# Patient Record
Sex: FEMALE | Race: WHITE | Employment: OTHER | URBAN - METROPOLITAN AREA
[De-identification: names, ages, dates, MRNs, and addresses within clinical notes are randomized per-mention and may not be internally consistent; named-entity substitution may affect disease eponyms.]

---

## 2018-04-04 ENCOUNTER — HOSPITAL ENCOUNTER (OUTPATIENT)
Dept: NON INVASIVE DIAGNOSTICS | Age: 73
Discharge: HOME | End: 2018-04-04
Attending: FAMILY MEDICINE
Payer: MEDICARE

## 2018-04-04 ENCOUNTER — HOSPITAL ENCOUNTER (OUTPATIENT)
Dept: GENERAL RADIOLOGY | Age: 73
Discharge: HOME | End: 2018-04-04
Payer: MEDICARE

## 2018-04-04 DIAGNOSIS — R06.02 SHORTNESS OF BREATH: ICD-10-CM

## 2018-04-04 DIAGNOSIS — R06.02 SOB (SHORTNESS OF BREATH): ICD-10-CM

## 2018-04-04 PROCEDURE — 74011250636 HC RX REV CODE- 250/636: Performed by: FAMILY MEDICINE

## 2018-04-04 PROCEDURE — 71046 X-RAY EXAM CHEST 2 VIEWS: CPT

## 2018-04-04 PROCEDURE — C8929 TTE W OR WO FOL WCON,DOPPLER: HCPCS

## 2018-04-04 RX ADMIN — PERFLUTREN 2 ML: 6.52 INJECTION, SUSPENSION INTRAVENOUS at 15:37

## 2018-05-04 ENCOUNTER — HOSPITAL ENCOUNTER (OUTPATIENT)
Dept: ULTRASOUND IMAGING | Age: 73
Discharge: HOME | End: 2018-05-04
Attending: INTERNAL MEDICINE
Payer: MEDICARE

## 2018-05-04 DIAGNOSIS — E83.110 HEMOCHROMATOSIS, HEREDITARY (HCC): ICD-10-CM

## 2018-05-04 PROCEDURE — 76700 US EXAM ABDOM COMPLETE: CPT

## 2018-05-22 ENCOUNTER — HOSPITAL ENCOUNTER (OUTPATIENT)
Dept: GENERAL RADIOLOGY | Age: 73
Discharge: HOME | End: 2018-05-22
Payer: MEDICARE

## 2018-05-22 DIAGNOSIS — R06.02 SHORTNESS OF BREATH: ICD-10-CM

## 2018-05-22 PROCEDURE — 71046 X-RAY EXAM CHEST 2 VIEWS: CPT

## 2018-12-27 ENCOUNTER — OFFICE VISIT (OUTPATIENT)
Dept: ORTHOPEDIC SURGERY | Age: 73
End: 2018-12-27

## 2018-12-27 VITALS
HEIGHT: 66 IN | BODY MASS INDEX: 34.46 KG/M2 | DIASTOLIC BLOOD PRESSURE: 62 MMHG | RESPIRATION RATE: 16 BRPM | HEART RATE: 66 BPM | SYSTOLIC BLOOD PRESSURE: 120 MMHG | TEMPERATURE: 97.4 F | OXYGEN SATURATION: 99 % | WEIGHT: 214.4 LBS

## 2018-12-27 DIAGNOSIS — Z96.653 STATUS POST TOTAL BILATERAL KNEE REPLACEMENT: ICD-10-CM

## 2018-12-27 DIAGNOSIS — M48.061 SPINAL STENOSIS OF LUMBAR REGION, UNSPECIFIED WHETHER NEUROGENIC CLAUDICATION PRESENT: ICD-10-CM

## 2018-12-27 DIAGNOSIS — M25.562 LEFT KNEE PAIN, UNSPECIFIED CHRONICITY: ICD-10-CM

## 2018-12-27 DIAGNOSIS — M70.52 PES ANSERINUS BURSITIS OF LEFT KNEE: Primary | ICD-10-CM

## 2018-12-27 DIAGNOSIS — M65.9: ICD-10-CM

## 2018-12-27 DIAGNOSIS — M25.561 RIGHT KNEE PAIN, UNSPECIFIED CHRONICITY: ICD-10-CM

## 2018-12-27 RX ORDER — OXYCODONE HYDROCHLORIDE 5 MG/1
5 TABLET ORAL
COMMUNITY
End: 2019-07-16

## 2018-12-27 RX ORDER — BETAMETHASONE SODIUM PHOSPHATE AND BETAMETHASONE ACETATE 3; 3 MG/ML; MG/ML
6 INJECTION, SUSPENSION INTRA-ARTICULAR; INTRALESIONAL; INTRAMUSCULAR; SOFT TISSUE ONCE
Qty: 1 VIAL | Refills: 0
Start: 2018-12-27 | End: 2018-12-27

## 2018-12-27 NOTE — PROGRESS NOTES
1. Have you been to the ER, urgent care clinic since your last visit? Hospitalized since your last visit? No    2. Have you seen or consulted any other health care providers outside of the [address] [place] [address] since your last visit? Include any pap smears or colon screening.  No

## 2018-12-27 NOTE — PROGRESS NOTES
[address] [place] [address], [address] [place] [address]  [phone]           Patient: Leesa Cranker                MRN: [id number]       SSN: [id number]  YOB: 1945        AGE: 74 y.o. SEX: female  Body mass index is 34.61 kg/m². PCP: Kelley Peterson MD  12/27/18      This office note has been dictated. REVIEW OF SYSTEMS:  Constitutional: Negative for fever, chills, weight loss and malaise/fatigue. HENT: Negative. Eyes: Negative. Respiratory: Negative. Cardiovascular: Negative. Gastrointestinal: No bowel incontinence or constipation. Genitourinary: No bladder incontinence or saddle anesthesia. Skin: Negative. Neurological: Negative. Endo/Heme/Allergies: Negative. Psychiatric/Behavioral: Negative. Musculoskeletal: As per HPI above.      Past Medical History:   Diagnosis Date    Adopted     Arthritis     Balance problems     GERD (gastroesophageal reflux disease)     Hemochromatosis     High cholesterol     Hypertension     Left knee pain     Left shoulder pain     Lumbar pain     Pain of left sacroiliac joint     Shoulder impingement, right, with rotator cuff strain          Current Outpatient Medications:     oxyCODONE IR (ROXICODONE) 5 mg immediate release tablet, Take 5 mg by mouth every four (4) hours as needed for Pain., Disp: , Rfl:     betamethasone (CELESTONE SOLUSPAN) 6 mg/mL injection, 1 mL by Intra artICUlar route once for 1 dose., Disp: 1 Vial, Rfl: 0    ondansetron (ZOFRAN ODT) 4 mg disintegrating tablet, Take 1 Tab by mouth every eight (8) hours as needed for Nausea., Disp: 10 Tab, Rfl: 0    acetaminophen (TYLENOL EXTRA STRENGTH) 500 mg tablet, Take  by mouth every six (6) hours as needed for Pain., Disp: , Rfl:     metolazone (ZAROXOLYN) 5 mg tablet, , Disp: , Rfl: 5    lisinopril (PRINIVIL, ZESTRIL) 10 mg tablet, , Disp: , Rfl: 6    simvastatin (ZOCOR) 40 mg tablet, Take  by mouth nightly., Disp: , Rfl:     traMADol (ULTRAM) 50 mg tablet, Take 50 mg by mouth every six (6) hours as needed for Pain., Disp: , Rfl:     Omeprazole delayed release (PRILOSEC D/R) 20 mg tablet, Take 20 mg by mouth daily. , Disp: , Rfl:     cholecalciferol, vitamin D3, (VITAMIN D3) 2,000 unit tab, Take  by mouth., Disp: , Rfl:     NAPROXEN SODIUM (ALEVE PO), Take  by mouth., Disp: , Rfl:     HYDROcodone-acetaminophen (NORCO) 5-325 mg per tablet, Take 1 Tab by mouth every four (4) hours as needed for Pain. Max Daily Amount: 6 Tabs., Disp: 20 Tab, Rfl: 0    HYDROcodone-acetaminophen (NORCO) 5-325 mg per tablet, Take  by mouth every four (4) hours as needed for Pain., Disp: , Rfl:     Allergies   Allergen Reactions    Indomethacin Anaphylaxis, Hives and Swelling       Social History     Socioeconomic History    Marital status: SINGLE     Spouse name: Not on file    Number of children: Not on file    Years of education: Not on file    Highest education level: Not on file   Social Needs    Financial resource strain: Not on file    Food insecurity - worry: Not on file    Food insecurity - inability: Not on file   [employer] needs - medical: Not on file   [employer] needs - non-medical: Not on file   Occupational History    Not on file   Tobacco Use    Smoking status: Unknown If Ever Smoked   Substance and Sexual Activity    Alcohol use: No    Drug use: No    Sexual activity: Not on file     Comment: post menopausal   Other Topics Concern    Not on file   Social History Narrative    Not on file       Past Surgical History:   Procedure Laterality Date    HX CHOLECYSTECTOMY      HX KNEE REPLACEMENT Bilateral     HX OTHER SURGICAL      Right little finger    HX OTHER SURGICAL      Right wrist, replaced unlnar).     HX OTHER SURGICAL      Both knees    HX OTHER SURGICAL      Left femur    HX OTHER SURGICAL      Trigger finger surgery    HX OTHER SURGICAL      Left knee replacment revision  HX TONSILLECTOMY             * Patient was identified by name and date of birth   * Agreement on procedure being performed was verified  * Risks and Benefits explained to the patient  * Procedure site verified and marked as necessary  * Patient was positioned for comfort  * Consent was signed and verified  1:53 PM    The patient was instructed on post injection care. We did see Ms. Reeder today for followup with regards to complaints of left lower extremity pain. The patient reports pain in her thigh, knee, and into her shin. She is status post knee replacement surgery. She has had bilateral knees. The left one was done in 2003. She has had a right knee replacement, which is having no troubles. She does report issues with her back. She is in pain management currently on Oxycodone. She is taking Ibuprofen as well. She cannot take Tylenol due to liver problems. She denies any injuries. She has had no falls and no fevers or chills. She denies any change in her bowel or bladder habits. PHYSICAL EXAMINATION:  In general, the patient is alert and oriented x 3 in no acute distress. The patient is well-developed, well-nourished, with a normal affect. The patient is afebrile. HEENT:  Head is normocephalic and atraumatic. Pupils are equally round and reactive to light and accommodation. Extraocular eye movements are intact. Neck is supple. Trachea is midline. No JVD is present. Breathing is nonlabored. Examination of the lower extremities reveals pain-free range of motion of the hips. There is no pain to palpation of the greater trochanteric bursae. There is negative straight leg raise on the right. She does have discomfort to straight leg raise on the left reproducing thigh pain, knee pain, and shin pain. The knee itself has no erythema, no ecchymosis, no warmth, and no signs of infection or cellulitis present.   She has well maintained range of motion with some slight laxity in mid-flexion. The patella tracks nicely. There is a little rub to the lateral patellar facet and tenderness to palpation to the pes bursa itself. The right knee examines benignly. RADIOGRAPHS:  Radiographs in the office today, including AP and lateral of the lumbar spine, shows multilevel degenerative changes with some bridging osteophyte formation at T12-L1 and L1-L2, and L2-L3. There is degenerative disc disease at multiple levels as well. X-rays of the knees, AP, lateral, and skyline, show the total knee components are well-fixed without evidence of loosening or fracture noted. ASSESSMENT:      1. Lumbar radiculopathy. 2. Left knee replacement with pes bursitis. PLAN:  At this point, we are going to obtain some basic labs, CBC, ESR, CRP, IL-6, and uric acid. I expect these will come back as negative. We will get an MRI of the lumbar spine, as well as a referral over to Dr. Cheikh Fay, who she has seen before. Today, in the office, we are going to move forward with a cortisone injection for the left pes bursitis. Under aseptic conditions, after informed and written consent, and appropriate time out performed, with ultrasound-guided assistance, the left knee was prepped with Betadine and 3 mg of Celestone was injected to the pes bursa without complications. The patient tolerated the injection well. The patient was instructed on post injection care. We will see her back in the office in about four to six weeks time for evaluation and review of the labs.                JR Raciel TANNER, ADAMA, ATC

## 2019-01-04 ENCOUNTER — HOSPITAL ENCOUNTER (OUTPATIENT)
Dept: LAB | Age: 74
Discharge: HOME | End: 2019-01-04
Payer: MEDICARE

## 2019-01-04 ENCOUNTER — HOSPITAL ENCOUNTER (OUTPATIENT)
Dept: MRI IMAGING | Age: 74
Discharge: HOME | End: 2019-01-04
Attending: PHYSICIAN ASSISTANT
Payer: MEDICARE

## 2019-01-04 DIAGNOSIS — Z96.653 STATUS POST TOTAL BILATERAL KNEE REPLACEMENT: ICD-10-CM

## 2019-01-04 DIAGNOSIS — M48.061 SPINAL STENOSIS OF LUMBAR REGION, UNSPECIFIED WHETHER NEUROGENIC CLAUDICATION PRESENT: ICD-10-CM

## 2019-01-04 DIAGNOSIS — M65.9: ICD-10-CM

## 2019-01-04 LAB
BASOPHILS # BLD: 0 K/UL (ref 0–0.1)
BASOPHILS NFR BLD: 0 % (ref 0–2)
CRP SERPL-MCNC: 0.7 MG/DL (ref 0–0.3)
DIFFERENTIAL METHOD BLD: ABNORMAL
EOSINOPHIL # BLD: 0.4 K/UL (ref 0–0.4)
EOSINOPHIL NFR BLD: 4 % (ref 0–5)
ERYTHROCYTE [DISTWIDTH] IN BLOOD BY AUTOMATED COUNT: 13.1 % (ref 11.6–14.5)
ERYTHROCYTE [SEDIMENTATION RATE] IN BLOOD: 31 MM/HR (ref 0–30)
HCT VFR BLD AUTO: 38.5 % (ref 35–45)
HGB BLD-MCNC: 13.1 G/DL (ref 12–16)
LYMPHOCYTES # BLD: 2.2 K/UL (ref 0.9–3.6)
LYMPHOCYTES NFR BLD: 24 % (ref 21–52)
MCH RBC QN AUTO: 32.4 PG (ref 24–34)
MCHC RBC AUTO-ENTMCNC: 34 G/DL (ref 31–37)
MCV RBC AUTO: 95.3 FL (ref 74–97)
MONOCYTES # BLD: 0.9 K/UL (ref 0.05–1.2)
MONOCYTES NFR BLD: 10 % (ref 3–10)
NEUTS SEG # BLD: 6 K/UL (ref 1.8–8)
NEUTS SEG NFR BLD: 62 % (ref 40–73)
PLATELET # BLD AUTO: 246 K/UL (ref 135–420)
PMV BLD AUTO: 11.4 FL (ref 9.2–11.8)
RBC # BLD AUTO: 4.04 M/UL (ref 4.2–5.3)
URATE SERPL-MCNC: 9.7 MG/DL (ref 2.6–7.2)
WBC # BLD AUTO: 9.5 K/UL (ref 4.6–13.2)

## 2019-01-04 PROCEDURE — 86140 C-REACTIVE PROTEIN: CPT

## 2019-01-04 PROCEDURE — 85652 RBC SED RATE AUTOMATED: CPT

## 2019-01-04 PROCEDURE — 85025 COMPLETE CBC W/AUTO DIFF WBC: CPT

## 2019-01-04 PROCEDURE — 83520 IMMUNOASSAY QUANT NOS NONAB: CPT

## 2019-01-04 PROCEDURE — 84550 ASSAY OF BLOOD/URIC ACID: CPT

## 2019-01-04 PROCEDURE — 36415 COLL VENOUS BLD VENIPUNCTURE: CPT

## 2019-01-04 PROCEDURE — 72148 MRI LUMBAR SPINE W/O DYE: CPT

## 2019-01-08 LAB — IL6 SERPL-MCNC: 11.1 PG/ML (ref 0–15.5)

## 2019-01-11 ENCOUNTER — OFFICE VISIT (OUTPATIENT)
Dept: FAMILY MEDICINE CLINIC | Facility: CLINIC | Age: 74
End: 2019-01-11

## 2019-01-11 VITALS
HEIGHT: 66 IN | WEIGHT: 213.4 LBS | BODY MASS INDEX: 34.3 KG/M2 | HEART RATE: 74 BPM | DIASTOLIC BLOOD PRESSURE: 48 MMHG | RESPIRATION RATE: 16 BRPM | OXYGEN SATURATION: 97 % | SYSTOLIC BLOOD PRESSURE: 102 MMHG | TEMPERATURE: 95.7 F

## 2019-01-11 DIAGNOSIS — R60.9 EDEMA, UNSPECIFIED TYPE: ICD-10-CM

## 2019-01-11 DIAGNOSIS — E83.119 HEMOCHROMATOSIS, UNSPECIFIED HEMOCHROMATOSIS TYPE: ICD-10-CM

## 2019-01-11 DIAGNOSIS — E03.9 ACQUIRED HYPOTHYROIDISM: ICD-10-CM

## 2019-01-11 DIAGNOSIS — R11.0 NAUSEA: ICD-10-CM

## 2019-01-11 DIAGNOSIS — E78.00 HIGH CHOLESTEROL: ICD-10-CM

## 2019-01-11 DIAGNOSIS — R06.09 DOE (DYSPNEA ON EXERTION): Primary | ICD-10-CM

## 2019-01-11 DIAGNOSIS — I10 ESSENTIAL HYPERTENSION: ICD-10-CM

## 2019-01-11 DIAGNOSIS — M81.0 OSTEOPOROSIS, UNSPECIFIED OSTEOPOROSIS TYPE, UNSPECIFIED PATHOLOGICAL FRACTURE PRESENCE: ICD-10-CM

## 2019-01-11 RX ORDER — LEVOTHYROXINE SODIUM 25 UG/1
25 TABLET ORAL
Qty: 30 TAB | Refills: 3 | Status: SHIPPED | OUTPATIENT
Start: 2019-01-11 | End: 2019-05-14 | Stop reason: SDUPTHER

## 2019-01-11 RX ORDER — ONDANSETRON 4 MG/1
4 TABLET, ORALLY DISINTEGRATING ORAL
Qty: 10 TAB | Refills: 0 | Status: SHIPPED | OUTPATIENT
Start: 2019-01-11 | End: 2019-01-30

## 2019-01-11 RX ORDER — POTASSIUM CHLORIDE 750 MG/1
TABLET, FILM COATED, EXTENDED RELEASE ORAL
COMMUNITY
End: 2019-01-11 | Stop reason: SDUPTHER

## 2019-01-11 RX ORDER — FUROSEMIDE 20 MG/1
20 TABLET ORAL 2 TIMES DAILY
Qty: 60 TAB | Refills: 3 | Status: SHIPPED | OUTPATIENT
Start: 2019-01-11 | End: 2019-01-30

## 2019-01-11 RX ORDER — LISINOPRIL 10 MG/1
10 TABLET ORAL DAILY
Qty: 30 TAB | Refills: 3 | Status: SHIPPED | OUTPATIENT
Start: 2019-01-11 | End: 2019-05-14 | Stop reason: SDUPTHER

## 2019-01-11 RX ORDER — FUROSEMIDE 20 MG/1
20 TABLET ORAL 2 TIMES DAILY
COMMUNITY
End: 2019-01-11 | Stop reason: SDUPTHER

## 2019-01-11 RX ORDER — SIMVASTATIN 40 MG/1
40 TABLET, FILM COATED ORAL
Qty: 30 TAB | Refills: 3 | Status: SHIPPED | OUTPATIENT
Start: 2019-01-11 | End: 2019-05-14 | Stop reason: SDUPTHER

## 2019-01-11 RX ORDER — CHOLECALCIFEROL (VITAMIN D3) 125 MCG
1 CAPSULE ORAL DAILY
Qty: 30 TAB | Refills: 3 | Status: SHIPPED | OUTPATIENT
Start: 2019-01-11 | End: 2019-01-30

## 2019-01-11 RX ORDER — LEVOTHYROXINE SODIUM 25 UG/1
TABLET ORAL
COMMUNITY
End: 2019-01-11 | Stop reason: SDUPTHER

## 2019-01-11 RX ORDER — MELOXICAM 15 MG/1
15 TABLET ORAL DAILY
COMMUNITY
End: 2019-07-16

## 2019-01-11 RX ORDER — METOLAZONE 5 MG/1
5 TABLET ORAL DAILY
Qty: 30 TAB | Refills: 3 | Status: SHIPPED | OUTPATIENT
Start: 2019-01-11 | End: 2019-05-14 | Stop reason: SDUPTHER

## 2019-01-11 RX ORDER — POTASSIUM CHLORIDE 750 MG/1
10 TABLET, FILM COATED, EXTENDED RELEASE ORAL DAILY
Qty: 30 TAB | Refills: 3 | Status: SHIPPED | OUTPATIENT
Start: 2019-01-11 | End: 2019-01-30

## 2019-01-11 NOTE — PROGRESS NOTES
HISTORY OF PRESENT ILLNESS Alyce Rosales is a 74 y.o. female. 01/11/19 
9:23 AM 
This is a 74 y.o. female Patient presents with: 
Establish Care: pt here to establish care. She has multiple medical problems Today she is seen for chronic dyspnea HPI Comments: The patient states She sees Dr. Kelvin Bernard in pain management clinic. There was concern about her MCBRIDE She saw Dr. Yo Fowler and a workup was negative, which included labs and a 2DECHO There is no history of lung disease. She stopped smoking 20 years ago after starting smoking at age 30. 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 She sees orthopedics for hip and knee pain as well as bilateral rotator cuff injury. She did heavy lifting work back home in [facility] as a child She has had no fevers chills or night sweats. The patient has had no recent change in her medications Review of Systems Constitutional:  
     The patient denies any fever, chills, night sweats or weight loss There has been no diaphoresis malaise or weakness HENT: Positive for hearing loss (bilateral hearing aids). Negative for congestion, ear discharge, ear pain, sinus pain and tinnitus. Eyes: Negative for blurred vision (Wears glasses), double vision, photophobia, pain, discharge and redness. Respiratory: Positive for shortness of breath and wheezing (primarily anteriorly). Negative for cough. Cardiovascular: Positive for leg swelling. Negative for chest pain, palpitations, orthopnea and PND. Gastrointestinal: Negative for constipation, diarrhea, heartburn and nausea. Genitourinary: Negative for dysuria, frequency and urgency. Musculoskeletal: Positive for back pain, joint pain and myalgias. Skin: Positive for rash (She has areas of scaling on the trunk and saw a dermatologist for the problem She was given a cream that she states was too expensive). Negative for itching. Neurological: Negative for dizziness, tingling, tremors, sensory change and headaches. Endo/Heme/Allergies: Negative for polydipsia. Does not bruise/bleed easily. Psychiatric/Behavioral: Negative for depression. The patient is not nervous/anxious. Active Ambulatory Problems Diagnosis Date Noted  Shoulder impingement, right, with rotator cuff strain  Spinal stenosis 11/27/2013  Sacroiliitis ([address] 12/31/2013  High cholesterol 01/11/2019  Hypertension 01/11/2019  Hemochromatosis 01/11/2019 Resolved Ambulatory Problems Diagnosis Date Noted  No Resolved Ambulatory Problems Past Medical History:  
Diagnosis Date  Adopted  Arthritis  Balance problems  GERD (gastroesophageal reflux disease)  Hemochromatosis  High cholesterol  Hypertension  Left knee pain  Left shoulder pain  Lumbar pain  Pain of left sacroiliac joint  Shoulder impingement, right, with rotator cuff strain   
 
 
family history is not on file. She was adopted. reports that she has quit smoking. she has never used smokeless tobacco. She reports that she does not drink alcohol or use drugs. No results found for any visits on 01/11/19. Meaghan Reeder had no medications administered during this visit. Vitals:  
 01/11/19 0912 BP: 102/48 Pulse: 74 Resp: 16 Temp: 95.7 °F (35.4 °C) TempSrc: Oral  
SpO2: 97% Weight: 213 lb 6.4 oz (96.8 kg) Height: 5' 6\" (1.676 m) Physical Exam  
Constitutional:  
Body mass index is 34.44 kg/m². HENT:  
Bilateral hearing aids Eyes: Pupils are equal, round, and reactive to light. Wears glasses Neck: No thyromegaly present. Cardiovascular: Normal rate, regular rhythm and intact distal pulses. Exam reveals no gallop and no friction rub. No murmur heard. Pulmonary/Chest: No respiratory distress (Dyspneic on moving from chair to exam table). She has wheezes (Auditory, less so by auscultation). She has no rales. She exhibits no tenderness. Abdominal: She exhibits no distension. There is no tenderness. There is no rebound. Musculoskeletal: She exhibits edema (3+). Angalgic gait Can't abduct shoulders to 90 degrees. Knee pain on movement Overlapping toes, abnormal arch Neurological: No cranial nerve deficit. Coordination normal.  
Skin: No rash noted. No erythema. Psychiatric: She has a normal mood and affect. Nursing note and vitals reviewed. ASSESSMENT and PLAN 
  ICD-10-CM ICD-9-CM 1. MCBRIDE (dyspnea on exertion) R06.09 786.09 PULMONARY FUNCTION TEST Reviewed workup to date Ordered PFT's 2. Hemochromatosis, unspecified hemochromatosis type E83.119 275.03 May be contributing to the dyspnea if  deposition takes place Being followed by Dr Jocelyne Nava 3. Acquired hypothyroidism E03.9 244.9 levothyroxine (SYNTHROID) 25 mcg tablet 4. Edema, unspecified type R60.9 782.3 metOLazone (ZAROXOLYN) 5 mg tablet  
   furosemide (LASIX) 20 mg tablet 5. Essential hypertension I10 401.9 potassium chloride SR (KLOR-CON 10) 10 mEq tablet  
   lisinopril (PRINIVIL, ZESTRIL) 10 mg tablet 6. High cholesterol E78.00 272.0 simvastatin (ZOCOR) 40 mg tablet 7. Nausea R11.0 787.02 ondansetron (ZOFRAN ODT) 4 mg disintegrating tablet 8. Osteoporosis, unspecified osteoporosis type, unspecified pathological fracture presence M81.0 733.00 cholecalciferol, vitamin D3, (VITAMIN D3) 2,000 unit tab Follow-up Disposition: 
Return in about 2 months (around 3/11/2019). the following changes in treatment are made: Old records sought

## 2019-01-11 NOTE — PROGRESS NOTES
Donovan Connell is a 74 y.o. female here to establish care Donovan Connell is a 74 y.o. female (: 1945) presenting to address: Chief Complaint Patient presents with  Establish Care  
  pt here to establish care Vitals:  
 19 0912 Resp: 16 SpO2: 97% Weight: 213 lb 6.4 oz (96.8 kg) Height: 5' 6\" (1.676 m) PainSc:   0 - No pain Hearing/Vision: No exam data present Learning Assessment:  
 
Learning Assessment 2019 PRIMARY LEARNER Patient HIGHEST LEVEL OF EDUCATION - PRIMARY LEARNER  4 YEARS OF COLLEGE  
BARRIERS PRIMARY LEARNER NONE  
CO-LEARNER CAREGIVER No  
PRIMARY LANGUAGE ENGLISH  
LEARNER PREFERENCE PRIMARY DEMONSTRATION  
  READING  
  VIDEOS  
ANSWERED BY patient RELATIONSHIP SELF Depression Screening: PHQ over the last two weeks 2019 Little interest or pleasure in doing things Not at all Feeling down, depressed, irritable, or hopeless Not at all Total Score PHQ 2 0 Fall Risk Assessment:  
 
Fall Risk Assessment, last 12 mths 2019 Able to walk? Yes Fall in past 12 months? No  
 
Abuse Screening: No flowsheet data found. Coordination of Care Questionaire: 1. Have you been to the ER, urgent care clinic since your last visit? Hospitalized since your last visit? NO 
 
2. Have you seen or consulted any other health care providers outside of the [address] [place] [address] since your last visit? Include any pap smears or colon screening. NO Advanced Directive: 1. Do you have an Advanced Directive? NO 
 
2. Would you like information on Advanced Directives?  NO

## 2019-01-17 ENCOUNTER — OFFICE VISIT (OUTPATIENT)
Dept: ORTHOPEDIC SURGERY | Facility: CLINIC | Age: 74
End: 2019-01-17

## 2019-01-17 VITALS
BODY MASS INDEX: 34.01 KG/M2 | OXYGEN SATURATION: 97 % | TEMPERATURE: 95.7 F | DIASTOLIC BLOOD PRESSURE: 66 MMHG | HEIGHT: 66 IN | HEART RATE: 68 BPM | SYSTOLIC BLOOD PRESSURE: 122 MMHG | RESPIRATION RATE: 18 BRPM | WEIGHT: 211.6 LBS

## 2019-01-17 DIAGNOSIS — M10.9 GOUT, UNSPECIFIED CAUSE, UNSPECIFIED CHRONICITY, UNSPECIFIED SITE: Primary | ICD-10-CM

## 2019-01-17 DIAGNOSIS — M70.50 PES ANSERINE BURSITIS: ICD-10-CM

## 2019-01-17 DIAGNOSIS — Z96.653 STATUS POST TOTAL BILATERAL KNEE REPLACEMENT: ICD-10-CM

## 2019-01-17 DIAGNOSIS — M48.061 SPINAL STENOSIS OF LUMBAR REGION, UNSPECIFIED WHETHER NEUROGENIC CLAUDICATION PRESENT: ICD-10-CM

## 2019-01-17 RX ORDER — ALLOPURINOL 100 MG/1
100 TABLET ORAL 2 TIMES DAILY
Qty: 60 TAB | Refills: 1 | Status: SHIPPED | OUTPATIENT
Start: 2019-01-17 | End: 2019-02-14 | Stop reason: SDUPTHER

## 2019-01-17 NOTE — PROGRESS NOTES
[address] [place] [address], [address] [facility] [address] 
[phone] Patient: Kaylee Severino                MRN: [id number]       SSN: [id number] YOB: 1945        AGE: 74 y.o. SEX: female Body mass index is 34.15 kg/m². PCP: Barron Carrel, MD 
01/17/19 This office note has been dictated. REVIEW OF SYSTEMS: 
Constitutional: Negative for fever, chills, weight loss and malaise/fatigue. HENT: Negative. Eyes: Negative. Respiratory: Negative. Cardiovascular: Negative. Gastrointestinal: No bowel incontinence or constipation. Genitourinary: No bladder incontinence or saddle anesthesia. Skin: Negative. Neurological: Negative. Endo/Heme/Allergies: Negative. Psychiatric/Behavioral: Negative. Musculoskeletal: As per HPI above. Past Medical History:  
Diagnosis Date  Adopted  Arthritis  Balance problems  GERD (gastroesophageal reflux disease)  Hemochromatosis  High cholesterol  Hypertension  Left knee pain  Left shoulder pain  Lumbar pain  Pain of left sacroiliac joint  Shoulder impingement, right, with rotator cuff strain Current Outpatient Medications:  
  meloxicam (MOBIC) 15 mg tablet, Take 15 mg by mouth daily. , Disp: , Rfl:  
  simvastatin (ZOCOR) 40 mg tablet, Take 1 Tab by mouth nightly., Disp: 30 Tab, Rfl: 3 
  potassium chloride SR (KLOR-CON 10) 10 mEq tablet, Take 1 Tab by mouth daily. , Disp: 30 Tab, Rfl: 3 
  metOLazone (ZAROXOLYN) 5 mg tablet, Take 1 Tab by mouth daily. , Disp: 30 Tab, Rfl: 3 
  lisinopril (PRINIVIL, ZESTRIL) 10 mg tablet, Take 1 Tab by mouth daily. , Disp: 30 Tab, Rfl: 3 
  levothyroxine (SYNTHROID) 25 mcg tablet, Take 1 Tab by mouth Daily (before breakfast). , Disp: 30 Tab, Rfl: 3 
  furosemide (LASIX) 20 mg tablet, Take 1 Tab by mouth two (2) times a day., Disp: 60 Tab, Rfl: 3   cholecalciferol, vitamin D3, (VITAMIN D3) 2,000 unit tab, Take 1 Tab by mouth daily. , Disp: 30 Tab, Rfl: 3 
  oxyCODONE IR (ROXICODONE) 5 mg immediate release tablet, Take 5 mg by mouth every four (4) hours as needed for Pain., Disp: , Rfl:  
  Omeprazole delayed release (PRILOSEC D/R) 20 mg tablet, Take 20 mg by mouth daily. , Disp: , Rfl:  
  ondansetron (ZOFRAN ODT) 4 mg disintegrating tablet, Take 1 Tab by mouth every eight (8) hours as needed for Nausea., Disp: 10 Tab, Rfl: 0 
  HYDROcodone-acetaminophen (NORCO) 5-325 mg per tablet, Take 1 Tab by mouth every four (4) hours as needed for Pain. Max Daily Amount: 6 Tabs., Disp: 20 Tab, Rfl: 0 
  acetaminophen (TYLENOL EXTRA STRENGTH) 500 mg tablet, Take  by mouth every six (6) hours as needed for Pain., Disp: , Rfl:  
  HYDROcodone-acetaminophen (NORCO) 5-325 mg per tablet, Take  by mouth every four (4) hours as needed for Pain., Disp: , Rfl:  
  traMADol (ULTRAM) 50 mg tablet, Take 50 mg by mouth every six (6) hours as needed for Pain., Disp: , Rfl:  
  NAPROXEN SODIUM (ALEVE PO), Take  by mouth., Disp: , Rfl:  
 
Allergies Allergen Reactions  Indomethacin Anaphylaxis, Hives and Swelling Social History Socioeconomic History  Marital status: SINGLE Spouse name: Not on file  Number of children: Not on file  Years of education: Not on file  Highest education level: Not on file Social Needs  Financial resource strain: Not on file  Food insecurity - worry: Not on file  Food insecurity - inability: Not on file  Transportation needs - medical: Not on file  Transportation needs - non-medical: Not on file Occupational History  Not on file Tobacco Use  Smoking status: Former Smoker  Smokeless tobacco: Never Used  Tobacco comment: quit 20 years ago Substance and Sexual Activity  Alcohol use: No  
 Drug use: No  
 Sexual activity: Not on file Comment: post menopausal  
Other Topics Concern  Not on file Social History Narrative  Not on file Past Surgical History:  
Procedure Laterality Date  HX CHOLECYSTECTOMY  HX CHOLECYSTECTOMY  HX KNEE REPLACEMENT Bilateral   
 R knee/ Lknee and femur  HX ORTHOPAEDIC    
 R trigger finger  HX OTHER SURGICAL Right little finger  HX OTHER SURGICAL Right wrist, replaced unlnar).  HX OTHER SURGICAL Both knees  HX OTHER SURGICAL Left femur  HX OTHER SURGICAL Trigger finger surgery  HX OTHER SURGICAL Left knee replacment revision  HX ROTATOR CUFF REPAIR Right  HX TONSILLECTOMY We did see Ms. Reeder today for followup with regards to her lower extremities. The patient was having some radicular symptomatology. We sent her for an MRI of the lumbar spine. She does have an upcoming appointment with Dr. Roshni Menjivar. She has had a left knee replacement in the past.  She had some bursitis. I gave her a pes injection, which helped her considerably. We also sent her for infectious labs. She returns today for review. She has had no recent fevers, chills, systemic changes, or injuries to report, and no chest pain or shortness of breath. PHYSICAL EXAMINATION:  In general, the patient is alert and oriented x 3 in no acute distress. The patient is well-developed, well-nourished, with a normal affect. The patient is afebrile. HEENT:  Head is normocephalic and atraumatic. Pupils are equally round and reactive to light and accommodation. Extraocular eye movements are intact. Neck is supple. Trachea is midline. No JVD is present. Breathing is nonlabored. Examination of the lower extremities reveals pain-free range of motion of the hips. There is no pain to palpation of the greater trochanteric bursae. There is negative straight leg raise. There is negative calf tenderness. There is negative Shawnas.   There is no evidence of DVT present. There is slightly decreased sensation to L4-5 on the left and L3-4 on the right. The left knee reveals the skin is intact. There is no ecchymosis, no warmth, and no signs of infection or cellulitis present. There is full range of motion, very good stability, and the patella tracks nicely without rubs or crepitus noted. There is no tenderness to palpation about the knee today. LABORATORY STUDIES:   Review of labs shows uric acid is 9.7. CRP is 0.7. Sedimentation rate is 31. IL-6 is 11.1. CBC white count is 9.5. RADIOGRAPHS:  Review of the MRI of the lumbar spine shows multilevel degenerative changes worse at L4-5, foraminal stenosis, and exiting nerve root impingement on the left, L2-3 disc bulge, moderate central stenosis at L3-4, moderate foraminal stenosis, and nerve root compression right L3.  
 
ASSESSMENT:   
 
1. Status post left knee replacement. 2. Pes bursitis left knee improved. 3. Lumbar radiculopathy. 4. Gout. PLAN:  She does have an upcoming appointment with Dr. Deborah Kennedy. We discussed physical therapy. She would like to hold off on this until she sees Dr. Deborah Kennedy. We are going to start her on Allopurinol 100 mg b.i.d. This was sent to the pharmacy for her. We will plan on seeing her back in the office as needed. She will call with any questions or concerns that shall arise.   
 
 
 
 
 
 
 
JR Raciel TANNER, PASILAS, ATC

## 2019-01-23 ENCOUNTER — HOSPITAL ENCOUNTER (OUTPATIENT)
Dept: RESPIRATORY THERAPY | Age: 74
Discharge: HOME | End: 2019-01-23
Attending: EMERGENCY MEDICINE
Payer: MEDICARE

## 2019-01-23 DIAGNOSIS — R06.09 DOE (DYSPNEA ON EXERTION): ICD-10-CM

## 2019-01-23 PROCEDURE — 94726 PLETHYSMOGRAPHY LUNG VOLUMES: CPT

## 2019-01-23 PROCEDURE — 94060 EVALUATION OF WHEEZING: CPT

## 2019-01-23 PROCEDURE — 94729 DIFFUSING CAPACITY: CPT

## 2019-01-29 ENCOUNTER — OFFICE VISIT (OUTPATIENT)
Dept: FAMILY MEDICINE CLINIC | Facility: CLINIC | Age: 74
End: 2019-01-29

## 2019-01-29 VITALS
OXYGEN SATURATION: 95 % | DIASTOLIC BLOOD PRESSURE: 52 MMHG | RESPIRATION RATE: 16 BRPM | HEART RATE: 67 BPM | WEIGHT: 209 LBS | BODY MASS INDEX: 41.03 KG/M2 | TEMPERATURE: 95.4 F | HEIGHT: 60 IN | SYSTOLIC BLOOD PRESSURE: 112 MMHG

## 2019-01-29 DIAGNOSIS — I10 ESSENTIAL HYPERTENSION: ICD-10-CM

## 2019-01-29 DIAGNOSIS — R06.09 DOE (DYSPNEA ON EXERTION): Primary | ICD-10-CM

## 2019-01-29 DIAGNOSIS — E83.119 HEMOCHROMATOSIS, UNSPECIFIED HEMOCHROMATOSIS TYPE: ICD-10-CM

## 2019-01-29 DIAGNOSIS — E03.9 ACQUIRED HYPOTHYROIDISM: ICD-10-CM

## 2019-01-29 DIAGNOSIS — E78.00 HIGH CHOLESTEROL: ICD-10-CM

## 2019-01-29 PROBLEM — E66.01 OBESITY, MORBID (HCC): Status: ACTIVE | Noted: 2019-01-29

## 2019-01-29 RX ORDER — FLUTICASONE PROPIONATE 220 UG/1
1 AEROSOL, METERED RESPIRATORY (INHALATION) 2 TIMES DAILY
Qty: 1 INHALER | Refills: 3 | Status: SHIPPED | OUTPATIENT
Start: 2019-01-29 | End: 2019-01-29

## 2019-01-29 RX ORDER — ALBUTEROL SULFATE 90 UG/1
1 AEROSOL, METERED RESPIRATORY (INHALATION)
Qty: 1 INHALER | Refills: 3 | Status: SHIPPED | OUTPATIENT
Start: 2019-01-29 | End: 2019-07-26

## 2019-01-29 RX ORDER — FLUTICASONE PROPIONATE 220 UG/1
1 AEROSOL, METERED RESPIRATORY (INHALATION) 2 TIMES DAILY
Qty: 1 INHALER | Refills: 3 | Status: SHIPPED | OUTPATIENT
Start: 2019-01-29 | End: 2019-07-26

## 2019-01-29 RX ORDER — ALBUTEROL SULFATE 90 UG/1
1 AEROSOL, METERED RESPIRATORY (INHALATION)
Qty: 1 INHALER | Refills: 3 | Status: SHIPPED | OUTPATIENT
Start: 2019-01-29 | End: 2019-01-29

## 2019-01-29 NOTE — PATIENT INSTRUCTIONS
Albuterol (Proventil, VoSpire, VoSpire ER) - (By mouth) Why this medicine is used:  
Treats bronchospasm. Contact a nurse or doctor right away if you have: 
· Itching or hives, swelling in your face, mouth, throat, or hands, chest tightness, trouble breathing · Chest pain · Fast, pounding, or uneven heartbeat Common side effects: 
· Shakiness, restlessness, nervousness, excitement, or trouble sleeping © 2017 [facility] Information is for End User's use only and may not be sold, redistributed or otherwise used for commercial purposes.

## 2019-01-29 NOTE — PROGRESS NOTES
01/29/19 
9:12 AM 
This is a 74 y.o. female Chief Complaint Patient presents with  Follow-up HTN Cholesterol  Results HISTORY OF PRESENT ILLNESS The patient is presenting in follow up. Dyspnea on exertion The patient had a PFTshowing a decreased diffusion capacity of 73% of predicted. She states that when she received the bronchodilator during the test she felt much better. No cough admitted to. She denies orthopnea. DJD limits her mobility so she cannot attest to MCBRIDE Hypertension Patient has no symptoms or problems with medications. The patient has no headaches, visual changes, chest pain or pressure,abdominal pain, dysuria, weakness, or paresthesias HyperlipidemiaThe levels have not been checked recently. No problems with her medications Hemochromatosis This is being followed and treated by her oncology specialist. 
. 
 
 
Degenerative joint disease She is status post knee replacement surgery. She has had bilateral knees. The left one was done in 2003. She has had a right knee replacement, which is having no troubles. She does report issues with her back. She is in pain management currently on Oxycodone. She was given allopurinol for gout. She is not taking Tramadol or Naproxen Medication review. Discussed with the patient and friend who is present. Stop Lasix Stop Aleve. Not taking Tramadol Review of Systems Constitutional:  
     The patient denies any fever, chills, night sweats or weight loss There has been no diaphoresis malaise or weakness HENT: Positive for hearing loss (bilateral hearing aids). Negative for congestion, ear discharge, ear pain, sinus pain and tinnitus. Eyes: Negative for blurred vision (Wears glasses), double vision, photophobia, pain, discharge and redness. Respiratory: Positive for shortness of breath and wheezing (less now than prior visit). Negative for cough. Cardiovascular: Positive for leg swelling.  Negative for chest pain, palpitations, orthopnea and PND. Gastrointestinal: Negative for constipation, diarrhea, heartburn and nausea. Genitourinary: Negative for dysuria, frequency and urgency. Musculoskeletal: Positive for back pain, joint pain and myalgias. Skin: Positive for rash (She has areas of scaling on the trunk and saw a dermatologist for the problem She was given a cream that she states was too expensive). Negative for itching. Neurological: Negative for dizziness, tingling, tremors, sensory change and headaches. Endo/Heme/Allergies: Negative for polydipsia. Does not bruise/bleed easily. Psychiatric/Behavioral: Negative for depression. The patient is not nervous/anxious. Current Outpatient Medications:  
  allopurinol (ZYLOPRIM) 100 mg tablet, Take 1 Tab by mouth two (2) times a day., Disp: 60 Tab, Rfl: 1 
  meloxicam (MOBIC) 15 mg tablet, Take 15 mg by mouth daily. , Disp: , Rfl:  
  simvastatin (ZOCOR) 40 mg tablet, Take 1 Tab by mouth nightly., Disp: 30 Tab, Rfl: 3 
  potassium chloride SR (KLOR-CON 10) 10 mEq tablet, Take 1 Tab by mouth daily. , Disp: 30 Tab, Rfl: 3 
  metOLazone (ZAROXOLYN) 5 mg tablet, Take 1 Tab by mouth daily. , Disp: 30 Tab, Rfl: 3 
  lisinopril (PRINIVIL, ZESTRIL) 10 mg tablet, Take 1 Tab by mouth daily. , Disp: 30 Tab, Rfl: 3 
  levothyroxine (SYNTHROID) 25 mcg tablet, Take 1 Tab by mouth Daily (before breakfast). , Disp: 30 Tab, Rfl: 3 
  furosemide (LASIX) 20 mg tablet, Take 1 Tab by mouth two (2) times a day., Disp: 60 Tab, Rfl: 3   cholecalciferol, vitamin D3, (VITAMIN D3) 2,000 unit tab, Take 1 Tab by mouth daily. , Disp: 30 Tab, Rfl: 3 
  oxyCODONE IR (ROXICODONE) 5 mg immediate release tablet, Take 5 mg by mouth every four (4) hours as needed for Pain., Disp: , Rfl:  
  Omeprazole delayed release (PRILOSEC D/R) 20 mg tablet, Take 20 mg by mouth daily. , Disp: , Rfl:  
  ondansetron (ZOFRAN ODT) 4 mg disintegrating tablet, Take 1 Tab by mouth every eight (8) hours as needed for Nausea., Disp: 10 Tab, Rfl: 0 
  HYDROcodone-acetaminophen (NORCO) 5-325 mg per tablet, Take 1 Tab by mouth every four (4) hours as needed for Pain. Max Daily Amount: 6 Tabs., Disp: 20 Tab, Rfl: 0 
  acetaminophen (TYLENOL EXTRA STRENGTH) 500 mg tablet, Take  by mouth every six (6) hours as needed for Pain., Disp: , Rfl:  
  HYDROcodone-acetaminophen (NORCO) 5-325 mg per tablet, Take  by mouth every four (4) hours as needed for Pain., Disp: , Rfl:  
  traMADol (ULTRAM) 50 mg tablet, Take 50 mg by mouth every six (6) hours as needed for Pain., Disp: , Rfl:  
  NAPROXEN SODIUM (ALEVE PO), Take  by mouth., Disp: , Rfl:  
 
Allergies Allergen Reactions  Indomethacin Anaphylaxis, Hives and Swelling Active Ambulatory Problems Diagnosis Date Noted  Shoulder impingement, right, with rotator cuff strain  Spinal stenosis 11/27/2013  Sacroiliitis ([facility] [address] 12/31/2013  High cholesterol 01/11/2019  Hypertension 01/11/2019  Hemochromatosis 01/11/2019 Resolved Ambulatory Problems Diagnosis Date Noted  No Resolved Ambulatory Problems Past Medical History:  
Diagnosis Date  Adopted  Arthritis  Balance problems  GERD (gastroesophageal reflux disease)  Hemochromatosis  High cholesterol  Hypertension  Left knee pain  Left shoulder pain  Lumbar pain  Pain of left sacroiliac joint  Shoulder impingement, right, with rotator cuff strain Diagnostic review IMPRESSION Impression: 1. No acute process. 2DECHO STUDY; Left ventricle: Size was normal. Systolic function was normal by visual  
assessment. Ejection fraction was estimated in 
the range of 55 % to 60 %. No obvious wall motion abnormalities identified in 
 the views obtained. Wall thickness was 
mildly increased. Right ventricle: Systolic pressure was within the normal range. Estimated  
peak pressure was 32 mmHg. MRI  Lumbar area 1. Progression of degenerative disease at L4-L5, with foraminal stenosis and 
exiting nerve root compression, especially on the left. 2. Also slightly worse central stenosis at L2-L3 from posterior disc bulge, 
facet and ligamentous hypertrophy. 3. Moderate central stenosis at L3-L4 with moderate foraminal stenosis with 
potential exiting nerve root compression, especially right L3. Perhaps similar 
to prior study. 1. Lumbar radiculopathy. 2. Left knee replacement with pes bursitis. Visit Vitals /52 Pulse 67 Temp 95.4 °F (35.2 °C) (Oral) Resp 16 Ht 5' (1.524 m) Wt 209 lb (94.8 kg) SpO2 95% BMI 40.82 kg/m² Physical Exam  
Constitutional:  
Body mass index is 34.44 kg/m². HENT:  
Bilateral hearing aids Eyes: Pupils are equal, round, and reactive to light. Wears glasses Neck: No JVD present. No thyromegaly present. Cardiovascular: Normal rate, regular rhythm and intact distal pulses. Exam reveals no gallop and no friction rub. No murmur heard. Pulmonary/Chest: No respiratory distress (Dyspneic on moving from chair to exam table). She has wheezes (Decreased wheezing). She has no rales. She exhibits no tenderness. Abdominal: She exhibits no distension. There is no tenderness. There is no rebound. Musculoskeletal: She exhibits edema (3+). Angalgic gait Can't abduct shoulders to 90 degrees. Knee pain on movement Overlapping toes, abnormal arch Lymphadenopathy:  
  She has no cervical adenopathy. Neurological: No cranial nerve deficit. Coordination normal.  
Skin: No rash noted. No erythema. Psychiatric: She has a normal mood and affect. Nursing note and vitals reviewed. Diagnoses and all orders for this visit: 
 
1. MCBRIDE (dyspnea on exertion) Comments: 
States she improved with bronchodilator during the test 
Flovent and Proventil added Referral to pulmonary Orders: 
-     fluticasone (FLOVENT HFA) 220 mcg/actuation inhaler;  Take 1 Puff by inhalation two (2) times a day. -     REFERRAL TO PULMONARY DISEASE 
-     albuterol (PROVENTIL HFA, VENTOLIN HFA, PROAIR HFA) 90 mcg/actuation inhaler; Take 1 Puff by inhalation every six (6) hours as needed for Wheezing. 2. Essential hypertension Comments: 
Good control Stopped the Lasix. Follow 3. High cholesterol Comments: 
Lipid panel ordered No symptoms at present Orders: 
-     LIPID PANEL; Future 4. Hemochromatosis, unspecified hemochromatosis type Comments: Followed by specialist 
No symptoms at this time 5. Acquired hypothyroidism Comments: 
TSH ordered Orders: 
-     TSH 3RD GENERATION; Future

## 2019-01-31 ENCOUNTER — HOSPITAL ENCOUNTER (OUTPATIENT)
Dept: LAB | Age: 74
Discharge: HOME | End: 2019-01-31
Payer: MEDICARE

## 2019-01-31 DIAGNOSIS — E03.9 ACQUIRED HYPOTHYROIDISM: ICD-10-CM

## 2019-01-31 DIAGNOSIS — E78.00 HIGH CHOLESTEROL: ICD-10-CM

## 2019-01-31 LAB
CHOLEST SERPL-MCNC: 158 MG/DL
HDLC SERPL-MCNC: 53 MG/DL (ref 40–60)
HDLC SERPL: 3 {RATIO} (ref 0–5)
LDLC SERPL CALC-MCNC: 72.4 MG/DL (ref 0–100)
LIPID PROFILE,FLP: ABNORMAL
TRIGL SERPL-MCNC: 163 MG/DL (ref ?–150)
TSH SERPL DL<=0.05 MIU/L-ACNC: 2.39 UIU/ML (ref 0.36–3.74)
VLDLC SERPL CALC-MCNC: 32.6 MG/DL

## 2019-01-31 PROCEDURE — 36415 COLL VENOUS BLD VENIPUNCTURE: CPT

## 2019-01-31 PROCEDURE — 80061 LIPID PANEL: CPT

## 2019-01-31 PROCEDURE — 84443 ASSAY THYROID STIM HORMONE: CPT

## 2019-02-01 NOTE — PROGRESS NOTES
.please call thyroid studies normal.  Slight elevation of the lipid triglyceride, not worrisome, will follow up on it next visit

## 2019-02-12 ENCOUNTER — OFFICE VISIT (OUTPATIENT)
Dept: ORTHOPEDIC SURGERY | Age: 74
End: 2019-02-12

## 2019-02-12 ENCOUNTER — TELEPHONE (OUTPATIENT)
Dept: FAMILY MEDICINE CLINIC | Facility: CLINIC | Age: 74
End: 2019-02-12

## 2019-02-12 VITALS
WEIGHT: 221 LBS | TEMPERATURE: 97.6 F | RESPIRATION RATE: 22 BRPM | HEIGHT: 60 IN | HEART RATE: 58 BPM | SYSTOLIC BLOOD PRESSURE: 136 MMHG | OXYGEN SATURATION: 100 % | DIASTOLIC BLOOD PRESSURE: 68 MMHG | BODY MASS INDEX: 43.39 KG/M2

## 2019-02-12 DIAGNOSIS — M47.816 LUMBAR FACET ARTHROPATHY: Primary | ICD-10-CM

## 2019-02-12 DIAGNOSIS — M62.838 MUSCLE SPASM: ICD-10-CM

## 2019-02-12 DIAGNOSIS — R60.9 PERIPHERAL EDEMA: Primary | ICD-10-CM

## 2019-02-12 DIAGNOSIS — M48.061 SPINAL STENOSIS OF LUMBAR REGION WITHOUT NEUROGENIC CLAUDICATION: ICD-10-CM

## 2019-02-12 DIAGNOSIS — R60.9 PERIPHERAL EDEMA: ICD-10-CM

## 2019-02-12 DIAGNOSIS — M47.816 SPONDYLOSIS OF LUMBAR REGION WITHOUT MYELOPATHY OR RADICULOPATHY: ICD-10-CM

## 2019-02-12 RX ORDER — NALOXONE HYDROCHLORIDE 4 MG/.1ML
SPRAY NASAL
COMMUNITY
End: 2019-07-16

## 2019-02-12 RX ORDER — CHOLECALCIFEROL (VITAMIN D3) 125 MCG
CAPSULE ORAL
Refills: 3 | Status: ON HOLD | COMMUNITY
Start: 2019-01-11 | End: 2021-07-30

## 2019-02-12 RX ORDER — FUROSEMIDE 20 MG/1
TABLET ORAL
Refills: 2 | COMMUNITY
Start: 2018-12-19 | End: 2019-02-12 | Stop reason: SDUPTHER

## 2019-02-12 RX ORDER — OXYCODONE AND ACETAMINOPHEN 10; 325 MG/1; MG/1
TABLET ORAL
COMMUNITY
End: 2019-07-16

## 2019-02-12 RX ORDER — FUROSEMIDE 20 MG/1
TABLET ORAL
Qty: 30 TAB | Refills: 6 | Status: SHIPPED | OUTPATIENT
Start: 2019-02-12 | End: 2019-05-14 | Stop reason: SDUPTHER

## 2019-02-12 RX ORDER — POTASSIUM CHLORIDE 750 MG/1
TABLET, FILM COATED, EXTENDED RELEASE ORAL
COMMUNITY
End: 2019-05-14 | Stop reason: SDUPTHER

## 2019-02-12 RX ORDER — ONDANSETRON 4 MG/1
TABLET, ORALLY DISINTEGRATING ORAL
COMMUNITY
End: 2019-07-16

## 2019-02-12 RX ORDER — TRAMADOL HYDROCHLORIDE 50 MG/1
TABLET ORAL
COMMUNITY
End: 2019-07-16

## 2019-02-12 NOTE — TELEPHONE ENCOUNTER
Pt states she want Furosemide 20 bid  Because her feet is swelling up.  She was told to stop taking this medication

## 2019-02-12 NOTE — PROGRESS NOTES
[facility]  [address] [place], [facility] [address]  Phone: [phone]  Fax: [phone]    NEW PATIENT  Pt's YOB: 1945    ASSESSMENT   Diagnoses and all orders for this visit:    1. Lumbar facet arthropathy  -     SCHEDULE SURGERY    2. Spinal stenosis of lumbar region without neurogenic claudication    3. Muscle spasm  -     SCHEDULE SURGERY    4. Spondylosis of lumbar region without myelopathy or radiculopathy  -     SCHEDULE SURGERY    5. Peripheral edema       IMPRESSION AND PLAN:  Ashely Young is a 74 y.o. female with history of low back pain. She complains of pain across the lower back that radiates down the right leg. Pt admits to frequent swelling in both legs and notes that it does not improve with sleep. She is currently in pain management with Dr. Phan Hernandes. Pt takes Mobic 15 mg 1/2 tab BID as needed. 1) Pt was given information on lumbar arthritis exercises. 2) Discussed treatment options with the Pt, including aqua physical therapy and steroid injections. 3) Pt was scheduled for bilateral L4-5 and bilateral L5-S1 facet injections. 4) She will follow up with her PCP to address the swelling in her legs. 5) Ms. Reeder has a reminder for a \"due or due soon\" health maintenance. I have asked that she contact her primary care provider, Yael Ernst MD, for follow-up on this health maintenance. 6)  demonstrated consistency with prescribing. 7) Pt will follow-up in 1 month or sooner if needed. HISTORY OF PRESENT ILLNESS:  Ashely Young is a 74 y.o. female with history of low back pain. Pt presents to the office today as a new patient referred by LISSA Murry and was last seen in 2015. She complains of pain across the lower back that radiates down the right leg. Pt reports improvement in her leg pain when she had a right knee injection with Crystal Nguyễn. Pt denies any numbness or tingling in the legs at this time. Her pain is worse with activity and can be sharp, as well as, dull and aching. She complains of pain in the shoulders and notes difficulty getting dressed. Pt admits to frequent swelling in both legs and notes that it does not improve with sleep. She was taking Lasix for years and notes that she recently self discontinued the medication. Pt is currently taking metolazone. She admits to shortness of breath and states that she will follow up with a pulmonologist at the end of 02/2019. Pt states that she is trying to become established with a new PCP, Dr. Julio Trent, but she could not get an appointment until 04/2019. Pt was recently prescribed allopurinol by Don Cabrera for gout. She is currently in pain management with Dr. Cheryl Beach. Pt takes Mobic 15 mg 1/2 tab BID as needed. Of note, she is not diabetic. Pt denies ever trying aqua physical therapy. Pt at this time desires to proceed with steroid injections. She is a former smoker. Pt smoked for about 6 years and quit smoking about 40 years ago.      Pain Scale: /10     PCP: Deuce Aleman MD    Past Medical History:   Diagnosis Date    Adopted     Arthritis     Balance problems     GERD (gastroesophageal reflux disease)     Hemochromatosis     High cholesterol     Hypertension     Left knee pain     Left shoulder pain     Lumbar pain     Pain of left sacroiliac joint     Shoulder impingement, right, with rotator cuff strain         Social History     Socioeconomic History    Marital status: SINGLE     Spouse name: Not on file    Number of children: Not on file    Years of education: Not on file    Highest education level: Not on file   Social Needs    Financial resource strain: Not on file    Food insecurity - worry: Not on file    Food insecurity - inability: Not on file   [employer] needs - medical: Not on file   [employer] needs - non-medical: Not on file   Occupational History    Not on file   Tobacco Use    Smoking status: Former Smoker    Smokeless tobacco: Never Used    Tobacco comment: quit 20 years ago   Substance and Sexual Activity    Alcohol use: No    Drug use: No    Sexual activity: Not on file     Comment: post menopausal   Other Topics Concern    Not on file   Social History Narrative    Not on file       Current Outpatient Medications   Medication Sig Dispense Refill    tapentadol (NUCYNTA) 50 mg tablet Nucynta 50 mg tablet   Take 1 tablet 3 times a day by oral route as needed for 30 days.  potassium chloride SR (KLOR-CON 10) 10 mEq tablet Klor-Con 10 mEq tablet,extended release   TAKE 1 TABLET BY MOUTH EVERY DAY      ondansetron (ZOFRAN ODT) 4 mg disintegrating tablet ondansetron 4 mg disintegrating tablet   TAKE 1 TABLET BY MOUTH EVERY 8 HOURS AS NEEDED FOR NAUSEA      cholecalciferol, vitamin D3, 2,000 unit tab TAKE 1 TABLET BY MOUTH EVERY DAY  3    oxyCODONE 5 mg TbOr oxycodone 5 mg tablet   TAKE 1 TABLET BY MOUTH 2 TIMES DAILY FOR 30 DAYS.  albuterol (PROVENTIL HFA, VENTOLIN HFA, PROAIR HFA) 90 mcg/actuation inhaler Take 1 Puff by inhalation every six (6) hours as needed for Wheezing. 1 Inhaler 3    fluticasone (FLOVENT HFA) 220 mcg/actuation inhaler Take 1 Puff by inhalation two (2) times a day. 1 Inhaler 3    meloxicam (MOBIC) 15 mg tablet Take 15 mg by mouth daily.  simvastatin (ZOCOR) 40 mg tablet Take 1 Tab by mouth nightly. 30 Tab 3    metOLazone (ZAROXOLYN) 5 mg tablet Take 1 Tab by mouth daily. 30 Tab 3    lisinopril (PRINIVIL, ZESTRIL) 10 mg tablet Take 1 Tab by mouth daily. 30 Tab 3    levothyroxine (SYNTHROID) 25 mcg tablet Take 1 Tab by mouth Daily (before breakfast). 30 Tab 3    Omeprazole delayed release (PRILOSEC D/R) 20 mg tablet Take 20 mg by mouth daily.  NAPROXEN SODIUM (ALEVE PO) Take  by mouth.  allopurinol (ZYLOPRIM) 100 mg tablet Take 1 Tab by mouth two (2) times a day.  60 Tab 11    inhalational spacing device (ACE AEROSOL CLOUD ENHANCER) 1 Each by Does Not Apply route as needed. 1 Device 3    traMADol (ULTRAM) 50 mg tablet tramadol 50 mg tablet      oxyCODONE-acetaminophen (PERCOCET 10)  mg per tablet oxycodone-acetaminophen 10 mg-325 mg tablet      naloxone (NARCAN) 4 mg/actuation nasal spray Narcan 4 mg/actuation nasal spray   USE 1 SPRAY PER NOSTRIL      furosemide (LASIX) 20 mg tablet TAKE 2 TABLETS BY MOUTH EVERY DAY 30 Tab 6    oxyCODONE IR (ROXICODONE) 5 mg immediate release tablet Take 5 mg by mouth every four (4) hours as needed for Pain.  HYDROcodone-acetaminophen (NORCO) 5-325 mg per tablet Take 1 Tab by mouth every four (4) hours as needed for Pain. Max Daily Amount: 6 Tabs. 20 Tab 0    HYDROcodone-acetaminophen (NORCO) 5-325 mg per tablet Take  by mouth every four (4) hours as needed for Pain. Allergies   Allergen Reactions    Indomethacin Anaphylaxis, Hives and Swelling       REVIEW OF SYSTEMS    Constitutional: Negative for fever, chills, or weight change. Respiratory: Negative for cough or shortness of breath. Cardiovascular: Negative for chest pain or palpitations. Gastrointestinal: Negative for acid reflux, change in bowel habits, or constipation. Genitourinary: Negative for dysuria and flank pain. Musculoskeletal: Positive for lumbar pain. Skin: Negative for rash. Neurological: Negative for headaches, dizziness, or numbness. Endo/Heme/Allergies: Negative for increased bruising. Psychiatric/Behavioral: Negative for difficulty with sleep. PHYSICAL EXAMINATION  Visit Vitals  /68   Pulse (!) 58   Temp 97.6 °F (36.4 °C) (Oral)   Resp 22   Ht 5' (1.524 m)   Wt 221 lb (100.2 kg)   SpO2 100%   BMI 43.16 kg/m²       Constitutional: Awake, alert, and in no acute distress. HEENT: Normocephalic. Atraumatic. Oropharynx is moist and clear. PERRL. EOMI. Sclerae are nonicteric  Heart: Regular rate and rhythm  Lungs: Clear to auscultation bilaterally  Abdomen: Soft and nontender.  Bowel sounds are present  Neurological: 1+ symmetrical DTRs in the upper extremities. 1+ symmetrical DTRs in the lower extremities. Sensation to light touch is intact. Negative Joseph's sign bilaterally. Skin: warm, dry, and intact. 3 to 4+ pretibial edema bilaterally. Musculoskeletal: Tenderness to palpation in the lower lumbar region. Pain with extension and axial loading. Improved with forward flexion. No pain with internal or external rotation of her hips. Negative straight leg raise bilaterally. She presents to the office today using a wheelchair. Biceps  Triceps Deltoids Wrist Ext Wrist Flex Hand Intrin   Right +4/5 +4/5 +4/5 +4/5 +4/5 +4/5   Left +4/5 +4/5 +4/5 +4/5 +4/5 +4/5      Hip Flex  Quads Hamstrings Ankle DF EHL Ankle PF   Right +4/5 +4/5 +4/5 +4/5 +4/5 +4/5   Left +4/5 +4/5 +4/5 +4/5 +4/5 +4/5     IMAGING:    Lumbar spine MRI from 01/04/2019 was personally reviewed with the patient and demonstrated:  Results from [place] encounter on 01/04/19   MRI LUMB SPINE WO CONT    Narrative Sagittal and axial multisequence MR images of lumbar spine were obtained. HISTORY: Back pain. Comparison October 19, 2013    Trace anterospondylolisthesis L4. No compression fracture or pathologic marrow  signal. Large anterior endplate osteophyte at X91-Q9, contacting the aorta. Stable. Conus medullaris ends at T12-L1 with normal morphology and signal intensity. Kidneys with sinus lipomatosis and parapelvic cysts. L1-L2: Mild posterior lateral disc protrusion. No central stenosis. Facet and   ligamentous hypertrophy with mild bilateral foraminal narrowing. L2-L3: Posterior disc bulge. Facet and  ligamentous hypertrophy. Posterior  epidural fat also present. AP canal measures 8mm, mild to moderate central  stenosis, worse than before. Moderate bilateral foraminal stenosis with no  exiting nerve root compression. L3-L4: Posterior disc bulge. Facet ligamentous hypertrophy.  Moderate central  stenosis. Moderate bilateral foraminal stenosis. Slightly worse on the right. Exiting nerve roots contacted by facet hypertrophy, with potential mild  compression, especially on the right. Similar to prior study. L4-L5: Mild posterior disc bulge. More severe facet hypertrophy and arthropathy. Triangular configuration of the central canal. AP canal measures 7 mm, moderate  central stenosis. Severe left foraminal stenosis with compression of exiting L4  nerve root. Moderate right foraminal stenosis. Nerve roots contacted by facet  hypertrophy. Worse than before. L5-S1: No disc herniation or central stenosis. Facet hypertrophy is severe. No  foraminal stenosis. Impression IMPRESSION:  1. Progression of degenerative disease at L4-L5, with foraminal stenosis and  exiting nerve root compression, especially on the left. 2. Also slightly worse central stenosis at L2-L3 from posterior disc bulge,  facet and ligamentous hypertrophy. 3. Moderate central stenosis at L3-L4 with moderate foraminal stenosis with  potential exiting nerve root compression, especially right L3. Perhaps similar  to prior study. Written by Earl Conley MD, [address], as dictated by Crista Ryan MD.  I, Dr. Crista Ryan confirm that all documentation is accurate.

## 2019-02-12 NOTE — PATIENT INSTRUCTIONS
Low Back Arthritis: Exercises  Your Care Instructions  Here are some examples of typical rehabilitation exercises for your condition. Start each exercise slowly. Ease off the exercise if you start to have pain. Your doctor or physical therapist will tell you when you can start these exercises and which ones will work best for you. When you are not being active, find a comfortable position for rest. Some people are comfortable on the floor or a medium-firm bed with a small pillow under their head and another under their knees. Some people prefer to lie on their side with a pillow between their knees. Don't stay in one position for too long. Take short walks (10 to 20 minutes) every 2 to 3 hours. Avoid slopes, hills, and stairs until you feel better. Walk only distances you can manage without pain, especially leg pain. How to do the exercises  Pelvic tilt    1. Lie on your back with your knees bent. 2. \"Brace\" your stomach--tighten your muscles by pulling in and imagining your belly button moving toward your spine. 3. Press your lower back into the floor. You should feel your hips and pelvis rock back. 4. Hold for 6 seconds while breathing smoothly. 5. Relax and allow your pelvis and hips to rock forward. 6. Repeat 8 to 12 times. Back stretches    1. Get down on your hands and knees on the floor. 2. Relax your head and allow it to droop. Round your back up toward the ceiling until you feel a nice stretch in your upper, middle, and lower back. Hold this stretch for as long as it feels comfortable, or about 15 to 30 seconds. 3. Return to the starting position with a flat back while you are on your hands and knees. 4. Let your back sway by pressing your stomach toward the floor. Lift your buttocks toward the ceiling. 5. Hold this position for 15 to 30 seconds. 6. Repeat 2 to 4 times. Follow-up care is a key part of your treatment and safety.  Be sure to make and go to all appointments, and call your doctor if you are having problems. It's also a good idea to know your test results and keep a list of the medicines you take. Where can you learn more? Go to http://miki-ethan.info/. Enter G008 in the search box to learn more about \"Low Back Arthritis: Exercises. \"  Current as of: September 20, 2018  Content Version: 11.9  © 9564-7804 [employer]. Care instructions adapted under license by [employer] (which disclaims liability or warranty for this information). If you have questions about a medical condition or this instruction, always ask your healthcare professional. [address] any warranty or liability for your use of this information.

## 2019-02-14 ENCOUNTER — OFFICE VISIT (OUTPATIENT)
Dept: FAMILY MEDICINE CLINIC | Facility: CLINIC | Age: 74
End: 2019-02-14

## 2019-02-14 VITALS
RESPIRATION RATE: 20 BRPM | SYSTOLIC BLOOD PRESSURE: 116 MMHG | HEART RATE: 80 BPM | BODY MASS INDEX: 43.39 KG/M2 | WEIGHT: 221 LBS | TEMPERATURE: 95.8 F | HEIGHT: 60 IN | OXYGEN SATURATION: 98 % | DIASTOLIC BLOOD PRESSURE: 57 MMHG

## 2019-02-14 DIAGNOSIS — M10.9 GOUT, UNSPECIFIED CAUSE, UNSPECIFIED CHRONICITY, UNSPECIFIED SITE: ICD-10-CM

## 2019-02-14 DIAGNOSIS — R06.00 DYSPNEA, UNSPECIFIED TYPE: ICD-10-CM

## 2019-02-14 DIAGNOSIS — R60.9 EDEMA, UNSPECIFIED TYPE: Primary | ICD-10-CM

## 2019-02-14 RX ORDER — ALLOPURINOL 100 MG/1
100 TABLET ORAL 2 TIMES DAILY
Qty: 60 TAB | Refills: 11 | Status: SHIPPED | OUTPATIENT
Start: 2019-02-14 | End: 2019-05-14 | Stop reason: SDUPTHER

## 2019-02-14 NOTE — PATIENT INSTRUCTIONS
Shortness of Breath: Care Instructions Your Care Instructions Shortness of breath has many causes. Sometimes conditions such as anxiety can lead to shortness of breath. Some people get mild shortness of breath when they exercise. Trouble breathing also can be a symptom of a serious problem, such as asthma, lung disease, emphysema, heart problems, and pneumonia. If your shortness of breath continues, you may need tests and treatment. Watch for any changes in your breathing and other symptoms. Follow-up care is a key part of your treatment and safety. Be sure to make and go to all appointments, and call your doctor if you are having problems. It's also a good idea to know your test results and keep a list of the medicines you take. How can you care for yourself at home? · Do not smoke or allow others to smoke around you. If you need help quitting, talk to your doctor about stop-smoking programs and medicines. These can increase your chances of quitting for good. · Get plenty of rest and sleep. · Take your medicines exactly as prescribed. Call your doctor if you think you are having a problem with your medicine. · Find healthy ways to deal with stress. ? Exercise daily. ? Get plenty of sleep. ? Eat regularly and well. When should you call for help? Call 911 anytime you think you may need emergency care. For example, call if: 
  · You have severe shortness of breath.  
  · You have symptoms of a heart attack. These may include: 
? Chest pain or pressure, or a strange feeling in the chest. 
? Sweating. ? Shortness of breath. ? Nausea or vomiting. ? Pain, pressure, or a strange feeling in the back, neck, jaw, or upper belly or in one or both shoulders or arms. ? Lightheadedness or sudden weakness. ? A fast or irregular heartbeat. After you call 911, the  may tell you to chew 1 adult-strength or 2 to 4 low-dose aspirin. Wait for an ambulance. Do not try to drive yourself.  Call your doctor now or seek immediate medical care if: 
  · Your shortness of breath gets worse or you start to wheeze. Wheezing is a high-pitched sound when you breathe.  
  · You wake up at night out of breath or have to prop your head up on several pillows to breathe.  
  · You are short of breath after only light activity or while at rest.  
 Watch closely for changes in your health, and be sure to contact your doctor if: 
  · You do not get better over the next 1 to 2 days. Where can you learn more? Go to http://miki-ethan.info/. Enter S780 in the search box to learn more about \"Shortness of Breath: Care Instructions. \" Current as of: September 5, 2018 Content Version: 11.9 © 0502-2543 [employer]. Care instructions adapted under license by [employer] (which disclaims liability or warranty for this information). If you have questions about a medical condition or this instruction, always ask your healthcare professional. [address] any warranty or liability for your use of this information.

## 2019-02-14 NOTE — PROGRESS NOTES
02/14/19 
9:06 AM 
had concerns including Hypertension (pt here for f/u ). HISTORY OF PRESENT ILLNESS This is a 74 y.o. female Peripheral edema The patient states the leg swelling increased when the Lasix was stopped. She believes that the medication was started because of peripheral edema She has support hose, but was unable to use them because of the swelling. Dyspnea on exertion As yet she has not seen a pulmonologist but has an appointment for  the 28th of February. The patient had a PFTshowing a decreased diffusion capacity of 73% of predicted. She states that when she received the bronchodilator during the test she felt much better. No recent change in her dyspnea is admitted to Hypertension Patient has no  problems with medications. The patient has no headaches, visual changes, chest pain or pressure,abdominal pain, dysuria, weakness, or paresthesias Her blood pressure is good today 
 Hyperlipidemia She has good levels according to her last determination one month ago 
 Hemochromatosis This is being followed and treated by her oncology specialist 
Elevated Uric acid She was started on Allopurinol 100mg daily and she believes it helps although she cannot describe an acute attack, stating she has less need for pain medications. Current Outpatient Medications:  
  traMADol (ULTRAM) 50 mg tablet, tramadol 50 mg tablet, Disp: , Rfl:  
  tapentadol (NUCYNTA) 50 mg tablet, Nucynta 50 mg tablet  Take 1 tablet 3 times a day by oral route as needed for 30 days. , Disp: , Rfl:  
  potassium chloride SR (KLOR-CON 10) 10 mEq tablet, Klor-Con 10 mEq tablet,extended release  TAKE 1 TABLET BY MOUTH EVERY DAY, Disp: , Rfl:  
  oxyCODONE-acetaminophen (PERCOCET 10)  mg per tablet, oxycodone-acetaminophen 10 mg-325 mg tablet, Disp: , Rfl:  
  ondansetron (ZOFRAN ODT) 4 mg disintegrating tablet, ondansetron 4 mg disintegrating tablet  TAKE 1 TABLET BY MOUTH EVERY 8 HOURS AS NEEDED FOR NAUSEA, Disp: , Rfl:  
  naloxone (NARCAN) 4 mg/actuation nasal spray, Narcan 4 mg/actuation nasal spray  USE 1 SPRAY PER NOSTRIL, Disp: , Rfl:  
  cholecalciferol, vitamin D3, 2,000 unit tab, TAKE 1 TABLET BY MOUTH EVERY DAY, Disp: , Rfl: 3 
  oxyCODONE 5 mg TbOr, oxycodone 5 mg tablet  TAKE 1 TABLET BY MOUTH 2 TIMES DAILY FOR 30 DAYS., Disp: , Rfl:  
  furosemide (LASIX) 20 mg tablet, TAKE 2 TABLETS BY MOUTH EVERY DAY, Disp: 30 Tab, Rfl: 6 
  albuterol (PROVENTIL HFA, VENTOLIN HFA, PROAIR HFA) 90 mcg/actuation inhaler, Take 1 Puff by inhalation every six (6) hours as needed for Wheezing., Disp: 1 Inhaler, Rfl: 3 
  fluticasone (FLOVENT HFA) 220 mcg/actuation inhaler, Take 1 Puff by inhalation two (2) times a day., Disp: 1 Inhaler, Rfl: 3 
  allopurinol (ZYLOPRIM) 100 mg tablet, Take 1 Tab by mouth two (2) times a day., Disp: 60 Tab, Rfl: 1 
  meloxicam (MOBIC) 15 mg tablet, Take 15 mg by mouth daily. , Disp: , Rfl:  
  simvastatin (ZOCOR) 40 mg tablet, Take 1 Tab by mouth nightly., Disp: 30 Tab, Rfl: 3 
  metOLazone (ZAROXOLYN) 5 mg tablet, Take 1 Tab by mouth daily. , Disp: 30 Tab, Rfl: 3 
  lisinopril (PRINIVIL, ZESTRIL) 10 mg tablet, Take 1 Tab by mouth daily. , Disp: 30 Tab, Rfl: 3 
  levothyroxine (SYNTHROID) 25 mcg tablet, Take 1 Tab by mouth Daily (before breakfast). , Disp: 30 Tab, Rfl: 3 
  oxyCODONE IR (ROXICODONE) 5 mg immediate release tablet, Take 5 mg by mouth every four (4) hours as needed for Pain., Disp: , Rfl:  
  HYDROcodone-acetaminophen (NORCO) 5-325 mg per tablet, Take 1 Tab by mouth every four (4) hours as needed for Pain. Max Daily Amount: 6 Tabs., Disp: 20 Tab, Rfl: 0 
  HYDROcodone-acetaminophen (NORCO) 5-325 mg per tablet, Take  by mouth every four (4) hours as needed for Pain., Disp: , Rfl:  
  Omeprazole delayed release (PRILOSEC D/R) 20 mg tablet, Take 20 mg by mouth daily. , Disp: , Rfl:  
  NAPROXEN SODIUM (ALEVE PO), Take  by mouth., Disp: , Rfl:  
Allergies Allergen Reactions  Indomethacin Anaphylaxis, Hives and Swelling Active Ambulatory Problems Diagnosis Date Noted  Shoulder impingement, right, with rotator cuff strain  Spinal stenosis 11/27/2013  Sacroiliitis ([facility] [address] 12/31/2013  High cholesterol 01/11/2019  Hypertension 01/11/2019  Hemochromatosis 01/11/2019  Obesity, morbid ([facility] [address] 01/29/2019 Resolved Ambulatory Problems Diagnosis Date Noted  No Resolved Ambulatory Problems Past Medical History:  
Diagnosis Date  Adopted  Arthritis  Balance problems  GERD (gastroesophageal reflux disease)  Hemochromatosis  High cholesterol  Hypertension  Left knee pain  Left shoulder pain  Lumbar pain  Pain of left sacroiliac joint  Shoulder impingement, right, with rotator cuff strain Review of Systems Constitutional:  
     The patient denies any fever, chills, night sweats or weight loss There has been no diaphoresis malaise or weakness HENT: Positive for hearing loss (bilateral hearing aids). Negative for congestion, ear discharge, ear pain, sinus pain and tinnitus. Eyes: Negative for blurred vision (Wears glasses), double vision, photophobia, pain, discharge and redness. Respiratory: Positive for wheezing (less now than prior visit). Negative for cough and shortness of breath. Cardiovascular: Positive for leg swelling (increased since the last exam). Negative for chest pain, palpitations, orthopnea and PND. Gastrointestinal: Negative for constipation, diarrhea, heartburn and nausea. Genitourinary: Negative for dysuria, frequency and urgency. Musculoskeletal: Positive for back pain, joint pain and myalgias. Skin: Negative for itching and rash (She has areas of scaling on the trunk and saw a dermatologist for the problem She was given a cream that she states was too expensive).   
Neurological: Negative for dizziness, tingling, tremors, sensory change and headaches. Endo/Heme/Allergies: Negative for polydipsia. Does not bruise/bleed easily. Psychiatric/Behavioral: Negative for depression. The patient is not nervous/anxious. Tearful when talking with the nurse Much more cheerful during later parts of the interview Results for orders placed or performed during the hospital encounter of 01/31/19 TSH 3RD GENERATION Result Value Ref Range TSH 2.39 0.36 - 3.74 uIU/mL  
LIPID PANEL Result Value Ref Range LIPID PROFILE Cholesterol, total 158 <200 MG/DL Triglyceride 163 (H) <150 MG/DL  
 HDL Cholesterol 53 40 - 60 MG/DL  
 LDL, calculated 72.4 0 - 100 MG/DL VLDL, calculated 32.6 MG/DL  
 CHOL/HDL Ratio 3.0 0 - 5.0 Visit Vitals /57 (BP 1 Location: Right arm, BP Patient Position: Sitting) Pulse 80 Temp 95.8 °F (35.4 °C) (Oral) Resp 20 Ht 5' (1.524 m) Wt 221 lb (100.2 kg) SpO2 98% BMI 43.16 kg/m² Physical Exam  
Constitutional:  
Body mass index is 34.44 kg/m². HENT:  
Bilateral hearing aids Eyes: Pupils are equal, round, and reactive to light. Wears glasses Neck: No JVD present. No thyromegaly present. Cardiovascular: Normal rate, regular rhythm and intact distal pulses. Exam reveals no gallop and no friction rub. No murmur heard. Pulmonary/Chest: No respiratory distress (Dyspneic on moving from chair to exam table). She has wheezes (Decreased wheezing since the prior visit). She has no rales. She exhibits no tenderness. Abdominal: She exhibits no distension. There is no tenderness. There is no rebound. Musculoskeletal: She exhibits edema (3+, slight increase). Angalgic gait Can't abduct shoulders to 90 degrees. Knee pain on movement Overlapping toes, abnormal arch Lymphadenopathy:  
  She has no cervical adenopathy. Neurological: No cranial nerve deficit. Coordination normal.  
Skin: No rash noted. No erythema. Psychiatric: She has a normal mood and affect. Nursing note and vitals reviewed. MDM Number of Diagnoses or Management Options Dyspnea, unspecified type:  
Edema, unspecified type: established, worsening Gout, unspecified cause, unspecified chronicity, unspecified site:  
Diagnosis management comments: The differential of the edema includes venous insufficiency, cardiac, renal or hepatic disease, low protein, other Will start the patint on lasix, follow ASSESSMENT and PLAN 
  ICD-10-CM ICD-9-CM 1. 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 Gout, unspecified cause, unspecified chronicity, unspecified site M10.9 274.9 allopurinol (ZYLOPRIM) 100 mg tablet Refill allopurinol 3. Dyspnea, unspecified type R06.00 786.09 inhalational spacing device (ACE AEROSOL CLOUD ENHANCER) For pulmonary evaluation Continue inhaler Spacer added Follow-up Disposition: 
Return in about 3 months (around 5/14/2019).

## 2019-02-14 NOTE — PROGRESS NOTES
Troy Emery is a 74 y.o. female here for f/u Troy Emery is a 74 y.o. female (: 1945) presenting to address: Chief Complaint Patient presents with  Hypertension  
  pt here for f/u Vitals:  
 19 9003 Pulse: 80 Resp: 20 Temp: 95.8 °F (35.4 °C) TempSrc: Oral  
SpO2: 98% Weight: 221 lb (100.2 kg) Height: 5' (1.524 m) PainSc:  10 - Worst pain ever PainLoc: Generalized Hearing/Vision: No exam data present Learning Assessment:  
 
Learning Assessment 2019 PRIMARY LEARNER Patient HIGHEST LEVEL OF EDUCATION - PRIMARY LEARNER  -  
BARRIERS PRIMARY LEARNER -  
CO-LEARNER CAREGIVER -  
PRIMARY LANGUAGE ENGLISH  
LEARNER PREFERENCE PRIMARY LISTENING  
  -  
  -  
ANSWERED BY Patient RELATIONSHIP SELF Depression Screening:  
 
3 most recent PHQ Screens 2019 PHQ Not Done - Little interest or pleasure in doing things Not at all Feeling down, depressed, irritable, or hopeless Not at all Total Score PHQ 2 0 Fall Risk Assessment:  
 
Fall Risk Assessment, last 12 mths 2019 Able to walk? Yes Fall in past 12 months? No  
 
Abuse Screening: No flowsheet data found. Coordination of Care Questionaire: 1. Have you been to the ER, urgent care clinic since your last visit? Hospitalized since your last visit? NO 
 
2. Have you seen or consulted any other health care providers outside of the [address] [place] [address] since your last visit? Include any pap smears or colon screening. YES Oncology Advanced Directive: 1. Do you have an Advanced Directive? NO 
 
2. Would you like information on Advanced Directives?  NO

## 2019-02-20 ENCOUNTER — APPOINTMENT (OUTPATIENT)
Dept: GENERAL RADIOLOGY | Age: 74
End: 2019-02-20
Attending: PHYSICAL MEDICINE & REHABILITATION
Payer: MEDICARE

## 2019-02-20 ENCOUNTER — HOSPITAL ENCOUNTER (OUTPATIENT)
Age: 74
Setting detail: OUTPATIENT SURGERY
Discharge: HOME | End: 2019-02-20
Attending: PHYSICAL MEDICINE & REHABILITATION | Admitting: PHYSICAL MEDICINE & REHABILITATION
Payer: MEDICARE

## 2019-02-20 VITALS
DIASTOLIC BLOOD PRESSURE: 74 MMHG | HEART RATE: 86 BPM | TEMPERATURE: 98.2 F | WEIGHT: 221 LBS | SYSTOLIC BLOOD PRESSURE: 108 MMHG | BODY MASS INDEX: 43.39 KG/M2 | OXYGEN SATURATION: 100 % | HEIGHT: 60 IN | RESPIRATION RATE: 20 BRPM

## 2019-02-20 PROCEDURE — 77030039433 HC TY MYLEOGRAM BD -B: Performed by: PHYSICAL MEDICINE & REHABILITATION

## 2019-02-20 PROCEDURE — 74011636320 HC RX REV CODE- 636/320: Performed by: PHYSICAL MEDICINE & REHABILITATION

## 2019-02-20 PROCEDURE — 77030003676 HC NDL SPN MPRI -A: Performed by: PHYSICAL MEDICINE & REHABILITATION

## 2019-02-20 PROCEDURE — 74011250636 HC RX REV CODE- 250/636: Performed by: PHYSICAL MEDICINE & REHABILITATION

## 2019-02-20 PROCEDURE — 74011250637 HC RX REV CODE- 250/637: Performed by: PHYSICAL MEDICINE & REHABILITATION

## 2019-02-20 PROCEDURE — 76010000009 HC PAIN MGT 0 TO 30 MIN PROC: Performed by: PHYSICAL MEDICINE & REHABILITATION

## 2019-02-20 RX ORDER — LIDOCAINE HYDROCHLORIDE 10 MG/ML
INJECTION, SOLUTION EPIDURAL; INFILTRATION; INTRACAUDAL; PERINEURAL AS NEEDED
Status: DISCONTINUED | OUTPATIENT
Start: 2019-02-20 | End: 2019-02-20 | Stop reason: HOSPADM

## 2019-02-20 RX ORDER — DEXAMETHASONE SODIUM PHOSPHATE 100 MG/10ML
INJECTION INTRAMUSCULAR; INTRAVENOUS AS NEEDED
Status: DISCONTINUED | OUTPATIENT
Start: 2019-02-20 | End: 2019-02-20 | Stop reason: HOSPADM

## 2019-02-20 RX ORDER — DIAZEPAM 5 MG/1
5-20 TABLET ORAL ONCE
Status: COMPLETED | OUTPATIENT
Start: 2019-02-20 | End: 2019-02-20

## 2019-02-20 RX ADMIN — DIAZEPAM 10 MG: 5 TABLET ORAL at 14:49

## 2019-02-20 NOTE — H&P
Date of Surgery Update:  Kaylee Severino was seen and examined. History and physical has been reviewed. The patient has been examined. There have been no significant clinical changes since the last office visit.       Signed By: Kaelyn Morales MD     February 20, 2019 3:03 PM

## 2019-02-20 NOTE — DISCHARGE INSTRUCTIONS
[facility]   ([place])  Dr. Rehana Armijo, Dr. Barakat [facility], Dr. Gatito Cobian not drive a car, operate heavy machinery or dangerous equipment for 24 hours. * Activity as tolerated; rest for the remainder of the day. * Resume pre-block medications including those for your family doctor. * Do not drink alcoholic beverages for 24 hours. Alcohol and the medications you have received may interact and cause an adverse reaction. * You may feel better this evening and worse tomorrow, as the numbing medications wears off and the steroid has yet to begin to work. After 48 hrs the steroid should begin to release bringing you relief. * You may shower this evening and remove any bandages. * Avoid hot tubs and heating pads for 24 hours. You may use cold packs on the procedure site as tolerated for the first 24 hours. * If a headache develops, drink plenty of fluids and rest.  Take over the counter medications for headache if needed. If the headache continues longer than 24 hours, call MD at the [address] [place] [address]. [phone]    * Continue taking pain medications as needed. * You may resume your regular diet if tolerated. Otherwise, start with sips of water and advance slowly. * If Diabetic: check your blood sugar three times a day for the next 3 days. If your sugar is greater than 300 call your family doctor. If your sugar is greater than 400, have someone transport you to the nearest Emergency Room. * If you experience any of the following problems, Please Call the [address] [place] [address] at [phone].         * Shortness of Breath    * Fever of 101 or higher    * Nausea / Vomiting    * Severe Headache    * Weakness or numbness in arms or legs that is not      resolving    * Prolonged increase in pain greater than 4 days      DISCHARGE SUMMARY from Nurse      PATIENT INSTRUCTIONS:    After oral sedation, for 24 hours or while taking prescription Narcotics:  · Limit your activities  · Do not drive and operate hazardous machinery  · Do not make important personal or business decisions  · Do  not drink alcoholic beverages  · If you have not urinated within 8 hours after discharge, please contact your surgeon on call. Report the following to your surgeon:  · Excessive pain, swelling, redness or odor of or around the surgical area  · Temperature over 101  · Nausea and vomiting lasting longer than 4 hours or if unable to take medications  · Any signs of decreased circulation or nerve impairment to extremity: change in color, persistent  numbness, tingling, coldness or increase pain  · Any questions            What to do at Home:  Recommended activity: Activity as tolerated, NO DRIVING FOR 12 Hours post injection          *  Please give a list of your current medications to your Primary Care Provider. *  Please update this list whenever your medications are discontinued, doses are      changed, or new medications (including over-the-counter products) are added. *  Please carry medication information at all times in case of emergency situations. These are general instructions for a healthy lifestyle:    No smoking/ No tobacco products/ Avoid exposure to second hand smoke    Surgeon General's Warning:  Quitting smoking now greatly reduces serious risk to your health. Obesity, smoking, and sedentary lifestyle greatly increases your risk for illness    A healthy diet, regular physical exercise & weight monitoring are important for maintaining a healthy lifestyle    You may be retaining fluid if you have a history of heart failure or if you experience any of the following symptoms:  Weight gain of 3 pounds or more overnight or 5 pounds in a week, increased swelling in our hands or feet or shortness of breath while lying flat in bed.   Please call your doctor as soon as you notice any of these symptoms; do not wait until your next office visit. Recognize signs and symptoms of STROKE:    F-face looks uneven    A-arms unable to move or move unevenly    S-speech slurred or non-existent    T-time-call 911 as soon as signs and symptoms begin-DO NOT go       Back to bed or wait to see if you get better-TIME IS BRAIN.

## 2019-02-20 NOTE — PERIOP NOTES
Bandaids applied to injection sites. CDI. Patient assist off table by staff without difficulty. Ambulate from procedure room in stable condition.

## 2019-02-20 NOTE — PROCEDURES
Facet Joint Block Procedure Note    Patient Name: Troy Emery    Date of Procedure: February 20, 2019    Preoperative Diagnosis: STENOSIS, spondylosis, lumbar facet arthropathy, muscle spasm, back    Post Operative Diagnosis:STENOSIS, same    Procedure:  bilateral  L4-L5 Facet Joint Block  bilateral  L5-S1 Facet Joint Block    Consent: Informed consent was obtained prior to the procedure. The patient was given the opportunity to ask questions regarding the procedure and its associated risks. In addition to the potential risks associated with the procedure itself, the patient was informed both verbally and in writing of potential side effects of the use of glucocorticoids. The patient appeared to comprehend the informed consent and desired to have the procedure performed. Procedure: The patient was placed in the prone position on the flouroscopy table and the back was prepped and draped in the usual sterile manner. At each blocked level, the exact location of the facet joint was identified with flouroscopy, and after local Lidocaine 1% injection, a #22 gauge spinal needle was then advanced toward the joint. A total of 30 mg of preservative free Dexamethasone and 5 cc of Lidocaine was injected around and equally divided among all of the sites. The patient tolerated the procedure well. The injection area was cleaned and bandaids applied. No excessive bleeding was noted. Patient dressed and was discharged to home with instructions.        Josue Mccarthy MD  February 20, 2019

## 2019-02-28 ENCOUNTER — OFFICE VISIT (OUTPATIENT)
Dept: PULMONOLOGY | Age: 74
End: 2019-02-28

## 2019-02-28 VITALS
OXYGEN SATURATION: 99 % | SYSTOLIC BLOOD PRESSURE: 100 MMHG | HEART RATE: 77 BPM | TEMPERATURE: 97.6 F | BODY MASS INDEX: 41.57 KG/M2 | WEIGHT: 211.75 LBS | HEIGHT: 60 IN | RESPIRATION RATE: 24 BRPM | DIASTOLIC BLOOD PRESSURE: 70 MMHG

## 2019-02-28 DIAGNOSIS — G47.33 OSA (OBSTRUCTIVE SLEEP APNEA): ICD-10-CM

## 2019-02-28 DIAGNOSIS — E83.119 HEMOCHROMATOSIS, UNSPECIFIED HEMOCHROMATOSIS TYPE: ICD-10-CM

## 2019-02-28 DIAGNOSIS — I10 ESSENTIAL HYPERTENSION: ICD-10-CM

## 2019-02-28 DIAGNOSIS — R06.09 DOE (DYSPNEA ON EXERTION): Primary | ICD-10-CM

## 2019-02-28 DIAGNOSIS — E66.01 OBESITY, MORBID (HCC): ICD-10-CM

## 2019-02-28 RX ORDER — POTASSIUM CHLORIDE 20 MEQ/1
TABLET, EXTENDED RELEASE ORAL DAILY
COMMUNITY
End: 2019-05-14 | Stop reason: SDUPTHER

## 2019-02-28 NOTE — PROGRESS NOTES
The pt. C/o increased SOB. She notes wheezing with pain. Symptoms started last Fall. No cough. She is noted to have both the ProAir HFA Respiclick and a Ventolin MDI. She uses which ever one is at hand when she uses them as needed. She doesn't feel that the ProAir is effective. She is informed that they are both the same medication. She states she sneezes a lot. The drainage is clear. She has many orthopedic problems. She is followed by Pain Management. No recent ED nor Urgent Care visits.

## 2019-03-19 ENCOUNTER — OFFICE VISIT (OUTPATIENT)
Dept: ORTHOPEDIC SURGERY | Age: 74
End: 2019-03-19

## 2019-03-19 VITALS
WEIGHT: 214.5 LBS | HEART RATE: 81 BPM | DIASTOLIC BLOOD PRESSURE: 68 MMHG | BODY MASS INDEX: 42.11 KG/M2 | SYSTOLIC BLOOD PRESSURE: 106 MMHG | RESPIRATION RATE: 16 BRPM | TEMPERATURE: 97.6 F | HEIGHT: 60 IN | OXYGEN SATURATION: 95 %

## 2019-03-19 DIAGNOSIS — M62.838 MUSCLE SPASM: ICD-10-CM

## 2019-03-19 DIAGNOSIS — M47.816 SPONDYLOSIS OF LUMBAR REGION WITHOUT MYELOPATHY OR RADICULOPATHY: ICD-10-CM

## 2019-03-19 DIAGNOSIS — M47.816 LUMBAR FACET ARTHROPATHY: Primary | ICD-10-CM

## 2019-03-19 DIAGNOSIS — M48.061 SPINAL STENOSIS OF LUMBAR REGION WITHOUT NEUROGENIC CLAUDICATION: ICD-10-CM

## 2019-03-19 NOTE — PROGRESS NOTES
[facility]  [address] [place], [address]  Phone: [phone]  Fax: [phone]    Pt's YOB: 1945    ASSESSMENT   Diagnoses and all orders for this visit:    1. Lumbar facet arthropathy  -     REFERRAL TO PAIN MANAGEMENT  -     AMB SUPPLY ORDER    2. Spinal stenosis of lumbar region without neurogenic claudication  -     REFERRAL TO PAIN MANAGEMENT  -     AMB SUPPLY ORDER    3. Muscle spasm  -     REFERRAL TO PAIN MANAGEMENT  -     AMB SUPPLY ORDER    4. Spondylosis of lumbar region without myelopathy or radiculopathy  -     REFERRAL TO PAIN MANAGEMENT  -     AMB SUPPLY ORDER         IMPRESSION AND PLAN:  Jeromy Bertrand is a 74 y.o. female with history of lumbar pain. She complains of pain across the lower back and difficulty with ambulation. Pt notes minimal improvement with bilateral L4-5 and bilateral L5-S1 facet injections on 02/20/2019.     1) Pt was given information on lumbar arthritis exercises. 2) She was given information on radiofrequency ablation. 3) I recommended the Pt try water exercise. 4) Pt was referred for lumbar RFA evaluation. 5) She was prescribed a rollator. 6) Ms. Reeder has a reminder for a \"due or due soon\" health maintenance. I have asked that she contact her primary care provider, Jeremy Leonard MD, for follow-up on this health maintenance. 7)  demonstrated consistency with prescribing. 8) Pt will follow-up in 3 months or sooner if needed. HISTORY OF PRESENT ILLNESS:  Jeromy Bertrand is a 74 y.o. female with history of lumbar pain and presents to the office today for follow up. She complains of pain across the lower back. Pt notes that her lower back pain interferes with ambulation. She notes minimal improvement with bilateral L4-5 and bilateral L5-S1 facet injections on 02/20/2019.  Pt reports that her lower back occasionally locks up when she is working in her kitchen so she takes breaks often to sit or lay down. She also ambulates with the assistance of a cane but this has not given her the stability that she needs and she feels that she is off balance with this when her pain is more severe. Aqua exercise was also discussed but she does not want to participate with this. Pt denies any previous lumbar surgery. She requests a rolling walker at this time. RFA discussed extensively and pt at this time desires to proceed with a referral for lumbar RFA evaluation. 25 minutes of face to face contact was spent with the patient during today's office visit extensively discussing her symptoms, current medications, previous treatment including TENS unit, medication, injections, radiofrequency ablation and other equipment options.       Pain Scale: /10    PCP: Moise Avalos MD     Past Medical History:   Diagnosis Date    Adopted     Arthritis     Balance problems     MCBRIDE (dyspnea on exertion) 2/28/2019    GERD (gastroesophageal reflux disease)     Hemochromatosis     High cholesterol     Hypertension     Left knee pain     Left shoulder pain     Lumbar pain     Pain of left sacroiliac joint     Shoulder impingement, right, with rotator cuff strain         Social History     Socioeconomic History    Marital status: SINGLE     Spouse name: Not on file    Number of children: Not on file    Years of education: Not on file    Highest education level: Not on file   Occupational History    Not on file   Social Needs    Financial resource strain: Not on file    Food insecurity:     Worry: Not on file     Inability: Not on file    Transportation needs:     Medical: Not on file     Non-medical: Not on file   Tobacco Use    Smoking status: Former Smoker    Smokeless tobacco: Never Used    Tobacco comment: quit 20 years ago   Substance and Sexual Activity    Alcohol use: No    Drug use: No    Sexual activity: Not on file     Comment: post menopausal   Lifestyle    Physical activity:     Days per week: Not on file     Minutes per session: Not on file    Stress: Not on file   Relationships    Social connections:     Talks on phone: Not on file     Gets together: Not on file     Attends Mandaeism service: Not on file     Active member of club or organization: Not on file     Attends meetings of clubs or organizations: Not on file     Relationship status: Not on file    Intimate partner violence:     Fear of current or ex partner: Not on file     Emotionally abused: Not on file     Physically abused: Not on file     Forced sexual activity: Not on file   Other Topics Concern    Not on file   Social History Narrative    Not on file       Current Outpatient Medications   Medication Sig Dispense Refill    albuterol sulfate (PROAIR RESPICLICK) 90 mcg/actuation aepb Take 2 Puffs by inhalation every four (4) hours as needed.  allopurinol (ZYLOPRIM) 100 mg tablet Take 1 Tab by mouth two (2) times a day. 60 Tab 11    inhalational spacing device (ACE AEROSOL CLOUD ENHANCER) 1 Each by Does Not Apply route as needed. 1 Device 3    oxyCODONE-acetaminophen (PERCOCET 10)  mg per tablet oxycodone-acetaminophen 10 mg-325 mg tablet      cholecalciferol, vitamin D3, 2,000 unit tab TAKE 1 TABLET BY MOUTH EVERY DAY  3    furosemide (LASIX) 20 mg tablet TAKE 2 TABLETS BY MOUTH EVERY DAY 30 Tab 6    albuterol (PROVENTIL HFA, VENTOLIN HFA, PROAIR HFA) 90 mcg/actuation inhaler Take 1 Puff by inhalation every six (6) hours as needed for Wheezing. 1 Inhaler 3    fluticasone (FLOVENT HFA) 220 mcg/actuation inhaler Take 1 Puff by inhalation two (2) times a day. 1 Inhaler 3    meloxicam (MOBIC) 15 mg tablet Take 15 mg by mouth daily.  simvastatin (ZOCOR) 40 mg tablet Take 1 Tab by mouth nightly. 30 Tab 3    metOLazone (ZAROXOLYN) 5 mg tablet Take 1 Tab by mouth daily. 30 Tab 3    lisinopril (PRINIVIL, ZESTRIL) 10 mg tablet Take 1 Tab by mouth daily.  30 Tab 3    levothyroxine (SYNTHROID) 25 mcg tablet Take 1 Tab by mouth Daily (before breakfast). 30 Tab 3    potassium chloride (KLOR-CON M20) 20 mEq tablet Take  by mouth daily.  traMADol (ULTRAM) 50 mg tablet tramadol 50 mg tablet      tapentadol (NUCYNTA) 50 mg tablet Nucynta 50 mg tablet   Take 1 tablet 3 times a day by oral route as needed for 30 days.  potassium chloride SR (KLOR-CON 10) 10 mEq tablet Klor-Con 10 mEq tablet,extended release   TAKE 1 TABLET BY MOUTH EVERY DAY      ondansetron (ZOFRAN ODT) 4 mg disintegrating tablet ondansetron 4 mg disintegrating tablet   TAKE 1 TABLET BY MOUTH EVERY 8 HOURS AS NEEDED FOR NAUSEA      naloxone (NARCAN) 4 mg/actuation nasal spray Narcan 4 mg/actuation nasal spray   USE 1 SPRAY PER NOSTRIL      oxyCODONE 5 mg TbOr oxycodone 5 mg tablet   TAKE 1 TABLET BY MOUTH 2 TIMES DAILY FOR 30 DAYS.  oxyCODONE IR (ROXICODONE) 5 mg immediate release tablet Take 5 mg by mouth every four (4) hours as needed for Pain.  HYDROcodone-acetaminophen (NORCO) 5-325 mg per tablet Take 1 Tab by mouth every four (4) hours as needed for Pain. Max Daily Amount: 6 Tabs. (Patient taking differently: Take  by mouth every four (4) hours as needed for Pain.) 20 Tab 0    HYDROcodone-acetaminophen (NORCO) 5-325 mg per tablet Take  by mouth every four (4) hours as needed for Pain.  Omeprazole delayed release (PRILOSEC D/R) 20 mg tablet Take 20 mg by mouth daily.  NAPROXEN SODIUM (ALEVE PO) Take  by mouth. Allergies   Allergen Reactions    Indomethacin Anaphylaxis, Hives and Swelling         REVIEW OF SYSTEMS    Constitutional: Negative for fever, chills, or weight change. Respiratory: Negative for cough or shortness of breath. Cardiovascular: Negative for chest pain or palpitations. Gastrointestinal: Negative for acid reflux, change in bowel habits, or constipation. Genitourinary: Negative for dysuria and flank pain. Musculoskeletal: Positive for lumbar pain.   Skin: Negative for rash.   Neurological: Negative for headaches, dizziness, or numbness. Endo/Heme/Allergies: Negative for increased bruising. Psychiatric/Behavioral: Negative for difficulty with sleep. PHYSICAL EXAMINATION  Visit Vitals  /68 (BP 1 Location: Left arm, BP Patient Position: Sitting)   Pulse 81   Temp 97.6 °F (36.4 °C) (Oral)   Resp 16   Ht 5' (1.524 m)   Wt 214 lb 8 oz (97.3 kg)   SpO2 95%   BMI 41.89 kg/m²       Constitutional: Awake, alert, and in no acute distress. Neurological: 1+ symmetrical DTRs in the lower extremities. Sensation to light touch is intact. Skin: warm, dry, and intact. Musculoskeletal: Tenderness to palpation in the lower lumbar region. Moderate pain with extension and axial loading. No pain with internal or external rotation of her hips. Negative straight leg raise bilaterally. Pt presents to the office today using a wheelchair. Hip Flex  Quads Hamstrings Ankle DF EHL Ankle PF   Right +4/5 +4/5 +4/5 +4/5 +4/5 +4/5   Left +4/5 +4/5 +4/5 +4/5 +4/5 +4/5     IMAGING:    Lumbar spine MRI from 01/04/2019 was personally reviewed with the patient and demonstrated:       Results from [facility] on 01/04/19   MRI LUMB SPINE WO CONT     Narrative Sagittal and axial multisequence MR images of lumbar spine were obtained.     HISTORY: Back pain.     Comparison October 19, 2013     Trace anterospondylolisthesis L4. No compression fracture or pathologic marrow  signal. Large anterior endplate osteophyte at Q24-O1, contacting the aorta. Stable.     Conus medullaris ends at T12-L1 with normal morphology and signal intensity.     Kidneys with sinus lipomatosis and parapelvic cysts.     L1-L2: Mild posterior lateral disc protrusion. No central stenosis. Facet and   ligamentous hypertrophy with mild bilateral foraminal narrowing.     L2-L3: Posterior disc bulge. Facet and  ligamentous hypertrophy. Posterior  epidural fat also present.  AP canal measures 8mm, mild to moderate central  stenosis, worse than before. Moderate bilateral foraminal stenosis with no  exiting nerve root compression.     L3-L4: Posterior disc bulge. Facet ligamentous hypertrophy. Moderate central  stenosis. Moderate bilateral foraminal stenosis. Slightly worse on the right. Exiting nerve roots contacted by facet hypertrophy, with potential mild  compression, especially on the right. Similar to prior study.     L4-L5: Mild posterior disc bulge. More severe facet hypertrophy and arthropathy. Triangular configuration of the central canal. AP canal measures 7 mm, moderate  central stenosis. Severe left foraminal stenosis with compression of exiting L4  nerve root. Moderate right foraminal stenosis. Nerve roots contacted by facet  hypertrophy. Worse than before.     L5-S1: No disc herniation or central stenosis. Facet hypertrophy is severe. No  foraminal stenosis.       Impression IMPRESSION:  1. Progression of degenerative disease at L4-L5, with foraminal stenosis and  exiting nerve root compression, especially on the left. 2. Also slightly worse central stenosis at L2-L3 from posterior disc bulge,  facet and ligamentous hypertrophy. 3. Moderate central stenosis at L3-L4 with moderate foraminal stenosis with  potential exiting nerve root compression, especially right L3. Perhaps similar  to prior study. Written by Jordan Hercules MD, [address], as dictated by Cordelia Zepeda MD.  I, Dr. Cordelia Zepeda confirm that all documentation is accurate.

## 2019-03-19 NOTE — PATIENT INSTRUCTIONS
Low Back Arthritis: Exercises  Your Care Instructions  Here are some examples of typical rehabilitation exercises for your condition. Start each exercise slowly. Ease off the exercise if you start to have pain. Your doctor or physical therapist will tell you when you can start these exercises and which ones will work best for you. When you are not being active, find a comfortable position for rest. Some people are comfortable on the floor or a medium-firm bed with a small pillow under their head and another under their knees. Some people prefer to lie on their side with a pillow between their knees. Don't stay in one position for too long. Take short walks (10 to 20 minutes) every 2 to 3 hours. Avoid slopes, hills, and stairs until you feel better. Walk only distances you can manage without pain, especially leg pain. How to do the exercises  Pelvic tilt    1. Lie on your back with your knees bent. 2. \"Brace\" your stomach--tighten your muscles by pulling in and imagining your belly button moving toward your spine. 3. Press your lower back into the floor. You should feel your hips and pelvis rock back. 4. Hold for 6 seconds while breathing smoothly. 5. Relax and allow your pelvis and hips to rock forward. 6. Repeat 8 to 12 times. Back stretches    1. Get down on your hands and knees on the floor. 2. Relax your head and allow it to droop. Round your back up toward the ceiling until you feel a nice stretch in your upper, middle, and lower back. Hold this stretch for as long as it feels comfortable, or about 15 to 30 seconds. 3. Return to the starting position with a flat back while you are on your hands and knees. 4. Let your back sway by pressing your stomach toward the floor. Lift your buttocks toward the ceiling. 5. Hold this position for 15 to 30 seconds. 6. Repeat 2 to 4 times. Follow-up care is a key part of your treatment and safety.  Be sure to make and go to all appointments, and call your doctor if you are having problems. It's also a good idea to know your test results and keep a list of the medicines you take. Where can you learn more? Go to http://miki-ethan.info/. Enter X833 in the search box to learn more about \"Low Back Arthritis: Exercises. \"  Current as of: September 20, 2018  Content Version: 11.9  © 2006-2018 [employer]. Care instructions adapted under license by [employer] (which disclaims liability or warranty for this information). If you have questions about a medical condition or this instruction, always ask your healthcare professional. [address] any warranty or liability for your use of this information. Learning About Medial Branch Block and Neurotomy  What are medial branch block and neurotomy? Facet joints connect your vertebrae to each other. Problems in these joints can cause chronic (long-term) pain in the neck or back. They can sometimes affect the shoulders, arms, buttocks, or legs. Medial branch nerves are the nerves that carry many of the pain messages from your facet joints. Radiofrequency medial branch neurotomy is a type of medial branch neurotomy that is used to relieve arthritis pain. It uses radio waves to damage nerves in your neck or back so that they can no longer send pain messages to your brain. Before your doctor knows if a neurotomy will help you, he or she will do a medial branch block to find out if certain nerves are the ones that are a source of your pain. You will need two separate visits to the outpatient center or hospital to have both procedures. How is a medial branch block done? The doctor will use a tiny needle to numb the skin where you will get the block. Then he or she puts the block needle into the numbed area. You may feel some pressure, but you should not feel pain.  Using fluoroscopy (live X-ray) to guide the needle, the doctor injects medicine onto one or more nerves to make them numb. If you get relief from your pain in the next 4 to 6 hours, it's a sign that those nerves may be contributing to your pain. The relief will last only a short time. You may then have a medial branch neurotomy at a later visit to try to get longer relief. It takes 20 to 30 minutes to get the block. You can go home after the doctor watches you for about an hour. You will get instructions on how to report how much pain you have when you are at home. You will need someone to drive you home. How is medial branch neurotomy done? The doctor will use a tiny needle to numb the skin where you will get the neurotomy. Then he or she puts the neurotomy needle into the numbed area. You may feel some pressure. Using fluoroscopy (live X-ray) to guide the needle, the doctor sends radio waves through the needle to the nerve for 60 to 90 seconds. The radio waves heat the nerve, which damages it. The doctor may do this several times. And he or she may treat more than one nerve. It takes 45 to 90 minutes to get a neurotomy, depending on how many nerves are heated. You will probably go home 30 to 60 minutes later. You will need someone to drive you home. What can you expect after a neurotomy? You may feel a little sore or tender at the injection site at first. But after a successful neurotomy, most people have pain relief right away. It often lasts for 9 to 12 months or longer. Sometimes the pain relief is permanent. If your pain does come back, it may mean that the damaged nerve has healed and can send pain messages again. Or it can mean that a different nerve is causing pain. Your doctor will discuss your options with you. Follow-up care is a key part of your treatment and safety. Be sure to make and go to all appointments, and call your doctor if you are having problems. It's also a good idea to know your test results and keep a list of the medicines you take.   Where can you learn more?  Go to http://miki-ethan.info/. Enter W298 in the search box to learn more about \"Learning About Medial Branch Block and Neurotomy. \"  Current as of: Katherin 3, 2018  Content Version: 11.9  © 0419-3088 [employer]. Care instructions adapted under license by [employer] (which disclaims liability or warranty for this information). If you have questions about a medical condition or this instruction, always ask your healthcare professional. [place] [address] any warranty or liability for your use of this information.

## 2019-03-20 ENCOUNTER — TELEPHONE (OUTPATIENT)
Dept: ORTHOPEDIC SURGERY | Age: 74
End: 2019-03-20

## 2019-03-20 NOTE — TELEPHONE ENCOUNTER
Patient called for . Patient said that the last time she saw Gal Vance on 03/19/19, she was told that they were sending her out to get a test done. Patient would like to know the name of the test  is sending her out to have. Patient tel. [phone].

## 2019-03-20 NOTE — TELEPHONE ENCOUNTER
Returned call to patient, verified Name/. Per patient she wanted to look up information on the procedure. Per patient she did not receive her information from the  in regards to what Dr. Palencia was ordering. Informed patient I will reprint her information for her to , or we can mail to address on file. Per patient she can  information from  on 19. Forms printed and placed in lock box at . No further action required at this time.

## 2019-04-18 ENCOUNTER — HOSPITAL ENCOUNTER (OUTPATIENT)
Dept: SLEEP MEDICINE | Age: 74
Discharge: HOME | End: 2019-04-18
Payer: MEDICARE

## 2019-04-18 DIAGNOSIS — G47.33 OSA (OBSTRUCTIVE SLEEP APNEA): ICD-10-CM

## 2019-04-18 PROCEDURE — 95810 POLYSOM 6/> YRS 4/> PARAM: CPT

## 2019-04-25 NOTE — PROGRESS NOTES
The patient underwent sleep testing on 4/18/19. Recommendations listed below. Please refer to full report for specific details.  Consider repeating sleep study once, patients leg pain has been better optimized.  Other non-invasive treatment options are recommended were applicable and include the following: weight reduction, smoking cessation, body position training, and modification of alcohol ingestion and/or sedating agents.  Healthy sleep habit education and reinforcement will be reviewed with the patient.  Individuals are encouraged to obtain 7-9 hours of sleep per day.   safety is encouraged. Drowsy and/or inattentive driving should be avoided.  Follow up with referring provider.     Pau Gaffney DO, FCCP    [facility]  Pulmonary, Critical Care, and Sleep Medicine

## 2019-04-26 ENCOUNTER — TELEPHONE (OUTPATIENT)
Dept: PULMONOLOGY | Age: 74
End: 2019-04-26

## 2019-04-26 NOTE — TELEPHONE ENCOUNTER
I spoke with the patient regarding her sleep study.  She states she has chronic back pain due to arthritis and takes oxycontin before bed most nights for pain management.  She reported that on the night of the study she also had \"vinh horse\" in her legs which is not common for her.  I offered her the opportunity to repeat the study and explained why we were not able to get results however she declined at this time.  I advised her to call / follow up in the future if she changes her mind, she verbalized understanding.

## 2019-05-14 ENCOUNTER — OFFICE VISIT (OUTPATIENT)
Dept: FAMILY MEDICINE CLINIC | Facility: CLINIC | Age: 74
End: 2019-05-14

## 2019-05-14 VITALS
HEART RATE: 78 BPM | BODY MASS INDEX: 41.15 KG/M2 | OXYGEN SATURATION: 97 % | RESPIRATION RATE: 18 BRPM | TEMPERATURE: 95.6 F | SYSTOLIC BLOOD PRESSURE: 95 MMHG | WEIGHT: 209.6 LBS | DIASTOLIC BLOOD PRESSURE: 51 MMHG | HEIGHT: 60 IN

## 2019-05-14 DIAGNOSIS — R60.9 PERIPHERAL EDEMA: ICD-10-CM

## 2019-05-14 DIAGNOSIS — R60.9 EDEMA, UNSPECIFIED TYPE: ICD-10-CM

## 2019-05-14 DIAGNOSIS — E03.9 ACQUIRED HYPOTHYROIDISM: ICD-10-CM

## 2019-05-14 DIAGNOSIS — E78.00 HIGH CHOLESTEROL: ICD-10-CM

## 2019-05-14 DIAGNOSIS — M81.0 OSTEOPOROSIS, UNSPECIFIED OSTEOPOROSIS TYPE, UNSPECIFIED PATHOLOGICAL FRACTURE PRESENCE: ICD-10-CM

## 2019-05-14 DIAGNOSIS — I10 ESSENTIAL HYPERTENSION: Primary | ICD-10-CM

## 2019-05-14 DIAGNOSIS — I10 ESSENTIAL HYPERTENSION: ICD-10-CM

## 2019-05-14 DIAGNOSIS — M10.9 GOUT, UNSPECIFIED CAUSE, UNSPECIFIED CHRONICITY, UNSPECIFIED SITE: ICD-10-CM

## 2019-05-14 RX ORDER — SIMVASTATIN 40 MG/1
40 TABLET, FILM COATED ORAL
Qty: 90 TAB | Refills: 6 | Status: SHIPPED | OUTPATIENT
Start: 2019-05-14 | End: 2019-05-14 | Stop reason: SDUPTHER

## 2019-05-14 RX ORDER — FUROSEMIDE 20 MG/1
TABLET ORAL
Qty: 90 TAB | Refills: 6 | Status: SHIPPED | OUTPATIENT
Start: 2019-05-14 | End: 2019-07-26

## 2019-05-14 RX ORDER — LEVOTHYROXINE SODIUM 25 UG/1
25 TABLET ORAL
Qty: 90 TAB | Refills: 6 | Status: SHIPPED | OUTPATIENT
Start: 2019-05-14 | End: 2020-08-10

## 2019-05-14 RX ORDER — SIMVASTATIN 40 MG/1
40 TABLET, FILM COATED ORAL
Qty: 90 TAB | Refills: 6 | Status: SHIPPED | OUTPATIENT
Start: 2019-05-14 | End: 2020-01-13 | Stop reason: SDUPTHER

## 2019-05-14 RX ORDER — POTASSIUM CHLORIDE 20 MEQ/1
20 TABLET, EXTENDED RELEASE ORAL DAILY
Qty: 90 TAB | Refills: 6 | Status: SHIPPED | OUTPATIENT
Start: 2019-05-14 | End: 2019-10-28 | Stop reason: SDUPTHER

## 2019-05-14 RX ORDER — ALLOPURINOL 100 MG/1
100 TABLET ORAL 2 TIMES DAILY
Qty: 180 TAB | Refills: 11 | Status: SHIPPED | OUTPATIENT
Start: 2019-05-14 | End: 2019-10-28 | Stop reason: SDUPTHER

## 2019-05-14 RX ORDER — METOLAZONE 5 MG/1
5 TABLET ORAL DAILY
Qty: 90 TAB | Refills: 6 | Status: SHIPPED | OUTPATIENT
Start: 2019-05-14 | End: 2019-07-26

## 2019-05-14 RX ORDER — SIMVASTATIN 40 MG/1
40 TABLET, FILM COATED ORAL
Qty: 30 TAB | Refills: 3 | Status: SHIPPED | OUTPATIENT
Start: 2019-05-14 | End: 2019-05-14 | Stop reason: SDUPTHER

## 2019-05-14 RX ORDER — LISINOPRIL 10 MG/1
10 TABLET ORAL DAILY
Qty: 90 TAB | Refills: 6 | Status: SHIPPED | OUTPATIENT
Start: 2019-05-14 | End: 2019-07-26

## 2019-05-14 NOTE — PROGRESS NOTES
05/14/19 
8:04 AM 
had no chief complaint listed for this encounter. HISTORY OF PRESENT ILLNESS This is a 66 y.o. female seen by us in February. She since that time she has been seen by pulmonary medicine pain medicine and orthopedics. Hypertension hyperlipidemia and elevated uric acid. She carries a diagnosis of hemochromatosis and the dyspnea on exertion was evaluated subsequently by pulmonary medicine on February 28, 2019. She has also been seen in March by the orthopedist. 
She needs refills of all of her medications. As a correction it is noted that her potassium should be 20 mEq daily. She requests printed prescriptions. The patient has changed to a new pain management doctor. She states that her dose of medicine is the same but that she has received some lumbar injections. She has [employer] [address] [facility] [email or web address]. She has had some back shots that helped. She is also using a heat pad on her lumbar region. Radiofrequency ablation has also been recommended and she is considering it Difficulty breathing She could not do the sleep study. She states that this is because of leg cramps. I suggested she try again and she is considering. We reviewed the report from the pulmonologist. 
Elevated cholesterol She has no problems with the medications. The patient denies muscle aches, headache, GI symptoms or difficulty with thinking Hypothyroidism She has no problem with the medication and has no heat or cold intolerance no change in hair or skin is admitted to no strength no change in energy is admitted to Peripheral edema The patient continues to have leg swelling although it has improved. She has not been using support hose regularly. There is no orthopnea admitted to at this time. Hypertension The patient has no problems with medications. She denies any headache visual changes chest pain or pressure abdominal pain. No discs urea is admitted to. Hemochromatosis The patient has this problem followed by her hematology specialist. 
Elevated uric acid No joint pain is admitted to. Current Outpatient Medications:  
  albuterol sulfate (PROAIR RESPICLICK) 90 mcg/actuation aepb, Take 2 Puffs by inhalation every four (4) hours as needed. , Disp: , Rfl:  
  potassium chloride (KLOR-CON M20) 20 mEq tablet, Take  by mouth daily. , Disp: , Rfl:  
  allopurinol (ZYLOPRIM) 100 mg tablet, Take 1 Tab by mouth two (2) times a day., Disp: 60 Tab, Rfl: 11 
  inhalational spacing device (ACE AEROSOL CLOUD ENHANCER), 1 Each by Does Not Apply route as needed. , Disp: 1 Device, Rfl: 3 
  traMADol (ULTRAM) 50 mg tablet, tramadol 50 mg tablet, Disp: , Rfl:  
  tapentadol (NUCYNTA) 50 mg tablet, Nucynta 50 mg tablet  Take 1 tablet 3 times a day by oral route as needed for 30 days. , Disp: , Rfl:  
  potassium chloride SR (KLOR-CON 10) 10 mEq tablet, Klor-Con 10 mEq tablet,extended release  TAKE 1 TABLET BY MOUTH EVERY DAY, Disp: , Rfl:  
  oxyCODONE-acetaminophen (PERCOCET 10)  mg per tablet, oxycodone-acetaminophen 10 mg-325 mg tablet, Disp: , Rfl:  
  ondansetron (ZOFRAN ODT) 4 mg disintegrating tablet, ondansetron 4 mg disintegrating tablet  TAKE 1 TABLET BY MOUTH EVERY 8 HOURS AS NEEDED FOR NAUSEA, Disp: , Rfl:  
  naloxone (NARCAN) 4 mg/actuation nasal spray, Narcan 4 mg/actuation nasal spray  USE 1 SPRAY PER NOSTRIL, Disp: , Rfl:  
  cholecalciferol, vitamin D3, 2,000 unit tab, TAKE 1 TABLET BY MOUTH EVERY DAY, Disp: , Rfl: 3 
  oxyCODONE 5 mg TbOr, oxycodone 5 mg tablet  TAKE 1 TABLET BY MOUTH 2 TIMES DAILY FOR 30 DAYS., Disp: , Rfl:  
  furosemide (LASIX) 20 mg tablet, TAKE 2 TABLETS BY MOUTH EVERY DAY, Disp: 30 Tab, Rfl: 6 
  albuterol (PROVENTIL HFA, VENTOLIN HFA, PROAIR HFA) 90 mcg/actuation inhaler, Take 1 Puff by inhalation every six (6) hours as needed for Wheezing., Disp: 1 Inhaler, Rfl: 3   fluticasone (FLOVENT HFA) 220 mcg/actuation inhaler, Take 1 Puff by inhalation two (2) times a day., Disp: 1 Inhaler, Rfl: 3 
  meloxicam (MOBIC) 15 mg tablet, Take 15 mg by mouth daily. , Disp: , Rfl:  
  simvastatin (ZOCOR) 40 mg tablet, Take 1 Tab by mouth nightly., Disp: 30 Tab, Rfl: 3 
  metOLazone (ZAROXOLYN) 5 mg tablet, Take 1 Tab by mouth daily. , Disp: 30 Tab, Rfl: 3 
  lisinopril (PRINIVIL, ZESTRIL) 10 mg tablet, Take 1 Tab by mouth daily. , Disp: 30 Tab, Rfl: 3 
  levothyroxine (SYNTHROID) 25 mcg tablet, Take 1 Tab by mouth Daily (before breakfast). , Disp: 30 Tab, Rfl: 3 
  oxyCODONE IR (ROXICODONE) 5 mg immediate release tablet, Take 5 mg by mouth every four (4) hours as needed for Pain., Disp: , Rfl:  
  HYDROcodone-acetaminophen (NORCO) 5-325 mg per tablet, Take 1 Tab by mouth every four (4) hours as needed for Pain. Max Daily Amount: 6 Tabs. (Patient taking differently: Take  by mouth every four (4) hours as needed for Pain.), Disp: 20 Tab, Rfl: 0 
  HYDROcodone-acetaminophen (NORCO) 5-325 mg per tablet, Take  by mouth every four (4) hours as needed for Pain., Disp: , Rfl:  
  Omeprazole delayed release (PRILOSEC D/R) 20 mg tablet, Take 20 mg by mouth daily. , Disp: , Rfl:  
  NAPROXEN SODIUM (ALEVE PO), Take  by mouth., Disp: , Rfl:  
Allergies Allergen Reactions  Indomethacin Anaphylaxis, Hives and Swelling Active Ambulatory Problems Diagnosis Date Noted  Shoulder impingement, right, with rotator cuff strain  Spinal stenosis 11/27/2013  Sacroiliitis ([address] 12/31/2013  High cholesterol 01/11/2019  Hypertension 01/11/2019  Hemochromatosis 01/11/2019  Obesity, morbid ([address] 01/29/2019  
 MCBRIDE (dyspnea on exertion) 02/28/2019 Resolved Ambulatory Problems Diagnosis Date Noted  No Resolved Ambulatory Problems Past Medical History:  
Diagnosis Date  Adopted  Arthritis  Balance problems  MCBRIDE (dyspnea on exertion) 2/28/2019  GERD (gastroesophageal reflux disease)  Hemochromatosis  High cholesterol  Hypertension  Left knee pain  Left shoulder pain  Lumbar pain  Pain of left sacroiliac joint  Shoulder impingement, right, with rotator cuff strain Review of Systems Constitutional:  
     The patient denies any fever, chills, night sweats or weight loss There has been no diaphoresis malaise or weakness HENT: Positive for hearing loss (bilateral hearing aids). Negative for congestion, ear discharge, ear pain, sinus pain and tinnitus. Eyes: Negative for blurred vision (Wears glasses), double vision, photophobia, pain, discharge and redness. Respiratory: Positive for shortness of breath and wheezing (less now than prior visit). Negative for cough. Cardiovascular: Positive for leg swelling (increased since the last exam). Negative for chest pain, palpitations, orthopnea and PND. Gastrointestinal: Negative for constipation, diarrhea, heartburn and nausea. Genitourinary: Negative for dysuria, frequency and urgency. Musculoskeletal: Positive for back pain, joint pain and myalgias. Skin: Negative for itching and rash (She has areas of scaling on the trunk and saw a dermatologist for the problem She was given a cream that she states was too expensive). Neurological: Negative for dizziness, tingling, tremors, sensory change and headaches. Endo/Heme/Allergies: Negative for polydipsia. Does not bruise/bleed easily. Psychiatric/Behavioral: Negative for depression. The patient is not nervous/anxious. Tearful when talking with the nurse Much more cheerful during later parts of the interview Results for orders placed or performed during the hospital encounter of 01/31/19 TSH 3RD GENERATION Result Value Ref Range TSH 2.39 0.36 - 3.74 uIU/mL  
LIPID PANEL Result Value Ref Range LIPID PROFILE Cholesterol, total 158 <200 MG/DL  Triglyceride 163 (H) <150 MG/DL  
 HDL Cholesterol 53 40 - 60 MG/DL  
 LDL, calculated 72.4 0 - 100 MG/DL VLDL, calculated 32.6 MG/DL  
 CHOL/HDL Ratio 3.0 0 - 5.0 There were no vitals taken for this visit. Physical Exam  
Constitutional: She appears well-developed. Body mass index is 40.93 kg/m². HENT:  
Head: Normocephalic. Bilateral hearing aids Eyes: Pupils are equal, round, and reactive to light. Wears glasses Neck: No JVD present. No thyromegaly present. Cardiovascular: Normal rate, regular rhythm and intact distal pulses. Exam reveals no gallop and no friction rub. No murmur heard. Pulmonary/Chest: No respiratory distress (Dyspneic on moving from chair to exam table). She has wheezes (Decreased wheezing since the prior visit). She has no rales. She exhibits no tenderness. Abdominal: She exhibits no distension. There is no tenderness. There is no rebound. Musculoskeletal: She exhibits edema (3+, slight increase). Angalgic gait Can't abduct shoulders to 90 degrees. Knee pain on movement Overlapping toes, abnormal arch Lymphadenopathy:  
  She has no cervical adenopathy. Neurological: No cranial nerve deficit. Coordination normal.  
Skin: No rash noted. No erythema. Psychiatric: She has a normal mood and affect. Nursing note and vitals reviewed. MDM Number of Diagnoses or Management Options Acquired hypothyroidism:  
Edema, unspecified type:  
Essential hypertension:  
Gout, unspecified cause, unspecified chronicity, unspecified site:  
High cholesterol:  
Osteoporosis, unspecified osteoporosis type, unspecified pathological fracture presence:  
Peripheral edema:  
Diagnosis management comments: The patient has essential hypertension is in good control she has no symptoms although the blood pressure is on the low side and your present medications Edema continue present medications. Suggest support hose. I do not think we can add any further diuretics given her blood pressure She has hyper lipidemia. Continue present medications recheck labs next visit. Last labs were good. The speech focus ASSESSMENT and PLAN 
  ICD-10-CM ICD-9-CM 1. Essential hypertension I10 401.9 Good control may be low we will not change medications 2. High cholesterol E78.00 272.0 Good control continue present medications 3. Acquired hypothyroidism E03.9 244.9 Controlled on last visit continue present medications 4. Osteoporosis, unspecified osteoporosis type, unspecified pathological fracture presence M81.0 733.00 Will follow along with orthopedics and pain management 5. Edema, unspecified type R60.9 782.3 6. Acquired hypothyroidismChronic E03.9 244.9 levothyroxine (SYNTHROID) 25 mcg tablet 7. Essential hypertensionChronic I10 401.9 lisinopril (PRINIVIL, ZESTRIL) 10 mg tablet Good control, continue present medications 8. Gout, unspecified cause, unspecified chronicity, unspecified site M10.9 274.9 allopurinol (ZYLOPRIM) 100 mg tablet Refill allopurinol 9. Peripheral edema R60.9 782.3 furosemide (LASIX) 20 mg tablet 10. Edema, unspecified typeChronic R60.9 782.3 metOLazone (ZAROXOLYN) 5 mg tablet On therapy. Will review chart for  prior workup. Suggested support hose 11. High cholesterolChronic E78.00 272.0 simvastatin (ZOCOR) 40 mg tablet DISCONTINUED: simvastatin (ZOCOR) 40 mg tablet DISCONTINUED: simvastatin (ZOCOR) 40 mg tablet Continue present therapy, follow up labs next visit Follow-up and Dispositions · Return in about 3 months (around 8/14/2019) for Wellness visit also, Follow up on illnes. lab results and schedule of future lab studies reviewed with patient

## 2019-05-14 NOTE — PROGRESS NOTES
Haley Martinez is a 66 y.o. female here for f/u Haley Martinez is a 66 y.o. female (: 1945) presenting to address: Chief Complaint Patient presents with  Hypertension  
  here for f/u Vitals:  
 19 1109 Pulse: 78 Resp: 18 Temp: 95.6 °F (35.3 °C) TempSrc: Oral  
SpO2: 97% Weight: 209 lb 9.6 oz (95.1 kg) Height: 5' (1.524 m) PainSc:  10 - Worst pain ever PainLoc: Generalized Hearing/Vision: No exam data present Learning Assessment:  
 
Learning Assessment 2019 PRIMARY LEARNER Patient HIGHEST LEVEL OF EDUCATION - PRIMARY LEARNER  -  
BARRIERS PRIMARY LEARNER -  
CO-LEARNER CAREGIVER -  
PRIMARY LANGUAGE ENGLISH  
LEARNER PREFERENCE PRIMARY LISTENING  
  -  
  -  
ANSWERED BY Patient RELATIONSHIP SELF Depression Screening:  
 
3 most recent PHQ Screens 2019 PHQ Not Done - Little interest or pleasure in doing things Not at all Feeling down, depressed, irritable, or hopeless Not at all Total Score PHQ 2 0 Fall Risk Assessment:  
 
Fall Risk Assessment, last 12 mths 2019 Able to walk? Yes Fall in past 12 months? No  
 
Abuse Screening: No flowsheet data found. Coordination of Care Questionaire: 1. Have you been to the ER, urgent care clinic since your last visit? Hospitalized since your last visit? NO 
 
2. Have you seen or consulted any other health care providers outside of the [address] [place] [address] since your last visit? Include any pap smears or colon screening. YES pain management Advanced Directive: 1. Do you have an Advanced Directive? NO 
 
2. Would you like information on Advanced Directives?  NO

## 2019-05-14 NOTE — PATIENT INSTRUCTIONS
Body Mass Index: Care Instructions Your Care Instructions Body mass index (BMI) can help you see if your weight is raising your risk for health problems. It uses a formula to compare how much you weigh with how tall you are. · A BMI lower than 18.5 is considered underweight. · A BMI between 18.5 and 24.9 is considered healthy. · A BMI between 25 and 29.9 is considered overweight. A BMI of 30 or higher is considered obese. If your BMI is in the normal range, it means that you have a lower risk for weight-related health problems. If your BMI is in the overweight or obese range, you may be at increased risk for weight-related health problems, such as high blood pressure, heart disease, stroke, arthritis or joint pain, and diabetes. If your BMI is in the underweight range, you may be at increased risk for health problems such as fatigue, lower protection (immunity) against illness, muscle loss, bone loss, hair loss, and hormone problems. BMI is just one measure of your risk for weight-related health problems. You may be at higher risk for health problems if you are not active, you eat an unhealthy diet, or you drink too much alcohol or use tobacco products. Follow-up care is a key part of your treatment and safety. Be sure to make and go to all appointments, and call your doctor if you are having problems. It's also a good idea to know your test results and keep a list of the medicines you take. How can you care for yourself at home? · Practice healthy eating habits. This includes eating plenty of fruits, vegetables, whole grains, lean protein, and low-fat dairy. · If your doctor recommends it, get more exercise. Walking is a good choice. Bit by bit, increase the amount you walk every day. Try for at least 30 minutes on most days of the week. · Do not smoke. Smoking can increase your risk for health problems.  If you need help quitting, talk to your doctor about stop-smoking programs and medicines. These can increase your chances of quitting for good. · Limit alcohol to 2 drinks a day for men and 1 drink a day for women. Too much alcohol can cause health problems. If you have a BMI higher than 25 · Your doctor may do other tests to check your risk for weight-related health problems. This may include measuring the distance around your waist. A waist measurement of more than 40 inches in men or 35 inches in women can increase the risk of weight-related health problems. · Talk with your doctor about steps you can take to stay healthy or improve your health. You may need to make lifestyle changes to lose weight and stay healthy, such as changing your diet and getting regular exercise. If you have a BMI lower than 18.5 · Your doctor may do other tests to check your risk for health problems. · Talk with your doctor about steps you can take to stay healthy or improve your health. You may need to make lifestyle changes to gain or maintain weight and stay healthy, such as getting more healthy foods in your diet and doing exercises to build muscle. Where can you learn more? Go to http://miki-ethan.info/. Enter S176 in the search box to learn more about \"Body Mass Index: Care Instructions. \" Current as of: June 25, 2018 Content Version: 11.9 © 4942-8165 [employer]. Care instructions adapted under license by [employer] (which disclaims liability or warranty for this information). If you have questions about a medical condition or this instruction, always ask your healthcare professional. [address] any warranty or liability for your use of this information.

## 2019-06-18 ENCOUNTER — OFFICE VISIT (OUTPATIENT)
Dept: ORTHOPEDIC SURGERY | Age: 74
End: 2019-06-18

## 2019-06-18 VITALS
SYSTOLIC BLOOD PRESSURE: 101 MMHG | BODY MASS INDEX: 40.84 KG/M2 | HEIGHT: 60 IN | DIASTOLIC BLOOD PRESSURE: 62 MMHG | HEART RATE: 81 BPM | WEIGHT: 208 LBS

## 2019-06-18 DIAGNOSIS — M62.838 MUSCLE SPASM: ICD-10-CM

## 2019-06-18 DIAGNOSIS — M47.816 LUMBAR FACET ARTHROPATHY: Primary | ICD-10-CM

## 2019-06-18 DIAGNOSIS — M47.816 SPONDYLOSIS OF LUMBAR REGION WITHOUT MYELOPATHY OR RADICULOPATHY: ICD-10-CM

## 2019-06-18 DIAGNOSIS — M48.061 SPINAL STENOSIS OF LUMBAR REGION WITHOUT NEUROGENIC CLAUDICATION: ICD-10-CM

## 2019-06-18 RX ORDER — IBUPROFEN 200 MG
TABLET ORAL
COMMUNITY
End: 2019-07-26

## 2019-06-18 NOTE — PROGRESS NOTES
[facility]  [address] [place], [facility] [address]  Phone: [phone]  Fax: [phone]    Pt's YOB: 1945    ASSESSMENT   Diagnoses and all orders for this visit:    1. Lumbar facet arthropathy    2. Spinal stenosis of lumbar region without neurogenic claudication    3. Muscle spasm    4. Spondylosis of lumbar region without myelopathy or radiculopathy         IMPRESSION AND PLAN:  Christian Garg is a 66 y.o. female with history of lumbar pain. Pt followed up with Dr. Noriega July for a lumbar RFA and notes significant improvement with the left-sided procedure. She discontinued the oxycodone 5-325 mg 1.5 tabs daily and notes greater relief when taking ibuprofen 400 mg BID. 1) Pt was given information on lumbar arthritis exercises. 2) She may continue taking OTC ibuprofen as needed. 3) Pt will follow up with Dr. Ratna Casillas to complete her RFA. 4) I recommended the Pt try water exercise, drew chi, and chair yoga. 5) Ms. Reeder has a reminder for a \"due or due soon\" health maintenance. I have asked that she contact her primary care provider, Phani Crane MD, for follow-up on this health maintenance. 6)  demonstrated consistency with prescribing. Follow-up and Dispositions    · Return if symptoms worsen or fail to improve. HISTORY OF PRESENT ILLNESS:  Christian Garg is a 66 y.o. female with history of lumbar pain and presents to the office today for follow up. Pt followed up with Dr. Noriega July for a lumbar RFA and notes significant improvement with the left-sided procedure. She notes that she is scheduled to follow up with Dr. Ratna Casillas for the right-sided RFA. Pt reports benefit when using heat on her lower back. She discontinued the oxycodone 5-325 mg 1.5 tabs daily.  Pt notes that she is not taking any narcotics at this time including Nucynta and Ultram. She has been taking ibuprofen 400 mg BID and notes greater relief with the ibuprofen when compared to the oxycodone. Pt notes that she is no longer taking Mobic 15 mg. She admits to shortness of breath with activity and rest. Pt states that she followed up with a sleep specialist and is scheduled to undergo a sleep study. She notes that she has an inhaler but only uses it about once every 2 weeks. Pt notes that she received a lumbar support but it is very difficult to put on. Pt at this time desires to continue with current care.     Pain Scale: 0 - No pain/10    PCP: Yanick Soler MD     Past Medical History:   Diagnosis Date    Adopted     Arthritis     Balance problems     MCBRIDE (dyspnea on exertion) 2/28/2019    GERD (gastroesophageal reflux disease)     Hemochromatosis     High cholesterol     Hypertension     Left knee pain     Left shoulder pain     Lumbar pain     Pain of left sacroiliac joint     Shoulder impingement, right, with rotator cuff strain         Social History     Socioeconomic History    Marital status: SINGLE     Spouse name: Not on file    Number of children: Not on file    Years of education: Not on file    Highest education level: Not on file   Occupational History    Not on file   Social Needs    Financial resource strain: Not on file    Food insecurity:     Worry: Not on file     Inability: Not on file    Transportation needs:     Medical: Not on file     Non-medical: Not on file   Tobacco Use    Smoking status: Former Smoker    Smokeless tobacco: Never Used    Tobacco comment: quit 20 years ago   Substance and Sexual Activity    Alcohol use: No    Drug use: No    Sexual activity: Not on file     Comment: post menopausal   Lifestyle    Physical activity:     Days per week: Not on file     Minutes per session: Not on file    Stress: Not on file   Relationships    Social connections:     Talks on phone: Not on file     Gets together: Not on file     Attends Latter day service: Not on file     Active member of club or organization: Not on file     Attends meetings of clubs or organizations: Not on file     Relationship status: Not on file    Intimate partner violence:     Fear of current or ex partner: Not on file     Emotionally abused: Not on file     Physically abused: Not on file     Forced sexual activity: Not on file   Other Topics Concern    Not on file   Social History Narrative    Not on file       Current Outpatient Medications   Medication Sig Dispense Refill    ibuprofen (MOTRIN) 200 mg tablet Take  by mouth.  levothyroxine (SYNTHROID) 25 mcg tablet Take 1 Tab by mouth Daily (before breakfast). 90 Tab 6    potassium chloride (KLOR-CON M20) 20 mEq tablet Take 1 Tab by mouth daily. 90 Tab 6    lisinopril (PRINIVIL, ZESTRIL) 10 mg tablet Take 1 Tab by mouth daily. 90 Tab 6    allopurinol (ZYLOPRIM) 100 mg tablet Take 1 Tab by mouth two (2) times a day. 180 Tab 11    furosemide (LASIX) 20 mg tablet TAKE 2 TABLETS BY MOUTH EVERY DAY 90 Tab 6    metOLazone (ZAROXOLYN) 5 mg tablet Take 1 Tab by mouth daily. Indications: edema 90 Tab 6    simvastatin (ZOCOR) 40 mg tablet Take 1 Tab by mouth nightly. 90 Tab 6    albuterol sulfate (PROAIR RESPICLICK) 90 mcg/actuation aepb Take 2 Puffs by inhalation every four (4) hours as needed.  inhalational spacing device (ACE AEROSOL CLOUD ENHANCER) 1 Each by Does Not Apply route as needed. 1 Device 3    cholecalciferol, vitamin D3, 2,000 unit tab TAKE 1 TABLET BY MOUTH EVERY DAY  3    albuterol (PROVENTIL HFA, VENTOLIN HFA, PROAIR HFA) 90 mcg/actuation inhaler Take 1 Puff by inhalation every six (6) hours as needed for Wheezing. 1 Inhaler 3    fluticasone (FLOVENT HFA) 220 mcg/actuation inhaler Take 1 Puff by inhalation two (2) times a day. 1 Inhaler 3    Omeprazole delayed release (PRILOSEC D/R) 20 mg tablet Take 20 mg by mouth daily.       traMADol (ULTRAM) 50 mg tablet tramadol 50 mg tablet      tapentadol (NUCYNTA) 50 mg tablet Nucynta 50 mg tablet   Take 1 tablet 3 times a day by oral route as needed for 30 days.  oxyCODONE-acetaminophen (PERCOCET 10)  mg per tablet oxycodone-acetaminophen 10 mg-325 mg tablet      ondansetron (ZOFRAN ODT) 4 mg disintegrating tablet ondansetron 4 mg disintegrating tablet   TAKE 1 TABLET BY MOUTH EVERY 8 HOURS AS NEEDED FOR NAUSEA      naloxone (NARCAN) 4 mg/actuation nasal spray Narcan 4 mg/actuation nasal spray   USE 1 SPRAY PER NOSTRIL      oxyCODONE 5 mg TbOr oxycodone 5 mg tablet   TAKE 1 TABLET BY MOUTH 2 TIMES DAILY FOR 30 DAYS.  meloxicam (MOBIC) 15 mg tablet Take 15 mg by mouth daily.  oxyCODONE IR (ROXICODONE) 5 mg immediate release tablet Take 5 mg by mouth every four (4) hours as needed for Pain.  HYDROcodone-acetaminophen (NORCO) 5-325 mg per tablet Take 1 Tab by mouth every four (4) hours as needed for Pain. Max Daily Amount: 6 Tabs. (Patient not taking: Reported on 6/18/2019) 20 Tab 0    HYDROcodone-acetaminophen (NORCO) 5-325 mg per tablet Take  by mouth every four (4) hours as needed for Pain.  NAPROXEN SODIUM (ALEVE PO) Take  by mouth. Allergies   Allergen Reactions    Indomethacin Anaphylaxis, Hives and Swelling         REVIEW OF SYSTEMS    Constitutional: Negative for fever, chills, or weight change. Respiratory: Negative for cough or shortness of breath. Cardiovascular: Negative for chest pain or palpitations. Gastrointestinal: Negative for acid reflux, change in bowel habits, or constipation. Genitourinary: Negative for dysuria and flank pain. Musculoskeletal: Positive for lumbar pain. Skin: Negative for rash. Neurological: Negative for headaches, dizziness, or numbness. Endo/Heme/Allergies: Negative for increased bruising. Psychiatric/Behavioral: Negative for difficulty with sleep.       PHYSICAL EXAMINATION  Visit Vitals  /62   Pulse 81   Ht 5' (1.524 m)   Wt 208 lb (94.3 kg)   BMI 40.62 kg/m²       Constitutional: Awake, alert, and in no acute distress. Neurological: 1+ symmetrical DTRs in the lower extremities. Sensation to light touch is intact. Skin: warm, dry, and intact. Musculoskeletal: Tenderness to palpation in the lower lumbar region. Moderate pain with extension and axial loading. Improvement with forward flexion. No pain with internal or external rotation of her hips. Negative straight leg raise bilaterally. Patient ambulates with the assistance of a single point cane. Hip Flex  Quads Hamstrings Ankle DF EHL Ankle PF   Right +4/5 +4/5 +4/5 +4/5 +4/5 +4/5   Left +4/5 +4/5 +4/5 +4/5 +4/5 +4/5     IMAGING:    Lumbar spine MRI from 01/04/2019 was personally reviewed with the patient and demonstrated:          Results from [facility] on 01/04/19   MRI LUMB SPINE WO CONT     Narrative Sagittal and axial multisequence MR images of lumbar spine were obtained.     HISTORY: Back pain.     Comparison October 19, 2013     Trace anterospondylolisthesis L4. No compression fracture or pathologic marrow  signal. Large anterior endplate osteophyte at A56-S1, contacting the aorta. Stable.     Conus medullaris ends at T12-L1 with normal morphology and signal intensity.     Kidneys with sinus lipomatosis and parapelvic cysts.     L1-L2: Mild posterior lateral disc protrusion. No central stenosis. Facet and   ligamentous hypertrophy with mild bilateral foraminal narrowing.     L2-L3: Posterior disc bulge. Facet and  ligamentous hypertrophy. Posterior  epidural fat also present. AP canal measures 8mm, mild to moderate central  stenosis, worse than before. Moderate bilateral foraminal stenosis with no  exiting nerve root compression.     L3-L4: Posterior disc bulge. Facet ligamentous hypertrophy. Moderate central  stenosis. Moderate bilateral foraminal stenosis. Slightly worse on the right. Exiting nerve roots contacted by facet hypertrophy, with potential mild  compression, especially on the right.  Similar to prior study.     L4-L5: Mild posterior disc bulge. More severe facet hypertrophy and arthropathy. Triangular configuration of the central canal. AP canal measures 7 mm, moderate  central stenosis. Severe left foraminal stenosis with compression of exiting L4  nerve root. Moderate right foraminal stenosis. Nerve roots contacted by facet  hypertrophy. Worse than before.     L5-S1: No disc herniation or central stenosis. Facet hypertrophy is severe. No  foraminal stenosis.       Impression IMPRESSION:  1. Progression of degenerative disease at L4-L5, with foraminal stenosis and  exiting nerve root compression, especially on the left. 2. Also slightly worse central stenosis at L2-L3 from posterior disc bulge,  facet and ligamentous hypertrophy. 3. Moderate central stenosis at L3-L4 with moderate foraminal stenosis with  potential exiting nerve root compression, especially right L3. Perhaps similar  to prior study. Written by Sherie Baez, as dictated by Matthew Zavala MD.  I, Dr. Matthew Zavala confirm that all documentation is accurate.

## 2019-06-18 NOTE — LETTER
6/19/19 Patient: Skylar Recio YOB: 1945 Date of Visit: 6/18/2019 Cuauhtemoc Barrera, [address] [facility] [address] [place] [address] VIA In Basket Dear Cuauhtemoc Barrera MD, Thank you for referring Ms. [employer] to [facility] for evaluation. My notes for this consultation are attached. If you have questions, please do not hesitate to call me. I look forward to following your patient along with you. Sincerely, Jes Sarmiento MD

## 2019-06-18 NOTE — PATIENT INSTRUCTIONS
Low Back Arthritis: Exercises  Your Care Instructions  Here are some examples of typical rehabilitation exercises for your condition. Start each exercise slowly. Ease off the exercise if you start to have pain. Your doctor or physical therapist will tell you when you can start these exercises and which ones will work best for you. When you are not being active, find a comfortable position for rest. Some people are comfortable on the floor or a medium-firm bed with a small pillow under their head and another under their knees. Some people prefer to lie on their side with a pillow between their knees. Don't stay in one position for too long. Take short walks (10 to 20 minutes) every 2 to 3 hours. Avoid slopes, hills, and stairs until you feel better. Walk only distances you can manage without pain, especially leg pain. How to do the exercises  Pelvic tilt    1. Lie on your back with your knees bent. 2. \"Brace\" your stomach--tighten your muscles by pulling in and imagining your belly button moving toward your spine. 3. Press your lower back into the floor. You should feel your hips and pelvis rock back. 4. Hold for 6 seconds while breathing smoothly. 5. Relax and allow your pelvis and hips to rock forward. 6. Repeat 8 to 12 times. Back stretches    1. Get down on your hands and knees on the floor. 2. Relax your head and allow it to droop. Round your back up toward the ceiling until you feel a nice stretch in your upper, middle, and lower back. Hold this stretch for as long as it feels comfortable, or about 15 to 30 seconds. 3. Return to the starting position with a flat back while you are on your hands and knees. 4. Let your back sway by pressing your stomach toward the floor. Lift your buttocks toward the ceiling. 5. Hold this position for 15 to 30 seconds. 6. Repeat 2 to 4 times. Follow-up care is a key part of your treatment and safety.  Be sure to make and go to all appointments, and call your doctor if you are having problems. It's also a good idea to know your test results and keep a list of the medicines you take. Where can you learn more? Go to http://miki-ethan.info/. Enter U756 in the search box to learn more about \"Low Back Arthritis: Exercises. \"  Current as of: September 20, 2018  Content Version: 11.9  © 2696-1946 [employer]. Care instructions adapted under license by [employer] (which disclaims liability or warranty for this information). If you have questions about a medical condition or this instruction, always ask your healthcare professional. [address] any warranty or liability for your use of this information.

## 2019-07-16 ENCOUNTER — OFFICE VISIT (OUTPATIENT)
Dept: FAMILY MEDICINE CLINIC | Facility: CLINIC | Age: 74
End: 2019-07-16

## 2019-07-16 VITALS
RESPIRATION RATE: 16 BRPM | TEMPERATURE: 96.3 F | SYSTOLIC BLOOD PRESSURE: 68 MMHG | DIASTOLIC BLOOD PRESSURE: 50 MMHG | HEIGHT: 60 IN | BODY MASS INDEX: 40.05 KG/M2 | OXYGEN SATURATION: 92 % | HEART RATE: 89 BPM | WEIGHT: 204 LBS

## 2019-07-16 DIAGNOSIS — J06.9 VIRAL UPPER RESPIRATORY TRACT INFECTION: Primary | ICD-10-CM

## 2019-07-16 NOTE — PROGRESS NOTES
07/16/19  8:04 AM  had concerns including Cold Symptoms (pt presents for cough and congestion that pt has had for about 3 weeks pt has used Coriciden Cough Syrup with no relief \"). HISTORY OF PRESENT ILLNESS   This is a 66 y.o. female been seen by us today for an upper respiratory infection manifesting as a cough that was initially productive of green phlegm but now producing white phlegm. Nasal congestion and matting of the eyes. She had a facial swelling for 2 weeks, and green phlegm came up. No fever admitted to. She was seen by an eye specialist, and was given an antibiotic drop, which helped clear her eyes up. She was told that she had bilateral conjunctivitis. The cough meds that she is taking over-the-counter has not alleviated her cough as far. She has had prior repeat pulmonary function test that showed restrictive lung disease. She is a former smoker but does not do so now she was unable to complete the sleep study because of spasms in her legs. She does declines to try to repeat the study while on medications to decrease her restless legs at night. Prior encounters  Seen 5/14 for hypertension high cholesterol hypothyroidism osteoporosis edema acquired hypothyroidism essential gout. Seen June 18, 2019 by the orthopedist for lumbar facet arthropathy spinal stenosis of the lumbar region without new muscle spasm and spondylolysis is of lumbar region without myelopathy or radiculopathy and she was given exercises recommended to take OTC ibuprofen as needed and referred to Dr. Sarahy Narvaez who recommended her to have water exercise drew chi or chair yoga. She has been seen by pulmonary medicine pain medicine and orthopedics. Hypertension hyperlipidemia and elevated uric acid. She carries a diagnosis of hemochromatosis and the dyspnea on exertion was She has also been seen in March by the orthopedist.  The patient has changed to a new pain management doctor.   She states that her dose of medicine is the same but that she has received some lumbar injections. Current Outpatient Medications:     pseudoephedrine-codeine-gg (CHERATUSSIN DAC) 30- mg/5 mL solution, Take 10 mL by mouth every four (4) hours as needed for Cough for up to 3 days. Max Daily Amount: 60 mL. Indications: Cold Symptoms, cough, Disp: 250 mL, Rfl: 0    ibuprofen (MOTRIN) 200 mg tablet, Take  by mouth., Disp: , Rfl:     levothyroxine (SYNTHROID) 25 mcg tablet, Take 1 Tab by mouth Daily (before breakfast). , Disp: 90 Tab, Rfl: 6    potassium chloride (KLOR-CON M20) 20 mEq tablet, Take 1 Tab by mouth daily. , Disp: 90 Tab, Rfl: 6    lisinopril (PRINIVIL, ZESTRIL) 10 mg tablet, Take 1 Tab by mouth daily. , Disp: 90 Tab, Rfl: 6    allopurinol (ZYLOPRIM) 100 mg tablet, Take 1 Tab by mouth two (2) times a day., Disp: 180 Tab, Rfl: 11    furosemide (LASIX) 20 mg tablet, TAKE 2 TABLETS BY MOUTH EVERY DAY, Disp: 90 Tab, Rfl: 6    metOLazone (ZAROXOLYN) 5 mg tablet, Take 1 Tab by mouth daily. Indications: edema, Disp: 90 Tab, Rfl: 6    simvastatin (ZOCOR) 40 mg tablet, Take 1 Tab by mouth nightly., Disp: 90 Tab, Rfl: 6    albuterol sulfate (PROAIR RESPICLICK) 90 mcg/actuation aepb, Take 2 Puffs by inhalation every four (4) hours as needed. , Disp: , Rfl:     inhalational spacing device (ACE AEROSOL CLOUD ENHANCER), 1 Each by Does Not Apply route as needed. , Disp: 1 Device, Rfl: 3    cholecalciferol, vitamin D3, 2,000 unit tab, TAKE 1 TABLET BY MOUTH EVERY DAY, Disp: , Rfl: 3    fluticasone (FLOVENT HFA) 220 mcg/actuation inhaler, Take 1 Puff by inhalation two (2) times a day., Disp: 1 Inhaler, Rfl: 3    Omeprazole delayed release (PRILOSEC D/R) 20 mg tablet, Take 20 mg by mouth daily. , Disp: , Rfl:     albuterol (PROVENTIL HFA, VENTOLIN HFA, PROAIR HFA) 90 mcg/actuation inhaler, Take 1 Puff by inhalation every six (6) hours as needed for Wheezing., Disp: 1 Inhaler, Rfl: 3  Allergies   Allergen Reactions    Indomethacin Anaphylaxis, Hives and Swelling     Active Ambulatory Problems     Diagnosis Date Noted    Shoulder impingement, right, with rotator cuff strain     Spinal stenosis 11/27/2013    Sacroiliitis ([facility] [address] 12/31/2013    High cholesterol 01/11/2019    Hypertension 01/11/2019    Hemochromatosis 01/11/2019    Obesity, morbid ([facility] [address] 01/29/2019    MCBRIDE (dyspnea on exertion) 02/28/2019     Resolved Ambulatory Problems     Diagnosis Date Noted    No Resolved Ambulatory Problems     Past Medical History:   Diagnosis Date    Adopted     Arthritis     Balance problems     MCBRIDE (dyspnea on exertion) 2/28/2019    GERD (gastroesophageal reflux disease)     Hemochromatosis     High cholesterol     Hypertension     Left knee pain     Left shoulder pain     Lumbar pain     Pain of left sacroiliac joint     Shoulder impingement, right, with rotator cuff strain      Review of Systems   Constitutional:        The patient denies any fever, chills, night sweats or weight loss  There has been no diaphoresis malaise or weakness   HENT: Positive for hearing loss (bilateral hearing aids). Negative for congestion, ear discharge, ear pain, sinus pain and tinnitus. Eyes: Negative for blurred vision (Wears glasses), double vision, photophobia, pain, discharge and redness. Respiratory: Positive for shortness of breath and wheezing (less now than prior visit). Negative for cough. Cardiovascular: Positive for leg swelling (increased since the last exam). Negative for chest pain, palpitations, orthopnea and PND. Gastrointestinal: Negative for constipation, diarrhea, heartburn and nausea. Genitourinary: Negative for dysuria, frequency and urgency. Musculoskeletal: Positive for back pain, joint pain and myalgias. Skin: Negative for itching and rash (She has areas of scaling on the trunk and saw a dermatologist for the problem She was given a cream that she states was too expensive).    Neurological: Negative for dizziness, tingling, tremors, sensory change and headaches. Endo/Heme/Allergies: Negative for polydipsia. Does not bruise/bleed easily. Psychiatric/Behavioral: Negative for depression. The patient is not nervous/anxious. Tearful when talking with the nurse  Much more cheerful during later parts of the interview     Results for orders placed or performed during the hospital encounter of 01/31/19   TSH 3RD GENERATION   Result Value Ref Range    TSH 2.39 0.36 - 3.74 uIU/mL   LIPID PANEL   Result Value Ref Range    LIPID PROFILE          Cholesterol, total 158 <200 MG/DL    Triglyceride 163 (H) <150 MG/DL    HDL Cholesterol 53 40 - 60 MG/DL    LDL, calculated 72.4 0 - 100 MG/DL    VLDL, calculated 32.6 MG/DL    CHOL/HDL Ratio 3.0 0 - 5.0       Visit Vitals  BP (!) 68/50   Pulse 89   Temp 96.3 °F (35.7 °C) (Oral)   Resp 16   Ht 5' (1.524 m)   Wt 204 lb (92.5 kg)   SpO2 92%   BMI 39.84 kg/m²     Physical Exam   Constitutional: She appears well-developed. Body mass index is 40.93 kg/m². HENT:   Head: Normocephalic. Bilateral hearing aids   Eyes: Pupils are equal, round, and reactive to light. Wears glasses   Neck: No JVD present. No thyromegaly present. Cardiovascular: Normal rate, regular rhythm and intact distal pulses. Exam reveals no gallop and no friction rub. No murmur heard. Pulmonary/Chest: No respiratory distress (Dyspneic on moving from chair to exam table). She has no wheezes (Very scant wheezing noted). She has no rales. She exhibits no tenderness. Abdominal: She exhibits no distension. There is no tenderness. There is no rebound. Musculoskeletal: She exhibits edema (3+, slight increase). Angalgic gait  Can't abduct shoulders to 90 degrees. Knee pain on movement  Overlapping toes, abnormal arch   Lymphadenopathy:     She has no cervical adenopathy. Neurological: No cranial nerve deficit. Coordination normal.   Skin: No rash noted. No erythema. Psychiatric: She has a normal mood and affect.    Nursing note and vitals reviewed. MDM  Number of Diagnoses or Management Options  Viral upper respiratory tract infection:   Diagnosis management comments: Upper respiratory infection possibly adenovirus or some other virus. Plan Cheratussin. If not improved in 2 to 3 days would consider using prednisone. We will not start an antibiotic at this time. Consider stopping the ACE inhibitor. ASSESSMENT and PLAN    ICD-10-CM ICD-9-CM    1. Viral upper respiratory tract infection J06.9 465.9 pseudoephedrine-codeine-gg (CHERATUSSIN DAC) 30- mg/5 mL solution    Upper respiratory tract infection  Cheratussin prescribed. She is to call us in 7 days with her response to therapy     Follow-up and Dispositions    · Return in about 2 months (around 9/16/2019).        lab results and schedule of future lab studies reviewed with patient

## 2019-07-16 NOTE — PATIENT INSTRUCTIONS
Call in 1 week if not improved Upper Respiratory Infection (Cold): Care Instructions Your Care Instructions An upper respiratory infection, or URI, is an infection of the nose, sinuses, or throat. URIs are spread by coughs, sneezes, and direct contact. The common cold is the most frequent kind of URI. The flu and sinus infections are other kinds of URIs. Almost all URIs are caused by viruses. Antibiotics won't cure them. But you can treat most infections with home care. This may include drinking lots of fluids and taking over-the-counter pain medicine. You will probably feel better in 4 to 10 days. The doctor has checked you carefully, but problems can develop later. If you notice any problems or new symptoms, get medical treatment right away. Follow-up care is a key part of your treatment and safety. Be sure to make and go to all appointments, and call your doctor if you are having problems. It's also a good idea to know your test results and keep a list of the medicines you take. How can you care for yourself at home? · To prevent dehydration, drink plenty of fluids, enough so that your urine is light yellow or clear like water. Choose water and other caffeine-free clear liquids until you feel better. If you have kidney, heart, or liver disease and have to limit fluids, talk with your doctor before you increase the amount of fluids you drink. · Take an over-the-counter pain medicine, such as acetaminophen (Tylenol), ibuprofen (Advil, Motrin), or naproxen (Aleve). Read and follow all instructions on the label. · Before you use cough and cold medicines, check the label. These medicines may not be safe for young children or for people with certain health problems. · Be careful when taking over-the-counter cold or flu medicines and Tylenol at the same time. Many of these medicines have acetaminophen, which is Tylenol.  Read the labels to make sure that you are not taking more than the recommended dose. Too much acetaminophen (Tylenol) can be harmful. · Get plenty of rest. 
· Do not smoke or allow others to smoke around you. If you need help quitting, talk to your doctor about stop-smoking programs and medicines. These can increase your chances of quitting for good. When should you call for help? Call 911 anytime you think you may need emergency care. For example, call if: 
  · You have severe trouble breathing.  
 Call your doctor now or seek immediate medical care if: 
  · You seem to be getting much sicker.  
  · You have new or worse trouble breathing.  
  · You have a new or higher fever.  
  · You have a new rash.  
 Watch closely for changes in your health, and be sure to contact your doctor if: 
  · You have a new symptom, such as a sore throat, an earache, or sinus pain.  
  · You cough more deeply or more often, especially if you notice more mucus or a change in the color of your mucus.  
  · You do not get better as expected. Where can you learn more? Go to http://miki-ethan.info/. Enter O652 in the search box to learn more about \"Upper Respiratory Infection (Cold): Care Instructions. \" Current as of: September 5, 2018 Content Version: 11.9 © 3686-0709 [employer]. Care instructions adapted under license by [employer] (which disclaims liability or warranty for this information). If you have questions about a medical condition or this instruction, always ask your healthcare professional. [place] [address] any warranty or liability for your use of this information.

## 2019-07-19 ENCOUNTER — TELEPHONE (OUTPATIENT)
Dept: FAMILY MEDICINE CLINIC | Facility: CLINIC | Age: 74
End: 2019-07-19

## 2019-07-19 NOTE — TELEPHONE ENCOUNTER
Patient called stating the pharmacy said the Cheratussin has been discontinue. Please call in something else.   Pharmacy is [facility] on [address]

## 2019-07-20 ENCOUNTER — APPOINTMENT (OUTPATIENT)
Dept: GENERAL RADIOLOGY | Age: 74
DRG: 175 | End: 2019-07-20
Attending: EMERGENCY MEDICINE
Payer: MEDICARE

## 2019-07-20 ENCOUNTER — HOSPITAL ENCOUNTER (EMERGENCY)
Age: 74
Discharge: HOME | DRG: 175 | End: 2019-07-20
Attending: EMERGENCY MEDICINE
Payer: MEDICARE

## 2019-07-20 VITALS
SYSTOLIC BLOOD PRESSURE: 90 MMHG | TEMPERATURE: 97.5 F | OXYGEN SATURATION: 99 % | HEART RATE: 77 BPM | DIASTOLIC BLOOD PRESSURE: 62 MMHG | RESPIRATION RATE: 16 BRPM

## 2019-07-20 DIAGNOSIS — J18.9 COMMUNITY ACQUIRED PNEUMONIA, UNSPECIFIED LATERALITY: ICD-10-CM

## 2019-07-20 DIAGNOSIS — R06.02 SOB (SHORTNESS OF BREATH): ICD-10-CM

## 2019-07-20 DIAGNOSIS — R05.9 COUGH: Primary | ICD-10-CM

## 2019-07-20 LAB
ALBUMIN SERPL-MCNC: 3.6 G/DL (ref 3.4–5)
ALBUMIN/GLOB SERPL: 1.1 {RATIO} (ref 0.8–1.7)
ALP SERPL-CCNC: 65 U/L (ref 45–117)
ALT SERPL-CCNC: 16 U/L (ref 13–56)
ANION GAP SERPL CALC-SCNC: 9 MMOL/L (ref 3–18)
AST SERPL-CCNC: 18 U/L (ref 10–38)
ATRIAL RATE: 120 BPM
BASOPHILS # BLD: 0 K/UL (ref 0–0.1)
BASOPHILS NFR BLD: 0 % (ref 0–2)
BILIRUB SERPL-MCNC: 0.5 MG/DL (ref 0.2–1)
BNP SERPL-MCNC: 161 PG/ML (ref 0–1800)
BUN SERPL-MCNC: 49 MG/DL (ref 7–18)
BUN/CREAT SERPL: 24 (ref 12–20)
CALCIUM SERPL-MCNC: 9.4 MG/DL (ref 8.5–10.1)
CALCULATED P AXIS, ECG09: 77 DEGREES
CALCULATED R AXIS, ECG10: -8 DEGREES
CALCULATED T AXIS, ECG11: 17 DEGREES
CHLORIDE SERPL-SCNC: 101 MMOL/L (ref 100–111)
CK MB CFR SERPL CALC: 1.3 % (ref 0–4)
CK MB CFR SERPL CALC: 1.4 % (ref 0–4)
CK MB SERPL-MCNC: 1.5 NG/ML (ref 5–25)
CK MB SERPL-MCNC: 1.6 NG/ML (ref 5–25)
CK SERPL-CCNC: 116 U/L (ref 26–192)
CK SERPL-CCNC: 120 U/L (ref 26–192)
CO2 SERPL-SCNC: 26 MMOL/L (ref 21–32)
CREAT SERPL-MCNC: 2.01 MG/DL (ref 0.6–1.3)
DIAGNOSIS, 93000: NORMAL
DIFFERENTIAL METHOD BLD: ABNORMAL
EOSINOPHIL # BLD: 0.5 K/UL (ref 0–0.4)
EOSINOPHIL NFR BLD: 6 % (ref 0–5)
ERYTHROCYTE [DISTWIDTH] IN BLOOD BY AUTOMATED COUNT: 14.8 % (ref 11.6–14.5)
GLOBULIN SER CALC-MCNC: 3.4 G/DL (ref 2–4)
GLUCOSE SERPL-MCNC: 110 MG/DL (ref 74–99)
HCT VFR BLD AUTO: 38.5 % (ref 35–45)
HGB BLD-MCNC: 13.1 G/DL (ref 12–16)
INR PPP: 1 (ref 0.8–1.2)
LYMPHOCYTES # BLD: 1.8 K/UL (ref 0.9–3.6)
LYMPHOCYTES NFR BLD: 21 % (ref 21–52)
MAGNESIUM SERPL-MCNC: 1.6 MG/DL (ref 1.6–2.6)
MCH RBC QN AUTO: 32.8 PG (ref 24–34)
MCHC RBC AUTO-ENTMCNC: 34 G/DL (ref 31–37)
MCV RBC AUTO: 96.5 FL (ref 74–97)
MONOCYTES # BLD: 0.8 K/UL (ref 0.05–1.2)
MONOCYTES NFR BLD: 10 % (ref 3–10)
NEUTS SEG # BLD: 5.4 K/UL (ref 1.8–8)
NEUTS SEG NFR BLD: 63 % (ref 40–73)
P-R INTERVAL, ECG05: 214 MS
PLATELET # BLD AUTO: 243 K/UL (ref 135–420)
PMV BLD AUTO: 11 FL (ref 9.2–11.8)
POTASSIUM SERPL-SCNC: 3.7 MMOL/L (ref 3.5–5.5)
PROT SERPL-MCNC: 7 G/DL (ref 6.4–8.2)
PROTHROMBIN TIME: 13.3 SEC (ref 11.5–15.2)
Q-T INTERVAL, ECG07: 444 MS
QRS DURATION, ECG06: 72 MS
QTC CALCULATION (BEZET), ECG08: 627 MS
RBC # BLD AUTO: 3.99 M/UL (ref 4.2–5.3)
SODIUM SERPL-SCNC: 136 MMOL/L (ref 136–145)
TROPONIN I SERPL-MCNC: <0.02 NG/ML (ref 0–0.04)
TROPONIN I SERPL-MCNC: <0.02 NG/ML (ref 0–0.04)
TSH SERPL DL<=0.05 MIU/L-ACNC: 3.16 UIU/ML (ref 0.36–3.74)
VENTRICULAR RATE, ECG03: 120 BPM
WBC # BLD AUTO: 8.6 K/UL (ref 4.6–13.2)

## 2019-07-20 PROCEDURE — 84443 ASSAY THYROID STIM HORMONE: CPT

## 2019-07-20 PROCEDURE — 83880 ASSAY OF NATRIURETIC PEPTIDE: CPT

## 2019-07-20 PROCEDURE — 71045 X-RAY EXAM CHEST 1 VIEW: CPT

## 2019-07-20 PROCEDURE — 85025 COMPLETE CBC W/AUTO DIFF WBC: CPT

## 2019-07-20 PROCEDURE — 80053 COMPREHEN METABOLIC PANEL: CPT

## 2019-07-20 PROCEDURE — 93005 ELECTROCARDIOGRAM TRACING: CPT

## 2019-07-20 PROCEDURE — 83735 ASSAY OF MAGNESIUM: CPT

## 2019-07-20 PROCEDURE — 82550 ASSAY OF CK (CPK): CPT

## 2019-07-20 PROCEDURE — 99285 EMERGENCY DEPT VISIT HI MDM: CPT

## 2019-07-20 PROCEDURE — 85610 PROTHROMBIN TIME: CPT

## 2019-07-20 RX ORDER — DOXYCYCLINE HYCLATE 100 MG
100 TABLET ORAL 2 TIMES DAILY
Qty: 14 TAB | Refills: 0 | Status: SHIPPED | OUTPATIENT
Start: 2019-07-20 | End: 2019-07-26

## 2019-07-20 NOTE — ED PROVIDER NOTES
EMERGENCY DEPARTMENT HISTORY AND PHYSICAL EXAM  This was created with voice recognition software and transcription errors may be present. 10:09 AM  Date: 7/20/2019  Patient Name: Sascha Katz    History of Presenting Illness     Chief Complaint:    History Provided By:     HPI: Sascha Katz is a 66 y.o. female with a past medical history of arthritis, shortness of breath on exertion, reflux, hemochromatosis, hypertension, who presents with shortness of breath and wheezing. Is been going on for about 2 to 3 weeks. Her PCP prescribed her some cough medicine but that was discontinued by the pharmacy. She notes shortness of breath worse with laying flat and also wheezing with walking. No fever no chills no nausea no vomiting she has chest tightness from coughing. Denies chest pain and abdominal pain. PCP: Lizett Maharaj MD      Past History     Past Medical History:  Past Medical History:   Diagnosis Date    Adopted     Arthritis     Balance problems     MCBRIDE (dyspnea on exertion) 2/28/2019    GERD (gastroesophageal reflux disease)     Hemochromatosis     High cholesterol     Hypertension     Left knee pain     Left shoulder pain     Lumbar pain     Pain of left sacroiliac joint     Shoulder impingement, right, with rotator cuff strain        Past Surgical History:  Past Surgical History:   Procedure Laterality Date    HX CHOLECYSTECTOMY      HX CHOLECYSTECTOMY      HX KNEE REPLACEMENT Bilateral     R knee/ Lknee and femur    HX ORTHOPAEDIC      R trigger finger     HX OTHER SURGICAL      Right little finger    HX OTHER SURGICAL      Right wrist, replaced unlnar).     HX OTHER SURGICAL      Both knees    HX OTHER SURGICAL      Left femur    HX OTHER SURGICAL      Trigger finger surgery    HX OTHER SURGICAL      Left knee replacment revision     HX ROTATOR CUFF REPAIR Right     HX TONSILLECTOMY         Family History:  Family History   Adopted: Yes       Social History:  Social History     Tobacco Use    Smoking status: Former Smoker    Smokeless tobacco: Never Used    Tobacco comment: quit 20 years ago   Substance Use Topics    Alcohol use: No    Drug use: No       Allergies: Allergies   Allergen Reactions    Indomethacin Anaphylaxis, Hives and Swelling       Review of Systems     Review of Systems   Constitutional: Negative for diaphoresis and fever. HENT: Negative for congestion. Respiratory: Positive for shortness of breath. All other systems reviewed and are negative. 10 point review of systems otherwise negative unless noted in HPI. Physical Exam       Physical Exam   Constitutional: She is oriented to person, place, and time. She appears well-developed. HENT:   Head: Normocephalic and atraumatic. Eyes: Pupils are equal, round, and reactive to light. EOM are normal.   Neck: Normal range of motion. Neck supple. Cardiovascular: Normal rate, regular rhythm and normal heart sounds. Exam reveals no friction rub. No murmur heard. Pulmonary/Chest: Effort normal and breath sounds normal. No respiratory distress. She has no wheezes. Abdominal: Soft. She exhibits no distension. There is no tenderness. There is no rebound and no guarding. Musculoskeletal: Normal range of motion. Neurological: She is alert and oriented to person, place, and time. Skin: Skin is warm and dry. Psychiatric: She has a normal mood and affect. Her behavior is normal. Thought content normal.       Diagnostic Study Results     Vital Signs  EKG: EKG shows sinus at 80 with a normal axis normal intervals there is no ST elevation or depression no hypertrophy. The computer read was A. fib however it is clearly sinus with clear P waves and III as well as V2. Nursing noted a rate change today had a repeat EKG which is also sinus. Will sinus at 120 with a first-degree AV block at 214. No ST elevation or depression hypertrophy.   QRS is noted to be 627 on the repeat and 392 on the initial.  Labs: Noted for AK I with a creatinine of 2  Imaging:     Medical Decision Making     ED Course: Progress Notes, Reevaluation, and Consults:      Provider Notes (Medical Decision Making): This is a 68-year-old female who has shortness of breath with exertion with wheezing on exertion and shortness of breath with laying down. Presently active. She has been evaluated by pulmonary had PFTs done does not have a history of COPD does not have a history of heart failure. As she is hypotensive very low suspicion this is heart failure. Overall etiology remains unclear we will check basic labs EKG chest x-ray. Be improved mild ABBIE chest x-ray with potential for infectious process will place on some antibiotics. Diagnosis     Clinical Impression: No diagnosis found. Disposition:    Patient's Medications   Start Taking    No medications on file   Continue Taking    ALBUTEROL (PROVENTIL HFA, VENTOLIN HFA, PROAIR HFA) 90 MCG/ACTUATION INHALER    Take 1 Puff by inhalation every six (6) hours as needed for Wheezing. ALBUTEROL SULFATE (PROAIR RESPICLICK) 90 MCG/ACTUATION AEPB    Take 2 Puffs by inhalation every four (4) hours as needed. ALLOPURINOL (ZYLOPRIM) 100 MG TABLET    Take 1 Tab by mouth two (2) times a day. CHOLECALCIFEROL, VITAMIN D3, 2,000 UNIT TAB    TAKE 1 TABLET BY MOUTH EVERY DAY    FLUTICASONE (FLOVENT HFA) 220 MCG/ACTUATION INHALER    Take 1 Puff by inhalation two (2) times a day. FUROSEMIDE (LASIX) 20 MG TABLET    TAKE 2 TABLETS BY MOUTH EVERY DAY    IBUPROFEN (MOTRIN) 200 MG TABLET    Take  by mouth. INHALATIONAL SPACING DEVICE (ACE AEROSOL CLOUD ENHANCER)    1 Each by Does Not Apply route as needed. LEVOTHYROXINE (SYNTHROID) 25 MCG TABLET    Take 1 Tab by mouth Daily (before breakfast). LISINOPRIL (PRINIVIL, ZESTRIL) 10 MG TABLET    Take 1 Tab by mouth daily. METOLAZONE (ZAROXOLYN) 5 MG TABLET    Take 1 Tab by mouth daily.  Indications: edema    OMEPRAZOLE DELAYED RELEASE (PRILOSEC D/R) 20 MG TABLET    Take 20 mg by mouth daily. POTASSIUM CHLORIDE (KLOR-CON M20) 20 MEQ TABLET    Take 1 Tab by mouth daily. SIMVASTATIN (ZOCOR) 40 MG TABLET    Take 1 Tab by mouth nightly.    These Medications have changed    No medications on file   Stop Taking    No medications on file

## 2019-07-20 NOTE — DISCHARGE INSTRUCTIONS

## 2019-07-21 DIAGNOSIS — J06.9 VIRAL UPPER RESPIRATORY TRACT INFECTION: Primary | ICD-10-CM

## 2019-07-21 RX ORDER — HYDROCODONE POLISTIREX AND CHLORPHENIRAMINE POLISTIREX 10; 8 MG/5ML; MG/5ML
5 SUSPENSION, EXTENDED RELEASE ORAL
Qty: 60 ML | Refills: 0 | Status: SHIPPED | OUTPATIENT
Start: 2019-07-21 | End: 2019-07-26

## 2019-07-22 ENCOUNTER — HOSPITAL ENCOUNTER (INPATIENT)
Age: 74
LOS: 4 days | Discharge: NURSING, REHAB OR LONG-TERM CARE | DRG: 175 | End: 2019-07-26
Attending: EMERGENCY MEDICINE | Admitting: INTERNAL MEDICINE
Payer: MEDICARE

## 2019-07-22 ENCOUNTER — APPOINTMENT (OUTPATIENT)
Dept: GENERAL RADIOLOGY | Age: 74
DRG: 175 | End: 2019-07-22
Attending: EMERGENCY MEDICINE
Payer: MEDICARE

## 2019-07-22 ENCOUNTER — HOSPITAL ENCOUNTER (INPATIENT)
Dept: NUCLEAR MEDICINE | Age: 74
Discharge: HOME | DRG: 175 | End: 2019-07-22
Attending: INTERNAL MEDICINE
Payer: MEDICARE

## 2019-07-22 DIAGNOSIS — J18.9 COMMUNITY ACQUIRED PNEUMONIA, UNSPECIFIED LATERALITY: Primary | ICD-10-CM

## 2019-07-22 PROBLEM — I95.9 HYPOTENSION: Status: ACTIVE | Noted: 2019-07-22

## 2019-07-22 PROBLEM — I48.91 ATRIAL FIBRILLATION WITH RVR (HCC): Status: ACTIVE | Noted: 2019-07-22

## 2019-07-22 PROBLEM — A41.9 SEPSIS (HCC): Status: ACTIVE | Noted: 2019-07-22

## 2019-07-22 LAB
ALBUMIN SERPL-MCNC: 2.7 G/DL (ref 3.4–5)
ALBUMIN/GLOB SERPL: 1 {RATIO} (ref 0.8–1.7)
ALP SERPL-CCNC: 55 U/L (ref 45–117)
ALT SERPL-CCNC: 12 U/L (ref 13–56)
ANION GAP SERPL CALC-SCNC: 7 MMOL/L (ref 3–18)
ANION GAP SERPL CALC-SCNC: 7 MMOL/L (ref 3–18)
APPEARANCE UR: CLEAR
AST SERPL-CCNC: 16 U/L (ref 10–38)
ATRIAL RATE: 73 BPM
ATRIAL RATE: 81 BPM
BASOPHILS # BLD: 0.1 K/UL (ref 0–0.1)
BASOPHILS NFR BLD: 1 % (ref 0–2)
BILIRUB SERPL-MCNC: 0.3 MG/DL (ref 0.2–1)
BILIRUB UR QL: NEGATIVE
BNP SERPL-MCNC: 129 PG/ML (ref 0–1800)
BUN SERPL-MCNC: 44 MG/DL (ref 7–18)
BUN SERPL-MCNC: 53 MG/DL (ref 7–18)
BUN/CREAT SERPL: 26 (ref 12–20)
BUN/CREAT SERPL: 28 (ref 12–20)
CALCIUM SERPL-MCNC: 8 MG/DL (ref 8.5–10.1)
CALCIUM SERPL-MCNC: 8.3 MG/DL (ref 8.5–10.1)
CALCULATED R AXIS, ECG10: -15 DEGREES
CALCULATED R AXIS, ECG10: -5 DEGREES
CALCULATED T AXIS, ECG11: 52 DEGREES
CALCULATED T AXIS, ECG11: 61 DEGREES
CHLORIDE SERPL-SCNC: 108 MMOL/L (ref 100–111)
CHLORIDE SERPL-SCNC: 109 MMOL/L (ref 100–111)
CO2 SERPL-SCNC: 24 MMOL/L (ref 21–32)
CO2 SERPL-SCNC: 24 MMOL/L (ref 21–32)
COLOR UR: YELLOW
CREAT SERPL-MCNC: 1.69 MG/DL (ref 0.6–1.3)
CREAT SERPL-MCNC: 1.87 MG/DL (ref 0.6–1.3)
DIAGNOSIS, 93000: NORMAL
DIAGNOSIS, 93000: NORMAL
DIFFERENTIAL METHOD BLD: ABNORMAL
EOSINOPHIL # BLD: 0.6 K/UL (ref 0–0.4)
EOSINOPHIL NFR BLD: 7 % (ref 0–5)
ERYTHROCYTE [DISTWIDTH] IN BLOOD BY AUTOMATED COUNT: 15 % (ref 11.6–14.5)
GLOBULIN SER CALC-MCNC: 2.8 G/DL (ref 2–4)
GLUCOSE SERPL-MCNC: 137 MG/DL (ref 74–99)
GLUCOSE SERPL-MCNC: 76 MG/DL (ref 74–99)
GLUCOSE UR STRIP.AUTO-MCNC: NEGATIVE MG/DL
HCT VFR BLD AUTO: 35.5 % (ref 35–45)
HGB BLD-MCNC: 12.2 G/DL (ref 12–16)
HGB UR QL STRIP: NEGATIVE
KETONES UR QL STRIP.AUTO: NEGATIVE MG/DL
LACTATE BLD-SCNC: 1.82 MMOL/L (ref 0.4–2)
LEUKOCYTE ESTERASE UR QL STRIP.AUTO: NEGATIVE
LYMPHOCYTES # BLD: 1.8 K/UL (ref 0.9–3.6)
LYMPHOCYTES NFR BLD: 21 % (ref 21–52)
MCH RBC QN AUTO: 33.3 PG (ref 24–34)
MCHC RBC AUTO-ENTMCNC: 34.4 G/DL (ref 31–37)
MCV RBC AUTO: 97 FL (ref 74–97)
MONOCYTES # BLD: 0.8 K/UL (ref 0.05–1.2)
MONOCYTES NFR BLD: 9 % (ref 3–10)
NEUTS SEG # BLD: 5.3 K/UL (ref 1.8–8)
NEUTS SEG NFR BLD: 62 % (ref 40–73)
NITRITE UR QL STRIP.AUTO: NEGATIVE
P-R INTERVAL, ECG05: 192 MS
PH UR STRIP: 5 [PH] (ref 5–8)
PLATELET # BLD AUTO: 226 K/UL (ref 135–420)
PMV BLD AUTO: 11.2 FL (ref 9.2–11.8)
POTASSIUM SERPL-SCNC: 3.4 MMOL/L (ref 3.5–5.5)
POTASSIUM SERPL-SCNC: 3.8 MMOL/L (ref 3.5–5.5)
PROT SERPL-MCNC: 5.5 G/DL (ref 6.4–8.2)
PROT UR STRIP-MCNC: NEGATIVE MG/DL
Q-T INTERVAL, ECG07: 320 MS
Q-T INTERVAL, ECG07: 340 MS
QRS DURATION, ECG06: 50 MS
QRS DURATION, ECG06: 60 MS
QTC CALCULATION (BEZET), ECG08: 371 MS
QTC CALCULATION (BEZET), ECG08: 392 MS
RBC # BLD AUTO: 3.66 M/UL (ref 4.2–5.3)
SODIUM SERPL-SCNC: 139 MMOL/L (ref 136–145)
SODIUM SERPL-SCNC: 140 MMOL/L (ref 136–145)
SP GR UR REFRACTOMETRY: 1.01 (ref 1–1.03)
UROBILINOGEN UR QL STRIP.AUTO: 0.2 EU/DL (ref 0.2–1)
VENTRICULAR RATE, ECG03: 80 BPM
VENTRICULAR RATE, ECG03: 81 BPM
WBC # BLD AUTO: 8.5 K/UL (ref 4.6–13.2)

## 2019-07-22 PROCEDURE — 74011250636 HC RX REV CODE- 250/636: Performed by: EMERGENCY MEDICINE

## 2019-07-22 PROCEDURE — 80053 COMPREHEN METABOLIC PANEL: CPT

## 2019-07-22 PROCEDURE — 94640 AIRWAY INHALATION TREATMENT: CPT

## 2019-07-22 PROCEDURE — 74011000250 HC RX REV CODE- 250: Performed by: INTERNAL MEDICINE

## 2019-07-22 PROCEDURE — 87086 URINE CULTURE/COLONY COUNT: CPT

## 2019-07-22 PROCEDURE — 74011250636 HC RX REV CODE- 250/636: Performed by: INTERNAL MEDICINE

## 2019-07-22 PROCEDURE — 85025 COMPLETE CBC W/AUTO DIFF WBC: CPT

## 2019-07-22 PROCEDURE — 83605 ASSAY OF LACTIC ACID: CPT

## 2019-07-22 PROCEDURE — 87040 BLOOD CULTURE FOR BACTERIA: CPT

## 2019-07-22 PROCEDURE — 81003 URINALYSIS AUTO W/O SCOPE: CPT

## 2019-07-22 PROCEDURE — 96365 THER/PROPH/DIAG IV INF INIT: CPT

## 2019-07-22 PROCEDURE — 74011000250 HC RX REV CODE- 250: Performed by: EMERGENCY MEDICINE

## 2019-07-22 PROCEDURE — 96366 THER/PROPH/DIAG IV INF ADDON: CPT

## 2019-07-22 PROCEDURE — 77030029684 HC NEB SM VOL KT MONA -A

## 2019-07-22 PROCEDURE — 71045 X-RAY EXAM CHEST 1 VIEW: CPT

## 2019-07-22 PROCEDURE — 96361 HYDRATE IV INFUSION ADD-ON: CPT

## 2019-07-22 PROCEDURE — 94761 N-INVAS EAR/PLS OXIMETRY MLT: CPT

## 2019-07-22 PROCEDURE — 99285 EMERGENCY DEPT VISIT HI MDM: CPT

## 2019-07-22 PROCEDURE — 83880 ASSAY OF NATRIURETIC PEPTIDE: CPT

## 2019-07-22 PROCEDURE — 93005 ELECTROCARDIOGRAM TRACING: CPT

## 2019-07-22 PROCEDURE — 65660000000 HC RM CCU STEPDOWN

## 2019-07-22 PROCEDURE — 78582 LUNG VENTILAT&PERFUS IMAGING: CPT

## 2019-07-22 PROCEDURE — 74011250637 HC RX REV CODE- 250/637: Performed by: INTERNAL MEDICINE

## 2019-07-22 PROCEDURE — 96375 TX/PRO/DX INJ NEW DRUG ADDON: CPT

## 2019-07-22 PROCEDURE — 74011000250 HC RX REV CODE- 250: Performed by: PHYSICIAN ASSISTANT

## 2019-07-22 RX ORDER — ACETAMINOPHEN 325 MG/1
650 TABLET ORAL
Status: DISCONTINUED | OUTPATIENT
Start: 2019-07-22 | End: 2019-07-26 | Stop reason: HOSPADM

## 2019-07-22 RX ORDER — LEVOFLOXACIN 5 MG/ML
750 INJECTION, SOLUTION INTRAVENOUS EVERY 24 HOURS
Status: DISCONTINUED | OUTPATIENT
Start: 2019-07-22 | End: 2019-07-22

## 2019-07-22 RX ORDER — IPRATROPIUM BROMIDE AND ALBUTEROL SULFATE 2.5; .5 MG/3ML; MG/3ML
3 SOLUTION RESPIRATORY (INHALATION)
Status: DISCONTINUED | OUTPATIENT
Start: 2019-07-22 | End: 2019-07-22

## 2019-07-22 RX ORDER — GUAIFENESIN 100 MG/5ML
100 SOLUTION ORAL
Status: DISCONTINUED | OUTPATIENT
Start: 2019-07-22 | End: 2019-07-26 | Stop reason: HOSPADM

## 2019-07-22 RX ORDER — SODIUM CHLORIDE 0.9 % (FLUSH) 0.9 %
5-10 SYRINGE (ML) INJECTION AS NEEDED
Status: DISCONTINUED | OUTPATIENT
Start: 2019-07-22 | End: 2019-07-26 | Stop reason: HOSPADM

## 2019-07-22 RX ORDER — SODIUM CHLORIDE 0.9 % (FLUSH) 0.9 %
5-10 SYRINGE (ML) INJECTION AS NEEDED
Status: DISCONTINUED | OUTPATIENT
Start: 2019-07-22 | End: 2019-07-22

## 2019-07-22 RX ORDER — SIMVASTATIN 40 MG/1
40 TABLET, FILM COATED ORAL
Status: DISCONTINUED | OUTPATIENT
Start: 2019-07-22 | End: 2019-07-26 | Stop reason: HOSPADM

## 2019-07-22 RX ORDER — IPRATROPIUM BROMIDE AND ALBUTEROL SULFATE 2.5; .5 MG/3ML; MG/3ML
3 SOLUTION RESPIRATORY (INHALATION)
Status: DISCONTINUED | OUTPATIENT
Start: 2019-07-22 | End: 2019-07-26 | Stop reason: HOSPADM

## 2019-07-22 RX ORDER — DOCUSATE SODIUM 100 MG/1
100 CAPSULE, LIQUID FILLED ORAL
Status: DISCONTINUED | OUTPATIENT
Start: 2019-07-22 | End: 2019-07-26 | Stop reason: HOSPADM

## 2019-07-22 RX ORDER — SODIUM CHLORIDE 9 MG/ML
1000 INJECTION, SOLUTION INTRAVENOUS ONCE
Status: DISPENSED | OUTPATIENT
Start: 2019-07-22 | End: 2019-07-23

## 2019-07-22 RX ORDER — ALLOPURINOL 100 MG/1
100 TABLET ORAL 2 TIMES DAILY
Status: DISCONTINUED | OUTPATIENT
Start: 2019-07-22 | End: 2019-07-26 | Stop reason: HOSPADM

## 2019-07-22 RX ORDER — LEVOTHYROXINE SODIUM 25 UG/1
25 TABLET ORAL
Status: DISCONTINUED | OUTPATIENT
Start: 2019-07-23 | End: 2019-07-26 | Stop reason: HOSPADM

## 2019-07-22 RX ORDER — BUDESONIDE AND FORMOTEROL FUMARATE DIHYDRATE 160; 4.5 UG/1; UG/1
2 AEROSOL RESPIRATORY (INHALATION)
Status: DISCONTINUED | OUTPATIENT
Start: 2019-07-22 | End: 2019-07-26 | Stop reason: HOSPADM

## 2019-07-22 RX ORDER — VANCOMYCIN/0.9 % SOD CHLORIDE 1 G/100 ML
1000 PLASTIC BAG, INJECTION (ML) INTRAVENOUS ONCE
Status: DISCONTINUED | OUTPATIENT
Start: 2019-07-22 | End: 2019-07-22

## 2019-07-22 RX ORDER — METOPROLOL TARTRATE 5 MG/5ML
2.5 INJECTION INTRAVENOUS
Status: DISCONTINUED | OUTPATIENT
Start: 2019-07-22 | End: 2019-07-26 | Stop reason: HOSPADM

## 2019-07-22 RX ORDER — NOREPINEPHRINE BITARTRATE/D5W 8 MG/250ML
2-16 PLASTIC BAG, INJECTION (ML) INTRAVENOUS
Status: DISCONTINUED | OUTPATIENT
Start: 2019-07-22 | End: 2019-07-22

## 2019-07-22 RX ORDER — ONDANSETRON 2 MG/ML
4 INJECTION INTRAMUSCULAR; INTRAVENOUS
Status: DISCONTINUED | OUTPATIENT
Start: 2019-07-22 | End: 2019-07-26 | Stop reason: HOSPADM

## 2019-07-22 RX ORDER — ALBUTEROL SULFATE 1.25 MG/3ML
1.25 SOLUTION RESPIRATORY (INHALATION)
Status: DISCONTINUED | OUTPATIENT
Start: 2019-07-22 | End: 2019-07-26 | Stop reason: HOSPADM

## 2019-07-22 RX ORDER — VANCOMYCIN 1.75 GRAM/500 ML IN 0.9 % SODIUM CHLORIDE INTRAVENOUS
1750 ONCE
Status: DISCONTINUED | OUTPATIENT
Start: 2019-07-22 | End: 2019-07-22

## 2019-07-22 RX ORDER — SODIUM CHLORIDE 9 MG/ML
75 INJECTION, SOLUTION INTRAVENOUS CONTINUOUS
Status: DISPENSED | OUTPATIENT
Start: 2019-07-22 | End: 2019-07-23

## 2019-07-22 RX ORDER — NALOXONE HYDROCHLORIDE 0.4 MG/ML
0.4 INJECTION, SOLUTION INTRAMUSCULAR; INTRAVENOUS; SUBCUTANEOUS AS NEEDED
Status: DISCONTINUED | OUTPATIENT
Start: 2019-07-22 | End: 2019-07-26 | Stop reason: HOSPADM

## 2019-07-22 RX ORDER — HEPARIN SODIUM 5000 [USP'U]/ML
5000 INJECTION, SOLUTION INTRAVENOUS; SUBCUTANEOUS EVERY 8 HOURS
Status: DISCONTINUED | OUTPATIENT
Start: 2019-07-22 | End: 2019-07-23

## 2019-07-22 RX ADMIN — SODIUM CHLORIDE, SODIUM LACTATE, POTASSIUM CHLORIDE, AND CALCIUM CHLORIDE 1000 ML: 600; 310; 30; 20 INJECTION, SOLUTION INTRAVENOUS at 16:34

## 2019-07-22 RX ADMIN — METHYLPREDNISOLONE SODIUM SUCCINATE 125 MG: 125 INJECTION, POWDER, FOR SOLUTION INTRAMUSCULAR; INTRAVENOUS at 20:07

## 2019-07-22 RX ADMIN — IPRATROPIUM BROMIDE AND ALBUTEROL SULFATE 3 ML: .5; 3 SOLUTION RESPIRATORY (INHALATION) at 20:00

## 2019-07-22 RX ADMIN — AZITHROMYCIN 500 MG: 500 INJECTION, POWDER, LYOPHILIZED, FOR SOLUTION INTRAVENOUS at 22:16

## 2019-07-22 RX ADMIN — SODIUM CHLORIDE 1000 ML: 900 INJECTION, SOLUTION INTRAVENOUS at 14:26

## 2019-07-22 RX ADMIN — BUDESONIDE AND FORMOTEROL FUMARATE DIHYDRATE 2 PUFF: 160; 4.5 AEROSOL RESPIRATORY (INHALATION) at 22:16

## 2019-07-22 RX ADMIN — SODIUM CHLORIDE 775 ML: 900 INJECTION, SOLUTION INTRAVENOUS at 13:20

## 2019-07-22 RX ADMIN — HEPARIN SODIUM 5000 UNITS: 5000 INJECTION, SOLUTION INTRAVENOUS; SUBCUTANEOUS at 20:07

## 2019-07-22 RX ADMIN — CEFEPIME HYDROCHLORIDE 2 G: 2 INJECTION, POWDER, FOR SOLUTION INTRAVENOUS at 14:03

## 2019-07-22 RX ADMIN — VANCOMYCIN HYDROCHLORIDE 1000 MG: 10 INJECTION, POWDER, LYOPHILIZED, FOR SOLUTION INTRAVENOUS at 12:08

## 2019-07-22 RX ADMIN — SODIUM CHLORIDE 1000 ML: 900 INJECTION, SOLUTION INTRAVENOUS at 13:00

## 2019-07-22 RX ADMIN — CEFTRIAXONE SODIUM 2 G: 2 INJECTION, POWDER, FOR SOLUTION INTRAMUSCULAR; INTRAVENOUS at 20:07

## 2019-07-22 NOTE — CONSULTS
[facility]  Pulmonary, Critical Care, and Sleep Medicine    Name: Lilia Babcock MRN: [id number]   : 1945 Hospital: [address] [place] [address]    Date: 2019        Critical Care Consult      IMPRESSION:   · CAP - Failed outpatient ABX. Started on Doxycycline on  following ED visit and D/C. Progressively worsening cough with greenish sputum production and increased SOB. S/p 1 dose Cefepime and 1 dose of Vanc in ED. · Hypotension - Improving. Likely 2/2 above. S/p 2L NS Bolus in ED. · MCBRIDE - Multifactorial. Hx of same; follows with Dr. Marci Pizarro in clinic. Last office visit on 19. PFT with decreased diffusion capacity of 73% predicted. Body habitus, chronic back pain causing splinting and diaphragmatic weakness all contributing factors as well. · Suspected COURTNEY - Sleep study not completed as of yet. · Morbid Obesity  · Deconditioning  · Hx of HTN, HDL, GERD, Arthritis,  Hemochromatosis     Patient Active Problem List   Diagnosis Code    Shoulder impingement, right, with rotator cuff strain M75.40    Spinal stenosis M48.00    Sacroiliitis ([facility] [address] M46.1    High cholesterol E78.00    Hypertension I10    Hemochromatosis E83.119    Obesity, morbid ([address] E66.01    MCBRIDE (dyspnea on exertion) R06.09        RECOMMENDATIONS:   · Con't Duo-nebs q6 with RT; albuterol nebs q4 PRN  · Symbicort BID  · Agree with Steroids - Solu-Medrol - 40mg q6  · Aggressive pulmonary hygiene; encourage ICS; bronchial hygiene protocol  · Agree with Robitussin PRN  · Agree with ABX - Zithromax and Rocephin  · Obtain sputum Cx  · Monitor BP - may require additional fluid bolus; may use Albumin bolus if concern for fluid overload  · Con't daily Lasix 20mg (home dose)  · Monitor for fluid overload, consider BiPAP if necessary. Pt may also benefit from CPAP qhs for probable COURTNEY component. · Obtain ABG if respiratory status worsens  · Repeat CXR in AM   · Check BNP; monitor daily CBC, e-lytes. · Thank you for consult; will be available for questions. Subjective/History: This patient has been seen and evaluated at the request of Dr. Deangelo Sung for CAP with hypotension. 07/22/19    Patient is a 66 y.o. female with PMH of HTN, HLD, MCBRIDE, Arthritis, Hemachromatosis, GERD and joint pain who recently was treated at [facility] ED for CAP on 07/20/19. Pt admits to a 2 week hx of worsening cough and congestion. She states she saw her doctor about 1 week ago for her symptoms; was prescribed a \"cough medicine\" that she couldn't get filled because it was \"discontinued\". Pt continued to have progressively worsening cough, congestion and SOB; presented to [facility] ED on 07/20 c/o above symptoms;was treated for CAP and D/C'd with Doxycycline. Pt states she took 4 does of the ABX so far. Today (07/22) pt states that her symptoms continued to worsen and she had difficulty sleeping due to coughing and SOB, and thus presented to [facility] ED again. ED w/o significant for mild hypotension, improving with IVF. Pt was started on broad spec ABX; duo-nebs; received 125mg Solu-medrol and given 2L NS bolus over the course of 5 hours for her hypotension. ICU was initially consulted for hypotension. Upon evaluation, pt was sitting up in bed, eating dinner on RA. Pt's BP is currently stable. During evaluation, BP was measured x3 and all BP's had a MAP >60. Breathing is non-labored; pt is able to carry a full conversation without difficulty, though does appear mildly SOB at times. Pt currently admits to cough with greenish sputum production and SOB. Pt states that breathing is mildly improving. Denies any CP, palpitations, abd pain, N/V/D, FC, HA, dizziness, lightheadedness, dysuria. Pt currently has good UOP with pure wick in place. After evaluation and discussion with ED physician and Dr. Vj Ribeiro, pt will be admitted to SDU with PCCM to follow.        Past Medical History:   Diagnosis Date    Adopted     Arthritis     Balance problems     MCBRIDE (dyspnea on exertion) 2/28/2019    GERD (gastroesophageal reflux disease)     Hemochromatosis     High cholesterol     Hypertension     Left knee pain     Left shoulder pain     Lumbar pain     Pain of left sacroiliac joint     Shoulder impingement, right, with rotator cuff strain         Past Surgical History:   Procedure Laterality Date    HX CHOLECYSTECTOMY      HX CHOLECYSTECTOMY      HX KNEE REPLACEMENT Bilateral     R knee/ Lknee and femur    HX ORTHOPAEDIC      R trigger finger     HX OTHER SURGICAL      Right little finger    HX OTHER SURGICAL      Right wrist, replaced unlnar).  HX OTHER SURGICAL      Both knees    HX OTHER SURGICAL      Left femur    HX OTHER SURGICAL      Trigger finger surgery    HX OTHER SURGICAL      Left knee replacment revision     HX ROTATOR CUFF REPAIR Right     HX TONSILLECTOMY          Prior to Admission medications    Medication Sig Start Date End Date Taking? Authorizing Provider   chlorpheniramine-HYDROcodone (TUSSIONEX) 10-8 mg/5 mL suspension Take 5 mL by mouth every twelve (12) hours as needed for Cough for up to 6 days. Max Daily Amount: 10 mL. 7/21/19 7/27/19  Alex Chong MD   doxycycline (VIBRA-TABS) 100 mg tablet Take 1 Tab by mouth two (2) times a day for 7 days. 7/20/19 7/27/19  Shaan Hurley MD   ibuprofen (MOTRIN) 200 mg tablet Take  by mouth. Provider, Historical   levothyroxine (SYNTHROID) 25 mcg tablet Take 1 Tab by mouth Daily (before breakfast). 5/14/19   Alex Chong MD   potassium chloride (KLOR-CON M20) 20 mEq tablet Take 1 Tab by mouth daily. 5/14/19   Alex Chong MD   lisinopril (PRINIVIL, ZESTRIL) 10 mg tablet Take 1 Tab by mouth daily. 5/14/19   Alex Chong MD   allopurinol (ZYLOPRIM) 100 mg tablet Take 1 Tab by mouth two (2) times a day.  5/14/19   Alex Chong MD   furosemide (LASIX) 20 mg tablet TAKE 2 TABLETS BY MOUTH EVERY DAY 5/14/19   Alex Chong MD metOLazone (ZAROXOLYN) 5 mg tablet Take 1 Tab by mouth daily. Indications: edema 5/14/19   Jg Hart MD   simvastatin (ZOCOR) 40 mg tablet Take 1 Tab by mouth nightly. 5/14/19   Jg Hart MD   albuterol sulfate (PROAIR RESPICLICK) 90 mcg/actuation aepb Take 2 Puffs by inhalation every four (4) hours as needed. Provider, Historical   inhalational spacing device (ACE AEROSOL CLOUD ENHANCER) 1 Each by Does Not Apply route as needed. 2/14/19   Jg Hart MD   cholecalciferol, vitamin D3, 2,000 unit tab TAKE 1 TABLET BY MOUTH EVERY DAY 1/11/19   Provider, Historical   albuterol (PROVENTIL HFA, VENTOLIN HFA, PROAIR HFA) 90 mcg/actuation inhaler Take 1 Puff by inhalation every six (6) hours as needed for Wheezing. 1/29/19   Jg Hart MD   fluticasone (FLOVENT HFA) 220 mcg/actuation inhaler Take 1 Puff by inhalation two (2) times a day. 1/29/19   Jg Hart MD   Omeprazole delayed release (PRILOSEC D/R) 20 mg tablet Take 20 mg by mouth daily.     Provider, Historical       Current Facility-Administered Medications   Medication Dose Route Frequency    sodium chloride 0.9 % bolus infusion 775 mL  775 mL IntraVENous ONCE    0.9% sodium chloride infusion 1,000 mL  1,000 mL IntraVENous ONCE    azithromycin (ZITHROMAX) 500 mg in 0.9% sodium chloride 250 mL IVPB  500 mg IntraVENous Q24H    methylPREDNISolone (PF) (Solu-MEDROL) injection 125 mg  125 mg IntraVENous ONCE    allopurinol (ZYLOPRIM) tablet 100 mg  100 mg Oral BID    budesonide-formoterol (SYMBICORT) 160-4.5 mcg/actuation HFA inhaler 2 Puff  2 Puff Inhalation BID    [START ON 7/23/2019] levothyroxine (SYNTHROID) tablet 25 mcg  25 mcg Oral ACB    simvastatin (ZOCOR) tablet 40 mg  40 mg Oral QHS    heparin (porcine) injection 5,000 Units  5,000 Units SubCUTAneous Q8H    lactated ringers bolus infusion 1,000 mL  1,000 mL IntraVENous ONCE    cefTRIAXone (ROCEPHIN) 2 g in sterile water (preservative free) 20 mL IV syringe 2 g IntraVENous Q24H    NOREPINephrine (LEVOPHED) 8 mg in 5% dextrose 250mL infusion  2-16 mcg/min IntraVENous TITRATE       Allergies   Allergen Reactions    Indomethacin Anaphylaxis, Hives and Swelling        Social History     Tobacco Use    Smoking status: Former Smoker    Smokeless tobacco: Never Used    Tobacco comment: quit 20 years ago   Substance Use Topics    Alcohol use: No        Family History   Adopted: Yes          Review of Systems:  Pertinent items are noted in HPI. Objective:   Vital Signs:    Visit Vitals  BP (!) 89/67 (BP 1 Location: Left arm, BP Patient Position: Sitting)   Pulse 95   Temp 97.2 °F (36.2 °C)   Resp 18   Ht 5' (1.524 m)   Wt 92.5 kg (204 lb)   SpO2 96%   BMI 39.84 kg/m²       O2 Device: Room air       Temp (24hrs), Av.2 °F (36.2 °C), Min:97.2 °F (36.2 °C), Max:97.2 °F (36.2 °C)       Intake/Output:   Last shift:      No intake/output data recorded. Last 3 shifts: No intake/output data recorded. No intake or output data in the 24 hours ending 19 2936      Physical Exam:     General:   Alert, cooperative, NAD   Head:  Normocephalic, without obvious abnormality, atraumatic. Eyes:  Conjunctivae/corneas clear. PERRLA   Nose: Nares normal. Septum midline. Mucosa normal. No drainage or sinus tenderness. Throat: Lips, mucosa, and tongue normal. Teeth and gums normal.   Neck: Supple, symmetrical, trachea midline, no adenopathy, no carotid bruit and no JVD. Lungs:   Symmetrical chest rise; good AE bilat; diminished throughout; faint expiratory wheezes noted on posterior; No rhonchi/rales noted. Heart:  RRR, S1, S2 normal, no m/r/g   Abdomen:   Soft, non-tender. Bowel sounds normal. No masses,  No organomegaly. Extremities: Extremities normal, atraumatic,+2 edema up to knees; pt states this is about her normal edema. Pulses: 2+ and symmetric all extremities.    Skin: Skin color, texture, turgor normal. No rashes or lesions   Neurologic: Grossly nonfocal Devices: [employer]:     Recent Results (from the past 24 hour(s))   CBC WITH AUTOMATED DIFF    Collection Time: 07/22/19 12:40 PM   Result Value Ref Range    WBC 8.5 4.6 - 13.2 K/uL    RBC 3.66 (L) 4.20 - 5.30 M/uL    HGB 12.2 12.0 - 16.0 g/dL    HCT 35.5 35.0 - 45.0 %    MCV 97.0 74.0 - 97.0 FL    MCH 33.3 24.0 - 34.0 PG    MCHC 34.4 31.0 - 37.0 g/dL    RDW 15.0 (H) 11.6 - 14.5 %    PLATELET 866 960 - 757 K/uL    MPV 11.2 9.2 - 11.8 FL    NEUTROPHILS 62 40 - 73 %    LYMPHOCYTES 21 21 - 52 %    MONOCYTES 9 3 - 10 %    EOSINOPHILS 7 (H) 0 - 5 %    BASOPHILS 1 0 - 2 %    ABS. NEUTROPHILS 5.3 1.8 - 8.0 K/UL    ABS. LYMPHOCYTES 1.8 0.9 - 3.6 K/UL    ABS. MONOCYTES 0.8 0.05 - 1.2 K/UL    ABS. EOSINOPHILS 0.6 (H) 0.0 - 0.4 K/UL    ABS.  BASOPHILS 0.1 0.0 - 0.1 K/UL    DF AUTOMATED     POC LACTIC ACID    Collection Time: 07/22/19 12:48 PM   Result Value Ref Range    Lactic Acid (POC) 1.82 0.40 - 2.00 mmol/L   EKG, 12 LEAD, INITIAL    Collection Time: 07/22/19  1:20 PM   Result Value Ref Range    Ventricular Rate 81 BPM    Atrial Rate 81 BPM    P-R Interval 192 ms    QRS Duration 50 ms    Q-T Interval 320 ms    QTC Calculation (Bezet) 371 ms    Calculated R Axis -5 degrees    Calculated T Axis 61 degrees    Diagnosis       Normal sinus rhythm with sinus arrhythmia  Cannot rule out Anterior infarct , age undetermined  Abnormal ECG  When compared with ECG of 20-JUL-2019 10:43,  Sinus rhythm has replaced Atrial fibrillation  ST now depressed in Lateral leads  Nonspecific T wave abnormality, worse in Lateral leads  Confirmed by Luis E Moise MD, --- ([id number]) on 7/22/2019 3:44:37 PM     URINALYSIS W/ RFLX MICROSCOPIC    Collection Time: 07/22/19  2:43 PM   Result Value Ref Range    Color YELLOW      Appearance CLEAR      Specific gravity 1.008 1.005 - 1.030      pH (UA) 5.0 5.0 - 8.0      Protein NEGATIVE  NEG mg/dL    Glucose NEGATIVE  NEG mg/dL    Ketone NEGATIVE  NEG mg/dL    Bilirubin NEGATIVE  NEG      Blood NEGATIVE NEG      Urobilinogen 0.2 0.2 - 1.0 EU/dL    Nitrites NEGATIVE  NEG      Leukocyte Esterase NEGATIVE  NEG     METABOLIC PANEL, COMPREHENSIVE    Collection Time: 07/22/19  3:33 PM   Result Value Ref Range    Sodium 140 136 - 145 mmol/L    Potassium 3.8 3.5 - 5.5 mmol/L    Chloride 109 100 - 111 mmol/L    CO2 24 21 - 32 mmol/L    Anion gap 7 3.0 - 18 mmol/L    Glucose 76 74 - 99 mg/dL    BUN 53 (H) 7.0 - 18 MG/DL    Creatinine 1.87 (H) 0.6 - 1.3 MG/DL    BUN/Creatinine ratio 28 (H) 12 - 20      GFR est AA 32 (L) >60 ml/min/1.73m2    GFR est non-AA 26 (L) >60 ml/min/1.73m2    Calcium 8.0 (L) 8.5 - 10.1 MG/DL    Bilirubin, total 0.3 0.2 - 1.0 MG/DL    ALT (SGPT) 12 (L) 13 - 56 U/L    AST (SGOT) 16 10 - 38 U/L    Alk. phosphatase 55 45 - 117 U/L    Protein, total 5.5 (L) 6.4 - 8.2 g/dL    Albumin 2.7 (L) 3.4 - 5.0 g/dL    Globulin 2.8 2.0 - 4.0 g/dL    A-G Ratio 1.0 0.8 - 1.7             No results for input(s): FIO2I, IFO2, HCO3I, IHCO3, HCOPOC, PCO2I, PCOPOC, IPHI, PHI, PHPOC, PO2I, PO2POC in the last 72 hours. No lab exists for component: IPOC2    Telemetry:normal sinus rhythm    Imaging:  I have personally reviewed the patients radiographs and have reviewed the reports:    CXR [07/22/19]: FINDINGS:   - Both lungs are clear.   - No pleural effusion or pneumothorax is detected. - Cardiac silhouette, mediastinum and hilar regions appear unremarkable. - No significant interval changes are identified. IMPRESSION:  Negative study. CXR [07/20/19]: FINDINGS:  Image quality is somewhat degraded by body habitus and underpenetration. Mild hazy right basilar opacity, compatible with atelectasis versus possible airspace disease. Mild elevation of the right hemidiaphragm/lung base similar to prior.     No evidence of pneumothorax. The costophrenic sulci appear sharp.       The cardiac silhouette is within normal limits in size.      EKG leads/wires overlie the patient.       IMPRESSION:      Hazy right basilar opacity compatible with atelectasis versus possible airspace  disease. Allowing for differences in technique, no other significant interval  change. Total of  60   min critical care time spent at bedside during the course of care providing evaluation,management and care decisions and ordering appropriate treatment related to critical care problems exclusive of procedures. The reason for providing this level of medical care for this critically ill patient was due a critical illness that impaired one or more vital organ systems such that there was a high probability of imminent or life threatening deterioration in the patients condition. This care involved high complexity decision making to assess, manipulate, and support vital system functions, to treat this degree vital organ system failure and to prevent further life threatening deterioration of the patients condition.     Arash Chen PA-C      Pulmonary Critical Care Medicine  [facility]

## 2019-07-22 NOTE — H&P
History & Physical    Patient: Louise Gonzales MRN: [id number]  CSN: [id number]    YOB: 1945  Age: 66 y.o. Sex: female      DOA: 7/22/2019    Chief Complaint:   Chief Complaint   Patient presents with    Cough          HPI:     Louise Gonzales is a 66 y.o.  female who has PMH of Hemochromatosis for which she gets phlebotomy every few months, A-fib, HTN and mild p. HTN with reserved EF. Pt presented to ER with progressively worsening sOB for the last few days. Pt came to ER on 7/20 for same complaint, evaluated and discharged directly on Doxycycline. Pt took her Abx but did not feel better and started to develop SOB and c/o chest tightness at times that resolves after at rest.  IN ER, pt was found hypotensive and tachycardiac and continued to have productive cough. Pt received sepsis protocol yet continued to be hypotensive yet was alert and oriented and not in acute distress. [facility] IVF challenge and continued to have labile BP but mostly on the low side. ICU was consulted and appreciated and t will be admitted or further MGT. Of note, Pt contineud to have A-fib like picture with mild moderate RVR (110-120) and hypotnsive. Pt has CRF and CT chest can not be ordered to r/o PE.   Denies fever, abdominal and continues to be stable despite hypotension and very pleasant     Past Medical History:   Diagnosis Date    Adopted     Arthritis     Balance problems     MCBRIDE (dyspnea on exertion) 2/28/2019    GERD (gastroesophageal reflux disease)     Hemochromatosis     High cholesterol     Hypertension     Left knee pain     Left shoulder pain     Lumbar pain     Pain of left sacroiliac joint     Shoulder impingement, right, with rotator cuff strain        Past Surgical History:   Procedure Laterality Date    HX CHOLECYSTECTOMY      HX CHOLECYSTECTOMY      HX KNEE REPLACEMENT Bilateral     R knee/ Lknee and femur    HX ORTHOPAEDIC      R trigger finger     HX OTHER SURGICAL Right little finger    HX OTHER SURGICAL      Right wrist, replaced unlnar).  HX OTHER SURGICAL      Both knees    HX OTHER SURGICAL      Left femur    HX OTHER SURGICAL      Trigger finger surgery    HX OTHER SURGICAL      Left knee replacment revision     HX ROTATOR CUFF REPAIR Right     HX TONSILLECTOMY         Family History   Adopted: Yes       Social History     Socioeconomic History    Marital status: SINGLE     Spouse name: Not on file    Number of children: Not on file    Years of education: Not on file    Highest education level: Not on file   Tobacco Use    Smoking status: Former Smoker    Smokeless tobacco: Never Used    Tobacco comment: quit 20 years ago   Substance and Sexual Activity    Alcohol use: No    Drug use: No       Prior to Admission medications    Medication Sig Start Date End Date Taking? Authorizing Provider   doxycycline (VIBRA-TABS) 100 mg tablet Take 1 Tab by mouth two (2) times a day for 7 days. 7/20/19 7/27/19 Yes Rashida Olivier MD   simvastatin (ZOCOR) 40 mg tablet Take 1 Tab by mouth nightly. 5/14/19  Yes Marguerite Eric MD   cholecalciferol, vitamin D3, 2,000 unit tab TAKE 1 TABLET BY MOUTH EVERY DAY 1/11/19  Yes Provider, Historical   chlorpheniramine-HYDROcodone (TUSSIONEX) 10-8 mg/5 mL suspension Take 5 mL by mouth every twelve (12) hours as needed for Cough for up to 6 days. Max Daily Amount: 10 mL. 7/21/19 7/27/19  Marguerite Eric MD   ibuprofen (MOTRIN) 200 mg tablet Take  by mouth. Provider, Historical   levothyroxine (SYNTHROID) 25 mcg tablet Take 1 Tab by mouth Daily (before breakfast). 5/14/19   Marguerite Eric MD   potassium chloride (KLOR-CON M20) 20 mEq tablet Take 1 Tab by mouth daily. 5/14/19   Marguerite Eric MD   lisinopril (PRINIVIL, ZESTRIL) 10 mg tablet Take 1 Tab by mouth daily. 5/14/19   Marguerite Eric MD   allopurinol (ZYLOPRIM) 100 mg tablet Take 1 Tab by mouth two (2) times a day.  5/14/19   Marguerite Eric MD furosemide (LASIX) 20 mg tablet TAKE 2 TABLETS BY MOUTH EVERY DAY 5/14/19   Maeve Valdovinos MD   metOLazone (ZAROXOLYN) 5 mg tablet Take 1 Tab by mouth daily. Indications: edema 5/14/19   Maeve Valdovinos MD   albuterol sulfate (PROAIR RESPICLICK) 90 mcg/actuation aepb Take 2 Puffs by inhalation every four (4) hours as needed. Provider, Historical   inhalational spacing device (ACE AEROSOL CLOUD ENHANCER) 1 Each by Does Not Apply route as needed. 2/14/19   Maeve Valdovinos MD   albuterol (PROVENTIL HFA, VENTOLIN HFA, PROAIR HFA) 90 mcg/actuation inhaler Take 1 Puff by inhalation every six (6) hours as needed for Wheezing. 1/29/19   Maeve Valdovinos MD   fluticasone (FLOVENT HFA) 220 mcg/actuation inhaler Take 1 Puff by inhalation two (2) times a day. 1/29/19   Maeve Valdovinos MD   Omeprazole delayed release (PRILOSEC D/R) 20 mg tablet Take 20 mg by mouth daily. Provider, Historical       Allergies   Allergen Reactions    Indomethacin Anaphylaxis, Hives and Swelling         Review of Systems  GENERAL: Patient alert, awake and oriented times 3, able to communicate full sentences and not in distress. HEENT: No change in vision, no earache, tinnitus, sore throat or sinus congestion. NECK: No pain or stiffness. PULMONARY: mild shortness of breath, with cough / wheeze. Cardiovascular: no pnd / orthopnea, Chest tightness   GASTROINTESTINAL: No abdominal pain, nausea, vomiting or diarrhea, melena or bright red blood per rectum. GENITOURINARY: No urinary frequency, urgency, hesitancy or dysuria. MUSCULOSKELETAL: No joint or muscle pain, no back pain, no recent trauma. DERMATOLOGIC: No rash, no itching, no lesions. ENDOCRINE: No polyuria, polydipsia, no heat or cold intolerance. No recent change in weight. HEMATOLOGICAL: No anemia or easy bruising or bleeding. NEUROLOGIC: No headache, seizures, numbness, tingling or weakness.        Physical Exam:     Physical Exam:  Visit Vitals  /61 (BP 1 Location: Left arm, BP Patient Position: At rest)   Pulse 97   Temp 98.1 °F (36.7 °C)   Resp 18   Ht 5' (1.524 m)   Wt 98 kg (216 lb)   SpO2 99%   BMI 42.18 kg/m²      O2 Device: Room air    Temp (24hrs), Av.9 °F (36.6 °C), Min:97.5 °F (36.4 °C), Max:98.2 °F (36.8 °C)    701 - 1900  In: 958 [P.O.:600; I.V.:315]  Out: -    1901 -  0700  In: 4747.5 [P.O.:400; I.V.:4347.5]  Out: 1300 [Urine:1300]    General:  Alert, cooperative, no distress, appears stated age. Head: Normocephalic, without obvious abnormality, atraumatic. Eyes:  Conjunctivae/corneas clear. PERRL, EOMs intact. Nose: Nares normal. No drainage or sinus tenderness. Neck: Supple, symmetrical, trachea midline, no adenopathy, thyroid: no enlargement, no carotid bruit and no JVD. Lungs:   Clear to auscultation bilaterally. Heart:  irregular rate and rhythm, S1, S2 normal.     Abdomen: Soft, non-tender. Bowel sounds normal.    Extremities: Extremities normal, atraumatic, no cyanosis or edema. Pulses: 2+ and symmetric all extremities. Skin:  No rashes or lesions   Neurologic: AAOx3, No focal motor or sensory deficit. Labs Reviewed:    Lab results reviewed. For significant abnormal values and values requiring intervention, see assessment and plan.   CXR and EKG    Procedures/imaging: see electronic medical records for all procedures/Xrays and details which were not copied into this note but were reviewed prior to creation of Plan      Assessment/Plan     Principal Problem:    Sepsis ([address] (2019)    Active Problems:    CAP (community acquired pneumonia) (2019)      Hypotension (2019)      Atrial fibrillation with RVR ([address] (2019)       Pt is being admitted for Sepsis with tachycardia, tachypnea and hypotension mostly 2 ry to PNA   Hemochromatosis Hx   A-fib with RVR a times   persistent Hypotension despite fluids resuscitation    Continue Abx  Steroids and nebs  Hold BP meds Hypotension and tachycardia w/out significant SOB >> will get VQ scan to r/o PE    ICU consult is appreciated     DVT/GI Prophylaxis: Hep SQ and SCD's    Plan of care is discussed in details with Patient/Family at bedside and agreed upon    Leisa Bennett MD  7/23/2019 3:59 PM

## 2019-07-22 NOTE — ED TRIAGE NOTES
\"I was just here yesterday because of my cough. I didn't sleep all night because I coughed and my whole body hurts. \"

## 2019-07-22 NOTE — ED PROVIDER NOTES
EMERGENCY DEPARTMENT HISTORY AND PHYSICAL EXAM    12:55 PM  Date: 7/22/2019  Patient Name: Albina Bro    History of Presenting Illness     No chief complaint on file. History Provided By: Patient    HPI: Albina Bro is a 66 y.o. female with history of multiple medical problems as below. Patient was recently seen in the ER and diagnosed with pneumonia discharge. Over the past 2 days since discharge she reports that her shortness of breath and cough is been worse and she has been producing more sputum that is green in color. Denies fever or chills. No history of abdominal pain nausea or vomiting. No urinary symptoms. She took her blood pressure medication this morning lisinopril and Lasix. Location:  Severity:  Timing/course: Onset/Duration:     PCP: Juvenal Macdonald MD    Past History     Past Medical History:  Past Medical History:   Diagnosis Date    Adopted     Arthritis     Balance problems     MCBRIDE (dyspnea on exertion) 2/28/2019    GERD (gastroesophageal reflux disease)     Hemochromatosis     High cholesterol     Hypertension     Left knee pain     Left shoulder pain     Lumbar pain     Pain of left sacroiliac joint     Shoulder impingement, right, with rotator cuff strain        Past Surgical History:  Past Surgical History:   Procedure Laterality Date    HX CHOLECYSTECTOMY      HX CHOLECYSTECTOMY      HX KNEE REPLACEMENT Bilateral     R knee/ Lknee and femur    HX ORTHOPAEDIC      R trigger finger     HX OTHER SURGICAL      Right little finger    HX OTHER SURGICAL      Right wrist, replaced unlnar).     HX OTHER SURGICAL      Both knees    HX OTHER SURGICAL      Left femur    HX OTHER SURGICAL      Trigger finger surgery    HX OTHER SURGICAL      Left knee replacment revision     HX ROTATOR CUFF REPAIR Right     HX TONSILLECTOMY         Family History:  Family History   Adopted: Yes       Social History:  Social History     Tobacco Use    Smoking status: Former Smoker    Smokeless tobacco: Never Used    Tobacco comment: quit 20 years ago   Substance Use Topics    Alcohol use: No    Drug use: No       Allergies: Allergies   Allergen Reactions    Indomethacin Anaphylaxis, Hives and Swelling       Review of Systems   Review of Systems   Respiratory: Positive for cough, chest tightness and shortness of breath. Cardiovascular: Negative for chest pain. All other systems reviewed and are negative. Physical Exam     Patient Vitals for the past 12 hrs:   Temp Pulse Resp BP SpO2   07/22/19 1129 97.2 °F (36.2 °C) 95 18 (!) 89/67 96 %       Physical Exam   Constitutional: She is oriented to person, place, and time. She appears well-developed and well-nourished. No distress. HENT:   Head: Normocephalic and atraumatic. Eyes: Conjunctivae and EOM are normal.   Neck: Normal range of motion. Neck supple. Cardiovascular: Normal rate and normal heart sounds. Pulmonary/Chest: Effort normal and breath sounds normal. No respiratory distress. Abdominal: Soft. She exhibits no distension. There is no tenderness. Musculoskeletal: Normal range of motion. She exhibits edema. She exhibits no deformity. Neurological: She is alert and oriented to person, place, and time. Skin: Skin is warm and dry. Psychiatric: She has a normal mood and affect.  Her behavior is normal. Judgment and thought content normal.       Diagnostic Study Results     Labs -  Recent Results (from the past 12 hour(s))   CBC WITH AUTOMATED DIFF    Collection Time: 07/22/19 12:40 PM   Result Value Ref Range    WBC 8.5 4.6 - 13.2 K/uL    RBC 3.66 (L) 4.20 - 5.30 M/uL    HGB 12.2 12.0 - 16.0 g/dL    HCT 35.5 35.0 - 45.0 %    MCV 97.0 74.0 - 97.0 FL    MCH 33.3 24.0 - 34.0 PG    MCHC 34.4 31.0 - 37.0 g/dL    RDW 15.0 (H) 11.6 - 14.5 %    PLATELET 530 453 - 572 K/uL    MPV 11.2 9.2 - 11.8 FL    NEUTROPHILS 62 40 - 73 %    LYMPHOCYTES 21 21 - 52 %    MONOCYTES 9 3 - 10 %    EOSINOPHILS 7 (H) 0 - 5 % BASOPHILS 1 0 - 2 %    ABS. NEUTROPHILS 5.3 1.8 - 8.0 K/UL    ABS. LYMPHOCYTES 1.8 0.9 - 3.6 K/UL    ABS. MONOCYTES 0.8 0.05 - 1.2 K/UL    ABS. EOSINOPHILS 0.6 (H) 0.0 - 0.4 K/UL    ABS. BASOPHILS 0.1 0.0 - 0.1 K/UL    DF AUTOMATED     POC LACTIC ACID    Collection Time: 07/22/19 12:48 PM   Result Value Ref Range    Lactic Acid (POC) 1.82 0.40 - 2.00 mmol/L       Radiologic Studies -   No results found. Medical Decision Making     ED Course: Progress Notes, Reevaluation, and Consults:    12:55 PM Initial assessment performed. The patients presenting problems have been discussed, and they/their family are in agreement with the care plan formulated and outlined with them. I have encouraged them to ask questions as they arise throughout their visit. 4:32 PM  Labs resulted and I went to reevaluate the patient for admission but her map still below 60. We will give another liter of then reevaluate. If the patient remains hypotensive she will need ICU evaluation    4:57 PM  Patient is not very fluid responsive and after 2 L her map did not really change still in the 50s borderline tachycardic to the 1 teens. I was with Dr. Natasha Al the hospitalist at bedside and we discussed the patient's plan even though she is well-appearing and having a normal mental status she remains hypotensive in its best to have ICU evaluate her. I do not want to give the patient is more fluid not to overload her so she already has fluid overload and needed I think attempt to start pressors peripherally this point since she is not showing much of fluid responsiveness. ICU had been paged    5:48 PM  Discussed with ICU they had recommended giving the patient 30 kg/cc of feel comfortable doing that because I do not want to fluid overload the patient and have her require intubation while on the floor. Patient is mentating well and has shown no response at all to IV fluid in terms of her maps.   I think she needs very close monitoring and is very cautious resuscitation which will require ICU care    6:23 PM  As he has a bedside. Patient had improvement in her blood pressure map is 62. Borderline tachycardic but is been doing well so far. We agreed to the plan the patient can go to stepdown with pulmonary consult and close monitoring. From a pulmonary critical care standpoint we will continue to follow    Provider Notes (Medical Decision Making): 57-year-old female. Patient has borderline blood pressure. Likely due to her blood pressure medication on top of her infection. I do not think the patient is in septic shock she is very well-appearing and stable and mentating well. However septic bundle was initiated and the patient will need IV antibiotic given failure of outpatient therapy    Procedures:     Critical Care Time: Upon my evaluation, this patient had a high probability of imminent or life-threatening deterioration due to hypotension, which required my direct attention, intervention, and personal management. I have personally provided 35 minutes of critical care time exclusive of time spent on separately billable procedures. Time includes review of laboratory data, radiology results, discussion with consultants, and monitoring for potential decompensation. Interventions were performed as documented above. Alexandr Santos MD  12:58 PM        Vital Signs-Reviewed the patient's vital signs. Reviewed pt's pulse ox reading. EKG: Interpreted by the EP. Time Interpreted:    Rate:    Rhythm: Normal Sinus Rhythm 81   Interpretation:   Comparison: Sinus rhythm onset of A. fib. No significant interval changes    Records Reviewed: Nursing Notes, Old Medical Records and Previous electrocardiograms (Time of Review: 12:55 PM)  -I am the first provider for this patient.  -I reviewed the vital signs, available nursing notes, past medical history, past surgical history, family history and social history.     Current Facility-Administered Medications   Medication Dose Route Frequency Provider Last Rate Last Dose    vancomycin (VANCOCIN) 1750 mg in  ml infusion  1,750 mg IntraVENous ONCE Alexandr Santos MD        VANCOMYCIN INFORMATION NOTE   Other Rx Dosing/Monitoring Alexandr Santos MD        sodium chloride (NS) flush 5-10 mL  5-10 mL IntraVENous PRN Ruiz Garcia MD        sodium chloride 0.9 % bolus infusion 1,000 mL  1,000 mL IntraVENous ONCE Ruiz Garcia MD        Followed by   [address] [place] [address] sodium chloride 0.9 % bolus infusion 1,000 mL  1,000 mL IntraVENous ONCE Ruiz Garcia MD        Followed by   [address] [place] [address] sodium chloride 0.9 % bolus infusion 775 mL  775 mL IntraVENous ONCE Ruiz Garcia MD        cefepime (MAXIPIME) 2 g in sterile water (preservative free) 10 mL IV syringe  2 g IntraVENous Q24H Ruiz Garcia MD         Current Outpatient Medications   Medication Sig Dispense Refill    chlorpheniramine-HYDROcodone (TUSSIONEX) 10-8 mg/5 mL suspension Take 5 mL by mouth every twelve (12) hours as needed for Cough for up to 6 days. Max Daily Amount: 10 mL. 60 mL 0    doxycycline (VIBRA-TABS) 100 mg tablet Take 1 Tab by mouth two (2) times a day for 7 days. 14 Tab 0    ibuprofen (MOTRIN) 200 mg tablet Take  by mouth.  levothyroxine (SYNTHROID) 25 mcg tablet Take 1 Tab by mouth Daily (before breakfast). 90 Tab 6    potassium chloride (KLOR-CON M20) 20 mEq tablet Take 1 Tab by mouth daily. 90 Tab 6    lisinopril (PRINIVIL, ZESTRIL) 10 mg tablet Take 1 Tab by mouth daily. 90 Tab 6    allopurinol (ZYLOPRIM) 100 mg tablet Take 1 Tab by mouth two (2) times a day. 180 Tab 11    furosemide (LASIX) 20 mg tablet TAKE 2 TABLETS BY MOUTH EVERY DAY 90 Tab 6    metOLazone (ZAROXOLYN) 5 mg tablet Take 1 Tab by mouth daily. Indications: edema 90 Tab 6    simvastatin (ZOCOR) 40 mg tablet Take 1 Tab by mouth nightly.  90 Tab 6    albuterol sulfate (PROAIR RESPICLICK) 90 mcg/actuation aepb Take 2 Puffs by inhalation every four (4) hours as needed.  inhalational spacing device (ACE AEROSOL CLOUD ENHANCER) 1 Each by Does Not Apply route as needed. 1 Device 3    cholecalciferol, vitamin D3, 2,000 unit tab TAKE 1 TABLET BY MOUTH EVERY DAY  3    albuterol (PROVENTIL HFA, VENTOLIN HFA, PROAIR HFA) 90 mcg/actuation inhaler Take 1 Puff by inhalation every six (6) hours as needed for Wheezing. 1 Inhaler 3    fluticasone (FLOVENT HFA) 220 mcg/actuation inhaler Take 1 Puff by inhalation two (2) times a day. 1 Inhaler 3    Omeprazole delayed release (PRILOSEC D/R) 20 mg tablet Take 20 mg by mouth daily. Clinical Impression     Clinical Impression: No diagnosis found. Disposition: Admit          This note was dictated utilizing voice recognition software which may lead to typographical errors. I apologize in advance if the situation occurs. If questions arise please do not hesitate to contact me or call our department.     Shira David MD  12:55 PM

## 2019-07-23 ENCOUNTER — APPOINTMENT (OUTPATIENT)
Dept: VASCULAR SURGERY | Age: 74
DRG: 175 | End: 2019-07-23
Attending: HOSPITALIST
Payer: MEDICARE

## 2019-07-23 ENCOUNTER — APPOINTMENT (OUTPATIENT)
Dept: GENERAL RADIOLOGY | Age: 74
DRG: 175 | End: 2019-07-23
Attending: PHYSICIAN ASSISTANT
Payer: MEDICARE

## 2019-07-23 ENCOUNTER — APPOINTMENT (OUTPATIENT)
Dept: NON INVASIVE DIAGNOSTICS | Age: 74
DRG: 175 | End: 2019-07-23
Attending: HOSPITALIST
Payer: MEDICARE

## 2019-07-23 LAB
ANION GAP SERPL CALC-SCNC: 11 MMOL/L (ref 3–18)
APTT PPP: 31.1 SEC (ref 23–36.4)
APTT PPP: >180 SEC (ref 23–36.4)
APTT PPP: >180 SEC (ref 23–36.4)
BACTERIA SPEC CULT: NORMAL
BASOPHILS # BLD: 0 K/UL (ref 0–0.1)
BASOPHILS NFR BLD: 0 % (ref 0–2)
BUN SERPL-MCNC: 43 MG/DL (ref 7–18)
BUN/CREAT SERPL: 28 (ref 12–20)
CALCIUM SERPL-MCNC: 8.6 MG/DL (ref 8.5–10.1)
CHLORIDE SERPL-SCNC: 108 MMOL/L (ref 100–111)
CO2 SERPL-SCNC: 19 MMOL/L (ref 21–32)
CREAT SERPL-MCNC: 1.55 MG/DL (ref 0.6–1.3)
DIFFERENTIAL METHOD BLD: ABNORMAL
ECHO AO ROOT DIAM: 3.01 CM
ECHO LV E' LATERAL VELOCITY: 9.92 CM/S
ECHO LV E' SEPTAL VELOCITY: 8.25 CM/S
ECHO LV INTERNAL DIMENSION DIASTOLIC: 3.57 CM (ref 3.9–5.3)
ECHO LV INTERNAL DIMENSION SYSTOLIC: 2.26 CM
ECHO LV IVSD: 0.68 CM (ref 0.6–0.9)
ECHO LV MASS 2D: 68.4 G (ref 67–162)
ECHO LV MASS INDEX 2D: 35.5 G/M2 (ref 43–95)
ECHO LV POSTERIOR WALL DIASTOLIC: 0.74 CM (ref 0.6–0.9)
ECHO LVOT DIAM: 2.02 CM
ECHO LVOT PEAK GRADIENT: 7.5 MMHG
ECHO LVOT PEAK VELOCITY: 137.05 CM/S
ECHO LVOT VTI: 26.88 CM
ECHO MV A VELOCITY: 83.4 CM/S
ECHO MV E DECELERATION TIME (DT): 145.2 MS
ECHO MV E VELOCITY: 63.52 CM/S
ECHO MV E/A RATIO: 0.76
ECHO MV E/E' LATERAL: 6.4
ECHO MV E/E' RATIO (AVERAGED): 7.05
ECHO MV E/E' SEPTAL: 7.7
ECHO RV TAPSE: 2.09 CM (ref 1.5–2)
ECHO TV REGURGITANT MAX VELOCITY: 266.02 CM/S
ECHO TV REGURGITANT PEAK GRADIENT: 28.3 MMHG
EOSINOPHIL # BLD: 0 K/UL (ref 0–0.4)
EOSINOPHIL NFR BLD: 0 % (ref 0–5)
ERYTHROCYTE [DISTWIDTH] IN BLOOD BY AUTOMATED COUNT: 15 % (ref 11.6–14.5)
GLUCOSE SERPL-MCNC: 152 MG/DL (ref 74–99)
HCT VFR BLD AUTO: 33.6 % (ref 35–45)
HGB BLD-MCNC: 11.3 G/DL (ref 12–16)
INR PPP: 1.1 (ref 0.8–1.2)
LYMPHOCYTES # BLD: 0.3 K/UL (ref 0.9–3.6)
LYMPHOCYTES NFR BLD: 5 % (ref 21–52)
MAGNESIUM SERPL-MCNC: 1.5 MG/DL (ref 1.6–2.6)
MCH RBC QN AUTO: 32.8 PG (ref 24–34)
MCHC RBC AUTO-ENTMCNC: 33.6 G/DL (ref 31–37)
MCV RBC AUTO: 97.4 FL (ref 74–97)
MONOCYTES # BLD: 0 K/UL (ref 0.05–1.2)
MONOCYTES NFR BLD: 1 % (ref 3–10)
NEUTS SEG # BLD: 5.1 K/UL (ref 1.8–8)
NEUTS SEG NFR BLD: 94 % (ref 40–73)
PHOSPHATE SERPL-MCNC: 2.8 MG/DL (ref 2.5–4.9)
PLATELET # BLD AUTO: 208 K/UL (ref 135–420)
PMV BLD AUTO: 11.1 FL (ref 9.2–11.8)
POTASSIUM SERPL-SCNC: 3.9 MMOL/L (ref 3.5–5.5)
PROTHROMBIN TIME: 13.4 SEC (ref 11.5–15.2)
RBC # BLD AUTO: 3.45 M/UL (ref 4.2–5.3)
SERVICE CMNT-IMP: NORMAL
SODIUM SERPL-SCNC: 138 MMOL/L (ref 136–145)
WBC # BLD AUTO: 5.4 K/UL (ref 4.6–13.2)

## 2019-07-23 PROCEDURE — 74011250637 HC RX REV CODE- 250/637: Performed by: INTERNAL MEDICINE

## 2019-07-23 PROCEDURE — 94640 AIRWAY INHALATION TREATMENT: CPT

## 2019-07-23 PROCEDURE — 85610 PROTHROMBIN TIME: CPT

## 2019-07-23 PROCEDURE — 93970 EXTREMITY STUDY: CPT

## 2019-07-23 PROCEDURE — 74011250636 HC RX REV CODE- 250/636: Performed by: FAMILY MEDICINE

## 2019-07-23 PROCEDURE — 85025 COMPLETE CBC W/AUTO DIFF WBC: CPT

## 2019-07-23 PROCEDURE — 74011250636 HC RX REV CODE- 250/636: Performed by: INTERNAL MEDICINE

## 2019-07-23 PROCEDURE — 87070 CULTURE OTHR SPECIMN AEROBIC: CPT

## 2019-07-23 PROCEDURE — 36415 COLL VENOUS BLD VENIPUNCTURE: CPT

## 2019-07-23 PROCEDURE — 71045 X-RAY EXAM CHEST 1 VIEW: CPT

## 2019-07-23 PROCEDURE — 65270000029 HC RM PRIVATE

## 2019-07-23 PROCEDURE — 77030027138 HC INCENT SPIROMETER -A

## 2019-07-23 PROCEDURE — 80048 BASIC METABOLIC PNL TOTAL CA: CPT

## 2019-07-23 PROCEDURE — 94760 N-INVAS EAR/PLS OXIMETRY 1: CPT

## 2019-07-23 PROCEDURE — 84100 ASSAY OF PHOSPHORUS: CPT

## 2019-07-23 PROCEDURE — 85730 THROMBOPLASTIN TIME PARTIAL: CPT

## 2019-07-23 PROCEDURE — 74011000250 HC RX REV CODE- 250: Performed by: PHYSICIAN ASSISTANT

## 2019-07-23 PROCEDURE — 74011250636 HC RX REV CODE- 250/636: Performed by: HOSPITALIST

## 2019-07-23 PROCEDURE — C8929 TTE W OR WO FOL WCON,DOPPLER: HCPCS

## 2019-07-23 PROCEDURE — 74011250636 HC RX REV CODE- 250/636: Performed by: PHYSICIAN ASSISTANT

## 2019-07-23 PROCEDURE — 83735 ASSAY OF MAGNESIUM: CPT

## 2019-07-23 RX ORDER — HEPARIN SODIUM 10000 [USP'U]/100ML
18-36 INJECTION, SOLUTION INTRAVENOUS
Status: DISCONTINUED | OUTPATIENT
Start: 2019-07-23 | End: 2019-07-23

## 2019-07-23 RX ORDER — DICLOFENAC SODIUM 10 MG/G
2 GEL TOPICAL EVERY 6 HOURS
Status: DISCONTINUED | OUTPATIENT
Start: 2019-07-23 | End: 2019-07-26 | Stop reason: HOSPADM

## 2019-07-23 RX ORDER — HEPARIN SODIUM 10000 [USP'U]/100ML
18-36 INJECTION, SOLUTION INTRAVENOUS
Status: DISCONTINUED | OUTPATIENT
Start: 2019-07-23 | End: 2019-07-24

## 2019-07-23 RX ADMIN — METHYLPREDNISOLONE SODIUM SUCCINATE 40 MG: 40 INJECTION, POWDER, FOR SOLUTION INTRAMUSCULAR; INTRAVENOUS at 06:00

## 2019-07-23 RX ADMIN — PERFLUTREN 2 ML: 6.52 INJECTION, SUSPENSION INTRAVENOUS at 10:21

## 2019-07-23 RX ADMIN — GUAIFENESIN 100 MG: 200 SOLUTION ORAL at 02:39

## 2019-07-23 RX ADMIN — SIMVASTATIN 40 MG: 40 TABLET, FILM COATED ORAL at 21:40

## 2019-07-23 RX ADMIN — HEPARIN SODIUM AND DEXTROSE 15 UNITS/KG/HR: 10000; 5 INJECTION INTRAVENOUS at 19:37

## 2019-07-23 RX ADMIN — BUDESONIDE AND FORMOTEROL FUMARATE DIHYDRATE 2 PUFF: 160; 4.5 AEROSOL RESPIRATORY (INHALATION) at 07:40

## 2019-07-23 RX ADMIN — LEVOTHYROXINE SODIUM 25 MCG: 25 TABLET ORAL at 06:00

## 2019-07-23 RX ADMIN — IPRATROPIUM BROMIDE AND ALBUTEROL SULFATE 3 ML: .5; 3 SOLUTION RESPIRATORY (INHALATION) at 14:43

## 2019-07-23 RX ADMIN — HEPARIN SODIUM AND DEXTROSE 18 UNITS/KG/HR: 10000; 5 INJECTION INTRAVENOUS at 04:23

## 2019-07-23 RX ADMIN — BUDESONIDE AND FORMOTEROL FUMARATE DIHYDRATE 2 PUFF: 160; 4.5 AEROSOL RESPIRATORY (INHALATION) at 19:52

## 2019-07-23 RX ADMIN — IPRATROPIUM BROMIDE AND ALBUTEROL SULFATE 3 ML: .5; 3 SOLUTION RESPIRATORY (INHALATION) at 19:52

## 2019-07-23 RX ADMIN — IPRATROPIUM BROMIDE AND ALBUTEROL SULFATE 3 ML: .5; 3 SOLUTION RESPIRATORY (INHALATION) at 07:39

## 2019-07-23 RX ADMIN — SODIUM CHLORIDE 75 ML/HR: 900 INJECTION, SOLUTION INTRAVENOUS at 02:32

## 2019-07-23 RX ADMIN — METHYLPREDNISOLONE SODIUM SUCCINATE 40 MG: 40 INJECTION, POWDER, FOR SOLUTION INTRAMUSCULAR; INTRAVENOUS at 21:40

## 2019-07-23 RX ADMIN — ALLOPURINOL 100 MG: 100 TABLET ORAL at 08:24

## 2019-07-23 RX ADMIN — ALLOPURINOL 100 MG: 100 TABLET ORAL at 17:45

## 2019-07-23 RX ADMIN — GUAIFENESIN 100 MG: 200 SOLUTION ORAL at 13:08

## 2019-07-23 NOTE — PROGRESS NOTES
Echocardiogram completed. Patient returned to room with armband in place. Report to follow.     [address] [facility]

## 2019-07-23 NOTE — INTERDISCIPLINARY ROUNDS
Interdisciplinary Round Note   Patient Information:   Saravanan Snyder   460/01   Reason for Admission: CAP (community acquired pneumonia) [J18.9]  Sepsis [facility]) [A41.9]   Attending Provider:   Lino Sanders MD  Primary Care Physician:       Ernie Ruiz MD       [phone]   Estimated discharge date:  7/25/2019   Hospital day: 1  [unfilled]  - - -  RRAT Score: Low Risk            12       Total Score        3 Has Seen PCP in Last 6 Months (Yes=3, No=0)    5 Pt. Coverage (Medicare=5 , Medicaid, or Self-Pay=4)    4 Charlson Comorbidity Score (Age + Comorbid Conditions)        Criteria that do not apply:    . Living with Significant Other. Assisted Living. LTAC. SNF. or   Rehab    Patient Length of Stay (>5 days = 3)    IP Visits Last 12 Months (1-3=4, 4=9, >4=11)       normal sinus rhythm            Chemical      Lines, Drains, & Airways  None       IV Antibiotics:    Current Antimicrobial Therapy (168h ago, onward)    Ordered     Start Stop    07/22/19 1559  azithromycin (ZITHROMAX) 500 mg in 0.9% sodium chloride 250 mL IVPB  500 mg,   IntraVENous,   EVERY 24 HOURS      07/22/19 1700 07/29/19 2159    07/22/19 1638  cefTRIAXone (ROCEPHIN) 2 g in sterile water (preservative free) 20 mL IV syringe  2 g,   IntraVENous,   EVERY 24 HOURS      07/22/19 1639 07/29/19 1638        GI Prophylaxis: GI Prophylaxis: no   Type: Oral      Recent Glucose Results:   Lab Results   Component Value Date/Time     (H) 07/23/2019 03:07 AM     (H) 07/22/2019 09:24 PM    GLU 76 07/22/2019 03:33 PM      Activity Level: Activity Level:  Up with Assistance    Needs assistance with ADLs: no       Goals for Today: anticoagulant therapy   Recommendations:   Discharge Disposition:    Pharmacy evaluation and teaching, Focused Teaching for:  self care and  TBD    Needs for Discharge:  TBD IDR Team:  Nurse, MD,   Recommendations from [address] [place] [address] team:  TBD    Other Notes:  TBD

## 2019-07-23 NOTE — PROGRESS NOTES
Admitted for sepsis 2 ry to PNA  Hypotensive that required ICU consult  Hx of a-fib   Not on anticoagulation  AAOx 3 doing well except for Hypotension  On multiple bP meds at home   Hx of dHF

## 2019-07-23 NOTE — PROGRESS NOTES
Problem: Falls - Risk of  Goal: *Absence of Falls  Description  Document [facility] Fall Risk and appropriate interventions in the flowsheet.   Outcome: Progressing Towards Goal  Note:   Fall Risk Interventions:  Mobility Interventions: Patient to call before getting OOB              Elimination Interventions: Call light in reach              Problem: Patient Education: Go to Patient Education Activity  Goal: Patient/Family Education  Outcome: Progressing Towards Goal     Problem: General Medical Care Plan  Goal: *Vital signs within specified parameters  Outcome: Progressing Towards Goal  Goal: *Labs within defined limits  Outcome: Progressing Towards Goal  Goal: *Absence of infection signs and symptoms  Outcome: Progressing Towards Goal  Goal: *Optimal pain control at patient's stated goal  Outcome: Progressing Towards Goal  Goal: *Skin integrity maintained  Outcome: Progressing Towards Goal  Goal: *Fluid volume balance  Outcome: Progressing Towards Goal  Goal: *Optimize nutritional status  Outcome: Progressing Towards Goal  Goal: *Anxiety reduced or absent  Outcome: Progressing Towards Goal  Goal: *Progressive mobility and function (eg: ADL's)  Outcome: Progressing Towards Goal     Problem: Patient Education: Go to Patient Education Activity  Goal: Patient/Family Education  Outcome: Progressing Towards Goal

## 2019-07-23 NOTE — PROGRESS NOTES
conducted an initial consultation and Spiritual Assessment for Denisha, who is a 66 y.o.,female. Patients Primary Language is: [place]. According to the patients EMR Jain Affiliation is: [place]. The reason the Patient came to the hospital is:   Patient Active Problem List    Diagnosis Date Noted    CAP (community acquired pneumonia) 07/22/2019    Sepsis ([address] 07/22/2019    MCBRIDE (dyspnea on exertion) 02/28/2019    Obesity, morbid ([address] 01/29/2019    High cholesterol 01/11/2019    Hypertension 01/11/2019    Hemochromatosis 01/11/2019    Sacroiliitis ([facility] 12/31/2013    Spinal stenosis 11/27/2013    Shoulder impingement, right, with rotator cuff strain         The  provided the following Interventions:  Initiated a relationship of care and support. Explored issues of elayne, belief, spirituality and Jain/ritual needs while hospitalized. 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 She was a  in [place] for many years. Despite her illness, she was telling jokes and her spirit is well. Offered prayer and assurance of continued prayers on patient's behalf. The following outcomes where achieved:  Patient shared limited information about both their medical narrative and spiritual journey/beliefs.  confirmed Patient's Jain Affiliation. Patient processed feeling about current hospitalization. Patient expressed gratitude for 's visit. Assessment:  Patient does not have any Jain/cultural needs that will affect patients preferences in health care. There are no spiritual or Jain issues which require intervention at this time. Plan:  Chaplains will continue to follow and will provide pastoral care on an as needed/requested basis.    recommends bedside caregivers page  on duty if patient shows signs of acute spiritual or emotional distress.     [address] [facility]   [phone]

## 2019-07-23 NOTE — H&P
Patient was admitted on 07/22/19 for Pneumonia and Sepsis. V/Q Scan was done. Received a call from Radiology that the V/Q Scan will moderate to high probability for PE. Patient is Hemodynamically stable. Started on Heparin Drip. Will do Echocardiogram and Check US of bilateral LE for DVT.

## 2019-07-23 NOTE — ROUTINE PROCESS
Bedside shift change report given to  [facility] (oncoming nurse) by  Sofya Marie (offgoing nurse). Report included the following information SBAR, Kardex, ED Summary and Recent Results.

## 2019-07-23 NOTE — PROGRESS NOTES
Bedside shift change report given to Frankie Pereira 84 (oncoming nurse) by Lexie Stokes RN (offgoing nurse). Report included the following information SBAR, Kardex and MAR.

## 2019-07-23 NOTE — ED NOTES
Assumed care of patient, pt resting on stretcher, eating, pt states she has had persistent cough productive with green sputum, pt states she has not been able to sleep, diagnosed with pneumonia despite home antibiotics, pt awaiting admission bed, pt states her back hurts because she has chronic back pain, pt declines pain medication at this time, will try to move pt to hospital bed.

## 2019-07-23 NOTE — PROGRESS NOTES
NUTRITION    [facility]     RECOMMENDATIONS / PLAN:     - No nutrition intervention indicated at this time. Will re-screen as appropriate. Monitor meal intake and diet tolerance     NUTRITION INTERVENTIONS & DIAGNOSIS:     [x] Meals/snacks: modified composition    Nutrition Diagnosis:   No nutrition diagnosis at this time    ASSESSMENT:     Pt unavailable at time of visit. Has fair meal intake x 2 meals since admission per chart documentation.      Average po intake adequate to meet patients estimated nutritional needs:   [] Yes     [] No   [x] Unable to determine at this time    Diet: No diet orders on file      Food Allergies:  None known   Current Appetite:   [] Good     [] Fair     [] Poor     [x] Other:  Unknown   Appetite/meal intake prior to admission:   [] Good     [] Fair     [] Poor     [x] Other: unknown   Feeding Limitations:  [] Swallowing difficulty    [] Chewing difficulty    [] Other:  Current Meal Intake:   Patient Vitals for the past 100 hrs:   % Diet Eaten   07/23/19 1309 50 %   07/23/19 0829 50 %       BM: 7/23  Skin Integrity:  Healed skin tear on left elbow  Edema:   [] No     [x] Yes   Pertinent Medications: Reviewed: NS at 75 mL/hr, bowel regimen, zofran, steroid (ordered)    Recent Labs     07/23/19  0307 07/22/19  2124 07/22/19  1533    139 140   K 3.9 3.4* 3.8    108 109   CO2 19* 24 24   * 137* 76   BUN 43* 44* 53*   CREA 1.55* 1.69* 1.87*   CA 8.6 8.3* 8.0*   MG 1.5*  --   --    PHOS 2.8  --   --    ALB  --   --  2.7*   SGOT  --   --  16   ALT  --   --  12*       Intake/Output Summary (Last 24 hours) at 7/23/2019 1343  Last data filed at 7/23/2019 1309  Gross per 24 hour   Intake 5662.5 ml   Output 1300 ml   Net 4362.5 ml       Anthropometrics:  Ht Readings from Last 1 Encounters:   07/23/19 5' (1.524 m)     Last 3 Recorded Weights in this Encounter    07/22/19 1129 07/23/19 0724 07/23/19 0949   Weight: 92.5 kg (204 lb) 98.2 kg (216 lb 7.9 oz) 98 kg (216 lb)     Body mass index is 42.18 kg/m². Obese, Class III    Weight History:     Weight gain of 12 lb x 1 week PTA per chart hx; noted pt is edematous. Wt fluctuations PTA per chart hx    Weight Metrics 7/23/2019 7/16/2019 6/18/2019 5/14/2019 3/19/2019 2/28/2019 2/20/2019   Weight 216 lb 204 lb 208 lb 209 lb 9.6 oz 214 lb 8 oz 211 lb 12 oz 221 lb   BMI 42.18 kg/m2 39.84 kg/m2 40.62 kg/m2 40.93 kg/m2 41.89 kg/m2 41.35 kg/m2 43.16 kg/m2        Admitting Diagnosis: CAP (community acquired pneumonia) [J18.9]  Sepsis ([address] [A41.9]  Pertinent PMHx:  GERD, high cholesterol, HTN    Education Needs:        [x] None identified  [] Identified - Not appropriate at this time  []  Identified and addressed - refer to education log  Learning Limitations:   [x] None identified  [] Identified    Cultural, Latter day & ethnic food preferences:  [x] None identified    [] Identified and addressed     ESTIMATED NUTRITION NEEDS:     Calories: 5329-4539 kcal (MSJx1-1.2) based on  [x] Actual BW: 98 kg     [] IBW   Protein: 78-98 gm (0.8-1 gm/kg) based on  [x] Actual BW      [] IBW   Fluid: 1 mL/kcal     MONITORING & EVALUATION:     Nutrition Goal(s):   1. Po intake of meals will meet >75% of patient estimated nutritional needs within the next 7 days.   Outcome:  [] Met/Ongoing    [] Progressing towards goal    [] Not progressing towards goal    [x] New/Initial goal     Monitoring:   [x] Food and beverage intake   [x] Diet order   [x] Nutrition-focused physical findings   [x] Treatment/therapy   [] Weight   [] Enteral nutrition intake        Previous Recommendations (for follow-up assessments only):     []   Implemented       []   Not Implemented (RD to address)      [] No Longer Appropriate     [] No Recommendation Made     Discharge Planning:  Cardiac diet  [x] Participated in care planning, discharge planning, & interdisciplinary rounds as appropriate      Reginald Chilel, [address] [place] [address]   Pager: [phone]

## 2019-07-23 NOTE — ED NOTES
TRANSFER - OUT REPORT:    Verbal report given to [address] [place] [address] RN (name) on Denisha  being transferred to 4N (unit) for routine progression of care       Report consisted of patients Situation, Background, Assessment and   Recommendations(SBAR). Information from the following report(s) SBAR was reviewed with the receiving nurse. Lines:   Peripheral IV 07/22/19 Left Forearm (Active)   Site Assessment Clean, dry, & intact 7/22/2019 10:22 PM   Phlebitis Assessment 0 7/22/2019 10:22 PM   Infiltration Assessment 0 7/22/2019 10:22 PM   Dressing Status Clean, dry, & intact 7/22/2019 10:22 PM        Opportunity for questions and clarification was provided.       Patient transported with:   [facility]

## 2019-07-24 LAB
ANION GAP SERPL CALC-SCNC: 11 MMOL/L (ref 3–18)
ANION GAP SERPL CALC-SCNC: 18 MMOL/L (ref 3–18)
APTT PPP: 95.3 SEC (ref 23–36.4)
APTT PPP: >180 SEC (ref 23–36.4)
BASOPHILS # BLD: 0 K/UL (ref 0–0.1)
BASOPHILS NFR BLD: 0 % (ref 0–2)
BUN SERPL-MCNC: 41 MG/DL (ref 7–18)
BUN SERPL-MCNC: 41 MG/DL (ref 7–18)
BUN/CREAT SERPL: 26 (ref 12–20)
BUN/CREAT SERPL: 27 (ref 12–20)
CALCIUM SERPL-MCNC: 8.4 MG/DL (ref 8.5–10.1)
CALCIUM SERPL-MCNC: 9 MG/DL (ref 8.5–10.1)
CHLORIDE SERPL-SCNC: 100 MMOL/L (ref 100–111)
CHLORIDE SERPL-SCNC: 99 MMOL/L (ref 100–111)
CO2 SERPL-SCNC: 17 MMOL/L (ref 21–32)
CO2 SERPL-SCNC: 19 MMOL/L (ref 21–32)
CREAT SERPL-MCNC: 1.52 MG/DL (ref 0.6–1.3)
CREAT SERPL-MCNC: 1.55 MG/DL (ref 0.6–1.3)
DIFFERENTIAL METHOD BLD: ABNORMAL
EOSINOPHIL # BLD: 0 K/UL (ref 0–0.4)
EOSINOPHIL NFR BLD: 0 % (ref 0–5)
ERYTHROCYTE [DISTWIDTH] IN BLOOD BY AUTOMATED COUNT: 15 % (ref 11.6–14.5)
GLUCOSE SERPL-MCNC: 111 MG/DL (ref 74–99)
GLUCOSE SERPL-MCNC: 129 MG/DL (ref 74–99)
HCT VFR BLD AUTO: 31.3 % (ref 35–45)
HGB BLD-MCNC: 10.9 G/DL (ref 12–16)
LYMPHOCYTES # BLD: 0.7 K/UL (ref 0.9–3.6)
LYMPHOCYTES NFR BLD: 5 % (ref 21–52)
MCH RBC QN AUTO: 33.2 PG (ref 24–34)
MCHC RBC AUTO-ENTMCNC: 34.8 G/DL (ref 31–37)
MCV RBC AUTO: 95.4 FL (ref 74–97)
MONOCYTES # BLD: 0.3 K/UL (ref 0.05–1.2)
MONOCYTES NFR BLD: 2 % (ref 3–10)
NEUTS SEG # BLD: 11.5 K/UL (ref 1.8–8)
NEUTS SEG NFR BLD: 93 % (ref 40–73)
PLATELET # BLD AUTO: 224 K/UL (ref 135–420)
PMV BLD AUTO: 11.1 FL (ref 9.2–11.8)
POTASSIUM SERPL-SCNC: 3.8 MMOL/L (ref 3.5–5.5)
POTASSIUM SERPL-SCNC: 3.9 MMOL/L (ref 3.5–5.5)
RBC # BLD AUTO: 3.28 M/UL (ref 4.2–5.3)
SODIUM SERPL-SCNC: 129 MMOL/L (ref 136–145)
SODIUM SERPL-SCNC: 135 MMOL/L (ref 136–145)
WBC # BLD AUTO: 12.5 K/UL (ref 4.6–13.2)

## 2019-07-24 PROCEDURE — 85730 THROMBOPLASTIN TIME PARTIAL: CPT

## 2019-07-24 PROCEDURE — 74011250636 HC RX REV CODE- 250/636: Performed by: INTERNAL MEDICINE

## 2019-07-24 PROCEDURE — 94760 N-INVAS EAR/PLS OXIMETRY 1: CPT

## 2019-07-24 PROCEDURE — 74011250637 HC RX REV CODE- 250/637: Performed by: HOSPITALIST

## 2019-07-24 PROCEDURE — 97166 OT EVAL MOD COMPLEX 45 MIN: CPT

## 2019-07-24 PROCEDURE — 97530 THERAPEUTIC ACTIVITIES: CPT

## 2019-07-24 PROCEDURE — 77030038269 HC DRN EXT URIN PURWCK BARD -A

## 2019-07-24 PROCEDURE — 80048 BASIC METABOLIC PNL TOTAL CA: CPT

## 2019-07-24 PROCEDURE — 65270000029 HC RM PRIVATE

## 2019-07-24 PROCEDURE — 85025 COMPLETE CBC W/AUTO DIFF WBC: CPT

## 2019-07-24 PROCEDURE — 36415 COLL VENOUS BLD VENIPUNCTURE: CPT

## 2019-07-24 PROCEDURE — 94640 AIRWAY INHALATION TREATMENT: CPT

## 2019-07-24 PROCEDURE — 74011250637 HC RX REV CODE- 250/637: Performed by: NURSE PRACTITIONER

## 2019-07-24 PROCEDURE — 74011000250 HC RX REV CODE- 250: Performed by: PHYSICIAN ASSISTANT

## 2019-07-24 PROCEDURE — 97535 SELF CARE MNGMENT TRAINING: CPT

## 2019-07-24 PROCEDURE — 97116 GAIT TRAINING THERAPY: CPT

## 2019-07-24 PROCEDURE — 97161 PT EVAL LOW COMPLEX 20 MIN: CPT

## 2019-07-24 PROCEDURE — 94761 N-INVAS EAR/PLS OXIMETRY MLT: CPT

## 2019-07-24 PROCEDURE — 74011250637 HC RX REV CODE- 250/637: Performed by: INTERNAL MEDICINE

## 2019-07-24 RX ORDER — PREDNISONE 20 MG/1
40 TABLET ORAL
Status: DISCONTINUED | OUTPATIENT
Start: 2019-07-25 | End: 2019-07-26 | Stop reason: HOSPADM

## 2019-07-24 RX ADMIN — GUAIFENESIN 100 MG: 200 SOLUTION ORAL at 09:38

## 2019-07-24 RX ADMIN — DICLOFENAC 2 G: 10 GEL TOPICAL at 00:22

## 2019-07-24 RX ADMIN — DICLOFENAC 2 G: 10 GEL TOPICAL at 23:20

## 2019-07-24 RX ADMIN — BUDESONIDE AND FORMOTEROL FUMARATE DIHYDRATE 2 PUFF: 160; 4.5 AEROSOL RESPIRATORY (INHALATION) at 20:07

## 2019-07-24 RX ADMIN — BUDESONIDE AND FORMOTEROL FUMARATE DIHYDRATE 2 PUFF: 160; 4.5 AEROSOL RESPIRATORY (INHALATION) at 07:52

## 2019-07-24 RX ADMIN — APIXABAN 10 MG: 5 TABLET, FILM COATED ORAL at 20:53

## 2019-07-24 RX ADMIN — DICLOFENAC 2 G: 10 GEL TOPICAL at 17:37

## 2019-07-24 RX ADMIN — LEVOTHYROXINE SODIUM 25 MCG: 25 TABLET ORAL at 06:19

## 2019-07-24 RX ADMIN — SIMVASTATIN 40 MG: 40 TABLET, FILM COATED ORAL at 21:00

## 2019-07-24 RX ADMIN — IPRATROPIUM BROMIDE AND ALBUTEROL SULFATE 3 ML: .5; 3 SOLUTION RESPIRATORY (INHALATION) at 20:07

## 2019-07-24 RX ADMIN — DICLOFENAC 2 G: 10 GEL TOPICAL at 06:18

## 2019-07-24 RX ADMIN — DICLOFENAC 2 G: 10 GEL TOPICAL at 09:21

## 2019-07-24 RX ADMIN — ALLOPURINOL 100 MG: 100 TABLET ORAL at 09:22

## 2019-07-24 RX ADMIN — METHYLPREDNISOLONE SODIUM SUCCINATE 40 MG: 40 INJECTION, POWDER, FOR SOLUTION INTRAMUSCULAR; INTRAVENOUS at 09:22

## 2019-07-24 RX ADMIN — GUAIFENESIN 100 MG: 200 SOLUTION ORAL at 20:58

## 2019-07-24 RX ADMIN — IPRATROPIUM BROMIDE AND ALBUTEROL SULFATE 3 ML: .5; 3 SOLUTION RESPIRATORY (INHALATION) at 07:35

## 2019-07-24 RX ADMIN — APIXABAN 10 MG: 5 TABLET, FILM COATED ORAL at 11:36

## 2019-07-24 RX ADMIN — IPRATROPIUM BROMIDE AND ALBUTEROL SULFATE 3 ML: .5; 3 SOLUTION RESPIRATORY (INHALATION) at 13:41

## 2019-07-24 RX ADMIN — ALLOPURINOL 100 MG: 100 TABLET ORAL at 17:37

## 2019-07-24 NOTE — PROGRESS NOTES
Problem: Self Care Deficits Care Plan (Adult)  Goal: *Acute Goals and Plan of Care (Insert Text)  Description  Occupational Therapy Goals  Initiated 7/24/2019 within 7 day(s). 1.  Patient will perform grooming with supervision/set-up. 2.  Patient will perform upper body dressing with supervision/set-up. 3.  Patient will perform lower body dressing with supervision/set-up. 4.  Patient will perform toilet transfers with supervision/set-up. 5.  Patient will perform all aspects of toileting with supervision/set-up. 6.  Patient will participate in upper extremity therapeutic exercise/activities with supervision/set-up for 8 minutes. 7.  Patient will utilize energy conservation techniques during functional activities with min verbal cues. Prior Level of Function: Pt reports she lives with a friend in a [facility] with 3STE through the front and 4STE through the back. She was (I) with basic self-care/ADLs and IADLs, including cooking, cleaning, medication management, and driving PTA. Pt has a RW, rollator, and cane at home but was (I) for functional mobility. Outcome: Progressing Towards Goal   OCCUPATIONAL THERAPY EVALUATION    Patient: Liu Chowdhury [de-identified]66 y.o. female)  Date: 7/24/2019  Primary Diagnosis: CAP (community acquired pneumonia) [J18.9]  Sepsis ([facility] [address] [A41.9]        Precautions: Falls       ASSESSMENT :  Pt cleared to participate in OT evaluation by RN. Upon entering room, pt supine with HOB elevated, alert, and agreeable to therapy session. Based on the objective data described below, the patient presents with increased pain, decreased strength, decreased endurance, decreased activity tolerance, decreased functional balance, decreased functional mobility, and limited AROM of L shoulder affecting her performance in basic self-care/ADL tasks. Pt performed bed mobility with minimum assistance to participate in self-care.  Pt presented with SOB in sitting, requiring rest breaks through remainder of session. While sitting EOB, pt is able to perform LB dressing with SBA, doff/donning B socks, with additional time. Pt presents with difficulty of L shld flexion/abduction which affects her ability to perform UB ADLs. Pt is able to perform functional mobility using a RW to participate in toileting/toilet transfers with CGA. Pt then returned to bed at her request, reporting she was sitting in the recliner earlier in the day. Educated pt on the role of OT, evaluation process, walker management, pacing, energy conservation, deep breathing exercises, and goals for therapy with pt demonstrating good understanding. The pt will benefit from further OT services, in order to maximize her ADL performance and decrease the risk for complications associated with decreased functional activity. At the end of the session, pt left resting comfortably in bed, call-bell in reach, with all needs met. Patient will benefit from skilled intervention to address the above impairments. Patient's rehabilitation potential is considered to be Good  Factors which may influence rehabilitation potential include:   ? None noted  ? Mental ability/status  ? Medical condition  ? Home/family situation and support systems  ? Safety awareness  ? Pain tolerance/management  ? Other:      PLAN :  Recommendations and Planned Interventions:   ?               Self Care Training                  ? Therapeutic Activities  ? Functional Mobility Training   ? Cognitive Retraining  ? Therapeutic Exercises           ? Endurance Activities  ? Balance Training                    ? Neuromuscular Re-Education  ? Visual/Perceptual Training     ? Home Safety Training  ? Patient Education                   ? Family Training/Education  ?                Other (comment):    Frequency/Duration: Patient will be followed by occupational therapy 1-2 times per day/4-7 days per week to address goals. Discharge Recommendations: Skilled Nursing Facility  Further Equipment Recommendations for Discharge: Shower chair to decrease the risk of falls     SUBJECTIVE:   Patient stated I used to work as a     OBJECTIVE DATA SUMMARY:     Past Medical History:   Diagnosis Date    Adopted     Arthritis     Balance problems     MCBRIDE (dyspnea on exertion) 2/28/2019    GERD (gastroesophageal reflux disease)     Hemochromatosis     High cholesterol     Hypertension     Left knee pain     Left shoulder pain     Lumbar pain     Pain of left sacroiliac joint     Shoulder impingement, right, with rotator cuff strain      Past Surgical History:   Procedure Laterality Date    HX CHOLECYSTECTOMY      HX CHOLECYSTECTOMY      HX KNEE REPLACEMENT Bilateral     R knee/ Lknee and femur    HX ORTHOPAEDIC      R trigger finger     HX OTHER SURGICAL      Right little finger    HX OTHER SURGICAL      Right wrist, replaced unlnar). HX OTHER SURGICAL      Both knees    HX OTHER SURGICAL      Left femur    HX OTHER SURGICAL      Trigger finger surgery    HX OTHER SURGICAL      Left knee replacment revision     HX ROTATOR CUFF REPAIR Right     HX TONSILLECTOMY       Barriers to Learning/Limitations: yes;  physical  Compensate with: visual, verbal, tactile, kinesthetic cues/model    Home Situation:   Home Situation  Home Environment: Private residence  One/Two Story Residence: One story  Living Alone: No  Support Systems: Friends \ neighbors  Patient Expects to be Discharged to[de-identified] Private residence  Current DME Used/Available at Home: Walker, rollator  Tub or Shower Type: Tub/Shower combination  ? Right hand dominant   ? Left hand dominant    Cognitive/Behavioral Status:  Neurologic State: Alert  Orientation Level: Oriented X4  Cognition: Appropriate decision making; Follows commands  Safety/Judgement: Fall prevention    Skin: Noticeable bruising throughout BUEs  Edema: BUEs    Coordination: BUE  Coordination: Generally decreased, functional  Fine Motor Skills-Upper: Left Intact; Right Intact    Gross Motor Skills-Upper: Left Impaired;Right Intact    Balance:  Sitting: Intact  Standing: Impaired  Standing - Static: Fair  Standing - Dynamic : Fair    Strength: BUE  Strength: Generally decreased, functional    Tone & Sensation: BUE  Tone: Normal  Sensation: Intact      Range of Motion: BUE  AROM: Generally decreased, functional(Limited L shld ROM; L elbow, wrist, hand decreased but functional; RUE WFL)        Functional Mobility and Transfers for ADLs:  Bed Mobility:  Supine to Sit: Minimum assistance  Sit to Supine: Moderate assistance  Scooting: Contact guard assistance  Transfers:  Sit to Stand: Contact guard assistance  Stand to Sit: Contact guard assistance    ADL Assessment:   Feeding: Independent    Oral Facial Hygiene/Grooming: Minimum assistance    Bathing: Minimum assistance; Moderate assistance    Upper Body Dressing: Moderate assistance    Lower Body Dressing: Minimum assistance    Toileting: Contact guard assistance      ADL Intervention:  Self-feeding: Independent with Setup    Lower Body Dressing Assistance  Dressing Assistance: Stand-by assistance  Socks: Stand-by assistance  Leg Crossed Method Used: Yes  Position Performed: Seated edge of bed    Cognitive Retraining  Safety/Judgement: Fall prevention    Pain:  Pain level pre-treatment: 10/10 (L shld and back)  Pain level post-treatment: 10/10   Pain Intervention(s): Medication (see MAR); Rest, Ice, Repositioning   Response to intervention: Nurse notified, See doc flow    Activity Tolerance:   Poor    Please refer to the flowsheet for vital signs taken during this treatment. After treatment:   ? Patient left in no apparent distress sitting up in chair  ? Patient left in no apparent distress in bed  ?  Call bell left within reach  ? Nursing notified  ? Caregiver present  ? Bed alarm activated    COMMUNICATION/EDUCATION:   ? Role of Occupational Therapy in the acute care setting  ? Home safety education was provided and the patient/caregiver indicated understanding. ? Patient/family have participated as able in goal setting and plan of care. ? Patient/family agree to work toward stated goals and plan of care. ? Patient understands intent and goals of therapy, but is neutral about his/her participation. ? Patient is unable to participate in goal setting and plan of care. Thank you for this referral.  Frantz Oconnor MS, OTR/L  Time Calculation: 40 mins    Eval Complexity: History: MEDIUM Complexity : Expanded review of history including physical, cognitive and psychosocial  history ; Examination: MEDIUM Complexity : 3-5 performance deficits relating to physical, cognitive , or psychosocial skils that result in activity limitations and / or participation restrictions; Decision Making:MEDIUM Complexity : Patient may present with comorbidities that affect occupational performnce.  Miniml to moderate modification of tasks or assistance (eg, physical or verbal ) with assesment(s) is necessary to enable patient to complete evaluation

## 2019-07-24 NOTE — PROGRESS NOTES
[facility] Hospitalist Group  Progress Note    Patient: Yazmin Alegre Age: 66 y.o. : 1945 MR#: [id number] SSN: [id number]  Date: 2019     Subjective:   Alert and oriented X 3. Sitting in bedside recliner. Noted mildly SOB. No chest pain / chest discomfort. No n/v/d/c or abd pain. No new complaints. Awaiting SNF placement. D/w friend August Jarquin [phone]  Assessment/Plan:     1. Acute PE- Left lower lobe  2. Acute Bilateral DVT to femoral, popliteal, peroneal veins. 3. Recently treated pneumonia   4. Physical Deconditioning   5. HTN however Hypotension on admission r/t #1.   6. MCBRIDE suspect r/t #1. Improved   7. Suspected COURTNEY  8. Morbid obesity   9. HLD  10. GERD  11. Osteoarthritis   12. Hemochromatosis   13. Hx. ??A. Fib / arrhythmia : Not on anticoagulation  14. Diastolic HF - compensated   15. Chronic peripheral edema  16. 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 PLAN  1. Pulmonology and Oncology following. Transitioned to Eliquis, steroid de-escalated to 40mg X 3 days then 20mg X 3 days. cont nebs, symbicort, Cont bronchial hygiene protocol, IS. Would benefit from OP cardiology work up as well. 2. PT/TO following rec SNF. Case management following. 3. IVF d/c;d due to hyponatremia, will recheck BMP now and Q6 if warranted. Will consider nephrology consult pending serum sodium trend. Pt asymptomatic at this time.      Additional Notes:      Case discussed with:  [x]Patient  [x]Family  [x]Nursing  [x]Case Management  DVT Prophylaxis:  []Lovenox  []Hep SQ  []SCDs  []Coumadin   [x]On Heparin gtt    Objective:   VS:   Visit Vitals  BP 95/58 (BP 1 Location: Right arm, BP Patient Position: At rest)   Pulse 94   Temp 98.3 °F (36.8 °C)   Resp 15   Ht 5' (1.524 m)   Wt 98 kg (216 lb)   SpO2 98%   BMI 42.18 kg/m²      Tmax/24hrs: Temp (24hrs), Av.7 °F (36.5 °C), Min:97.1 °F (36.2 °C), Max:98.3 °F (36.8 °C)      Intake/Output Summary (Last 24 hours) at 2019 1057  Last data filed at 7/24/2019 1412  Gross per 24 hour   Intake 1680 ml   Output 900 ml   Net 780 ml       General:  Alert, NAD  Cardiovascular:  IRIR  Pulmonary:  Diminished left lobe; respiratory effort WNL  GI:  +BS in all four quadrants, soft, non-tender  Extremities:  +1 edema; 2+ dorsalis pedis pulses bilaterally  Neuro:  Alert and oriented X 3. Labs:    Recent Results (from the past 24 hour(s))   PTT    Collection Time: 07/23/19 12:53 PM   Result Value Ref Range    aPTT >180.0 (HH) 23.0 - 36.4 SEC   PTT    Collection Time: 07/23/19 10:05 PM   Result Value Ref Range    aPTT >180.0 (HH) 23.0 - 55.3 SEC   METABOLIC PANEL, BASIC    Collection Time: 07/24/19  5:44 AM   Result Value Ref Range    Sodium 129 (L) 136 - 145 mmol/L    Potassium 3.8 3.5 - 5.5 mmol/L    Chloride 99 (L) 100 - 111 mmol/L    CO2 19 (L) 21 - 32 mmol/L    Anion gap 11 3.0 - 18 mmol/L    Glucose 111 (H) 74 - 99 mg/dL    BUN 41 (H) 7.0 - 18 MG/DL    Creatinine 1.55 (H) 0.6 - 1.3 MG/DL    BUN/Creatinine ratio 26 (H) 12 - 20      GFR est AA 39 (L) >60 ml/min/1.73m2    GFR est non-AA 33 (L) >60 ml/min/1.73m2    Calcium 8.4 (L) 8.5 - 10.1 MG/DL   CBC WITH AUTOMATED DIFF    Collection Time: 07/24/19  5:44 AM   Result Value Ref Range    WBC 12.5 4.6 - 13.2 K/uL    RBC 3.28 (L) 4.20 - 5.30 M/uL    HGB 10.9 (L) 12.0 - 16.0 g/dL    HCT 31.3 (L) 35.0 - 45.0 %    MCV 95.4 74.0 - 97.0 FL    MCH 33.2 24.0 - 34.0 PG    MCHC 34.8 31.0 - 37.0 g/dL    RDW 15.0 (H) 11.6 - 14.5 %    PLATELET 437 937 - 833 K/uL    MPV 11.1 9.2 - 11.8 FL    NEUTROPHILS 93 (H) 40 - 73 %    LYMPHOCYTES 5 (L) 21 - 52 %    MONOCYTES 2 (L) 3 - 10 %    EOSINOPHILS 0 0 - 5 %    BASOPHILS 0 0 - 2 %    ABS. NEUTROPHILS 11.5 (H) 1.8 - 8.0 K/UL    ABS. LYMPHOCYTES 0.7 (L) 0.9 - 3.6 K/UL    ABS. MONOCYTES 0.3 0.05 - 1.2 K/UL    ABS. EOSINOPHILS 0.0 0.0 - 0.4 K/UL    ABS.  BASOPHILS 0.0 0.0 - 0.1 K/UL    DF AUTOMATED     PTT    Collection Time: 07/24/19  5:44 AM   Result Value Ref Range    aPTT >180.0 () 23.0 - 36.4 SEC       Signed By: Florentino Gonzales NP     July 24, 2019

## 2019-07-24 NOTE — ROUTINE PROCESS
Bedside and Verbal shift change report given to Thiago Murillo RN (oncoming nurse) by Patsy Krabbe, RN (offgoing nurse). Report included the following information SBAR, Kardex, ED Summary, Procedure Summary, Intake/Output, MAR, Recent Results and Cardiac Rhythm NSR.

## 2019-07-24 NOTE — PROGRESS NOTES
Reason for Admission:  CAP (community acquired pneumonia) [J18.9]  Sepsis ([address] [A41.9]                 RRAT Score:    12           Plan for utilizing home health: [facility]. Will need HH orders. Likelihood of Readmission:   Moderate                         Do you (patient/family) have any concerns for transition/discharge?  no      Initial assessment completed with patient. Cognitive status of patient: oriented to time, place, person and situation. Face sheet information confirmed:  yes. The patient designates Heather Lucasute, friend [phone] cell & [phone] home) to participate in her discharge plan and to receive any needed information. This patient lives in a single family home and  Guero Rubin, friend lives with patient. Patient w asable to navigate steps as needed. Prior to hospitalization, patient was considered to be independent with ADLs/IADLS : yes . Patient has a current ACP document on file: no     The patient's friend will be available to transport patient home upon discharge. The patient already has Arman Basil, [address] [facility] J[address] chair, and safety rails in home medical equipment available in the home. Patient is not currently active with home health. Patient has stayed in a skilled nursing facility or rehab 10 years ago. This patient is on dialysis :no      List of available Home Health agencies were provided and reviewed with the patient prior to discharge. Freedom of choice signed: yes, for [facility]. Currently, the discharge plan is Home with [address] [place] [address] [facility] Patient stated \" I will not be going to a nursing home but I will take home health. \"  - Will need home health orders. Mya Verde, friend will transport home at discharge. The patient states that she can obtain her medications from the pharmacy, and take her medications as directed.     Patient's current insurance is Medicare part A & B and Blue Cross supplement      Care Management Interventions  PCP Verified by CM:  Yes  Last Visit to PCP: 07/22/19  Mode of Transport at Discharge: Self  Transition of Care Consult (CM Consult): Discharge Planning, [address]: Yes  MyChart Signup: No  Discharge Durable Medical Equipment: No  Physical Therapy Consult: Yes  Occupational Therapy Consult: Yes  Speech Therapy Consult: No  Current Support Network: Own Home, Other(Friend lives in home.)  Confirm Follow Up Transport: Friends  Plan discussed with Pt/Family/Caregiver: Yes  Freedom of Choice Offered: Yes  Discharge Location  Discharge Placement: Home with home health      NICOLAS Espino, RN  Pager # [phone]  Care Manager

## 2019-07-24 NOTE — PROGRESS NOTES
Bedside and Verbal shift change report given to Lyubov Humphries RN (oncoming nurse) by Nico Carter RN (offgoing nurse). Report included the following information SBAR, Kardex, ED Summary, MAR and Accordion.

## 2019-07-24 NOTE — PROGRESS NOTES
HEMATOLOGY AND ONCOLOGY   PROGRESS NOTE      Patient: Louise Gonzales   MRN: [id number]      YOB: 1945      Admit Date: 7/22/2019    Assessment:     # VTE  # HEMOCHROMATOSIS:  # SEPSIS:  # HYPOTENSION:  # ABBIE    Plan:     - Today, she was explained different causes of blood clots and differences b/w provoked vs non-provoked clots. She reports being inactive at home and spending most of the time sitting. She also has morbid obesity which can increase her risk of clotting.  - She denies previous h/o blood clot or family h/o blood clots. She is adopted. - She has been getting [address] screening tests. She needs liver US. Moreover, age appropriate malignancy screening tests are recommended in her case. She needs screening mammogram and colonoscopy. - We discussed different forms of blood thinner, dosing, monitoring, bleeding risk and available reversible agents. She would like to continue with the Eliquis. - She has RLQ pain, she states it is chronic. Consider abdominal CT scan. - Monitor counts while on AC.  - Monitor lytes and kidney function.  - She is aware of the bleeding risk and has to follow bleeding precautions. - Follow up with Dr. Kana Boyce in 2 weeks post discharge. The plan was discussed with the patient and I answered her questions, she expressed an understanding and agreement with the plan. Jeromy Ness MD  [facility] [phone]    Subjective:     Patient is known to the hematology and oncology service. She has been managed for hemochromatosis and been on phlebotomy program. She is admitted now after presentation with SOB. She reports progressive dyspnea over the last month. She was treated for pneumonia after a recent visit to ER. However, she remained symptomatic and presented back to ER. She was found to be tachy with hypotension and hypoxia.  She required an admission to MICU for pressure support and her work up including VQ scan on 07/23/2019 showed an intermediate probability for pulmonary embolism. The leg US revealed acute nonocclusive DVT present in the right femoral popliteal vein, occlusive DVT present in the right peroneal vein, nonocclusive DVT demonstrated left femoral popliteal vein, acute occlusive DVT present left peroneal vein. She has been managed by IV heparin and evaluated by pulmonary. Patient feels better now, she is not as SOB as she was when she came. She denies cough or hemoptysis. Her only pain is her left shoulder. No GI or  complaints. She reports chronic RLQ pain. Objective:     Patient Vitals for the past 24 hrs:   BP Temp Pulse Resp SpO2   19 0802 95/58 98.3 °F (36.8 °C) 94 15 98 %   19 0736     98 %   19 0410 91/54 97.1 °F (36.2 °C) 75 15 96 %   19 0000 107/83 97.1 °F (36.2 °C) 93 20 94 %   19 2000 107/55 97.5 °F (36.4 °C) 99 20 94 %   19 1956     99 %   19 1954     99 %   19 1702 103/61 98.1 °F (36.7 °C) 97 18 99 %   19 1223 97/62 97.8 °F (36.6 °C) 86 18 98 %         Intake/Output Summary (Last 24 hours) at 2019 1156  Last data filed at 2019 1136  Gross per 24 hour   Intake 2055.88 ml   Output 900 ml   Net 1155.88 ml       Temp (24hrs), Av.7 °F (36.5 °C), Min:97.1 °F (36.2 °C), Max:98.3 °F (36.8 °C)     Review Of Systems:     Constitutional: negative for fevers, chills, sweats and fatigue  Eyes: negative for visual disturbance, redness and icterus  Ears, Nose, Mouth, Throat, and Face: negative for tinnitus, epistaxis, sore mouth and hoarseness  Respiratory: negative for cough, sputum, hemoptysis, pleurisy/chest pain or wheezing. Cardiovascular: negative for chest pain, chest pressure/discomfort, palpitations, irregular heart beats, syncope, paroxysmal nocturnal dyspnea  Gastrointestinal: negative for reflux symptoms, nausea, vomiting, change in bowel habits, melena, diarrhea, or constipation. She reports RLQ discomfort.   Genitourinary:negative for dysuria, nocturia, urinary incontinence, hesitancy and hematuria  Integument: negative for rash, skin lesion(s) and pruritus  Hematologic/Lymphatic: negative for easy bruising, bleeding and lymphadenopathy  Musculoskeletal: chronic left shoulder and lower back pain. Neurological: negative for headaches, dizziness, seizures, paresthesia and weakness. Physical Exam:     Constitutional: Alert, oriented, not in distress  Eyes: PERRLA, anicteric, no redness  HEENT: no palpable Lymph nodes, no mucositis, no thrush. Respiratory: symmetrical expansion, no rales, no rhonchi, no wheezing. Cardiovascular: S1S2 positive. Gastrointestinal: soft, benign, non tender, obese. Integument: no rash, no petechiae, no ecchymosis. Musculoskeletal: no edema, no cyanosis, no clubbing. Neurological: intact, symmetrical exam with no focal motor or sensory deficits.     Lab:     Recent Results (from the past 24 hour(s))   PTT    Collection Time: 07/23/19 12:53 PM   Result Value Ref Range    aPTT >180.0 (HH) 23.0 - 36.4 SEC   PTT    Collection Time: 07/23/19 10:05 PM   Result Value Ref Range    aPTT >180.0 (HH) 23.0 - 32.7 SEC   METABOLIC PANEL, BASIC    Collection Time: 07/24/19  5:44 AM   Result Value Ref Range    Sodium 129 (L) 136 - 145 mmol/L    Potassium 3.8 3.5 - 5.5 mmol/L    Chloride 99 (L) 100 - 111 mmol/L    CO2 19 (L) 21 - 32 mmol/L    Anion gap 11 3.0 - 18 mmol/L    Glucose 111 (H) 74 - 99 mg/dL    BUN 41 (H) 7.0 - 18 MG/DL    Creatinine 1.55 (H) 0.6 - 1.3 MG/DL    BUN/Creatinine ratio 26 (H) 12 - 20      GFR est AA 39 (L) >60 ml/min/1.73m2    GFR est non-AA 33 (L) >60 ml/min/1.73m2    Calcium 8.4 (L) 8.5 - 10.1 MG/DL   CBC WITH AUTOMATED DIFF    Collection Time: 07/24/19  5:44 AM   Result Value Ref Range    WBC 12.5 4.6 - 13.2 K/uL    RBC 3.28 (L) 4.20 - 5.30 M/uL    HGB 10.9 (L) 12.0 - 16.0 g/dL    HCT 31.3 (L) 35.0 - 45.0 %    MCV 95.4 74.0 - 97.0 FL    MCH 33.2 24.0 - 34.0 PG    MCHC 34.8 31.0 - 37.0 g/dL    RDW 15.0 (H) 11.6 - 14.5 %    PLATELET 740 808 - 815 K/uL    MPV 11.1 9.2 - 11.8 FL    NEUTROPHILS 93 (H) 40 - 73 %    LYMPHOCYTES 5 (L) 21 - 52 %    MONOCYTES 2 (L) 3 - 10 %    EOSINOPHILS 0 0 - 5 %    BASOPHILS 0 0 - 2 %    ABS. NEUTROPHILS 11.5 (H) 1.8 - 8.0 K/UL    ABS. LYMPHOCYTES 0.7 (L) 0.9 - 3.6 K/UL    ABS. MONOCYTES 0.3 0.05 - 1.2 K/UL    ABS. EOSINOPHILS 0.0 0.0 - 0.4 K/UL    ABS.  BASOPHILS 0.0 0.0 - 0.1 K/UL    DF AUTOMATED     PTT    Collection Time: 07/24/19  5:44 AM   Result Value Ref Range    aPTT >180.0 (HH) 23.0 - 36.4 SEC   PTT    Collection Time: 07/24/19 10:25 AM   Result Value Ref Range    aPTT 95.3 (H) 23.0 - 73.2 SEC   METABOLIC PANEL, BASIC    Collection Time: 07/24/19 10:25 AM   Result Value Ref Range    Sodium 135 (L) 136 - 145 mmol/L    Potassium 3.9 3.5 - 5.5 mmol/L    Chloride 100 100 - 111 mmol/L    CO2 17 (L) 21 - 32 mmol/L    Anion gap 18 3.0 - 18 mmol/L    Glucose 129 (H) 74 - 99 mg/dL    BUN 41 (H) 7.0 - 18 MG/DL    Creatinine 1.52 (H) 0.6 - 1.3 MG/DL    BUN/Creatinine ratio 27 (H) 12 - 20      GFR est AA 40 (L) >60 ml/min/1.73m2    GFR est non-AA 33 (L) >60 ml/min/1.73m2    Calcium 9.0 8.5 - 10.1 MG/DL

## 2019-07-24 NOTE — PROGRESS NOTES
Problem: Mobility Impaired (Adult and Pediatric)  Goal: *Acute Goals and Plan of Care (Insert Text)  Description  Physical Therapy Goals  Initiated 7/24/2019 and to be accomplished within 7 day(s)  1. Patient will move from supine to sit and sit to supine, scoot up and down and roll side to side in bed with supervision/set-up. 2.  Patient will transfer from bed to chair and chair to bed with supervision/set-up using the least restrictive device. 3.  Patient will perform sit to stand with supervision/set-up. 4.  Patient will ambulate with supervision/set-up for >50 feet with the least restrictive device. 5.  Patient will demo good sitting and standing balance in order to improve balance during functional tasks. PLOF: Patient lives in 1 story home with a roommate and reports she was able to walk short distances with no AD though notes she would hang onto things around the house. Patient also reports that she has a cane and walker at home. Outcome: Progressing Towards Goal   PHYSICAL THERAPY EVALUATION    Patient: Anu Mcallister [de-identified]66 y.o. female)  Date: 7/24/2019  Primary Diagnosis: CAP (community acquired pneumonia) [J18.9]  Sepsis ([facility] [address] [A41.9]  Precautions:Fall    ASSESSMENT :  Patient is 72yo F admitted to hospital for CAP and presents today alert and agreeable to therapy and was sitting on commode upon arrival. Patient assisted in standing and ambulated to bed for seated break, objective assessment, and educated regarding therapy. Patient demos decreased overall endurance with SOB in setting of PE and DVT's. Patient able to ambulate 25-35ft with RW and demos accelerated pace, decreased balance, and required cues of keeping posture upright. Patient transferred to sitting for rest break in hallway due to back pain and patient unable to make it back to room prior to seated rest break.  Patient demos poor safety awareness and took time with patient to educated on increasing safety awareness regarding activity pacing and walker use. Patient then ambulated back to room to sit in locked recliner and was left resting with call bell by her side. Patient educated not to get up without assist and she acknowledged understanding. Patient will benefit from skilled intervention to address the above impairments. Patient's rehabilitation potential is considered to be Good  Factors which may influence rehabilitation potential include:   ? None noted  ? Mental ability/status  ? Medical condition  ? Home/family situation and support systems  ? Safety awareness  ? Pain tolerance/management  ? Other:      PLAN :  Recommendations and Planned Interventions:   ?           Bed Mobility Training             ? Neuromuscular Re-Education  ? Transfer Training                   ? Orthotic/Prosthetic Training  ? Gait Training                          ? Modalities  ? Therapeutic Exercises           ? Edema Management/Control  ? Therapeutic Activities            ? Family Training/Education  ? Patient Education  ? Other (comment):    Frequency/Duration: Patient will be followed by physical therapy 1-2 times per day/4-7 days per week to address goals. Discharge Recommendations: Skilled Nursing Facility  Further Equipment Recommendations for Discharge: N/A     SUBJECTIVE:   Patient stated I just can't go too far.     OBJECTIVE DATA SUMMARY:     Past Medical History:   Diagnosis Date    Adopted     Arthritis     Balance problems     MCBRIDE (dyspnea on exertion) 2/28/2019    GERD (gastroesophageal reflux disease)     Hemochromatosis     High cholesterol     Hypertension     Left knee pain     Left shoulder pain     Lumbar pain     Pain of left sacroiliac joint     Shoulder impingement, right, with rotator cuff strain      Past Surgical History:   Procedure Laterality Date    HX CHOLECYSTECTOMY      HX CHOLECYSTECTOMY      HX KNEE REPLACEMENT Bilateral     R knee/ Lknee and femur    HX ORTHOPAEDIC      R trigger finger     HX OTHER SURGICAL      Right little finger    HX OTHER SURGICAL      Right wrist, replaced unlnar). HX OTHER SURGICAL      Both knees    HX OTHER SURGICAL      Left femur    HX OTHER SURGICAL      Trigger finger surgery    HX OTHER SURGICAL      Left knee replacment revision     HX ROTATOR CUFF REPAIR Right     HX TONSILLECTOMY       Barriers to Learning/Limitations: None  Compensate with: N/A  Home Situation:  Home Situation  Home Environment: Private residence  One/Two Story Residence: One story  Living Alone: No  Support Systems: Friends \ neighbors  Patient Expects to be Discharged to[de-identified] Private residence  Current DME Used/Available at Home: [employer], rollator  Critical Behavior:     A&Ox4  Strength:    Strength: Generally decreased, functional(BLE)   Tone & Sensation:   Tone: Normal(BLE)   Sensation: Intact(BLE)   Range Of Motion:  AROM: Generally decreased, functional(BLE)   Functional Mobility:  Bed Mobility:   Scooting: Contact guard assistance  Transfers:  Sit to Stand: Contact guard assistance  Stand to Sit: Contact guard assistance  Balance:   Sitting: Intact  Standing: Impaired; With support  Standing - Static: Fair  Standing - Dynamic : Fair  Ambulation/Gait Training:  Distance (ft): 60 Feet (ft)(25ft + 35ft)  Assistive Device: Walker, rolling  Ambulation - Level of Assistance: Contact guard assistance;Minimal assistance   Gait Abnormalities: Decreased step clearance    Base of Support: Narrowed   Speed/Maty: Accelerated  Step Length: Left shortened;Right shortened   Interventions: Verbal cues; Visual/Demos  Pain:  Pain level pre-treatment: 4/10   Pain level post-treatment: 4/10   Pain Intervention(s) : Medication (see MAR);  Rest, Repositioning  Response to intervention: Nurse notified, See doc flow    Activity Tolerance:   Patient tolerated activity fair; requires increased safety education on   Please refer to the flowsheet for vital signs taken during this treatment. After treatment:   ?         Patient left in no apparent distress sitting up in chair  ? Patient left in no apparent distress in bed  ? Call bell left within reach  ? Nursing notified  ? Caregiver present  ? Bed alarm activated  ? SCDs applied    COMMUNICATION/EDUCATION:   ?         Role of Physical Therapy in the acute care setting. ?         Fall prevention education was provided and the patient/caregiver indicated understanding. ? Patient/family have participated as able in goal setting and plan of care. ?         Patient/family agree to work toward stated goals and plan of care. ?         Patient understands intent and goals of therapy, but is neutral about his/her participation. ? Patient is unable to participate in goal setting/plan of care: ongoing with therapy staff. ?         Other:     Thank you for this referral.  Nicky Perez, PT   Time Calculation: 32 mins      Eval Complexity: History: MEDIUM  Complexity : 1-2 comorbidities / personal factors will impact the outcome/ POC Exam:LOW Complexity : 1-2 Standardized tests and measures addressing body structure, function, activity limitation and / or participation in recreation  Presentation: LOW Complexity : Stable, uncomplicated  Clinical Decision Making:Low Complexity    Overall Complexity:LOW

## 2019-07-24 NOTE — PROGRESS NOTES
[facility]  Pulmonary, Critical Care, and Sleep Medicine    Name: Leigh Steele MRN: [id number]   : 1945 Hospital: [address] [place] [address]   Date: 2019        IMPRESSION:   · PE- new, secondary to DVT, unprovoked  · DVT - bilateral , acute  · ? CAP  · MCBRIDE - Multifactorial. Last office visit on 19. PFT with decreased diffusion capacity of 73% predicted. Body habitus, chronic back pain causing splinting and diaphragmatic weakness all contributing factors as well. · Suspected COURTNEY - Sleep study not completed as of yet. · Atrial fibrillation with RVR  · Morbid Obesity  · Deconditioning  · Hx of HTN, HDL, GERD, Arthritis,  Hemochromatosis     Patient Active Problem List   Diagnosis Code    Shoulder impingement, right, with rotator cuff strain M75.40    Spinal stenosis M48.00    Sacroiliitis ([facility] [address] M46.1    High cholesterol E78.00    Hypertension I10    Hemochromatosis E83.119    Obesity, morbid ([facility] [address] E66.01    MCBRIDE (dyspnea on exertion) R06.09    CAP (community acquired pneumonia) J18.9    Sepsis ([facility] [address] A41.9    Hypotension I95.9    Atrial fibrillation with RVR (HCC) I48.91      PLAN:   · Continue anticoagulation-can start oral agents,DOAC preferred- lifelong   · Con't Duo-nebs q6 with RT; albuterol nebs q4 PRN  · Discontinue corticosteroids  · Aggressive pulmonary hygiene; encourage ICS; bronchial hygiene protocol  · Agree with Robitussin PRN  · Follow sputum culture and de-escalate antibiotics  · Will continue to follow     Subjective/Interval History:     19     Feels better as far as breathing  Some shortness of breath  Has minimal cough  Denies chest pain  Had difficulty sleeping due to back pain, left shoulder pain and just not getting comfortable. Denies any dizziness  Denies any nausea  Up in bedside chair    ROS:Pertinent items are noted in HPI. Objective:   Vital Signs:    Visit Vitals  BP 95/58 (BP 1 Location: Right arm, BP Patient Position:  At rest)   Pulse 94   Temp 98.3 °F (36.8 °C)   Resp 15   Ht 5' (1.524 m)   Wt 98 kg (216 lb)   SpO2 98%   BMI 42.18 kg/m²       O2 Device: Room air       Temp (24hrs), Av.7 °F (36.5 °C), Min:97.1 °F (36.2 °C), Max:98.3 °F (36.8 °C)       Intake/Output:   Last shift:      701 -  1900  In: 240 [P.O.:240]  Out: 700 [Urine:700]  Last 3 shifts: 1901 -  0700  In: 5732.5 [P.O.:2320; I.V.:3412.5]  Out: 1500 [Urine:1500]    Intake/Output Summary (Last 24 hours) at 2019 1012  Last data filed at 2019 0923  Gross per 24 hour   Intake 1680 ml   Output 900 ml   Net 780 ml        Physical Exam:    General: in no apparent distress, alert, oriented times 3 and normal vitals   HEENT: Normal   Neck: No abnormally enlarged lymph nodes. Chest: Poor excursion   Lungs: decreased air exchange bibasilar   Heart: Regular rate and rhythm or S1S2 present   Abdomen: abdomen is soft without significant tenderness, masses, organomegaly or guarding   Extremity: Trace edema   Neuro: alert   Skin: Skin color, texture, turgor normal. No rashes or lesions        DATA:  Labs:  Recent Labs     19  0544 19  0307 19  1240   WBC 12.5 5.4 8.5   HGB 10.9* 11.3* 12.2   HCT 31.3* 33.6* 35.5    208 226     Recent Labs     19  0544 19  0307 19  2124 19  1533   * 138 139 140   K 3.8 3.9 3.4* 3.8   CL 99* 108 108 109   CO2 19* * 24 24   * 152* 137* 76   BUN 41* 43* 44* 53*   CREA 1.55* 1.55* 1.69* 1.87*   CA 8.4* 8.6 8.3* 8.0*   MG  --  1.5*  --   --    PHOS  --  2.8  --   --    ALB  --   --   --  2.7*   SGOT  --   --   --  16   ALT  --   --   --  12*   INR  --  1.1  --   --      No results for input(s): PH, PCO2, PO2, HCO3, FIO2 in the last 72 hours. Imaging:  [x]I have personally reviewed the patients radiographs  V/Q scan:  Large single mismatched perfusion defect, probably lateral segment of the left  lower lobe.  This constitutes intermediate probability for pulmonary embolism. Vascular studies: 7/23/2019  Acute nonocclusive DVT present in the right femoral popliteal vein. Acute occlusive DVT present in the right peroneal vein. Acute nonocclusive DVT demonstrated left femoral popliteal vein. Acute occlusive DVT present left peroneal vein. Bilateral posterior tibial arteries are triphasic. 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 Intermittent \"thumping\" in left ear. TECHNIQUE:    Contiguous 1.25 mm thick axial sections of temporal bones of both sides have  been obtained without intravenous contrast.    Axial images are reformatted with 2.351 mm slice thickness. Coronal images are reformatted with slice thickness of 3.12 mm. All CT scans at this facility are performed using dose optimization technique as  appropriate to a  performed  examination, to include automated exposer control,  adjustment mA and / or  KV according to patient size (including appropriate  matching  for site specific examination), or use  of iterative  reconstruction  technique. COMPARISON STUDY: CT scan of brain with intravenous contrast, on 11/27/2015. Nicholas Dial FINDINGS:    ---------------------------------------------------------------    Right temporal bone: There is no demonstrable abnormality in the right external auditory canal. The  right tympanic membrane appears to be grossly intact. There is no demonstrable abnormality in the right middle ear cavity, mastoid  antrum, or in right mastoid air cells. In the right middle ear cavity the  auditory ossicles are intact with normal contours. There is no abnormal soft  tissue density in right middle ear cavity.  The right internal ear structures,  including terminal cochlea, vestibule and semicircular canals appear to be of  normal size and contours. The right internal auditory canal is of normal size  and contour. The outlines of the right vestibular aqueduct is partially  visualized and appears normal.    -----------------------------------------------------------    Left temporal bone: There is no demonstrable abnormality in the left external auditory canal. The  left tympanic membrane appears to be grossly intact. Within the left middle ear cavity there is no definable abnormal density or  fluid collection. The left auditory ossicles are intact with normal contours. There is normal radiation of the left middle ear cavity, just to the mastoid  antrum, the left mastoid antrum and left mastoid air cells. There is no evidence  of any abnormal density or fluid collection in mastoid air cells. The left  tympanic sinus and the facial nerve recess are normally delineated. The left internal ear structures, including cochlea vestibule and semicircular  canals appear to be of normal size and contours. The bony walls of the  semicircular canals are intact. Bony outlines of the left vestibular aqueduct is larger than the corresponding  bony outlines of the right vestibular aqueduct. The left jugular fossa is larger than the right jugular fossa. But there is a  bony septum of 2.8 mm thickness, between  the upper lateral aspect of left  jugular fossa  and  the left middle ear cavity.    ------------------------------------------------------------      Impression IMPRESSION:    There is no definable abnormality in the right temporal bone. There is no definable abnormality in the left external auditory canal, middle  ear cavity, mastoid antrum, mastoid air cells, or in the internal ear  structures, as visualized without intravenous contrast.    But, the bony outlines of the left vestibular aqueduct, appears to be mildly  larger than the corresponding bony outlines of right vestibular aqueduct.   Therefore, further evaluation of vestibular aqueducts,  is indicated. For further evaluation, MRI of IACs and CP angles, without and with contrast and  including high-resolution T2-weighted images, is suggested. High complexity decision making was performed during the evaluation of this patient at high risk for decompensation with multiple organ involvement     Above mentioned total time spent on reviewing the case/medical record/data/notes/EMR/patient examination/documentation/coordinating care with nurse/consultants, exclusive of procedures with complex decision making performed and > 50% time spent in face to face evaluation.     Ulysses Pacific, MD

## 2019-07-25 ENCOUNTER — APPOINTMENT (OUTPATIENT)
Dept: CT IMAGING | Age: 74
DRG: 175 | End: 2019-07-25
Attending: NURSE PRACTITIONER
Payer: MEDICARE

## 2019-07-25 LAB
ANION GAP SERPL CALC-SCNC: 9 MMOL/L (ref 3–18)
ANION GAP SERPL CALC-SCNC: 9 MMOL/L (ref 3–18)
BACTERIA SPEC CULT: NORMAL
BASOPHILS # BLD: 0 K/UL (ref 0–0.1)
BASOPHILS NFR BLD: 0 % (ref 0–2)
BUN SERPL-MCNC: 38 MG/DL (ref 7–18)
BUN SERPL-MCNC: 42 MG/DL (ref 7–18)
BUN/CREAT SERPL: 36 (ref 12–20)
BUN/CREAT SERPL: 38 (ref 12–20)
CALCIUM SERPL-MCNC: 8.6 MG/DL (ref 8.5–10.1)
CALCIUM SERPL-MCNC: 9.6 MG/DL (ref 8.5–10.1)
CHLORIDE SERPL-SCNC: 97 MMOL/L (ref 100–111)
CHLORIDE SERPL-SCNC: 97 MMOL/L (ref 100–111)
CO2 SERPL-SCNC: 24 MMOL/L (ref 21–32)
CO2 SERPL-SCNC: 25 MMOL/L (ref 21–32)
CREAT SERPL-MCNC: 1.06 MG/DL (ref 0.6–1.3)
CREAT SERPL-MCNC: 1.11 MG/DL (ref 0.6–1.3)
DIFFERENTIAL METHOD BLD: ABNORMAL
EOSINOPHIL # BLD: 0 K/UL (ref 0–0.4)
EOSINOPHIL NFR BLD: 0 % (ref 0–5)
ERYTHROCYTE [DISTWIDTH] IN BLOOD BY AUTOMATED COUNT: 14.7 % (ref 11.6–14.5)
GLUCOSE SERPL-MCNC: 108 MG/DL (ref 74–99)
GLUCOSE SERPL-MCNC: 93 MG/DL (ref 74–99)
GRAM STN SPEC: NORMAL
HCT VFR BLD AUTO: 29 % (ref 35–45)
HGB BLD-MCNC: 9.8 G/DL (ref 12–16)
LYMPHOCYTES # BLD: 0.9 K/UL (ref 0.9–3.6)
LYMPHOCYTES NFR BLD: 8 % (ref 21–52)
MCH RBC QN AUTO: 31.6 PG (ref 24–34)
MCHC RBC AUTO-ENTMCNC: 33.8 G/DL (ref 31–37)
MCV RBC AUTO: 93.5 FL (ref 74–97)
MONOCYTES # BLD: 0.8 K/UL (ref 0.05–1.2)
MONOCYTES NFR BLD: 7 % (ref 3–10)
NEUTS SEG # BLD: 9.5 K/UL (ref 1.8–8)
NEUTS SEG NFR BLD: 85 % (ref 40–73)
PLATELET # BLD AUTO: 226 K/UL (ref 135–420)
PMV BLD AUTO: 11 FL (ref 9.2–11.8)
POTASSIUM SERPL-SCNC: 3.8 MMOL/L (ref 3.5–5.5)
POTASSIUM SERPL-SCNC: 4 MMOL/L (ref 3.5–5.5)
RBC # BLD AUTO: 3.1 M/UL (ref 4.2–5.3)
SERVICE CMNT-IMP: NORMAL
SODIUM SERPL-SCNC: 130 MMOL/L (ref 136–145)
SODIUM SERPL-SCNC: 131 MMOL/L (ref 136–145)
WBC # BLD AUTO: 11.1 K/UL (ref 4.6–13.2)

## 2019-07-25 PROCEDURE — 97116 GAIT TRAINING THERAPY: CPT

## 2019-07-25 PROCEDURE — 80048 BASIC METABOLIC PNL TOTAL CA: CPT

## 2019-07-25 PROCEDURE — 74176 CT ABD & PELVIS W/O CONTRAST: CPT

## 2019-07-25 PROCEDURE — 74011636637 HC RX REV CODE- 636/637: Performed by: NURSE PRACTITIONER

## 2019-07-25 PROCEDURE — 74011250637 HC RX REV CODE- 250/637: Performed by: INTERNAL MEDICINE

## 2019-07-25 PROCEDURE — 94640 AIRWAY INHALATION TREATMENT: CPT

## 2019-07-25 PROCEDURE — 94761 N-INVAS EAR/PLS OXIMETRY MLT: CPT

## 2019-07-25 PROCEDURE — 36415 COLL VENOUS BLD VENIPUNCTURE: CPT

## 2019-07-25 PROCEDURE — 77030038269 HC DRN EXT URIN PURWCK BARD -A

## 2019-07-25 PROCEDURE — 74011250637 HC RX REV CODE- 250/637: Performed by: NURSE PRACTITIONER

## 2019-07-25 PROCEDURE — 74011000250 HC RX REV CODE- 250: Performed by: PHYSICIAN ASSISTANT

## 2019-07-25 PROCEDURE — 85025 COMPLETE CBC W/AUTO DIFF WBC: CPT

## 2019-07-25 PROCEDURE — 65270000029 HC RM PRIVATE

## 2019-07-25 RX ORDER — GUAIFENESIN 100 MG/5ML
100 SOLUTION ORAL
Qty: 100 ML | Refills: 0 | Status: SHIPPED | OUTPATIENT
Start: 2019-07-25 | End: 2019-12-02

## 2019-07-25 RX ORDER — MAG HYDROX/ALUMINUM HYD/SIMETH 200-200-20
30 SUSPENSION, ORAL (FINAL DOSE FORM) ORAL
Qty: 100 ML | Refills: 0 | Status: SHIPPED | OUTPATIENT
Start: 2019-07-25 | End: 2019-12-02 | Stop reason: ALTCHOICE

## 2019-07-25 RX ORDER — DOCUSATE SODIUM 100 MG/1
100 CAPSULE, LIQUID FILLED ORAL
Qty: 30 CAP | Refills: 0 | Status: SHIPPED | OUTPATIENT
Start: 2019-07-25 | End: 2019-10-23

## 2019-07-25 RX ORDER — BUDESONIDE AND FORMOTEROL FUMARATE DIHYDRATE 160; 4.5 UG/1; UG/1
2 AEROSOL RESPIRATORY (INHALATION) 2 TIMES DAILY
Qty: 1 INHALER | Refills: 0 | Status: SHIPPED | OUTPATIENT
Start: 2019-07-25 | End: 2019-09-09 | Stop reason: ALTCHOICE

## 2019-07-25 RX ORDER — PANTOPRAZOLE SODIUM 40 MG/1
40 TABLET, DELAYED RELEASE ORAL
Status: DISCONTINUED | OUTPATIENT
Start: 2019-07-26 | End: 2019-07-26 | Stop reason: HOSPADM

## 2019-07-25 RX ORDER — DICLOFENAC SODIUM 10 MG/G
2 GEL TOPICAL EVERY 6 HOURS
Qty: 100 G | Refills: 0 | Status: SHIPPED | OUTPATIENT
Start: 2019-07-25 | End: 2021-03-10 | Stop reason: ALTCHOICE

## 2019-07-25 RX ORDER — IPRATROPIUM BROMIDE AND ALBUTEROL SULFATE 2.5; .5 MG/3ML; MG/3ML
3 SOLUTION RESPIRATORY (INHALATION)
Qty: 30 NEBULE | Refills: 0 | Status: SHIPPED | OUTPATIENT
Start: 2019-07-25 | End: 2019-08-09 | Stop reason: SDUPTHER

## 2019-07-25 RX ORDER — PREDNISONE 20 MG/1
TABLET ORAL
Qty: 9 TAB | Refills: 0 | Status: SHIPPED | OUTPATIENT
Start: 2019-07-26 | End: 2019-08-01

## 2019-07-25 RX ORDER — ACETAMINOPHEN 325 MG/1
650 TABLET ORAL
Qty: 30 TAB | Refills: 0 | Status: SHIPPED | OUTPATIENT
Start: 2019-07-25 | End: 2021-08-11 | Stop reason: ALTCHOICE

## 2019-07-25 RX ORDER — MIDODRINE HYDROCHLORIDE 5 MG/1
5 TABLET ORAL
Status: DISCONTINUED | OUTPATIENT
Start: 2019-07-25 | End: 2019-07-26 | Stop reason: HOSPADM

## 2019-07-25 RX ORDER — MIDODRINE HYDROCHLORIDE 5 MG/1
5 TABLET ORAL
Qty: 10 TAB | Refills: 0 | Status: SHIPPED | OUTPATIENT
Start: 2019-07-25 | End: 2019-08-24

## 2019-07-25 RX ORDER — MAG HYDROX/ALUMINUM HYD/SIMETH 200-200-20
30 SUSPENSION, ORAL (FINAL DOSE FORM) ORAL
Status: DISCONTINUED | OUTPATIENT
Start: 2019-07-25 | End: 2019-07-26 | Stop reason: HOSPADM

## 2019-07-25 RX ADMIN — BUDESONIDE AND FORMOTEROL FUMARATE DIHYDRATE 2 PUFF: 160; 4.5 AEROSOL RESPIRATORY (INHALATION) at 19:40

## 2019-07-25 RX ADMIN — PREDNISONE 40 MG: 20 TABLET ORAL at 10:38

## 2019-07-25 RX ADMIN — IPRATROPIUM BROMIDE AND ALBUTEROL SULFATE 3 ML: .5; 3 SOLUTION RESPIRATORY (INHALATION) at 19:43

## 2019-07-25 RX ADMIN — BUDESONIDE AND FORMOTEROL FUMARATE DIHYDRATE 2 PUFF: 160; 4.5 AEROSOL RESPIRATORY (INHALATION) at 09:30

## 2019-07-25 RX ADMIN — APIXABAN 10 MG: 5 TABLET, FILM COATED ORAL at 20:23

## 2019-07-25 RX ADMIN — ALLOPURINOL 100 MG: 100 TABLET ORAL at 10:38

## 2019-07-25 RX ADMIN — DICLOFENAC 2 G: 10 GEL TOPICAL at 12:45

## 2019-07-25 RX ADMIN — Medication 10 ML: at 06:30

## 2019-07-25 RX ADMIN — APIXABAN 10 MG: 5 TABLET, FILM COATED ORAL at 10:37

## 2019-07-25 RX ADMIN — IPRATROPIUM BROMIDE AND ALBUTEROL SULFATE 3 ML: .5; 3 SOLUTION RESPIRATORY (INHALATION) at 02:42

## 2019-07-25 RX ADMIN — LEVOTHYROXINE SODIUM 25 MCG: 25 TABLET ORAL at 06:29

## 2019-07-25 RX ADMIN — IPRATROPIUM BROMIDE AND ALBUTEROL SULFATE 3 ML: .5; 3 SOLUTION RESPIRATORY (INHALATION) at 09:28

## 2019-07-25 RX ADMIN — ALUMINUM HYDROXIDE, MAGNESIUM HYDROXIDE, AND SIMETHICONE 30 ML: 200; 200; 20 SUSPENSION ORAL at 14:43

## 2019-07-25 RX ADMIN — DICLOFENAC 2 G: 10 GEL TOPICAL at 17:43

## 2019-07-25 RX ADMIN — ALLOPURINOL 100 MG: 100 TABLET ORAL at 17:43

## 2019-07-25 RX ADMIN — IPRATROPIUM BROMIDE AND ALBUTEROL SULFATE 3 ML: .5; 3 SOLUTION RESPIRATORY (INHALATION) at 14:51

## 2019-07-25 RX ADMIN — ALUMINUM HYDROXIDE, MAGNESIUM HYDROXIDE, AND SIMETHICONE 30 ML: 200; 200; 20 SUSPENSION ORAL at 20:36

## 2019-07-25 RX ADMIN — SIMVASTATIN 40 MG: 40 TABLET, FILM COATED ORAL at 21:28

## 2019-07-25 NOTE — ROUTINE PROCESS
Bedside shift change report given to [facility], RN (oncoming nurse) by Pamella Garcia RN (offgoing nurse). Report included the following information SBAR, Kardex, Procedure Summary and MAR.

## 2019-07-25 NOTE — PROGRESS NOTES
[facility] Hospitalist Group  Progress Note    Patient: Chiquita William Age: 66 y.o. : 1945 MR#: [id number] SSN: [id number]  Date: 2019     Subjective:   Alert and oriented X 3. Sitting in bedside recliner. No SOB. No chest pain / chest discomfort. No n/v/d/c or abd pain. No new complaints. Awaiting SNF placement - anticipate discharge tomorrow. D/w friend Yanely Vides [phone]  Assessment/Plan:     1. Acute PE- Left lower lobe  2. Acute Bilateral DVT to femoral, popliteal, peroneal veins. 3. Recently treated pneumonia - completed antibiotics op  4. Physical Deconditioning - improving. 5. Hypotension - asymptomatic    6. MCBRIDE suspect r/t #1. Improved   7. Suspected COURTNEY  8. Morbid obesity   9. HLD  10. GERD  11. Osteoarthritis   12. Hemochromatosis   13. Hx. ??A. Fib / arrhythmia : Not on anticoagulation  14. Diastolic HF - compensated   15. Chronic peripheral edema  16. 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 17. Mild hyponatremia. PLAN  1. Pulmonology and Oncology following. Continue Eliquis, steroid taper of 40mg X 3 days then 20mg X 3 days. cont nebs, symbicort, Cont bronchial hygiene protocol, IS. Would benefit from OP cardiology work up as well. Oncology recommending CT abdomen w/o contrast to eval chronic RLQ pain. No pain at this time, no n/v, tolerating oral intake, LFTs on admission unremarkable. CT unremarkable. 2. PT/TO following rec SNF. Case management following. 3. recheck BMP now. Will consider nephrology consult pending serum sodium trend. Pt asymptomatic at this time.      Additional Notes:      Case discussed with:  [x]Patient  [x]Family  [x]Nursing  [x]Case Management  DVT Prophylaxis:  []Lovenox  []Hep SQ  []SCDs  []Coumadin   [x]On Heparin gtt    Objective:   VS:   Visit Vitals  BP 95/60 (BP 1 Location: Right arm, BP Patient Position: At rest)   Pulse 91   Temp 96.7 °F (35.9 °C)   Resp 15   Ht 5' (1.524 m)   Wt 105.2 kg (232 lb)   SpO2 96%   BMI 45.31 kg/m² Tmax/24hrs: Temp (24hrs), Av.4 °F (36.3 °C), Min:96.7 °F (35.9 °C), Max:97.8 °F (36.6 °C)      Intake/Output Summary (Last 24 hours) at 2019 1459  Last data filed at 2019 1433  Gross per 24 hour   Intake 960 ml   Output 1204 ml   Net -244 ml       General:  Alert, NAD  Cardiovascular:  IRIR  Pulmonary:  Diminished left lobe; respiratory effort WNL  GI:  +BS in all four quadrants, soft, non-tender  Extremities:  +1 edema; 2+ dorsalis pedis pulses bilaterally  Neuro:  Alert and oriented X 3. Labs:    Recent Results (from the past 24 hour(s))   CBC WITH AUTOMATED DIFF    Collection Time: 19  2:07 AM   Result Value Ref Range    WBC 11.1 4.6 - 13.2 K/uL    RBC 3.10 (L) 4.20 - 5.30 M/uL    HGB 9.8 (L) 12.0 - 16.0 g/dL    HCT 29.0 (L) 35.0 - 45.0 %    MCV 93.5 74.0 - 97.0 FL    MCH 31.6 24.0 - 34.0 PG    MCHC 33.8 31.0 - 37.0 g/dL    RDW 14.7 (H) 11.6 - 14.5 %    PLATELET 823 661 - 311 K/uL    MPV 11.0 9.2 - 11.8 FL    NEUTROPHILS 85 (H) 40 - 73 %    LYMPHOCYTES 8 (L) 21 - 52 %    MONOCYTES 7 3 - 10 %    EOSINOPHILS 0 0 - 5 %    BASOPHILS 0 0 - 2 %    ABS. NEUTROPHILS 9.5 (H) 1.8 - 8.0 K/UL    ABS. LYMPHOCYTES 0.9 0.9 - 3.6 K/UL    ABS. MONOCYTES 0.8 0.05 - 1.2 K/UL    ABS. EOSINOPHILS 0.0 0.0 - 0.4 K/UL    ABS.  BASOPHILS 0.0 0.0 - 0.1 K/UL    DF AUTOMATED     METABOLIC PANEL, BASIC    Collection Time: 19  2:07 AM   Result Value Ref Range    Sodium 130 (L) 136 - 145 mmol/L    Potassium 4.0 3.5 - 5.5 mmol/L    Chloride 97 (L) 100 - 111 mmol/L    CO2 24 21 - 32 mmol/L    Anion gap 9 3.0 - 18 mmol/L    Glucose 108 (H) 74 - 99 mg/dL    BUN 42 (H) 7.0 - 18 MG/DL    Creatinine 1.11 0.6 - 1.3 MG/DL    BUN/Creatinine ratio 38 (H) 12 - 20      GFR est AA 58 (L) >60 ml/min/1.73m2    GFR est non-AA 48 (L) >60 ml/min/1.73m2    Calcium 8.6 8.5 - 10.1 MG/DL       Signed By: Damian Wood NP     2019

## 2019-07-25 NOTE — PROGRESS NOTES
[facility]  Pulmonary, Critical Care, and Sleep Medicine    Name: Dona Zhang MRN: [id number]   : 1945 Hospital: [address] [place] [address]   Date: 2019        IMPRESSION:   · PE- new, secondary to DVT, unprovoked  · DVT - bilateral , acute  · ? CAP  · MCBRIDE - Multifactorial. Last office visit on 19. PFT with decreased diffusion capacity of 73% predicted. Body habitus, chronic back pain causing splinting and diaphragmatic weakness all contributing factors as well. · Suspected COURTNEY - Sleep study not completed as of yet. · Atrial fibrillation with RVR  · Morbid Obesity  · Deconditioning  · Hx of HTN, HDL, GERD, Arthritis,  Hemochromatosis     Patient Active Problem List   Diagnosis Code    Shoulder impingement, right, with rotator cuff strain M75.40    Spinal stenosis M48.00    Sacroiliitis ([facility] M46.1    High cholesterol E78.00    Hypertension I10    Hemochromatosis E83.119    Obesity, morbid ([facility] E66.01    MCBRIDE (dyspnea on exertion) R06.09    CAP (community acquired pneumonia) J18.9    Sepsis ([facility] A41.9    Hypotension I95.9    Atrial fibrillation with RVR (HCC) I48.91      PLAN:   · Continue anticoagulation-can start oral agents,DOAC preferred- lifelong   · Con't Duo-nebs q6 with RT; albuterol nebs q4 PRN  · Aggressive pulmonary hygiene; encourage ICS; bronchial hygiene protocol  · Agree with Robitussin PRN  · Will continue to follow as outpatient     Subjective/Interval History:     19     Feels better as far as breathing  Some shortness of breath  Has minimal cough- productive of thick mucus- not colored  Denies chest pain  Had difficulty sleeping due to back pain, left shoulder pain and just not getting comfortable. Denies any dizziness  Denies any nausea  Up in bedside chair    ROS:Pertinent items are noted in HPI.     Objective:   Vital Signs:    Visit Vitals  BP 95/60 (BP 1 Location: Right arm, BP Patient Position: At rest)   Pulse 91   Temp 96.7 °F (35.9 °C)   Resp 15   Ht 5' (1.524 m)   Wt 105.2 kg (232 lb)   SpO2 97%   BMI 45.31 kg/m²       O2 Device: Room air       Temp (24hrs), Av.4 °F (36.3 °C), Min:96.7 °F (35.9 °C), Max:97.8 °F (36.6 °C)       Intake/Output:   Last shift:      701 - 1900  In: 240 [P.O.:240]  Out: -   Last 3 shifts: 1901 -  07  In: 1335.9 [P.O.:960; I.V.:375.9]  Out: 1904 [Urine:1900]    Intake/Output Summary (Last 24 hours) at 2019 1330  Last data filed at 2019 1000  Gross per 24 hour   Intake 720 ml   Output 1204 ml   Net -484 ml        Physical Exam:    General: in no apparent distress, alert, oriented times 3 and normal vitals   HEENT: Normal   Neck: No abnormally enlarged lymph nodes. Chest: Poor excursion   Lungs: decreased air exchange bibasilar   Heart: Regular rate and rhythm or S1S2 present   Abdomen: abdomen is soft without significant tenderness, masses, organomegaly or guarding   Extremity: Trace edema   Neuro: alert   Skin: Skin color, texture, turgor normal. No rashes or lesions        DATA:  Labs:  Recent Labs     19  0207 19  0544 19  0307   WBC 11.1 12.5 5.4   HGB 9.8* 10.9* 11.3*   HCT 29.0* 31.3* 33.6*    224 208     Recent Labs     19  0207 19  1025 19  0544 19  0307  19  1533   * 135* 129* 138   < > 140   K 4.0 3.9 3.8 3.9   < > 3.8   CL 97* 100 99* 108   < > 109   CO2 24 17* 19* 19*   < > 24   * 129* 111* 152*   < > 76   BUN 42* 41* 41* 43*   < > 53*   CREA 1.11 1.52* 1.55* 1.55*   < > 1.87*   CA 8.6 9.0 8.4* 8.6   < > 8.0*   MG  --   --   --  1.5*  --   --    PHOS  --   --   --  2.8  --   --    ALB  --   --   --   --   --  2.7*   SGOT  --   --   --   --   --  16   ALT  --   --   --   --   --  12*   INR  --   --   --  1.1  --   --     < > = values in this interval not displayed. No results for input(s): PH, PCO2, PO2, HCO3, FIO2 in the last 72 hours.     Imaging:  [x]I have personally reviewed the patients radiographs  V/Q scan:  Large single mismatched perfusion defect, probably lateral segment of the left  lower lobe. This constitutes intermediate probability for pulmonary embolism. Vascular studies: 7/23/2019  Acute nonocclusive DVT present in the right femoral popliteal vein. Acute occlusive DVT present in the right peroneal vein. Acute nonocclusive DVT demonstrated left femoral popliteal vein. Acute occlusive DVT present left peroneal vein. Bilateral posterior tibial arteries are triphasic. 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 Intermittent \"thumping\" in left ear. TECHNIQUE:    Contiguous 1.25 mm thick axial sections of temporal bones of both sides have  been obtained without intravenous contrast.    Axial images are reformatted with 8.617 mm slice thickness. Coronal images are reformatted with slice thickness of 1.27 mm. All CT scans at this facility are performed using dose optimization technique as  appropriate to a  performed  examination, to include automated exposer control,  adjustment mA and / or  KV according to patient size (including appropriate  matching  for site specific examination), or use  of iterative  reconstruction  technique. COMPARISON STUDY: CT scan of brain with intravenous contrast, on 11/27/2015. Daphnie Boyd FINDINGS:    ---------------------------------------------------------------    Right temporal bone: There is no demonstrable abnormality in the right external auditory canal. The  right tympanic membrane appears to be grossly intact. There is no demonstrable abnormality in the right middle ear cavity, mastoid  antrum, or in right mastoid air cells. In the right middle ear cavity the  auditory ossicles are intact with normal contours.  There is no abnormal soft  tissue density in right middle ear cavity. The right internal ear structures,  including terminal cochlea, vestibule and semicircular canals appear to be of  normal size and contours. The right internal auditory canal is of normal size  and contour. The outlines of the right vestibular aqueduct is partially  visualized and appears normal.    -----------------------------------------------------------    Left temporal bone: There is no demonstrable abnormality in the left external auditory canal. The  left tympanic membrane appears to be grossly intact. Within the left middle ear cavity there is no definable abnormal density or  fluid collection. The left auditory ossicles are intact with normal contours. There is normal radiation of the left middle ear cavity, just to the mastoid  antrum, the left mastoid antrum and left mastoid air cells. There is no evidence  of any abnormal density or fluid collection in mastoid air cells. The left  tympanic sinus and the facial nerve recess are normally delineated. The left internal ear structures, including cochlea vestibule and semicircular  canals appear to be of normal size and contours. The bony walls of the  semicircular canals are intact. Bony outlines of the left vestibular aqueduct is larger than the corresponding  bony outlines of the right vestibular aqueduct. The left jugular fossa is larger than the right jugular fossa. But there is a  bony septum of 2.8 mm thickness, between  the upper lateral aspect of left  jugular fossa  and  the left middle ear cavity.    ------------------------------------------------------------      Impression IMPRESSION:    There is no definable abnormality in the right temporal bone.     There is no definable abnormality in the left external auditory canal, middle  ear cavity, mastoid antrum, mastoid air cells, or in the internal ear  structures, as visualized without intravenous contrast.    But, the bony outlines of the left vestibular aqueduct, appears to be mildly  larger than the corresponding bony outlines of right vestibular aqueduct. Therefore, further evaluation of vestibular aqueducts,  is indicated. For further evaluation, MRI of IACs and CP angles, without and with contrast and  including high-resolution T2-weighted images, is suggested. High complexity decision making was performed during the evaluation of this patient at high risk for decompensation with multiple organ involvement     Above mentioned total time spent on reviewing the case/medical record/data/notes/EMR/patient examination/documentation/coordinating care with nurse/consultants, exclusive of procedures with complex decision making performed and > 50% time spent in face to face evaluation.     Sol Reed MD

## 2019-07-25 NOTE — PROGRESS NOTES
Bedside and Verbal shift change report given to [address] [place] [address] (oncoming nurse) by Darrell Muñoz (offgoing nurse). Report included the following information SBAR, Kardex, OR Summary, Procedure Summary, Intake/Output, MAR, Recent Results and Med Rec Status.

## 2019-07-25 NOTE — PROGRESS NOTES
PHYSICAL THERAPY TREATMENT    Patient: Lilia Babcock (66 y.o. female)  Date: 7/25/2019  Diagnosis: CAP (community acquired pneumonia) [J18.9]  Sepsis ([facility] [A41.9] Sepsis ([facility]    Precautions: Fall    ASSESSMENT:  Patient presents today alert and agreeable to therapy and was seated in locked recliner upon arrival. Patient attempted to don briefs in recliner, however reports she is unable to don in a chair this deep without back support. Patient transferred to standard chair in room though stood with brief around one ankle in attempts to walk with brief from recliner to chair; educated patient on safety concerns of walking with briefs on around ankle and assisted patient to sitting in chair. Patient then transferred to standing and ambulated 150ft with RW at [facility] with VC's to slow pace and for activity pacing. Patient's SpO2 >93% with HR into 120-130bpm during ambulation. Patient required 3 standing rest breaks, 2 of which initiated by PT as patient audibly SOB while talking and walking. Patient required safety cues not to rest forearms on walker and to keep walker square to shoulders including when approaching chair to transfer to sitting. Patient educated on walker use in narrow spaces and would continue to benefit from therapy to improved endurance, activity pacing, safety awareness. Patient demos decreased back pain this session and tolerated sitting up in recliner at conclusion. Patient left resting with call bell by her side and educated not to get up without assist; she acknowledged understanding. Progression toward goals:   ?      Improving appropriately and progressing toward goals  ? Improving slowly and progressing toward goals  ? Not making progress toward goals and plan of care will be adjusted     PLAN:  Patient continues to benefit from skilled intervention to address the above impairments. Continue treatment per established plan of care.   Discharge Recommendations:  Skilled Nursing Facility  Further Equipment Recommendations for Discharge:  rolling walker     SUBJECTIVE:   Patient stated I don't normally sit in a chair like this at home, so it's harder for me.     OBJECTIVE DATA SUMMARY:   Critical Behavior:  Neurologic State: Alert, Eyes open spontaneously  Orientation Level: Oriented X4  Cognition: Appropriate decision making, Appropriate for age attention/concentration, Appropriate safety awareness, Follows commands  Safety/Judgement: Fall prevention  Functional Mobility Training:  Bed Mobility:   Scooting: Contact guard assistance   Transfers:  Sit to Stand: Stand-by assistance  Stand to Sit: Stand-by assistance   Balance:  Sitting: Intact  Standing: Impaired   Ambulation/Gait Training:  Distance (ft): 150 Feet (ft)(3 standing rest breaks)  Assistive Device: Walker, rolling  Ambulation - Level of Assistance: Contact guard assistance;Minimal assistance    Gait Abnormalities: Decreased step clearance    Speed/Maty: Accelerated  Step Length: Left shortened;Right shortened   Interventions: Verbal cues; Visual/Demos  Education:  ?         Bed mobility  ? Transfers  ? Ambulation  ? Assistive device management  ? Stairs  ? Body mechanics  ? Position change  ? Activity pacing/energy conservation  ? Other:   Pain:  Pain level pre-treatment: 0/10  Pain level post-treatment: 0/10   Activity Tolerance:     Please refer to the flowsheet for vital signs taken during this treatment. After treatment:   ? Patient left in no apparent distress sitting up in chair  ? Patient left in no apparent distress in bed  ? Call bell left within reach  ? Nursing notified  ? Caregiver present  ? Bed alarm activated  ? SCDs applied      COMMUNICATION/EDUCATION:   ?         Role of Physical Therapy in the acute care setting. ?         Fall prevention education was provided and the patient/caregiver indicated understanding. ?          Patient/family have participated as able in working toward goals and plan of care. ?         Patient/family agree to work toward stated goals and plan of care. ?         Patient understands intent and goals of therapy, but is neutral about his/her participation. ? Patient is unable to participate in stated goals/plan of care: ongoing with therapy staff.   ?         Other:        Brenden Pryor, PT   Time Calculation: 24 mins

## 2019-07-25 NOTE — PROGRESS NOTES
Discharge Planning: This writer along with treating Kenrick RENETTA Reyes spoke with this pt who states she is willing to be placed [facility] for short term rehab care. This pt was placed in cclink for admission consideration FOC signed and placed on this pt's chart. This pt has been accepted for admission to [facility] for rehab 7/26/19.     [facility]

## 2019-07-25 NOTE — PROGRESS NOTES
OT chart reviewed. Pt not seen for skilled OT due to:  []  Nausea/vomiting  []  Eating  []  Pain  []  Pt lethargic  [x]  Other:   0640: Pt speaking with staff member   [phone]: Pt undergoing breathing treatment with Respiratory  Will f/u later as schedule allows. Thank you.   Noé Benton MS, OTR/L

## 2019-07-25 NOTE — PROGRESS NOTES
Problem: Falls - Risk of  Goal: *Absence of Falls  Description  Document [place] Fam Fall Risk and appropriate interventions in the flowsheet.   Outcome: Progressing Towards Goal  Note:   Fall Risk Interventions:  Mobility Interventions: Patient to call before getting OOB, Bed/chair exit alarm, Communicate number of staff needed for ambulation/transfer         Medication Interventions: Bed/chair exit alarm, Patient to call before getting OOB, Teach patient to arise slowly    Elimination Interventions: Bed/chair exit alarm, Call light in reach, Patient to call for help with toileting needs    History of Falls Interventions: Bed/chair exit alarm, Investigate reason for fall         Problem: General Medical Care Plan  Goal: *Vital signs within specified parameters  Outcome: Progressing Towards Goal     Problem: Patient Education: Go to Patient Education Activity  Goal: Patient/Family Education  Outcome: Progressing Towards Goal

## 2019-07-25 NOTE — PROGRESS NOTES
NUTRITION    Nursing Referral: [facility]     RECOMMENDATIONS / PLAN:     - Continue current nutrition interventions. Update food preferences  - Continue RD inpatient monitoring and evaluation     NUTRITION INTERVENTIONS & DIAGNOSIS:     [x] Meals/snacks: modified composition    Nutrition Diagnosis:   Inadequate oral intake related to food preferences as evidenced by fair po intake x few recent meals    ASSESSMENT:     7/25: Pt reported appetite and meal intake are fair, mainly due to not being able to tolerate spicy, heavy seasoned foods. Diet order modified to include preferences. Discussed adding nutrition supplement; pt declined. She stated that she will be fine now that the diet has been modified. 7/23: Pt unavailable at time of visit. Has fair meal intake x 2 meals since admission per chart documentation.      Average po intake adequate to meet patients estimated nutritional needs:   [] Yes     [x] No   [] Unable to determine at this time    Diet: DIET CARDIAC Regular, bland/ no spices  Food Allergies:  None known   Current Appetite:   [x] Good     [x] Fair     [] Poor     [] Other:  Unknown   Appetite/meal intake prior to admission:   [] Good     [] Fair     [] Poor     [x] Other: unknown   Feeding Limitations:  [] Swallowing difficulty    [] Chewing difficulty    [] Other:  Current Meal Intake:   Patient Vitals for the past 100 hrs:   % Diet Eaten   07/25/19 1433 50 %   07/25/19 1000 50 %   07/24/19 1744 25 %   07/24/19 1236 70 %   07/24/19 0923 100 %   07/23/19 1746 50 %   07/23/19 1309 50 %   07/23/19 0829 50 %       BM: 7/24  Skin Integrity:  Healed skin tear on left elbow  Edema:   [] No     [x] Yes   Pertinent Medications: Reviewed: mylanta, colace, zofran, steroid, protonix    Recent Labs     07/25/19  0207 07/24/19  1025 07/24/19  0544 07/23/19  0307  07/22/19  1533   * 135* 129* 138   < > 140   K 4.0 3.9 3.8 3.9   < > 3.8   CL 97* 100 99* 108   < > 109   CO2 24 17* 19* 19*   < > 24   * 129* 111* 152*   < > 76   BUN 42* 41* 41* 43*   < > 53*   CREA 1.11 1.52* 1.55* 1.55*   < > 1.87*   CA 8.6 9.0 8.4* 8.6   < > 8.0*   MG  --   --   --  1.5*  --   --    PHOS  --   --   --  2.8  --   --    ALB  --   --   --   --   --  2.7*   SGOT  --   --   --   --   --  16   ALT  --   --   --   --   --  12*    < > = values in this interval not displayed. Intake/Output Summary (Last 24 hours) at 7/25/2019 1508  Last data filed at 7/25/2019 1433  Gross per 24 hour   Intake 960 ml   Output 1204 ml   Net -244 ml       Anthropometrics:  Ht Readings from Last 1 Encounters:   07/23/19 5' (1.524 m)     Last 3 Recorded Weights in this Encounter    07/23/19 0724 07/23/19 0949 07/25/19 0800   Weight: 98.2 kg (216 lb 7.9 oz) 98 kg (216 lb) 105.2 kg (232 lb)     Body mass index is 45.31 kg/m². Obese, Class III    Weight History:     Weight gain of 12 lb x 1 week PTA per chart hx; noted pt is edematous. Wt fluctuations PTA per chart hx    Weight Metrics 7/25/2019 7/16/2019 6/18/2019 5/14/2019 3/19/2019 2/28/2019 2/20/2019   Weight 232 lb 204 lb 208 lb 209 lb 9.6 oz 214 lb 8 oz 211 lb 12 oz 221 lb   BMI 45.31 kg/m2 39.84 kg/m2 40.62 kg/m2 40.93 kg/m2 41.89 kg/m2 41.35 kg/m2 43.16 kg/m2        Admitting Diagnosis: CAP (community acquired pneumonia) [J18.9]  Sepsis ([address] [A41.9]  Pertinent PMHx:  GERD, high cholesterol, HTN    Education Needs:        [x] None identified  [] Identified - Not appropriate at this time  []  Identified and addressed - refer to education log  Learning Limitations:   [] None identified  [x] Identified: hard of hearing    Cultural, [employer] & ethnic food preferences:  [x] None identified    [] Identified and addressed     ESTIMATED NUTRITION NEEDS:     Calories: 3991-1323 kcal (MSJx1-1.2) based on  [x] Actual BW: 98 kg     [] IBW   Protein: 78-98 gm (0.8-1 gm/kg) based on  [x] Actual BW      [] IBW   Fluid: 1 mL/kcal     MONITORING & EVALUATION:     Nutrition Goal(s):   1.  Po intake of meals will meet >75% of patient estimated nutritional needs within the next 7 days.   Outcome:  [] Met/Ongoing    [x] Progressing towards goal    [] Not progressing towards goal    [] New/Initial goal     Monitoring:   [x] Food and beverage intake   [x] Diet order   [x] Nutrition-focused physical findings   [x] Treatment/therapy   [] Weight   [] Enteral nutrition intake        Previous Recommendations (for follow-up assessments only):     []   Implemented       []   Not Implemented (RD to address)      [] No Longer Appropriate     [x] No Recommendation Made     Discharge Planning:  Cardiac diet  [x] Participated in care planning, discharge planning, & interdisciplinary rounds as appropriate      Alesha Hernandez, [address] [place] [address]   Pager: [phone]

## 2019-07-26 VITALS
DIASTOLIC BLOOD PRESSURE: 67 MMHG | WEIGHT: 232 LBS | HEART RATE: 92 BPM | OXYGEN SATURATION: 98 % | SYSTOLIC BLOOD PRESSURE: 138 MMHG | RESPIRATION RATE: 20 BRPM | HEIGHT: 60 IN | BODY MASS INDEX: 45.55 KG/M2 | TEMPERATURE: 96.6 F

## 2019-07-26 LAB
ANION GAP SERPL CALC-SCNC: 5 MMOL/L (ref 3–18)
BASOPHILS # BLD: 0 K/UL (ref 0–0.1)
BASOPHILS NFR BLD: 0 % (ref 0–2)
BUN SERPL-MCNC: 33 MG/DL (ref 7–18)
BUN/CREAT SERPL: 34 (ref 12–20)
CALCIUM SERPL-MCNC: 9.1 MG/DL (ref 8.5–10.1)
CHLORIDE SERPL-SCNC: 98 MMOL/L (ref 100–111)
CO2 SERPL-SCNC: 28 MMOL/L (ref 21–32)
CREAT SERPL-MCNC: 0.98 MG/DL (ref 0.6–1.3)
DIFFERENTIAL METHOD BLD: ABNORMAL
EOSINOPHIL # BLD: 0 K/UL (ref 0–0.4)
EOSINOPHIL NFR BLD: 0 % (ref 0–5)
ERYTHROCYTE [DISTWIDTH] IN BLOOD BY AUTOMATED COUNT: 14.7 % (ref 11.6–14.5)
GLUCOSE SERPL-MCNC: 109 MG/DL (ref 74–99)
HCT VFR BLD AUTO: 30.3 % (ref 35–45)
HGB BLD-MCNC: 10.2 G/DL (ref 12–16)
LYMPHOCYTES # BLD: 0.8 K/UL (ref 0.9–3.6)
LYMPHOCYTES NFR BLD: 8 % (ref 21–52)
MCH RBC QN AUTO: 31.9 PG (ref 24–34)
MCHC RBC AUTO-ENTMCNC: 33.7 G/DL (ref 31–37)
MCV RBC AUTO: 94.7 FL (ref 74–97)
MONOCYTES # BLD: 0.6 K/UL (ref 0.05–1.2)
MONOCYTES NFR BLD: 6 % (ref 3–10)
NEUTS SEG # BLD: 9 K/UL (ref 1.8–8)
NEUTS SEG NFR BLD: 86 % (ref 40–73)
PLATELET # BLD AUTO: 258 K/UL (ref 135–420)
PMV BLD AUTO: 10.9 FL (ref 9.2–11.8)
POTASSIUM SERPL-SCNC: 4.2 MMOL/L (ref 3.5–5.5)
RBC # BLD AUTO: 3.2 M/UL (ref 4.2–5.3)
SODIUM SERPL-SCNC: 131 MMOL/L (ref 136–145)
WBC # BLD AUTO: 10.4 K/UL (ref 4.6–13.2)

## 2019-07-26 PROCEDURE — 97530 THERAPEUTIC ACTIVITIES: CPT

## 2019-07-26 PROCEDURE — 74011250637 HC RX REV CODE- 250/637: Performed by: INTERNAL MEDICINE

## 2019-07-26 PROCEDURE — 94760 N-INVAS EAR/PLS OXIMETRY 1: CPT

## 2019-07-26 PROCEDURE — 94640 AIRWAY INHALATION TREATMENT: CPT

## 2019-07-26 PROCEDURE — 74011250637 HC RX REV CODE- 250/637: Performed by: NURSE PRACTITIONER

## 2019-07-26 PROCEDURE — 74011000250 HC RX REV CODE- 250: Performed by: PHYSICIAN ASSISTANT

## 2019-07-26 PROCEDURE — 85025 COMPLETE CBC W/AUTO DIFF WBC: CPT

## 2019-07-26 PROCEDURE — 74011636637 HC RX REV CODE- 636/637: Performed by: NURSE PRACTITIONER

## 2019-07-26 PROCEDURE — 74011250636 HC RX REV CODE- 250/636: Performed by: INTERNAL MEDICINE

## 2019-07-26 PROCEDURE — 97116 GAIT TRAINING THERAPY: CPT

## 2019-07-26 PROCEDURE — 80048 BASIC METABOLIC PNL TOTAL CA: CPT

## 2019-07-26 PROCEDURE — 77030038269 HC DRN EXT URIN PURWCK BARD -A

## 2019-07-26 PROCEDURE — 97535 SELF CARE MNGMENT TRAINING: CPT

## 2019-07-26 PROCEDURE — 36415 COLL VENOUS BLD VENIPUNCTURE: CPT

## 2019-07-26 RX ADMIN — ALUMINUM HYDROXIDE, MAGNESIUM HYDROXIDE, AND SIMETHICONE 30 ML: 200; 200; 20 SUSPENSION ORAL at 03:21

## 2019-07-26 RX ADMIN — PREDNISONE 40 MG: 20 TABLET ORAL at 08:10

## 2019-07-26 RX ADMIN — ALUMINUM HYDROXIDE, MAGNESIUM HYDROXIDE, AND SIMETHICONE 30 ML: 200; 200; 20 SUSPENSION ORAL at 00:16

## 2019-07-26 RX ADMIN — DICLOFENAC 2 G: 10 GEL TOPICAL at 12:16

## 2019-07-26 RX ADMIN — IPRATROPIUM BROMIDE AND ALBUTEROL SULFATE 3 ML: .5; 3 SOLUTION RESPIRATORY (INHALATION) at 08:10

## 2019-07-26 RX ADMIN — PANTOPRAZOLE SODIUM 40 MG: 40 TABLET, DELAYED RELEASE ORAL at 08:10

## 2019-07-26 RX ADMIN — ONDANSETRON 4 MG: 2 INJECTION INTRAMUSCULAR; INTRAVENOUS at 03:43

## 2019-07-26 RX ADMIN — LEVOTHYROXINE SODIUM 25 MCG: 25 TABLET ORAL at 06:28

## 2019-07-26 RX ADMIN — BUDESONIDE AND FORMOTEROL FUMARATE DIHYDRATE 2 PUFF: 160; 4.5 AEROSOL RESPIRATORY (INHALATION) at 08:51

## 2019-07-26 RX ADMIN — ALLOPURINOL 100 MG: 100 TABLET ORAL at 08:09

## 2019-07-26 RX ADMIN — APIXABAN 10 MG: 5 TABLET, FILM COATED ORAL at 08:09

## 2019-07-26 NOTE — ROUTINE PROCESS
Bedside and Verbal shift change report given by Poli Carpenter RN (offgoing nurse) to Neil Tripp RN (oncoming nurse). Report included the following information SBAR, Kardex and MAR.

## 2019-07-26 NOTE — DISCHARGE INSTRUCTIONS

## 2019-07-26 NOTE — PROGRESS NOTES
HEMATOLOGY AND ONCOLOGY   PROGRESS NOTE      Patient: Neema Franco   MRN: [id number]      YOB: 1945      Admit Date: 2019    Assessment:     # VTE  # HEMOCHROMATOSIS:  # SEPSIS:  # HYPOTENSION:  # ABBIE    Plan:     - Anticoagulation, Eliquis, as long as benefits outweigh the risk. - Monitor counts and signs of bleeding. HgB is stable this AM.   - She needs age appropriate malignancy screening tests, including mammogram and colonoscopy. - She has been getting [address] screening tests. She needs liver US, it can be scheduled as an outpatient. Will do it as inpatient if she is still in-house over the weekend. - Monitor lytes and kidney function.  - She is aware of the bleeding risk and has to follow bleeding precautions. - Follow up with Dr. Willian Tompkins in 2-4 weeks post discharge. The plan was discussed with the patient and I answered her questions, she expressed an understanding and agreement with the plan. Orlando Magaña MD  [facility] [phone]    Subjective:     She is doing better and reports an improvement of her SOB. No chest pain. Her LQ abdominal pain has also improved. No urinary complaints.      Objective:     Patient Vitals for the past 24 hrs:   BP Temp Pulse Resp SpO2   19 1140 138/67 96.6 °F (35.9 °C) 92 20 98 %   19 0851     97 %   19 0800 107/66 97.1 °F (36.2 °C) 77 18 92 %   19 0344 113/65 97.3 °F (36.3 °C) 87 18 99 %   19 0038 95/57 97.6 °F (36.4 °C) 95 16 98 %   19 2043 104/59 96.8 °F (36 °C) (!) 101 16 99 %   19 1537 117/64 96.6 °F (35.9 °C) 96 17 98 %   19 1452     96 %         Intake/Output Summary (Last 24 hours) at 2019 1247  Last data filed at 2019 0806  Gross per 24 hour   Intake 960 ml   Output 3500 ml   Net -2540 ml       Temp (24hrs), Av °F (36.1 °C), Min:96.6 °F (35.9 °C), Max:97.6 °F (36.4 °C)     Review Of Systems:     Constitutional: negative for fevers, chills, sweats and fatigue  Eyes: negative for visual disturbance, redness and icterus  Ears, Nose, Mouth, Throat, and Face: negative for tinnitus, epistaxis, sore mouth and hoarseness  Respiratory: negative for cough, sputum, hemoptysis, pleurisy/chest pain or wheezing. Cardiovascular: negative for chest pain, chest pressure/discomfort, palpitations, irregular heart beats, syncope, paroxysmal nocturnal dyspnea  Gastrointestinal: negative for reflux symptoms, nausea, vomiting, change in bowel habits, melena, diarrhea, or constipation. She reports RLQ discomfort. Genitourinary:negative for dysuria, nocturia, urinary incontinence, hesitancy and hematuria  Integument: negative for rash, skin lesion(s) and pruritus  Hematologic/Lymphatic: negative for easy bruising, bleeding and lymphadenopathy  Musculoskeletal: chronic left shoulder and lower back pain. Neurological: negative for headaches, dizziness, seizures, paresthesia and weakness. Physical Exam:     Constitutional: Alert, oriented, not in distress  Eyes: PERRLA, anicteric, no redness  HEENT: no palpable Lymph nodes, no mucositis, no thrush. Respiratory: symmetrical expansion, no rales,  no wheezing. Cardiovascular: S1S2 positive. Gastrointestinal: soft, benign, non tender, obese. Integument: Scar rash over her left shoulder and arm. Musculoskeletal: no edema,   Neurological: intact, symmetrical exam with no focal motor or sensory deficits.     Lab:     Recent Results (from the past 24 hour(s))   METABOLIC PANEL, BASIC    Collection Time: 07/25/19  2:47 PM   Result Value Ref Range    Sodium 131 (L) 136 - 145 mmol/L    Potassium 3.8 3.5 - 5.5 mmol/L    Chloride 97 (L) 100 - 111 mmol/L    CO2 25 21 - 32 mmol/L    Anion gap 9 3.0 - 18 mmol/L    Glucose 93 74 - 99 mg/dL    BUN 38 (H) 7.0 - 18 MG/DL    Creatinine 1.06 0.6 - 1.3 MG/DL    BUN/Creatinine ratio 36 (H) 12 - 20      GFR est AA >60 >60 ml/min/1.73m2    GFR est non-AA 50 (L) >60 ml/min/1.73m2    Calcium 9.6 8.5 - 10.1 MG/DL   CBC WITH AUTOMATED DIFF    Collection Time: 07/26/19  3:24 AM   Result Value Ref Range    WBC 10.4 4.6 - 13.2 K/uL    RBC 3.20 (L) 4.20 - 5.30 M/uL    HGB 10.2 (L) 12.0 - 16.0 g/dL    HCT 30.3 (L) 35.0 - 45.0 %    MCV 94.7 74.0 - 97.0 FL    MCH 31.9 24.0 - 34.0 PG    MCHC 33.7 31.0 - 37.0 g/dL    RDW 14.7 (H) 11.6 - 14.5 %    PLATELET 777 528 - 690 K/uL    MPV 10.9 9.2 - 11.8 FL    NEUTROPHILS 86 (H) 40 - 73 %    LYMPHOCYTES 8 (L) 21 - 52 %    MONOCYTES 6 3 - 10 %    EOSINOPHILS 0 0 - 5 %    BASOPHILS 0 0 - 2 %    ABS. NEUTROPHILS 9.0 (H) 1.8 - 8.0 K/UL    ABS. LYMPHOCYTES 0.8 (L) 0.9 - 3.6 K/UL    ABS. MONOCYTES 0.6 0.05 - 1.2 K/UL    ABS. EOSINOPHILS 0.0 0.0 - 0.4 K/UL    ABS.  BASOPHILS 0.0 0.0 - 0.1 K/UL    DF AUTOMATED     METABOLIC PANEL, BASIC    Collection Time: 07/26/19  3:24 AM   Result Value Ref Range    Sodium 131 (L) 136 - 145 mmol/L    Potassium 4.2 3.5 - 5.5 mmol/L    Chloride 98 (L) 100 - 111 mmol/L    CO2 28 21 - 32 mmol/L    Anion gap 5 3.0 - 18 mmol/L    Glucose 109 (H) 74 - 99 mg/dL    BUN 33 (H) 7.0 - 18 MG/DL    Creatinine 0.98 0.6 - 1.3 MG/DL    BUN/Creatinine ratio 34 (H) 12 - 20      GFR est AA >60 >60 ml/min/1.73m2    GFR est non-AA 55 (L) >60 ml/min/1.73m2    Calcium 9.1 8.5 - 10.1 MG/DL

## 2019-07-26 NOTE — PROGRESS NOTES
Discharge Planning:  Discharge order noted for today. Patient has been accepted to [facility] skilled nursing facility. Confirmed with [facility] in Admission that bed is available today. Met with patient who is agreeable to the transition plan today. Medicare Insurance authorization has been obtained. Medical Transport to facility has been arranged through [address] transport at 1:00 p.m. time. Patient's discharge summary has been forwarded to skilled nursing facility via cclink. Bedside RN  Genaro Caraballo, , has been updated to the transition plan. Discharge information has been updated on the AVS.  Please call report to [phone].       [facility]

## 2019-07-26 NOTE — DISCHARGE SUMMARY
[facility]  Discharge Summary       Patient: Denisha Age: 66 y.o. : 1945 MR#: [id number] SSN: [id number]  PCP on record: Marguerite Eric MD  Admit date: 2019  Discharge date: 2019    Disposition:    []Home   []Home with Home Health   [x]SNF/NH   []Rehab   []Home with family   []Alternate Facility:____________________    Discharge Diagnoses:                             Acute PE LLL  Acute bilateral DVT, femoral, popliteal, peroneal  Recently treated pneumonia. Completed PO abx  Physical deconditioning  Hemochromatosis  Mild hyponatremia    Discharge Medications:     Discharge Medication List as of 2019  1:08 PM      START taking these medications    Details   predniSONE (DELTASONE) 20 mg tablet Take 40 mg by mouth daily (with breakfast) for 3 days, THEN 20 mg daily (with breakfast) for 3 days. , Print, Disp-9 Tab, R-0      guaiFENesin (ROBITUSSIN) 100 mg/5 mL liquid Take 5 mL by mouth every four (4) hours as needed for Cough. , Print, Disp-100 mL, R-0      docusate sodium (COLACE) 100 mg capsule Take 1 Cap by mouth two (2) times daily as needed for Constipation for up to 90 days. , Print, Disp-30 Cap, R-0      diclofenac (VOLTAREN) 1 % gel Apply 2 g to affected area every six (6) hours. Indications: chronic shoulder pain, Print, Disp-100 g, R-0      budesonide-formoterol (SYMBICORT) 160-4.5 mcg/actuation HFAA Take 2 Puffs by inhalation two (2) times a day., Print, Disp-1 Inhaler, R-0      !! apixaban (ELIQUIS) 5 mg tablet Take 2 Tabs by mouth every twelve (12) hours for 5 days. , Print, Disp-20 Tab, R-0      !! apixaban (ELIQUIS) 5 mg tablet Take 1 Tab by mouth every twelve (12) hours. , Print, Disp-90 Tab, R-0      alum-mag hydroxide-simeth (MYLANTA) 200-200-20 mg/5 mL susp Take 30 mL by mouth every four (4) hours as needed (heart burn). , Print, Disp-100 mL, R-0      albuterol-ipratropium (DUO-NEB) 2.5 mg-0.5 mg/3 ml nebu 3 mL by Nebulization route every six (6) hours as needed (SOB). , Print, Disp-30 Nebule, R-0      acetaminophen (TYLENOL) 325 mg tablet Take 2 Tabs by mouth every six (6) hours as needed for Pain or Fever., Print, Disp-30 Tab, R-0      !! OTHER Incentive Spirometer: please use 10 times every hour daily, Print, Disp-1 Units, R-0      !! OTHER Please check CBC, BMP, Mg, and Phos in 3-5 days. PCP at [facility] to f/u results. , Print, Disp-1 Units, R-0      midodrine (PROAMITINE) 5 mg tablet Take 1 Tab by mouth three (3) times daily as needed (SBP LESS THAN 90) for up to 30 days. , Print, Disp-10 Tab, R-0       !! - Potential duplicate medications found. Please discuss with provider. CONTINUE these medications which have NOT CHANGED    Details   levothyroxine (SYNTHROID) 25 mcg tablet Take 1 Tab by mouth Daily (before breakfast). , Print, Disp-90 Tab, R-6      potassium chloride (KLOR-CON M20) 20 mEq tablet Take 1 Tab by mouth daily. , Print, Disp-90 Tab, R-6      allopurinol (ZYLOPRIM) 100 mg tablet Take 1 Tab by mouth two (2) times a day., Print, Disp-180 Tab, R-11      simvastatin (ZOCOR) 40 mg tablet Take 1 Tab by mouth nightly. , Print, Disp-90 Tab, R-6      albuterol sulfate (PROAIR RESPICLICK) 90 mcg/actuation aepb Take 2 Puffs by inhalation every four (4) hours as needed., Historical Med      inhalational spacing device (ACE AEROSOL CLOUD ENHANCER) 1 Each by Does Not Apply route as needed., Normal, Disp-1 Device, R-3      cholecalciferol, vitamin D3, 2,000 unit tab TAKE 1 TABLET BY MOUTH EVERY DAY, Historical Med, R-3      Omeprazole delayed release (PRILOSEC D/R) 20 mg tablet Take 20 mg by mouth daily. , Historical Med         STOP taking these medications       chlorpheniramine-HYDROcodone (TUSSIONEX) 10-8 mg/5 mL suspension Comments:   Reason for Stopping:         doxycycline (VIBRA-TABS) 100 mg tablet Comments:   Reason for Stopping:         ibuprofen (MOTRIN) 200 mg tablet Comments:   Reason for Stopping:         lisinopril (PRINIVIL, ZESTRIL) 10 mg tablet Comments:   Reason for Stopping:         furosemide (LASIX) 20 mg tablet Comments:   Reason for Stopping:         metOLazone (ZAROXOLYN) 5 mg tablet Comments:   Reason for Stopping:         albuterol (PROVENTIL HFA, VENTOLIN HFA, PROAIR HFA) 90 mcg/actuation inhaler Comments:   Reason for Stopping:         fluticasone (FLOVENT HFA) 220 mcg/actuation inhaler Comments:   Reason for Stopping:               Consults:    - pulmonary    Significant Diagnostic Studies:   -  Ct Abd Pelv Wo Cont    Result Date: 7/25/2019  IMPRESSION[de-identified] 1.  Nothing clearly acute within the abdomen or pelvis. -Fluid distended stomach; if there are issues of nausea, consider possibility of gastroparesis -Large bowel diverticulosis without inflammation Thank you for this referral.    Xr Chest Port    Result Date: 7/23/2019  IMPRESSION: No radiographic finding for an acute cardiopulmonary process    Xr Chest Port    Result Date: 7/22/2019  IMPRESSION: Negative study. Nm Lung Perfusion W Vent    Result Date: 7/23/2019  Impression: Large single mismatched perfusion defect, probably lateral segment of the left lower lobe. This constitutes intermediate probability for pulmonary embolism. Discussed with Dr. Zachary Sanchez, July 23, 2019 at 2:45 AM.       Hospital Course by Problem   Per H&P 66 y.o.  female who has PMH of Hemochromatosis for which she gets phlebotomy every few months, A-fib, HTN and mild p. HTN with reserved EF. Pt presented to ER with progressively worsening sOB for the last few days. Pt came to ER on 7/20 for same complaint, evaluated and discharged directly on Doxycycline. Pt took her Abx but did not feel better and started to develop SOB and c/o chest tightness at times that resolves after at rest.  IN ER, pt was found hypotensive and tachycardiac and continued to have productive cough. Pt received sepsis protocol yet continued to be hypotensive yet was alert and oriented and not in acute distress. Mahadine Never IVF challenge and continued to have labile BP but mostly on the low side. ICU was consulted and appreciated and t will be admitted or further MGT.     Of note, Pt contineud to have A-fib like picture with mild moderate RVR (110-120) and hypotnsive. Pt has CRF and CT chest can not be ordered to r/o PE. Denies fever, abdominal and continues to be stable despite hypotension and very pleasant    VQ scan with moderate to ada probability for PE. Started on heparin drip. Bilateral PVL/duplex ordered, echo ordered. Steroids to start weaning. Continue nebs, symbicort. Antibiotic therapy discontinued, doubtful of pneumonia. Bilateral lower DVT/femoral, popliteal, peroneal veins.  transitioned from heparin drip to PO Eliquis. Steroid de-escalated to taper PO dose. PT/OT recommendations SNF   CT abd/pelvis Fluid distended stomach; if there are issues of nausea, consider possibility of  gastroparesis  -Large bowel diverticulosis without inflammation    Today's examination of the patient revealed:     Subjective: All 10 systems reviewed and negative.    Objective:   VS:   Visit Vitals  /66 (BP 1 Location: Left arm, BP Patient Position: At rest)   Pulse 77   Temp 97.1 °F (36.2 °C)   Resp 18   Ht 5' (1.524 m)   Wt 105.2 kg (232 lb)   SpO2 97%   BMI 45.31 kg/m²      Tmax/24hrs: Temp (24hrs), Av °F (36.1 °C), Min:96.6 °F (35.9 °C), Max:97.6 °F (36.4 °C)     Input/Output:     Intake/Output Summary (Last 24 hours) at 2019 1003  Last data filed at 2019 0260  Gross per 24 hour   Intake 960 ml   Output 3500 ml   Net -2540 ml       General:  Alert, NAD  Cardiovascular:  RRR  Pulmonary:  LSC throughout  GI:  +BS in all four quadrants, soft, non-tender  Extremities:  No edema; 2+ dorsalis pedis pulses bilaterally  Neurology: Patient A&O     Labs:    Recent Results (from the past 24 hour(s))   METABOLIC PANEL, BASIC    Collection Time: 19  2:47 PM   Result Value Ref Range    Sodium 131 (L) 136 - 145 mmol/L    Potassium 3.8 3.5 - 5.5 mmol/L    Chloride 97 (L) 100 - 111 mmol/L    CO2 25 21 - 32 mmol/L    Anion gap 9 3.0 - 18 mmol/L    Glucose 93 74 - 99 mg/dL    BUN 38 (H) 7.0 - 18 MG/DL    Creatinine 1.06 0.6 - 1.3 MG/DL    BUN/Creatinine ratio 36 (H) 12 - 20      GFR est AA >60 >60 ml/min/1.73m2    GFR est non-AA 50 (L) >60 ml/min/1.73m2    Calcium 9.6 8.5 - 10.1 MG/DL   CBC WITH AUTOMATED DIFF    Collection Time: 07/26/19  3:24 AM   Result Value Ref Range    WBC 10.4 4.6 - 13.2 K/uL    RBC 3.20 (L) 4.20 - 5.30 M/uL    HGB 10.2 (L) 12.0 - 16.0 g/dL    HCT 30.3 (L) 35.0 - 45.0 %    MCV 94.7 74.0 - 97.0 FL    MCH 31.9 24.0 - 34.0 PG    MCHC 33.7 31.0 - 37.0 g/dL    RDW 14.7 (H) 11.6 - 14.5 %    PLATELET 213 113 - 603 K/uL    MPV 10.9 9.2 - 11.8 FL    NEUTROPHILS 86 (H) 40 - 73 %    LYMPHOCYTES 8 (L) 21 - 52 %    MONOCYTES 6 3 - 10 %    EOSINOPHILS 0 0 - 5 %    BASOPHILS 0 0 - 2 %    ABS. NEUTROPHILS 9.0 (H) 1.8 - 8.0 K/UL    ABS. LYMPHOCYTES 0.8 (L) 0.9 - 3.6 K/UL    ABS. MONOCYTES 0.6 0.05 - 1.2 K/UL    ABS. EOSINOPHILS 0.0 0.0 - 0.4 K/UL    ABS. BASOPHILS 0.0 0.0 - 0.1 K/UL    DF AUTOMATED     METABOLIC PANEL, BASIC    Collection Time: 07/26/19  3:24 AM   Result Value Ref Range    Sodium 131 (L) 136 - 145 mmol/L    Potassium 4.2 3.5 - 5.5 mmol/L    Chloride 98 (L) 100 - 111 mmol/L    CO2 28 21 - 32 mmol/L    Anion gap 5 3.0 - 18 mmol/L    Glucose 109 (H) 74 - 99 mg/dL    BUN 33 (H) 7.0 - 18 MG/DL    Creatinine 0.98 0.6 - 1.3 MG/DL    BUN/Creatinine ratio 34 (H) 12 - 20      GFR est AA >60 >60 ml/min/1.73m2    GFR est non-AA 55 (L) >60 ml/min/1.73m2    Calcium 9.1 8.5 - 10.1 MG/DL     Additional Data Reviewed:     Condition: stable  Follow-up Appointments:   1. Your PCP: Ab Bullard MD, within 5-7 days  2. Follow up with hematology/onc Dr. Willian Tompkins in 1-2 weeks  3.  Follow up with pulmonary, Dr. Bakari Henley in 1-2 weeks        >30 minutes spent coordinating this discharge (review instructions/follow-up, prescriptions, preparing report for sign off)    Signed:  Obdulia Pino NP  7/26/2019  10:03 AM

## 2019-07-26 NOTE — PROGRESS NOTES
Problem: Self Care Deficits Care Plan (Adult)  Goal: *Acute Goals and Plan of Care (Insert Text)  Description  Occupational Therapy Goals  Initiated 7/24/2019 within 7 day(s). 1.  Patient will perform grooming with supervision/set-up. 2.  Patient will perform upper body dressing with supervision/set-up. 3.  Patient will perform lower body dressing with supervision/set-up. 4.  Patient will perform toilet transfers with supervision/set-up. 5.  Patient will perform all aspects of toileting with supervision/set-up. 6.  Patient will participate in upper extremity therapeutic exercise/activities with supervision/set-up for 8 minutes. 7.  Patient will utilize energy conservation techniques during functional activities with min verbal cues. Prior Level of Function: Pt reports she lives with a friend in a [facility] with 3STE through the front and 4STE through the back. She was (I) with basic self-care/ADLs and IADLs, including cooking, cleaning, medication management, and driving PTA. Pt has a RW, rollator, and cane at home but was (I) for functional mobility. Outcome: Progressing Towards Goal   OCCUPATIONAL THERAPY TREATMENT    Patient: Manfred Ch [de-identified]66 y.o. female)  Date: 7/26/2019  Diagnosis: CAP (community acquired pneumonia) [J18.9]  Sepsis ([facility] [address] [A41.9] Sepsis ([facility] [address]       Precautions:      Chart, occupational therapy assessment, plan of care, and goals were reviewed. ASSESSMENT:  Pt reports heartburn all night and not getting much sleep. Pt however, is agreeable to participate in OT. Pt maneuvered to EOB w/CGA, participated in UB dressing task w/SBA for safety and VCs for adaptive strategy 2/2 pain in BUE shds. Per pt she has chronic BUE rotator cuff injuries. Pt maneuvered to the BR w/FWW, requiring SBA for safety and VCs for pacing. Pt required CGA to std from the toilet, and SBA to complete std grooming task at sinkside for ~ 2 min. Pt maneuvered to the chair following bathroom mobility.  Pt educated on mult energy conservation techniques to utilize in home environment, including pacing and deep breathing to prevent SOB and fatigue, and increase activity tolerance and safety w/ADLs and functional mobility, pt verbalized understanding, required Min VCs to follow throughout tasks. Pt was able to doff/don socks w/EC technique w/Supervision. Pt educated on scapular strengthening TherEx to perform seated in the chair, including gentle stretching to prevent stiffness and improve functional use of BUE without pain, pt verbalized and demod understanding of all education. Progression toward goals:  ?          Improving appropriately and progressing toward goals  ? Improving slowly and progressing toward goals  ? Not making progress toward goals and plan of care will be adjusted     PLAN:  Patient continues to benefit from skilled intervention to address the above impairments. Continue treatment per established plan of care. Discharge Recommendations:  Skilled Nursing Facility  Further Equipment Recommendations for Discharge:  bedside commode, shower chair and rolling walker     SUBJECTIVE:   Patient stated Lis Roman usually go out with one of my buddies who holds my hand.     OBJECTIVE DATA SUMMARY:   Cognitive/Behavioral Status:  Neurologic State: Alert  Orientation Level: Oriented X4  Cognition: Follows commands  Safety/Judgement: Fall prevention    Functional Mobility and Transfers for ADLs:   Bed Mobility:     Supine to Sit: Contact guard assistance     Scooting: Contact guard assistance   Transfers:  Sit to Stand: Stand-by assistance;Contact guard assistance  Stand to Sit: Stand-by assistance   Toilet Transfer : Stand-by assistance;Contact guard assistance(CGA to std from the toilet)    Balance:  Sitting: Intact  Standing: Impaired; With support  Standing - Static: Fair(plus)  Standing - Dynamic : Fair    ADL Intervention:       Grooming  Grooming Assistance: Stand-by assistance(std sinkside)  Washing Hands: Stand-by assistance  Upper [address]: Supervision  Shirt simulation with hospital gown: Supervision    Lower Body Dressing Assistance  Socks: Supervision  Leg Crossed Method Used: Yes  Position Performed: Seated in chair    Toileting  Toileting Assistance: Modified independent  UE Therapeutic Exercises:   Educated on scapular stabilization/strengthening to promote increased endurance and activity tolerance for ADLs carryover    Pain:  Pain level pre-treatment: 0/10   Pain level post-treatment: 0/10      Activity Tolerance:    Fair  Please refer to the flowsheet for vital signs taken during this treatment. After treatment:   ?  Patient left in no apparent distress sitting up in chair  ? Patient left in no apparent distress in bed  ? Call bell left within reach  ? Nursing notified  ? Caregiver present  ? Bed alarm activated    COMMUNICATION/EDUCATION:   ? Role of Occupational Therapy in the acute care setting  ? Home safety education was provided and the patient/caregiver indicated understanding. ? Patient/family have participated as able in working towards goals and plan of care. ? Patient/family agree to work toward stated goals and plan of care. ? Patient understands intent and goals of therapy, but is neutral about his/her participation. ? Patient is unable to participate in goal setting and plan of care.       Thank you for this referral.  EZEKIEL Matamoros  Time Calculation: 40 mins

## 2019-07-26 NOTE — PROGRESS NOTES
Problem: Falls - Risk of  Goal: *Absence of Falls  Description  Document Sanket Yanez Fall Risk and appropriate interventions in the flowsheet. Outcome: Progressing Towards Goal  Note:   Fall Risk Interventions:  Mobility Interventions: Communicate number of staff needed for ambulation/transfer, Patient to call before getting OOB, Bed/chair exit alarm         Medication Interventions: Patient to call before getting OOB, Teach patient to arise slowly, Bed/chair exit alarm    Elimination Interventions: Call light in reach, Patient to call for help with toileting needs, Bed/chair exit alarm    History of Falls Interventions:  Investigate reason for fall, Bed/chair exit alarm         Problem: Patient Education: Go to Patient Education Activity  Goal: Patient/Family Education  Outcome: Progressing Towards Goal     Problem: Patient Education: Go to Patient Education Activity  Goal: Patient/Family Education  Outcome: Progressing Towards Goal     Problem: Patient Education: Go to Patient Education Activity  Goal: Patient/Family Education  Outcome: Progressing Towards Goal

## 2019-07-26 NOTE — PROGRESS NOTES
Problem: Mobility Impaired (Adult and Pediatric)  Goal: *Acute Goals and Plan of Care (Insert Text)  Description  Physical Therapy Goals  Initiated 7/24/2019 and to be accomplished within 7 day(s)  1. Patient will move from supine to sit and sit to supine, scoot up and down and roll side to side in bed with supervision/set-up. 2.  Patient will transfer from bed to chair and chair to bed with supervision/set-up using the least restrictive device. 3.  Patient will perform sit to stand with supervision/set-up. 4.  Patient will ambulate with supervision/set-up for >50 feet with the least restrictive device. 5.  Patient will demo good sitting and standing balance in order to improve balance during functional tasks. PLOF: Patient lives in 1 story home with a roommate and reports she was able to walk short distances with no AD though notes she would hang onto things around the house. Patient also reports that she has a cane and walker at home. Outcome: Progressing Towards Goal     PHYSICAL THERAPY TREATMENT    Patient: Kody Rocha [de-identified]66 y.o. female)  Date: 7/26/2019  Diagnosis: CAP (community acquired pneumonia) [J18.9]  Sepsis ([facility] [address] [A41.9] Sepsis ([facility] [address]       Precautions:     Chart, physical therapy assessment, plan of care and goals were reviewed. ASSESSMENT:  Pt requests to ambulate with shoes and without AD (to simulate her normal means of mobility). Pt is able to reach a similar distance, but report rapid increase of LBP. Pt gait has has hard lateral shifts and trunk sway. Pt would benefit from continued RW use to increase contralateral core stability. Placement would be beneficial to increase pt to a community level of mobility, before return to home were she cares for herself. Progression toward goals:  ?      Improving appropriately and progressing toward goals  ? Improving slowly and progressing toward goals  ?       Not making progress toward goals and plan of care will be adjusted PLAN:  Patient continues to benefit from skilled intervention to address the above impairments. Continue treatment per established plan of care. Discharge Recommendations:  Skilled Nursing Facility  Further Equipment Recommendations for Discharge:  rolling walker     SUBJECTIVE:   Patient stated I want to wear my shoes today. My feet don't have padding on the bottoms.     OBJECTIVE DATA SUMMARY:   Critical Behavior:  Neurologic State: Alert  Orientation Level: Oriented X4  Cognition: Appropriate for age attention/concentration  Safety/Judgement: Fall prevention  Functional Mobility Training:  Bed Mobility:  Scooting: Contact guard assistance  Transfers:  Sit to Stand: Stand-by assistance  Stand to Sit: Stand-by assistance  Balance:  Sitting: Intact  Standing: Impaired  Ambulation/Gait Training:  Distance (ft): 140 Feet (ft)  Assistive Device: Walker, rolling  Ambulation - Level of Assistance: Contact guard assistance;Minimal assistance  Gait Abnormalities: Decreased step clearance  Base of Support: Narrowed  Speed/Maty: Accelerated  Step Length: Left shortened;Right shortened  Pain:  Pain Scale 1: Numeric (0 - 10)  Pain Intensity 1: 4 (LBP)   Pain out: 6  Activity Tolerance:   Fair+  Please refer to the flowsheet for vital signs taken during this treatment. After treatment:   ? Patient left in no apparent distress sitting up in chair  ? Patient left in no apparent distress in bed  ? Call bell left within reach  ? Nursing notified  ? Caregiver present  ?  Bed alarm activated      Tamera Brown PTA   Time Calculation: 37 mins

## 2019-07-26 NOTE — PROGRESS NOTES
[facility]  Pulmonary, Critical Care, and Sleep Medicine    Name: Laura Sung MRN: [id number]   : 1945 Hospital: [facility]   Date: 2019        IMPRESSION:   · PE- new, secondary to DVT, unprovoked  · DVT - bilateral , acute  · ? CAP  · MCBRIDE - Multifactorial. Last office visit on 19. PFT with decreased diffusion capacity of 73% predicted. Body habitus, chronic back pain causing splinting and diaphragmatic weakness all contributing factors as well. · Suspected COURTNEY - Sleep study not completed as of yet. · Atrial fibrillation with RVR  · Morbid Obesity  · Deconditioning  · Hx of HTN, HDL, GERD, Arthritis,  Hemochromatosis     Patient Active Problem List   Diagnosis Code    Shoulder impingement, right, with rotator cuff strain M75.40    Spinal stenosis M48.00    Sacroiliitis ([facility] [address] M46.1    High cholesterol E78.00    Hypertension I10    Hemochromatosis E83.119    Obesity, morbid ([facility] [address] E66.01    MCBRIDE (dyspnea on exertion) R06.09    CAP (community acquired pneumonia) J18.9    Sepsis ([facility] [address] A41.9    Hypotension I95.9    Atrial fibrillation with RVR (HCC) I48.91      PLAN:   · Continue anticoagulation-can start oral agents,DOAC preferred- lifelong   · Con't Duo-nebs q6 with RT; albuterol nebs q4 PRN  · Aggressive pulmonary hygiene; encourage ICS; bronchial hygiene protocol  · Agree with Robitussin PRN  · Will continue to follow as outpatient     Subjective/Interval History:     19     Feels better as far as breathing  Some shortness of breath  Has minimal cough- productive of thick mucus- not colored  Denies chest pain  Had difficulty sleeping due to back pain, left shoulder pain and just not getting comfortable. Denies any dizziness  Denies any nausea  Up in bedside chair    ROS:Pertinent items are noted in HPI.     Objective:   Vital Signs:    Visit Vitals  /66 (BP 1 Location: Left arm, BP Patient Position: At rest)   Pulse 77   Temp 97.1 °F (36.2 °C)   Resp 18   Ht 5' (1.524 m)   Wt 105.2 kg (232 lb)   SpO2 97%   BMI 45.31 kg/m²       O2 Device: Room air       Temp (24hrs), Av °F (36.1 °C), Min:96.6 °F (35.9 °C), Max:97.6 °F (36.4 °C)       Intake/Output:   Last shift:      701 - 1900  In: -   Out:  [Urine:2000]  Last 3 shifts: 1901 - 700  In: 1200 [P.O.:1200]  Out: 2700 [Urine:2700]    Intake/Output Summary (Last 24 hours) at 2019 1129  Last data filed at 2019 0806  Gross per 24 hour   Intake 960 ml   Output 3500 ml   Net -2540 ml        Physical Exam:    General: in no apparent distress, alert, oriented times 3 and normal vitals   HEENT: Normal   Neck: No abnormally enlarged lymph nodes. Chest: Poor excursion   Lungs: decreased air exchange bibasilar   Heart: Regular rate and rhythm or S1S2 present   Abdomen: abdomen is soft without significant tenderness, masses, organomegaly or guarding   Extremity: Trace edema   Neuro: alert   Skin: Skin color, texture, turgor normal. No rashes or lesions        DATA:  Labs:  Recent Labs     19  0324 19  0207 19  0544   WBC 10.4 11.1 12.5   HGB 10.2* 9.8* 10.9*   HCT 30.3* 29.0* 31.3*    226 224     Recent Labs     19  0324 19  1447 19  0207   * 131* 130*   K 4.2 3.8 4.0   CL 98* 97* 97*   CO2 28  24   * 93 108*   BUN 33* 38* 42*   CREA 0.98 1.06 1.11   CA 9.1 9.6 8.6     No results for input(s): PH, PCO2, PO2, HCO3, FIO2 in the last 72 hours. Imaging:  [x]I have personally reviewed the patients radiographs  V/Q scan:  Large single mismatched perfusion defect, probably lateral segment of the left  lower lobe. This constitutes intermediate probability for pulmonary embolism. Vascular studies: 2019  Acute nonocclusive DVT present in the right femoral popliteal vein. Acute occlusive DVT present in the right peroneal vein. Acute nonocclusive DVT demonstrated left femoral popliteal vein.   Acute occlusive DVT present left peroneal vein. Bilateral posterior tibial arteries are triphasic. CT Results (most recent):  Results from Hospital Encounter encounter on 07/22/19   CT ABD PELV WO CONT    Narrative CT abdomen and pelvis without contrast.    Indications: RLQ pain ; superimposed upon history of chronic abdominal pain. No  current alteration in bowel habits. Prior cholecystectomy. Technique: No IV contrast administered. No oral contrast. Contiguous 5 mm axial  images obtained from above the liver to the symphysis pubis. Sagittal and  Coronal MPR generated. CT scans at this facility are performed using dose optimization technique as  appropriate with performed exam, to include automated exposure control,  adjustment of mA and/or kV according to patient's size (including appropriate  matching for site-specific examinations), or use of iterative reconstruction  technique. Comparison: September 2015    Findings:    Lower chest: Lung bases are unremarkable. No intraperitoneal free air or free fluid. No fluid collection. Liver: Unremarkable. Gallbladder/biliary: Prior cholecystectomy. Biliary tree unremarkable. Spleen: Unremarkable    Pancreas: Atrophic morphology, no concerning features. Left Kidney: Unremarkable. Right Kidney: Unremarkable. Adrenals: Unremarkable. Bowels/mesentery: Fluid distended stomach without obstructing focus. There is  large bowel diverticulosis. Appendix unremarkable. Pelvis: Urinary bladder unremarkable. Atrophic uterus versus prior hysterectomy. Unremarkable for age. Vascular: Aorta unremarkable for age. IVC unremarkable. Lymph Nodes: No adenopathy. Bones: No acute findings. Prior left hip ORIF. Unremarkable for age.       Impression IMPRESSION[de-identified]    1.  Nothing clearly acute within the abdomen or pelvis.  -Fluid distended stomach; if there are issues of nausea, consider possibility of  gastroparesis  -Large bowel diverticulosis without inflammation    Thank you for this referral.       High complexity decision making was performed during the evaluation of this patient at high risk for decompensation with multiple organ involvement     Above mentioned total time spent on reviewing the case/medical record/data/notes/EMR/patient examination/documentation/coordinating care with nurse/consultants, exclusive of procedures with complex decision making performed and > 50% time spent in face to face evaluation.     Cesar Rodarte MD

## 2019-07-26 NOTE — ROUTINE PROCESS
Bedside shift change report given to Sandor Oliver RN (oncoming nurse) by Pamella Garcia RN (offgoing nurse). Report included the following information SBAR, Kardex, Procedure Summary and MAR.

## 2019-07-26 NOTE — ROUTINE PROCESS
Report given to medical transport for transportation. Patient alert and oriented x 4. No signs or symptoms of distress.

## 2019-07-28 LAB
BACTERIA SPEC CULT: NORMAL
BACTERIA SPEC CULT: NORMAL
SERVICE CMNT-IMP: NORMAL
SERVICE CMNT-IMP: NORMAL

## 2019-07-30 ENCOUNTER — HOSPITAL ENCOUNTER (OUTPATIENT)
Dept: LAB | Age: 74
Discharge: HOME | End: 2019-07-30
Payer: MEDICARE

## 2019-07-30 LAB
ANION GAP SERPL CALC-SCNC: 8 MMOL/L (ref 3–18)
BASOPHILS # BLD: 0 K/UL (ref 0–0.1)
BASOPHILS NFR BLD: 0 % (ref 0–2)
BUN SERPL-MCNC: 36 MG/DL (ref 7–18)
BUN/CREAT SERPL: 35 (ref 12–20)
CALCIUM SERPL-MCNC: 8.7 MG/DL (ref 8.5–10.1)
CHLORIDE SERPL-SCNC: 99 MMOL/L (ref 100–111)
CO2 SERPL-SCNC: 29 MMOL/L (ref 21–32)
CREAT SERPL-MCNC: 1.03 MG/DL (ref 0.6–1.3)
DIFFERENTIAL METHOD BLD: ABNORMAL
EOSINOPHIL # BLD: 0 K/UL (ref 0–0.4)
EOSINOPHIL NFR BLD: 0 % (ref 0–5)
ERYTHROCYTE [DISTWIDTH] IN BLOOD BY AUTOMATED COUNT: 14.9 % (ref 11.6–14.5)
GLUCOSE SERPL-MCNC: 106 MG/DL (ref 74–99)
HCT VFR BLD AUTO: 29.8 % (ref 35–45)
HGB BLD-MCNC: 10 G/DL (ref 12–16)
LYMPHOCYTES # BLD: 1.7 K/UL (ref 0.9–3.6)
LYMPHOCYTES NFR BLD: 11 % (ref 21–52)
MCH RBC QN AUTO: 31.8 PG (ref 24–34)
MCHC RBC AUTO-ENTMCNC: 33.6 G/DL (ref 31–37)
MCV RBC AUTO: 94.9 FL (ref 74–97)
MONOCYTES # BLD: 1.2 K/UL (ref 0.05–1.2)
MONOCYTES NFR BLD: 8 % (ref 3–10)
NEUTS SEG # BLD: 11.8 K/UL (ref 1.8–8)
NEUTS SEG NFR BLD: 81 % (ref 40–73)
PLATELET # BLD AUTO: 279 K/UL (ref 135–420)
PMV BLD AUTO: 11.3 FL (ref 9.2–11.8)
POTASSIUM SERPL-SCNC: 3.2 MMOL/L (ref 3.5–5.5)
RBC # BLD AUTO: 3.14 M/UL (ref 4.2–5.3)
SODIUM SERPL-SCNC: 136 MMOL/L (ref 136–145)
WBC # BLD AUTO: 14.6 K/UL (ref 4.6–13.2)

## 2019-07-30 PROCEDURE — 85025 COMPLETE CBC W/AUTO DIFF WBC: CPT

## 2019-07-30 PROCEDURE — 36415 COLL VENOUS BLD VENIPUNCTURE: CPT

## 2019-07-30 PROCEDURE — 80048 BASIC METABOLIC PNL TOTAL CA: CPT

## 2019-07-30 NOTE — CDMP QUERY
Dr. Madelin Retana,    Pt admitted with SOB, hypotensive, tachycardia. Sepsis protocol initiated. Found to have PE. Per discharge summary-sepsis not included in discharge diagnosis,  stated \"started on sepsis protocol but still hypotensive-found pt to have PE.\"    Please address the followin) Sepsis still a valid diagnosis-ruled in. 2) Sepsis ruled out after study.   3) other/unable to determine    Thank you,   Gisel Zimmerman RN, BSN, [address] [place] [address]  [phone]

## 2019-08-01 ENCOUNTER — PATIENT OUTREACH (OUTPATIENT)
Dept: CASE MANAGEMENT | Age: 74
End: 2019-08-01

## 2019-08-01 NOTE — PROGRESS NOTES
Community Care Team Documentation for Patient in [facility]     Patient discharged from [facility]  to [address] [place] [address],[address] Floor, on 7/26/19. Hospital Discharge diagnosis:       Acute PE, LLL   Bilateral DVT   Hemochromatosis   Hyponatremia     SNF Attending Provider:  Elizabeth Guerrier    Anticipated discharge date from SNF:  TBD    PCP : Jose Peters MD    Nurse Navigator: Celina Cm RN    [facility] Team rounds completed, updates provided by facility. Brief Summary of Care:    Doing very well, ambulating with min assist.      Dispo:  Home  - lives with friend. RRAT:  Low Risk            7       Total Score        3 Has Seen PCP in Last 6 Months (Yes=3, No=0)    4 Charlson Comorbidity Score (Age + Comorbid Conditions)        Criteria that do not apply:    . Living with Significant Other. Assisted Living. LTAC. SNF. or   Rehab    Patient Length of Stay (>5 days = 3)    IP Visits Last 12 Months (1-3=4, 4=9, >4=11)    Pt.  Coverage (Medicare=5 , Medicaid, or Self-Pay=4)        Active Ambulatory Problems     Diagnosis Date Noted    Shoulder impingement, right, with rotator cuff strain     Spinal stenosis 11/27/2013    Sacroiliitis ([address] 12/31/2013    High cholesterol 01/11/2019    Hypertension 01/11/2019    Hemochromatosis 01/11/2019    Obesity, morbid ([address] 01/29/2019    MCBRIDE (dyspnea on exertion) 02/28/2019    CAP (community acquired pneumonia) 07/22/2019    Sepsis ([address] 07/22/2019    Hypotension 07/22/2019    Atrial fibrillation with RVR ([address] 07/22/2019     Resolved Ambulatory Problems     Diagnosis Date Noted    No Resolved Ambulatory Problems     Past Medical History:   Diagnosis Date    Adopted     Arthritis     Balance problems     GERD (gastroesophageal reflux disease)     Left knee pain     Left shoulder pain     Lumbar pain     Pain of left sacroiliac joint              Stehp Mac RN  [facility] Coordinator

## 2019-08-05 ENCOUNTER — HOSPITAL ENCOUNTER (OUTPATIENT)
Dept: LAB | Age: 74
Discharge: HOME | End: 2019-08-05
Payer: MEDICARE

## 2019-08-05 PROCEDURE — 36415 COLL VENOUS BLD VENIPUNCTURE: CPT

## 2019-08-05 PROCEDURE — 87186 SC STD MICRODIL/AGAR DIL: CPT

## 2019-08-05 PROCEDURE — 87077 CULTURE AEROBIC IDENTIFY: CPT

## 2019-08-05 PROCEDURE — 87086 URINE CULTURE/COLONY COUNT: CPT

## 2019-08-05 PROCEDURE — 81003 URINALYSIS AUTO W/O SCOPE: CPT

## 2019-08-06 ENCOUNTER — HOSPITAL ENCOUNTER (OUTPATIENT)
Dept: LAB | Age: 74
Discharge: HOME | End: 2019-08-06
Payer: COMMERCIAL

## 2019-08-06 LAB
ANION GAP SERPL CALC-SCNC: 7 MMOL/L (ref 3–18)
APPEARANCE UR: CLEAR
BASOPHILS # BLD: 0 K/UL (ref 0–0.1)
BASOPHILS NFR BLD: 0 % (ref 0–2)
BILIRUB UR QL: NEGATIVE
BUN SERPL-MCNC: 18 MG/DL (ref 7–18)
BUN/CREAT SERPL: 20 (ref 12–20)
CALCIUM SERPL-MCNC: 8.7 MG/DL (ref 8.5–10.1)
CHLORIDE SERPL-SCNC: 102 MMOL/L (ref 100–111)
CO2 SERPL-SCNC: 32 MMOL/L (ref 21–32)
COLOR UR: YELLOW
CREAT SERPL-MCNC: 0.9 MG/DL (ref 0.6–1.3)
DIFFERENTIAL METHOD BLD: ABNORMAL
EOSINOPHIL # BLD: 0.3 K/UL (ref 0–0.4)
EOSINOPHIL NFR BLD: 3 % (ref 0–5)
ERYTHROCYTE [DISTWIDTH] IN BLOOD BY AUTOMATED COUNT: 16 % (ref 11.6–14.5)
GLUCOSE SERPL-MCNC: 129 MG/DL (ref 74–99)
GLUCOSE UR STRIP.AUTO-MCNC: NEGATIVE MG/DL
HCT VFR BLD AUTO: 31.6 % (ref 35–45)
HGB BLD-MCNC: 10.5 G/DL (ref 12–16)
HGB UR QL STRIP: NEGATIVE
KETONES UR QL STRIP.AUTO: NEGATIVE MG/DL
LEUKOCYTE ESTERASE UR QL STRIP.AUTO: NEGATIVE
LYMPHOCYTES # BLD: 1.8 K/UL (ref 0.9–3.6)
LYMPHOCYTES NFR BLD: 20 % (ref 21–52)
MCH RBC QN AUTO: 32.7 PG (ref 24–34)
MCHC RBC AUTO-ENTMCNC: 33.2 G/DL (ref 31–37)
MCV RBC AUTO: 98.4 FL (ref 74–97)
MONOCYTES # BLD: 0.7 K/UL (ref 0.05–1.2)
MONOCYTES NFR BLD: 8 % (ref 3–10)
NEUTS SEG # BLD: 6.1 K/UL (ref 1.8–8)
NEUTS SEG NFR BLD: 69 % (ref 40–73)
NITRITE UR QL STRIP.AUTO: NEGATIVE
PH UR STRIP: 7 [PH] (ref 5–8)
PLATELET # BLD AUTO: 250 K/UL (ref 135–420)
PMV BLD AUTO: 10.7 FL (ref 9.2–11.8)
POTASSIUM SERPL-SCNC: 2.7 MMOL/L (ref 3.5–5.5)
PROT UR STRIP-MCNC: NEGATIVE MG/DL
RBC # BLD AUTO: 3.21 M/UL (ref 4.2–5.3)
SODIUM SERPL-SCNC: 141 MMOL/L (ref 136–145)
SP GR UR REFRACTOMETRY: 1.01 (ref 1–1.03)
UROBILINOGEN UR QL STRIP.AUTO: 0.2 EU/DL (ref 0.2–1)
WBC # BLD AUTO: 8.9 K/UL (ref 4.6–13.2)

## 2019-08-06 PROCEDURE — 85025 COMPLETE CBC W/AUTO DIFF WBC: CPT

## 2019-08-06 PROCEDURE — 80048 BASIC METABOLIC PNL TOTAL CA: CPT

## 2019-08-07 ENCOUNTER — HOSPITAL ENCOUNTER (OUTPATIENT)
Dept: LAB | Age: 74
Discharge: HOME | End: 2019-08-07
Payer: MEDICARE

## 2019-08-07 LAB — POTASSIUM SERPL-SCNC: 2.9 MMOL/L (ref 3.5–5.5)

## 2019-08-07 PROCEDURE — 36415 COLL VENOUS BLD VENIPUNCTURE: CPT

## 2019-08-07 PROCEDURE — 84132 ASSAY OF SERUM POTASSIUM: CPT

## 2019-08-08 LAB
BACTERIA SPEC CULT: ABNORMAL
BACTERIA SPEC CULT: ABNORMAL
SERVICE CMNT-IMP: ABNORMAL

## 2019-08-09 ENCOUNTER — PATIENT OUTREACH (OUTPATIENT)
Dept: CASE MANAGEMENT | Age: 74
End: 2019-08-09

## 2019-08-09 ENCOUNTER — HOSPITAL ENCOUNTER (OUTPATIENT)
Dept: LAB | Age: 74
Discharge: HOME | End: 2019-08-09
Payer: MEDICARE

## 2019-08-09 ENCOUNTER — OFFICE VISIT (OUTPATIENT)
Dept: PULMONOLOGY | Age: 74
End: 2019-08-09

## 2019-08-09 VITALS
HEART RATE: 96 BPM | DIASTOLIC BLOOD PRESSURE: 78 MMHG | HEIGHT: 60 IN | TEMPERATURE: 98.1 F | BODY MASS INDEX: 45.55 KG/M2 | SYSTOLIC BLOOD PRESSURE: 132 MMHG | OXYGEN SATURATION: 97 % | RESPIRATION RATE: 18 BRPM | WEIGHT: 232 LBS

## 2019-08-09 DIAGNOSIS — I26.99 PULMONARY EMBOLISM WITHOUT ACUTE COR PULMONALE, UNSPECIFIED CHRONICITY, UNSPECIFIED PULMONARY EMBOLISM TYPE (HCC): ICD-10-CM

## 2019-08-09 DIAGNOSIS — E83.119 HEMOCHROMATOSIS, UNSPECIFIED HEMOCHROMATOSIS TYPE: ICD-10-CM

## 2019-08-09 DIAGNOSIS — J18.9 COMMUNITY ACQUIRED PNEUMONIA, UNSPECIFIED LATERALITY: Primary | ICD-10-CM

## 2019-08-09 DIAGNOSIS — E66.01 CLASS 3 SEVERE OBESITY DUE TO EXCESS CALORIES WITH SERIOUS COMORBIDITY AND BODY MASS INDEX (BMI) OF 45.0 TO 49.9 IN ADULT (HCC): ICD-10-CM

## 2019-08-09 DIAGNOSIS — I82.493 ACUTE DEEP VEIN THROMBOSIS (DVT) OF OTHER SPECIFIED VEIN OF BOTH LOWER EXTREMITIES (HCC): ICD-10-CM

## 2019-08-09 LAB — POTASSIUM SERPL-SCNC: 3.3 MMOL/L (ref 3.5–5.5)

## 2019-08-09 PROCEDURE — 36415 COLL VENOUS BLD VENIPUNCTURE: CPT

## 2019-08-09 PROCEDURE — 84132 ASSAY OF SERUM POTASSIUM: CPT

## 2019-08-09 RX ORDER — IPRATROPIUM BROMIDE AND ALBUTEROL SULFATE 2.5; .5 MG/3ML; MG/3ML
3 SOLUTION RESPIRATORY (INHALATION)
Qty: 30 NEBULE | Refills: 0 | Status: SHIPPED | OUTPATIENT
Start: 2019-08-09 | End: 2019-09-09 | Stop reason: ALTCHOICE

## 2019-08-09 NOTE — PATIENT INSTRUCTIONS
· Continue Eliquis 5 mg twice daily    · Continue albuterol rescue inhaler OR nebulizer treatment every 4-6 hours as needed for increased shortness of breath, wheezing or cough    · Take over the counter mucinex for cough    · Follow up in pulmonary clinic in 3 months or sooner with worsening of symptoms    · Follow up with PCP     · Go to the ER with chest pain or difficulty breathing    · Recommend healthy diet / weight / exercise as tolerated

## 2019-08-09 NOTE — PROGRESS NOTES
[facility]  Pulmonary, Critical Care, and Sleep Medicine      Pulmonary Office Progress Notes    Name: Bri Goff     : 1945     Date: 2019        Subjective:     2019          HPI    Bri Goff  is a 66 y.o. female with PMH of arthritis and hemochromatosis who presents for hospital follow-up visit. She was admitted to [facility] on 2019 through 2019 for pulmonary embolus, bilateral DVT and pneumonia. She was treated with IV heparin, antibiotics and bronchodilators, and discharged to a rehab facility on Eliquis. Today she appears comfortable, no distress and reports that her symptoms have improved significantly since her hospital discharge. She continues to have some shortness of breath that has improved along with a daily, intermittent productive cough of clear to white sputum. She denies chest pain or hemoptysis. No fever, chills or orthopnea; no decreased appetite or weight loss. She has no reports of nasal congestion/drainage or sinus pressure/pain. She has bilateral lower extremity and back pain due to osteoarthritis and has had 2 knee replacements. She additionally has chronic BLE edema and was taken off her Lasix due to hypotension during her hospitalization. Her last PFTs were on 2019 and demonstrated normal flow volume loop with reduced diffusion capacity. She is a former smoker of cigarettes with a 2-pack-year history and quit in . VQ scan, 2019-intermediate probability for pulmonary embolism, LLL  Venous duplex, 2019-        Acute nonocclusive DVT present in the right femoral popliteal vein. Acute occlusive DVT present in the right peroneal vein. Acute nonocclusive DVT demonstrated left femoral popliteal vein. Acute occlusive DVT present left peroneal vein. Echocardiogram, 2018- EF of 55 to 60% with no evidence of pulmonary hypertension.             Past Medical History:   Diagnosis Date    Adopted     Arthritis     Balance problems     MCBRIDE (dyspnea on exertion) 2/28/2019    GERD (gastroesophageal reflux disease)     Hemochromatosis     High cholesterol     Hypertension     Left knee pain     Left shoulder pain     Lumbar pain     Pain of left sacroiliac joint     Shoulder impingement, right, with rotator cuff strain        Allergies   Allergen Reactions    Indomethacin Anaphylaxis, Hives and Swelling    Tomato Other (comments)     Heart burn       Current Outpatient Medications   Medication Sig Dispense Refill    albuterol sulfate (PROAIR RESPICLICK) 90 mcg/actuation aepb Take 2 Puffs by inhalation every four (4) hours as needed for Cough for up to 30 days. 1 Inhaler 3    albuterol-ipratropium (DUO-NEB) 2.5 mg-0.5 mg/3 ml nebu 3 mL by Nebulization route every six (6) hours as needed (SOB). 30 Nebule 0    apixaban (ELIQUIS) 5 mg tablet Take 1 Tab by mouth every twelve (12) hours. 90 Tab 0    guaiFENesin (ROBITUSSIN) 100 mg/5 mL liquid Take 5 mL by mouth every four (4) hours as needed for Cough. 100 mL 0    docusate sodium (COLACE) 100 mg capsule Take 1 Cap by mouth two (2) times daily as needed for Constipation for up to 90 days. 30 Cap 0    diclofenac (VOLTAREN) 1 % gel Apply 2 g to affected area every six (6) hours. Indications: chronic shoulder pain 100 g 0    alum-mag hydroxide-simeth (MYLANTA) 200-200-20 mg/5 mL susp Take 30 mL by mouth every four (4) hours as needed (heart burn). 100 mL 0    acetaminophen (TYLENOL) 325 mg tablet Take 2 Tabs by mouth every six (6) hours as needed for Pain or Fever. 30 Tab 0    OTHER Incentive Spirometer: please use 10 times every hour daily 1 Units 0    OTHER Please check CBC, BMP, Mg, and Phos in 3-5 days. PCP at [facility] to f/u results. 1 Units 0    midodrine (PROAMITINE) 5 mg tablet Take 1 Tab by mouth three (3) times daily as needed (SBP LESS THAN 90) for up to 30 days.  10 Tab 0    levothyroxine (SYNTHROID) 25 mcg tablet Take 1 Tab by mouth Daily (before breakfast). 90 Tab 6    potassium chloride (KLOR-CON M20) 20 mEq tablet Take 1 Tab by mouth daily. 90 Tab 6    allopurinol (ZYLOPRIM) 100 mg tablet Take 1 Tab by mouth two (2) times a day. 180 Tab 11    simvastatin (ZOCOR) 40 mg tablet Take 1 Tab by mouth nightly. 90 Tab 6    inhalational spacing device (ACE AEROSOL CLOUD ENHANCER) 1 Each by Does Not Apply route as needed. 1 Device 3    cholecalciferol, vitamin D3, 2,000 unit tab TAKE 1 TABLET BY MOUTH EVERY DAY  3    Omeprazole delayed release (PRILOSEC D/R) 20 mg tablet Take 20 mg by mouth daily.  budesonide-formoterol (SYMBICORT) 160-4.5 mcg/actuation HFAA Take 2 Puffs by inhalation two (2) times a day. 1 Inhaler 0       Review of Systems:    HEENT: No epistaxis, no nasal drainage, no difficulty in swallowing, no redness in eyes  Respiratory: As stated above in HPI  Cardiovascular: no chest pain, no palpitations, no syncope. Chronic BLE edema  Gastrointestinal: no abd pain, no vomiting, no diarrhea, no bleeding symptoms  Genitourinary: No urinary symptoms or hematuria  Integument/breast: No ulcers or rashes  Musculoskeletal: Chronic BLE pain and back pain due to osteoarthritis  Neurological: No focal weakness, no seizures, no headaches  Behvioral/Psych: No anxiety, no depression  Constitutional: No fever, chills or night sweats. No decreased appetite or weight loss     Objective:     Visit Vitals  /78 (BP 1 Location: Right arm, BP Patient Position: Sitting)   Pulse 96   Temp 98.1 °F (36.7 °C) (Oral)   Resp 18   Ht 5' (1.524 m)   Wt 105.2 kg (232 lb)   SpO2 97%   BMI 45.31 kg/m²        PHYSICAL EXAM      General: Oriented to person, place, and time. Well-developed, well-nourished, and in no distress. Obese      Head:   Normocephalic, without obvious abnormality, atraumatic       Eyes:   Pupils reactive, conjunctivae / corneas clear. EOM's intact, no scleral icterus       Nose:   Nares normal, no drainage.       Throat:    Lips, mucosa and tongue normal. Teeth and gums normal       Neck:   Supple, symmetrical, trachea midline. No adenopathy or thyroid swelling; no carotid bruit or JVD. CVS:    Regular rate and rhythm. S1S2 normal,  no murmurs       RS:      Symmetrical chest rise, good AE bilaterally. Lung sounds clear to auscultation bilaterally. No wheezing, rales or rhonchi, no accessory muscle use      Abd:     Soft, non-tender. No hepatosplenomegaly                                                     Neuro:   non focal, awake, alert and oriented to person, place, time and situation    Extrm:   No clubbing or cyanosis. BLE edema, +2       Skin:   no rash    Data review:                     PULMONARY FUNCTION TESTS    Date FVC FEV1  FEV1/FVC NNU52-08 TLC RV RV/TLC VC DLCO   1/23/2019  95%  97%  76%  92%  97%  98%  101%  96%  74%                                             Imaging:  I have personally reviewed the patients radiographs and have reviewed the reports:  XR Results (most recent):  Results from Hospital Encounter encounter on 07/22/19   XR CHEST PORT    Narrative Portable Chest    CPT CODE: 49634    HISTORY: Dyspnea. FINDINGS:     Compared with 7/22/2019    Heart size and mediastinal contours within normal limits. No pulmonary edema,  effusion, or pneumothorax. No focal consolidation. No acute osseous  abnormality. .      Impression IMPRESSION:    No radiographic finding for an acute cardiopulmonary process     CT Results (most recent):  Results from Hospital Encounter encounter on 07/22/19   CT ABD PELV WO CONT    Narrative CT abdomen and pelvis without contrast.    Indications: RLQ pain ; superimposed upon history of chronic abdominal pain. No  current alteration in bowel habits. Prior cholecystectomy. Technique: No IV contrast administered. No oral contrast. Contiguous 5 mm axial  images obtained from above the liver to the symphysis pubis. Sagittal and  Coronal MPR generated.     CT scans at this facility are performed using dose optimization technique as  appropriate with performed exam, to include automated exposure control,  adjustment of mA and/or kV according to patient's size (including appropriate  matching for site-specific examinations), or use of iterative reconstruction  technique. Comparison: September 2015    Findings:    Lower chest: Lung bases are unremarkable. No intraperitoneal free air or free fluid. No fluid collection. Liver: Unremarkable. Gallbladder/biliary: Prior cholecystectomy. Biliary tree unremarkable. Spleen: Unremarkable    Pancreas: Atrophic morphology, no concerning features. Left Kidney: Unremarkable. Right Kidney: Unremarkable. Adrenals: Unremarkable. Bowels/mesentery: Fluid distended stomach without obstructing focus. There is  large bowel diverticulosis. Appendix unremarkable. Pelvis: Urinary bladder unremarkable. Atrophic uterus versus prior hysterectomy. Unremarkable for age. Vascular: Aorta unremarkable for age. IVC unremarkable. Lymph Nodes: No adenopathy. Bones: No acute findings. Prior left hip ORIF. Unremarkable for age. Impression IMPRESSION[de-identified]    1.  Nothing clearly acute within the abdomen or pelvis.  -Fluid distended stomach; if there are issues of nausea, consider possibility of  gastroparesis  -Large bowel diverticulosis without inflammation    Thank you for this referral.        Patient Active Problem List   Diagnosis Code    Shoulder impingement, right, with rotator cuff strain M75.40    Spinal stenosis M48.00    Sacroiliitis ([address] M46.1    High cholesterol E78.00    Hypertension I10    Hemochromatosis E83.119    Obesity, morbid ([address] E66.01    MCBRIDE (dyspnea on exertion) R06.09    CAP (community acquired pneumonia) J18.9    Sepsis ([address] A41.9    Hypotension I95.9    Atrial fibrillation with RVR ([address] I48.91       IMPRESSION:   · Pulmonary embolus- LLL.   Currently on Eliquis 5 mg twice daily  · Bilateral DVT-left and right femoral popliteal and peroneal vein  · Obesity-BMI 45.31  · History of community-acquired pneumonia  · A. fib with RVR  · Chronic BLE and back pain  · Hemochromatosis      RECOMMENDATIONS:   · Pharmacotherapy - Continue   Albuterol rescue inhaler or DuoNeb treatment PRN. Discussed appropriate use of respiratory maintenance and rescue  medications with patient. Continue Eliquis 5 mg twice daily  · Discussed recommendations of duration of anticoagulation therapy for unprovoked PE with patient. Reviewed risk factors and DVT/PE prophylaxis  · Take over-the-counter Mucinex as needed for cough  · Discussed recommendations for preventative vaccines with patient. Advised Pneumovax and flu vac when symptoms resolve  · Follow-up with PCP regarding management of BLE edema  · Follow up in pulmonary clinic in 3 months   or sooner with worsening of symptoms  · Recommend healthy diet / weight / exercise as tolerated  · Report to ER with chest pain or difficulty breathing.         Chauncy Kawasaki, NP

## 2019-08-12 RX ORDER — ALBUTEROL SULFATE 90 UG/1
2 AEROSOL, METERED RESPIRATORY (INHALATION)
Qty: 1 INHALER | Refills: 3 | Status: SHIPPED | OUTPATIENT
Start: 2019-08-12 | End: 2019-12-13 | Stop reason: SDUPTHER

## 2019-08-12 NOTE — TELEPHONE ENCOUNTER
Insurance will cover ProAir HFA. Not the Respiclick. Order is adjusted and pended to SWATHI Natarajan NP.

## 2019-08-14 ENCOUNTER — HOME HEALTH ADMISSION (OUTPATIENT)
Dept: HOME HEALTH SERVICES | Facility: HOME HEALTH | Age: 74
End: 2019-08-14

## 2019-08-14 NOTE — PROGRESS NOTES
Community Care Team Documentation for Patient in [facility]     Patient discharged from [facility]  to [address] [place] [address],[address] Floor, on 7/26/19. Hospital Discharge diagnosis:       Acute PE, LLL   Bilateral DVT   Hemochromatosis   Hyponatremia     SNF Attending Provider:  Luis Alberto Boland    Anticipated discharge date from SNF:  8/9/19    PCP : Charleen Conley MD    Nurse Navigator: Lesli Jackson RN    [facility] Team rounds completed, updates provided by facility. Brief Summary of Care:    Doing very well, ambulating with min assist.      Dispo:  Home  - lives with friend. Discharged home 8/9/19 [facility]    RRAT:  Low Risk            7       Total Score        3 Has Seen PCP in Last 6 Months (Yes=3, No=0)    4 Charlson Comorbidity Score (Age + Comorbid Conditions)        Criteria that do not apply:    . Living with Significant Other. Assisted Living. LTAC. SNF. or   Rehab    Patient Length of Stay (>5 days = 3)    IP Visits Last 12 Months (1-3=4, 4=9, >4=11)    Pt.  Coverage (Medicare=5 , Medicaid, or Self-Pay=4)        Active Ambulatory Problems     Diagnosis Date Noted    Shoulder impingement, right, with rotator cuff strain     Spinal stenosis 11/27/2013    Sacroiliitis ([address] 12/31/2013    High cholesterol 01/11/2019    Hypertension 01/11/2019    Hemochromatosis 01/11/2019    Obesity, morbid ([address] 01/29/2019    MCBRIDE (dyspnea on exertion) 02/28/2019    CAP (community acquired pneumonia) 07/22/2019    Sepsis ([address] 07/22/2019    Hypotension 07/22/2019    Atrial fibrillation with RVR ([address] 07/22/2019     Resolved Ambulatory Problems     Diagnosis Date Noted    No Resolved Ambulatory Problems     Past Medical History:   Diagnosis Date    Adopted     Arthritis     Balance problems     GERD (gastroesophageal reflux disease)     Left knee pain     Left shoulder pain     Lumbar pain     Pain of left sacroiliac joint Neil Rose, RN  Community Care

## 2019-08-15 ENCOUNTER — HOME CARE VISIT (OUTPATIENT)
Dept: SCHEDULING | Facility: HOME HEALTH | Age: 74
End: 2019-08-15

## 2019-08-20 ENCOUNTER — PATIENT OUTREACH (OUTPATIENT)
Dept: FAMILY MEDICINE CLINIC | Facility: CLINIC | Age: 74
End: 2019-08-20

## 2019-08-20 NOTE — PROGRESS NOTES
Date/Time:  2019 11:57 AM    [facility] and discharged on 19 for LLL PE, bilat DVT. Patient was admitted to [facility] on 19 and discharged home on 19. Top Challenges reviewed with the provider   Patient remains SOB with minimal exertion, continues to have bilateral edema of lower extremities that remain painful to the touch. No increase in SOB or leg pain/swelling at present time. Method of communication with provider :chart routing    Inpatient RRAT score: Low Risk            7       Total Score        3 Has Seen PCP in Last 6 Months (Yes=3, No=0)    4 Charlson Comorbidity Score (Age + Comorbid Conditions)        Criteria that do not apply:    . Living with Significant Other. Assisted Living. LTAC. SNF. or   Rehab    Patient Length of Stay (>5 days = 3)    IP Visits Last 12 Months (1-3=4, 4=9, >4=11)    Pt. Coverage (Medicare=5 , Medicaid, or Self-Pay=4)        Nurse Navigator (NN) contacted the patient by telephone to perform post hospital discharge assessment. Verified name and  with patient as identifiers. Provided introduction to self, and explanation of the Nurse Navigator role. Reviewed discharge instructions and red flags with patient who verbalized understanding. Patient given an opportunity to ask questions and does not have any further questions or concerns at this time. The patient agrees to contact the PCP office for questions related to their healthcare. NN provided contact information for future reference. Disease Specific:   N/A    Summary of patient's top problems:  1. LLL PE  2. Bilat DVT      Home Health orders at discharge: [address]: [facility]  Date of initial visit: 19      Barriers to care? None noted at present. Patient has a friend as her roommate.  Patient is independent with driving, medications and scheduling appointments    Advance Care Planning:   Does patient have an Advance Directive:  not on file. Patient states she has ADRIANO ESCOBAR instructed patient to take copy of paperwork to PCP. Medication(s):   Medication reconciliation was performed with patient, who verbalizes understanding of administration of home medications. There were no barriers to obtaining medications identified at this time. Referral to Pharm D needed: no     Current Outpatient Medications   Medication Sig    albuterol (PROAIR HFA) 90 mcg/actuation inhaler Take 2 Puffs by inhalation every four (4) hours as needed for Wheezing. Or SOB    albuterol-ipratropium (DUO-NEB) 2.5 mg-0.5 mg/3 ml nebu 3 mL by Nebulization route every six (6) hours as needed (SOB).  apixaban (ELIQUIS) 5 mg tablet Take 1 Tab by mouth every twelve (12) hours.  guaiFENesin (ROBITUSSIN) 100 mg/5 mL liquid Take 5 mL by mouth every four (4) hours as needed for Cough.  diclofenac (VOLTAREN) 1 % gel Apply 2 g to affected area every six (6) hours. Indications: chronic shoulder pain    budesonide-formoterol (SYMBICORT) 160-4.5 mcg/actuation HFAA Take 2 Puffs by inhalation two (2) times a day.  OTHER Incentive Spirometer: please use 10 times every hour daily    levothyroxine (SYNTHROID) 25 mcg tablet Take 1 Tab by mouth Daily (before breakfast).  potassium chloride (KLOR-CON M20) 20 mEq tablet Take 1 Tab by mouth daily.  allopurinol (ZYLOPRIM) 100 mg tablet Take 1 Tab by mouth two (2) times a day.  simvastatin (ZOCOR) 40 mg tablet Take 1 Tab by mouth nightly.  inhalational spacing device (ACE AEROSOL CLOUD ENHANCER) 1 Each by Does Not Apply route as needed.  cholecalciferol, vitamin D3, 2,000 unit tab TAKE 1 TABLET BY MOUTH EVERY DAY    Omeprazole delayed release (PRILOSEC D/R) 20 mg tablet Take 20 mg by mouth daily.  docusate sodium (COLACE) 100 mg capsule Take 1 Cap by mouth two (2) times daily as needed for Constipation for up to 90 days.     alum-mag hydroxide-simeth (MYLANTA) 110-651-43 mg/5 mL susp Take 30 mL by mouth every four (4) hours as needed (heart burn).  acetaminophen (TYLENOL) 325 mg tablet Take 2 Tabs by mouth every six (6) hours as needed for Pain or Fever.  OTHER Please check CBC, BMP, Mg, and Phos in 3-5 days. PCP at SNF to f/u results.  midodrine (PROAMITINE) 5 mg tablet Take 1 Tab by mouth three (3) times daily as needed (SBP LESS THAN 90) for up to 30 days. No current facility-administered medications for this visit. There are no discontinued medications. [facility] follow up appointment(s):   Future Appointments   Date Time Provider Jose Areli   8/26/2019  1:20 PM Charles Miller NP [address]      Non-[facility] follow up appointment(s): [facility]  [facility]:  out of service area       Goals      Prevent complications post hospitalization. Patient will attend all appointments as scheduled    NN will follow patient for at least 4 weeks post SNF discharge.  Understands red flags post discharge. Patient will be able to identify red flags and know when to call physician vs 911:   Increased redness, swelling or pain in lower extremities, worsening pain with bending foot  Leg cramps   Excessive warmth of lower extremity   Increased shortness of breath with or without activity  Lightheadedness/dizziness  Fever  Rapid or irregular heart beat  Clammy or cyanotic skin  Excessive sweating

## 2019-08-26 ENCOUNTER — OFFICE VISIT (OUTPATIENT)
Dept: FAMILY MEDICINE CLINIC | Age: 74
End: 2019-08-26

## 2019-08-26 ENCOUNTER — HOSPITAL ENCOUNTER (OUTPATIENT)
Dept: LAB | Age: 74
Discharge: HOME | End: 2019-08-26
Payer: MEDICARE

## 2019-08-26 VITALS
DIASTOLIC BLOOD PRESSURE: 76 MMHG | TEMPERATURE: 98.6 F | SYSTOLIC BLOOD PRESSURE: 120 MMHG | OXYGEN SATURATION: 98 % | BODY MASS INDEX: 45.55 KG/M2 | HEART RATE: 87 BPM | RESPIRATION RATE: 17 BRPM | WEIGHT: 232 LBS | HEIGHT: 60 IN

## 2019-08-26 DIAGNOSIS — I10 ESSENTIAL HYPERTENSION: Primary | ICD-10-CM

## 2019-08-26 DIAGNOSIS — I10 ESSENTIAL HYPERTENSION: ICD-10-CM

## 2019-08-26 DIAGNOSIS — E03.9 ACQUIRED HYPOTHYROIDISM: ICD-10-CM

## 2019-08-26 DIAGNOSIS — R60.9 PERIPHERAL EDEMA: ICD-10-CM

## 2019-08-26 DIAGNOSIS — E78.00 HIGH CHOLESTEROL: ICD-10-CM

## 2019-08-26 LAB
ALBUMIN SERPL-MCNC: 3.3 G/DL (ref 3.4–5)
ALBUMIN/GLOB SERPL: 1.2 {RATIO} (ref 0.8–1.7)
ALP SERPL-CCNC: 74 U/L (ref 45–117)
ALT SERPL-CCNC: 17 U/L (ref 13–56)
ANION GAP SERPL CALC-SCNC: 6 MMOL/L (ref 3–18)
AST SERPL-CCNC: 17 U/L (ref 10–38)
BILIRUB SERPL-MCNC: 0.4 MG/DL (ref 0.2–1)
BUN SERPL-MCNC: 9 MG/DL (ref 7–18)
BUN/CREAT SERPL: 9 (ref 12–20)
CALCIUM SERPL-MCNC: 9 MG/DL (ref 8.5–10.1)
CHLORIDE SERPL-SCNC: 113 MMOL/L (ref 100–111)
CHOLEST SERPL-MCNC: 127 MG/DL
CO2 SERPL-SCNC: 25 MMOL/L (ref 21–32)
CREAT SERPL-MCNC: 0.97 MG/DL (ref 0.6–1.3)
GLOBULIN SER CALC-MCNC: 2.7 G/DL (ref 2–4)
GLUCOSE SERPL-MCNC: 89 MG/DL (ref 74–99)
HDLC SERPL-MCNC: 53 MG/DL (ref 40–60)
HDLC SERPL: 2.4 {RATIO} (ref 0–5)
LDLC SERPL CALC-MCNC: 44.4 MG/DL (ref 0–100)
LIPID PROFILE,FLP: NORMAL
POTASSIUM SERPL-SCNC: 4.7 MMOL/L (ref 3.5–5.5)
PROT SERPL-MCNC: 6 G/DL (ref 6.4–8.2)
SODIUM SERPL-SCNC: 144 MMOL/L (ref 136–145)
T4 FREE SERPL-MCNC: 1 NG/DL (ref 0.7–1.5)
TRIGL SERPL-MCNC: 148 MG/DL (ref ?–150)
TSH SERPL DL<=0.05 MIU/L-ACNC: 2.48 UIU/ML (ref 0.36–3.74)
VLDLC SERPL CALC-MCNC: 29.6 MG/DL

## 2019-08-26 PROCEDURE — 80061 LIPID PANEL: CPT

## 2019-08-26 PROCEDURE — 84443 ASSAY THYROID STIM HORMONE: CPT

## 2019-08-26 PROCEDURE — 36415 COLL VENOUS BLD VENIPUNCTURE: CPT

## 2019-08-26 PROCEDURE — 84439 ASSAY OF FREE THYROXINE: CPT

## 2019-08-26 PROCEDURE — 80053 COMPREHEN METABOLIC PANEL: CPT

## 2019-08-26 RX ORDER — GUAIFENESIN 600 MG/1
600 TABLET, EXTENDED RELEASE ORAL 2 TIMES DAILY
COMMUNITY
End: 2019-12-02

## 2019-08-26 NOTE — PROGRESS NOTES
HISTORY OF PRESENT ILLNESS  Louise Gonzales is a 66 y.o. female. HPI   Comment she has been having bilateral lower leg swelling. Comments she is followed by Dr. Kana Boyce, hematologist.  Reports her last follow up 8/21/19. Comments she was prescribed compression stockings for leg swelling, which she states she will get tomorrow. Comments her leg swelling improves some with elevation but her feet do not improve. Patient followed by Dr. Cheryl Figueroa for chronic lower back pain. States she has been given several cortisone injections with improvement. Requesting to have fasting labs drawn. Last ate croissant 4 1/2 hrs ago   Allergies   Allergen Reactions    Indomethacin Anaphylaxis, Hives and Swelling    Tomato Other (comments)     Heart burn     Current Outpatient Medications   Medication Sig Dispense Refill    albuterol (PROAIR HFA) 90 mcg/actuation inhaler Take 2 Puffs by inhalation every four (4) hours as needed for Wheezing. Or SOB 1 Inhaler 3    albuterol-ipratropium (DUO-NEB) 2.5 mg-0.5 mg/3 ml nebu 3 mL by Nebulization route every six (6) hours as needed (SOB). 30 Nebule 0    apixaban (ELIQUIS) 5 mg tablet Take 1 Tab by mouth every twelve (12) hours. 90 Tab 0    diclofenac (VOLTAREN) 1 % gel Apply 2 g to affected area every six (6) hours. Indications: chronic shoulder pain 100 g 0    budesonide-formoterol (SYMBICORT) 160-4.5 mcg/actuation HFAA Take 2 Puffs by inhalation two (2) times a day. 1 Inhaler 0    acetaminophen (TYLENOL) 325 mg tablet Take 2 Tabs by mouth every six (6) hours as needed for Pain or Fever. 30 Tab 0    OTHER Incentive Spirometer: please use 10 times every hour daily 1 Units 0    levothyroxine (SYNTHROID) 25 mcg tablet Take 1 Tab by mouth Daily (before breakfast). 90 Tab 6    potassium chloride (KLOR-CON M20) 20 mEq tablet Take 1 Tab by mouth daily. 90 Tab 6    allopurinol (ZYLOPRIM) 100 mg tablet Take 1 Tab by mouth two (2) times a day.  180 Tab 11    simvastatin (ZOCOR) 40 mg tablet Take 1 Tab by mouth nightly. 90 Tab 6    inhalational spacing device (ACE AEROSOL CLOUD ENHANCER) 1 Each by Does Not Apply route as needed. 1 Device 3    cholecalciferol, vitamin D3, 2,000 unit tab TAKE 1 TABLET BY MOUTH EVERY DAY  3    Omeprazole delayed release (PRILOSEC D/R) 20 mg tablet Take 20 mg by mouth daily.  guaiFENesin (ROBITUSSIN) 100 mg/5 mL liquid Take 5 mL by mouth every four (4) hours as needed for Cough. 100 mL 0    docusate sodium (COLACE) 100 mg capsule Take 1 Cap by mouth two (2) times daily as needed for Constipation for up to 90 days. 30 Cap 0    alum-mag hydroxide-simeth (MYLANTA) 200-200-20 mg/5 mL susp Take 30 mL by mouth every four (4) hours as needed (heart burn). 100 mL 0    OTHER Please check CBC, BMP, Mg, and Phos in 3-5 days. PCP at SNF to f/u results.  1 Units 0     Past Medical History:   Diagnosis Date    Adopted     Arthritis     Balance problems     MCBRIDE (dyspnea on exertion) 2019    GERD (gastroesophageal reflux disease)     Hemochromatosis     High cholesterol     Hypertension     Left knee pain     Left shoulder pain     Lumbar pain     Pain of left sacroiliac joint     Shoulder impingement, right, with rotator cuff strain      Social History     Socioeconomic History    Marital status: SINGLE     Spouse name: Not on file    Number of children: Not on file    Years of education: Not on file    Highest education level: Not on file   Occupational History    Not on file   Social Needs    Financial resource strain: Not on file    Food insecurity:     Worry: Not on file     Inability: Not on file    Transportation needs:     Medical: Not on file     Non-medical: Not on file   Tobacco Use    Smoking status: Former Smoker     Packs/day: 0.50     Years: 2.00     Pack years: 1.00     Start date:      Last attempt to quit: 1974     Years since quittin.6    Smokeless tobacco: Never Used    Tobacco comment: quit 20 years ago   Substance and Sexual Activity    Alcohol use: No    Drug use: No    Sexual activity: Not on file     Comment: post menopausal   Lifestyle    Physical activity:     Days per week: Not on file     Minutes per session: Not on file    Stress: Not on file   Relationships    Social connections:     Talks on phone: Not on file     Gets together: Not on file     Attends Restoration service: Not on file     Active member of club or organization: Not on file     Attends meetings of clubs or organizations: Not on file     Relationship status: Not on file    Intimate partner violence:     Fear of current or ex partner: Not on file     Emotionally abused: Not on file     Physically abused: Not on file     Forced sexual activity: Not on file   Other Topics Concern    Not on file   Social History Narrative    Not on file     Wt Readings from Last 3 Encounters:   08/26/19 232 lb (105.2 kg)   08/09/19 232 lb (105.2 kg)   07/25/19 232 lb (105.2 kg)     BP Readings from Last 3 Encounters:   08/26/19 120/76   08/09/19 132/78   07/26/19 138/67     Review of Systems   Constitutional: Negative for chills and fever. Respiratory: Negative for shortness of breath. Cardiovascular: Positive for leg swelling. Negative for chest pain, palpitations and PND. Neurological: Negative for dizziness and headaches. /76 (BP 1 Location: Left arm, BP Patient Position: Sitting)   Pulse 87   Temp 98.6 °F (37 °C) (Oral)   Resp 17   Ht 5' (1.524 m)   Wt 232 lb (105.2 kg)   SpO2 98%   BMI 45.31 kg/m²      Physical Exam   Constitutional: She is oriented to person, place, and time. She appears well-developed and well-nourished. HENT:   Head: Normocephalic and atraumatic. Neck: Normal range of motion. Neck supple. Cardiovascular: Normal rate, regular rhythm and normal heart sounds. Exam reveals no gallop and no friction rub. No murmur heard. Pulmonary/Chest: Effort normal and breath sounds normal. She has no wheezes.  She has no rhonchi. She has no rales. Abdominal: Soft. Bowel sounds are normal. There is no tenderness. Musculoskeletal: She exhibits edema (bilateral lower extremitites). Neurological: She is alert and oriented to person, place, and time. Skin: Skin is warm and dry. ASSESSMENT and PLAN    ICD-10-CM ICD-9-CM    1. Essential hypertension M89 363.2 METABOLIC PANEL, COMPREHENSIVE   2. High cholesterol E78.00 272.0 LIPID PANEL      METABOLIC PANEL, COMPREHENSIVE   3. Acquired hypothyroidism E03.9 244.9 TSH 3RD GENERATION      T4, FREE   4. Peripheral edema R60.9 782.3 REFERRAL TO VASCULAR CENTER     alarm signs when to seek emergent care provided and reviewed   I have discussed the diagnosis with the patient and the intended plan as seen in the above orders. The patient has received an after-visit summary and questions were answered concerning future plans. I have discussed medication side effects and warnings with the patient as well. Patient agreeable with above plan and verbalizes understanding. Follow-up and Dispositions    · Return in about 2 months (around 10/26/2019) for medicare wellness .

## 2019-08-26 NOTE — PATIENT INSTRUCTIONS
Leg and Ankle Edema: Care Instructions  Your Care Instructions  Swelling in the legs, ankles, and feet is called edema. It is common after you sit or stand for a while. Long plane flights or car rides often cause swelling in the legs and feet. You may also have swelling if you have to stand for long periods of time at your job. Problems with the veins in the legs (varicose veins) and changes in hormones can also cause swelling. Sometimes the swelling in the ankles and feet is caused by a more serious problem, such as heart failure, infection, blood clots, or liver or kidney disease. Follow-up care is a key part of your treatment and safety. Be sure to make and go to all appointments, and call your doctor if you are having problems. It's also a good idea to know your test results and keep a list of the medicines you take. How can you care for yourself at home? · If your doctor gave you medicine, take it as prescribed. Call your doctor if you think you are having a problem with your medicine. · Whenever you are resting, raise your legs up. Try to keep the swollen area higher than the level of your heart. · Take breaks from standing or sitting in one position. ? Walk around to increase the blood flow in your lower legs. ? Move your feet and ankles often while you stand, or tighten and relax your leg muscles. · Wear support stockings. Put them on in the morning, before swelling gets worse. · Eat a balanced diet. Lose weight if you need to. · Limit the amount of salt (sodium) in your diet. Salt holds fluid in the body and may increase swelling. When should you call for help? Call 911 anytime you think you may need emergency care. For example, call if:    · You have symptoms of a blood clot in your lung (called a pulmonary embolism). These may include:  ? Sudden chest pain. ? Trouble breathing. ?  Coughing up blood.    Call your doctor now or seek immediate medical care if:    · You have signs of a blood clot, such as:  ? Pain in your calf, back of the knee, thigh, or groin. ? Redness and swelling in your leg or groin.     · You have symptoms of infection, such as:  ? Increased pain, swelling, warmth, or redness. ? Red streaks or pus. ? A fever.    Watch closely for changes in your health, and be sure to contact your doctor if:    · Your swelling is getting worse.     · You have new or worsening pain in your legs.     · You do not get better as expected. Where can you learn more? Go to http://miki-ethan.info/. Enter D394 in the search box to learn more about \"Leg and Ankle Edema: Care Instructions. \"  Current as of: September 23, 2018  Content Version: 12.1  © 8556-6027 [employer]. Care instructions adapted under license by [employer] (which disclaims liability or warranty for this information). If you have questions about a medical condition or this instruction, always ask your healthcare professional. [place] [address] any warranty or liability for your use of this information.

## 2019-08-26 NOTE — PROGRESS NOTES
Saravanan Snyder presents today for   Chief Complaint   Patient presents with   [address]       Is someone accompanying this pt? no    Is the patient using any DME equipment during OV? no    Depression Screening:  3 most recent PHQ Screens 8/26/2019   PHQ Not Done -   Little interest or pleasure in doing things Not at all   Feeling down, depressed, irritable, or hopeless Not at all   Total Score PHQ 2 0       Learning Assessment:  Learning Assessment 1/17/2019   PRIMARY LEARNER Patient   HIGHEST LEVEL OF EDUCATION - PRIMARY LEARNER  -   BARRIERS PRIMARY LEARNER -   CO-LEARNER CAREGIVER -   PRIMARY LANGUAGE ENGLISH   LEARNER PREFERENCE PRIMARY LISTENING     -     -   ANSWERED BY Patient   RELATIONSHIP SELF       Abuse Screening:  No flowsheet data found. Fall Risk  Fall Risk Assessment, last 12 mths 8/26/2019   Able to walk? Yes   Fall in past 12 months? No       Health Maintenance reviewed and discussed and ordered per Provider. Health Maintenance Due   Topic Date Due    DTaP/Tdap/Td series (1 - Tdap) 04/17/1964    Shingrix Vaccine Age 50> (1 of 2) 04/17/1993    GLAUCOMA SCREENING Q2Y  04/17/2008    Bone Densitometry (Dexa) Screening  04/17/2008    Pneumococcal 65+ years (1 of 2 - PCV13) 04/17/2008    MEDICARE YEARLY EXAM  03/14/2018    Influenza Age 9 to Adult  08/01/2019           Coordination of Care:  1. Have you been to the ER, urgent care clinic since your last visit? Hospitalized since your last visit? no    2. Have you seen or consulted any other health care providers outside of the [address] [place] [address] since your last visit? Include any pap smears or colon screening.  no

## 2019-09-03 ENCOUNTER — OFFICE VISIT (OUTPATIENT)
Dept: VASCULAR SURGERY | Age: 74
End: 2019-09-03

## 2019-09-03 VITALS
HEIGHT: 60 IN | WEIGHT: 232 LBS | DIASTOLIC BLOOD PRESSURE: 74 MMHG | SYSTOLIC BLOOD PRESSURE: 120 MMHG | RESPIRATION RATE: 17 BRPM | BODY MASS INDEX: 45.55 KG/M2

## 2019-09-03 DIAGNOSIS — I82.413 ACUTE DEEP VEIN THROMBOSIS (DVT) OF FEMORAL VEIN OF BOTH LOWER EXTREMITIES (HCC): ICD-10-CM

## 2019-09-03 DIAGNOSIS — R60.0 BILATERAL LEG EDEMA: Primary | ICD-10-CM

## 2019-09-03 NOTE — PROGRESS NOTES
1. Have you been to an emergency room or urgent care clinic since your last visit? No    Hospitalized since your last visit? If yes, where, when, and reason for visit? No  2. Have you seen or consulted any other health care providers outside of the [facility] since your last visit including any procedures, health maintenance items. If yes, where, when and reason for visit?  NO

## 2019-09-03 NOTE — PROGRESS NOTES
[address] [facility]    Chief Complaint   Patient presents with    New Patient    Leg Pain    Swelling       History and Physical    Ms Omega Graham is referred by her PCP for leg edema  Apparently she had reported this to another caretaker and stated she was prescribed compression stockings for leg swelling. Comments her leg swelling improves some with elevation but her feet do not improve    I do also see in her records that during a hospital admission in July she was diagnosed with DVT and PE  She is now on eliquis    She says she is never really had problems before this hospital admission. She says since she has been home, she has been doing efforts of leg elevation. She had ZOË hose in the hospital which were too tight and uncomfortable. She has some Tubigrip compression sleeves at home but does not feel that they are offering much relief. She does tell me that she was recently taken off of several of her medications. She also mentions that she feels like she has not been urinating as frequently as she used to. She says she feels like she is drinking enough water.   She says this was even something that she brought up in rehab and there was discussion to do a Gonzalez catheterization but nothing more ever came out of that    Past Medical History:   Diagnosis Date    Adopted     Arthritis     Balance problems     MCBRIDE (dyspnea on exertion) 2/28/2019    GERD (gastroesophageal reflux disease)     Hemochromatosis     High cholesterol     Hypertension     Left knee pain     Left shoulder pain     Lumbar pain     Pain of left sacroiliac joint     Shoulder impingement, right, with rotator cuff strain      Patient Active Problem List   Diagnosis Code    Shoulder impingement, right, with rotator cuff strain M75.40    Spinal stenosis M48.00    Sacroiliitis ([address] M46.1    High cholesterol E78.00    Hypertension I10    Hemochromatosis E83.119    Obesity, morbid ([address] E66.01    MCBRIDE (dyspnea on exertion) R06.09    CAP (community acquired pneumonia) J18.9    Sepsis ([facility] A41.9    Hypotension I95.9    Atrial fibrillation with RVR ([facility]) I48.91     Past Surgical History:   Procedure Laterality Date    HX CHOLECYSTECTOMY      HX CHOLECYSTECTOMY      HX KNEE REPLACEMENT Bilateral     R knee/ Lknee and femur    HX ORTHOPAEDIC      R trigger finger     HX OTHER SURGICAL      Right little finger    HX OTHER SURGICAL      Right wrist, replaced unlnar).  HX OTHER SURGICAL      Both knees    HX OTHER SURGICAL      Left femur    HX OTHER SURGICAL      Trigger finger surgery    HX OTHER SURGICAL      Left knee replacment revision     HX ROTATOR CUFF REPAIR Right     HX TONSILLECTOMY       Current Outpatient Medications   Medication Sig Dispense Refill    guaiFENesin ER (MUCINEX) 600 mg ER tablet Take 600 mg by mouth two (2) times a day.  albuterol (PROAIR HFA) 90 mcg/actuation inhaler Take 2 Puffs by inhalation every four (4) hours as needed for Wheezing. Or SOB 1 Inhaler 3    albuterol-ipratropium (DUO-NEB) 2.5 mg-0.5 mg/3 ml nebu 3 mL by Nebulization route every six (6) hours as needed (SOB). 30 Nebule 0    apixaban (ELIQUIS) 5 mg tablet Take 1 Tab by mouth every twelve (12) hours. 90 Tab 0    guaiFENesin (ROBITUSSIN) 100 mg/5 mL liquid Take 5 mL by mouth every four (4) hours as needed for Cough. 100 mL 0    docusate sodium (COLACE) 100 mg capsule Take 1 Cap by mouth two (2) times daily as needed for Constipation for up to 90 days. 30 Cap 0    diclofenac (VOLTAREN) 1 % gel Apply 2 g to affected area every six (6) hours. Indications: chronic shoulder pain 100 g 0    budesonide-formoterol (SYMBICORT) 160-4.5 mcg/actuation HFAA Take 2 Puffs by inhalation two (2) times a day. 1 Inhaler 0    alum-mag hydroxide-simeth (MYLANTA) 200-200-20 mg/5 mL susp Take 30 mL by mouth every four (4) hours as needed (heart burn).  100 mL 0    acetaminophen (TYLENOL) 325 mg tablet Take 2 Tabs by mouth every six (6) hours as needed for Pain or Fever. 30 Tab 0    OTHER Incentive Spirometer: please use 10 times every hour daily 1 Units 0    OTHER Please check CBC, BMP, Mg, and Phos in 3-5 days. PCP at [facility] to f/u results. 1 Units 0    levothyroxine (SYNTHROID) 25 mcg tablet Take 1 Tab by mouth Daily (before breakfast). 90 Tab 6    potassium chloride (KLOR-CON M20) 20 mEq tablet Take 1 Tab by mouth daily. 90 Tab 6    allopurinol (ZYLOPRIM) 100 mg tablet Take 1 Tab by mouth two (2) times a day. 180 Tab 11    simvastatin (ZOCOR) 40 mg tablet Take 1 Tab by mouth nightly. 90 Tab 6    inhalational spacing device (ACE AEROSOL CLOUD ENHANCER) 1 Each by Does Not Apply route as needed. 1 Device 3    cholecalciferol, vitamin D3, 2,000 unit tab TAKE 1 TABLET BY MOUTH EVERY DAY  3    Omeprazole delayed release (PRILOSEC D/R) 20 mg tablet Take 20 mg by mouth daily.        Allergies   Allergen Reactions    Indomethacin Anaphylaxis, Hives and Swelling    Tomato Other (comments)     Heart burn     Social History     Socioeconomic History    Marital status: SINGLE     Spouse name: Not on file    Number of children: Not on file    Years of education: Not on file    Highest education level: Not on file   Occupational History    Not on file   Social Needs    Financial resource strain: Not on file    Food insecurity:     Worry: Not on file     Inability: Not on file    Transportation needs:     Medical: Not on file     Non-medical: Not on file   Tobacco Use    Smoking status: Former Smoker     Packs/day: 0.50     Years: 2.00     Pack years: 1.00     Start date:      Last attempt to quit:      Years since quittin.7    Smokeless tobacco: Never Used    Tobacco comment: quit 20 years ago   Substance and Sexual Activity    Alcohol use: No    Drug use: No    Sexual activity: Not on file     Comment: post menopausal   Lifestyle    Physical activity:     Days per week: Not on file     Minutes per session: Not on file    Stress: Not on file   Relationships    Social connections:     Talks on phone: Not on file     Gets together: Not on file     Attends Voodoo service: Not on file     Active member of club or organization: Not on file     Attends meetings of clubs or organizations: Not on file     Relationship status: Not on file    Intimate partner violence:     Fear of current or ex partner: Not on file     Emotionally abused: Not on file     Physically abused: Not on file     Forced sexual activity: Not on file   Other Topics Concern    Not on file   Social History Narrative    Not on file      Family History   Adopted: Yes       Review of Systems    Review of Systems - History obtained from the patient  General ROS: negative  Psychological ROS: negative  Ophthalmic ROS: negative  Respiratory ROS: negative  Cardiovascular ROS: negative  Gastrointestinal ROS: negative  Musculoskeletal ROS: positive for - joint stiffness  Neurological ROS: negative  Dermatological ROS: negative  Vascular ROS: bilateral leg edema        Physical Exam:    Visit Vitals  /74 (BP 1 Location: Left arm, BP Patient Position: Sitting)   Resp 17   Ht 5' (1.524 m)   Wt 232 lb (105.2 kg)   BMI 45.31 kg/m²      General:  Alert, cooperative, no distress. Head:  Normocephalic, without obvious abnormality, atraumatic. Eyes:    Conjunctivae/corneas clear. Pupils equal, round, reactive to light. Extraocular movements intact. Extremities:  She does have at least 3-4+ pitting edema of both lower legs, ankles, and feet bilaterally. No significant swelling above the knee   Pulses:  Distal pulses are nonpalpable due to the edema, but no signs of arterial insufficiency and she is known to have triphasic Doppler flow   Skin:  In spite of the swelling, we fortunately have no skin blistering, no cellulitis no ulcerations     Vascular studies:  July 2019 study:  Acute nonocclusive DVT present in the right femoral popliteal vein.   Acute occlusive DVT present in the right peroneal vein. Acute nonocclusive DVT demonstrated left femoral popliteal vein. Acute occlusive DVT present left peroneal vein. Bilateral posterior tibial arteries are triphasic.     Impression and Plan:  1. Bilateral leg edema    2. Acute deep vein thrombosis (DVT) of femoral vein of both lower extremities (HCC)      No orders of the defined types were placed in this encounter. From a vascular origin, I can definitely describe that a good amount of her edema is probably from the bilateral DVTs. I explained the mechanism of this and the efforts to resolve swelling are with compression modalities, leg elevation routine, range of motion routine. I cannot really do unna boot style compression wraps due to the presence of the DVTs. I am also concerned though that with what she is describing as her infrequency and low volume of urination whether or not she has something else medically of some concern. She did have a normal echo/ejection fraction when she was hospitalized last month; she had labs last week which showed kidney function is stable. So I am a little perplexed what to explain. It was discussed that maybe she just is not drinking enough water and is going to try to increase her fluid intake somewhat and then she will follow-up with primary care. I am assuming to go with the fact that the DVTs are the main culprit and implement the modalities suggested above. We will have her come back in about 6 weeks to reevaluate her edema and see if there is any other options that we can do since she will have been at least 3 months into her DVT treatment at that time      LISSA Persaud    Portions of this note have been created using voice recognition software.

## 2019-09-09 ENCOUNTER — OFFICE VISIT (OUTPATIENT)
Dept: PULMONOLOGY | Age: 74
End: 2019-09-09

## 2019-09-09 ENCOUNTER — TELEPHONE (OUTPATIENT)
Dept: PULMONOLOGY | Age: 74
End: 2019-09-09

## 2019-09-09 VITALS
WEIGHT: 230 LBS | OXYGEN SATURATION: 99 % | TEMPERATURE: 97.1 F | DIASTOLIC BLOOD PRESSURE: 84 MMHG | HEIGHT: 60 IN | BODY MASS INDEX: 45.16 KG/M2 | SYSTOLIC BLOOD PRESSURE: 156 MMHG | RESPIRATION RATE: 18 BRPM | HEART RATE: 78 BPM

## 2019-09-09 DIAGNOSIS — R05.9 COUGH: ICD-10-CM

## 2019-09-09 DIAGNOSIS — Z86.711 HISTORY OF PULMONARY EMBOLUS (PE): ICD-10-CM

## 2019-09-09 DIAGNOSIS — E83.119 HEMOCHROMATOSIS, UNSPECIFIED HEMOCHROMATOSIS TYPE: ICD-10-CM

## 2019-09-09 DIAGNOSIS — R06.00 DYSPNEA, UNSPECIFIED TYPE: Primary | ICD-10-CM

## 2019-09-09 DIAGNOSIS — E66.01 CLASS 3 SEVERE OBESITY DUE TO EXCESS CALORIES WITH BODY MASS INDEX (BMI) OF 40.0 TO 44.9 IN ADULT, UNSPECIFIED WHETHER SERIOUS COMORBIDITY PRESENT (HCC): ICD-10-CM

## 2019-09-09 RX ORDER — IPRATROPIUM BROMIDE AND ALBUTEROL SULFATE 2.5; .5 MG/3ML; MG/3ML
3 SOLUTION RESPIRATORY (INHALATION)
Qty: 30 NEBULE | Refills: 3 | Status: SHIPPED | OUTPATIENT
Start: 2019-09-09 | End: 2019-10-21 | Stop reason: ALTCHOICE

## 2019-09-09 RX ORDER — DOXYCYCLINE 100 MG/1
100 CAPSULE ORAL 2 TIMES DAILY
Qty: 14 CAP | Refills: 0 | Status: SHIPPED | OUTPATIENT
Start: 2019-09-09 | End: 2019-09-16

## 2019-09-09 RX ORDER — BUDESONIDE AND FORMOTEROL FUMARATE DIHYDRATE 160; 4.5 UG/1; UG/1
2 AEROSOL RESPIRATORY (INHALATION) 2 TIMES DAILY
Qty: 1 INHALER | Refills: 3 | Status: SHIPPED | OUTPATIENT
Start: 2019-09-09 | End: 2019-12-01 | Stop reason: SDUPTHER

## 2019-09-09 RX ORDER — PREDNISONE 10 MG/1
TABLET ORAL
Qty: 30 TAB | Refills: 0 | Status: SHIPPED | OUTPATIENT
Start: 2019-09-09 | End: 2019-11-18 | Stop reason: ALTCHOICE

## 2019-09-09 NOTE — TELEPHONE ENCOUNTER
Pt c/o SOB. Was in hospital in July, pt states she has never fully recovered.    Appt given this am with [facility] NP.

## 2019-09-09 NOTE — PATIENT INSTRUCTIONS
 Continue symbicort, two inhalations twice daily,  rinse mouth out after use.  Continue albuterol nebulizer treatment or rescue inhaler, 2 inhalations every 4-6 hours as needed for shortness of breath, wheezing or cough     Complete course of prednisone - 40 mg daily x 3 days, 30 mg x 3 days, 20 mg x 3 days, 10 mg x 3 days.   Take with breakfast     Complete course of doxycycliine - 100 mg twice daily x 7 days    · Take over the counter mucinex as needed for cough, increase fluids             Recommend healthy diet/weight and exercise as tolerated     Follow-up in pulmonary clinic in 6 months or sooner with worsening of symptoms     Report to the ER with chest pain or difficulty breathing

## 2019-09-09 NOTE — PROGRESS NOTES
[facility]  Pulmonary, Critical Care, and Sleep Medicine      Pulmonary Office Progress Notes    Name: Louise Gonzales     : 1945     Date: 2019        Subjective:     2019          HPI    Louise Gonzales  is a 66 y.o. female with PMH of DVT and pneumonia who presents for evaluation of cough. She was last seen here on 2019 s/p hospitalization and reported significant improvement in symptoms of shortness of breath. Today she appears comfortable, in  no distress however she states that she had a persisting cough for 2 weeks. She describes the cough as daily and productive of yellow/green sputum. She states that she has some increased shortness of breath/wheezing with activity. She denies chest pain or hemoptysis. No fever, chills or orthopnea;  no decreased appetite or weight loss. She has no reports of nasal congestion/drainage or sinus pressure/pain. She complains of chronic back and LE pain/swelling due to osteoarthritis. Her last PFTs were on 2019 and demonstrated normal flow volume loop with reduced diffusion capacity. She is a former smoker of cigarettes with a 2-pack-year history and quit in . Past Medical History:   Diagnosis Date    Adopted     Arthritis     Balance problems     MCBRIDE (dyspnea on exertion) 2019    GERD (gastroesophageal reflux disease)     Hemochromatosis     High cholesterol     Hypertension     Left knee pain     Left shoulder pain     Lumbar pain     Pain of left sacroiliac joint     Shoulder impingement, right, with rotator cuff strain        Allergies   Allergen Reactions    Indomethacin Anaphylaxis, Hives and Swelling    Tomato Other (comments)     Heart burn       Current Outpatient Medications   Medication Sig Dispense Refill    predniSONE (DELTASONE) 10 mg tablet Take 40 mg daily x 3 days, 30 mg x 3 days, 20 mg x 3 days, 10 mg x 3 days.   Take with breakfast 30 Tab 0    doxycycline (MONODOX) 100 mg capsule Take 1 Cap by mouth two (2) times a day for 7 days. 14 Cap 0    albuterol-ipratropium (DUO-NEB) 2.5 mg-0.5 mg/3 ml nebu 3 mL by Nebulization route every four (4) hours as needed for Cough or Other (shortness of breath / wheezing). 30 Nebule 3    budesonide-formoterol (SYMBICORT) 160-4.5 mcg/actuation HFAA Take 2 Puffs by inhalation two (2) times a day for 30 days. 1 Inhaler 3    guaiFENesin ER (MUCINEX) 600 mg ER tablet Take 600 mg by mouth two (2) times a day.  albuterol (PROAIR HFA) 90 mcg/actuation inhaler Take 2 Puffs by inhalation every four (4) hours as needed for Wheezing. Or SOB 1 Inhaler 3    apixaban (ELIQUIS) 5 mg tablet Take 1 Tab by mouth every twelve (12) hours. 90 Tab 0    docusate sodium (COLACE) 100 mg capsule Take 1 Cap by mouth two (2) times daily as needed for Constipation for up to 90 days. 30 Cap 0    diclofenac (VOLTAREN) 1 % gel Apply 2 g to affected area every six (6) hours. Indications: chronic shoulder pain 100 g 0    alum-mag hydroxide-simeth (MYLANTA) 200-200-20 mg/5 mL susp Take 30 mL by mouth every four (4) hours as needed (heart burn). 100 mL 0    acetaminophen (TYLENOL) 325 mg tablet Take 2 Tabs by mouth every six (6) hours as needed for Pain or Fever. 30 Tab 0    OTHER Incentive Spirometer: please use 10 times every hour daily 1 Units 0    OTHER Please check CBC, BMP, Mg, and Phos in 3-5 days. PCP at [facility] to f/u results. 1 Units 0    levothyroxine (SYNTHROID) 25 mcg tablet Take 1 Tab by mouth Daily (before breakfast). 90 Tab 6    potassium chloride (KLOR-CON M20) 20 mEq tablet Take 1 Tab by mouth daily. 90 Tab 6    allopurinol (ZYLOPRIM) 100 mg tablet Take 1 Tab by mouth two (2) times a day. 180 Tab 11    simvastatin (ZOCOR) 40 mg tablet Take 1 Tab by mouth nightly. 90 Tab 6    inhalational spacing device (ACE AEROSOL CLOUD ENHANCER) 1 Each by Does Not Apply route as needed.  1 Device 3    cholecalciferol, vitamin D3, 2,000 unit tab TAKE 1 TABLET BY MOUTH EVERY DAY  3    Omeprazole delayed release (PRILOSEC D/R) 20 mg tablet Take 20 mg by mouth daily.  guaiFENesin (ROBITUSSIN) 100 mg/5 mL liquid Take 5 mL by mouth every four (4) hours as needed for Cough. 100 mL 0       Review of Systems:    HEENT: No epistaxis, no nasal drainage, no difficulty in swallowing, no redness in eyes  Respiratory: As stated above in HPI  Cardiovascular: no chest pain, no palpitations, no chronic leg edema, no syncope  Gastrointestinal: no abd pain, no vomiting, no diarrhea, no bleeding symptoms  Genitourinary: No urinary symptoms or hematuria  Integument/breast: No ulcers or rashes  Musculoskeletal: Chronic back and bilateral LE pain due to osteoarthritis  Neurological: No focal weakness, no seizures, no headaches  Behvioral/Psych: No anxiety, no depression  Constitutional: No fever, chills or night sweats. No decreased appetite or weight loss     Objective:     Visit Vitals  /84 (BP 1 Location: Left arm, BP Patient Position: Sitting)   Pulse 78   Temp 97.1 °F (36.2 °C) (Oral)   Resp 18   Ht 5' (1.524 m)   Wt 104.3 kg (230 lb)   SpO2 99%   BMI 44.92 kg/m²        PHYSICAL EXAM      General: Oriented to person, place, and time. Well-developed, well-nourished, and in no distress. Obese      Head:   Normocephalic, without obvious abnormality, atraumatic       Eyes:   Pupils reactive, conjunctivae / corneas clear. EOM's intact, no scleral icterus       Nose:   Nares normal, no drainage. Throat:    Lips, mucosa and tongue normal. Teeth and gums normal       Neck:   Supple, symmetrical, trachea midline. No adenopathy or thyroid swelling; no carotid bruit or JVD. CVS:    Regular rate and rhythm. S1S2 normal,  no murmurs       RS:      Symmetrical chest rise, good AE bilaterally. Mild expiratory wheezing throughout lung fields. No rales or rhonchi, no accessory muscle use      Abd:     Soft, non-tender.   No hepatosplenomegaly Neuro:   non focal, awake, alert and oriented to person, place, time and situation    Extrm:   No clubbing or cyanosis. Chronic bilateral LE edema, +1       Skin:   no rash    Data review:         PULMONARY FUNCTION TESTS     Date FVC FEV1  FEV1/FVC [id number] TLC RV RV/TLC VC DLCO   1/23/2019  95%  97%  76%  92%  97%  98%  101%  96%  74%                                                                                           Imaging:  I have personally reviewed the patients radiographs and have reviewed the reports:  XR Results (most recent):  Results from Hospital Encounter encounter on 07/22/19   XR CHEST PORT    Narrative Portable Chest    CPT CODE: 59259    HISTORY: Dyspnea. FINDINGS:     Compared with 7/22/2019    Heart size and mediastinal contours within normal limits. No pulmonary edema,  effusion, or pneumothorax. No focal consolidation. No acute osseous  abnormality. .      Impression IMPRESSION:    No radiographic finding for an acute cardiopulmonary process     CT Results (most recent):  Results from Hospital Encounter encounter on 07/22/19   CT ABD PELV WO CONT    Narrative CT abdomen and pelvis without contrast.    Indications: RLQ pain ; superimposed upon history of chronic abdominal pain. No  current alteration in bowel habits. Prior cholecystectomy. Technique: No IV contrast administered. No oral contrast. Contiguous 5 mm axial  images obtained from above the liver to the symphysis pubis. Sagittal and  Coronal MPR generated. CT scans at this facility are performed using dose optimization technique as  appropriate with performed exam, to include automated exposure control,  adjustment of mA and/or kV according to patient's size (including appropriate  matching for site-specific examinations), or use of iterative reconstruction  technique. Comparison: September 2015    Findings:    Lower chest: Lung bases are unremarkable. No intraperitoneal free air or free fluid. No fluid collection. Liver: Unremarkable. Gallbladder/biliary: Prior cholecystectomy. Biliary tree unremarkable. Spleen: Unremarkable    Pancreas: Atrophic morphology, no concerning features. Left Kidney: Unremarkable. Right Kidney: Unremarkable. Adrenals: Unremarkable. Bowels/mesentery: Fluid distended stomach without obstructing focus. There is  large bowel diverticulosis. Appendix unremarkable. Pelvis: Urinary bladder unremarkable. Atrophic uterus versus prior hysterectomy. Unremarkable for age. Vascular: Aorta unremarkable for age. IVC unremarkable. Lymph Nodes: No adenopathy. Bones: No acute findings. Prior left hip ORIF. Unremarkable for age. Impression IMPRESSION[de-identified]    1.  Nothing clearly acute within the abdomen or pelvis.  -Fluid distended stomach; if there are issues of nausea, consider possibility of  gastroparesis  -Large bowel diverticulosis without inflammation    Thank you for this referral.        Patient Active Problem List   Diagnosis Code    Shoulder impingement, right, with rotator cuff strain M75.40    Spinal stenosis M48.00    Sacroiliitis ([address] M46.1    High cholesterol E78.00    Hypertension I10    Hemochromatosis E83.119    Obesity, morbid ([address] E66.01    MCBRIDE (dyspnea on exertion) R06.09    CAP (community acquired pneumonia) J18.9    Sepsis ([address] A41.9    Hypotension I95.9    Atrial fibrillation with RVR ([address] I48.91       IMPRESSION:   · Cough/shortness of breath-worsening x2 weeks, productive of yellow/green sputum. Mild diffuse expiratory wheezing throughout lung fields  · History of PE/ bilateral DVT -currently on Eliquis 5 mg twice daily  · Obesity-BMI 44.92  · Hemochromatosis      RECOMMENDATIONS:     · Continue Symbicort 160/4.5, 2 inhalations twice daily   and albuterol PRN.   Discussed appropriate use of respiratory  maintenance and rescue  medications with patient  · Complete course of prednisone- 40 mg daily x3 days, 30 mg daily x3 days, 20 mg daily x3 days, 10 mg daily x3 days  · Complete course of doxycycline, 100 mg twice daily x7 days  · Continue Eliquis 5 mg twice daily  · Increase p.o. fluids, take OTC Mucinex as needed for cough  · Discussed recommendations for preventative immunizations when symptoms resolve  · Follow up in pulmonary clinic in   6 months or sooner with worsening of symptoms  · Recommend healthy diet / weight / exercise as tolerated  · Report to ER with chest pain or difficulty breathing.         Hussain Trammell NP

## 2019-09-10 ENCOUNTER — PATIENT OUTREACH (OUTPATIENT)
Dept: FAMILY MEDICINE CLINIC | Age: 74
End: 2019-09-10

## 2019-09-10 NOTE — PROGRESS NOTES
Follow Up     Patient appears to have established care with Ms. Ramona Powell, MICHELLE @ [facility]. DSETINY Episode closed and reopened under this Care Transitions Nurse. Per Chart Review patient attended follow up appointment with Ms. Lavern Hi NP with [facility] on 9-9-19. Care Transitions Nurse to monitor case.

## 2019-09-16 ENCOUNTER — PATIENT OUTREACH (OUTPATIENT)
Dept: FAMILY MEDICINE CLINIC | Age: 74
End: 2019-09-16

## 2019-09-16 DIAGNOSIS — J18.9 COMMUNITY ACQUIRED PNEUMONIA, UNSPECIFIED LATERALITY: ICD-10-CM

## 2019-09-16 DIAGNOSIS — I26.99 PULMONARY EMBOLISM WITHOUT ACUTE COR PULMONALE, UNSPECIFIED CHRONICITY, UNSPECIFIED PULMONARY EMBOLISM TYPE (HCC): ICD-10-CM

## 2019-09-16 RX ORDER — APIXABAN 5 MG/1
TABLET, FILM COATED ORAL
Qty: 90 TAB | Refills: 0 | Status: SHIPPED | OUTPATIENT
Start: 2019-09-16 | End: 2021-03-10 | Stop reason: ALTCHOICE

## 2019-09-16 NOTE — PROGRESS NOTES
Care Transitions Nurse ( CTN) attempted to contact patient via telephone call. There was no response. A voicemail message was left requesting a non-emergency return telephone call. Nurse Navigator contact information provided. CTN attempted to contact patient via her mobile phone number. A message was heard that patient is not available.

## 2019-09-24 ENCOUNTER — HOSPITAL ENCOUNTER (OUTPATIENT)
Dept: ULTRASOUND IMAGING | Age: 74
Discharge: HOME | End: 2019-09-24
Attending: INTERNAL MEDICINE
Payer: MEDICARE

## 2019-09-24 DIAGNOSIS — E83.110 HEMOCHROMATOSIS TYPE 1 (HCC): ICD-10-CM

## 2019-09-24 PROCEDURE — 76700 US EXAM ABDOM COMPLETE: CPT

## 2019-09-30 ENCOUNTER — PATIENT OUTREACH (OUTPATIENT)
Dept: FAMILY MEDICINE CLINIC | Age: 74
End: 2019-09-30

## 2019-10-21 ENCOUNTER — OFFICE VISIT (OUTPATIENT)
Dept: PULMONOLOGY | Age: 74
End: 2019-10-21

## 2019-10-21 VITALS
RESPIRATION RATE: 24 BRPM | TEMPERATURE: 98 F | HEIGHT: 60 IN | OXYGEN SATURATION: 99 % | SYSTOLIC BLOOD PRESSURE: 130 MMHG | DIASTOLIC BLOOD PRESSURE: 70 MMHG | BODY MASS INDEX: 44.8 KG/M2 | HEART RATE: 78 BPM | WEIGHT: 228.2 LBS

## 2019-10-21 DIAGNOSIS — R05.3 PERSISTENT COUGH FOR 3 WEEKS OR LONGER: Primary | ICD-10-CM

## 2019-10-21 DIAGNOSIS — I48.91 ATRIAL FIBRILLATION WITH RVR (HCC): ICD-10-CM

## 2019-10-21 DIAGNOSIS — R06.09 DOE (DYSPNEA ON EXERTION): ICD-10-CM

## 2019-10-21 DIAGNOSIS — E83.119 HEMOCHROMATOSIS, UNSPECIFIED HEMOCHROMATOSIS TYPE: ICD-10-CM

## 2019-10-21 DIAGNOSIS — J45.40 MODERATE PERSISTENT ASTHMA WITHOUT COMPLICATION: ICD-10-CM

## 2019-10-21 RX ORDER — BUDESONIDE 0.5 MG/2ML
500 INHALANT ORAL 2 TIMES DAILY
Qty: 60 EACH | Refills: 3 | Status: SHIPPED | OUTPATIENT
Start: 2019-10-21 | End: 2019-10-21 | Stop reason: SDUPTHER

## 2019-10-21 RX ORDER — BUDESONIDE AND FORMOTEROL FUMARATE DIHYDRATE 160; 4.5 UG/1; UG/1
2 AEROSOL RESPIRATORY (INHALATION) 2 TIMES DAILY
COMMUNITY
End: 2019-10-21 | Stop reason: ALTCHOICE

## 2019-10-21 RX ORDER — ALBUTEROL SULFATE 0.83 MG/ML
2.5 SOLUTION RESPIRATORY (INHALATION)
Qty: 30 NEBULE | Refills: 3 | Status: SHIPPED | OUTPATIENT
Start: 2019-10-21 | End: 2019-10-21 | Stop reason: SDUPTHER

## 2019-10-21 NOTE — PROGRESS NOTES
The pt. C/o chest congestion with prod. Of very thick white Sputum. This has been happening in [address] despite treatment since June. Hector Ketty presents today for   Chief Complaint   Patient presents with    Breathing Problem     F/U 9/20    Cough       Is someone accompanying this pt? Delano Rollins    Is the patient using any DME equipment during OV? Nebulizer   -DME Company Unknown  Depression Screening:  3 most recent PHQ Screens 8/26/2019   PHQ Not Done -   Little interest or pleasure in doing things Not at all   Feeling down, depressed, irritable, or hopeless Not at all   Total Score PHQ 2 0       Learning Assessment:  Learning Assessment 9/3/2019   PRIMARY LEARNER Patient   HIGHEST LEVEL OF EDUCATION - PRIMARY LEARNER  -   BARRIERS PRIMARY LEARNER -   CO-LEARNER CAREGIVER -   PRIMARY LANGUAGE ENGLISH   LEARNER PREFERENCE PRIMARY LISTENING     -     -   ANSWERED BY patient   RELATIONSHIP SELF       Abuse Screening:  The pt. denies    Fall Risk  Fall Risk Assessment, last 12 mths 9/9/2019   Able to walk? Yes   Fall in past 12 months? No         Coordination of Care:  1. Have you been to the ER, urgent care clinic since your last visit? Hospitalized since your last visit? No  2. Have you seen or consulted any other health care providers outside of the [address] [place] [address] since your last visit? Dr. Kimmie Cruz. Medication variance in dosage/sig per patient as follows: Per Med. Rec.     Scheduled for a flu vaccine next Monday

## 2019-10-21 NOTE — PROGRESS NOTES
[facility]  Pulmonary, Critical Care, and Sleep Medicine      Pulmonary Office Progress Notes    Name: Sammy Peralta     : 1945     Date: 10/21/2019        Subjective:     10/21/2019     Patient states that she has had a cough since July-initially was productive of yellow-green mucus which was treated with antibiotics doxycycline and a taper of prednisone as well as Symbicort added to her current regime. She now has persistent daily cough which she describes as intense, seems to come from deep inside her chest and productive of whitish foamy mucus. Causes her to have bouts and not responding to her current therapy which includes twice daily Symbicort and up to 4 times a day albuterol that she is using. She was prescribed a taper of prednisone-feels that it did not help  Denies any wheezing but does get tight in her chest.  Denies chest pain  Remains on Eliquis for PE  Denies orthopnea or paroxysmal nocturnal dyspnea    HPI    Sammy Peralta  is a 66 y.o. female with PMH of DVT and pneumonia who presents for evaluation of cough. She was last seen here on 2019 s/p hospitalization and reported significant improvement in symptoms of shortness of breath. Today she appears comfortable, in  no distress however she states that she had a persisting cough for 2 weeks. She describes the cough as daily and productive of yellow/green sputum. She states that she has some increased shortness of breath/wheezing with activity. She denies chest pain or hemoptysis. No fever, chills or orthopnea;  no decreased appetite or weight loss. She has no reports of nasal congestion/drainage or sinus pressure/pain. She complains of chronic back and LE pain/swelling due to osteoarthritis. Her last PFTs were on 2019 and demonstrated normal flow volume loop with reduced diffusion capacity. She is a former smoker of cigarettes with a 2-pack-year history and quit in .       Past Medical History:   Diagnosis Date    Adopted     Arthritis     Balance problems     MCBRIDE (dyspnea on exertion) 2/28/2019    GERD (gastroesophageal reflux disease)     Hemochromatosis     High cholesterol     Hypertension     Left knee pain     Left shoulder pain     Lumbar pain     Pain of left sacroiliac joint     Shoulder impingement, right, with rotator cuff strain        Allergies   Allergen Reactions    Indomethacin Anaphylaxis, Hives and Swelling    Tomato Other (comments)     Heart burn       Current Outpatient Medications   Medication Sig Dispense Refill    budesonide-formoterol (SYMBICORT) 160-4.5 mcg/actuation HFAA Take 2 Puffs by inhalation two (2) times a day.  ELIQUIS 5 mg tablet TAKE 1 TABLET BY MOUTH EVERY 12 HOURS 90 Tab 0    albuterol-ipratropium (DUO-NEB) 2.5 mg-0.5 mg/3 ml nebu 3 mL by Nebulization route every four (4) hours as needed for Cough or Other (shortness of breath / wheezing). 30 Nebule 3    guaiFENesin ER (MUCINEX) 600 mg ER tablet Take 600 mg by mouth two (2) times a day.  guaiFENesin (ROBITUSSIN) 100 mg/5 mL liquid Take 5 mL by mouth every four (4) hours as needed for Cough. 100 mL 0    acetaminophen (TYLENOL) 325 mg tablet Take 2 Tabs by mouth every six (6) hours as needed for Pain or Fever. 30 Tab 0    OTHER Incentive Spirometer: please use 10 times every hour daily 1 Units 0    levothyroxine (SYNTHROID) 25 mcg tablet Take 1 Tab by mouth Daily (before breakfast). 90 Tab 6    potassium chloride (KLOR-CON M20) 20 mEq tablet Take 1 Tab by mouth daily. 90 Tab 6    simvastatin (ZOCOR) 40 mg tablet Take 1 Tab by mouth nightly. 90 Tab 6    inhalational spacing device (ACE AEROSOL CLOUD ENHANCER) 1 Each by Does Not Apply route as needed. 1 Device 3    cholecalciferol, vitamin D3, 2,000 unit tab TAKE 1 TABLET BY MOUTH EVERY DAY  3    Omeprazole delayed release (PRILOSEC D/R) 20 mg tablet Take 20 mg by mouth daily.       predniSONE (DELTASONE) 10 mg tablet Take 40 mg daily x 3 days, 30 mg x 3 days, 20 mg x 3 days, 10 mg x 3 days. Take with breakfast 30 Tab 0    albuterol (PROAIR HFA) 90 mcg/actuation inhaler Take 2 Puffs by inhalation every four (4) hours as needed for Wheezing. Or SOB 1 Inhaler 3    docusate sodium (COLACE) 100 mg capsule Take 1 Cap by mouth two (2) times daily as needed for Constipation for up to 90 days. 30 Cap 0    diclofenac (VOLTAREN) 1 % gel Apply 2 g to affected area every six (6) hours. Indications: chronic shoulder pain 100 g 0    alum-mag hydroxide-simeth (MYLANTA) 200-200-20 mg/5 mL susp Take 30 mL by mouth every four (4) hours as needed (heart burn). 100 mL 0    OTHER Please check CBC, BMP, Mg, and Phos in 3-5 days. PCP at [facility] to f/u results. 1 Units 0    allopurinol (ZYLOPRIM) 100 mg tablet Take 1 Tab by mouth two (2) times a day. 180 Tab 11       Review of Systems:    HEENT: No epistaxis, no nasal drainage, no difficulty in swallowing, no redness in eyes  Respiratory: As stated above in HPI  Cardiovascular: no chest pain, no palpitations, no chronic leg edema, no syncope  Gastrointestinal: no abd pain, no vomiting, no diarrhea, no bleeding symptoms  Genitourinary: No urinary symptoms or hematuria  Integument/breast: No ulcers or rashes  Musculoskeletal: Chronic back and bilateral LE pain due to osteoarthritis  Neurological: No focal weakness, no seizures, no headaches  Behvioral/Psych: No anxiety, no depression  Constitutional: No fever, chills or night sweats. No decreased appetite or weight loss     Objective:     Visit Vitals  /70 (BP 1 Location: Left arm, BP Patient Position: Sitting)   Pulse 78   Temp 98 °F (36.7 °C)   Resp 24   Ht 5' (1.524 m)   Wt 103.5 kg (228 lb 3.2 oz)   SpO2 99%   BMI 44.57 kg/m²        PHYSICAL EXAM      General: Oriented to person, place, and time. Well-developed, well-nourished, and in no distress.   Obese      Head:   Normocephalic, without obvious abnormality, atraumatic       Eyes:   Pupils reactive, conjunctivae / corneas clear. EOM's intact, no scleral icterus       Nose:   Nares normal, no drainage. Throat:    Lips, mucosa and tongue normal. Teeth and gums normal       Neck:   Supple, symmetrical, trachea midline. No adenopathy or thyroid swelling; no carotid bruit or JVD. CVS:    Regular rate and rhythm. S1S2 normal,  no murmurs       RS:      Symmetrical chest rise, good AE bilaterally. No wheezing no rales or rhonchi, no accessory muscle use      Abd:     Soft, non-tender. No hepatosplenomegaly                                                     Neuro:   non focal, awake, alert and oriented to person, place, time and situation    Extrm:   No clubbing or cyanosis. Chronic bilateral LE edema, +1       Skin:   no rash    Data review:         PULMONARY FUNCTION TESTS     Date FVC FEV1  FEV1/FVC [id number] TLC RV RV/TLC VC DLCO   1/23/2019  95%  97%  76%  92%  97%  98%  101%  96%  74%                                                                         Imaging:  I have personally reviewed the patients radiographs and have reviewed the reports:  XR Results (most recent):  Results from Hospital Encounter encounter on 07/22/19   XR CHEST PORT    Narrative Portable Chest    CPT CODE: 21677    HISTORY: Dyspnea. FINDINGS:     Compared with 7/22/2019    Heart size and mediastinal contours within normal limits. No pulmonary edema,  effusion, or pneumothorax. No focal consolidation. No acute osseous  abnormality. .      Impression IMPRESSION:    No radiographic finding for an acute cardiopulmonary process     CT Results (most recent):  Results from Hospital Encounter encounter on 07/22/19   CT ABD PELV WO CONT    Narrative CT abdomen and pelvis without contrast.    Indications: RLQ pain ; superimposed upon history of chronic abdominal pain. No  current alteration in bowel habits. Prior cholecystectomy. Technique: No IV contrast administered.   No oral contrast. Contiguous 5 mm axial  images obtained from above the liver to the symphysis pubis. Sagittal and  Coronal MPR generated. CT scans at this facility are performed using dose optimization technique as  appropriate with performed exam, to include automated exposure control,  adjustment of mA and/or kV according to patient's size (including appropriate  matching for site-specific examinations), or use of iterative reconstruction  technique. Comparison: September 2015    Findings:    Lower chest: Lung bases are unremarkable. No intraperitoneal free air or free fluid. No fluid collection. Liver: Unremarkable. Gallbladder/biliary: Prior cholecystectomy. Biliary tree unremarkable. Spleen: Unremarkable    Pancreas: Atrophic morphology, no concerning features. Left Kidney: Unremarkable. Right Kidney: Unremarkable. Adrenals: Unremarkable. Bowels/mesentery: Fluid distended stomach without obstructing focus. There is  large bowel diverticulosis. Appendix unremarkable. Pelvis: Urinary bladder unremarkable. Atrophic uterus versus prior hysterectomy. Unremarkable for age. Vascular: Aorta unremarkable for age. IVC unremarkable. Lymph Nodes: No adenopathy. Bones: No acute findings. Prior left hip ORIF. Unremarkable for age.       Impression IMPRESSION[de-identified]    1.  Nothing clearly acute within the abdomen or pelvis.  -Fluid distended stomach; if there are issues of nausea, consider possibility of  gastroparesis  -Large bowel diverticulosis without inflammation    Thank you for this referral.        Patient Active Problem List   Diagnosis Code    Shoulder impingement, right, with rotator cuff strain M75.40    Spinal stenosis M48.00    Sacroiliitis ([address] M46.1    High cholesterol E78.00    Hypertension I10    Hemochromatosis E83.119    Obesity, morbid ([address] E66.01    MCBRIDE (dyspnea on exertion) R06.09    CAP (community acquired pneumonia) J18.9    Sepsis ([address] A41.9    Hypotension I95.9    Atrial fibrillation with RVR ([facility]) I48.91       IMPRESSION:   · Persistent cough with features suggestive of reactive airways likely postinfectious inflammatory cough  · Asthma-moderate persistent  · History of PE/ bilateral DVT -currently on Eliquis 5 mg twice daily  · Obesity-BMI 44.92  · Hemochromatosis      RECOMMENDATIONS:     · Will step up treatment with nebulized medications -patient may benefit from longer acting agents which are better penetrated.   Add a trial of YUPELERI once a day and budesonide twice daily  · Instructed to discontinue Symbicort 160/4.5  · Albuterol as needed  · Will consider adding macrolides if response to above measures and adequate  · Continue Eliquis 5 mg twice daily  · Increase p.o. fluids, take OTC Mucinex as needed for cough  · Discussed recommendations for preventative immunizations when symptoms resolve  · Follow up in pulmonary clinic in 2 months or sooner with worsening of symptoms  · Recommend healthy diet / weight / exercise as tolerated  · Written instructions given to patient and caregiver about all of the above        Ilya Barber MD

## 2019-10-21 NOTE — TELEPHONE ENCOUNTER
Per pt, [facility] only filled one medication the budesinide and stated it was over $400. Please call them to make sure it was filed correctly. Also, please verify other medication they did not get. Please call pt to let her know status at either [phone] or [phone].

## 2019-10-22 RX ORDER — BUDESONIDE 0.5 MG/2ML
500 INHALANT ORAL 2 TIMES DAILY
Qty: 60 EACH | Refills: 3 | Status: SHIPPED | OUTPATIENT
Start: 2019-10-22 | End: 2019-12-02

## 2019-10-22 RX ORDER — ALBUTEROL SULFATE 0.83 MG/ML
2.5 SOLUTION RESPIRATORY (INHALATION)
Qty: 30 NEBULE | Refills: 3 | Status: SHIPPED | OUTPATIENT
Start: 2019-10-22 | End: 2019-12-11 | Stop reason: SDUPTHER

## 2019-10-28 ENCOUNTER — OFFICE VISIT (OUTPATIENT)
Dept: FAMILY MEDICINE CLINIC | Age: 74
End: 2019-10-28

## 2019-10-28 ENCOUNTER — HOSPITAL ENCOUNTER (OUTPATIENT)
Dept: LAB | Age: 74
Discharge: HOME | End: 2019-10-28
Payer: MEDICARE

## 2019-10-28 VITALS
TEMPERATURE: 97.5 F | SYSTOLIC BLOOD PRESSURE: 164 MMHG | HEIGHT: 60 IN | RESPIRATION RATE: 18 BRPM | HEART RATE: 94 BPM | OXYGEN SATURATION: 97 % | DIASTOLIC BLOOD PRESSURE: 98 MMHG | WEIGHT: 216 LBS | BODY MASS INDEX: 42.41 KG/M2

## 2019-10-28 DIAGNOSIS — Z00.00 MEDICARE ANNUAL WELLNESS VISIT, SUBSEQUENT: Primary | ICD-10-CM

## 2019-10-28 DIAGNOSIS — M10.9 GOUT, UNSPECIFIED CAUSE, UNSPECIFIED CHRONICITY, UNSPECIFIED SITE: ICD-10-CM

## 2019-10-28 DIAGNOSIS — R60.9 EDEMA, UNSPECIFIED TYPE: ICD-10-CM

## 2019-10-28 DIAGNOSIS — I10 ESSENTIAL HYPERTENSION: ICD-10-CM

## 2019-10-28 DIAGNOSIS — R06.09 DOE (DYSPNEA ON EXERTION): ICD-10-CM

## 2019-10-28 LAB
ALBUMIN SERPL-MCNC: 3.7 G/DL (ref 3.4–5)
ALBUMIN/GLOB SERPL: 1.3 {RATIO} (ref 0.8–1.7)
ALP SERPL-CCNC: 70 U/L (ref 45–117)
ALT SERPL-CCNC: 24 U/L (ref 13–56)
ANION GAP SERPL CALC-SCNC: 5 MMOL/L (ref 3–18)
AST SERPL-CCNC: 26 U/L (ref 10–38)
BILIRUB SERPL-MCNC: 0.5 MG/DL (ref 0.2–1)
BUN SERPL-MCNC: 16 MG/DL (ref 7–18)
BUN/CREAT SERPL: 15 (ref 12–20)
CALCIUM SERPL-MCNC: 9.6 MG/DL (ref 8.5–10.1)
CHLORIDE SERPL-SCNC: 111 MMOL/L (ref 100–111)
CO2 SERPL-SCNC: 26 MMOL/L (ref 21–32)
CREAT SERPL-MCNC: 1.07 MG/DL (ref 0.6–1.3)
GLOBULIN SER CALC-MCNC: 2.8 G/DL (ref 2–4)
GLUCOSE SERPL-MCNC: 83 MG/DL (ref 74–99)
POTASSIUM SERPL-SCNC: 4.6 MMOL/L (ref 3.5–5.5)
PROT SERPL-MCNC: 6.5 G/DL (ref 6.4–8.2)
SODIUM SERPL-SCNC: 142 MMOL/L (ref 136–145)

## 2019-10-28 PROCEDURE — 80053 COMPREHEN METABOLIC PANEL: CPT

## 2019-10-28 PROCEDURE — 36415 COLL VENOUS BLD VENIPUNCTURE: CPT

## 2019-10-28 RX ORDER — MONTELUKAST SODIUM 10 MG/1
10 TABLET ORAL
Qty: 30 TAB | Refills: 1 | Status: SHIPPED | OUTPATIENT
Start: 2019-10-28 | End: 2019-11-25 | Stop reason: SDUPTHER

## 2019-10-28 RX ORDER — BENZONATATE 200 MG/1
200 CAPSULE ORAL
Qty: 21 CAP | Refills: 0 | Status: SHIPPED | OUTPATIENT
Start: 2019-10-28 | End: 2019-11-04

## 2019-10-28 RX ORDER — ALLOPURINOL 100 MG/1
100 TABLET ORAL 2 TIMES DAILY
Qty: 180 TAB | Refills: 11 | Status: SHIPPED | OUTPATIENT
Start: 2019-10-28 | End: 2020-11-10 | Stop reason: SDUPTHER

## 2019-10-28 RX ORDER — POTASSIUM CHLORIDE 20 MEQ/1
20 TABLET, EXTENDED RELEASE ORAL DAILY
Qty: 90 TAB | Refills: 6 | Status: SHIPPED | OUTPATIENT
Start: 2019-10-28 | End: 2020-12-22

## 2019-10-28 RX ORDER — FUROSEMIDE 20 MG/1
20 TABLET ORAL DAILY
Qty: 30 TAB | Refills: 2 | Status: SHIPPED | OUTPATIENT
Start: 2019-10-28 | End: 2020-01-13 | Stop reason: SDUPTHER

## 2019-10-28 NOTE — PATIENT INSTRUCTIONS
Medicare Wellness Visit, Female The best way to live healthy is to have a lifestyle where you eat a well-balanced diet, exercise regularly, limit alcohol use, and quit all forms of tobacco/nicotine, if applicable. Regular preventive services are another way to keep healthy. Preventive services (vaccines, screening tests, monitoring & exams) can help personalize your care plan, which helps you manage your own care. Screening tests can find health problems at the earliest stages, when they are easiest to treat. [facility] follows the current, evidence-based guidelines published by the [facility] ([facility]) when recommending preventive services for our patients. Because we follow these guidelines, sometimes recommendations change over time as research supports it. (For example, mammograms used to be recommended annually. Even though Medicare will still pay for an annual mammogram, the newer guidelines recommend a mammogram every two years for women of average risk.) Of course, you and your doctor may decide to screen more often for some diseases, based on your risk and your health status. Preventive services for you include: - Medicare offers their members a free annual wellness visit, which is time for you and your primary care provider to discuss and plan for your preventive service needs. Take advantage of this benefit every year! 
-All adults over the age of 72 should receive the recommended pneumonia vaccines. Current USPSTF guidelines recommend a series of two vaccines for the best pneumonia protection.  
-All adults should have a flu vaccine yearly and a tetanus vaccine every 10 years. All adults age 61 and older should receive a shingles vaccine once in their lifetime.   
-A bone mass density test is recommended when a woman turns 65 to screen for osteoporosis. This test is only recommended one time, as a screening. Some providers will use this same test as a disease monitoring tool if you already have osteoporosis. -All adults age 38-68 who are overweight should have a diabetes screening test once every three years.  
-Other screening tests and preventive services for persons with diabetes include: an eye exam to screen for diabetic retinopathy, a kidney function test, a foot exam, and stricter control over your cholesterol.  
-Cardiovascular screening for adults with routine risk involves an electrocardiogram (ECG) at intervals determined by your doctor.  
-Colorectal cancer screenings should be done for adults age 54-65 with no increased risk factors for colorectal cancer. There are a number of acceptable methods of screening for this type of cancer. Each test has its own benefits and drawbacks. Discuss with your doctor what is most appropriate for you during your annual wellness visit. The different tests include: colonoscopy (considered the best screening method), a fecal occult blood test, a fecal DNA test, and sigmoidoscopy. -Breast cancer screenings are recommended every other year for women of normal risk, age 54-69. 
-Cervical cancer screenings for women over age 72 are only recommended with certain risk factors.  
-All adults born between [facility] should be screened once for Hepatitis C. Here is a list of your current Health Maintenance items (your personalized list of preventive services) with a due date: 
Health Maintenance Due Topic Date Due  
 DTaP/Tdap/Td  (1 - Tdap) 04/17/1964  Shingles Vaccine (1 of 2) 04/17/1993  Glaucoma Screening   04/17/2008  Bone Mineral Density   04/17/2008  Pneumococcal Vaccine (1 of 2 - PCV13) 04/17/2008 Irma Shelton Annual Well Visit  03/14/2018

## 2019-10-28 NOTE — PROGRESS NOTES
This is the Subsequent Medicare Annual Wellness Exam, performed 12 months or more after the Initial AWV or the last Subsequent AWV    I have reviewed the patient's medical history in detail and updated the computerized patient record. History   Patient states she continues to have sob and cough. Denies worsening of sob  States she noticed increased lower leg swelling yesterday after getting out of bed. Comments she was previous on lasix for lower leg edema with improvement. States medication was discontinued in the hospital.  Reports she was given mucinex to help with cough but this increased her mucus production. Stopped medication due to this. Past Medical History:   Diagnosis Date    Adopted     Arthritis     Balance problems     MCBRIDE (dyspnea on exertion) 2/28/2019    GERD (gastroesophageal reflux disease)     Hemochromatosis     High cholesterol     Hypertension     Left knee pain     Left shoulder pain     Lumbar pain     Pain of left sacroiliac joint     Shoulder impingement, right, with rotator cuff strain       Past Surgical History:   Procedure Laterality Date    HX CHOLECYSTECTOMY      HX CHOLECYSTECTOMY      HX KNEE REPLACEMENT Bilateral     R knee/ Lknee and femur    HX ORTHOPAEDIC      R trigger finger     HX OTHER SURGICAL      Right little finger    HX OTHER SURGICAL      Right wrist, replaced unlnar).  HX OTHER SURGICAL      Both knees    HX OTHER SURGICAL      Left femur    HX OTHER SURGICAL      Trigger finger surgery    HX OTHER SURGICAL      Left knee replacment revision     HX ROTATOR CUFF REPAIR Right     HX TONSILLECTOMY       Current Outpatient Medications   Medication Sig Dispense Refill    albuterol (PROVENTIL VENTOLIN) 2.5 mg /3 mL (0.083 %) nebu 3 mL by Nebulization route every six (6) hours as needed for Cough. FILE UNDER MEDICARE B  Diag.  Codes: R05, J45.40, R06.09 30 Nebule 3    budesonide (PULMICORT) 0.5 mg/2 mL nbsp 2 mL by Nebulization route two (2) times a day. FILE UNDER MEDICARE B  Diag. Codes:  R05, J45.40, R06.09 60 Each 3    revefenacin (YUPELRI) 175 mcg/3 mL nebu nebulizer solution 3 mL by Nebulization route daily. 7 Nebule 0    ELIQUIS 5 mg tablet TAKE 1 TABLET BY MOUTH EVERY 12 HOURS 90 Tab 0    albuterol (PROAIR HFA) 90 mcg/actuation inhaler Take 2 Puffs by inhalation every four (4) hours as needed for Wheezing. Or SOB 1 Inhaler 3    alum-mag hydroxide-simeth (MYLANTA) 200-200-20 mg/5 mL susp Take 30 mL by mouth every four (4) hours as needed (heart burn). 100 mL 0    acetaminophen (TYLENOL) 325 mg tablet Take 2 Tabs by mouth every six (6) hours as needed for Pain or Fever. 30 Tab 0    OTHER Incentive Spirometer: please use 10 times every hour daily 1 Units 0    OTHER Please check CBC, BMP, Mg, and Phos in 3-5 days. [facility]CP at [facility] to f/u results. 1 Units 0    levothyroxine (SYNTHROID) 25 mcg tablet Take 1 Tab by mouth Daily (before breakfast). 90 Tab 6    potassium chloride (KLOR-CON M20) 20 mEq tablet Take 1 Tab by mouth daily. 90 Tab 6    allopurinol (ZYLOPRIM) 100 mg tablet Take 1 Tab by mouth two (2) times a day. 180 Tab 11    simvastatin (ZOCOR) 40 mg tablet Take 1 Tab by mouth nightly. 90 Tab 6    inhalational spacing device (ACE AEROSOL CLOUD ENHANCER) 1 Each by Does Not Apply route as needed. 1 Device 3    cholecalciferol, vitamin D3, 2,000 unit tab TAKE 1 TABLET BY MOUTH EVERY DAY  3    Omeprazole delayed release (PRILOSEC D/R) 20 mg tablet Take 20 mg by mouth daily.  predniSONE (DELTASONE) 10 mg tablet Take 40 mg daily x 3 days, 30 mg x 3 days, 20 mg x 3 days, 10 mg x 3 days. Take with breakfast 30 Tab 0    guaiFENesin ER (MUCINEX) 600 mg ER tablet Take 600 mg by mouth two (2) times a day.  guaiFENesin (ROBITUSSIN) 100 mg/5 mL liquid Take 5 mL by mouth every four (4) hours as needed for Cough. 100 mL 0    diclofenac (VOLTAREN) 1 % gel Apply 2 g to affected area every six (6) hours.  Indications: chronic shoulder pain 100 g 0     Allergies   Allergen Reactions    Indomethacin Anaphylaxis, Hives and Swelling    Tomato Other (comments)     Heart burn     Family History   Adopted: Yes     Social History     Tobacco Use    Smoking status: Former Smoker     Packs/day: 0.50     Years: 2.00     Pack years: 1.00     Start date:      Last attempt to quit: 1974     Years since quittin.8    Smokeless tobacco: Never Used    Tobacco comment: quit 20 years ago   Substance Use Topics    Alcohol use: No     Patient Active Problem List   Diagnosis Code    Shoulder impingement, right, with rotator cuff strain M75.40    Spinal stenosis M48.00    Sacroiliitis ([facility] [address] M46.1    High cholesterol E78.00    Hypertension I10    Hemochromatosis E83.119    Obesity, morbid ([facility] [address] E66.01    MCBRIDE (dyspnea on exertion) R06.09    CAP (community acquired pneumonia) J18.9    Sepsis ([facility] [address] A41.9    Hypotension I95.9    [facility] fibrillation with RVR [facility] I48.91     Review of Systems   Constitutional: Negative for chills and fever. Respiratory: Positive for cough, sputum production and shortness of breath. Cardiovascular: Positive for leg swelling. BP (!) 164/98 (BP 1 Location: Right arm, BP Patient Position: Sitting)   Pulse 94   Temp 97.5 °F (36.4 °C) (Oral)   Resp 18   Ht 5' (1.524 m)   Wt 216 lb (98 kg)   SpO2 97%   BMI 42.18 kg/m²     Physical Exam   Constitutional: She is oriented to person, place, and time. She appears well-developed and well-nourished. No distress. HENT:   Head: Normocephalic and atraumatic. Right Ear: Tympanic membrane normal.   Left Ear: Tympanic membrane normal.   Nose: Mucosal edema present. Right sinus exhibits no maxillary sinus tenderness and no frontal sinus tenderness. Left sinus exhibits no maxillary sinus tenderness and no frontal sinus tenderness.    Mouth/Throat: Uvula is midline, oropharynx is clear and moist and mucous membranes are normal.   cobblestoning to post oropharynx Neck: Normal range of motion. Neck supple. Carotid bruit is not present. Cardiovascular: Normal rate, regular rhythm, normal heart sounds and intact distal pulses. Exam reveals no gallop and no friction rub. No murmur heard. Pulmonary/Chest: Effort normal and breath sounds normal. She has no decreased breath sounds. She has no wheezes. She has no rhonchi. She has no rales. Musculoskeletal: She exhibits edema (1+ bilateral lower extremities). Lymphadenopathy:     She has no cervical adenopathy. Neurological: She is alert and oriented to person, place, and time. Skin: Skin is warm and dry. Depression Risk Factor Screening:     3 most recent PHQ Screens 8/26/2019   PHQ Not Done -   Little interest or pleasure in doing things Not at all   Feeling down, depressed, irritable, or hopeless Not at all   Total Score PHQ 2 0     Alcohol Risk Factor Screening:     Alcohol Risk Factor Screening:   Do you average 1 drink per night or more than 7 drinks a week:  No    On any one occasion in the past three months have you have had more than 3 drinks containing alcohol:  No    Functional Ability and Level of Safety:   Hearing Loss  Hearing is good. Activities of Daily Living  The home contains: no safety equipment. Patient does total self care    Fall Risk  Fall Risk Assessment, last 12 mths 9/9/2019   Able to walk? Yes   Fall in past 12 months?  No       Abuse Screen  Patient is not abused    Cognitive Screening   Evaluation of Cognitive Function:  Has your family/caregiver stated any concerns about your memory: no  Normal    Patient Care Team   Patient Care Team:  Ashley Wen NP as PCP - General (Nurse Practitioner)  Juan Wick MD (Pulmonary Disease)  Inessa Ellsworth MD (Pain Management)  Antonino Mann RN as Ambulatory Care Navigator  Le Olivia as Transitional Nurse Navigator    Assessment/Plan   Education and counseling provided:  Are appropriate based on today's review and evaluation    Diagnoses and all orders for this visit:    1. Medicare annual wellness visit, subsequent    2. Gout, unspecified cause, unspecified chronicity, unspecified site  Comments:  Refill allopurinol  Orders:  -     allopurinol (ZYLOPRIM) 100 mg tablet; Take 1 Tab by mouth two (2) times a day. 3. Essential hypertension    4. Edema, unspecified type  -     METABOLIC PANEL, COMPREHENSIVE; Future    5. MCBRIDE (dyspnea on exertion)  -     REFERRAL TO PULMONARY REHAB    Other orders  -     potassium chloride (KLOR-CON M20) 20 mEq tablet; Take 1 Tab by mouth daily. -     montelukast (SINGULAIR) 10 mg tablet; Take 1 Tab by mouth nightly. -     benzonatate (TESSALON) 200 mg capsule; Take 1 Cap by mouth three (3) times daily as needed for Cough for up to 7 days. -     furosemide (LASIX) 20 mg tablet; Take 1 Tab by mouth daily. Health Maintenance Due   Topic Date Due    DTaP/Tdap/Td series (1 - Tdap) 04/17/1964    Shingrix Vaccine Age 50> (1 of 2) 04/17/1993    GLAUCOMA SCREENING Q2Y  04/17/2008    Bone Densitometry (Dexa) Screening  04/17/2008    Pneumococcal 65+ years (1 of 2 - PCV13) 04/17/2008    MEDICARE YEARLY EXAM  03/14/2018       alarm signs when to seek emergent care provided and reviewed   I have discussed the diagnosis with the patient and the intended plan as seen in the above orders. The patient has received an after-visit summary and questions were answered concerning future plans. I have discussed medication side effects and warnings with the patient as well. Patient agreeable with above plan and verbalizes understanding. Follow-up and Dispositions    · Return in about 10 days (around 11/7/2019) for cough/sob.

## 2019-10-28 NOTE — PROGRESS NOTES
Callie Ellis is a  66 y.o. female presents today for office visit for [employer]. Patient c/o a cough x 3 months and swelling of the lower extremities. 1. Have you been to the ER, urgent care clinic or hospitalized since your last visit? No     2. Have you seen or consulted any other health care providers outside of the [address] [place] [address] since your last visit (Include any pap smears or colon screening)? NO

## 2019-10-29 ENCOUNTER — TELEPHONE (OUTPATIENT)
Dept: CARDIAC REHAB | Age: 74
End: 2019-10-29

## 2019-10-29 NOTE — TELEPHONE ENCOUNTER
Pulmonary rehab reached out to patient who showed interest. She stated she will check her schedule for when she can come in and call back.     Thank you,    Radha Sayres  Pulmonary Rehab Coordinator

## 2019-10-30 ENCOUNTER — TELEPHONE (OUTPATIENT)
Dept: CARDIAC REHAB | Age: 74
End: 2019-10-30

## 2019-10-30 NOTE — TELEPHONE ENCOUNTER
Pulmonary rehab reached out to patient who is currently very sick and stated she could not get in because of lack of transportation. We will call back in a week.     Thank you,    Mike Mahan  Pulmonary Rehab Coordinator

## 2019-11-05 ENCOUNTER — PATIENT OUTREACH (OUTPATIENT)
Dept: FAMILY MEDICINE CLINIC | Age: 74
End: 2019-11-05

## 2019-11-05 NOTE — PROGRESS NOTES
Complex Case Management      Date/Time:  2019 2:10 PM    Method of communication with patient: phone    [facility] contacted the Patient by telephone to perform Ambulatory Care Coordination. Verified name and  with Patient as identifiers. Provided introduction to self, and explanation of the Ambulatory Care Manager's role. Reviewed most recent clinic visit w/ Patient who verbalized understanding. Patient given an opportunity to ask questions. Top Challenges reviewed with the patient   1. Cough  2. Back and leg pain     Patient reports that she is \"not good\". States she still has a cough productive of thick, clear sputum. She is unsure if she has a fever because she has not checked her temperature but does not feel like she has one. She is also complaining of back pain and leg pain which are chronic for her. She states she is not sleeping well at night because her mouth is dry, but she reports that she stays well hydrated. Patient declined appointment with Keith Dumont. She is opting to wait until her scheduled appointment on 19. She was advised to call if anything worsens before then. She verbalized understanding. She reports that her blood pressure has been good. She states she has some edema in her right leg that occurs as the day progresses. She tries to elevate her legs when sitting but does not always do so. Patient denies nausea, vomiting, dizziness, urinary frequency or retention, diarrhea or constipation. Patient reports taking medications as prescribed. She reports that the pharmacy has not refilled her allopurinol. Writer called [facility] to confirm. The pharmacist said the refill will be available today. Patient made aware. The Patient agrees to contact the PCP office or the [facility] for questions related to their healthcare. Provided contact information for future reference.     Disease Specific:   N/A    Home Health Active: No     DME Active: No Barriers to care? None at this time. Advance Care Planning:   Does patient have an Advance Directive:  not on file     Medication(s):   Medication reconciliation was not performed with patient, who verbalizes understanding of administration of home medications. There were no barriers to obtaining medications identified at this time. Referral to Pharm D needed: no     Current Outpatient Medications   Medication Sig    potassium chloride (KLOR-CON M20) 20 mEq tablet Take 1 Tab by mouth daily.  allopurinol (ZYLOPRIM) 100 mg tablet Take 1 Tab by mouth two (2) times a day.  montelukast (SINGULAIR) 10 mg tablet Take 1 Tab by mouth nightly.  furosemide (LASIX) 20 mg tablet Take 1 Tab by mouth daily.  albuterol (PROVENTIL VENTOLIN) 2.5 mg /3 mL (0.083 %) nebu 3 mL by Nebulization route every six (6) hours as needed for Cough. FILE UNDER MEDICARE B  Diag. Codes: R05, J45.40, R06.09    budesonide (PULMICORT) 0.5 mg/2 mL nbsp 2 mL by Nebulization route two (2) times a day. FILE UNDER MEDICARE B  Diag. Codes:  R05, J45.40, R06.09    revefenacin (YUPELRI) 175 mcg/3 mL nebu nebulizer solution 3 mL by Nebulization route daily.  ELIQUIS 5 mg tablet TAKE 1 TABLET BY MOUTH EVERY 12 HOURS    predniSONE (DELTASONE) 10 mg tablet Take 40 mg daily x 3 days, 30 mg x 3 days, 20 mg x 3 days, 10 mg x 3 days. Take with breakfast    guaiFENesin ER (MUCINEX) 600 mg ER tablet Take 600 mg by mouth two (2) times a day.  albuterol (PROAIR HFA) 90 mcg/actuation inhaler Take 2 Puffs by inhalation every four (4) hours as needed for Wheezing. Or SOB    guaiFENesin (ROBITUSSIN) 100 mg/5 mL liquid Take 5 mL by mouth every four (4) hours as needed for Cough.  diclofenac (VOLTAREN) 1 % gel Apply 2 g to affected area every six (6) hours. Indications: chronic shoulder pain    alum-mag hydroxide-simeth (MYLANTA) 200-200-20 mg/5 mL susp Take 30 mL by mouth every four (4) hours as needed (heart burn).     acetaminophen (TYLENOL) 325 mg tablet Take 2 Tabs by mouth every six (6) hours as needed for Pain or Fever.  OTHER Incentive Spirometer: please use 10 times every hour daily    OTHER Please check CBC, BMP, Mg, and Phos in 3-5 days. [facility] to f/u results.  levothyroxine (SYNTHROID) 25 mcg tablet Take 1 Tab by mouth Daily (before breakfast).  simvastatin (ZOCOR) 40 mg tablet Take 1 Tab by mouth nightly.  inhalational spacing device (ACE AEROSOL CLOUD ENHANCER) 1 Each by Does Not Apply route as needed.  cholecalciferol, vitamin D3, 2,000 unit tab TAKE 1 TABLET BY MOUTH EVERY DAY    Omeprazole delayed release (PRILOSEC D/R) 20 mg tablet Take 20 mg by mouth daily. No current facility-administered medications for this visit. [facility] follow up appointment(s):   Future Appointments   Date Time Provider Jose Singleton   11/11/2019 11:40 AM Rhiannon Chinchilla NP [facility]        Non-[facility] follow up appointment(s): n/a    Goals       Pt will complete an ACP and have it scanned into their EMR by 12/30/19      Patient will attend all scheduled appointments through 12/30/19 11/5/19 Patient has upcoming appointment with Rg Joseph on 11/11/19       Pt will take all medications prescribed to be evaluated on each outreach through 12/30/19 11/5/19 Patient reports taking medications as prescribed. She reports that the pharmacy has not refilled her allopurinol. Writer called [facility] to confirm. The pharmacist said the refill will be available today. Patient made aware.

## 2019-11-11 ENCOUNTER — OFFICE VISIT (OUTPATIENT)
Dept: FAMILY MEDICINE CLINIC | Age: 74
End: 2019-11-11

## 2019-11-11 ENCOUNTER — APPOINTMENT (OUTPATIENT)
Dept: GENERAL RADIOLOGY | Age: 74
End: 2019-11-11
Attending: EMERGENCY MEDICINE
Payer: MEDICARE

## 2019-11-11 ENCOUNTER — HOSPITAL ENCOUNTER (EMERGENCY)
Age: 74
Discharge: HOME | End: 2019-11-11
Attending: EMERGENCY MEDICINE
Payer: MEDICARE

## 2019-11-11 VITALS
HEIGHT: 60 IN | TEMPERATURE: 98 F | HEART RATE: 86 BPM | WEIGHT: 219 LBS | OXYGEN SATURATION: 95 % | SYSTOLIC BLOOD PRESSURE: 130 MMHG | RESPIRATION RATE: 18 BRPM | BODY MASS INDEX: 43 KG/M2 | DIASTOLIC BLOOD PRESSURE: 68 MMHG

## 2019-11-11 VITALS
HEART RATE: 84 BPM | TEMPERATURE: 97.2 F | DIASTOLIC BLOOD PRESSURE: 89 MMHG | OXYGEN SATURATION: 95 % | SYSTOLIC BLOOD PRESSURE: 186 MMHG | RESPIRATION RATE: 18 BRPM

## 2019-11-11 DIAGNOSIS — R06.02 SOB (SHORTNESS OF BREATH): Primary | ICD-10-CM

## 2019-11-11 DIAGNOSIS — R06.09 DOE (DYSPNEA ON EXERTION): Primary | ICD-10-CM

## 2019-11-11 LAB
ANION GAP SERPL CALC-SCNC: 3 MMOL/L (ref 3–18)
APTT PPP: 34.9 SEC (ref 23–36.4)
BASOPHILS # BLD: 0 K/UL (ref 0–0.1)
BASOPHILS NFR BLD: 0 % (ref 0–2)
BUN SERPL-MCNC: 17 MG/DL (ref 7–18)
BUN/CREAT SERPL: 18 (ref 12–20)
CALCIUM SERPL-MCNC: 9.2 MG/DL (ref 8.5–10.1)
CHLORIDE SERPL-SCNC: 110 MMOL/L (ref 100–111)
CK MB CFR SERPL CALC: 1.5 % (ref 0–4)
CK MB SERPL-MCNC: 1.8 NG/ML (ref 5–25)
CK SERPL-CCNC: 120 U/L (ref 26–192)
CO2 SERPL-SCNC: 32 MMOL/L (ref 21–32)
CREAT SERPL-MCNC: 0.97 MG/DL (ref 0.6–1.3)
DIFFERENTIAL METHOD BLD: ABNORMAL
EOSINOPHIL # BLD: 0.4 K/UL (ref 0–0.4)
EOSINOPHIL NFR BLD: 4 % (ref 0–5)
ERYTHROCYTE [DISTWIDTH] IN BLOOD BY AUTOMATED COUNT: 15.2 % (ref 11.6–14.5)
GLUCOSE SERPL-MCNC: 81 MG/DL (ref 74–99)
HCT VFR BLD AUTO: 38.6 % (ref 35–45)
HGB BLD-MCNC: 12.6 G/DL (ref 12–16)
INR PPP: 1.4 (ref 0.8–1.2)
LYMPHOCYTES # BLD: 1.9 K/UL (ref 0.9–3.6)
LYMPHOCYTES NFR BLD: 22 % (ref 21–52)
MCH RBC QN AUTO: 31 PG (ref 24–34)
MCHC RBC AUTO-ENTMCNC: 32.6 G/DL (ref 31–37)
MCV RBC AUTO: 94.8 FL (ref 74–97)
MONOCYTES # BLD: 1 K/UL (ref 0.05–1.2)
MONOCYTES NFR BLD: 12 % (ref 3–10)
NEUTS SEG # BLD: 5.3 K/UL (ref 1.8–8)
NEUTS SEG NFR BLD: 62 % (ref 40–73)
PLATELET # BLD AUTO: 348 K/UL (ref 135–420)
PMV BLD AUTO: 10.7 FL (ref 9.2–11.8)
POTASSIUM SERPL-SCNC: 4 MMOL/L (ref 3.5–5.5)
PROTHROMBIN TIME: 16.7 SEC (ref 11.5–15.2)
RBC # BLD AUTO: 4.07 M/UL (ref 4.2–5.3)
SODIUM SERPL-SCNC: 145 MMOL/L (ref 136–145)
TROPONIN I SERPL-MCNC: 0.04 NG/ML (ref 0–0.04)
WBC # BLD AUTO: 8.6 K/UL (ref 4.6–13.2)

## 2019-11-11 PROCEDURE — 82550 ASSAY OF CK (CPK): CPT

## 2019-11-11 PROCEDURE — 85025 COMPLETE CBC W/AUTO DIFF WBC: CPT

## 2019-11-11 PROCEDURE — 71045 X-RAY EXAM CHEST 1 VIEW: CPT

## 2019-11-11 PROCEDURE — 80048 BASIC METABOLIC PNL TOTAL CA: CPT

## 2019-11-11 PROCEDURE — 85730 THROMBOPLASTIN TIME PARTIAL: CPT

## 2019-11-11 PROCEDURE — 85610 PROTHROMBIN TIME: CPT

## 2019-11-11 PROCEDURE — 99283 EMERGENCY DEPT VISIT LOW MDM: CPT

## 2019-11-11 PROCEDURE — 93005 ELECTROCARDIOGRAM TRACING: CPT

## 2019-11-11 NOTE — PROGRESS NOTES
HISTORY OF PRESENT ILLNESS  Krupa Leone is a 66 y.o. female. Patient states cough has not changed. Comments singulair seemed to help some but over the last week symptoms worsened. States she began to have to sit up in the chair again at night. She continues to have increased sputum production. States she wakes up with chest congestion. Reports resuming lasix did not improve lower leg edema. Allergies   Allergen Reactions    Indomethacin Anaphylaxis, Hives and Swelling    Tomato Other (comments)     Heart burn     Current Outpatient Medications   Medication Sig Dispense Refill    potassium chloride (KLOR-CON M20) 20 mEq tablet Take 1 Tab by mouth daily. 90 Tab 6    allopurinol (ZYLOPRIM) 100 mg tablet Take 1 Tab by mouth two (2) times a day. 180 Tab 11    montelukast (SINGULAIR) 10 mg tablet Take 1 Tab by mouth nightly. 30 Tab 1    furosemide (LASIX) 20 mg tablet Take 1 Tab by mouth daily. 30 Tab 2    albuterol (PROVENTIL VENTOLIN) 2.5 mg /3 mL (0.083 %) nebu 3 mL by Nebulization route every six (6) hours as needed for Cough. FILE UNDER MEDICARE B  Diag. Codes: R05, J45.40, R06.09 30 Nebule 3    budesonide (PULMICORT) 0.5 mg/2 mL nbsp 2 mL by Nebulization route two (2) times a day. FILE UNDER MEDICARE B  Diag. Codes:  R05, J45.40, R06.09 60 Each 3    revefenacin (YUPELRI) 175 mcg/3 mL nebu nebulizer solution 3 mL by Nebulization route daily. 7 Nebule 0    ELIQUIS 5 mg tablet TAKE 1 TABLET BY MOUTH EVERY 12 HOURS 90 Tab 0    predniSONE (DELTASONE) 10 mg tablet Take 40 mg daily x 3 days, 30 mg x 3 days, 20 mg x 3 days, 10 mg x 3 days. Take with breakfast 30 Tab 0    guaiFENesin ER (MUCINEX) 600 mg ER tablet Take 600 mg by mouth two (2) times a day.  albuterol (PROAIR HFA) 90 mcg/actuation inhaler Take 2 Puffs by inhalation every four (4) hours as needed for Wheezing.  Or SOB 1 Inhaler 3    guaiFENesin (ROBITUSSIN) 100 mg/5 mL liquid Take 5 mL by mouth every four (4) hours as needed for Cough. 100 mL 0    diclofenac (VOLTAREN) 1 % gel Apply 2 g to affected area every six (6) hours. Indications: chronic shoulder pain 100 g 0    alum-mag hydroxide-simeth (MYLANTA) 200-200-20 mg/5 mL susp Take 30 mL by mouth every four (4) hours as needed (heart burn). 100 mL 0    acetaminophen (TYLENOL) 325 mg tablet Take 2 Tabs by mouth every six (6) hours as needed for Pain or Fever. 30 Tab 0    OTHER Incentive Spirometer: please use 10 times every hour daily 1 Units 0    OTHER Please check CBC, BMP, Mg, and Phos in 3-5 days. PCP at [facility] to f/u results. 1 Units 0    levothyroxine (SYNTHROID) 25 mcg tablet Take 1 Tab by mouth Daily (before breakfast). 90 Tab 6    simvastatin (ZOCOR) 40 mg tablet Take 1 Tab by mouth nightly. 90 Tab 6    inhalational spacing device (ACE AEROSOL CLOUD ENHANCER) 1 Each by Does Not Apply route as needed. 1 Device 3    cholecalciferol, vitamin D3, 2,000 unit tab TAKE 1 TABLET BY MOUTH EVERY DAY  3    Omeprazole delayed release (PRILOSEC D/R) 20 mg tablet Take 20 mg by mouth daily.        Past Medical History:   Diagnosis Date    Adopted     Arthritis     Balance problems     MCBRIDE (dyspnea on exertion) 2/28/2019    GERD (gastroesophageal reflux disease)     Hemochromatosis     High cholesterol     Hypertension     Left knee pain     Left shoulder pain     Lumbar pain     Pain of left sacroiliac joint     Shoulder impingement, right, with rotator cuff strain      Social History     Socioeconomic History    Marital status: SINGLE     Spouse name: Not on file    Number of children: Not on file    Years of education: Not on file    Highest education level: Not on file   Occupational History    Not on file   Social Needs    Financial resource strain: Not on file    Food insecurity:     Worry: Not on file     Inability: Not on file    Transportation needs:     Medical: Not on file     Non-medical: Not on file   Tobacco Use    Smoking status: Former Smoker Packs/day: 0.50     Years: 2.00     Pack years: 1.00     Start date: 5     Last attempt to quit: 1974     Years since quittin.11    Smokeless tobacco: Never Used    Tobacco comment: quit 20 years ago   Substance and Sexual Activity    Alcohol use: No    Drug use: No    Sexual activity: Not on file     Comment: post menopausal   Lifestyle    Physical activity:     Days per week: Not on file     Minutes per session: Not on file    Stress: Not on file   Relationships    Social connections:     Talks on phone: Not on file     Gets together: Not on file     Attends Taoist service: Not on file     Active member of club or organization: Not on file     Attends meetings of clubs or organizations: Not on file     Relationship status: Not on file    Intimate partner violence:     Fear of current or ex partner: Not on file     Emotionally abused: Not on file     Physically abused: Not on file     Forced sexual activity: Not on file   Other Topics Concern    Not on file   Social History Narrative    Not on file     Wt Readings from Last 3 Encounters:   19 219 lb (99.3 kg)   10/28/19 216 lb (98 kg)   10/21/19 228 lb 3.2 oz (103.5 kg)     BP Readings from Last 3 Encounters:   19 130/68   10/28/19 (!) 164/98   10/21/19 130/70     Review of Systems   Constitutional: Negative for chills and fever. Respiratory: Positive for cough and shortness of breath. Cardiovascular: Positive for orthopnea and leg swelling. Negative for chest pain and palpitations. Neurological: Negative for dizziness and headaches. /68   Pulse 86   Temp 98 °F (36.7 °C) (Oral)   Resp 18   Ht 5' (1.524 m)   Wt 219 lb (99.3 kg)   SpO2 95%   BMI 42.77 kg/m²      Physical Exam   Constitutional: She is oriented to person, place, and time. She appears well-developed and well-nourished. HENT:   Head: Normocephalic and atraumatic. Neck: Normal range of motion. Neck supple.    Cardiovascular: Normal rate, regular rhythm and normal heart sounds. Exam reveals no gallop and no friction rub. No murmur heard. Pulmonary/Chest: Effort normal and breath sounds normal. She has no wheezes. She has no rhonchi. She has no rales. Musculoskeletal: She exhibits edema (2+ pitting edema). Neurological: She is alert and oriented to person, place, and time. Skin: Skin is warm and dry. ASSESSMENT and PLAN    ICD-10-CM ICD-9-CM    1. MCBRIDE (dyspnea on exertion) R06.09 786.09 AMB POC EKG ROUTINE W/ 12 LEADS, INTER & REP     Orders Placed This Encounter    AMB POC EKG ROUTINE W/ 12 LEADS, INTER & REP         Given symptoms and abnormal EKG will sent to the ED for further evaluation. Patient will have friend take her in the ED. Patient stable to travel via private vehicle. Attempted to contact the ED x3 to inform of patient's pending arrival to no avail. I have discussed the diagnosis with the patient and the intended plan as seen in the above orders. The patient has received an after-visit summary and questions were answered concerning future plans. I have discussed medication side effects and warnings with the patient as well. Patient agreeable with above plan and verbalizes understanding. Follow-up and Dispositions    · Return in about 2 weeks (around 11/25/2019) for sob/edema.

## 2019-11-11 NOTE — ED PROVIDER NOTES
EMERGENCY DEPARTMENT HISTORY AND PHYSICAL EXAM    3:28 PM  Date: 11/11/2019  Patient Name: Natalie Mustafa    History of Presenting Illness     Chief Complaint   Patient presents with    Abnormal EKG    Shortness of Breath    Chest Pain        History Provided By: Patient    HPI: Natalie Mustafa is a 66 y.o. female with history of multiple medical problems as below. Patient was at her primary care doctor appointment today for routine follow-up and she was referred to the ER because they noticed an abnormal EKG. Comparing her EKG from today and her prior EKGs from July they looked at the same without any acute changes. Patient is reporting no change in her baseline shortness of breath. She had pneumonia in July and was hospitalized and since then she has been having shortness of breath and cough that had no change. She was started recently on Singulair and her nighttime cough had actually improved. Denies chest pain, fever or chills. No history of vomiting or diarrhea. She does have bilateral lower extremity edema right greater than left but that is also since July without a change. Reports she is compliant to her medications. Location:  Severity:  Timing/course:    Onset/Duration:     PCP: Antonio Sy NP    Past History     Past Medical History:  Past Medical History:   Diagnosis Date    Adopted     Arthritis     Balance problems     MCBRIDE (dyspnea on exertion) 2/28/2019    GERD (gastroesophageal reflux disease)     Hemochromatosis     High cholesterol     Hypertension     Left knee pain     Left shoulder pain     Lumbar pain     Pain of left sacroiliac joint     Shoulder impingement, right, with rotator cuff strain        Past Surgical History:  Past Surgical History:   Procedure Laterality Date    HX CHOLECYSTECTOMY      HX CHOLECYSTECTOMY      HX KNEE REPLACEMENT Bilateral     R knee/ Lknee and femur    HX ORTHOPAEDIC      R trigger finger     HX OTHER SURGICAL      Right little finger    HX OTHER SURGICAL      Right wrist, replaced unlnar).  HX OTHER SURGICAL      Both knees    HX OTHER SURGICAL      Left femur    HX OTHER SURGICAL      Trigger finger surgery    HX OTHER SURGICAL      Left knee replacment revision     HX ROTATOR CUFF REPAIR Right     HX TONSILLECTOMY         Family History:  Family History   Adopted: Yes       Social History:  Social History     Tobacco Use    Smoking status: Former Smoker     Packs/day: 0.50     Years: 2.00     Pack years: 1.00     Start date:      Last attempt to quit: 1974     Years since quittin.8    Smokeless tobacco: Never Used    Tobacco comment: quit 20 years ago   Substance Use Topics    Alcohol use: No    Drug use: No       Allergies: Allergies   Allergen Reactions    Indomethacin Anaphylaxis, Hives and Swelling    Tomato Other (comments)     Heart burn       Review of Systems   Review of Systems   Respiratory: Positive for cough and shortness of breath. Cardiovascular: Positive for leg swelling. All other systems reviewed and are negative. Physical Exam     Patient Vitals for the past 12 hrs:   Temp Pulse Resp BP SpO2   19 1426 97.2 °F (36.2 °C) 84 18 186/89 95 %       Physical Exam   Constitutional: She is oriented to person, place, and time. She appears well-developed and well-nourished. HENT:   Head: Normocephalic and atraumatic. Eyes: Conjunctivae and EOM are normal.   Neck: Normal range of motion. Neck supple. Cardiovascular: Normal rate. Pulmonary/Chest: Effort normal. No respiratory distress. Musculoskeletal: Normal range of motion. She exhibits edema. She exhibits no deformity. Neurological: She is alert and oriented to person, place, and time. Skin: Skin is warm and dry. Psychiatric: She has a normal mood and affect.  Her behavior is normal.       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)  Abnormal ECG  When compared with ECG of 22-JUL-2019 13:20,  Questionable change in initial forces of Anterior leads  ST no longer depressed in Lateral leads  Nonspecific T wave abnormality no longer evident in Lateral leads     PTT    Collection Time: 11/11/19  2:41 PM   Result Value Ref Range    aPTT 34.9 23.0 - 36.4 SEC   PROTHROMBIN TIME + INR    Collection Time: 11/11/19  2:41 PM   Result Value Ref Range    Prothrombin time 16.7 (H) 11.5 - 15.2 sec    INR 1.4 (H) 0.8 - 1.2     CBC WITH AUTOMATED DIFF    Collection Time: 11/11/19  2:41 PM   Result Value Ref Range    WBC 8.6 4.6 - 13.2 K/uL    RBC 4.07 (L) 4.20 - 5.30 M/uL    HGB 12.6 12.0 - 16.0 g/dL    HCT 38.6 35.0 - 45.0 %    MCV 94.8 74.0 - 97.0 FL    MCH 31.0 24.0 - 34.0 PG    MCHC 32.6 31.0 - 37.0 g/dL    RDW 15.2 (H) 11.6 - 14.5 %    PLATELET 759 865 - 179 K/uL    MPV 10.7 9.2 - 11.8 FL    NEUTROPHILS 62 40 - 73 %    LYMPHOCYTES 22 21 - 52 %    MONOCYTES 12 (H) 3 - 10 %    EOSINOPHILS 4 0 - 5 %    BASOPHILS 0 0 - 2 %    ABS. NEUTROPHILS 5.3 1.8 - 8.0 K/UL    ABS. LYMPHOCYTES 1.9 0.9 - 3.6 K/UL    ABS. MONOCYTES 1.0 0.05 - 1.2 K/UL    ABS. EOSINOPHILS 0.4 0.0 - 0.4 K/UL    ABS.  BASOPHILS 0.0 0.0 - 0.1 K/UL    DF AUTOMATED     METABOLIC PANEL, BASIC    Collection Time: 11/11/19  2:41 PM   Result Value Ref Range    Sodium 145 136 - 145 mmol/L    Potassium 4.0 3.5 - 5.5 mmol/L    Chloride 110 100 - 111 mmol/L    CO2 32 21 - 32 mmol/L    Anion gap 3 3.0 - 18 mmol/L    Glucose 81 74 - 99 mg/dL    BUN 17 7.0 - 18 MG/DL    Creatinine 0.97 0.6 - 1.3 MG/DL    BUN/Creatinine ratio 18 12 - 20      GFR est AA >60 >60 ml/min/1.73m2    GFR est non-AA 56 (L) >60 ml/min/1.73m2    Calcium 9.2 8.5 - 10.1 MG/DL   CARDIAC PANEL,(CK, CKMB & TROPONIN)    Collection Time: 11/11/19  2:41 PM   Result Value Ref Range     26 - 192 U/L    CK - MB 1.8 <3.6 ng/ml    CK-MB Index 1.5 0.0 - 4.0 %    Troponin-I, QT 0.04 0.0 - 0.045 NG/ML       Radiologic Studies -   No results found. Medical Decision Making     ED Course: Progress Notes, Reevaluation, and Consults:    3:28 PM Initial assessment performed. The patients presenting problems have been discussed, and they/their family are in agreement with the care plan formulated and outlined with them. I have encouraged them to ask questions as they arise throughout their visit. Provider Notes (Medical Decision Making): 66-year-old female referred from her primary care doctor for shortness of breath and abnormal EKG. Patient's shortness of breath had no change over the course of the past 4 months and her EKG also had no acute changes. Will get screening labs then likely be discharged. The cough seems related to her chronic bronchitis and she is reporting improvement in her symptoms. Procedures:     Critical Care Time:     Vital Signs-Reviewed the patient's vital signs. Reviewed pt's pulse ox reading. EKG: Interpreted by the EP. Time Interpreted:    Rate:    Rhythm:    Interpretation:   Comparison:     Records Reviewed: Nursing Notes (Time of Review: 3:28 PM)  -I am the first provider for this patient.  -I reviewed the vital signs, available nursing notes, past medical history, past surgical history, family history and social history. Current Outpatient Medications   Medication Sig Dispense Refill    potassium chloride (KLOR-CON M20) 20 mEq tablet Take 1 Tab by mouth daily. 90 Tab 6    allopurinol (ZYLOPRIM) 100 mg tablet Take 1 Tab by mouth two (2) times a day. 180 Tab 11    montelukast (SINGULAIR) 10 mg tablet Take 1 Tab by mouth nightly.  30 Tab 1  furosemide (LASIX) 20 mg tablet Take 1 Tab by mouth daily. 30 Tab 2    albuterol (PROVENTIL VENTOLIN) 2.5 mg /3 mL (0.083 %) nebu 3 mL by Nebulization route every six (6) hours as needed for Cough. FILE UNDER MEDICARE B  Diag. Codes: R05, J45.40, R06.09 30 Nebule 3    budesonide (PULMICORT) 0.5 mg/2 mL nbsp 2 mL by Nebulization route two (2) times a day. FILE UNDER MEDICARE B  Diag. Codes:  R05, J45.40, R06.09 60 Each 3    revefenacin (YUPELRI) 175 mcg/3 mL nebu nebulizer solution 3 mL by Nebulization route daily. 7 Nebule 0    ELIQUIS 5 mg tablet TAKE 1 TABLET BY MOUTH EVERY 12 HOURS 90 Tab 0    predniSONE (DELTASONE) 10 mg tablet Take 40 mg daily x 3 days, 30 mg x 3 days, 20 mg x 3 days, 10 mg x 3 days. Take with breakfast 30 Tab 0    guaiFENesin ER (MUCINEX) 600 mg ER tablet Take 600 mg by mouth two (2) times a day.  albuterol (PROAIR HFA) 90 mcg/actuation inhaler Take 2 Puffs by inhalation every four (4) hours as needed for Wheezing. Or SOB 1 Inhaler 3    guaiFENesin (ROBITUSSIN) 100 mg/5 mL liquid Take 5 mL by mouth every four (4) hours as needed for Cough. 100 mL 0    diclofenac (VOLTAREN) 1 % gel Apply 2 g to affected area every six (6) hours. Indications: chronic shoulder pain 100 g 0    alum-mag hydroxide-simeth (MYLANTA) 200-200-20 mg/5 mL susp Take 30 mL by mouth every four (4) hours as needed (heart burn). 100 mL 0    acetaminophen (TYLENOL) 325 mg tablet Take 2 Tabs by mouth every six (6) hours as needed for Pain or Fever. 30 Tab 0    OTHER Incentive Spirometer: please use 10 times every hour daily 1 Units 0    OTHER Please check CBC, BMP, Mg, and Phos in 3-5 days. PCP at [facility] to f/u results. 1 Units 0    levothyroxine (SYNTHROID) 25 mcg tablet Take 1 Tab by mouth Daily (before breakfast). 90 Tab 6    simvastatin (ZOCOR) 40 mg tablet Take 1 Tab by mouth nightly. 90 Tab 6    inhalational spacing device (ACE AEROSOL CLOUD ENHANCER) 1 Each by Does Not Apply route as needed.  1 Device 3    cholecalciferol, vitamin D3, 2,000 unit tab TAKE 1 TABLET BY MOUTH EVERY DAY  3    Omeprazole delayed release (PRILOSEC D/R) 20 mg tablet Take 20 mg by mouth daily. Clinical Impression     Clinical Impression: No diagnosis found. Disposition: DC      DISCHARGE NOTE:     Pt has been reexamined. Patient has no new complaints, changes, or physical findings. Care plan outlined and precautions discussed. Results of labs and imaging were reviewed with the patient. All medications were reviewed with the patient; will d/c home with return precautions. All of pt's questions and concerns were addressed. Patient was instructed and agrees to follow up with PCP, as well as to return to the ED upon further deterioration. Patient is ready to go home. This note was dictated utilizing voice recognition software which may lead to typographical errors. I apologize in advance if the situation occurs. If questions arise please do not hesitate to contact me or call our department.     Alexandr Hendrickson MD  3:28 PM

## 2019-11-11 NOTE — PROGRESS NOTES
Sonia Saldana presents today for   Chief Complaint   Patient presents with    Shortness of Breath    Cough       Is someone accompanying this pt? no    Is the patient using any DME equipment during OV? no    Depression Screening:  3 most recent PHQ Screens 8/26/2019   PHQ Not Done -   Little interest or pleasure in doing things Not at all   Feeling down, depressed, irritable, or hopeless Not at all   Total Score PHQ 2 0       Learning Assessment:  Learning Assessment 9/3/2019   PRIMARY LEARNER Patient   HIGHEST LEVEL OF EDUCATION - PRIMARY LEARNER  -   BARRIERS PRIMARY LEARNER -   CO-LEARNER CAREGIVER -   PRIMARY LANGUAGE ENGLISH   LEARNER PREFERENCE PRIMARY LISTENING     -     -   ANSWERED BY patient   RELATIONSHIP SELF       Abuse Screening:  No flowsheet data found. Fall Risk  Fall Risk Assessment, last 12 mths 9/9/2019   Able to walk? Yes   Fall in past 12 months? No       Health Maintenance reviewed and discussed and ordered per Provider. Health Maintenance Due   Topic Date Due    DTaP/Tdap/Td series (1 - Tdap) 04/17/1964    Shingrix Vaccine Age 50> (1 of 2) 04/17/1993    GLAUCOMA SCREENING Q2Y  04/17/2008    Bone Densitometry (Dexa) Screening  04/17/2008    Pneumococcal 65+ years (1 of 1 - PPSV23) 04/17/2008    Influenza Age 5 to Adult  08/01/2019           Coordination of Care:  1. Have you been to the ER, urgent care clinic since your last visit? Hospitalized since your last visit? no    2. Have you seen or consulted any other health care providers outside of the [address] [place] [address] since your last visit? Include any pap smears or colon screening.  no

## 2019-11-11 NOTE — DISCHARGE INSTRUCTIONS

## 2019-11-12 ENCOUNTER — PATIENT OUTREACH (OUTPATIENT)
Dept: FAMILY MEDICINE CLINIC | Age: 74
End: 2019-11-12

## 2019-11-12 LAB
ATRIAL RATE: 74 BPM
CALCULATED P AXIS, ECG09: 26 DEGREES
CALCULATED R AXIS, ECG10: -3 DEGREES
CALCULATED T AXIS, ECG11: 20 DEGREES
DIAGNOSIS, 93000: NORMAL
P-R INTERVAL, ECG05: 162 MS
Q-T INTERVAL, ECG07: 330 MS
QRS DURATION, ECG06: 58 MS
QTC CALCULATION (BEZET), ECG08: 366 MS
VENTRICULAR RATE, ECG03: 74 BPM

## 2019-11-12 NOTE — PROGRESS NOTES
Patient attended PCP appointment yesterday with Gene Summers NP. She was sent to ED from office for abnormal EKG. Patient was discharged from ED. EMR reviewed. Will continue to monitor.

## 2019-11-18 ENCOUNTER — OFFICE VISIT (OUTPATIENT)
Dept: PULMONOLOGY | Age: 74
End: 2019-11-18

## 2019-11-18 VITALS
OXYGEN SATURATION: 98 % | TEMPERATURE: 97 F | RESPIRATION RATE: 18 BRPM | BODY MASS INDEX: 42.8 KG/M2 | DIASTOLIC BLOOD PRESSURE: 89 MMHG | HEART RATE: 73 BPM | WEIGHT: 218 LBS | HEIGHT: 60 IN | SYSTOLIC BLOOD PRESSURE: 175 MMHG

## 2019-11-18 DIAGNOSIS — J41.1 CHRONIC BRONCHITIS WITH PRODUCTIVE MUCOPURULENT COUGH (HCC): ICD-10-CM

## 2019-11-18 DIAGNOSIS — R06.09 DOE (DYSPNEA ON EXERTION): Primary | ICD-10-CM

## 2019-11-18 DIAGNOSIS — E66.01 CLASS 3 SEVERE OBESITY DUE TO EXCESS CALORIES IN ADULT, UNSPECIFIED BMI, UNSPECIFIED WHETHER SERIOUS COMORBIDITY PRESENT (HCC): ICD-10-CM

## 2019-11-18 DIAGNOSIS — J45.909 ASTHMA, UNSPECIFIED ASTHMA SEVERITY, UNSPECIFIED WHETHER COMPLICATED, UNSPECIFIED WHETHER PERSISTENT: ICD-10-CM

## 2019-11-18 RX ORDER — PREDNISONE 20 MG/1
20 TABLET ORAL DAILY
Qty: 7 TAB | Refills: 0 | Status: SHIPPED | OUTPATIENT
Start: 2019-11-18 | End: 2019-11-25

## 2019-11-18 NOTE — PROGRESS NOTES
Claude Post presents today for   Chief Complaint   Patient presents with    Asthma    Breathing Problem    Cough     blood tinge sputum presented on Saturday    Shortness of Breath       Is someone accompanying this pt? Noah Head    Is the patient using any DME equipment during OV? [employer] walker    -DME Company n/a     Depression Screening:  3 most recent PHQ Screens 11/18/2019   PHQ Not Done -   Little interest or pleasure in doing things Not at all   Feeling down, depressed, irritable, or hopeless Not at all   Total Score PHQ 2 0       Learning Assessment:  Learning Assessment 9/3/2019   PRIMARY LEARNER Patient   HIGHEST LEVEL OF EDUCATION - PRIMARY LEARNER  -   BARRIERS PRIMARY LEARNER -   CO-LEARNER CAREGIVER -   PRIMARY LANGUAGE ENGLISH   LEARNER PREFERENCE PRIMARY LISTENING     -     -   ANSWERED BY patient   RELATIONSHIP SELF       Abuse Screening:  No flowsheet data found. Fall Risk  Fall Risk Assessment, last 12 mths 11/18/2019   Able to walk? Yes   Fall in past 12 months? No         Coordination of Care:  1. Have you been to the ER, urgent care clinic since your last visit? Hospitalized since your last visit? Yes; Name: [facility]    2. Have you seen or consulted any other health care providers outside of the [address] [place] [address] since your last visit? Include any pap smears or colon screening.  No

## 2019-11-18 NOTE — PROGRESS NOTES
[facility]  Pulmonary, Critical Care, and Sleep Medicine      Pulmonary Office Progress Notes    Name: Kin Hart     : 1945     Date: 2019        Subjective:     2019          HPI       Kin Hart  is a 66 y.o. female with PMH of DVT and pneumonia who presents for evaluation of cough. She was last seen here on 10/21/19 by Dr. Ashley Norris with complaints of chronic cough and her medications were changed to nebulized yuperli and budesonide   Today she appears comfortable, in  no distress however she states that she continues to have a daily, persistent cough productive of thick yellow mucus. She reports that she coughed up  blood tinged mucus several times throughout  the day on 19  but has not had any further episodes of hemoptysis  She continues to have  some increased shortness of breath/wheezing with activity. She denies chest pain. No fever, chills or orthopnea;  no decreased appetite or weight loss. She complains of chronic back and LE pain/swelling due to osteoarthritis. Her last PFTs were on 2019 and demonstrated normal flow volume loop with reduced diffusion capacity. She has a minimal history of smoking and reports a  2-pack-year history, quit in .       Past Medical History:   Diagnosis Date    Adopted     Arthritis     Balance problems     MCBRIDE (dyspnea on exertion) 2019    GERD (gastroesophageal reflux disease)     Hemochromatosis     High cholesterol     Hypertension     Left knee pain     Left shoulder pain     Lumbar pain     Pain of left sacroiliac joint     Shoulder impingement, right, with rotator cuff strain        Allergies   Allergen Reactions    Indomethacin Anaphylaxis, Hives and Swelling    Tomato Other (comments)     Heart burn       Current Outpatient Medications   Medication Sig Dispense Refill    potassium chloride (KLOR-CON M20) 20 mEq tablet Take 1 Tab by mouth daily.  90 Tab 6    allopurinol (ZYLOPRIM) 100 mg tablet Take 1 Tab by mouth two (2) times a day. 180 Tab 11    montelukast (SINGULAIR) 10 mg tablet Take 1 Tab by mouth nightly. 30 Tab 1    furosemide (LASIX) 20 mg tablet Take 1 Tab by mouth daily. 30 Tab 2    albuterol (PROVENTIL VENTOLIN) 2.5 mg /3 mL (0.083 %) nebu 3 mL by Nebulization route every six (6) hours as needed for Cough. FILE UNDER MEDICARE B  Diag. Codes: R05, J45.40, R06.09 30 Nebule 3    budesonide (PULMICORT) 0.5 mg/2 mL nbsp 2 mL by Nebulization route two (2) times a day. FILE UNDER MEDICARE B  Diag. Codes:  R05, J45.40, R06.09 60 Each 3    revefenacin (YUPELRI) 175 mcg/3 mL nebu nebulizer solution 3 mL by Nebulization route daily. 7 Nebule 0    ELIQUIS 5 mg tablet TAKE 1 TABLET BY MOUTH EVERY 12 HOURS 90 Tab 0    predniSONE (DELTASONE) 10 mg tablet Take 40 mg daily x 3 days, 30 mg x 3 days, 20 mg x 3 days, 10 mg x 3 days. Take with breakfast 30 Tab 0    guaiFENesin ER (MUCINEX) 600 mg ER tablet Take 600 mg by mouth two (2) times a day.  albuterol (PROAIR HFA) 90 mcg/actuation inhaler Take 2 Puffs by inhalation every four (4) hours as needed for Wheezing. Or SOB 1 Inhaler 3    guaiFENesin (ROBITUSSIN) 100 mg/5 mL liquid Take 5 mL by mouth every four (4) hours as needed for Cough. 100 mL 0    diclofenac (VOLTAREN) 1 % gel Apply 2 g to affected area every six (6) hours. Indications: chronic shoulder pain 100 g 0    alum-mag hydroxide-simeth (MYLANTA) 200-200-20 mg/5 mL susp Take 30 mL by mouth every four (4) hours as needed (heart burn). 100 mL 0    acetaminophen (TYLENOL) 325 mg tablet Take 2 Tabs by mouth every six (6) hours as needed for Pain or Fever. 30 Tab 0    OTHER Incentive Spirometer: please use 10 times every hour daily 1 Units 0    OTHER Please check CBC, BMP, Mg, and Phos in 3-5 days. PCP at SNF to f/u results. 1 Units 0    levothyroxine (SYNTHROID) 25 mcg tablet Take 1 Tab by mouth Daily (before breakfast).  90 Tab 6    simvastatin (ZOCOR) 40 mg tablet Take 1 Tab by mouth nightly. 90 Tab 6    inhalational spacing device (ACE AEROSOL CLOUD ENHANCER) 1 Each by Does Not Apply route as needed. 1 Device 3    cholecalciferol, vitamin D3, 2,000 unit tab TAKE 1 TABLET BY MOUTH EVERY DAY  3    Omeprazole delayed release (PRILOSEC D/R) 20 mg tablet Take 20 mg by mouth daily. Review of Systems:    HEENT: No epistaxis, no nasal drainage, no difficulty in swallowing, no redness in eyes  Respiratory: As stated above in HPI  Cardiovascular: no chest pain, no palpitations, no syncope. Chronic BLE edema  Gastrointestinal: no abd pain, no vomiting, no diarrhea, no bleeding symptoms  Genitourinary: No urinary symptoms or hematuria  Integument/breast: No ulcers or rashes  Musculoskeletal: chronic back and BLE joint pain  Neurological: No focal weakness, no seizures, no headaches  Behvioral/Psych: No anxiety, no depression  Constitutional: No fever, chills or night sweats. No decreased appetite or weight loss     Objective:     Visit Vitals  /89 (BP 1 Location: Right arm, BP Patient Position: Sitting)   Pulse 73   Temp 97 °F (36.1 °C) (Oral)   Resp 18   Ht 5' (1.524 m)   Wt 98.9 kg (218 lb)   SpO2 98%   BMI 42.58 kg/m²        PHYSICAL EXAM      General: Oriented to person, place, and time. Well-developed, well-nourished, and in no distress. Obese      Head:   Normocephalic, without obvious abnormality, atraumatic       Eyes:   Pupils reactive, conjunctivae / corneas clear. EOM's intact, no scleral icterus       Nose:   Nares normal, no drainage. Throat:    Lips, mucosa and tongue normal. Teeth and gums normal       Neck:   Supple, symmetrical, trachea midline. No adenopathy or thyroid swelling; no carotid bruit or JVD. CVS:    Regular rate and rhythm. S1S2 normal,  no murmurs       RS:      Symmetrical chest rise, moderate AE bilaterally. Lung sounds clear to auscultation bilaterally.   No wheezing, rales or rhonchi, no accessory muscle use      Abd:     Soft, non-tender. No hepatosplenomegaly                                                     Neuro:   non focal, awake, alert and oriented to person, place, time and situation    Extrm:   RLE edema, + 2. Trace edema, LLE. No  clubbing or cyanosis       Skin:   no rash    Data review:         PULMONARY FUNCTION TESTS     Date FVC FEV1  FEV1/FVC [id number] TLC RV RV/TLC VC DLCO   1/23/2019  95%  97%  76%  92%  97%  98%  101%  96%  74%                                                                                               Imaging:  I have reviewed all of the available data including the patient's previous history external records and radiological imaging available for review. In addition, applicable cardiology and other lab data were also reviewed. XR Results (most recent):  Results from Hospital Encounter encounter on 11/11/19   XR CHEST PORT    Narrative EXAM: XR CHEST PORT    INDICATION: sob    COMPARISON: 7/23/2019    FINDINGS: A portable AP radiograph of the chest was obtained at 1513 hours. The  bones are somewhat osteopenic. Degenerative changes noted in both shoulders. Moderate scoliotic curvature of the thoracic spine evident. The heart is not  dilated. The lungs are clear. The right hemidiaphragm is slightly elevated. Impression IMPRESSION: No interval change, no acute disease     CT Results (most recent):  Results from Hospital Encounter encounter on 07/22/19   CT ABD PELV WO CONT    Narrative CT abdomen and pelvis without contrast.    Indications: RLQ pain ; superimposed upon history of chronic abdominal pain. No  current alteration in bowel habits. Prior cholecystectomy. Technique: No IV contrast administered. No oral contrast. Contiguous 5 mm axial  images obtained from above the liver to the symphysis pubis. Sagittal and  Coronal MPR generated.     CT scans at this facility are performed using dose optimization technique as  appropriate with performed exam, to include automated exposure control,  adjustment of mA and/or kV according to patient's size (including appropriate  matching for site-specific examinations), or use of iterative reconstruction  technique. Comparison: September 2015    Findings:    Lower chest: Lung bases are unremarkable. No intraperitoneal free air or free fluid. No fluid collection. Liver: Unremarkable. Gallbladder/biliary: Prior cholecystectomy. Biliary tree unremarkable. Spleen: Unremarkable    Pancreas: Atrophic morphology, no concerning features. Left Kidney: Unremarkable. Right Kidney: Unremarkable. Adrenals: Unremarkable. Bowels/mesentery: Fluid distended stomach without obstructing focus. There is  large bowel diverticulosis. Appendix unremarkable. Pelvis: Urinary bladder unremarkable. Atrophic uterus versus prior hysterectomy. Unremarkable for age. Vascular: Aorta unremarkable for age. IVC unremarkable. Lymph Nodes: No adenopathy. Bones: No acute findings. Prior left hip ORIF. Unremarkable for age.       Impression IMPRESSION[de-identified]    1.  Nothing clearly acute within the abdomen or pelvis.  -Fluid distended stomach; if there are issues of nausea, consider possibility of  gastroparesis  -Large bowel diverticulosis without inflammation    Thank you for this referral.        Patient Active Problem List   Diagnosis Code    Shoulder impingement, right, with rotator cuff strain M75.40    Spinal stenosis M48.00    Sacroiliitis ([address] M46.1    High cholesterol E78.00    Hypertension I10    Hemochromatosis E83.119    Obesity, morbid ([address] E66.01    MCBRIDE (dyspnea on exertion) R06.09    CAP (community acquired pneumonia) J18.9    Sepsis ([address] A41.9    Hypotension I95.9    Atrial fibrillation with RVR ([address] I48.91       IMPRESSION:   · Persistent cough with features suggestive of reactive airways likely postinfectious inflammatory cough  · Asthma-moderate persistent  · History of PE/ bilateral DVT -currently on Eliquis 5 mg twice daily  · Obesity-BMI 44.92  · Hemochromatosis      RECOMMENDATIONS:   · Continue pulmicort ,  yulperi  And albuterol PRN. Discussed appropriate use of maintenance and rescue inhalers with pateint  · Continue eliquis 5 mg BID  · Complete CT chest - further evaluation needed for worsening cough and hemoptysis  · Submit sputum sample for respiratory culture  · Increase po fluids, take OTC mucinex as needed for cough  · Recommend healthy diet / weight / exercise as tolerated  · Follow up in pulmonary clinic in 3 months or sooner  · Go to the ER with chest pain or difficulty breathing            Please note that this dictation was completed with [employer], the computer voice recognition software. Quite often unanticipated grammatical, syntax, homophones, and other interpretive errors are inadvertently transcribed by the computer software. Please disregard these errors. Please excuse any errors that have escaped final proofreading.               Isaias Orona NP

## 2019-11-18 NOTE — PATIENT INSTRUCTIONS
 Continue pulmicort and yupelri   daily, rinse mouth out after use.        Continue albuterol nebulizer treatment or rescue inhaler, 2 inhalations every 4-6 hours as needed for shortness of breath, wheezing or cough    · Complete CT chest    · Complete sputum culture     Recommend healthy diet/weight and exercise as tolerated     Follow-up in pulmonary clinic in or sooner with worsening of symptoms     Report to the ER with chest pain or difficulty breathing

## 2019-11-19 ENCOUNTER — TELEPHONE (OUTPATIENT)
Dept: PULMONOLOGY | Age: 74
End: 2019-11-19

## 2019-11-19 NOTE — TELEPHONE ENCOUNTER
Pt was seen yesterday by TRISTIAN Wood for a cough. She called this morning asking to speak with her stating that it was very important. Would not give any other information.  Please call [phone]

## 2019-11-20 ENCOUNTER — OFFICE VISIT (OUTPATIENT)
Dept: FAMILY MEDICINE CLINIC | Age: 74
End: 2019-11-20

## 2019-11-20 VITALS
DIASTOLIC BLOOD PRESSURE: 80 MMHG | HEIGHT: 60 IN | RESPIRATION RATE: 16 BRPM | TEMPERATURE: 98.7 F | SYSTOLIC BLOOD PRESSURE: 160 MMHG | OXYGEN SATURATION: 98 % | BODY MASS INDEX: 42.8 KG/M2 | WEIGHT: 218 LBS | HEART RATE: 84 BPM

## 2019-11-20 DIAGNOSIS — J41.1 CHRONIC BRONCHITIS WITH PRODUCTIVE MUCOPURULENT COUGH (HCC): ICD-10-CM

## 2019-11-20 DIAGNOSIS — R06.00 DYSPNEA, UNSPECIFIED TYPE: ICD-10-CM

## 2019-11-20 DIAGNOSIS — I10 ESSENTIAL HYPERTENSION: ICD-10-CM

## 2019-11-20 DIAGNOSIS — R06.09 DOE (DYSPNEA ON EXERTION): Primary | ICD-10-CM

## 2019-11-20 NOTE — PROGRESS NOTES
HISTORY OF PRESENT ILLNESS  Pascual William is a 66 y.o. female. States she stopped taking pulmicort and also mucinex and has felt better. States she has been able to sleep in the bed. States cough has greatly decreased. Swelling has also greatly decreased. Patient requesting second option from another pulmonologist for sob with exertion, chronic bronchitis. Allergies   Allergen Reactions    Indomethacin Anaphylaxis, Hives and Swelling    Tomato Other (comments)     Heart burn     Current Outpatient Medications   Medication Sig Dispense Refill    predniSONE (DELTASONE) 20 mg tablet Take 20 mg by mouth daily for 7 days. 7 Tab 0    potassium chloride (KLOR-CON M20) 20 mEq tablet Take 1 Tab by mouth daily. 90 Tab 6    allopurinol (ZYLOPRIM) 100 mg tablet Take 1 Tab by mouth two (2) times a day. 180 Tab 11    montelukast (SINGULAIR) 10 mg tablet Take 1 Tab by mouth nightly. 30 Tab 1    furosemide (LASIX) 20 mg tablet Take 1 Tab by mouth daily. 30 Tab 2    albuterol (PROVENTIL VENTOLIN) 2.5 mg /3 mL (0.083 %) nebu 3 mL by Nebulization route every six (6) hours as needed for Cough. FILE UNDER MEDICARE B  Diag. Codes: R05, J45.40, R06.09 30 Nebule 3    budesonide (PULMICORT) 0.5 mg/2 mL nbsp 2 mL by Nebulization route two (2) times a day. FILE UNDER MEDICARE B  Diag. Codes:  R05, J45.40, R06.09 60 Each 3    revefenacin (YUPELRI) 175 mcg/3 mL nebu nebulizer solution 3 mL by Nebulization route daily. 7 Nebule 0    ELIQUIS 5 mg tablet TAKE 1 TABLET BY MOUTH EVERY 12 HOURS 90 Tab 0    guaiFENesin ER (MUCINEX) 600 mg ER tablet Take 600 mg by mouth two (2) times a day.  albuterol (PROAIR HFA) 90 mcg/actuation inhaler Take 2 Puffs by inhalation every four (4) hours as needed for Wheezing. Or SOB 1 Inhaler 3    guaiFENesin (ROBITUSSIN) 100 mg/5 mL liquid Take 5 mL by mouth every four (4) hours as needed for Cough.  100 mL 0    diclofenac (VOLTAREN) 1 % gel Apply 2 g to affected area every six (6) hours. Indications: chronic shoulder pain 100 g 0    alum-mag hydroxide-simeth (MYLANTA) 200-200-20 mg/5 mL susp Take 30 mL by mouth every four (4) hours as needed (heart burn). 100 mL 0    acetaminophen (TYLENOL) 325 mg tablet Take 2 Tabs by mouth every six (6) hours as needed for Pain or Fever. 30 Tab 0    OTHER Incentive Spirometer: please use 10 times every hour daily 1 Units 0    OTHER Please check CBC, BMP, Mg, and Phos in 3-5 days. PCP at [facility] to f/u results. 1 Units 0    levothyroxine (SYNTHROID) 25 mcg tablet Take 1 Tab by mouth Daily (before breakfast). 90 Tab 6    simvastatin (ZOCOR) 40 mg tablet Take 1 Tab by mouth nightly. 90 Tab 6    inhalational spacing device (ACE AEROSOL CLOUD ENHANCER) 1 Each by Does Not Apply route as needed. 1 Device 3    cholecalciferol, vitamin D3, 2,000 unit tab TAKE 1 TABLET BY MOUTH EVERY DAY  3    Omeprazole delayed release (PRILOSEC D/R) 20 mg tablet Take 20 mg by mouth daily.        Past Medical History:   Diagnosis Date    Adopted     Arthritis     Balance problems     MCBRIDE (dyspnea on exertion) 2/28/2019    GERD (gastroesophageal reflux disease)     Hemochromatosis     High cholesterol     Hypertension     Left knee pain     Left shoulder pain     Lumbar pain     Pain of left sacroiliac joint     Shoulder impingement, right, with rotator cuff strain      Social History     Socioeconomic History    Marital status: SINGLE     Spouse name: Not on file    Number of children: Not on file    Years of education: Not on file    Highest education level: Not on file   Occupational History    Not on file   Social Needs    Financial resource strain: Not on file    Food insecurity:     Worry: Not on file     Inability: Not on file    Transportation needs:     Medical: Not on file     Non-medical: Not on file   Tobacco Use    Smoking status: Former Smoker     Packs/day: 0.50     Years: 2.00     Pack years: 1.00     Start date: 1970     Last attempt to quit: 1974     Years since quittin.9    Smokeless tobacco: Never Used    Tobacco comment: quit 20 years ago   Substance and Sexual Activity    Alcohol use: No    Drug use: No    Sexual activity: Not on file     Comment: post menopausal   Lifestyle    Physical activity:     Days per week: Not on file     Minutes per session: Not on file    Stress: Not on file   Relationships    Social connections:     Talks on phone: Not on file     Gets together: Not on file     Attends Taoism service: Not on file     Active member of club or organization: Not on file     Attends meetings of clubs or organizations: Not on file     Relationship status: Not on file    Intimate partner violence:     Fear of current or ex partner: Not on file     Emotionally abused: Not on file     Physically abused: Not on file     Forced sexual activity: Not on file   Other Topics Concern    Not on file   Social History Narrative    Not on file     Wt Readings from Last 3 Encounters:   19 218 lb (98.9 kg)   19 218 lb (98.9 kg)   19 219 lb (99.3 kg)     BP Readings from Last 3 Encounters:   19 160/80   19 175/89   19 186/89     Review of Systems   Constitutional: Negative for chills and fever. Respiratory: Negative for shortness of breath. Cardiovascular: Negative for chest pain and palpitations. Neurological: Negative for dizziness and headaches. /80 (BP 1 Location: Left arm, BP Patient Position: Sitting)   Pulse 84   Temp 98.7 °F (37.1 °C) (Oral)   Resp 16   Ht 5' (1.524 m)   Wt 218 lb (98.9 kg)   SpO2 98%   BMI 42.58 kg/m²   Repeat: right arm 170/84  Physical Exam  Constitutional:       Appearance: She is well-developed. HENT:      Head: Normocephalic and atraumatic. Neck:      Musculoskeletal: Normal range of motion and neck supple. Cardiovascular:      Rate and Rhythm: Normal rate and regular rhythm. Heart sounds: Normal heart sounds. No murmur.  No friction rub. No gallop. Pulmonary:      Effort: Pulmonary effort is normal.      Breath sounds: Normal breath sounds. No wheezing, rhonchi or rales. Musculoskeletal:      Right lower leg: Edema (1+ non pitting) present. Left lower leg: Edema (1+ non pitting) present. Skin:     General: Skin is warm and dry. Neurological:      Mental Status: She is alert and oriented to person, place, and time. ASSESSMENT and PLAN    ICD-10-CM ICD-9-CM    1. MCBRIDE (dyspnea on exertion) R06.09 786.09 REFERRAL TO PULMONARY DISEASE   2. Dyspnea, unspecified type R06.00 786.09    3. Essential hypertension I10 401.9    4. Chronic bronchitis with productive mucopurulent cough (HCC) J41.1 491.1 REFERRAL TO PULMONARY DISEASE     Orders Placed This Encounter    REFERRAL TO PULMONARY DISEASE       Informed to continue to monitor blood pressure at home and bring log to follow up  I have discussed the diagnosis with the patient and the intended plan as seen in the above orders. The patient has received an after-visit summary and questions were answered concerning future plans. I have discussed medication side effects and warnings with the patient as well. Patient agreeable with above plan and verbalizes understanding. Follow-up and Dispositions    · Return in about 4 weeks (around 12/18/2019) for HTN.

## 2019-11-20 NOTE — PROGRESS NOTES
Jesús Nix presents today for   Chief Complaint   Patient presents with   [facility] ED Follow-up       Is someone accompanying this pt? no    Is the patient using any DME equipment during [address]? no    Depression Screening:  3 most recent PHQ Screens 11/18/2019   PHQ Not Done -   Little interest or pleasure in doing things Not at all   Feeling down, depressed, irritable, or hopeless Not at all   Total Score PHQ 2 0       Learning Assessment:  Learning Assessment 9/3/2019   PRIMARY LEARNER Patient   HIGHEST LEVEL OF EDUCATION - PRIMARY LEARNER  -   BARRIERS PRIMARY LEARNER -   CO-LEARNER CAREGIVER -   PRIMARY LANGUAGE ENGLISH   LEARNER PREFERENCE PRIMARY LISTENING     -     -   ANSWERED BY patient   RELATIONSHIP SELF       Abuse Screening:  No flowsheet data found. Fall Risk  Fall Risk Assessment, last 12 mths 11/18/2019   Able to walk? Yes   Fall in past 12 months? No       Health Maintenance reviewed and discussed and ordered per Provider. Health Maintenance Due   Topic Date Due    DTaP/Tdap/Td series (1 - Tdap) 04/17/1954    Shingrix Vaccine Age 50> (1 of 2) 04/17/1993    GLAUCOMA SCREENING Q2Y  04/17/2008    Bone Densitometry (Dexa) Screening  04/17/2008    Pneumococcal 65+ years (1 of 1 - PPSV23) 04/17/2008    Influenza Age 5 to Adult  08/01/2019           Coordination of Care:  1. Have you been to the ER, urgent care clinic since your last visit? Hospitalized since your last visit? no    2. Have you seen or consulted any other health care providers outside of the [address] [place] [address] since your last visit? Include any pap smears or colon screening.  no

## 2019-11-25 ENCOUNTER — PATIENT OUTREACH (OUTPATIENT)
Dept: FAMILY MEDICINE CLINIC | Age: 74
End: 2019-11-25

## 2019-11-25 ENCOUNTER — TELEPHONE (OUTPATIENT)
Dept: FAMILY MEDICINE CLINIC | Age: 74
End: 2019-11-25

## 2019-11-25 RX ORDER — MONTELUKAST SODIUM 10 MG/1
10 TABLET ORAL
Qty: 30 TAB | Refills: 3 | Status: SHIPPED | OUTPATIENT
Start: 2019-11-25 | End: 2020-03-09

## 2019-11-25 NOTE — TELEPHONE ENCOUNTER
Verbal order from GONZALO Michel ok to refill Singulair. Attempted to contact patient. Unable to leave message.      Prescription was sent to her pharmacy and she should have no further issues

## 2019-11-25 NOTE — TELEPHONE ENCOUNTER
Per pt per [facility] [address] there is an issue preventing them from filling montelukast.    She said they were going to contact the office    Please assist

## 2019-11-25 NOTE — PROGRESS NOTES
Complex Case Management  Follow-Up    Date/Time:  11/25/2019 12:16 PM     Ambulatory Care Manager contacted patient for Complex Case Management  follow up. Spoke to patient. Introduced self/role and reason for call. Patient reported:   She is still coughing and short of breath. She reports it is no better or worse. She states she has not had sputum culture done that was ordered on 11/18/19 by simona Knight, because when she got home she realized the lab order she received was for another patient. Patient was advised that she needs to still have the sputum culture done. She was informed that the lab order is in her chart and can be accessed when she goes to the lab. She stated she go to [facility] lab for the sputm culture after the Thanksgiving holiday. Patient also reports that her chest CT has not been scheduled yet. Per the scheduling notes, scheduling attempted to call her on 11/19/19 but was unable to reach her. Writer provided patient with the phone number to central scheduling. Patient stated she will call to schedule today. Ms. Zeferino Infante also relates that she has not received a call from [facility] pulmonary to schedule appt with Dr Axel Franco. Ava Chong as per referral that was  placed on 11/20/19 by Natalia Kulkarni. Writer called [facility] pulmonary to attempt to schedule appt. They stated that in order to schedule a patient who has recently seen another pulmonologist, the provider first needs to review the patient's previous medical records. Patient was informed of this and she states she will continue to follow up with [facility] pulmonology. Patient reports taking medications as prescribed and denies needing any refills at this time. Specialist appointments since last outreach? yes  If so, specialist and date: pulmonary SWATHI Baron 11/28/19    Medications:   New medications since last outreach: no  Does patient need refills on any medications: no  Medication changes since last outreach (dose adjustments or discontinued meds): no    Home Health company: n/a  Date of discharge: n/a    Barriers to care? None at this time. Patient reminded that there are physicians on call 24 hours a day / 7 days a week (M-F 5pm to 8am and from Friday 5pm until Monday 8a for the weekend) should the patient have questions or concerns. Future Appointments   Date Time Provider Jose Singleton   12/18/2019 11:40 AM Gatito Colvin NP [facility]   2/17/2020  1:45 PM Leandro Osuna NP [address]        Other upcoming appointments:  n/a    Goals       Pt will complete an ACP and have it scanned into their EMR by 12/30/19      Patient will attend all scheduled appointments through 12/30/19 11/5/19 Patient has upcoming appointment with Tom Silveira on 11/11/19 11/25/19 Patient has attended all scheduled appointments. Upcoming appts reviewed with patient and she voiced acknowledgement.  Pt will take all medications prescribed to be evaluated on each outreach through 12/30/19 11/5/19 Patient reports taking medications as prescribed. She reports that the pharmacy has not refilled her allopurinol. Writer called [facility] to confirm. The pharmacist said the refill will be available today. Patient made aware. 11/25/19 Patient reports taking medications as prescribed and denies needing any refills at this time.

## 2019-12-01 DIAGNOSIS — R06.00 DYSPNEA, UNSPECIFIED TYPE: ICD-10-CM

## 2019-12-02 RX ORDER — BUDESONIDE AND FORMOTEROL FUMARATE DIHYDRATE 160; 4.5 UG/1; UG/1
AEROSOL RESPIRATORY (INHALATION)
Qty: 30.6 INHALER | Refills: 1 | Status: SHIPPED | OUTPATIENT
Start: 2019-12-02 | End: 2020-01-07 | Stop reason: ALTCHOICE

## 2019-12-03 ENCOUNTER — HOSPITAL ENCOUNTER (OUTPATIENT)
Dept: CT IMAGING | Age: 74
Discharge: HOME | End: 2019-12-03
Attending: NURSE PRACTITIONER
Payer: MEDICARE

## 2019-12-03 ENCOUNTER — HOSPITAL ENCOUNTER (OUTPATIENT)
Dept: LAB | Age: 74
Discharge: HOME | End: 2019-12-03

## 2019-12-03 ENCOUNTER — PATIENT OUTREACH (OUTPATIENT)
Dept: FAMILY MEDICINE CLINIC | Age: 74
End: 2019-12-03

## 2019-12-03 DIAGNOSIS — J41.1 CHRONIC BRONCHITIS WITH PRODUCTIVE MUCOPURULENT COUGH (HCC): ICD-10-CM

## 2019-12-03 DIAGNOSIS — R06.09 DOE (DYSPNEA ON EXERTION): ICD-10-CM

## 2019-12-03 PROCEDURE — 87186 SC STD MICRODIL/AGAR DIL: CPT

## 2019-12-03 PROCEDURE — 87070 CULTURE OTHR SPECIMN AEROBIC: CPT

## 2019-12-03 PROCEDURE — 71250 CT THORAX DX C-: CPT

## 2019-12-03 PROCEDURE — 87077 CULTURE AEROBIC IDENTIFY: CPT

## 2019-12-03 NOTE — PROGRESS NOTES
Complex Case Management  Follow-Up    Date/Time:  12/3/2019 10:13 AM     Ambulatory Care Manager contacted patient for Complex Case Management  follow up. Spoke to patient. Introduced self/role and reason for call. Patient reported:   She is still having a cough productive of \"a thick white\" phlegm. She reports that the cough is worse in the morning and evening. She is scheduled for a chest CT and sputum culture today. Patient reports she is taking so many medications and she states \"I don't know what I'm taking anymore. \" Patient was advised to bring medication bottles with her to her appointment with Radha Jamison on 12/18/19. She admitted that she usually brings a list. Writer advised her to bring bottles so a full medication reconciliation could be done. Patient agreed to think about it. Specialist appointments since last outreach? no  If so, specialist and date: n/a    Medications:   New medications since last outreach: no  Does patient need refills on any medications: no  Medication changes since last outreach (dose adjustments or discontinued meds): no    Home Health company: n/a  Date of discharge: n/a    Barriers to care? None at this time. Patient reminded that there are physicians on call 24 hours a day / 7 days a week (M-F 5pm to 8am and from Friday 5pm until Monday 8a for the weekend) should the patient have questions or concerns. Future Appointments   Date Time Provider Jose Singleton   12/3/2019  4:30 PM [facility] CT RM 2 [facility]   12/18/2019 11:40 AM Diane Arroyo NP [facility]   2/17/2020  1:45 PM Garrison Clark NP [address]        Other upcoming appointments:  n/a    Goals       Pt will complete an ACP and have it scanned into their EMR by 12/30/19      Patient will attend all scheduled appointments through 12/30/19 11/5/19 Patient has upcoming appointment with Radha Jamison on 11/11/19 11/25/19 Patient has attended all scheduled appointments. Upcoming appts reviewed with patient and she voiced acknowledgement. 12/3/19 Patient has CT scan appt today.  Pt will take all medications prescribed to be evaluated on each outreach through 12/30/19 11/5/19 Patient reports taking medications as prescribed. She reports that the pharmacy has not refilled her allopurinol. Writer called [facility] to confirm. The pharmacist said the refill will be available today. Patient made aware. 11/25/19 Patient reports taking medications as prescribed and denies needing any refills at this time. 12/3/19 Patient was encouraged to bring medication bottles with her to her appt on 12/18/19.

## 2019-12-06 ENCOUNTER — TELEPHONE (OUTPATIENT)
Dept: PULMONOLOGY | Age: 74
End: 2019-12-06

## 2019-12-06 DIAGNOSIS — R93.89 ABNORMAL CT OF THE CHEST: Primary | ICD-10-CM

## 2019-12-06 DIAGNOSIS — J18.9 COMMUNITY ACQUIRED PNEUMONIA, UNSPECIFIED LATERALITY: Primary | ICD-10-CM

## 2019-12-06 RX ORDER — CIPROFLOXACIN 500 MG/1
500 TABLET ORAL 2 TIMES DAILY
Qty: 14 TAB | Refills: 0 | Status: SHIPPED | OUTPATIENT
Start: 2019-12-06 | End: 2019-12-13

## 2019-12-06 NOTE — TELEPHONE ENCOUNTER
PT [phone]). WANTS TO KNOW IF NP TOMMY HAS SEEN RESULTS OF CT DONE  12/3/19 [facility] AND CULTURE RESULTS. STATES THAT ANOTHER PHYSICIAN TOLD HER SHE HAS A LUNG INFECTION. PLEASE CALL HER BACK.

## 2019-12-07 LAB
BACTERIA SPEC CULT: ABNORMAL
GRAM STN SPEC: ABNORMAL
SERVICE CMNT-IMP: ABNORMAL

## 2019-12-10 ENCOUNTER — PATIENT OUTREACH (OUTPATIENT)
Dept: FAMILY MEDICINE CLINIC | Age: 74
End: 2019-12-10

## 2019-12-10 NOTE — PROGRESS NOTES
Complex Case Management  Follow-Up    Date/Time:  12/10/2019 4:37 PM     Ambulatory Care Manager contacted patient for Complex Case Management  follow up. Spoke to patient. Introduced self/role and reason for call. Patient reported:   She is very frustrated because she is still coughing and short of breath. Patient was started on Cipro on 12/6/19 for bacteria seen in sputum culture. Patient states she is unable to sleep at night because of cough and is unable to do simple chores around the house because of shortness of breath. She lives with another woman who is able to help with these tasks. She reports that she uses her nebulizer and rescue inhaler several times per day. This writer offered to schedule a follow up with pulmonary but patient declined stating she would do it. Writer discussed reporting to ED for worsening of symptoms but at the time of this call  patient is not willing to do so. Patient has appointment with John Cardona NP on 12/18/19. She relates that she had thought about trying to get a sooner appointment up but decided against it since the appointment is next week. Writer strongly advised patient to call or report to ED if symptoms worsen before the appointment. Patient reports taking medications as prescribed and denies needing any refills at this time. Upcoming appointments were reviewed with patient. Patient verbalized acknowledgement. Specialist appointments since last outreach? yes  If so, specialist and date: Dr. Espinosa [facility] hematology 12/6/192    Medications:   New medications since last outreach: yes -Cipro  Does patient need refills on any medications: no  Medication changes since last outreach (dose adjustments or discontinued meds): no    Home Health company: n/a  Date of discharge: n/q    Barriers to care? None at this time.       Patient reminded that there are physicians on call 24 hours a day / 7 days a week (M-F 5pm to 8am and from Friday 5pm until Monday 8a for the weekend) should the patient have questions or concerns. Future Appointments   Date Time Provider Jose Singleton   12/18/2019 11:40 AM Marianela Tee NP [address]   2/17/2020  1:45 PM Sadie Wagoner NP [address]   3/6/2020  1:00 PM [facility] CT RM 2 [facility]        Other upcoming appointments:  N/a    Goals       Pt will complete an ACP and have it scanned into their EMR by 12/30/19      Patient will attend all scheduled appointments through 12/30/19 11/5/19 Patient has upcoming appointment with Moy Ragland on 11/11/19 11/25/19 Patient has attended all scheduled appointments. Upcoming appts reviewed with patient and she voiced acknowledgement. 12/3/19 Patient has CT scan appt today. 12/10/19 Patient attended appt with Dr. Gladys Hokos on 12/6/19       Pt will take all medications prescribed to be evaluated on each outreach through 12/30/19 11/5/19 Patient reports taking medications as prescribed. She reports that the pharmacy has not refilled her allopurinol. Writer called [facility] to confirm. The pharmacist said the refill will be available today. Patient made aware. 11/25/19 Patient reports taking medications as prescribed and denies needing any refills at this time. 12/3/19 Patient was encouraged to bring medication bottles with her to her appt on 12/18/19.

## 2019-12-11 RX ORDER — ALBUTEROL SULFATE 0.83 MG/ML
SOLUTION RESPIRATORY (INHALATION)
Qty: 75 ML | Refills: 3 | Status: SHIPPED | OUTPATIENT
Start: 2019-12-11 | End: 2019-12-16 | Stop reason: SDUPTHER

## 2019-12-13 RX ORDER — ALBUTEROL SULFATE 90 UG/1
2 AEROSOL, METERED RESPIRATORY (INHALATION)
Qty: 1 INHALER | Refills: 3 | Status: SHIPPED | OUTPATIENT
Start: 2019-12-13 | End: 2020-01-07 | Stop reason: ALTCHOICE

## 2019-12-13 NOTE — TELEPHONE ENCOUNTER
Pt called requesting refill of albuterol sent to [facility] on [address]  Please call [phone]

## 2019-12-16 DIAGNOSIS — R06.00 DYSPNEA, UNSPECIFIED TYPE: ICD-10-CM

## 2019-12-16 RX ORDER — ALBUTEROL SULFATE 0.83 MG/ML
SOLUTION RESPIRATORY (INHALATION)
Qty: 75 ML | Refills: 3 | Status: SHIPPED | OUTPATIENT
Start: 2019-12-16 | End: 2019-12-18 | Stop reason: ALTCHOICE

## 2019-12-16 NOTE — TELEPHONE ENCOUNTER
Per pt, she has been without her nebulizer meds all weekend, because they said the prescription was not filled out properly. Please call [facility] and then call pt and let her know at [phone].

## 2019-12-16 NOTE — TELEPHONE ENCOUNTER
Per pharmacist at [facility] they need the nebulizer med to have the Medicare B and ICD code

## 2019-12-18 ENCOUNTER — PATIENT OUTREACH (OUTPATIENT)
Dept: FAMILY MEDICINE CLINIC | Age: 74
End: 2019-12-18

## 2019-12-18 ENCOUNTER — OFFICE VISIT (OUTPATIENT)
Dept: FAMILY MEDICINE CLINIC | Age: 74
End: 2019-12-18

## 2019-12-18 VITALS
DIASTOLIC BLOOD PRESSURE: 64 MMHG | HEART RATE: 76 BPM | RESPIRATION RATE: 22 BRPM | TEMPERATURE: 97.5 F | BODY MASS INDEX: 42.29 KG/M2 | OXYGEN SATURATION: 97 % | HEIGHT: 61 IN | WEIGHT: 224 LBS | SYSTOLIC BLOOD PRESSURE: 153 MMHG

## 2019-12-18 DIAGNOSIS — I10 ESSENTIAL HYPERTENSION: Primary | ICD-10-CM

## 2019-12-18 DIAGNOSIS — M79.605 LEFT LEG PAIN: ICD-10-CM

## 2019-12-18 RX ORDER — PREDNISONE 10 MG/1
TABLET ORAL
Qty: 21 TAB | Refills: 0 | OUTPATIENT
Start: 2019-12-18 | End: 2019-12-25

## 2019-12-18 RX ORDER — IPRATROPIUM BROMIDE AND ALBUTEROL SULFATE 2.5; .5 MG/3ML; MG/3ML
SOLUTION RESPIRATORY (INHALATION)
Qty: 90 ML | Refills: 3 | Status: SHIPPED | OUTPATIENT
Start: 2019-12-18 | End: 2019-12-19 | Stop reason: SDUPTHER

## 2019-12-18 NOTE — PATIENT INSTRUCTIONS
Increase Furosemide to 2 tabs daily for the next 3 days. Leg and Ankle Edema: Care Instructions Your Care Instructions Swelling in the legs, ankles, and feet is called edema. It is common after you sit or stand for a while. Long plane flights or car rides often cause swelling in the legs and feet. You may also have swelling if you have to stand for long periods of time at your job. Problems with the veins in the legs (varicose veins) and changes in hormones can also cause swelling. Sometimes the swelling in the ankles and feet is caused by a more serious problem, such as heart failure, infection, blood clots, or liver or kidney disease. Follow-up care is a key part of your treatment and safety. Be sure to make and go to all appointments, and call your doctor if you are having problems. It's also a good idea to know your test results and keep a list of the medicines you take. How can you care for yourself at home? · If your doctor gave you medicine, take it as prescribed. Call your doctor if you think you are having a problem with your medicine. · Whenever you are resting, raise your legs up. Try to keep the swollen area higher than the level of your heart. · Take breaks from standing or sitting in one position. ? Walk around to increase the blood flow in your lower legs. ? Move your feet and ankles often while you stand, or tighten and relax your leg muscles. · Wear support stockings. Put them on in the morning, before swelling gets worse. · Eat a balanced diet. Lose weight if you need to. · Limit the amount of salt (sodium) in your diet. Salt holds fluid in the body and may increase swelling. When should you call for help? Call 911 anytime you think you may need emergency care. For example, call if: 
  · You have symptoms of a blood clot in your lung (called a pulmonary embolism). These may include: 
? Sudden chest pain. ? Trouble breathing. ? Coughing up blood.  Call your doctor now or seek immediate medical care if: 
  · You have signs of a blood clot, such as: 
? Pain in your calf, back of the knee, thigh, or groin. ? Redness and swelling in your leg or groin.  
  · You have symptoms of infection, such as: 
? Increased pain, swelling, warmth, or redness. ? Red streaks or pus. ? A fever.  
 Watch closely for changes in your health, and be sure to contact your doctor if: 
  · Your swelling is getting worse.  
  · You have new or worsening pain in your legs.  
  · You do not get better as expected. Where can you learn more? Go to http://miki-ethan.info/. Enter V711 in the search box to learn more about \"Leg and Ankle Edema: Care Instructions. \" Current as of: June 26, 2019 Content Version: 12.2 © 0690-8341 [employer]. Care instructions adapted under license by [employer] (which disclaims liability or warranty for this information). If you have questions about a medical condition or this instruction, always ask your healthcare professional. [place] [address] any warranty or liability for your use of this information. Leg Pain: Care Instructions Your Care Instructions Many things can cause leg pain. Too much exercise or overuse can cause a muscle cramp (or charley horse). You can get leg cramps from not eating a balanced diet that has enough potassium, calcium, and other minerals. If you do not drink enough fluids or are taking certain medicines, you may develop leg cramps. Other causes of leg pain include injuries, blood flow problems, nerve damage, and twisted and enlarged veins (varicose veins). You can usually ease pain with self-care. Your doctor may recommend that you rest your leg and keep it elevated. Follow-up care is a key part of your treatment and safety.  Be sure to make and go to all appointments, and call your doctor if you are having problems. It's also a good idea to know your test results and keep a list of the medicines you take. How can you care for yourself at home? · Take pain medicines exactly as directed. ? If the doctor gave you a prescription medicine for pain, take it as prescribed. ? If you are not taking a prescription pain medicine, ask your doctor if you can take an over-the-counter medicine. · Take any other medicines exactly as prescribed. Call your doctor if you think you are having a problem with your medicine. · Rest your leg while you have pain, and avoid standing for long periods of time. · Prop up your leg at or above the level of your heart when possible. · Make sure you are eating a balanced diet that is rich in calcium, potassium, and magnesium, especially if you are pregnant. · If directed by your doctor, put ice or a cold pack on the area for 10 to 20 minutes at a time. Put a thin cloth between the ice and your skin. · Your leg may be in a splint, a brace, or an elastic bandage, and you may have crutches to help you walk. Follow your doctor's directions about how long to wear supports and how to use the crutches. When should you call for help? Call 911 anytime you think you may need emergency care. For example, call if: 
  · You have sudden chest pain and shortness of breath, or you cough up blood.  
  · Your leg is cool or pale or changes color.  
 Call your doctor now or seek immediate medical care if: 
  · You have increasing or severe pain.  
  · Your leg suddenly feels weak and you cannot move it.  
  · You have signs of a blood clot, such as: 
? Pain in your calf, back of the knee, thigh, or groin. ? Redness and swelling in your leg or groin.  
  · You have signs of infection, such as: 
? Increased pain, swelling, warmth, or redness. ? Red streaks leading from the sore area. ? Pus draining from a place on your leg. ? A fever.  
  · You cannot bear weight on your leg.  Watch closely for changes in your health, and be sure to contact your doctor if: 
  · You do not get better as expected. Where can you learn more? Go to http://miki-ethan.info/. Enter D135 in the search box to learn more about \"Leg Pain: Care Instructions. \" Current as of: June 26, 2019 Content Version: 12.2 © 8674-0063 [employer]. Care instructions adapted under license by [employer] (which disclaims liability or warranty for this information). If you have questions about a medical condition or this instruction, always ask your healthcare professional. [place] [address] any warranty or liability for your use of this information.

## 2019-12-18 NOTE — PROGRESS NOTES
Subjective:   Dauna Galeazzi is a 66 y.o. female with hypertension presents for 4 weeks follow up. Patient Active Problem List   Diagnosis Code    Shoulder impingement, right, with rotator cuff strain M75.40    Spinal stenosis M48.00    [facility] M46.1    High cholesterol E78.00    Hypertension I10    Hemochromatosis E83.119    Obesity, morbid ([facility] E66.01    MCBRIDE (dyspnea on exertion) R06.09    CAP (community acquired pneumonia) J18.9    Sepsis ([facility] A41.9    Hypotension I95.9    Atrial fibrillation with RVR (HCC) I48.91     Current Outpatient Medications   Medication Sig Dispense Refill    albuterol (PROVENTIL VENTOLIN) 2.5 mg /3 mL (0.083 %) nebu Ue 1 vial every 6 hrs for wheezing or cough. File under Medicare part B, ICD J45.909 75 mL 3    albuterol (PROAIR HFA) 90 mcg/actuation inhaler Take 2 Puffs by inhalation every four (4) hours as needed for Wheezing. Or SOB 1 Inhaler 3    SYMBICORT 160-4.5 mcg/actuation HFAA TAKE 2 PUFFS BY INHALATION TWO (2) TIMES A DAY FOR 30 DAYS. 30.6 Inhaler 1    montelukast (SINGULAIR) 10 mg tablet Take 1 Tab by mouth nightly. 30 Tab 3    potassium chloride (KLOR-CON M20) 20 mEq tablet Take 1 Tab by mouth daily. 90 Tab 6    allopurinol (ZYLOPRIM) 100 mg tablet Take 1 Tab by mouth two (2) times a day. 180 Tab 11    furosemide (LASIX) 20 mg tablet Take 1 Tab by mouth daily. 30 Tab 2    ELIQUIS 5 mg tablet TAKE 1 TABLET BY MOUTH EVERY 12 HOURS 90 Tab 0    diclofenac (VOLTAREN) 1 % gel Apply 2 g to affected area every six (6) hours. Indications: chronic shoulder pain 100 g 0    acetaminophen (TYLENOL) 325 mg tablet Take 2 Tabs by mouth every six (6) hours as needed for Pain or Fever. 30 Tab 0    levothyroxine (SYNTHROID) 25 mcg tablet Take 1 Tab by mouth Daily (before breakfast). 90 Tab 6    simvastatin (ZOCOR) 40 mg tablet Take 1 Tab by mouth nightly.  90 Tab 6    inhalational spacing device (ACE AEROSOL CLOUD ENHANCER) 1 Each by Does Not Apply route as needed. 1 Device 3    cholecalciferol, vitamin D3, 2,000 unit tab TAKE 1 TABLET BY MOUTH EVERY DAY  3    Omeprazole delayed release (PRILOSEC D/R) 20 mg tablet Take 20 mg by mouth daily. Allergies   Allergen Reactions    Indomethacin Anaphylaxis, Hives and Swelling    Tomato Other (comments)     Heart burn     Past Surgical History:   Procedure Laterality Date    HX CHOLECYSTECTOMY      HX CHOLECYSTECTOMY      HX KNEE REPLACEMENT Bilateral     R knee/ Lknee and femur    HX ORTHOPAEDIC      R trigger finger     HX OTHER SURGICAL      Right little finger    HX OTHER SURGICAL      Right wrist, replaced unlnar).  HX OTHER SURGICAL      Both knees    HX OTHER SURGICAL      Left femur    HX OTHER SURGICAL      Trigger finger surgery    HX OTHER SURGICAL      Left knee replacment revision     HX ROTATOR CUFF REPAIR Right     HX TONSILLECTOMY       Family History   Adopted: Yes      Lab Results   Component Value Date/Time    Cholesterol, total 127 08/26/2019 02:02 PM    HDL Cholesterol 53 08/26/2019 02:02 PM    LDL, calculated 44.4 08/26/2019 02:02 PM    VLDL, calculated 29.6 08/26/2019 02:02 PM    Triglyceride 148 08/26/2019 02:02 PM    CHOL/HDL Ratio 2.4 08/26/2019 02:02 PM     Lab Results   Component Value Date/Time    Sodium 145 11/11/2019 02:41 PM    Potassium 4.0 11/11/2019 02:41 PM    Chloride 110 11/11/2019 02:41 PM    CO2 32 11/11/2019 02:41 PM    Anion gap 3 11/11/2019 02:41 PM    Glucose 81 11/11/2019 02:41 PM    BUN 17 11/11/2019 02:41 PM    Creatinine 0.97 11/11/2019 02:41 PM    BUN/Creatinine ratio 18 11/11/2019 02:41 PM    GFR est AA >60 11/11/2019 02:41 PM    GFR est non-AA 56 (L) 11/11/2019 02:41 PM    Calcium 9.2 11/11/2019 02:41 PM    Bilirubin, total 0.5 10/28/2019 09:55 AM    AST (SGOT) 26 10/28/2019 09:55 AM    Alk.  phosphatase 70 10/28/2019 09:55 AM    Protein, total 6.5 10/28/2019 09:55 AM    Albumin 3.7 10/28/2019 09:55 AM    Globulin 2.8 10/28/2019 09:55 AM    A-G Ratio 1.3 10/28/2019 09:55 AM    ALT (SGPT) 24 10/28/2019 09:55 AM     Lab Results   Component Value Date/Time    WBC 8.6 11/11/2019 02:41 PM    HGB 12.6 11/11/2019 02:41 PM    HCT 38.6 11/11/2019 02:41 PM    PLATELET 519 33/83/1029 02:41 PM    MCV 94.8 11/11/2019 02:41 PM     Wt Readings from Last 3 Encounters:   12/18/19 224 lb (101.6 kg)   11/20/19 218 lb (98.9 kg)   11/18/19 218 lb (98.9 kg)     Last Point of Care HGB A1C  No results found for: [id number]   BP Readings from Last 3 Encounters:   12/18/19 153/64   11/20/19 160/80   11/18/19 175/89       Hypertension ROS: taking medications as instructed, no medication side effects noted, no TIA's, no chest pain on exertion, no dyspnea on exertion, no swelling of ankles. Review of Systems - Musculoskeletal ROS: positive for - lower leg pain    New concerns: patient states she was treated for a bacteria infection with Cipro. States 4 days into the cipro she began to have right leg tightness which has improved and now has left leg tightness. Comments area is painful to touch. Reports she did complete medication and pain has not improved. Objective:   Visit Vitals  /64 (BP 1 Location: Left arm, BP Patient Position: Sitting)   Pulse 76   Temp 97.5 °F (36.4 °C) (Oral)   Resp 22   Ht 5' 1\" (1.549 m)   Wt 224 lb (101.6 kg)   SpO2 97%   BMI 42.32 kg/m²      General appearance - alert, well appearing, and in no distress  Neck - supple, no significant adenopathy, carotids upstroke normal bilaterally, no bruits  Chest - clear to auscultation, no wheezes, rales or rhonchi, symmetric air entry  Heart - normal rate, regular rhythm, normal S1, S2, no murmurs, rubs, clicks or gallops  Extremities - peripheral pulses normal, 2+ edema bilateral lower extremities, no clubbing or cyanosis  Tenderness to left achilles tendon. No erythema or redness. Left ankle limited ROM due to pain. Assessment/Plan:      ICD-10-CM ICD-9-CM    1.  Essential hypertension I10 401.9 2. Left leg pain M79.605 729.5      Orders Placed This Encounter    predniSONE (STERAPRED DS) 10 mg dose pack           alarm signs when to seek emergent care provided and reviewed   I have discussed the diagnosis with the patient and the intended plan as seen in the above orders. The patient has received an after-visit summary and questions were answered concerning future plans. I have discussed medication side effects and warnings with the patient as well. Patient agreeable with above plan and verbalizes understanding. Follow-up and Dispositions    · Return in about 5 days (around 12/23/2019) for left leg pain.

## 2019-12-18 NOTE — PROGRESS NOTES
Chief Complaint   Patient presents with    Hypertension     1. Have you been to the ER, urgent care clinic since your last visit? Hospitalized since your last visit? no    2. Have you seen or consulted any other health care providers outside of the [address] [place] [address] since your last visit? Include any pap smears or colon screening.  no

## 2019-12-18 NOTE — PROGRESS NOTES
Patient attended appointment today with PCP, Antelmo Lees. EMR reviewed. Lasix increased to 40mg daily x3 days for leg edema. Will continue to follow. Goals Addressed                 This Visit's Progress     Patient will attend all scheduled appointments through 12/30/19 11/5/19 Patient has upcoming appointment with Anthony Munoz on 11/11/19 11/25/19 Patient has attended all scheduled appointments. Upcoming appts reviewed with patient and she voiced acknowledgement. 12/3/19 Patient has CT scan appt today. 12/10/19 Patient attended appt with Dr. Albina Ngo on 12/6/19 12/18/19 Patient attended appointment with PCP, Anthony Munzo.

## 2019-12-19 ENCOUNTER — TELEPHONE (OUTPATIENT)
Dept: PULMONOLOGY | Age: 74
End: 2019-12-19

## 2019-12-19 DIAGNOSIS — R06.00 DYSPNEA, UNSPECIFIED TYPE: ICD-10-CM

## 2019-12-19 RX ORDER — IPRATROPIUM BROMIDE AND ALBUTEROL SULFATE 2.5; .5 MG/3ML; MG/3ML
SOLUTION RESPIRATORY (INHALATION)
Qty: 90 ML | Refills: 3 | Status: SHIPPED | OUTPATIENT
Start: 2019-12-19 | End: 2020-01-07 | Stop reason: ALTCHOICE

## 2019-12-23 ENCOUNTER — OFFICE VISIT (OUTPATIENT)
Dept: FAMILY MEDICINE CLINIC | Age: 74
End: 2019-12-23

## 2019-12-23 VITALS
BODY MASS INDEX: 40.59 KG/M2 | TEMPERATURE: 97.6 F | WEIGHT: 215 LBS | SYSTOLIC BLOOD PRESSURE: 169 MMHG | HEIGHT: 61 IN | HEART RATE: 75 BPM | RESPIRATION RATE: 22 BRPM | OXYGEN SATURATION: 96 % | DIASTOLIC BLOOD PRESSURE: 81 MMHG

## 2019-12-23 DIAGNOSIS — R05.3 CHRONIC COUGH: ICD-10-CM

## 2019-12-23 DIAGNOSIS — M79.605 LEFT LEG PAIN: Primary | ICD-10-CM

## 2019-12-23 RX ORDER — BENZONATATE 200 MG/1
200 CAPSULE ORAL
Qty: 21 CAP | Refills: 0 | Status: SHIPPED | OUTPATIENT
Start: 2019-12-23 | End: 2019-12-30

## 2019-12-23 NOTE — PROGRESS NOTES
HISTORY OF PRESENT ILLNESS  Stella Bo is a 66 y.o. female. Patient presents today to follow up on bilateral leg pain. States swelling has greatly improved. Further states leg pain has resolved. received influenza vaccination with [facility]  Allergies   Allergen Reactions    Indomethacin Anaphylaxis, Hives and Swelling    Tomato Other (comments)     Heart burn     Current Outpatient Medications   Medication Sig Dispense Refill    predniSONE (STERAPRED DS) 10 mg dose pack See administration instruction per 10mg dose pack 21 Tab 0    albuterol (PROAIR HFA) 90 mcg/actuation inhaler Take 2 Puffs by inhalation every four (4) hours as needed for Wheezing. Or SOB 1 Inhaler 3    SYMBICORT 160-4.5 mcg/actuation HFAA TAKE 2 PUFFS BY INHALATION TWO (2) TIMES A DAY FOR 30 DAYS. 30.6 Inhaler 1    montelukast (SINGULAIR) 10 mg tablet Take 1 Tab by mouth nightly. 30 Tab 3    potassium chloride (KLOR-CON M20) 20 mEq tablet Take 1 Tab by mouth daily. 90 Tab 6    allopurinol (ZYLOPRIM) 100 mg tablet Take 1 Tab by mouth two (2) times a day. 180 Tab 11    furosemide (LASIX) 20 mg tablet Take 1 Tab by mouth daily. 30 Tab 2    ELIQUIS 5 mg tablet TAKE 1 TABLET BY MOUTH EVERY 12 HOURS (Patient taking differently: 2.5 mg.) 90 Tab 0    diclofenac (VOLTAREN) 1 % gel Apply 2 g to affected area every six (6) hours. Indications: chronic shoulder pain 100 g 0    acetaminophen (TYLENOL) 325 mg tablet Take 2 Tabs by mouth every six (6) hours as needed for Pain or Fever. 30 Tab 0    levothyroxine (SYNTHROID) 25 mcg tablet Take 1 Tab by mouth Daily (before breakfast). 90 Tab 6    simvastatin (ZOCOR) 40 mg tablet Take 1 Tab by mouth nightly. 90 Tab 6    inhalational spacing device (ACE AEROSOL CLOUD ENHANCER) 1 Each by Does Not Apply route as needed. 1 Device 3    cholecalciferol, vitamin D3, 2,000 unit tab TAKE 1 TABLET BY MOUTH EVERY DAY  3    Omeprazole delayed release (PRILOSEC D/R) 20 mg tablet Take 20 mg by mouth daily.  albuterol-ipratropium (DUO-NEB) 2.5 mg-0.5 mg/3 ml nebu 1 VIAL VIA NEBULIZER EVERY 4 HOURS FOR COUGH OR WHEEZING.  File under Medicare Part B, ICD code J45.909 90 mL 3     Past Medical History:   Diagnosis Date    Adopted     Arthritis     Balance problems     MCBRIDE (dyspnea on exertion) 2019    GERD (gastroesophageal reflux disease)     Hemochromatosis     High cholesterol     Hypertension     Left knee pain     Left shoulder pain     Lumbar pain     Pain of left sacroiliac joint     Shoulder impingement, right, with rotator cuff strain      Social History     Socioeconomic History    Marital status: SINGLE     Spouse name: Not on file    Number of children: Not on file    Years of education: Not on file    Highest education level: Not on file   Occupational History    Not on file   Social Needs    Financial resource strain: Not on file    Food insecurity:     Worry: Not on file     Inability: Not on file    Transportation needs:     Medical: Not on file     Non-medical: Not on file   Tobacco Use    Smoking status: Former Smoker     Packs/day: 0.50     Years: 2.00     Pack years: 1.00     Start date:      Last attempt to quit:      Years since quittin.0    Smokeless tobacco: Never Used    Tobacco comment: quit 20 years ago   Substance and Sexual Activity    Alcohol use: No    Drug use: No    Sexual activity: Not on file     Comment: post menopausal   Lifestyle    Physical activity:     Days per week: Not on file     Minutes per session: Not on file    Stress: Not on file   Relationships    Social connections:     Talks on phone: Not on file     Gets together: Not on file     Attends [employer] service: Not on file     Active member of club or organization: Not on file     Attends meetings of clubs or organizations: Not on file     Relationship status: Not on file    Intimate partner violence:     Fear of current or ex partner: Not on file     Emotionally abused: Not on file     Physically abused: Not on file     Forced sexual activity: Not on file   Other Topics Concern    Not on file   Social History Narrative    Not on file     Wt Readings from Last 3 Encounters:   12/18/19 224 lb (101.6 kg)   11/20/19 218 lb (98.9 kg)   11/18/19 218 lb (98.9 kg)     BP Readings from Last 3 Encounters:   12/18/19 153/64   11/20/19 160/80   11/18/19 175/89   164/88  Review of Systems   Constitutional: Negative for chills. Respiratory: Positive for cough and sputum production. Cardiovascular: Positive for leg swelling. Musculoskeletal: Positive for joint pain. /81 (BP 1 Location: Left arm, BP Patient Position: Sitting)   Pulse 75   Temp 97.6 °F (36.4 °C)   Resp 22   Ht 5' 1\" (1.549 m)   Wt 215 lb (97.5 kg)   SpO2 96%   BMI 40.62 kg/m²     Physical Exam  Constitutional:       Appearance: She is well-developed. HENT:      Head: Normocephalic and atraumatic. Neck:      Musculoskeletal: Normal range of motion and neck supple. Cardiovascular:      Rate and Rhythm: Normal rate and regular rhythm. Heart sounds: Normal heart sounds. No murmur. No friction rub. No gallop. Pulmonary:      Effort: Pulmonary effort is normal.      Breath sounds: Normal breath sounds. No wheezing, rhonchi or rales. Musculoskeletal:      Right lower leg: Edema (2+ lower extremity, improved) present. Left lower leg: Edema (2+ lower extermity, improved) present. Skin:     General: Skin is warm and dry. Neurological:      Mental Status: She is alert and oriented to person, place, and time. ASSESSMENT and PLAN    ICD-10-CM ICD-9-CM    1. Left leg pain M79.605 729.5    2. Chronic cough R05 786.2      Orders Placed This Encounter    benzonatate (TESSALON) 200 mg capsule     Above improving  Advise may take an extra lasix if needed for increased swelling  I have discussed the diagnosis with the patient and the intended plan as seen in the above orders.   The patient has received an after-visit summary and questions were answered concerning future plans. I have discussed medication side effects and warnings with the patient as well. Patient agreeable with above plan and verbalizes understanding. Follow-up and Dispositions    · Return in about 3 months (around 3/23/2020) for HTN/HLD.

## 2019-12-23 NOTE — PROGRESS NOTES
Chief Complaint   Patient presents with    Leg Pain     1. Have you been to the ER, urgent care clinic since your last visit? Hospitalized since your last visit? no    2. Have you seen or consulted any other health care providers outside of the [address] [place] [address] since your last visit? Include any pap smears or colon screening.  no

## 2019-12-25 ENCOUNTER — HOSPITAL ENCOUNTER (EMERGENCY)
Age: 74
Discharge: HOME | End: 2019-12-25
Attending: EMERGENCY MEDICINE
Payer: MEDICARE

## 2019-12-25 ENCOUNTER — APPOINTMENT (OUTPATIENT)
Dept: GENERAL RADIOLOGY | Age: 74
End: 2019-12-25
Attending: EMERGENCY MEDICINE
Payer: MEDICARE

## 2019-12-25 VITALS
HEART RATE: 90 BPM | OXYGEN SATURATION: 93 % | RESPIRATION RATE: 20 BRPM | DIASTOLIC BLOOD PRESSURE: 70 MMHG | SYSTOLIC BLOOD PRESSURE: 151 MMHG

## 2019-12-25 DIAGNOSIS — J45.909 REACTIVE AIRWAY DISEASE WITHOUT COMPLICATION, UNSPECIFIED ASTHMA SEVERITY, UNSPECIFIED WHETHER PERSISTENT: Primary | ICD-10-CM

## 2019-12-25 DIAGNOSIS — J20.9 ACUTE BRONCHITIS, UNSPECIFIED ORGANISM: ICD-10-CM

## 2019-12-25 DIAGNOSIS — R06.00 DYSPNEA, UNSPECIFIED TYPE: ICD-10-CM

## 2019-12-25 LAB
ALBUMIN SERPL-MCNC: 3.8 G/DL (ref 3.4–5)
ALBUMIN/GLOB SERPL: 1 {RATIO} (ref 0.8–1.7)
ALP SERPL-CCNC: 84 U/L (ref 45–117)
ALT SERPL-CCNC: 29 U/L (ref 13–56)
ANION GAP SERPL CALC-SCNC: 5 MMOL/L (ref 3–18)
APTT PPP: 32.8 SEC (ref 23–36.4)
AST SERPL-CCNC: 32 U/L (ref 10–38)
ATRIAL RATE: 88 BPM
BASOPHILS # BLD: 0 K/UL (ref 0–0.1)
BASOPHILS NFR BLD: 0 % (ref 0–2)
BILIRUB SERPL-MCNC: 0.7 MG/DL (ref 0.2–1)
BNP SERPL-MCNC: 124 PG/ML (ref 0–1800)
BUN SERPL-MCNC: 31 MG/DL (ref 7–18)
BUN/CREAT SERPL: 25 (ref 12–20)
CALCIUM SERPL-MCNC: 9.6 MG/DL (ref 8.5–10.1)
CALCULATED P AXIS, ECG09: 37 DEGREES
CALCULATED R AXIS, ECG10: 1 DEGREES
CALCULATED T AXIS, ECG11: 14 DEGREES
CHLORIDE SERPL-SCNC: 109 MMOL/L (ref 100–111)
CK MB CFR SERPL CALC: 1.9 % (ref 0–4)
CK MB SERPL-MCNC: 5.3 NG/ML (ref 5–25)
CK SERPL-CCNC: 276 U/L (ref 26–192)
CO2 SERPL-SCNC: 30 MMOL/L (ref 21–32)
CREAT SERPL-MCNC: 1.23 MG/DL (ref 0.6–1.3)
DIAGNOSIS, 93000: NORMAL
DIFFERENTIAL METHOD BLD: ABNORMAL
EOSINOPHIL # BLD: 0.3 K/UL (ref 0–0.4)
EOSINOPHIL NFR BLD: 3 % (ref 0–5)
ERYTHROCYTE [DISTWIDTH] IN BLOOD BY AUTOMATED COUNT: 16.3 % (ref 11.6–14.5)
FLUAV AG NPH QL IA: NEGATIVE
FLUBV AG NOSE QL IA: NEGATIVE
GLOBULIN SER CALC-MCNC: 3.8 G/DL (ref 2–4)
GLUCOSE SERPL-MCNC: 81 MG/DL (ref 74–99)
HCT VFR BLD AUTO: 41.5 % (ref 35–45)
HGB BLD-MCNC: 13.8 G/DL (ref 12–16)
INR PPP: 1.4 (ref 0.8–1.2)
LACTATE BLD-SCNC: 1.38 MMOL/L (ref 0.4–2)
LYMPHOCYTES # BLD: 0.8 K/UL (ref 0.9–3.6)
LYMPHOCYTES NFR BLD: 9 % (ref 21–52)
MAGNESIUM SERPL-MCNC: 2.2 MG/DL (ref 1.6–2.6)
MCH RBC QN AUTO: 31.1 PG (ref 24–34)
MCHC RBC AUTO-ENTMCNC: 33.3 G/DL (ref 31–37)
MCV RBC AUTO: 93.5 FL (ref 74–97)
MONOCYTES # BLD: 1.4 K/UL (ref 0.05–1.2)
MONOCYTES NFR BLD: 16 % (ref 3–10)
NEUTS SEG # BLD: 6.1 K/UL (ref 1.8–8)
NEUTS SEG NFR BLD: 72 % (ref 40–73)
P-R INTERVAL, ECG05: 144 MS
PLATELET # BLD AUTO: 320 K/UL (ref 135–420)
PMV BLD AUTO: 10.7 FL (ref 9.2–11.8)
POTASSIUM SERPL-SCNC: 3.9 MMOL/L (ref 3.5–5.5)
PROT SERPL-MCNC: 7.6 G/DL (ref 6.4–8.2)
PROTHROMBIN TIME: 16.7 SEC (ref 11.5–15.2)
Q-T INTERVAL, ECG07: 324 MS
QRS DURATION, ECG06: 58 MS
QTC CALCULATION (BEZET), ECG08: 392 MS
RBC # BLD AUTO: 4.44 M/UL (ref 4.2–5.3)
SODIUM SERPL-SCNC: 144 MMOL/L (ref 136–145)
TROPONIN I SERPL-MCNC: 0.03 NG/ML (ref 0–0.04)
VENTRICULAR RATE, ECG03: 88 BPM
WBC # BLD AUTO: 8.6 K/UL (ref 4.6–13.2)

## 2019-12-25 PROCEDURE — 82550 ASSAY OF CK (CPK): CPT

## 2019-12-25 PROCEDURE — 85025 COMPLETE CBC W/AUTO DIFF WBC: CPT

## 2019-12-25 PROCEDURE — 71045 X-RAY EXAM CHEST 1 VIEW: CPT

## 2019-12-25 PROCEDURE — 74011250636 HC RX REV CODE- 250/636: Performed by: EMERGENCY MEDICINE

## 2019-12-25 PROCEDURE — 83735 ASSAY OF MAGNESIUM: CPT

## 2019-12-25 PROCEDURE — 96375 TX/PRO/DX INJ NEW DRUG ADDON: CPT

## 2019-12-25 PROCEDURE — 83880 ASSAY OF NATRIURETIC PEPTIDE: CPT

## 2019-12-25 PROCEDURE — 99285 EMERGENCY DEPT VISIT HI MDM: CPT

## 2019-12-25 PROCEDURE — 96374 THER/PROPH/DIAG INJ IV PUSH: CPT

## 2019-12-25 PROCEDURE — 74011000250 HC RX REV CODE- 250: Performed by: EMERGENCY MEDICINE

## 2019-12-25 PROCEDURE — 74011250637 HC RX REV CODE- 250/637: Performed by: EMERGENCY MEDICINE

## 2019-12-25 PROCEDURE — 87040 BLOOD CULTURE FOR BACTERIA: CPT

## 2019-12-25 PROCEDURE — 80053 COMPREHEN METABOLIC PANEL: CPT

## 2019-12-25 PROCEDURE — 93005 ELECTROCARDIOGRAM TRACING: CPT

## 2019-12-25 PROCEDURE — 83605 ASSAY OF LACTIC ACID: CPT

## 2019-12-25 PROCEDURE — 85730 THROMBOPLASTIN TIME PARTIAL: CPT

## 2019-12-25 PROCEDURE — 87804 INFLUENZA ASSAY W/OPTIC: CPT

## 2019-12-25 PROCEDURE — 85610 PROTHROMBIN TIME: CPT

## 2019-12-25 RX ORDER — CODEINE PHOSPHATE AND GUAIFENESIN 10; 100 MG/5ML; MG/5ML
2.5 SOLUTION ORAL
Status: COMPLETED | OUTPATIENT
Start: 2019-12-25 | End: 2019-12-25

## 2019-12-25 RX ORDER — CODEINE PHOSPHATE AND GUAIFENESIN 10; 100 MG/5ML; MG/5ML
2.5 SOLUTION ORAL
Qty: 50 ML | Refills: 0 | Status: SHIPPED | OUTPATIENT
Start: 2019-12-25 | End: 2020-01-01

## 2019-12-25 RX ORDER — IPRATROPIUM BROMIDE AND ALBUTEROL SULFATE 2.5; .5 MG/3ML; MG/3ML
3 SOLUTION RESPIRATORY (INHALATION)
Status: COMPLETED | OUTPATIENT
Start: 2019-12-25 | End: 2019-12-25

## 2019-12-25 RX ORDER — PREDNISONE 5 MG/1
TABLET ORAL
Qty: 48 TAB | Refills: 0 | Status: SHIPPED | OUTPATIENT
Start: 2019-12-25 | End: 2020-02-06 | Stop reason: ALTCHOICE

## 2019-12-25 RX ORDER — CEFDINIR 300 MG/1
300 CAPSULE ORAL 2 TIMES DAILY
Qty: 14 CAP | Refills: 0 | Status: SHIPPED | OUTPATIENT
Start: 2019-12-25 | End: 2020-01-01

## 2019-12-25 RX ADMIN — CEFTRIAXONE SODIUM 1 G: 1 INJECTION, POWDER, FOR SOLUTION INTRAMUSCULAR; INTRAVENOUS at 11:59

## 2019-12-25 RX ADMIN — IPRATROPIUM BROMIDE AND ALBUTEROL SULFATE 3 ML: .5; 3 SOLUTION RESPIRATORY (INHALATION) at 08:42

## 2019-12-25 RX ADMIN — GUAIFENESIN AND CODEINE PHOSPHATE 2.5 ML: 100; 10 SOLUTION ORAL at 11:57

## 2019-12-25 RX ADMIN — IPRATROPIUM BROMIDE AND ALBUTEROL SULFATE 3 ML: .5; 3 SOLUTION RESPIRATORY (INHALATION) at 08:54

## 2019-12-25 RX ADMIN — METHYLPREDNISOLONE SODIUM SUCCINATE 125 MG: 125 INJECTION, POWDER, FOR SOLUTION INTRAMUSCULAR; INTRAVENOUS at 08:51

## 2019-12-25 RX ADMIN — IPRATROPIUM BROMIDE AND ALBUTEROL SULFATE 3 ML: .5; 3 SOLUTION RESPIRATORY (INHALATION) at 08:50

## 2019-12-25 NOTE — DISCHARGE INSTRUCTIONS
Patient Education        Bronchitis: Care Instructions  Your Care Instructions    Bronchitis is inflammation of the bronchial tubes, which carry air to the lungs. The tubes swell and produce mucus, or phlegm. The mucus and inflamed bronchial tubes make you cough. You may have trouble breathing. Most cases of bronchitis are caused by viruses like those that cause colds. Antibiotics usually do not help and they may be harmful. Bronchitis usually develops rapidly and lasts about 2 to 3 weeks in otherwise healthy people. Follow-up care is a key part of your treatment and safety. Be sure to make and go to all appointments, and call your doctor if you are having problems. It's also a good idea to know your test results and keep a list of the medicines you take. How can you care for yourself at home? · Take all medicines exactly as prescribed. Call your doctor if you think you are having a problem with your medicine. · Get some extra rest.  · Take an over-the-counter pain medicine, such as acetaminophen (Tylenol), ibuprofen (Advil, Motrin), or naproxen (Aleve) to reduce fever and relieve body aches. Read and follow all instructions on the label. · Do not take two or more pain medicines at the same time unless the doctor told you to. Many pain medicines have acetaminophen, which is Tylenol. Too much acetaminophen (Tylenol) can be harmful. · Take an over-the-counter cough medicine that contains dextromethorphan to help quiet a dry, hacking cough so that you can sleep. Avoid cough medicines that have more than one active ingredient. Read and follow all instructions on the label. · Breathe moist air from a humidifier, hot shower, or sink filled with hot water. The heat and moisture will thin mucus so you can cough it out. · Do not smoke. Smoking can make bronchitis worse. If you need help quitting, talk to your doctor about stop-smoking programs and medicines.  These can increase your chances of quitting for good.  When should you call for help? Call 911 anytime you think you may need emergency care. For example, call if:    · You have severe trouble breathing.    Call your doctor now or seek immediate medical care if:    · You have new or worse trouble breathing.     · You cough up dark brown or bloody mucus (sputum).     · You have a new or higher fever.     · You have a new rash.    Watch closely for changes in your health, and be sure to contact your doctor if:    · You cough more deeply or more often, especially if you notice more mucus or a change in the color of your mucus.     · You are not getting better as expected. Where can you learn more? Go to http://mikiAquaBlokethan.info/. Enter H333 in the search box to learn more about \"Bronchitis: Care Instructions. \"  Current as of: June 9, 2019  Content Version: 12.2  © 9336-7046 [employer]. Care instructions adapted under license by [employer] (which disclaims liability or warranty for this information). If you have questions about a medical condition or this instruction, always ask your healthcare professional. [place] [address] any warranty or liability for your use of this information. Patient Education        Reactive Airway Disease: Care Instructions  Your Care Instructions    Reactive airway disease is a breathing problem that appears as wheezing, a whistling noise in your airways. It may be caused by a viral or bacterial infection, allergies, tobacco smoke, or something else in the environment. When you are around these triggers, your body releases chemicals that make the airways get tight. Reactive airway disease is a lot like asthma. Both can cause wheezing. But asthma is ongoing, while reactive airway disease may occur only now and then. Tests can be done to tell whether you have asthma. You may take the same medicines used to treat asthma.  Good home care and follow-up care with your doctor can help you recover. Follow-up care is a key part of your treatment and safety. Be sure to make and go to all appointments, and call your doctor if you are having problems. It's also a good idea to know your test results and keep a list of the medicines you take. How can you care for yourself at home? · Take your medicines exactly as prescribed. Call your doctor if you think you are having a problem with your medicine. · Do not smoke or allow others to smoke around you. If you need help quitting, talk to your doctor about stop-smoking programs and medicines. These can increase your chances of quitting for good. · If you know what caused your wheezing (such as perfume or the odor of household chemicals), try to avoid it in the future. · Wash your hands several times a day, and consider using hand gels or wipes that contain alcohol. This can prevent colds and other infections. When should you call for help? Call 911 anytime you think you may need emergency care. For example, call if:    · You have severe trouble breathing.    Watch closely for changes in your health, and be sure to contact your doctor if:    · You cough up yellow, dark brown, or bloody mucus.     · You have a fever.     · Your wheezing gets worse. Where can you learn more? Go to http://miki-ethan.info/. Enter U326 in the search box to learn more about \"Reactive Airway Disease: Care Instructions. \"  Current as of: June 9, 2019  Content Version: 12.2  © 5600-3270 [employer]. Care instructions adapted under license by [employer] (which disclaims liability or warranty for this information). If you have questions about a medical condition or this instruction, always ask your healthcare professional. [place] [address] any warranty or liability for your use of this information.

## 2019-12-25 NOTE — ED PROVIDER NOTES
EMERGENCY DEPARTMENT HISTORY AND PHYSICAL EXAM    8:18 AM      Date: 12/25/2019  Patient Name: Ella Null    History of Presenting Illness     Chief Complaint   Patient presents with    Shortness of Breath         History Provided By: Patient  Location/Duration/Severity/Modifying factors   Patient is a 49-year-old female with a history of COPD, pulmonary embolism on Eliquis, hemochromatosis with recurrent phlebotomy, atrial fibrillation, hypertension with pulmonary hypertension, recent pneumonia, chronic cough, that presents emergency department with complaint of increasing cough and shortness of breath this morning. Patient know she has been having shortness of breath difficulty lying flat and difficulty ambulating since July when she was diagnosed with pulmonary embolism. Patient has a chronic cough which bothers her however this morning the cough was bad and she is felt like she could not get the mucus up. She called EMS and EMS found her to have a sat of 91%. Patient denies any fevers or chills but has been having sneezing, nasal congestion, runny nose, and productive cough. Patient notes that she has chronic back paindifficult for her to ambulate and she has been having a difficult time ambulating distances due to shortness of breath and pain since July. Patient denies any other aggravating alleviating factors. Patient uses nebulizer at home and sometimes gets relief in the morning for the nebulizer. Patient is compliant with all her medications including her Eliquis. Patient follows closely with her primary doctor. Patient denies being a smoker or drinker and did not have any plans for the [place] holiday. Patient did have a ham sandwich last evening.           PCP: Kulwinder Martínez NP    Current Outpatient Medications   Medication Sig Dispense Refill    guaiFENesin-codeine (CHERATUSSIN AC) 100-10 mg/5 mL solution Take 2.5 mL by mouth three (3) times daily as needed for Cough for up to 7 days. Max Daily Amount: 7.5 mL. 50 mL 0    cefdinir (OMNICEF) 300 mg capsule Take 1 Cap by mouth two (2) times a day for 7 days. 14 Cap 0    benzonatate (TESSALON) 200 mg capsule Take 1 Cap by mouth three (3) times daily as needed for Cough for up to 7 days. 21 Cap 0    albuterol-ipratropium (DUO-NEB) 2.5 mg-0.5 mg/3 ml nebu 1 VIAL VIA NEBULIZER EVERY 4 HOURS FOR COUGH OR WHEEZING. File under Medicare Part B, ICD code J45.909 90 mL 3    albuterol (PROAIR HFA) 90 mcg/actuation inhaler Take 2 Puffs by inhalation every four (4) hours as needed for Wheezing. Or SOB 1 Inhaler 3    SYMBICORT 160-4.5 mcg/actuation HFAA TAKE 2 PUFFS BY INHALATION TWO (2) TIMES A DAY FOR 30 DAYS. 30.6 Inhaler 1    montelukast (SINGULAIR) 10 mg tablet Take 1 Tab by mouth nightly. 30 Tab 3    potassium chloride (KLOR-CON M20) 20 mEq tablet Take 1 Tab by mouth daily. 90 Tab 6    allopurinol (ZYLOPRIM) 100 mg tablet Take 1 Tab by mouth two (2) times a day. 180 Tab 11    furosemide (LASIX) 20 mg tablet Take 1 Tab by mouth daily. 30 Tab 2    ELIQUIS 5 mg tablet TAKE 1 TABLET BY MOUTH EVERY 12 HOURS (Patient taking differently: 2.5 mg.) 90 Tab 0    diclofenac (VOLTAREN) 1 % gel Apply 2 g to affected area every six (6) hours. Indications: chronic shoulder pain 100 g 0    acetaminophen (TYLENOL) 325 mg tablet Take 2 Tabs by mouth every six (6) hours as needed for Pain or Fever. 30 Tab 0    levothyroxine (SYNTHROID) 25 mcg tablet Take 1 Tab by mouth Daily (before breakfast). 90 Tab 6    simvastatin (ZOCOR) 40 mg tablet Take 1 Tab by mouth nightly. 90 Tab 6    inhalational spacing device (ACE AEROSOL CLOUD ENHANCER) 1 Each by Does Not Apply route as needed. 1 Device 3    cholecalciferol, vitamin D3, 2,000 unit tab TAKE 1 TABLET BY MOUTH EVERY DAY  3    Omeprazole delayed release (PRILOSEC D/R) 20 mg tablet Take 20 mg by mouth daily.          Past History     Past Medical History:  Past Medical History:   Diagnosis Date    Adopted     Arthritis     Balance problems     MCBRIDE (dyspnea on exertion) 2019    GERD (gastroesophageal reflux disease)     Hemochromatosis     High cholesterol     Hypertension     Left knee pain     Left shoulder pain     Lumbar pain     Pain of left sacroiliac joint     Shoulder impingement, right, with rotator cuff strain        Past Surgical History:  Past Surgical History:   Procedure Laterality Date    HX CHOLECYSTECTOMY      HX CHOLECYSTECTOMY      HX KNEE REPLACEMENT Bilateral     R knee/ Lknee and femur    HX ORTHOPAEDIC      R trigger finger     HX OTHER SURGICAL      Right little finger    HX OTHER SURGICAL      Right wrist, replaced unlnar).  HX OTHER SURGICAL      Both knees    HX OTHER SURGICAL      Left femur    HX OTHER SURGICAL      Trigger finger surgery    HX OTHER SURGICAL      Left knee replacment revision     HX ROTATOR CUFF REPAIR Right     HX TONSILLECTOMY         Family History:  Family History   Adopted: Yes       Social History:  Social History     Tobacco Use    Smoking status: Former Smoker     Packs/day: 0.50     Years: 2.00     Pack years: 1.00     Start date:      Last attempt to quit:      Years since quittin.0    Smokeless tobacco: Never Used    Tobacco comment: quit 20 years ago   Substance Use Topics    Alcohol use: No    Drug use: No       Allergies: Allergies   Allergen Reactions    Indomethacin Anaphylaxis, Hives and Swelling    Tomato Other (comments)     Heart burn         Review of Systems       Review of Systems   Constitutional: Positive for activity change. Negative for fatigue and fever. HENT: Positive for congestion. Negative for rhinorrhea. Eyes: Negative for visual disturbance. Respiratory: Positive for cough, shortness of breath and wheezing. Cardiovascular: Positive for leg swelling. Negative for chest pain and palpitations. Gastrointestinal: Negative for abdominal pain, diarrhea, nausea and vomiting. Genitourinary: Negative for dysuria and hematuria. Musculoskeletal: Negative for back pain. Skin: Negative for rash. Neurological: Negative for dizziness, weakness and light-headedness. All other systems reviewed and are negative. Physical Exam     Visit Vitals  /70   Pulse 90   Resp 20   SpO2 93%     Physical Exam  Vitals signs and nursing note reviewed. Constitutional:       General: She is not in acute distress. Appearance: She is well-developed. HENT:      Head: Normocephalic and atraumatic. Right Ear: External ear normal.      Left Ear: External ear normal.      Nose: Nose normal.   Eyes:      General: No scleral icterus. Conjunctiva/sclera: Conjunctivae normal.      Pupils: Pupils are equal, round, and reactive to light. Neck:      Musculoskeletal: Normal range of motion and neck supple. Thyroid: No thyromegaly. Vascular: No JVD. Trachea: No tracheal deviation. Cardiovascular:      Rate and Rhythm: Normal rate and regular rhythm. Heart sounds: Normal heart sounds. No murmur. No friction rub. No gallop. Pulmonary:      Effort: Pulmonary effort is normal.      Breath sounds: Examination of the right-middle field reveals wheezing and rhonchi. Examination of the left-middle field reveals wheezing, rhonchi and rales. Examination of the left-lower field reveals rales. Wheezing, rhonchi and rales present. No decreased breath sounds. Chest:      Chest wall: No tenderness. Abdominal:      General: Bowel sounds are normal. There is no distension. Palpations: Abdomen is soft. Tenderness: There is no tenderness. There is no guarding or rebound. Musculoskeletal: Normal range of motion. General: No tenderness. Right lower leg: Edema present. Left lower leg: Edema present. Lymphadenopathy:      Cervical: No cervical adenopathy. Skin:     General: Skin is warm and dry.    Neurological:      Mental Status: She is alert and oriented to person, place, and time. Cranial Nerves: No cranial nerve deficit. Coordination: Coordination normal.      Comments: No sensory loss, Gait normal, Motor 5/5   Psychiatric:         Behavior: Behavior normal.         Thought Content: Thought content normal.         Judgment: Judgment normal.      Comments: No family at the bedside            Diagnostic Study Results     Labs -  Recent Results (from the past 12 hour(s))   EKG, 12 LEAD, INITIAL    Collection Time: 12/25/19  8:10 AM   Result Value Ref Range    Ventricular Rate 88 BPM    Atrial Rate 88 BPM    P-R Interval 144 ms    QRS Duration 58 ms    Q-T Interval 324 ms    QTC Calculation (Bezet) 392 ms    Calculated P Axis 37 degrees    Calculated R Axis 1 degrees    Calculated T Axis 14 degrees    Diagnosis       Normal sinus rhythm  Possible Inferior infarct , age undetermined  Abnormal ECG  When compared with ECG of 11-NOV-2019 14:33,  Criteria for Anterior infarct are no longer present  T wave amplitude has increased in Anterior leads     INFLUENZA A & B AG (RAPID TEST)    Collection Time: 12/25/19  8:48 AM   Result Value Ref Range    Influenza A Antigen NEGATIVE  NEG      Influenza B Antigen NEGATIVE  NEG     CBC WITH AUTOMATED DIFF    Collection Time: 12/25/19  8:55 AM   Result Value Ref Range    WBC 8.6 4.6 - 13.2 K/uL    RBC 4.44 4.20 - 5.30 M/uL    HGB 13.8 12.0 - 16.0 g/dL    HCT 41.5 35.0 - 45.0 %    MCV 93.5 74.0 - 97.0 FL    MCH 31.1 24.0 - 34.0 PG    MCHC 33.3 31.0 - 37.0 g/dL    RDW 16.3 (H) 11.6 - 14.5 %    PLATELET 466 730 - 796 K/uL    MPV 10.7 9.2 - 11.8 FL    NEUTROPHILS 72 40 - 73 %    LYMPHOCYTES 9 (L) 21 - 52 %    MONOCYTES 16 (H) 3 - 10 %    EOSINOPHILS 3 0 - 5 %    BASOPHILS 0 0 - 2 %    ABS. NEUTROPHILS 6.1 1.8 - 8.0 K/UL    ABS. LYMPHOCYTES 0.8 (L) 0.9 - 3.6 K/UL    ABS. MONOCYTES 1.4 (H) 0.05 - 1.2 K/UL    ABS. EOSINOPHILS 0.3 0.0 - 0.4 K/UL    ABS.  BASOPHILS 0.0 0.0 - 0.1 K/UL    DF AUTOMATED     METABOLIC PANEL, COMPREHENSIVE    Collection Time: 12/25/19  8:55 AM   Result Value Ref Range    Sodium 144 136 - 145 mmol/L    Potassium 3.9 3.5 - 5.5 mmol/L    Chloride 109 100 - 111 mmol/L    CO2 30 21 - 32 mmol/L    Anion gap 5 3.0 - 18 mmol/L    Glucose 81 74 - 99 mg/dL    BUN 31 (H) 7.0 - 18 MG/DL    Creatinine 1.23 0.6 - 1.3 MG/DL    BUN/Creatinine ratio 25 (H) 12 - 20      GFR est AA 51 (L) >60 ml/min/1.73m2    GFR est non-AA 42 (L) >60 ml/min/1.73m2    Calcium 9.6 8.5 - 10.1 MG/DL    Bilirubin, total 0.7 0.2 - 1.0 MG/DL    ALT (SGPT) 29 13 - 56 U/L    AST (SGOT) 32 10 - 38 U/L    Alk. phosphatase 84 45 - 117 U/L    Protein, total 7.6 6.4 - 8.2 g/dL    Albumin 3.8 3.4 - 5.0 g/dL    Globulin 3.8 2.0 - 4.0 g/dL    A-G Ratio 1.0 0.8 - 1.7     MAGNESIUM    Collection Time: 12/25/19  8:55 AM   Result Value Ref Range    Magnesium 2.2 1.6 - 2.6 mg/dL   NT-PRO BNP    Collection Time: 12/25/19  8:55 AM   Result Value Ref Range    NT pro- 0 - 1,800 PG/ML   CARDIAC PANEL,(CK, CKMB & TROPONIN)    Collection Time: 12/25/19  8:55 AM   Result Value Ref Range     (H) 26 - 192 U/L    CK - MB 5.3 (H) <3.6 ng/ml    CK-MB Index 1.9 0.0 - 4.0 %    Troponin-I, QT 0.03 0.0 - 0.045 NG/ML   PROTHROMBIN TIME + INR    Collection Time: 12/25/19  8:55 AM   Result Value Ref Range    Prothrombin time 16.7 (H) 11.5 - 15.2 sec    INR 1.4 (H) 0.8 - 1.2     PTT    Collection Time: 12/25/19  8:55 AM   Result Value Ref Range    aPTT 32.8 23.0 - 36.4 SEC   POC LACTIC ACID    Collection Time: 12/25/19  9:01 AM   Result Value Ref Range    Lactic Acid (POC) 1.38 0.40 - 2.00 mmol/L       Radiologic Studies -   XR CHEST SNGL V   Final Result   IMPRESSION:       Subtle vague reticulonodular prominence of the lung markings, with the numerous   pulmonary nodules described on chest CT of 12/3/2019 much better demonstrated on   the CT. No new acute findings compared to the CT. Medical Decision Making   I am the first provider for this patient.     I reviewed the vital signs, available nursing notes, past medical history, past surgical history, family history and social history. Vital Signs-Reviewed the patient's vital signs. EKG: NSR at 88, no stemi interpreted by me    Records Reviewed: Nursing Notes and Old Medical Records (Time of Review: 8:18 AM)    ED Course: Progress Notes, Reevaluation, and Consults:     Patient is feeling better after nebulizer treatments and her saturations are 97% even with ambulation. Patient has a productive cough and has been placed on Tessalon by her primary provider this week and allows her to sleep. Patient supposed to be using an long-acting inhaler but she says it makes her cough more so she has not been using it. Suspect this is an exacerbation of her reactive airway disease and will treat as such and provide antibiotics for her productive cough. Leslie Harris,  10:29 AM    Patient had sputum studies that show that he had multiple bacteria however all sensitive to cephalosporins and patient just completed a course of Cipro. I will put her on Omnicef, prednisone taper, Cheratussin half a teaspoon to help with her cough and have her follow closely with her doctors. This point I do not think she needs to be admitted to the hospital she is hemodynamically stable and will have her return if at all worsened or concerned. Workup and recommendations were reviewed with the patient and all questions were answered. The patient understands the plan and will proceed with close outpatient care. I have encouraged the patient to return if at all worsened or concerned. Leslie Harris,  11:33 AM    Is [place] and patient cannot get her medications we will give her a dose of IV Rocephin and Cheratussin prior to discharge.  Leslie Harris,  11:51 AM          Provider Notes (Medical Decision Making):   MDM  Number of Diagnoses or Management Options  Diagnosis management comments: Patient is a 70-year-old female with a history of COPD, chronic atrial fibrillation on Eliquis, pulmonary embolism on Eliquis, pneumonia in July, and hemochromatosis with recurrent phlebotomy presents emergency department with complaint increasing shortness of breath and productive cough. Patient's had a chronic cough for last 5 months however this morning had a coughing episode that was worse than her baseline. Patient was found to have sats 91% according to EMS and in route was put on 2 L nasal cannula with some relief. Patient's wheezing however has some rales at left base of her lung. We will follow the patient's chest x-ray, start duo nebs, cardiac labs including BNP, and then reevaluate. Ultimately suspect her dyspnea is multifactorial and related to her chronic lung disease as well as deconditioning. Irina Prajapati,  8:33 AM        Procedures          Diagnosis     Clinical Impression:   1. Reactive airway disease without complication, unspecified asthma severity, unspecified whether persistent    2. Acute bronchitis, unspecified organism    3. Dyspnea, unspecified type        Disposition: DC    Follow-up Information     Follow up With Specialties Details Why Contact Info    Amos Renteria NP Nurse Practitioner In 2 days  [address] [facility]  [address] [address]      Vito Evangelista MD Pulmonary Disease In 1 week  [address] [facility],[address] [facility] EMERGENCY DEPT Emergency Medicine  As needed, If symptoms worsen [address] [facility] [address]  [phone]           Patient's Medications   Start Taking    CEFDINIR (OMNICEF) 300 MG CAPSULE    Take 1 Cap by mouth two (2) times a day for 7 days. GUAIFENESIN-CODEINE (CHERATUSSIN AC) 100-10 MG/5 ML SOLUTION    Take 2.5 mL by mouth three (3) times daily as needed for Cough for up to 7 days. Max Daily Amount: 7.5 mL.    Continue Taking    ACETAMINOPHEN (TYLENOL) 325 MG TABLET    Take 2 Tabs by mouth every six (6) hours as needed for Pain or Fever. ALBUTEROL (PROAIR HFA) 90 MCG/ACTUATION INHALER    Take 2 Puffs by inhalation every four (4) hours as needed for Wheezing. Or SOB    ALBUTEROL-IPRATROPIUM (DUO-NEB) 2.5 MG-0.5 MG/3 ML NEBU    1 VIAL VIA NEBULIZER EVERY 4 HOURS FOR COUGH OR WHEEZING. File under Medicare Part B, ICD code J45.909    ALLOPURINOL (ZYLOPRIM) 100 MG TABLET    Take 1 Tab by mouth two (2) times a day. BENZONATATE (TESSALON) 200 MG CAPSULE    Take 1 Cap by mouth three (3) times daily as needed for Cough for up to 7 days. CHOLECALCIFEROL, VITAMIN D3, 2,000 UNIT TAB    TAKE 1 TABLET BY MOUTH EVERY DAY    DICLOFENAC (VOLTAREN) 1 % GEL    Apply 2 g to affected area every six (6) hours. Indications: chronic shoulder pain    ELIQUIS 5 MG TABLET    TAKE 1 TABLET BY MOUTH EVERY 12 HOURS    FUROSEMIDE (LASIX) 20 MG TABLET    Take 1 Tab by mouth daily. INHALATIONAL SPACING DEVICE (ACE AEROSOL CLOUD ENHANCER)    1 Each by Does Not Apply route as needed. LEVOTHYROXINE (SYNTHROID) 25 MCG TABLET    Take 1 Tab by mouth Daily (before breakfast). MONTELUKAST (SINGULAIR) 10 MG TABLET    Take 1 Tab by mouth nightly. OMEPRAZOLE DELAYED RELEASE (PRILOSEC D/R) 20 MG TABLET    Take 20 mg by mouth daily. POTASSIUM CHLORIDE (KLOR-CON M20) 20 MEQ TABLET    Take 1 Tab by mouth daily. SIMVASTATIN (ZOCOR) 40 MG TABLET    Take 1 Tab by mouth nightly. SYMBICORT 160-4.5 MCG/ACTUATION HFAA    TAKE 2 PUFFS BY INHALATION TWO (2) TIMES A DAY FOR 30 DAYS. These Medications have changed    No medications on file   Stop Taking    PREDNISONE (STERAPRED DS) 10 MG DOSE PACK    See administration instruction per 10mg dose pack     Disclaimer: Sections of this note are dictated using utilizing voice recognition software. Minor typographical errors may be present. If questions arise, please do not hesitate to contact me or call our department.

## 2019-12-26 ENCOUNTER — PATIENT OUTREACH (OUTPATIENT)
Dept: FAMILY MEDICINE CLINIC | Age: 74
End: 2019-12-26

## 2019-12-26 NOTE — PROGRESS NOTES
Complex Case Management  Follow-Up    Date/Time:  12/26/2019 12:09 PM     Ambulatory Care Manager contacted patient for Complex Case Management  follow up. Spoke to patient. Introduced self/role and reason for call. Patient reported:   She was seen in the ED yesterday for cough, shortness of breath and wheezing. Since being seen and given nebulizer treatments, patient reports she is feeling much better today. She states \"this is the most air I've gotten in a long while\". She was able to sleep in her bed last night. Patient was unsure of how to take Prednisone 5 mg dose pack that was prescribed in the ED. Writer called [facility] pharmacy where script was filled and was given the following instructions by Julianne: Day 1-4 two tablets after breakfast, one tablet after lunch, one tablet after dinner, 2 tablets at bedtime. Day 5-8 One tablet after breakfast, one tablet after lunch, one tablet after dinner and one tablet at bedtime. Day 9-12 One tablet after breakfast and one tablet at bedtime. Patient given the above instructions with teach back. Patient reports that she has a follow up with pulmonary on 1/7/20 and she plans to attend. Patient denies any other complaints or concerns at this time. Specialist appointments since last outreach? no  If so, specialist and date: n/a    Medications:   New medications since last outreach: no  Does patient need refills on any medications: no  Medication changes since last outreach (dose adjustments or discontinued meds): no    Home Health company: n/a  Date of discharge: n/a    Barriers to care? None at this time. Patient reminded that there are physicians on call 24 hours a day / 7 days a week (M-F 5pm to 8am and from Friday 5pm until Monday 8a for the weekend) should the patient have questions or concerns.      Future Appointments   Date Time Provider Jose Singleton   1/7/2020  3:15 PM Mame Velez NP [place] [address]   1/13/2020 11:40 AM Antonio Sy NP [address] [facility]   3/6/2020  1:00 PM [facility] CT RM 2 [facility]   3/18/2020  1:00 PM Lucia POST, TRISTIAN [address] [facility]        Other upcoming appointments:  n/a    Goals       Pt will complete an ACP and have it scanned into their EMR by 12/30/19      Patient will attend all scheduled appointments through 12/30/19 11/5/19 Patient has upcoming appointment with Tarri Carrel on 11/11/19 11/25/19 Patient has attended all scheduled appointments. Upcoming appts reviewed with patient and she voiced acknowledgement. 12/3/19 Patient has CT scan appt today. 12/10/19 Patient attended appt with Dr. Disha Bentley on 12/6/19 12/18/19 Patient attended appointment with PCP, Tarri Carrel.  Pt will take all medications prescribed to be evaluated on each outreach through 12/30/19 11/5/19 Patient reports taking medications as prescribed. She reports that the pharmacy has not refilled her allopurinol. Writer called [facility] to confirm. The pharmacist said the refill will be available today. Patient made aware. 11/25/19 Patient reports taking medications as prescribed and denies needing any refills at this time. 12/3/19 Patient was encouraged to bring medication bottles with her to her appt on 12/18/19.

## 2020-01-01 NOTE — PROGRESS NOTES
Patient:   GAGE GIRL            MRN: [facility] [place]-[id number]            FIN: [id number]              Age:   10 days     Sex:  FEMALE     :  18   Associated Diagnoses:   None   Author:   KATERYNA ZAPATA     Basic Information   Baby's Information:     Admit to Hospital:    2020  Admit to Critical Care:    2020  Current Date:    2020  Days in Critical Care:    11  ,      :  2020 21:18  AGE:  10 Days  Duration of Current Hospitalization:  10 Days  Gestational Age at Birth:  32 1   Corrected Gestational Age:  33 4/7   , Baby's name is  Jerri Hinkle   ,    Dosing Weight:   1.360 kg    2020 12:04  Most Recent Clinical Weight:   1.445  kg    2020 00:00    Previous Clinical Weight:   1.450  kg 2020 00:00  Changed:   -   %0.34    5.00 gm   ,   Growth Parameters at Birth:    - Weight: 1445 grams (24%ile)   - Head Circumference: 28 cm (25%ile)   - Length: 43 cm (72nd%ile)   .    Gestational age assessment   Gestational Age by Dates: 32  weeks, 1  days.     .    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 Patient reports headache developed after arrival to hospital, feels it may be associated w/ anxiety. Notes waking up with a headache 2-3 days in a row earlier this week that improved w/ tylenol. Denies vision changes, chest pain, cough, shortness of breath, RUQ pain. Denies contractions, gush of fluid, vaginal bleeding. +Fetal movement,  [facility] Pulmonary, Critical Care, and Sleep Medicine Pulmonary Office Initial Consultation Name: Jose Manuel Gomez : 1945 Date: 2019 Subjective:  
Patient has been referred for evaluation of: SOB Patient is a 74 y.o. female has history of dyspnea on exertion, chronic peripheral edema. Hypertension hyperlipidemia, hemochromatosis and elevated uric acid. Dyspnea on exertion As yet she has not seen a pulmonologist but has an appointment for  the . The patient had a PFTshowing a decreased diffusion capacity of 73% of predicted.  She states that when she received the bronchodilator during the test she felt much better. However when she uses the pro-air or Ventolin more recently she does not see any benefit Symptoms occur at exertion-predominantly walking. Able to walk about 50-75   feet. Cannot climb stairs. She notices increased symptoms especially when she bends to  something . activities of daily living are affected. Denies orthopnea/ paroxysmal nocturnal dyspnea. Usually sleeps fairly flat She has been limited in her physical activity due to back pain and multiple other joint arthropathies. Denies any significant cough Denies associated wheezing, chest pain, fever, chills, night sweats dyspepsia, reflux. The patient states the leg swelling increased when the Lasix was stopped. She believes that the medication was started because of peripheral edema She has support hose, but was unable to use them because of the swelling She has been a lifelong non-smoker Occupational exposure-none Past Medical History:  
Diagnosis Date  Adopted  Arthritis  Balance problems  GERD (gastroesophageal reflux disease)  Hemochromatosis  High cholesterol  Hypertension  Left knee pain  Left shoulder pain  Lumbar pain  Pain of left sacroiliac joint unchanged.  Patient feels elevated BPs in office today may be related to anxiety as she has been nervous for US results. Patient was previously on Labetalol 200mg bid, increased to 200 TID on 12/4/19, decreased to 200 bid @ 22wga. BPs at home running 110-120s/70s.  Prenatal Labs: Blood type A+/Ab neg, GBS neg, Hep B neg, Gc/Chlamydia neg, RPR non-reactive, HIV non-reactive (11/25/2019, 2020), RI/VI, Pap 2018 WNL.  [facility] US:  31+5w US: 1562g, 8%ile, JEFFY = 13.9, BPP = 8/8, slightly elevated UA dopplers, breech, post fundal placenta, IUGR  PNI:  1. cHTN  - Dx 2014; Rx since 2015.   - Previously on Labetalol 200mg bid, increased to 200 TID on 12/4/19, decreased to 200 bid @ 22wga (current dosage).  - Normal baseline renal/liver function.   - Started ASA 162mg daily at 12 wga  - Evaluated by cardiology for uncontrolled HTN w/ normal renal US and urine metanephrines in 2016; Last appt 2020.  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 IUGR  - Diagnosed today at [facility], 1562g, 8%ile  - UA dopplers slightly elevated  3. MI Asthma  - Well-controlled  OBHx: none  GynHx: Pap 4/2018 NILM, neg HPV; Hx LEEP 2014 for CHELO 1-2, normal Paps subsequently. No h/o STIs  PMHx: cHTN (dx 2014, rx since 2015), mild intermittent asthma (well-controlled), anxiety, seasonal allergies, h/o heart murmur as child  Medications: Labetalol 200mg bid, Advair 250/50 bid, Albuterol MDI prn, Aspirin 162mg daily, PNV daily  Allergies: NKDA  PSHx: LEEP 2014   SHx: Denies any smoking, drinking, or drug use.   FHx: Paternal aunt has a son with \"spina bifida\" and parents w/ HTN; Otherwise denies family history of congenital disorders, bleeding/clotting disorders, issues w/ anesthesia, major medical conditions.  Objective:   Vitals between:   2020 18:56:31   TO   2020 18:56:31                   LAST RESULT MINIMUM MAXIMUM  Temperature 36.9 36.9 36.9  Heart Rate 65 65 86  Respiratory Rate 16 16 16  NISBP     Shoulder impingement, right, with rotator cuff strain Past Surgical History:  
Procedure Laterality Date  HX CHOLECYSTECTOMY  HX CHOLECYSTECTOMY  HX KNEE REPLACEMENT Bilateral   
 R knee/ Lknee and femur  HX ORTHOPAEDIC    
 R trigger finger  HX OTHER SURGICAL Right little finger  HX OTHER SURGICAL Right wrist, replaced unlnar).  HX OTHER SURGICAL Both knees  HX OTHER SURGICAL Left femur  HX OTHER SURGICAL Trigger finger surgery  HX OTHER SURGICAL Left knee replacment revision  HX ROTATOR CUFF REPAIR Right  HX TONSILLECTOMY Social History Socioeconomic History  Marital status: SINGLE Spouse name: Not on file  Number of children: Not on file  Years of education: Not on file  Highest education level: Not on file Tobacco Use  Smoking status: Former Smoker  Smokeless tobacco: Never Used  Tobacco comment: quit 20 years ago Substance and Sexual Activity  Alcohol use: No  
 Drug use: No  
 
Family History Adopted: Yes Allergies Allergen Reactions  Indomethacin Anaphylaxis, Hives and Swelling Current Outpatient Medications Medication Sig Dispense Refill  albuterol sulfate (PROAIR RESPICLICK) 90 mcg/actuation aepb Take 2 Puffs by inhalation every four (4) hours as needed.  allopurinol (ZYLOPRIM) 100 mg tablet Take 1 Tab by mouth two (2) times a day. 60 Tab 11  
 inhalational spacing device (ACE AEROSOL CLOUD ENHANCER) 1 Each by Does Not Apply route as needed. 1 Device 3  
 traMADol (ULTRAM) 50 mg tablet tramadol 50 mg tablet  tapentadol (NUCYNTA) 50 mg tablet Nucynta 50 mg tablet Take 1 tablet 3 times a day by oral route as needed for 30 days.  potassium chloride SR (KLOR-CON 10) 10 mEq tablet Klor-Con 10 mEq tablet,extended release TAKE 1 TABLET BY MOUTH EVERY DAY  oxyCODONE-acetaminophen (PERCOCET 10)  mg per tablet         130 130 158  NIDBP           84 84 105  NIMBP           116 116 116     Physical Exam:  General: Alert, NAD though tearful and anxious  Abdomen: Soft, non-tender, gravid.   Extremities: No edema, non-tender.   SSE: Deferred   SVE: Deferred  FHT: 130/moderate/+acc/-dec   [place]: Acontractile  BSUS: breech, fundal placenta, JEFFY = 16.3  A/P: 28 y/o G1 at 32+1w presenting for cHTN r/o SI pre-E, with IUGR and abnormal UA dopplers  1. VS  - BPs normal to mild range, no severe range BPs  - VS otherwise wnl  2. FHT  - reassuring  3. cHTN, r/o SI Pre-E  - S/p normal workup for early onset HTN, s/p normal maternal echo  - Pt has HA that she attributes to stress of the day's events, not severe; otherwise asymptomatic  - BPs normal to mild range in OBT  - Pre-E labs significant for ALT elevation to 82, otherwise wnl; PCR = 188  - Recommend 24h admission for 24h urine collection, repeat pre-E labs in 6 hours and again in AM  - Continue PO labetalol 200mg BID, continue LDA  4. IUGR w/ Elevated UA Dopplers  - 32+1w US: 1562g, 8%ile, JEFFY = 13.9, BPP = 8/8, slightly elevated UA dopplers, IUGR  - Pt will need twice weekly surveillance at [facility] with UA dopplers, as well as weekly labs as outpatient  5. Prematurity  - BMTZ x2 received ( and )  6. PNI  a. MI Asthma  - Well controlled  7. Routine  - General diet for undelivered pregnancy  - SLIV  - Continuous EFM  - Activity: up ad jose  - VS per unit routine  - Daily PNV  - F/u rapid COVID  - PPX: SCDs per unit routine   ,   Delivery Information:  - Neonatologist present at delivery: Noel  - Labor onset: None  - Mode of delivery:   - Anesthesia type: Epidural  - ROM: At delivery  - Fetal Presentation: Breech   - Nuchal cord?:   - Vacuum assist?: None  - Maternal fever and/or suspected chorioamnionitis?: No  - APGAR: 9/9  - Resuscitation: Routine, dried and stimulated   - Cord gas:    ,   Primary Care Physician: To be determined   .      Health Status   Allergies:  oxycodone-acetaminophen 10 mg-325 mg tablet  ondansetron (ZOFRAN ODT) 4 mg disintegrating tablet ondansetron 4 mg disintegrating tablet TAKE 1 TABLET BY MOUTH EVERY 8 HOURS AS NEEDED FOR NAUSEA  naloxone (NARCAN) 4 mg/actuation nasal spray Narcan 4 mg/actuation nasal spray USE 1 SPRAY PER NOSTRIL  cholecalciferol, vitamin D3, 2,000 unit tab TAKE 1 TABLET BY MOUTH EVERY DAY  3  
 oxyCODONE 5 mg TbOr oxycodone 5 mg tablet TAKE 1 TABLET BY MOUTH 2 TIMES DAILY FOR 30 DAYS.  furosemide (LASIX) 20 mg tablet TAKE 2 TABLETS BY MOUTH EVERY DAY 30 Tab 6  
 albuterol (PROVENTIL HFA, VENTOLIN HFA, PROAIR HFA) 90 mcg/actuation inhaler Take 1 Puff by inhalation every six (6) hours as needed for Wheezing. 1 Inhaler 3  
 fluticasone (FLOVENT HFA) 220 mcg/actuation inhaler Take 1 Puff by inhalation two (2) times a day. 1 Inhaler 3  
 meloxicam (MOBIC) 15 mg tablet Take 15 mg by mouth daily.  simvastatin (ZOCOR) 40 mg tablet Take 1 Tab by mouth nightly. 30 Tab 3  
 metOLazone (ZAROXOLYN) 5 mg tablet Take 1 Tab by mouth daily. 30 Tab 3  
 lisinopril (PRINIVIL, ZESTRIL) 10 mg tablet Take 1 Tab by mouth daily. 30 Tab 3  
 levothyroxine (SYNTHROID) 25 mcg tablet Take 1 Tab by mouth Daily (before breakfast). 30 Tab 3  
 oxyCODONE IR (ROXICODONE) 5 mg immediate release tablet Take 5 mg by mouth every four (4) hours as needed for Pain.  HYDROcodone-acetaminophen (NORCO) 5-325 mg per tablet Take 1 Tab by mouth every four (4) hours as needed for Pain. Max Daily Amount: 6 Tabs. 20 Tab 0  
 HYDROcodone-acetaminophen (NORCO) 5-325 mg per tablet Take  by mouth every four (4) hours as needed for Pain.  Omeprazole delayed release (PRILOSEC D/R) 20 mg tablet Take 20 mg by mouth daily.  NAPROXEN SODIUM (ALEVE PO) Take  by mouth. Review of Systems: 
HEENT: No epistaxis, no nasal drainage, no difficulty in swallowing, no redness in eyes Respiratory: as above Allergies (ST)   Allergies (1) Active Reaction  NKA None Documented    Problem List: Problems (ST)   32 week prematurity   affected by IUGR  RDS of   Respiratory distress      Review / Management   Intake and Output:     I & O between:  2020 07:43 TO 2020 07:43  Med Dosing Weight:  1.360  kg   2020  24 Hour Intake:   224.00  ( 164.71 mL/kg )  24 Hour Output:   0.00           24 Hour Urine/Stool Output:   0.0  24 Hour Balance:   224.00           24 Hour Urine Output:   0.00  ( 0.00 mL/kg/hr )                   Urine Count:  6.00    Stool Count:  5.00     .    Lab results:     No Qualifying Labs are resulted on this patient in the last 24 hours  ,     Type and Rh  Orders  TYPE AND SCREEN [TAS] ( Completed ) 2020 22:02    Last Posted Results  Type And Screen:  2020 22:30     .    Medications:  Medications (6) Active  Scheduled: (4)  Caffeine citrate 20 mg/mL oral liquid syringe  6.8 mg 0.34 mL, Oral, Q24H  Cholecalciferol 200 unit (5 mcg)/0.5 mL oral liquid repack  200 unit 0.5 mL, Oral, Daily  Hepatitis B pediatric VACCINE 10 mcg/0.5 mL IM inj SDV  10 mcg 0.5 mL, IM, On Call  Multivitamin-iron ped 0.5 mL oral drops repack [Fe 5 mg]  0.5 mL, Oral, Daily  Continuous: (1)  Dextrose 10% 250 mL  250 mL, IV, 1 mL/hr  PRN: (1)  Glycerin pediatric suppos  0.25 suppository, Rectal, Daily   , Medications (6) Active  Scheduled: (4)  Caffeine citrate 20 mg/mL oral liquid syringe  6.8 mg 0.34 mL, Oral, Q24H  Cholecalciferol 200 unit (5 mcg)/0.5 mL oral liquid repack  200 unit 0.5 mL, Oral, Daily  Hepatitis B pediatric VACCINE 10 mcg/0.5 mL IM inj SDV  10 mcg 0.5 mL, IM, On Call  Multivitamin-iron ped 0.5 mL oral drops repack [Fe 5 mg]  0.5 mL, Oral, Daily  Continuous: (1)  Dextrose 10% 250 mL  250 mL, IV, 1 mL/hr  PRN: (1)  Glycerin pediatric suppos  0.25 suppository, Rectal, Daily  ,    No qualifying data available  .    Lines and Tubes:     DRAINS AND TUBES  Gastric Tubes  Cardiovascular: no chest pain, no palpitations, no chronic leg edema, no syncope Gastrointestinal: no abd pain, no vomiting, no diarrhea, no bleeding symptoms Genitourinary: No urinary symptoms or hematuria Integument/breast: No ulcers or rashes Musculoskeletal:Neg 
Neurological: No focal weakness, no seizures, no headaches Behvioral/Psych: No anxiety, no depression Constitutional: No fever, no chills, no weight loss, no night sweats Objective:  
 
Visit Vitals /70 (BP 1 Location: Left arm, BP Patient Position: Sitting) Pulse 77 Temp 97.6 °F (36.4 °C) Resp 24 Ht 5' (1.524 m) Wt 96 kg (211 lb 12 oz) SpO2 99% BMI 41.35 kg/m² Physical Exam:  
General: comfortable, no acute distress, obese HEENT: pupils reactive, sclera anicteric, EOM intact Neck: No adenopathy or thyroid swelling, no lymphadenopathy or JVD, supple CVS: S1S2 no murmurs RS: Mod AE bilaterally, no tactile fremitus or egophony, no accessory muscle use Abd: soft, non tender, no hepatosplenomegaly Neuro: non focal, awake, alert Extrm: no leg edema, clubbing or cyanosis Skin: no rash Data review:  
Pertinent labs: CBC, BMP, LFT's 
 
PFT:1/24/2019 Patient effort:  
Good Meets ATS criteria for interpretation Flows: 
Normal flows Volumes: 
Normal Volumes Flow Volume Loop: 
Normal Flow Volume Loop Bronchodilator: No significant improvement with bronchodilator therapy Diffusion: Abnormal Diffusion Capacity reduced to 73 % predicted Impression: 
Reduced diffusion capacity indicating a decrease in alveolar surface area for gas exchange Results for orders placed or performed during the hospital encounter of 09/27/13 EKG, 12 LEAD, INITIAL Result Value Ref Range Ventricular Rate 72 BPM  
 Atrial Rate 72 BPM  
 P-R Interval 190 ms QRS Duration 78 ms Q-T Interval 390 ms QTC Calculation (Bezet) 427 ms Calculated P Axis 11 degrees Calculated R Axis 19 degrees Calculated T Axis -9 degrees Diagnosis Sinus rhythm with premature supraventricular complexes Possible Inferior infarct , age undetermined Cannot rule out Anterior infarct , age undetermined Abnormal ECG When compared with ECG of 22-FEB-2011 11:29, 
premature supraventricular complexes are now present Confirmed by Kehinde Malhotra MD, -- ([id number]) on 9/28/2013 11:14:00 AM  
 
 
Imaging: 
I have personally reviewed the patients radiographs and have reviewed the reports: XR Results (most recent): 
4/2018 CXR Findings: Mild eventration of right hemidiaphragm. No focal consolidation, 
pleural effusion, pulmonary edema, or pneumothorax. Heart size is normal. 
Atherosclerosis. Degenerative changes of the spine. 
  
IMPRESSION Impression: 1. No acute process. Results from [facility] Encounter encounter on 02/20/19 [place] XR TECHNOLOGIST SERVICE Narrative Fluoroscopy was provided for a bundled exam for documentation purposes. Impression IMPRESSION: 
 
Please see above. FLUORO TIME: 00.20 AJB 
  
 
CT Results (most recent): 
Results from Hospital Encounter encounter on 08/09/16 CT TEMP BONES WO CONT Narrative CT scan of temporal bones, without intravenous contrast: 
 
 
 
INDICATION: 
 
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 Intermittent \"thumping\" in left ear. TECHNIQUE: 
 
Contiguous 1.25 mm thick axial sections of temporal bones of both sides have 
been obtained without intravenous contrast. 
 
Axial images are reformatted with 3.678 mm slice thickness. Coronal images are reformatted with slice thickness of 4.19 mm. All CT scans at this facility are performed using dose optimization technique as 
appropriate to a  performed  examination, to include automated exposer control, 
adjustment mA and / or  KV according to patient size (including appropriate 
matching  for site specific examination), or use  of iterative  reconstruction 
technique. Nasogastric Left Nare   Tube Size: 5   Gastric Tube Level: 17   Charted: 05/15/20 00:00  Inserted: 05/13/20   Days Since Insertion: 2 days  Usage: Feeding  .      Findings/Physical Exam   VS/Measurements     Vitals between:   2020 07:44:00   TO   2020 07:44:00                   LAST RESULT MINIMUM MAXIMUM  Temperature 37.0 36.9 37.4  Heart Rate 139 124 179  Respiratory Rate 47 24 62  NISBP           70 61 84  NIDBP           38 29 56  NIMBP           49 40 65  SpO2                    99 94 100  , Weight Change (ST)     Dosing Weight:   1.360 kg    2020 12:04  Most Recent Clinical Weight:   1.445  kg    2020 00:00    Previous Clinical Weight:   1.450  kg 2020 00:00  Changed:   -   %0.34    5.00 gm    General:  No acute distress.    Eye:  Pupils are equal, round and reactive to light, Red reflex (deferred).   HENT:  Normocephalic, Anterior fontanelle open/soft/flat, Ears normally set and rotated, Palate intact, nondysmorphic.   Neck:  No thyromegaly, clavicles intact.    Respiratory:  Respirations are non-labored, Breath sounds are equal, Symmetrical chest wall expansion.   Cardiovascular:  Normal rate, Regular rhythm, Normal peripheral perfusion.   Gastrointestinal:  Soft, Non-distended, No organomegaly, Anus patent.   Genitourinary:  Normal genitalia for age and sex.    Musculoskeletal     No deformity.     Integumentary:  Pink, No pallor, No rash, no abornormal nevi or hemangiomas noted on admission.   Neurologic:  Normal deep tendon reflexes, No focal deficits, normal tone for gestational age.      Impression and Plan   System View:  Apnea/ Bradycardia/Desaturation.    Respiratory:  Assessment/Plan   5/4: Initially stable on RA in DR, however started having tachypnea and grunting on admission to NICU. Will start on HFNC, 3L/21%, obtain CBG and CXR.   5/5: Stable 3LHFNC/23%, CXR shows mild RDS.    5/6 wean HFNC to 1 lpm at 21%. RM.    5/7: Stable 3LHFNC/23%, will wean as tolerated.     : Wean to 2L HFNC 21%.     wean to 1 lpm at 21%. RM.    5/10: Stable, RA now.    : Stable, RA.     Clinically stable in room air. RM   .    5/15; Stable, RA.     .    Apnea/Bradycardia/Desaturation:   Event (ST)     2020 06:00   Bradycardia/Desaturation   Activity: Emesis, Suctioning, PC Feeding   Intervention: Repositioned, Suctioned, Other: suctioned small amount of white secretion from mouth /nose  .    Assessment/Plan   : Will monitor clinically.    :  2x karolyn desats yesterday requiring stim - started on Caffeine.   5/10: Few ABDs, self resolving, and one increased FiO2.     .    Cardiovascular:  Assessment/Plan   continuous cardiorespiratory monitoring/continuous pulse-oximetry .   : Hemodynamically stable.    : HDS.    : HDS  5/10: HDS.    : HDS.     Hemodynamically stable. RM   .     .    FEN:  Assessment/Plan   : Start IVFS d10w, start feeds when stable, monitor blood sugar.   .   : Will start TPN, trophic BM feeds, TFi 120, monitor UOP. weight, lytes..    Advance feeds to 7 ml q3h + TPN. RM.    : Some spit up OVN, NG found to be in mid oesophagus, pushed in by 4cm. Will resume NG feeds tonight. Continue tPN. Good UOP/stools. .   :  feeding held last night at 3 am due to emesis, belly soft, bowel loop slightly dilated on xray.  Small stool this morning.  Tolerating feeds since.  Will monitor tolerance and if does well will increase feeds to 9ml q3hrs.  Peripheral TPN ordered.    Urine and stool present. Plan: advance feeds to 9 ml q3h over 30', Extended dwell in place. RM.   5/10: Increased enteral feeds to 12lms q3hrs, continue TPN. . Good uOP/sotols.    advance feeds to 15 ml q3h + TPN. RM.    : Lost EDC this am, will titrate enteral feeds, till TFI 160mls. Good uOP/stools.   :  Tolerating feeds, 28ml q3hrs fortified to 24 hilaria.     Stable. No new or current clinically significant issues. Plan: continue current  COMPARISON STUDY: CT scan of brain with intravenous contrast, on 11/27/2015. [facility] FINDINGS: 
 
--------------------------------------------------------------- Right temporal bone: There is no demonstrable abnormality in the right external auditory canal. The 
right tympanic membrane appears to be grossly intact. There is no demonstrable abnormality in the right middle ear cavity, mastoid 
antrum, or in right mastoid air cells. In the right middle ear cavity the 
auditory ossicles are intact with normal contours. There is no abnormal soft 
tissue density in right middle ear cavity. The right internal ear structures, 
including terminal cochlea, vestibule and semicircular canals appear to be of 
normal size and contours. The right internal auditory canal is of normal size 
and contour. The outlines of the right vestibular aqueduct is partially 
visualized and appears normal. 
 
----------------------------------------------------------- Left temporal bone: There is no demonstrable abnormality in the left external auditory canal. The 
left tympanic membrane appears to be grossly intact. Within the left middle ear cavity there is no definable abnormal density or 
fluid collection. The left auditory ossicles are intact with normal contours. There is normal radiation of the left middle ear cavity, just to the mastoid 
antrum, the left mastoid antrum and left mastoid air cells. There is no evidence 
of any abnormal density or fluid collection in mastoid air cells. The left 
tympanic sinus and the facial nerve recess are normally delineated. The left internal ear structures, including cochlea vestibule and semicircular 
canals appear to be of normal size and contours. The bony walls of the 
semicircular canals are intact. Bony outlines of the left vestibular aqueduct is larger than the corresponding 
bony outlines of the right vestibular aqueduct. The left jugular fossa is larger than the right jugular fossa. But there is a 
bony septum of 2.8 mm thickness, between  the upper lateral aspect of left 
jugular fossa  and  the left middle ear cavity. 
 
------------------------------------------------------------ Impression IMPRESSION: 
 
There is no definable abnormality in the right temporal bone. There is no definable abnormality in the left external auditory canal, middle 
ear cavity, mastoid antrum, mastoid air cells, or in the internal ear 
structures, as visualized without intravenous contrast. 
 
But, the bony outlines of the left vestibular aqueduct, appears to be mildly 
larger than the corresponding bony outlines of right vestibular aqueduct. Therefore, further evaluation of vestibular aqueducts,  is indicated. For further evaluation, MRI of IACs and CP angles, without and with contrast and 
including high-resolution T2-weighted images, is suggested. .  
2-D echo: 4/4/2018 Left ventricle: Size was normal. Systolic function was normal by visual  
assessment. Ejection fraction was estimated in 
the range of 55 % to 60 %. No obvious wall motion abnormalities identified in 
 the views obtained. Wall thickness was 
mildly increased. Right ventricle: Systolic pressure was within the normal range. Estimated  
peak pressure was 32 mmHg. Patient Active Problem List  
Diagnosis Code  Shoulder impingement, right, with rotator cuff strain M75.40  Spinal stenosis M48.00  Sacroiliitis ([place] [address] M46.1  High cholesterol E78.00  Hypertension I10  
 Hemochromatosis E83.119  
 Obesity, morbid ([address] E66.01 IMPRESSION:  
· Dyspnea on exertion with multifactorial etiology and relatively normal pulmonary function tests with isolated mildly reduced diffusion capacity which is not enough to explain her symptoms of dyspnea on exertion especially positional changes.   I suspect some degree of diaphragmatic care. RM  .    5/15: -5g, , will monitor weight velocity. Good UOP/stools .Tolerating NG feeds.    .    Hematology:     Assessment/Plan   Blood Type/Coomb's (Baby): .    Monitor for hyperbilirubinemia.    5/5: Bili pending.    5/6 T bili 7.6 at 32h. RM.    5/7: Bili 6.6, continue Phottx, F/U in am.    5/8:  Bili 6.2 - continue phototherapy .    5/9 T bili 5.2. RM.    5/12: Bili 6.2- LRZ.    5/15: Bili 7.1- LRZ.     .    Assessment/Plan   NEUTROPENIA.    Initial WBC 4.3,  - likely due to maternal preEclampsia. Continue to monitor.   5/8:  WBC 4.2, ANC 1400.  No signs of sepsis, respiratory status improving.  COntinue to monitor.   5/10: ANC now 700, likely 2/2 Pre-X, will continue to monitor clinically.   5/11 WBC 7.7, ANC 2000. RM.     .    Gastrointestinal:       Gastrointestinal Status: Stable.    /Renal:       /Renal Status: Stable.    Neurological:  Assessment/Plan   5/6 normal tone. RM.     .    Infectious Disease:  Assessment/Plan   5/4: Due to oxygen requirement, will obtain CBCd and blood culture and start amp/gent for minimum of 36 hours pending results of labs and blood culture.   5/5: Completing 36hr ABx, , likely 2/2 Pre-X.    5/6 Blood Culture negative till date. RM.    5/10: No clinical signs of sepsis, NA.     .    Congenital Anomalies/Genetics:  Assessment/Plan   5/6 sacral dimple. Plan: Sacral US on 5/11. RM.    5/10: For Spine US in am.    5/11 Spinal US FINDINGS:  The distal spinal cord and nerve roots are unremarkable. The conus and nerve roots are freely mobile with normal pulsatile movement.  The conus medullaris is identified at the L1-L2 vertebral level.  Imaging over the region of sacral dimple identifies no sonographic abnormality. IMPRESSION: Cord terminates normally. No evidence of bony or soft tissue defect.  5/11 NBS SAMPLE DATE: 5/7:  Abnormal Amino Acid Profile: ABNORMAL SCREENING RESULT - Maple Syrup Urine Disease. (Iso)leucine = 320.25 µmol/L (Normal Range:  < 225**** µmol/L). Valine = 234.34 µmol/L (Normal Range: < 255 µmol/L). Plan: [facility] -  Repeat testing ordered on:  NBS.    .    Social   Plan Discussed with:   : Parents updated in DR,   : Updated Dad. NA,   : Updated Parents. NA,    Discussed with father at the bedside about the baby's condition. RM..  Family: Family informed.  .       Health Maintenance   Care Practices/Screens:     Immunizations:  Immunization (ST)     Vaccine Name: hepatitis B pediatric VACCINE ( Ordered ) Ordered On 2020 22:02  -   Administered: Not Administered     .   .     State screen per protocol: [place] Screen (ST)     Orders:     SCREEN [NEOS] ( Completed ) 2020 23:00       SCREEN REPEAT [NEOSR] ( Completed ) 2020 05:41     Results:    [place] Scrn Summary- The purpose of the [place] Screening Program in [place] is to identify infants at risk for certain congenital conditions and in need of more definitive testing. Abnormal results always require medical evaluation. Results can be affected by: age at time       Nurse Collect-         [place] Scrn Summary Repeat- The purpose of the  Screening Program in [place] is to identify infants at risk for certain congenital conditions and in need of more definitive testing. Abnormal results always require medical evaluation. Results can be affected by: age at time      .   .      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   weakness related to eventration coupled with body habitus and deconditioning is playing a role. In addition her chronic back pain with some degree of splinting also contributing. · Snoring and suspected obstructive sleep apnea with chronic daytime fatigue · History of hypertension · Hemochromatosis · Morbid obesity RECOMMENDATIONS:  
· Discussed with patient differential diagnosis · We will continue as needed use of albuterol(even though low suspicion for bronchospasm) · Ordering polysomnogram to diagnose obstructive sleep apnea and consider CPAP as a modality of treatment · Instructed to sleep with the head and raised to obtain a position of mechanical advantage with her diaphragm · Do not see a need for any further diagnostic testing at the current · Defer pain control to Ortho · We will follow-up after completion of testing and make further recommendations as needed · Answered questions and concerns with the patient and her friend was present Health maintenance screens deferred to Primary care provider.  
  
Jurgen Kinney MD

## 2020-01-03 ENCOUNTER — PATIENT OUTREACH (OUTPATIENT)
Dept: FAMILY MEDICINE CLINIC | Age: 75
End: 2020-01-03

## 2020-01-03 NOTE — PROGRESS NOTES
Complex Case Management  Follow-Up    Date/Time:  1/3/2020 11:31 AM     Ambulatory Care Manager contacted patient for Complex Case Management  follow up. Spoke to patient. Introduced self/role and reason for call. Patient reported:   She is doing \"about the same\". She has two days left on the prednisone taper she was prescribed in the ED. She feels that her coughing has improved somewhat but she states her breathing is unchanged. Patient has been monitoring her blood pressure. When she went to ortho yesterday it was 371 systolic, but patient relates that it is usually 356 systolic at home. Patient admits to taking medication as prescribed and denies needing any refills at this time. Patient is aware of upcoming appointment with pulmonary on 1/7/20 and Shobha Plascencia on 1/13/20. Specialist appointments since last outreach? yes  If so, specialist and date: ortho 1/2/20    Medications:   New medications since last outreach: no  Does patient need refills on any medications: no  Medication changes since last outreach (dose adjustments or discontinued meds): no    Home Health company: n/a  Date of discharge: n/a    Barriers to care? None at this time. Patient reminded that there are physicians on call 24 hours a day / 7 days a week (M-F 5pm to 8am and from Friday 5pm until Monday 8a for the weekend) should the patient have questions or concerns.      Future Appointments   Date Time Provider Jose Singleton   1/7/2020  3:15 PM Fredis Santos NP [address]   1/13/2020 11:40 AM Aura Fritz NP [facility]   3/6/2020  1:00 PM [facility]   3/18/2020  1:00 PM Aura Fritz NP [address]        Other upcoming appointments:  n/a    Goals       Pt will complete an ACP and have it scanned into their EMR by 12/30/19      Patient will attend all scheduled appointments through 12/30/19 11/5/19 Patient has upcoming appointment with Shobha Plascencia on 11/11/19 11/25/19 Patient has attended all scheduled appointments. Upcoming appts reviewed with patient and she voiced acknowledgement. 12/3/19 Patient has CT scan appt today. 12/10/19 Patient attended appt with Dr. Willian Gonzalez on 12/6/19 12/18/19 Patient attended appointment with PCP, Tasha Lugo.     12/26/19 Patient has appt with pulmonary on 1/7/20 and she plans to attend. 1/3/20 Patient is aware of upcoming appointment with pulmonary on 1/7/20 and Tasha Lugo on 1/13/20.  Pt will take all medications prescribed to be evaluated on each outreach through 12/30/19 11/5/19 Patient reports taking medications as prescribed. She reports that the pharmacy has not refilled her allopurinol. Writer called [facility] to confirm. The pharmacist said the refill will be available today. Patient made aware. 11/25/19 Patient reports taking medications as prescribed and denies needing any refills at this time. 12/3/19 Patient was encouraged to bring medication bottles with her to her appt on 12/18/19.    12/26/19 Prednisone dose pack instructions reviewed with patient with teach back. 1/3/20 Patient has 2 days left on prednisone taper. Patient admits to taking medication as prescribed and denies needing any refills at this time.

## 2020-01-07 ENCOUNTER — OFFICE VISIT (OUTPATIENT)
Dept: PULMONOLOGY | Age: 75
End: 2020-01-07

## 2020-01-07 VITALS
TEMPERATURE: 97 F | HEIGHT: 61 IN | WEIGHT: 209.2 LBS | OXYGEN SATURATION: 98 % | BODY MASS INDEX: 39.5 KG/M2 | RESPIRATION RATE: 20 BRPM | HEART RATE: 82 BPM | SYSTOLIC BLOOD PRESSURE: 173 MMHG | DIASTOLIC BLOOD PRESSURE: 75 MMHG

## 2020-01-07 DIAGNOSIS — J45.909 ASTHMA, UNSPECIFIED ASTHMA SEVERITY, UNSPECIFIED WHETHER COMPLICATED, UNSPECIFIED WHETHER PERSISTENT: ICD-10-CM

## 2020-01-07 DIAGNOSIS — E66.9 OBESITY (BMI 35.0-39.9 WITHOUT COMORBIDITY): ICD-10-CM

## 2020-01-07 DIAGNOSIS — J47.0 BRONCHIECTASIS WITH ACUTE LOWER RESPIRATORY INFECTION (HCC): Primary | ICD-10-CM

## 2020-01-07 DIAGNOSIS — I48.91 ATRIAL FIBRILLATION, UNSPECIFIED TYPE (HCC): ICD-10-CM

## 2020-01-07 DIAGNOSIS — Z86.711 HISTORY OF PULMONARY EMBOLUS (PE): ICD-10-CM

## 2020-01-07 RX ORDER — CODEINE PHOSPHATE AND GUAIFENESIN 10; 100 MG/5ML; MG/5ML
5 SOLUTION ORAL
Qty: 105 ML | Refills: 0 | Status: SHIPPED | OUTPATIENT
Start: 2020-01-07 | End: 2020-01-10

## 2020-01-07 RX ORDER — BUDESONIDE 0.5 MG/2ML
500 INHALANT ORAL 2 TIMES DAILY
Qty: 60 EACH | Refills: 3 | Status: SHIPPED | OUTPATIENT
Start: 2020-01-07 | End: 2020-02-06 | Stop reason: ALTCHOICE

## 2020-01-07 RX ORDER — ALBUTEROL SULFATE 0.83 MG/ML
2.5 SOLUTION RESPIRATORY (INHALATION)
Qty: 120 NEBULE | Refills: 3 | Status: SHIPPED | OUTPATIENT
Start: 2020-01-07 | End: 2022-05-17

## 2020-01-07 RX ORDER — TOBRAMYCIN INHALATION SOLUTION 300 MG/5ML
300 INHALANT RESPIRATORY (INHALATION) 2 TIMES DAILY
Qty: 60 AMPULE | Refills: 0 | Status: SHIPPED | OUTPATIENT
Start: 2020-01-07 | End: 2020-02-04

## 2020-01-07 RX ORDER — LEVOFLOXACIN 750 MG/1
750 TABLET ORAL DAILY
Qty: 7 TAB | Refills: 0 | Status: SHIPPED | OUTPATIENT
Start: 2020-01-07 | End: 2020-01-13 | Stop reason: SINTOL

## 2020-01-07 NOTE — PATIENT INSTRUCTIONS
 Take budesonide (pulmicort) neb treatment twice   daily, rinse mouth out after use.          · Start inhaled tobramycin neb treatments twice daily for 28 days    · Complete course of levaquin 750 mg daily x 7 days         Continue albuterol nebulizer treatment or rescue inhaler, 2 inhalations every 4-6 hours as needed for shortness of breath, wheezing or cough     Recommend healthy diet/weight and exercise as tolerated     Follow-up in pulmonary clinic in one month or sooner with worsening of symptoms     Report to the ER with chest pain or difficulty breathing

## 2020-01-07 NOTE — PROGRESS NOTES
Soco Strauss presents today for   Chief Complaint   Patient presents with    Asthma     Culture done 12/3, CT done 12/3    Breathing Problem    Cough    Shortness of Breath       Is someone accompanying this pt? No     Is the patient using any DME equipment during OV? Wheeled walker    -DME Company no     Depression Screening:  3 most recent PHQ Screens 1/7/2020   PHQ Not Done -   Little interest or pleasure in doing things Not at all   Feeling down, depressed, irritable, or hopeless Not at all   Total Score PHQ 2 0       Learning Assessment:  Learning Assessment 9/3/2019   PRIMARY LEARNER Patient   HIGHEST LEVEL OF EDUCATION - PRIMARY LEARNER  -   BARRIERS PRIMARY LEARNER -   [address] CAREGIVER -   PRIMARY LANGUAGE ENGLISH   LEARNER PREFERENCE PRIMARY LISTENING     -     -   ANSWERED BY patient   RELATIONSHIP SELF       Abuse Screening:  No flowsheet data found. Fall Risk  Fall Risk Assessment, last 12 mths 1/7/2020   Able to walk? Yes   Fall in past 12 months? No         Coordination of Care:  1. Have you been to the ER, urgent care clinic since your last visit? Hospitalized since your last visit? Yes; Name: [facility]    2. Have you seen or consulted any other health care providers outside of the [address] [place] [address] since your last visit? Include any pap smears or colon screening.  Dr Aria Meek Specialist

## 2020-01-08 NOTE — PROGRESS NOTES
[facility]  Pulmonary, Critical Care, and Sleep Medicine      Pulmonary Office Progress Notes    Name: Dauna Galeazzi     : 1945     Date: 2020        Subjective:     2020          HPI    Dauna Galeazzi  is a 66 y.o. female with PMH of   of DVT and pneumonia who presents for routine follow up visit.      She was last seen here on 19 with complaints of chronic persistent cough. A sputum culture demonstrated serratia liquifaciens / klebsiella pneumoniae and she was treated with a course of omnicef and prednisone. Today she appears comfortable, in  no distress however she states that she continues to have a daily, persistent cough productive of thick yellow mucus. She continues to have  some increased shortness of breath/wheezing with activity.  She denies chest pain. No fever, chills or orthopnea;  no decreased appetite or weight loss.     She complains of chronic back and LE pain/swelling due to osteoarthritis.      She appears confused about respiratory medications. She  is no longer taking nebulized pulmicort and yuperli - states she has no knowledge that it was prescribed . She takes nebulized albuterol TID.     Her last PFTs were on 2019 and demonstrated normal flow volume loop with reduced diffusion capacity.    She has a minimal history of smoking and reports a  2-pack-year history, quit in .             Past Medical History:   Diagnosis Date    Adopted     Arthritis     Balance problems     MCBRIDE (dyspnea on exertion) 2019    GERD (gastroesophageal reflux disease)     Hemochromatosis     High cholesterol     Hypertension     Left knee pain     Left shoulder pain     Lumbar pain     Pain of left sacroiliac joint     Shoulder impingement, right, with rotator cuff strain        Allergies   Allergen Reactions    Indomethacin Anaphylaxis, Hives and Swelling    Ciprofloxacin Other (comments)     Achilles pain    Tomato Other (comments) Heart burn       Current Outpatient Medications   Medication Sig Dispense Refill    budesonide (PULMICORT) 0.5 mg/2 mL nbsp 2 mL by Nebulization route two (2) times a day. 60 Each 3    tobramycin, PF, (COREY) 300 mg/5 mL nebulizer solution 5 mL by Nebulization route two (2) times a day for 28 days. 60 Ampule 0    albuterol (PROVENTIL VENTOLIN) 2.5 mg /3 mL (0.083 %) nebu 3 mL by Nebulization route every six (6) hours as needed for Wheezing or Shortness of Breath. 120 Nebule 3    levoFLOXacin (LEVAQUIN) 750 mg tablet Take 1 Tab by mouth daily for 7 days. 7 Tab 0    guaiFENesin-codeine (ROBITUSSIN AC) 100-10 mg/5 mL solution Take 5 mL by mouth three (3) times daily as needed for Cough for up to 3 days. Max Daily Amount: 15 mL. 105 mL 0    predniSONE (STERAPRED) 5 mg dose pack As directed 48 Tab 0    montelukast (SINGULAIR) 10 mg tablet Take 1 Tab by mouth nightly. 30 Tab 3    potassium chloride (KLOR-CON M20) 20 mEq tablet Take 1 Tab by mouth daily. 90 Tab 6    allopurinol (ZYLOPRIM) 100 mg tablet Take 1 Tab by mouth two (2) times a day. 180 Tab 11    furosemide (LASIX) 20 mg tablet Take 1 Tab by mouth daily. 30 Tab 2    ELIQUIS 5 mg tablet TAKE 1 TABLET BY MOUTH EVERY 12 HOURS (Patient taking differently: 2.5 mg.) 90 Tab 0    levothyroxine (SYNTHROID) 25 mcg tablet Take 1 Tab by mouth Daily (before breakfast). 90 Tab 6    simvastatin (ZOCOR) 40 mg tablet Take 1 Tab by mouth nightly. 90 Tab 6    inhalational spacing device (ACE AEROSOL CLOUD ENHANCER) 1 Each by Does Not Apply route as needed. 1 Device 3    cholecalciferol, vitamin D3, 2,000 unit tab TAKE 1 TABLET BY MOUTH EVERY DAY  3    Omeprazole delayed release (PRILOSEC D/R) 20 mg tablet Take 20 mg by mouth daily.  diclofenac (VOLTAREN) 1 % gel Apply 2 g to affected area every six (6) hours. Indications: chronic shoulder pain 100 g 0    acetaminophen (TYLENOL) 325 mg tablet Take 2 Tabs by mouth every six (6) hours as needed for Pain or Fever. 30 Tab 0       Review of Systems:    HEENT: No epistaxis, no nasal drainage, no difficulty in swallowing, no redness in eyes  Respiratory: As stated above in HPI  Cardiovascular: no chest pain, no palpitations, no chronic leg edema, no syncope  Gastrointestinal: no abd pain, no vomiting, no diarrhea, no bleeding symptoms  Genitourinary: No urinary symptoms or hematuria  Integument/breast: No ulcers or rashes  Musculoskeletal: CHronic back and LE pain due to osteoarthritis  Neurological: No focal weakness, no seizures, no headaches  Behvioral/Psych: No anxiety, no depression  Constitutional: No fever, chills or night sweats. No decreased appetite or weight loss     Objective:     Visit Vitals  /75 (BP 1 Location: Left arm, BP Patient Position: Sitting)   Pulse 82   Temp 97 °F (36.1 °C) (Oral)   Resp 20   Ht 5' 1\" (1.549 m)   Wt 94.9 kg (209 lb 3.2 oz)   SpO2 98%   BMI 39.53 kg/m²        PHYSICAL EXAM      General: Oriented to person, place, and time. Well-developed, well-nourished, and in no distress. Obese      Head:   Normocephalic, without obvious abnormality, atraumatic       Eyes:   Pupils reactive, conjunctivae / corneas clear. EOM's intact, no scleral icterus       Nose:   Nares normal, no drainage. Throat:    Lips, mucosa and tongue normal. Teeth and gums normal       Neck:   Supple, symmetrical, trachea midline. No adenopathy or thyroid swelling; no carotid bruit or JVD. CVS:    Regular rate and rhythm. S1S2 normal,  no murmurs       RS:      Symmetrical chest rise, good AE bilaterally. Lung sounds clear to auscultation bilaterally. No wheezing, rales or rhonchi, no accessory muscle use      Abd:     Soft, non-tender.   No hepatosplenomegaly                                                     Neuro:   non focal, awake, alert and oriented to person, place, time and situation    Extrm:   no leg edema,  clubbing or cyanosis       Skin:   no rash    Data review: PULMONARY FUNCTION TESTS    Date FVC FEV1  FEV1/FVC [id number] TLC RV RV/TLC VC DLCO                                                         Imaging:  I have reviewed all of the available data including the patient's previous history external records and radiological imaging available for review. In addition, applicable cardiology and other lab data were also reviewed. XR Results (most recent):  Results from Hospital Encounter encounter on 12/25/19   XR CHEST SNGL V    Narrative AP CHEST, PORTABLE    CPT CODE: 93638    INDICATION: Above. Dyspnea. Shortness of breath, onset/duration not provided. COMPARISON: 11/11/2019. TECHNIQUE: Portable AP chest radiograph is reviewed. FINDINGS:    Subtle reticulonodular prominence of the lung markings, with the numerous  pulmonary nodules described on recent chest CT much better visualized on the CT. No evidence of larger focal pulmonary consolidation or pulmonary edema. No  evidence of pneumothorax. The costophrenic sulci appear sharp. The cardiac silhouette is within normal limits in size. EKG leads/wires overlie the patient. Diffuse radiographic osteopenia. Degenerative changes in the thoracic spine and shoulders. Surgical clips again  project over the right upper quadrant medially. Impression IMPRESSION:     Subtle vague reticulonodular prominence of the lung markings, with the numerous  pulmonary nodules described on chest CT of 12/3/2019 much better demonstrated on  the CT. No new acute findings compared to the CT. CT Results (most recent):  Results from Hospital Encounter encounter on 12/03/19   CT CHEST WO CONT    Narrative CT CHEST WITHOUT IV CONTRAST      COMPARISON: cough, hemoptysis, history of bronchitis. INDICATIONS: Female. TECHNIQUE: Volumetric data acquisition was performed through the chest with a  multislice scanner. Reconstructions were created in the axial, coronal, and  sagittal planes.   All CT scans at this facility are performed using dose optimization technique as  appropriate to the performed exam, to include automated exposure control,  adjustment of the mA and/or kV according to patient's size (Including  appropriate matching for site-specific examinations), or use of iterative  reconstruction technique. FINDINGS:     Thyroid/Base Of Neck:  Unremarkable  . Lungs:   No consolidation or mass. .   Patent trachea and central bronchi. No significant bronchiectasis or bronchial  wall thickening. Multiple small linear scars in the bilateral lung bases. .  Numerous solid or part solid bilateral pulmonary nodules ranging in size from to  millimeter the largest 5 mm in the left upper lobe (image 13). Cluster of  ill-defined groundglass nodularity in the superior right lower lobe (images  21-24)    Pleural Spaces: There is no pneumothorax or pleural fluid evident. No pleural plaques are seen    Lymph Nodes:   Axillae: Right axilla 2 x 2 x 3 cm soft tissue density with internal lateral fat  density. Otherwise no enlarged lymph nodes. Mediastinum / Yakelin: Limited evaluation without IV contrast. No gross  enlargement. .    Mediastinum, Great Vessels And Heart: The heart is not enlarged. No pericardial effusion. Mild atherosclerosis in the  aorta and coronary arteries. The aorta is nonaneurysmal..    Abdomen Structures Included:  Cholecystectomy. No diagnostic abnormalities in the visualized upper abdomen. Mariam Norberto    Osseous Structures: Unremarkable for age. Impression IMPRESSION:     1. No mass or focal consolidation.   2. Cluster of ill-defined groundglass nodularity in the superior right lower  lobe concerning for infectious bronchiolar disease, possibly  mycobacterial/atypical mycobacterial or fungal.  -Otherwise there are numerous scattered bilateral pulmonary nodules measuring up  to 5 mm in size, possibly similar process but given the diffuse bilateral  distribution differential also include granulomatous disease and cannot exclude  metastatic disease although no known primary malignancy.  -Follow-up chest CT in 3 months after appropriate treatment is recommended  3. Right axilla 3 cm mixed fat and soft tissue density nodule may represent  enlarged lymph node although atypical appearance, attention on follow-up  imaging. Also recommend mammogram if not obtained elsewhere. Patient Active Problem List   Diagnosis Code    Shoulder impingement, right, with rotator cuff strain M75.40    Spinal stenosis M48.00    Sacroiliitis ([facility] [address] M46.1    High cholesterol E78.00    Hypertension I10    Hemochromatosis E83.119    Obesity, morbid ([address] E66.01    MCBRIDE (dyspnea on exertion) R06.09    CAP (community acquired pneumonia) J18.9    Sepsis ([facility] [address] A41.9    Hypotension I95.9    Atrial fibrillation with RVR (HCC) I48.91       IMPRESSION:   · Persistent cough with features suggestive of reactive airways - sputum cultures positive for serratia liquifaciens / klebsiella pneumoniae , sensitive to floroquinolones, macrolides and tobramycin  · Asthma-moderate persistent  · History of PE/ bilateral DVT currently on Eliquis 5 mg twice daily  · ObesityBMI 39.53  · Hemochromatosis         · Pulmonary nodules / Abnormal CT chest  11/18/19 - Cluster of ill-defined groundglass nodularity in the superior right lower  lobe concerning for infectious bronchiolar disease, possibly  mycobacterial/atypical mycobacterial or fungal.   Numerous scattered bilateral pulmonary nodules measuring up  to 5 mm in size, possibly similar process but given the diffuse bilateral  distribution differential also include granulomatous disease and cannot exclude  metastatic disease although no known primary malignancy.-Follow-up chest CT in 3 months after appropriate treatment is recommended      RECOMMENDATIONS:   · Continue pulmicort by nebulizer and albuterol PRN.   Yuperli not covered by insurance Discussed at length  appropriate use of maintenance and rescue inhalers with patient. · Continue eliquis 5 mg BID x 28 days  · Complete course of levaquin 750 mg daily x 7 days  · Repeat CT chest recommended in March - f/u pulmonary nodules  · Start inhaled tobramycin 300 mg/5ml  · Increase po fluids, take OTC mucinex as needed for cough  · PRN Robitussin AC - 5 ml HS  · Recommend healthy diet / weight / exercise as tolerated  · Follow up in pulmonary clinic in 3 months or sooner  · Go to the ER with chest pain or difficulty breathing             Please note that this dictation was completed with [employer], the [employer] voice recognition software. Quite often unanticipated grammatical, syntax, homophones, and other interpretive errors are inadvertently transcribed by the computer software. Please disregard these errors. Please excuse any errors that have escaped final proofreading.               Abhinav Guerra NP

## 2020-01-13 ENCOUNTER — PATIENT OUTREACH (OUTPATIENT)
Dept: FAMILY MEDICINE CLINIC | Age: 75
End: 2020-01-13

## 2020-01-13 ENCOUNTER — OFFICE VISIT (OUTPATIENT)
Dept: FAMILY MEDICINE CLINIC | Age: 75
End: 2020-01-13

## 2020-01-13 ENCOUNTER — HOSPITAL ENCOUNTER (OUTPATIENT)
Dept: LAB | Age: 75
Discharge: HOME | End: 2020-01-13
Payer: MEDICARE

## 2020-01-13 VITALS
RESPIRATION RATE: 20 BRPM | DIASTOLIC BLOOD PRESSURE: 84 MMHG | TEMPERATURE: 97.5 F | SYSTOLIC BLOOD PRESSURE: 130 MMHG | OXYGEN SATURATION: 99 % | WEIGHT: 212 LBS | BODY MASS INDEX: 40.02 KG/M2 | HEIGHT: 61 IN | HEART RATE: 84 BPM

## 2020-01-13 DIAGNOSIS — J20.9 ACUTE BRONCHITIS, UNSPECIFIED ORGANISM: ICD-10-CM

## 2020-01-13 DIAGNOSIS — E78.00 HIGH CHOLESTEROL: ICD-10-CM

## 2020-01-13 DIAGNOSIS — I10 ESSENTIAL HYPERTENSION: Primary | ICD-10-CM

## 2020-01-13 LAB
ANION GAP SERPL CALC-SCNC: 4 MMOL/L (ref 3–18)
BUN SERPL-MCNC: 21 MG/DL (ref 7–18)
BUN/CREAT SERPL: 18 (ref 12–20)
CALCIUM SERPL-MCNC: 8.7 MG/DL (ref 8.5–10.1)
CHLORIDE SERPL-SCNC: 111 MMOL/L (ref 100–111)
CO2 SERPL-SCNC: 29 MMOL/L (ref 21–32)
CREAT SERPL-MCNC: 1.14 MG/DL (ref 0.6–1.3)
GLUCOSE SERPL-MCNC: 95 MG/DL (ref 74–99)
POTASSIUM SERPL-SCNC: 3.8 MMOL/L (ref 3.5–5.5)
SODIUM SERPL-SCNC: 144 MMOL/L (ref 136–145)

## 2020-01-13 PROCEDURE — 36415 COLL VENOUS BLD VENIPUNCTURE: CPT

## 2020-01-13 PROCEDURE — 80048 BASIC METABOLIC PNL TOTAL CA: CPT

## 2020-01-13 RX ORDER — SIMVASTATIN 40 MG/1
40 TABLET, FILM COATED ORAL
Qty: 90 TAB | Refills: 1 | Status: SHIPPED | OUTPATIENT
Start: 2020-01-13 | End: 2020-07-09 | Stop reason: SINTOL

## 2020-01-13 RX ORDER — SULFAMETHOXAZOLE AND TRIMETHOPRIM 800; 160 MG/1; MG/1
1 TABLET ORAL 2 TIMES DAILY
Qty: 20 TAB | Refills: 0 | Status: SHIPPED | OUTPATIENT
Start: 2020-01-13 | End: 2020-01-23

## 2020-01-13 RX ORDER — FUROSEMIDE 20 MG/1
20 TABLET ORAL DAILY
Qty: 60 TAB | Refills: 3 | Status: SHIPPED | OUTPATIENT
Start: 2020-01-13 | End: 2020-04-11

## 2020-01-13 NOTE — PROGRESS NOTES
Patient attended appointment today with PCP, Isadora Garcia, NP. Will continue to follow. Goals Addressed                 This Visit's Progress     Patient will attend all scheduled appointments through 12/30/19 11/5/19 Patient has upcoming appointment with Isadora Garcia on 11/11/19 11/25/19 Patient has attended all scheduled appointments. Upcoming appts reviewed with patient and she voiced acknowledgement. 12/3/19 Patient has CT scan appt today. 12/10/19 Patient attended appt with Dr. Micah Cortez on 12/6/19 12/18/19 Patient attended appointment with PCP, Isadora Garcia.     12/26/19 Patient has appt with pulmonary on 1/7/20 and she plans to attend. 1/3/20 Patient is aware of upcoming appointment with pulmonary on 1/7/20 and Isadora Garcia on 1/13/20.    1/13/20 Patient attended appt with PCP, Tonya Reveles.

## 2020-01-13 NOTE — PROGRESS NOTES
HISTORY OF PRESENT ILLNESS    Consuelo Luz is a 66y.o. year old female comes in today for ED follow up:  RAD, acute bronchitis and dyspnea    Patient was seen in the ED on 12/25/19 for sob. History Provided By: Patient  Location/Duration/Severity/Modifying factors   Patient is a 78-year-old female with a history of COPD, pulmonary embolism on Eliquis, hemochromatosis with recurrent phlebotomy, atrial fibrillation, hypertension with pulmonary hypertension, recent pneumonia, chronic cough, that presents emergency department with complaint of increasing cough and shortness of breath this morning. Patient know she has been having shortness of breath difficulty lying flat and difficulty ambulating since July when she was diagnosed with pulmonary embolism. Patient has a chronic cough which bothers her however this morning the cough was bad and she is felt like she could not get the mucus up. She called EMS and EMS found her to have a sat of 91%. Patient denies any fevers or chills but has been having sneezing, nasal congestion, runny nose, and productive cough. Patient notes that she has chronic back paindifficult for her to ambulate and she has been having a difficult time ambulating distances due to shortness of breath and pain since July. Patient denies any other aggravating alleviating factors. Patient uses nebulizer at home and sometimes gets relief in the morning for the nebulizer. Patient is compliant with all her medications including her Eliquis. Patient follows closely with her primary doctor. Patient denies being a smoker or drinker and did not have any plans for the [place] holiday. Patient did have a ham sandwich last evening. Patient states she was prescribed levaquin for her bronchitis. Reports she began to experience leg pain with the first dosage. Comments she did not take tablet today and pain has slightly improved. Patient reports her breathing has also improved.   Comments she does have an upcoming appt with her pulmonologist Dr. Huang Benedict. Allergies   Allergen Reactions    Indomethacin Anaphylaxis, Hives and Swelling    Ciprofloxacin Other (comments)     Achilles pain    Tomato Other (comments)     Heart burn     Current Outpatient Medications   Medication Sig Dispense Refill    budesonide (PULMICORT) 0.5 mg/2 mL nbsp 2 mL by Nebulization route two (2) times a day. 60 Each 3    tobramycin, PF, (COREY) 300 mg/5 mL nebulizer solution 5 mL by Nebulization route two (2) times a day for 28 days. 60 Ampule 0    albuterol (PROVENTIL VENTOLIN) 2.5 mg /3 mL (0.083 %) nebu 3 mL by Nebulization route every six (6) hours as needed for Wheezing or Shortness of Breath. 120 Nebule 3    levoFLOXacin (LEVAQUIN) 750 mg tablet Take 1 Tab by mouth daily for 7 days. 7 Tab 0    predniSONE (STERAPRED) 5 mg dose pack As directed 48 Tab 0    montelukast (SINGULAIR) 10 mg tablet Take 1 Tab by mouth nightly. 30 Tab 3    potassium chloride (KLOR-CON M20) 20 mEq tablet Take 1 Tab by mouth daily. 90 Tab 6    allopurinol (ZYLOPRIM) 100 mg tablet Take 1 Tab by mouth two (2) times a day. 180 Tab 11    furosemide (LASIX) 20 mg tablet Take 1 Tab by mouth daily. 30 Tab 2    ELIQUIS 5 mg tablet TAKE 1 TABLET BY MOUTH EVERY 12 HOURS (Patient taking differently: 2.5 mg.) 90 Tab 0    diclofenac (VOLTAREN) 1 % gel Apply 2 g to affected area every six (6) hours. Indications: chronic shoulder pain 100 g 0    acetaminophen (TYLENOL) 325 mg tablet Take 2 Tabs by mouth every six (6) hours as needed for Pain or Fever. 30 Tab 0    levothyroxine (SYNTHROID) 25 mcg tablet Take 1 Tab by mouth Daily (before breakfast). 90 Tab 6    simvastatin (ZOCOR) 40 mg tablet Take 1 Tab by mouth nightly. 90 Tab 6    inhalational spacing device (ACE AEROSOL CLOUD ENHANCER) 1 Each by Does Not Apply route as needed.  1 Device 3    cholecalciferol, vitamin D3, 2,000 unit tab TAKE 1 TABLET BY MOUTH EVERY DAY  3    Omeprazole delayed release (PRILOSEC D/R) 20 mg tablet Take 20 mg by mouth daily. Past Medical History:   Diagnosis Date    Adopted     Arthritis     Balance problems     MCBRIDE (dyspnea on exertion) 2/28/2019    GERD (gastroesophageal reflux disease)     Hemochromatosis     High cholesterol     Hypertension     Left knee pain     Left shoulder pain     Lumbar pain     Pain of left sacroiliac joint     Shoulder impingement, right, with rotator cuff strain        No visits with results within 1 Week(s) from this visit.    Latest known visit with results is:   Admission on 12/25/2019, Discharged on 12/25/2019   Component Date Value Ref Range Status    Ventricular Rate 12/25/2019 88  BPM Final    Atrial Rate 12/25/2019 88  BPM Final    P-R Interval 12/25/2019 144  ms Final    QRS Duration 12/25/2019 58  ms Final    Q-T Interval 12/25/2019 324  ms Final    QTC Calculation (Bezet) 12/25/2019 392  ms Final    Calculated P Axis 12/25/2019 37  degrees Final    Calculated R Axis 12/25/2019 1  degrees Final    Calculated T Axis 12/25/2019 14  degrees Final    Diagnosis 12/25/2019    Final                    Value:Normal sinus rhythm  Possible Inferior infarct , age undetermined  Abnormal ECG    Confirmed by Erwin Andrea MD, Cassidy Pacer ([id number]) on 12/25/2019 12:31:15 PM      WBC 12/25/2019 8.6  4.6 - 13.2 K/uL Final    RBC 12/25/2019 4.44  4.20 - 5.30 M/uL Final    HGB 12/25/2019 13.8  12.0 - 16.0 g/dL Final    HCT 12/25/2019 41.5  35.0 - 45.0 % Final    MCV 12/25/2019 93.5  74.0 - 97.0 FL Final    MCH 12/25/2019 31.1  24.0 - 34.0 PG Final    MCHC 12/25/2019 33.3  31.0 - 37.0 g/dL Final    RDW 12/25/2019 16.3* 11.6 - 14.5 % Final    PLATELET 23/02/4410 455  135 - 420 K/uL Final    MPV 12/25/2019 10.7  9.2 - 11.8 FL Final    NEUTROPHILS 12/25/2019 72  40 - 73 % Final    LYMPHOCYTES 12/25/2019 9* 21 - 52 % Final    MONOCYTES 12/25/2019 16* 3 - 10 % Final    EOSINOPHILS 12/25/2019 3  0 - 5 % Final    BASOPHILS 12/25/2019 0  0 - 2 % Final    ABS. NEUTROPHILS 12/25/2019 6.1  1.8 - 8.0 K/UL Final    ABS. LYMPHOCYTES 12/25/2019 0.8* 0.9 - 3.6 K/UL Final    ABS. MONOCYTES 12/25/2019 1.4* 0.05 - 1.2 K/UL Final    ABS. EOSINOPHILS 12/25/2019 0.3  0.0 - 0.4 K/UL Final    ABS. BASOPHILS 12/25/2019 0.0  0.0 - 0.1 K/UL Final    DF 12/25/2019 AUTOMATED    Final    Sodium 12/25/2019 144  136 - 145 mmol/L Final    Potassium 12/25/2019 3.9  3.5 - 5.5 mmol/L Final    Chloride 12/25/2019 109  100 - 111 mmol/L Final    CO2 12/25/2019 30  21 - 32 mmol/L Final    Anion gap 12/25/2019 5  3.0 - 18 mmol/L Final    Glucose 12/25/2019 81  74 - 99 mg/dL Final    BUN 12/25/2019 31* 7.0 - 18 MG/DL Final    Creatinine 12/25/2019 1.23  0.6 - 1.3 MG/DL Final    BUN/Creatinine ratio 12/25/2019 25* 12 - 20   Final    GFR est AA 12/25/2019 51* >60 ml/min/1.73m2 Final    GFR est non-AA 12/25/2019 42* >60 ml/min/1.73m2 Final    Comment: (NOTE)  Estimated GFR is calculated using the Modification of Diet in Renal   Disease (MDRD) Study equation, reported for both  Americans   (GFRAA) and non- Americans (GFRNA), and normalized to 1.73m2   body surface area. The physician must decide which value applies to   the patient. The MDRD study equation should only be used in   individuals age 25 or older. It has not been validated for the   following: pregnant women, patients with serious comorbid conditions,   or on certain medications, or persons with extremes of body size,   muscle mass, or nutritional status.  Calcium 12/25/2019 9.6  8.5 - 10.1 MG/DL Final    Bilirubin, total 12/25/2019 0.7  0.2 - 1.0 MG/DL Final    ALT (SGPT) 12/25/2019 29  13 - 56 U/L Final    AST (SGOT) 12/25/2019 32  10 - 38 U/L Final    Alk.  phosphatase 12/25/2019 84  45 - 117 U/L Final    Protein, total 12/25/2019 7.6  6.4 - 8.2 g/dL Final    Albumin 12/25/2019 3.8  3.4 - 5.0 g/dL Final    Globulin 12/25/2019 3.8  2.0 - 4.0 g/dL Final    A-G Ratio 12/25/2019 1.0  0.8 - 1.7   Final    Magnesium 12/25/2019 2.2  1.6 - 2.6 mg/dL Final    NT pro-BNP 12/25/2019 124  0 - 1,800 PG/ML Final    Comment:           For patients with dyspnea, NT proBNP is highly sensitive for detecting acute congestive heart failure. Also, an NT proBNP <300 pg/mL effectively rules out acute congestive heart failure, with 99% negative predictive value. Our reference ranges are for acute dyspnea. Age              Range (pg/ml)        0-49                0-450        50-75               0-900        >75                 0-1800       For ambulatory office patients, the ranges below apply: For patients with dyspnea, NT proBNP is highly sensitive for detecting acute congestive heart failure. Also, an NT proBNP <300 pg/mL effectively rules out acute congestive heart failure, with 99% negative predictive value. Our reference ranges are for acute dyspnea.  CK 12/25/2019 276* 26 - 192 U/L Final    CK - MB 12/25/2019 5.3* <3.6 ng/ml Final    CK-MB Index 12/25/2019 1.9  0.0 - 4.0 % Final    Troponin-I, QT 12/25/2019 0.03  0.0 - 0.045 NG/ML Final    Comment: The presence of detectable troponin above the reference range indicates myocardial injury which may be due to ischemia, myocarditis, trauma, etc.  Clinical correlation is necessary to establish the significance of this finding. Sequential testing is recommended to determine if the typical rise and fall of cTnI is demonstrated. Note:  Cardiac troponin I has a relatively long half life and may be present well after the CK MB has returned to baseline. The reference range is based on the 99th percentile of the referent population.       Prothrombin time 12/25/2019 16.7* 11.5 - 15.2 sec Final    INR 12/25/2019 1.4* 0.8 - 1.2   Final    Comment:            INR Therapeutic Ranges         (on stable oral anticoagulant):     INDICATION                INR  DVT/PE/Atrial Fib          2.0-3.0  MI/Mechanical Heart Valve  2.5-3.5      aPTT 12/25/2019 32.8 23.0 - 36.4 SEC Final    Special Requests: 12/25/2019 NO SPECIAL REQUESTS    Final    Culture result: 12/25/2019 NO GROWTH 6 DAYS    Final    Special Requests: 12/25/2019 NO SPECIAL REQUESTS    Final    Culture result: 12/25/2019 NO GROWTH 6 DAYS    Final    Influenza A Antigen 12/25/2019 NEGATIVE   NEG   Final    A negative result does not exclude influenza virus infection, seasonal or H1N1 due to suboptimal sensitivity. If influenza is circulating in your community, a diagnosis of influenza should be considered based on a patients clinical presentation and empiric antiviral treatment should be considered, if indicated.  Influenza B Antigen 12/25/2019 NEGATIVE   NEG   Final    Lactic Acid (POC) 12/25/2019 1.38  0.40 - 2.00 mmol/L Final       XR Results (maximum last 3): Results from [place] encounter on 12/25/19   XR CHEST SNGL V    Impression IMPRESSION:     Subtle vague reticulonodular prominence of the lung markings, with the numerous  pulmonary nodules described on chest CT of 12/3/2019 much better demonstrated on  the CT. No new acute findings compared to the CT. Results from [place] encounter on 11/11/19   XR CHEST PORT    Impression IMPRESSION: No interval change, no acute disease   Results from [place] encounter on 07/22/19   XR CHEST PORT    Impression IMPRESSION:    No radiographic finding for an acute cardiopulmonary process       CT Results (maximum last 3): Results from [place] encounter on 12/03/19   CT CHEST WO CONT    Impression IMPRESSION:     1. No mass or focal consolidation.   2. Cluster of ill-defined groundglass nodularity in the superior right lower  lobe concerning for infectious bronchiolar disease, possibly  mycobacterial/atypical mycobacterial or fungal.  -Otherwise there are numerous scattered bilateral pulmonary nodules measuring up  to 5 mm in size, possibly similar process but given the diffuse bilateral  distribution differential also include granulomatous disease and cannot exclude  metastatic disease although no known primary malignancy.  -Follow-up chest CT in 3 months after appropriate treatment is recommended  3. Right axilla 3 cm mixed fat and soft tissue density nodule may represent  enlarged lymph node although atypical appearance, attention on follow-up  imaging. Also recommend mammogram if not obtained elsewhere. Results from [place] encounter on 07/22/19   CT ABD PELV WO CONT    Impression IMPRESSION[de-identified]    1.  Nothing clearly acute within the abdomen or pelvis.  -Fluid distended stomach; if there are issues of nausea, consider possibility of  gastroparesis  -Large bowel diverticulosis without inflammation    Thank you for this referral.   Results from [place] encounter on 08/09/16   CT TEMP BONES WO CONT    Impression IMPRESSION:    There is no definable abnormality in the right temporal bone. There is no definable abnormality in the left external auditory canal, middle  ear cavity, mastoid antrum, mastoid air cells, or in the internal ear  structures, as visualized without intravenous contrast.    But, the bony outlines of the left vestibular aqueduct, appears to be mildly  larger than the corresponding bony outlines of right vestibular aqueduct. Therefore, further evaluation of vestibular aqueducts,  is indicated. For further evaluation, MRI of IACs and CP angles, without and with contrast and  including high-resolution T2-weighted images, is suggested. MRI Results (maximum last 3): Results from [place] encounter on 01/04/19   MRI LUMB SPINE WO CONT    Impression IMPRESSION:  1. Progression of degenerative disease at L4-L5, with foraminal stenosis and  exiting nerve root compression, especially on the left. 2. Also slightly worse central stenosis at L2-L3 from posterior disc bulge,  facet and ligamentous hypertrophy.   3. Moderate central stenosis at L3-L4 with moderate foraminal stenosis with  potential exiting nerve root compression, especially right L3. Perhaps similar  to prior study. Results from [place] encounter on 10/19/13   MRI LUMB SPINE WO CONT    Impression Impression:    Degenerative changes as above. Results from [place] encounter on 08/10/13   MRI SHOULDER LT WO CONT    Impression Impression:    1. Massive supraspinatus and infraspinatus full-thickness tear with retraction  to the level of the mid humeral dome. Mild to moderate supraspinatus and  infraspinatus fat infiltration with at least mild muscular atrophy. 2. Mild subacromial subdeltoid bursitis. 3. Mild acromioclavicular osteoarthrosis. ROS:  Review of Systems   Constitutional: Negative for chills and fever. Respiratory: Positive for cough and shortness of breath. Cardiovascular: Positive for leg swelling. Negative for chest pain and palpitations. Neurological: Negative for dizziness and headaches. /84 (BP 1 Location: Right arm, BP Patient Position: Sitting)   Pulse 84   Temp 97.5 °F (36.4 °C) (Oral)   Resp 20   Ht 5' 1\" (1.549 m)   Wt 212 lb (96.2 kg)   SpO2 99%   BMI 40.06 kg/m²     Objective:  Physical Exam  HENT:      Head: Normocephalic and atraumatic. Neck:      Musculoskeletal: Normal range of motion and neck supple. Cardiovascular:      Rate and Rhythm: Normal rate and regular rhythm. Heart sounds: Normal heart sounds. No murmur. No friction rub. No gallop. Pulmonary:      Effort: Pulmonary effort is normal.      Breath sounds: Normal breath sounds. No wheezing, rhonchi or rales. Musculoskeletal:      Right lower leg: Edema (1+) present. Left lower leg: Edema (1+) present. Lymphadenopathy:      Cervical: No cervical adenopathy. Skin:     General: Skin is warm and dry. Neurological:      Mental Status: She is alert and oriented to person, place, and time.              Assessment/Plan:     ICD-10-CM ICD-9-CM 1. Essential hypertension I10 401.9    2. High cholesterolChronic E78.00 272.0 simvastatin (ZOCOR) 40 mg tablet    Continue present therapy, follow up labs next visit   3. Acute bronchitis, unspecified organism L89.0 889.4 METABOLIC PANEL, BASIC     Orders Placed This Encounter    METABOLIC PANEL, BASIC    simvastatin (ZOCOR) 40 mg tablet    furosemide (LASIX) 20 mg tablet    trimethoprim-sulfamethoxazole (BACTRIM DS, SEPTRA DS) 160-800 mg per tablet       alarm signs when to seek emergent care provided and reviewed   I have discussed the diagnosis with the patient and the intended plan as seen in the above orders. The patient has received an after-visit summary and questions were answered concerning future plans. I have discussed medication side effects and warnings with the patient as well. Patient agreeable with above plan and verbalizes understanding. Follow-up and Dispositions    · Return in about 10 days (around 1/23/2020) for bronchitis.

## 2020-01-13 NOTE — PATIENT INSTRUCTIONS
Bronchitis: Care Instructions  Your Care Instructions    Bronchitis is inflammation of the bronchial tubes, which carry air to the lungs. The tubes swell and produce mucus, or phlegm. The mucus and inflamed bronchial tubes make you cough. You may have trouble breathing. Most cases of bronchitis are caused by viruses like those that cause colds. Antibiotics usually do not help and they may be harmful. Bronchitis usually develops rapidly and lasts about 2 to 3 weeks in otherwise healthy people. Follow-up care is a key part of your treatment and safety. Be sure to make and go to all appointments, and call your doctor if you are having problems. It's also a good idea to know your test results and keep a list of the medicines you take. How can you care for yourself at home? · Take all medicines exactly as prescribed. Call your doctor if you think you are having a problem with your medicine. · Get some extra rest.  · Take an over-the-counter pain medicine, such as acetaminophen (Tylenol), ibuprofen (Advil, Motrin), or naproxen (Aleve) to reduce fever and relieve body aches. Read and follow all instructions on the label. · Do not take two or more pain medicines at the same time unless the doctor told you to. Many pain medicines have acetaminophen, which is Tylenol. Too much acetaminophen (Tylenol) can be harmful. · Take an over-the-counter cough medicine that contains dextromethorphan to help quiet a dry, hacking cough so that you can sleep. Avoid cough medicines that have more than one active ingredient. Read and follow all instructions on the label. · Breathe moist air from a humidifier, hot shower, or sink filled with hot water. The heat and moisture will thin mucus so you can cough it out. · Do not smoke. Smoking can make bronchitis worse. If you need help quitting, talk to your doctor about stop-smoking programs and medicines. These can increase your chances of quitting for good.   When should you call for help? Call 911 anytime you think you may need emergency care. For example, call if:    · You have severe trouble breathing.    Call your doctor now or seek immediate medical care if:    · You have new or worse trouble breathing.     · You cough up dark brown or bloody mucus (sputum).     · You have a new or higher fever.     · You have a new rash.    Watch closely for changes in your health, and be sure to contact your doctor if:    · You cough more deeply or more often, especially if you notice more mucus or a change in the color of your mucus.     · You are not getting better as expected. Where can you learn more? Go to http://miki-etahn.info/. Enter H333 in the search box to learn more about \"Bronchitis: Care Instructions. \"  Current as of: June 9, 2019  Content Version: 12.2  © 4684-9064 [employer]. Care instructions adapted under license by [employer] (which disclaims liability or warranty for this information). If you have questions about a medical condition or this instruction, always ask your healthcare professional. [address] any warranty or liability for your use of this information.

## 2020-01-14 ENCOUNTER — PATIENT OUTREACH (OUTPATIENT)
Dept: FAMILY MEDICINE CLINIC | Age: 75
End: 2020-01-14

## 2020-01-14 ENCOUNTER — TELEPHONE (OUTPATIENT)
Dept: PULMONOLOGY | Age: 75
End: 2020-01-14

## 2020-01-14 NOTE — PROGRESS NOTES
Patient called to report that she was told that her budesonide nebulizer solution would cost $195 at [facility] pharmacy and she is requesting help with this matter. ACM called [facility] on [address] (patient's pharmacy) and was told that is the cost after Medicare part B was applied for a 90 day supply. A 30 day supply will cost $105. Writer asked pharmacy to apply Good Rx discount. Pharmacists stated that the Good Rx could not be applied along with Medicare part B but on it's own the Good Rx cost would be $438. Writer called patient to discuss [employer]'s patient assistance program. Patient did not answer. Message left for return call. Called again. LM for patient to return call. Patient returned call. She has not spent 3% of her income on medications this year so she would not meet criteria for [employer]'s patient assistance program. Patient would like to be prescribed an alternative medication. Writer called [facility] and spoke with Jarvis Manuel and informed her of patient's request. Cassy Goodwin a return call from a nurse.

## 2020-01-14 NOTE — TELEPHONE ENCOUNTER
Audrey Root states she needs to speak w/ nurse to see if pt can have an alternative to the budesonide she was prescribed. Please advise [phone].

## 2020-01-14 NOTE — TELEPHONE ENCOUNTER
Spoke with Aria Joyner with [facility]. Pulmicort is going to cost the patient $195 due to deductible she will need to pay. Luis Alberto checking on samples or cheaper medication. Aria Joyner advised all inhalers are high and with the new year she would also have the deductible with another inhaler.  I have called the [place] rep to see if we can get samples to help her out at this time

## 2020-01-15 NOTE — TELEPHONE ENCOUNTER
Drug rep called back, they do not sample Pulmicort anylonger.    Left message for Nelsy Barnard with [facility] we will not be getting any sample of this med

## 2020-01-23 ENCOUNTER — OFFICE VISIT (OUTPATIENT)
Dept: FAMILY MEDICINE CLINIC | Age: 75
End: 2020-01-23

## 2020-01-23 VITALS
HEART RATE: 91 BPM | WEIGHT: 215 LBS | RESPIRATION RATE: 22 BRPM | SYSTOLIC BLOOD PRESSURE: 158 MMHG | OXYGEN SATURATION: 97 % | BODY MASS INDEX: 40.59 KG/M2 | TEMPERATURE: 97.6 F | HEIGHT: 61 IN | DIASTOLIC BLOOD PRESSURE: 84 MMHG

## 2020-01-23 DIAGNOSIS — I10 ESSENTIAL HYPERTENSION: ICD-10-CM

## 2020-01-23 DIAGNOSIS — J20.9 ACUTE BRONCHITIS, UNSPECIFIED ORGANISM: ICD-10-CM

## 2020-01-23 DIAGNOSIS — M79.10 MYALGIA: Primary | ICD-10-CM

## 2020-01-23 RX ORDER — OLMESARTAN MEDOXOMIL 20 MG/1
20 TABLET ORAL DAILY
Qty: 30 TAB | Refills: 1 | Status: SHIPPED | OUTPATIENT
Start: 2020-01-23 | End: 2020-02-14

## 2020-01-23 NOTE — PROGRESS NOTES
HISTORY OF PRESENT ILLNESS  Regis Hicks is a 66 y.o. female. Reports cough has improved and leg pain has resolved. Reports she did not take bactrim, per patient pharmacy did not give her the prescription. Patient reports she was not able to  her inhaler and eliquis due to cost $600+. States she did call her pulmonologist and also hematologist regarding the cost of medications. States she is waiting on a response from the offices. New concerns: patient states intermittent leg pain described as an ache most nights that has been present for years. Allergies   Allergen Reactions    Ciprofloxacin Other (comments)     Achilles tendonitis/leg pain and swelling    Indomethacin Anaphylaxis, Hives and Swelling    Tomato Other (comments)     Heart burn     Current Outpatient Medications   Medication Sig Dispense Refill    simvastatin (ZOCOR) 40 mg tablet Take 1 Tab by mouth nightly. 90 Tab 1    furosemide (LASIX) 20 mg tablet Take 1 Tab by mouth daily. May take 1 additional tablet daily as needed for increased edema 60 Tab 3    trimethoprim-sulfamethoxazole (BACTRIM DS, SEPTRA DS) 160-800 mg per tablet Take 1 Tab by mouth two (2) times a day for 10 days. 20 Tab 0    budesonide (PULMICORT) 0.5 mg/2 mL nbsp 2 mL by Nebulization route two (2) times a day. 60 Each 3    tobramycin, PF, (COREY) 300 mg/5 mL nebulizer solution 5 mL by Nebulization route two (2) times a day for 28 days. 60 Ampule 0    albuterol (PROVENTIL VENTOLIN) 2.5 mg /3 mL (0.083 %) nebu 3 mL by Nebulization route every six (6) hours as needed for Wheezing or Shortness of Breath. 120 Nebule 3    predniSONE (STERAPRED) 5 mg dose pack As directed 48 Tab 0    montelukast (SINGULAIR) 10 mg tablet Take 1 Tab by mouth nightly. 30 Tab 3    potassium chloride (KLOR-CON M20) 20 mEq tablet Take 1 Tab by mouth daily. 90 Tab 6    allopurinol (ZYLOPRIM) 100 mg tablet Take 1 Tab by mouth two (2) times a day.  180 Tab 11    ELIQUIS 5 mg tablet TAKE 1 TABLET BY MOUTH EVERY 12 HOURS (Patient taking differently: 2.5 mg.) 90 Tab 0    diclofenac (VOLTAREN) 1 % gel Apply 2 g to affected area every six (6) hours. Indications: chronic shoulder pain 100 g 0    acetaminophen (TYLENOL) 325 mg tablet Take 2 Tabs by mouth every six (6) hours as needed for Pain or Fever. 30 Tab 0    levothyroxine (SYNTHROID) 25 mcg tablet Take 1 Tab by mouth Daily (before breakfast). 90 Tab 6    inhalational spacing device (ACE AEROSOL CLOUD ENHANCER) 1 Each by Does Not Apply route as needed. 1 Device 3    cholecalciferol, vitamin D3, 2,000 unit tab TAKE 1 TABLET BY MOUTH EVERY DAY  3    Omeprazole delayed release (PRILOSEC D/R) 20 mg tablet Take 20 mg by mouth daily.        Past Medical History:   Diagnosis Date    Adopted     Arthritis     Balance problems     MCBRIDE (dyspnea on exertion) 2019    GERD (gastroesophageal reflux disease)     Hemochromatosis     High cholesterol     Hypertension     Left knee pain     Left shoulder pain     Lumbar pain     Pain of left sacroiliac joint     Shoulder impingement, right, with rotator cuff strain      Social History     Socioeconomic History    Marital status: SINGLE     Spouse name: Not on file    Number of children: Not on file    Years of education: Not on file    Highest education level: Not on file   Occupational History    Not on file   Social Needs    Financial resource strain: Not on file    Food insecurity:     Worry: Not on file     Inability: Not on file    Transportation needs:     Medical: Not on file     Non-medical: Not on file   Tobacco Use    Smoking status: Former Smoker     Packs/day: 0.50     Years: 2.00     Pack years: 1.00     Start date:      Last attempt to quit:      Years since quittin.0    Smokeless tobacco: Never Used    Tobacco comment: quit 20 years ago   Substance and Sexual Activity    Alcohol use: No    Drug use: No    Sexual activity: Not on file     Comment: post menopausal   Lifestyle    Physical activity:     Days per week: Not on file     Minutes per session: Not on file    Stress: Not on file   Relationships    Social connections:     Talks on phone: Not on file     Gets together: Not on file     Attends [employer] service: Not on file     Active member of club or organization: Not on file     Attends meetings of clubs or organizations: Not on file     Relationship status: Not on file    Intimate partner violence:     Fear of current or ex partner: Not on file     Emotionally abused: Not on file     Physically abused: Not on file     Forced sexual activity: Not on file   Other Topics Concern    Not on file   Social History Narrative    Not on file     Wt Readings from Last 3 Encounters:   20 215 lb (97.5 kg)   20 212 lb (96.2 kg)   20 209 lb 3.2 oz (94.9 kg)     BP Readings from Last 3 Encounters:   20 167/80   20 130/84   20 173/75     Review of Systems   Constitutional: Negative for chills and fever. Respiratory: Positive for cough and shortness of breath. Cardiovascular: Positive for leg swelling. Negative for chest pain and palpitations. Neurological: Negative for dizziness and headaches. /80 (BP 1 Location: Left arm, BP Patient Position: Sitting)   Pulse 91   Temp 97.6 °F (36.4 °C) (Oral)   Resp 22   Ht 5' 1\" (1.549 m)   Wt 215 lb (97.5 kg)   SpO2 97%   BMI 40.62 kg/m²     Physical Exam  Constitutional:       Appearance: She is well-developed. HENT:      Head: Normocephalic and atraumatic. Neck:      Musculoskeletal: Normal range of motion and neck supple. Cardiovascular:      Rate and Rhythm: Normal rate and regular rhythm. Heart sounds: Normal heart sounds. No murmur. No friction rub. No gallop. Pulmonary:      Effort: Pulmonary effort is normal.      Breath sounds: Normal breath sounds. No wheezing, rhonchi or rales. Musculoskeletal:      Right lower le+ Edema present. Left lower le+ Edema present. Skin:     General: Skin is warm and dry. Neurological:      Mental Status: She is alert and oriented to person, place, and time. ASSESSMENT and PLAN    ICD-10-CM ICD-9-CM    1. Myalgia M79.10 729.1    2. Essential hypertension I10 401.9    3. Acute bronchitis, unspecified organism J20.9 466.0      Orders Placed This Encounter    olmesartan (BENICAR) 20 mg tablet       HTN not completely controlled, begin benicar as above  Informed to hold zocor until follow up for myalgia and will reassess at that time. I have discussed the diagnosis with the patient and the intended plan as seen in the above orders. The patient has received an after-visit summary and questions were answered concerning future plans. I have discussed medication side effects and warnings with the patient as well. Patient agreeable with above plan and verbalizes understanding. Follow-up and Dispositions    · Return in about 4 weeks (around 2020) for leg cramps.

## 2020-02-06 ENCOUNTER — OFFICE VISIT (OUTPATIENT)
Dept: PULMONOLOGY | Age: 75
End: 2020-02-06

## 2020-02-06 VITALS
HEIGHT: 61 IN | HEART RATE: 92 BPM | OXYGEN SATURATION: 97 % | RESPIRATION RATE: 20 BRPM | TEMPERATURE: 95.6 F | SYSTOLIC BLOOD PRESSURE: 122 MMHG | WEIGHT: 215.6 LBS | BODY MASS INDEX: 40.7 KG/M2 | DIASTOLIC BLOOD PRESSURE: 71 MMHG

## 2020-02-06 DIAGNOSIS — R05.3 CHRONIC COUGH: ICD-10-CM

## 2020-02-06 DIAGNOSIS — R06.09 DOE (DYSPNEA ON EXERTION): ICD-10-CM

## 2020-02-06 DIAGNOSIS — J21.9 BRONCHIOLITIS: Primary | ICD-10-CM

## 2020-02-06 DIAGNOSIS — R93.89 ABNORMAL CHEST CT: ICD-10-CM

## 2020-02-06 DIAGNOSIS — E66.01 OBESITY, MORBID (HCC): ICD-10-CM

## 2020-02-06 RX ORDER — BUDESONIDE 0.5 MG/2ML
500 INHALANT ORAL 2 TIMES DAILY
Qty: 60 EACH | Refills: 3 | Status: SHIPPED | OUTPATIENT
Start: 2020-02-06 | End: 2020-02-10 | Stop reason: SDUPTHER

## 2020-02-06 NOTE — PROGRESS NOTES
Hung Grisel presents today for   Chief Complaint   Patient presents with    Asthma    Bronchiectasis    Cough    Shortness of Breath       Is someone accompanying this pt? Rj Vila     Is the patient using any DME equipment during OV? No     -DME Company n/a     Depression Screening:  3 most recent PHQ Screens 2/6/2020   PHQ Not Done -   Little interest or pleasure in doing things Not at all   Feeling down, depressed, irritable, or hopeless Not at all   Total Score PHQ 2 0       Learning Assessment:  Learning Assessment 9/3/2019   PRIMARY LEARNER Patient   HIGHEST LEVEL OF EDUCATION - PRIMARY LEARNER  -   BARRIERS PRIMARY LEARNER -   CO-LEARNER CAREGIVER -   PRIMARY LANGUAGE ENGLISH   LEARNER PREFERENCE PRIMARY LISTENING     -     -   ANSWERED BY patient   RELATIONSHIP SELF       Abuse Screening:  No flowsheet data found. Fall Risk  Fall Risk Assessment, last 12 mths 2/6/2020   Able to walk? Yes   Fall in past 12 months? No         Coordination of Care:  1. Have you been to the ER, urgent care clinic since your last visit? Hospitalized since your last visit? No    2. Have you seen or consulted any other health care providers outside of the [address] [place] [address] since your last visit? Include any pap smears or colon screening.  Dr Cici [facility]

## 2020-02-07 ENCOUNTER — PATIENT OUTREACH (OUTPATIENT)
Dept: FAMILY MEDICINE CLINIC | Age: 75
End: 2020-02-07

## 2020-02-07 NOTE — PROGRESS NOTES
Patient attended appointment on 2/6/20 with Dr. Lara Kinsey -pulmonary. EMR reviewed. Will continue to monitor and will contact patient at time of next scheduled outreach. Goals Addressed                 This Visit's Progress     Patient will attend all scheduled appointments through 12/30/19 11/5/19 Patient has upcoming appointment with Vear Leventhal on 11/11/19 11/25/19 Patient has attended all scheduled appointments. Upcoming appts reviewed with patient and she voiced acknowledgement. 12/3/19 Patient has CT scan appt today. 12/10/19 Patient attended appt with Dr. Mayra Dowd on 12/6/19 12/18/19 Patient attended appointment with PCP, Vear Leventhal.     12/26/19 Patient has appt with pulmonary on 1/7/20 and she plans to attend. 1/3/20 Patient is aware of upcoming appointment with pulmonary on 1/7/20 and Vear Leventhal on 1/13/20.    1/13/20 Patient attended appt with PCP, Shelby Prakash.     2/7/20 Patient attended appt with TED Mancuso-pulmonary

## 2020-02-10 ENCOUNTER — TELEPHONE (OUTPATIENT)
Dept: PULMONOLOGY | Age: 75
End: 2020-02-10

## 2020-02-10 RX ORDER — BUDESONIDE 0.5 MG/2ML
500 INHALANT ORAL 2 TIMES DAILY
Qty: 60 EACH | Refills: 3 | Status: SHIPPED | OUTPATIENT
Start: 2020-02-10 | End: 2021-04-15 | Stop reason: SDUPTHER

## 2020-02-10 NOTE — TELEPHONE ENCOUNTER
Pt informed the Pulmicort from [facility] is being processed under medicare part B.  The Asia Boor from [facility] pharmacy is not covered by her insurance due to her not having dx of COPD

## 2020-02-10 NOTE — TELEPHONE ENCOUNTER
Pt states pharmacy wouldn't allow her to fill her prescriptions, says the order was written incorrectly. Please advise [phone].

## 2020-02-10 NOTE — TELEPHONE ENCOUNTER
Pt hasnt received either the Yupelri from [facility] or Pulmicort from [facility].   Per [id number] [facility] denied coverage due to no dx of COPD. They will only cover for that dx. Checking with [facility] on [facility], we have not received yet.

## 2020-02-11 ENCOUNTER — TELEPHONE (OUTPATIENT)
Dept: PULMONOLOGY | Age: 75
End: 2020-02-11

## 2020-02-11 NOTE — TELEPHONE ENCOUNTER
Spoke again with [facility]. We understand the Jarrod Coulterett is not covered due to the patient does not have COPD.  They have cancelled the order

## 2020-02-11 NOTE — TELEPHONE ENCOUNTER
Per Yolanda at [facility], they need chart notes addended for pt's medication. Please call [phone] ext [phone].

## 2020-02-14 RX ORDER — OLMESARTAN MEDOXOMIL 20 MG/1
TABLET ORAL
Qty: 30 TAB | Refills: 1 | Status: SHIPPED | OUTPATIENT
Start: 2020-02-14 | End: 2020-03-09

## 2020-02-26 ENCOUNTER — PATIENT OUTREACH (OUTPATIENT)
Dept: FAMILY MEDICINE CLINIC | Age: 75
End: 2020-02-26

## 2020-02-26 NOTE — PROGRESS NOTES
Patient has graduated from the Complex Case Management  program on 2/26/20. Patient's symptoms are stable at this time. Patient/family has the ability to self-manage. Care management goals have been completed at this time. No further nurse navigator follow up scheduled. Goals Addressed                 This Visit's Progress     COMPLETED:  Pt will complete an ACP and have it scanned into their EMR by 12/30/19        COMPLETED: Patient will attend all scheduled appointments through 12/30/19 11/5/19 Patient has upcoming appointment with Bella Lynch on 11/11/19 11/25/19 Patient has attended all scheduled appointments. Upcoming appts reviewed with patient and she voiced acknowledgement. 12/3/19 Patient has CT scan appt today. 12/10/19 Patient attended appt with Dr. Refugio Hanna on 12/6/19 12/18/19 Patient attended appointment with PCP, eBlla Lynch.     12/26/19 Patient has appt with pulmonary on 1/7/20 and she plans to attend. 1/3/20 Patient is aware of upcoming appointment with pulmonary on 1/7/20 and Bella Murciao on 1/13/20.    1/13/20 Patient attended appt with PCP, Michelle Mtz. 2/7/20 Patient attended appt with TED Mancuso-pulmonary       COMPLETED: Pt will take all medications prescribed to be evaluated on each outreach through 12/30/19 11/5/19 Patient reports taking medications as prescribed. She reports that the pharmacy has not refilled her allopurinol. Writer called [facility] to confirm. The pharmacist said the refill will be available today. Patient made aware. 11/25/19 Patient reports taking medications as prescribed and denies needing any refills at this time. 12/3/19 Patient was encouraged to bring medication bottles with her to her appt on 12/18/19.    12/26/19 Prednisone dose pack instructions reviewed with patient with teach back. 1/3/20 Patient has 2 days left on prednisone taper.  Patient admits to taking medication as prescribed and denies needing any refills at this time.             Pt has nurse navigator's contact information for any further questions, concerns, or needs.   Patients upcoming visits:    Future Appointments   Date Time Provider Jose Singleton   3/6/2020  1:00 PM [facility]   3/17/2020  2:00 PM Alicia Charles MD [address]   3/18/2020  2:00 PM Aura Fritz NP 11 [facility]

## 2020-03-06 ENCOUNTER — HOSPITAL ENCOUNTER (OUTPATIENT)
Dept: CT IMAGING | Age: 75
Discharge: HOME | End: 2020-03-06
Attending: NURSE PRACTITIONER
Payer: MEDICARE

## 2020-03-06 DIAGNOSIS — R93.89 ABNORMAL CT OF THE CHEST: ICD-10-CM

## 2020-03-06 PROCEDURE — 71250 CT THORAX DX C-: CPT

## 2020-03-09 RX ORDER — MONTELUKAST SODIUM 10 MG/1
TABLET ORAL
Qty: 90 TAB | Refills: 1 | Status: SHIPPED | OUTPATIENT
Start: 2020-03-09 | End: 2020-09-02

## 2020-03-16 ENCOUNTER — HOSPITAL ENCOUNTER (OUTPATIENT)
Dept: LAB | Age: 75
Discharge: HOME | End: 2020-03-16
Payer: MEDICARE

## 2020-03-16 ENCOUNTER — OFFICE VISIT (OUTPATIENT)
Dept: FAMILY MEDICINE CLINIC | Age: 75
End: 2020-03-16

## 2020-03-16 VITALS
WEIGHT: 220 LBS | SYSTOLIC BLOOD PRESSURE: 132 MMHG | RESPIRATION RATE: 18 BRPM | OXYGEN SATURATION: 97 % | TEMPERATURE: 97.5 F | BODY MASS INDEX: 41.54 KG/M2 | HEIGHT: 61 IN | HEART RATE: 75 BPM | DIASTOLIC BLOOD PRESSURE: 68 MMHG

## 2020-03-16 DIAGNOSIS — I10 ESSENTIAL HYPERTENSION: ICD-10-CM

## 2020-03-16 DIAGNOSIS — E78.5 HYPERLIPIDEMIA, UNSPECIFIED HYPERLIPIDEMIA TYPE: ICD-10-CM

## 2020-03-16 DIAGNOSIS — M79.10 MYALGIA: ICD-10-CM

## 2020-03-16 DIAGNOSIS — E03.9 ACQUIRED HYPOTHYROIDISM: ICD-10-CM

## 2020-03-16 DIAGNOSIS — I10 ESSENTIAL HYPERTENSION: Primary | ICD-10-CM

## 2020-03-16 LAB
ANION GAP SERPL CALC-SCNC: 6 MMOL/L (ref 3–18)
BUN SERPL-MCNC: 21 MG/DL (ref 7–18)
BUN/CREAT SERPL: 20 (ref 12–20)
CALCIUM SERPL-MCNC: 9.5 MG/DL (ref 8.5–10.1)
CHLORIDE SERPL-SCNC: 109 MMOL/L (ref 100–111)
CHOLEST SERPL-MCNC: 222 MG/DL
CK SERPL-CCNC: 61 U/L (ref 26–192)
CO2 SERPL-SCNC: 29 MMOL/L (ref 21–32)
CREAT SERPL-MCNC: 1.03 MG/DL (ref 0.6–1.3)
GLUCOSE SERPL-MCNC: 82 MG/DL (ref 74–99)
HDLC SERPL-MCNC: 43 MG/DL (ref 40–60)
HDLC SERPL: 5.2 {RATIO} (ref 0–5)
LDLC SERPL CALC-MCNC: 135.6 MG/DL (ref 0–100)
LIPID PROFILE,FLP: ABNORMAL
POTASSIUM SERPL-SCNC: 4.7 MMOL/L (ref 3.5–5.5)
SODIUM SERPL-SCNC: 144 MMOL/L (ref 136–145)
T4 FREE SERPL-MCNC: 0.9 NG/DL (ref 0.7–1.5)
TRIGL SERPL-MCNC: 217 MG/DL (ref ?–150)
TSH SERPL DL<=0.05 MIU/L-ACNC: 6.98 UIU/ML (ref 0.36–3.74)
VLDLC SERPL CALC-MCNC: 43.4 MG/DL

## 2020-03-16 PROCEDURE — 84439 ASSAY OF FREE THYROXINE: CPT

## 2020-03-16 PROCEDURE — 80061 LIPID PANEL: CPT

## 2020-03-16 PROCEDURE — 80048 BASIC METABOLIC PNL TOTAL CA: CPT

## 2020-03-16 PROCEDURE — 82550 ASSAY OF CK (CPK): CPT

## 2020-03-16 PROCEDURE — 36415 COLL VENOUS BLD VENIPUNCTURE: CPT

## 2020-03-16 PROCEDURE — 84443 ASSAY THYROID STIM HORMONE: CPT

## 2020-03-16 NOTE — PROGRESS NOTES
Subjective:   Caty Noble is a 66 y.o. female with hypertension presents for 4 weeks follow up. Also continues to have leg cramps despite holding zocor. Patient Active Problem List   Diagnosis Code    Shoulder impingement, right, with rotator cuff strain M75.40    Spinal stenosis M48.00    [facility] M46.1    High cholesterol E78.00    Hypertension I10    Hemochromatosis E83.119    Obesity, morbid ([facility] E66.01    MCBRIDE (dyspnea on exertion) R06.09    CAP (community acquired pneumonia) J18.9    Sepsis ([facility] A41.9    Hypotension I95.9    Atrial fibrillation with RVR (HCC) I48.91    Abnormal chest CT R93.89    Chronic cough R05     Current Outpatient Medications   Medication Sig Dispense Refill    olmesartan (BENICAR) 20 mg tablet TAKE 1 TABLET BY MOUTH EVERY DAY 30 Tab 3    montelukast (SINGULAIR) 10 mg tablet TAKE 1 TABLET BY MOUTH EVERY DAY AT NIGHT 90 Tab 1    budesonide (PULMICORT) 0.5 mg/2 mL nbsp 2 mL by Nebulization route two (2) times a day. File under Medicare Part B, ICD J47.0, J45.909 60 Each 3    revefenacin (YUPELRI) 175 mcg/3 mL nebu nebulizer solution 3 mL by Nebulization route daily. 30 Nebule 3    revefenacin (YUPELRI) 175 mcg/3 mL nebu nebulizer solution 3 mL by Nebulization route daily. 7 Nebule 0    simvastatin (ZOCOR) 40 mg tablet Take 1 Tab by mouth nightly. 90 Tab 1    furosemide (LASIX) 20 mg tablet Take 1 Tab by mouth daily. May take 1 additional tablet daily as needed for increased edema 60 Tab 3    albuterol (PROVENTIL VENTOLIN) 2.5 mg /3 mL (0.083 %) nebu 3 mL by Nebulization route every six (6) hours as needed for Wheezing or Shortness of Breath. 120 Nebule 3    potassium chloride (KLOR-CON M20) 20 mEq tablet Take 1 Tab by mouth daily. 90 Tab 6    allopurinol (ZYLOPRIM) 100 mg tablet Take 1 Tab by mouth two (2) times a day.  180 Tab 11    ELIQUIS 5 mg tablet TAKE 1 TABLET BY MOUTH EVERY 12 HOURS (Patient taking differently: 2.5 mg.) 90 Tab 0    diclofenac (VOLTAREN) 1 % gel Apply 2 g to affected area every six (6) hours. Indications: chronic shoulder pain 100 g 0    acetaminophen (TYLENOL) 325 mg tablet Take 2 Tabs by mouth every six (6) hours as needed for Pain or Fever. 30 Tab 0    levothyroxine (SYNTHROID) 25 mcg tablet Take 1 Tab by mouth Daily (before breakfast). 90 Tab 6    inhalational spacing device (ACE AEROSOL CLOUD ENHANCER) 1 Each by Does Not Apply route as needed. 1 Device 3    cholecalciferol, vitamin D3, 2,000 unit tab TAKE 1 TABLET BY MOUTH EVERY DAY  3    Omeprazole delayed release (PRILOSEC D/R) 20 mg tablet Take 20 mg by mouth daily. Allergies   Allergen Reactions    Ciprofloxacin Other (comments)     Achilles tendonitis/leg pain and swelling    Indomethacin Anaphylaxis, Hives and Swelling    Tomato Other (comments)     Heart burn     Past Surgical History:   Procedure Laterality Date    HX CHOLECYSTECTOMY      HX CHOLECYSTECTOMY      HX KNEE REPLACEMENT Bilateral     R knee/ Lknee and femur    HX ORTHOPAEDIC      R trigger finger     HX OTHER SURGICAL      Right little finger    HX OTHER SURGICAL      Right wrist, replaced unlnar).     HX OTHER SURGICAL      Both knees    HX OTHER SURGICAL      Left femur    HX OTHER SURGICAL      Trigger finger surgery    HX OTHER SURGICAL      Left knee replacment revision     HX ROTATOR CUFF REPAIR Right     HX TONSILLECTOMY       Family History   Adopted: Yes      Lab Results   Component Value Date/Time    Cholesterol, total 127 08/26/2019 02:02 PM    HDL Cholesterol 53 08/26/2019 02:02 PM    LDL, calculated 44.4 08/26/2019 02:02 PM    VLDL, calculated 29.6 08/26/2019 02:02 PM    Triglyceride 148 08/26/2019 02:02 PM    CHOL/HDL Ratio 2.4 08/26/2019 02:02 PM     Lab Results   Component Value Date/Time    Sodium 144 01/13/2020 12:43 PM    Potassium 3.8 01/13/2020 12:43 PM    Chloride 111 01/13/2020 12:43 PM    CO2 29 01/13/2020 12:43 PM    Anion gap 4 01/13/2020 12:43 PM Glucose 95 01/13/2020 12:43 PM    BUN 21 (H) 01/13/2020 12:43 PM    Creatinine 1.14 01/13/2020 12:43 PM    BUN/Creatinine ratio 18 01/13/2020 12:43 PM    GFR est AA 56 (L) 01/13/2020 12:43 PM    GFR est non-AA 46 (L) 01/13/2020 12:43 PM    Calcium 8.7 01/13/2020 12:43 PM    Bilirubin, total 0.7 12/25/2019 08:55 AM    AST (SGOT) 32 12/25/2019 08:55 AM    Alk. phosphatase 84 12/25/2019 08:55 AM    Protein, total 7.6 12/25/2019 08:55 AM    Albumin 3.8 12/25/2019 08:55 AM    Globulin 3.8 12/25/2019 08:55 AM    A-G Ratio 1.0 12/25/2019 08:55 AM    ALT (SGPT) 29 12/25/2019 08:55 AM     Lab Results   Component Value Date/Time    WBC 8.6 12/25/2019 08:55 AM    HGB 13.8 12/25/2019 08:55 AM    HCT 41.5 12/25/2019 08:55 AM    PLATELET 511 12/22/8054 08:55 AM    MCV 93.5 12/25/2019 08:55 AM     No results found for: HBA1C, HGBE8, PJA5YERS, ZZE7ZVFF  Wt Readings from Last 3 Encounters:   03/16/20 220 lb (99.8 kg)   02/06/20 215 lb 9.6 oz (97.8 kg)   01/23/20 215 lb (97.5 kg)     Last Point of Care HGB A1C  No results found for: WPC1DONY   BP Readings from Last 3 Encounters:   03/16/20 146/64   02/06/20 122/71   01/23/20 158/84       Hypertension ROS: taking medications as instructed, side effects noted by patient include headaches, no TIA's, no chest pain on exertion, no dyspnea on exertion, no swelling of ankles. Review of Systems - Neurological ROS: positive for - headaches    New concerns: states since beginning benicar she has been experiencing headaches. States headaches are pounding, located to posterior region of head.      Objective:   Visit Vitals  /68 (BP 1 Location: Right arm, BP Patient Position: Sitting)   Pulse 75   Temp 97.5 °F (36.4 °C) (Oral)   Resp 18   Ht 5' 1\" (1.549 m)   Wt 220 lb (99.8 kg)   SpO2 97%   BMI 41.57 kg/m²      General appearance - alert, well appearing, and in no distress  Neck - supple, no significant adenopathy, carotids upstroke normal bilaterally, no bruits  Chest - clear to auscultation, no wheezes, rales or rhonchi, symmetric air entry  Heart - normal rate, regular rhythm, normal S1, S2, no murmurs, rubs, clicks or gallops  Extremities - peripheral pulses normal, no clubbing or cyanosis, 1+ non pitting edema bilaterally          Assessment/Plan:      ICD-10-CM ICD-9-CM    1. Essential hypertension V69 556.9 METABOLIC PANEL, BASIC      LIPID PANEL   2. Acquired hypothyroidism E03.9 244.9 T4, FREE      TSH 3RD GENERATION   3. Hyperlipidemia, unspecified hyperlipidemia type K23.3 788.3 METABOLIC PANEL, BASIC      LIPID PANEL   4. Myalgia M79.10 729.1 CK     Orders Placed This Encounter    T4, FREE    TSH 3RD GENERATION    METABOLIC PANEL, BASIC    LIPID PANEL    CK         Advised to take 1/2 benicar tablet and report to provider blood pressure readings and if headaches have improved on lower dosage. I have discussed the diagnosis with the patient and the intended plan as seen in the above orders. The patient has received an after-visit summary and questions were answered concerning future plans. I have discussed medication side effects and warnings with the patient as well. Patient agreeable with above plan and verbalizes understanding. Follow-up and Dispositions    · Return in about 3 months (around 6/16/2020) for HTN.

## 2020-04-11 RX ORDER — FUROSEMIDE 20 MG/1
TABLET ORAL
Qty: 180 TAB | Refills: 1 | Status: SHIPPED | OUTPATIENT
Start: 2020-04-11 | End: 2020-09-30

## 2020-04-11 RX ORDER — FUROSEMIDE 20 MG/1
TABLET ORAL
Qty: 90 TAB | Refills: 0 | Status: SHIPPED | OUTPATIENT
Start: 2020-04-11 | End: 2020-07-09 | Stop reason: SDUPTHER

## 2020-07-09 ENCOUNTER — OFFICE VISIT (OUTPATIENT)
Dept: FAMILY MEDICINE CLINIC | Age: 75
End: 2020-07-09

## 2020-07-09 VITALS
BODY MASS INDEX: 40.02 KG/M2 | RESPIRATION RATE: 22 BRPM | SYSTOLIC BLOOD PRESSURE: 110 MMHG | DIASTOLIC BLOOD PRESSURE: 56 MMHG | WEIGHT: 212 LBS | OXYGEN SATURATION: 96 % | HEART RATE: 67 BPM | TEMPERATURE: 98.6 F | HEIGHT: 61 IN

## 2020-07-09 DIAGNOSIS — R60.9 PERIPHERAL EDEMA: ICD-10-CM

## 2020-07-09 DIAGNOSIS — I10 ESSENTIAL HYPERTENSION: Primary | ICD-10-CM

## 2020-07-09 DIAGNOSIS — E78.5 HYPERLIPIDEMIA, UNSPECIFIED HYPERLIPIDEMIA TYPE: ICD-10-CM

## 2020-07-09 DIAGNOSIS — E03.9 ACQUIRED HYPOTHYROIDISM: ICD-10-CM

## 2020-07-09 RX ORDER — ROSUVASTATIN CALCIUM 5 MG/1
5 TABLET, COATED ORAL
Qty: 30 TAB | Refills: 1 | Status: SHIPPED | OUTPATIENT
Start: 2020-07-09 | End: 2020-07-09

## 2020-07-09 RX ORDER — ROSUVASTATIN CALCIUM 5 MG/1
5 TABLET, COATED ORAL
Qty: 30 TAB | Refills: 1 | Status: SHIPPED | OUTPATIENT
Start: 2020-07-09 | End: 2020-07-31

## 2020-07-09 NOTE — PROGRESS NOTES
Chief Complaint   Patient presents with    Hypertension     1. Have you been to the ER, urgent care clinic since your last visit? Hospitalized since your last visit?no    2. Have you seen or consulted any other health care providers outside of the [address] [place] [address] since your last visit? Include any pap smears or colon screening.  no

## 2020-07-09 NOTE — PROGRESS NOTES
Subjective:   Abilio Maki is a 66 y.o. female with hypertension presents for 4 months follow up. Patient Active Problem List   Diagnosis Code    Shoulder impingement, right, with rotator cuff strain M75.40    Spinal stenosis M48.00    Sacroiliitis ([facility] M46.1    High cholesterol E78.00    Hypertension I10    Hemochromatosis E83.119    Obesity, morbid ([facility] E66.01    MCBRIDE (dyspnea on exertion) R06.00    CAP (community acquired pneumonia) J18.9    Sepsis ([facility] A41.9    Hypotension I95.9    Atrial fibrillation with RVR (HCC) I48.91    Abnormal chest CT R93.89    Chronic cough R05     Current Outpatient Medications   Medication Sig Dispense Refill    olmesartan (BENICAR) 20 mg tablet TAKE 1 TABLET BY MOUTH EVERY DAY 90 Tab 1    furosemide (LASIX) 20 mg tablet TAKE 1 TABLET BY MOUTH EVERY DAY MAY TAKE 1 ADDITIONAL TAB IF NEEDED 180 Tab 1    montelukast (SINGULAIR) 10 mg tablet TAKE 1 TABLET BY MOUTH EVERY DAY AT NIGHT 90 Tab 1    budesonide (PULMICORT) 0.5 mg/2 mL nbsp 2 mL by Nebulization route two (2) times a day. File under Medicare Part B, ICD J47.0, J45.909 60 Each 3    simvastatin (ZOCOR) 40 mg tablet Take 1 Tab by mouth nightly. 90 Tab 1    potassium chloride (KLOR-CON M20) 20 mEq tablet Take 1 Tab by mouth daily. 90 Tab 6    allopurinol (ZYLOPRIM) 100 mg tablet Take 1 Tab by mouth two (2) times a day. 180 Tab 11    ELIQUIS 5 mg tablet TAKE 1 TABLET BY MOUTH EVERY 12 HOURS (Patient taking differently: 2.5 mg.) 90 Tab 0    acetaminophen (TYLENOL) 325 mg tablet Take 2 Tabs by mouth every six (6) hours as needed for Pain or Fever. 30 Tab 0    levothyroxine (SYNTHROID) 25 mcg tablet Take 1 Tab by mouth Daily (before breakfast). 90 Tab 6    cholecalciferol, vitamin D3, 2,000 unit tab TAKE 1 TABLET BY MOUTH EVERY DAY  3    Omeprazole delayed release (PRILOSEC D/R) 20 mg tablet Take 20 mg by mouth daily.       furosemide (LASIX) 20 mg tablet TAKE 1 TABLET BY MOUTH EVERY DAY 90 Tab 0    revefenacin (YUPELRI) 175 mcg/3 mL nebu nebulizer solution 3 mL by Nebulization route daily. 30 Nebule 3    albuterol (PROVENTIL VENTOLIN) 2.5 mg /3 mL (0.083 %) nebu 3 mL by Nebulization route every six (6) hours as needed for Wheezing or Shortness of Breath. 120 Nebule 3    diclofenac (VOLTAREN) 1 % gel Apply 2 g to affected area every six (6) hours. Indications: chronic shoulder pain 100 g 0    inhalational spacing device (ACE AEROSOL CLOUD ENHANCER) 1 Each by Does Not Apply route as needed. 1 Device 3      Allergies   Allergen Reactions    Ciprofloxacin Other (comments)     Achilles tendonitis/leg pain and swelling    Indomethacin Anaphylaxis, Hives and Swelling    Tomato Other (comments)     Heart burn     Past Surgical History:   Procedure Laterality Date    HX CHOLECYSTECTOMY      HX CHOLECYSTECTOMY      HX KNEE REPLACEMENT Bilateral     R knee/ Lknee and femur    HX ORTHOPAEDIC      R trigger finger     HX OTHER SURGICAL      Right little finger    HX OTHER SURGICAL      Right wrist, replaced unlnar).     HX OTHER SURGICAL      Both knees    HX OTHER SURGICAL      Left femur    HX OTHER SURGICAL      Trigger finger surgery    HX OTHER SURGICAL      Left knee replacment revision     HX ROTATOR CUFF REPAIR Right     HX TONSILLECTOMY       Family History   Adopted: Yes      Lab Results   Component Value Date/Time    Cholesterol, total 222 (H) 03/16/2020 08:33 AM    HDL Cholesterol 43 03/16/2020 08:33 AM    LDL, calculated 135.6 (H) 03/16/2020 08:33 AM    VLDL, calculated 43.4 03/16/2020 08:33 AM    Triglyceride 217 (H) 03/16/2020 08:33 AM    CHOL/HDL Ratio 5.2 (H) 03/16/2020 08:33 AM     Lab Results   Component Value Date/Time    Sodium 144 03/16/2020 08:33 AM    Potassium 4.7 03/16/2020 08:33 AM    Chloride 109 03/16/2020 08:33 AM    CO2 29 03/16/2020 08:33 AM    Anion gap 6 03/16/2020 08:33 AM    Glucose 82 03/16/2020 08:33 AM    BUN 21 (H) 03/16/2020 08:33 AM    Creatinine 1.03 03/16/2020 08:33 AM    BUN/Creatinine ratio 20 03/16/2020 08:33 AM    GFR est AA >60 03/16/2020 08:33 AM    GFR est non-AA 52 (L) 03/16/2020 08:33 AM    Calcium 9.5 03/16/2020 08:33 AM    Bilirubin, total 0.7 12/25/2019 08:55 AM    Alk. phosphatase 84 12/25/2019 08:55 AM    Protein, total 7.6 12/25/2019 08:55 AM    Albumin 3.8 12/25/2019 08:55 AM    Globulin 3.8 12/25/2019 08:55 AM    A-G Ratio 1.0 12/25/2019 08:55 AM    ALT (SGPT) 29 12/25/2019 08:55 AM    AST (SGOT) 32 12/25/2019 08:55 AM     Lab Results   Component Value Date/Time    WBC 8.6 12/25/2019 08:55 AM    HGB 13.8 12/25/2019 08:55 AM    HCT 41.5 12/25/2019 08:55 AM    PLATELET 319 31/86/7428 08:55 AM    MCV 93.5 12/25/2019 08:55 AM     Wt Readings from Last 3 Encounters:   07/09/20 212 lb (96.2 kg)   03/16/20 220 lb (99.8 kg)   02/06/20 215 lb 9.6 oz (97.8 kg)     Last Point of Care HGB A1C  No results found for: CLF4WJDP   BP Readings from Last 3 Encounters:   07/09/20 110/56   03/16/20 132/68   02/06/20 122/71     Hypertension ROS: taking medications as instructed, no medication side effects noted, no TIA's, no chest pain on exertion, no dyspnea on exertion, no swelling of ankles. New concerns: patient states she discontinuing zocor leg cramps have improved.      Objective:   Visit Vitals  /56 (BP 1 Location: Left arm, BP Patient Position: Sitting)   Pulse 67   Temp 98.6 °F (37 °C) (Temporal)   Resp 22   Ht 5' 1\" (1.549 m)   Wt 212 lb (96.2 kg)   SpO2 96%   BMI 40.06 kg/m²      General appearance - alert, well appearing, and in no distress  Neck - supple, no significant adenopathy, carotids upstroke normal bilaterally, no bruits  Chest - clear to auscultation, no wheezes, rales or rhonchi, symmetric air entry  Heart - normal rate, regular rhythm, normal S1, S2, no murmurs, rubs, clicks or gallops  Extremities - peripheral pulses normal, no clubbing or cyanosis, pedal edema 1 + bilateral lower extremities        Assessment/Plan:      ICD-10-CM ICD-9-CM 1. Essential hypertension  I10 401.9    2. Acquired hypothyroidism  E03.9 244.9    3. Hyperlipidemia, unspecified hyperlipidemia type  E78.5 272.4    4. Peripheral edema  R60.9 782.3      Orders Placed This Encounter    rosuvastatin (CRESTOR) 5 mg tablet          reviewed diet, exercise and weight control  recommended sodium restriction. I have discussed the diagnosis with the patient and the intended plan as seen in the above orders. The patient has received an after-visit summary and questions were answered concerning future plans. I have discussed medication side effects and warnings with the patient as well. Patient agreeable with above plan and verbalizes understanding. Follow-up and Dispositions    · Return in about 4 months (around 11/9/2020) for HTN/HLD.

## 2020-07-09 NOTE — PROGRESS NOTES
Subjective:  
Abilio Maki is a 66 y.o. female with hypertension presents for *** {days/wks/mos/yrs:997572} follow up. Patient Active Problem List  
Diagnosis Code  Shoulder impingement, right, with rotator cuff strain M75.40  Spinal stenosis M48.00  Sacroiliitis ([facility].) M46.1  High cholesterol E78.00  Hypertension I10  
 Hemochromatosis E83.119  
 Obesity, morbid ([facility].) E66.01  
 MCBRIDE (dyspnea on exertion) R06.00  
 CAP (community acquired pneumonia) J18.9  Sepsis ([facility] A41.9  Hypotension I95.9  Atrial fibrillation with RVR (HCC) I48.91  
 Abnormal chest CT R93.89  Chronic cough R05 Current Outpatient Medications Medication Sig Dispense Refill  olmesartan (BENICAR) 20 mg tablet TAKE 1 TABLET BY MOUTH EVERY DAY 90 Tab 1  
 furosemide (LASIX) 20 mg tablet TAKE 1 TABLET BY MOUTH EVERY DAY MAY TAKE 1 ADDITIONAL TAB IF NEEDED 180 Tab 1  
 montelukast (SINGULAIR) 10 mg tablet TAKE 1 TABLET BY MOUTH EVERY DAY AT NIGHT 90 Tab 1  
 budesonide (PULMICORT) 0.5 mg/2 mL nbsp 2 mL by Nebulization route two (2) times a day. File under Medicare Part B, ICD J47.0, J45.909 60 Each 3  
 simvastatin (ZOCOR) 40 mg tablet Take 1 Tab by mouth nightly. 90 Tab 1  potassium chloride (KLOR-CON M20) 20 mEq tablet Take 1 Tab by mouth daily. 90 Tab 6  
 allopurinol (ZYLOPRIM) 100 mg tablet Take 1 Tab by mouth two (2) times a day. 180 Tab 11  
 ELIQUIS 5 mg tablet TAKE 1 TABLET BY MOUTH EVERY 12 HOURS (Patient taking differently: 2.5 mg.) 90 Tab 0  
 acetaminophen (TYLENOL) 325 mg tablet Take 2 Tabs by mouth every six (6) hours as needed for Pain or Fever. 30 Tab 0  
 levothyroxine (SYNTHROID) 25 mcg tablet Take 1 Tab by mouth Daily (before breakfast). 90 Tab 6  cholecalciferol, vitamin D3, 2,000 unit tab TAKE 1 TABLET BY MOUTH EVERY DAY  3  
 Omeprazole delayed release (PRILOSEC D/R) 20 mg tablet Take 20 mg by mouth daily.  furosemide (LASIX) 20 mg tablet TAKE 1 TABLET BY MOUTH EVERY DAY 90 Tab 0  
 revefenacin (YUPELRI) 175 mcg/3 mL nebu nebulizer solution 3 mL by Nebulization route daily. 30 Nebule 3  
 albuterol (PROVENTIL VENTOLIN) 2.5 mg /3 mL (0.083 %) nebu 3 mL by Nebulization route every six (6) hours as needed for Wheezing or Shortness of Breath. 120 Nebule 3  
 diclofenac (VOLTAREN) 1 % gel Apply 2 g to affected area every six (6) hours. Indications: chronic shoulder pain 100 g 0  
 inhalational spacing device (ACE AEROSOL CLOUD ENHANCER) 1 Each by Does Not Apply route as needed. 1 Device 3 Allergies Allergen Reactions  Ciprofloxacin Other (comments) Achilles tendonitis/leg pain and swelling  Indomethacin Anaphylaxis, Hives and Swelling  Tomato Other (comments) Heart burn Past Surgical History:  
Procedure Laterality Date  HX CHOLECYSTECTOMY  HX CHOLECYSTECTOMY  HX KNEE REPLACEMENT Bilateral   
 R knee/ Lknee and femur  HX ORTHOPAEDIC    
 R trigger finger  HX OTHER SURGICAL Right little finger  HX OTHER SURGICAL Right wrist, replaced unlnar).  HX OTHER SURGICAL Both knees  HX OTHER SURGICAL Left femur  HX OTHER SURGICAL Trigger finger surgery  HX OTHER SURGICAL Left knee replacment revision  HX ROTATOR CUFF REPAIR Right  HX TONSILLECTOMY Family History Adopted: Yes Lab Results Component Value Date/Time Cholesterol, total 222 (H) 03/16/2020 08:33 AM  
 HDL Cholesterol 43 03/16/2020 08:33 AM  
 LDL, calculated 135.6 (H) 03/16/2020 08:33 AM  
 VLDL, calculated 43.4 03/16/2020 08:33 AM  
 Triglyceride 217 (H) 03/16/2020 08:33 AM  
 CHOL/HDL Ratio 5.2 (H) 03/16/2020 08:33 AM  
 
Lab Results Component Value Date/Time  Sodium 144 03/16/2020 08:33 AM  
 Potassium 4.7 03/16/2020 08:33 AM  
 Chloride 109 03/16/2020 08:33 AM  
 CO2 29 03/16/2020 08:33 AM  
 Anion gap 6 03/16/2020 08:33 AM  
 Glucose 82 03/16/2020 08:33 AM  
 BUN 21 (H) 03/16/2020 08:33 AM  
 Creatinine 1.03 03/16/2020 08:33 AM  
 BUN/Creatinine ratio 20 03/16/2020 08:33 AM  
 GFR est AA >60 03/16/2020 08:33 AM  
 GFR est non-AA 52 (L) 03/16/2020 08:33 AM  
 Calcium 9.5 03/16/2020 08:33 AM  
 Bilirubin, total 0.7 12/25/2019 08:55 AM  
 Alk. phosphatase 84 12/25/2019 08:55 AM  
 Protein, total 7.6 12/25/2019 08:55 AM  
 Albumin 3.8 12/25/2019 08:55 AM  
 Globulin 3.8 12/25/2019 08:55 AM  
 A-G Ratio 1.0 12/25/2019 08:55 AM  
 ALT (SGPT) 29 12/25/2019 08:55 AM  
 AST (SGOT) 32 12/25/2019 08:55 AM  
 
Lab Results Component Value Date/Time WBC 8.6 12/25/2019 08:55 AM  
 HGB 13.8 12/25/2019 08:55 AM  
 HCT 41.5 12/25/2019 08:55 AM  
 PLATELET 909 97/51/8404 08:55 AM  
 MCV 93.5 12/25/2019 08:55 AM  
 
No results found for: HBA1C, HGBE8, VMP2JDJN, TLS9GPPA Wt Readings from Last 3 Encounters:  
07/09/20 212 lb (96.2 kg) 03/16/20 220 lb (99.8 kg) 02/06/20 215 lb 9.6 oz (97.8 kg) Last Point of Care HGB A1C No results found for: UDQ8AEDR  
BP Readings from Last 3 Encounters:  
07/09/20 110/56  
03/16/20 132/68  
02/06/20 122/71 Hypertension ROS: {htn cvs ros:261279::\"taking medications as instructed\",\"no medication side effects noted\",\"no TIA's\",\"no chest pain on exertion\",\"no dyspnea on exertion\",\"no swelling of ankles\"}. Review of Systems - {ros master:311784} New concerns: ***. Objective:  
Visit Vitals /56 (BP 1 Location: Left arm, BP Patient Position: Sitting) Pulse 67 Temp 98.6 °F (37 °C) (Temporal) Resp 22 Ht 5' 1\" (1.549 m) Wt 212 lb (96.2 kg) SpO2 96% BMI 40.06 kg/m² General appearance - alert, well appearing, and in no distress Neck - supple, no significant adenopathy, carotids upstroke normal bilaterally, no bruits Chest - clear to auscultation, no wheezes, rales or rhonchi, symmetric air entry Heart - normal rate, regular rhythm, normal S1, S2, no murmurs, rubs, clicks or gallops Extremities - peripheral pulses normal, no pedal edema, no clubbing or cyanosis {PE ADULT/PEDS  MALE/FEMALE:73362} Assessment/Plan:   
{Assessment and Plan Chronic Disease:64814} {disease control degree:197669}. {disease follow up plans:474583}. I have discussed the diagnosis with the patient and the intended plan as seen in the above orders. The patient has received an after-visit summary and questions were answered concerning future plans. I have discussed medication side effects and warnings with the patient as well. Patient agreeable with above plan and verbalizes understanding.

## 2020-07-17 RX ORDER — LISINOPRIL 10 MG/1
TABLET ORAL
Qty: 90 TAB | Refills: 6 | Status: SHIPPED | OUTPATIENT
Start: 2020-07-17 | End: 2020-12-21

## 2020-07-31 RX ORDER — ROSUVASTATIN CALCIUM 5 MG/1
TABLET, COATED ORAL
Qty: 30 TAB | Refills: 1 | Status: SHIPPED | OUTPATIENT
Start: 2020-07-31 | End: 2020-08-25

## 2020-08-10 DIAGNOSIS — E03.9 ACQUIRED HYPOTHYROIDISM: ICD-10-CM

## 2020-08-10 RX ORDER — LEVOTHYROXINE SODIUM 25 UG/1
TABLET ORAL
Qty: 90 TAB | Refills: 6 | Status: SHIPPED | OUTPATIENT
Start: 2020-08-10 | End: 2021-08-04 | Stop reason: SDUPTHER

## 2020-08-20 ENCOUNTER — HOSPITAL ENCOUNTER (OUTPATIENT)
Dept: LAB | Age: 75
Discharge: HOME | End: 2020-08-20
Payer: MEDICARE

## 2020-08-20 ENCOUNTER — TELEPHONE (OUTPATIENT)
Dept: FAMILY MEDICINE CLINIC | Age: 75
End: 2020-08-20

## 2020-08-20 DIAGNOSIS — E03.9 ACQUIRED HYPOTHYROIDISM: ICD-10-CM

## 2020-08-20 DIAGNOSIS — R60.9 PERIPHERAL EDEMA: ICD-10-CM

## 2020-08-20 DIAGNOSIS — E78.5 HYPERLIPIDEMIA, UNSPECIFIED HYPERLIPIDEMIA TYPE: ICD-10-CM

## 2020-08-20 DIAGNOSIS — I10 ESSENTIAL HYPERTENSION: ICD-10-CM

## 2020-08-20 LAB
ALBUMIN SERPL-MCNC: 3.4 G/DL (ref 3.4–5)
ALBUMIN/GLOB SERPL: 1.1 {RATIO} (ref 0.8–1.7)
ALP SERPL-CCNC: 85 U/L (ref 45–117)
ALT SERPL-CCNC: 19 U/L (ref 13–56)
ANION GAP SERPL CALC-SCNC: 8 MMOL/L (ref 3–18)
AST SERPL-CCNC: 17 U/L (ref 10–38)
BILIRUB SERPL-MCNC: 0.5 MG/DL (ref 0.2–1)
BUN SERPL-MCNC: 25 MG/DL (ref 7–18)
BUN/CREAT SERPL: 22 (ref 12–20)
CALCIUM SERPL-MCNC: 9 MG/DL (ref 8.5–10.1)
CHLORIDE SERPL-SCNC: 110 MMOL/L (ref 100–111)
CHOLEST SERPL-MCNC: 156 MG/DL
CO2 SERPL-SCNC: 27 MMOL/L (ref 21–32)
CREAT SERPL-MCNC: 1.12 MG/DL (ref 0.6–1.3)
GLOBULIN SER CALC-MCNC: 3.1 G/DL (ref 2–4)
GLUCOSE SERPL-MCNC: 88 MG/DL (ref 74–99)
HDLC SERPL-MCNC: 51 MG/DL (ref 40–60)
HDLC SERPL: 3.1 {RATIO} (ref 0–5)
LDLC SERPL CALC-MCNC: 69 MG/DL (ref 0–100)
LIPID PROFILE,FLP: ABNORMAL
POTASSIUM SERPL-SCNC: 4.2 MMOL/L (ref 3.5–5.5)
PROT SERPL-MCNC: 6.5 G/DL (ref 6.4–8.2)
SODIUM SERPL-SCNC: 145 MMOL/L (ref 136–145)
T4 FREE SERPL-MCNC: 0.9 NG/DL (ref 0.7–1.5)
TRIGL SERPL-MCNC: 180 MG/DL (ref ?–150)
TSH SERPL DL<=0.05 MIU/L-ACNC: 2.06 UIU/ML (ref 0.36–3.74)
VLDLC SERPL CALC-MCNC: 36 MG/DL

## 2020-08-20 PROCEDURE — 80061 LIPID PANEL: CPT

## 2020-08-20 PROCEDURE — 80053 COMPREHEN METABOLIC PANEL: CPT

## 2020-08-20 PROCEDURE — 84439 ASSAY OF FREE THYROXINE: CPT

## 2020-08-20 PROCEDURE — 36415 COLL VENOUS BLD VENIPUNCTURE: CPT

## 2020-08-20 PROCEDURE — 84443 ASSAY THYROID STIM HORMONE: CPT

## 2020-08-20 NOTE — TELEPHONE ENCOUNTER
Patient sent in a [employer] message to check if she can take these to supplements with her medications. would like a call back [phone]      Patient stated she was asked for the ingredients . Slenderized Keto -magnesium stearate ,rice flour, silicon Dioxide and Gelatin . States to taje 2 capsules a day with 8 oz of water. 1st time user states to take 1 time a day for 15 days.     Precise Cleanse which is a weigh management support supplement   -magnesium stearate ,rice flour, silicon Dioxide and Gelatin ,[place] bark powder,Psyllium husk powder  irvingia [place]ensis seed extract  Green coffee bean extract  Aloe vera leaf powder   buckthorn bark powder  Citrus pectin powder  Fennel seed powder   2 capsules  With 8 oz glass of water

## 2020-08-25 RX ORDER — ROSUVASTATIN CALCIUM 5 MG/1
TABLET, COATED ORAL
Qty: 30 TAB | Refills: 1 | Status: SHIPPED | OUTPATIENT
Start: 2020-08-25 | End: 2020-09-21

## 2020-09-02 RX ORDER — MONTELUKAST SODIUM 10 MG/1
TABLET ORAL
Qty: 90 TAB | Refills: 1 | Status: SHIPPED | OUTPATIENT
Start: 2020-09-02 | End: 2021-02-26

## 2020-09-10 ENCOUNTER — DOCUMENTATION ONLY (OUTPATIENT)
Dept: FAMILY MEDICINE CLINIC | Age: 75
End: 2020-09-10

## 2020-09-10 RX ORDER — OXYCODONE HYDROCHLORIDE 5 MG/1
5 TABLET ORAL
Status: ON HOLD | COMMUNITY

## 2020-09-21 RX ORDER — ROSUVASTATIN CALCIUM 5 MG/1
TABLET, COATED ORAL
Qty: 30 TAB | Refills: 1 | Status: SHIPPED | OUTPATIENT
Start: 2020-09-21 | End: 2020-10-14

## 2020-09-30 RX ORDER — FUROSEMIDE 20 MG/1
TABLET ORAL
Qty: 90 TAB | Refills: 0 | Status: SHIPPED | OUTPATIENT
Start: 2020-09-30 | End: 2021-01-08

## 2020-10-14 RX ORDER — ROSUVASTATIN CALCIUM 5 MG/1
TABLET, COATED ORAL
Qty: 30 TAB | Refills: 1 | Status: SHIPPED | OUTPATIENT
Start: 2020-10-14 | End: 2020-11-10

## 2020-11-10 ENCOUNTER — OFFICE VISIT (OUTPATIENT)
Dept: FAMILY MEDICINE CLINIC | Age: 75
End: 2020-11-10
Payer: MEDICARE

## 2020-11-10 VITALS
RESPIRATION RATE: 22 BRPM | SYSTOLIC BLOOD PRESSURE: 121 MMHG | HEART RATE: 84 BPM | HEIGHT: 61 IN | OXYGEN SATURATION: 96 % | TEMPERATURE: 97.5 F | BODY MASS INDEX: 39.46 KG/M2 | DIASTOLIC BLOOD PRESSURE: 71 MMHG | WEIGHT: 209 LBS

## 2020-11-10 DIAGNOSIS — M10.9 GOUT, UNSPECIFIED CAUSE, UNSPECIFIED CHRONICITY, UNSPECIFIED SITE: ICD-10-CM

## 2020-11-10 DIAGNOSIS — I10 ESSENTIAL HYPERTENSION: Primary | ICD-10-CM

## 2020-11-10 DIAGNOSIS — Z00.00 MEDICARE ANNUAL WELLNESS VISIT, SUBSEQUENT: ICD-10-CM

## 2020-11-10 DIAGNOSIS — E78.5 HYPERLIPIDEMIA, UNSPECIFIED HYPERLIPIDEMIA TYPE: ICD-10-CM

## 2020-11-10 PROCEDURE — G8536 NO DOC ELDER MAL SCRN: HCPCS | Performed by: NURSE PRACTITIONER

## 2020-11-10 PROCEDURE — 1090F PRES/ABSN URINE INCON ASSESS: CPT | Performed by: NURSE PRACTITIONER

## 2020-11-10 PROCEDURE — G0439 PPPS, SUBSEQ VISIT: HCPCS | Performed by: NURSE PRACTITIONER

## 2020-11-10 PROCEDURE — 99214 OFFICE O/P EST MOD 30 MIN: CPT | Performed by: NURSE PRACTITIONER

## 2020-11-10 PROCEDURE — G8417 CALC BMI ABV UP PARAM F/U: HCPCS | Performed by: NURSE PRACTITIONER

## 2020-11-10 PROCEDURE — G8752 SYS BP LESS 140: HCPCS | Performed by: NURSE PRACTITIONER

## 2020-11-10 PROCEDURE — G8754 DIAS BP LESS 90: HCPCS | Performed by: NURSE PRACTITIONER

## 2020-11-10 PROCEDURE — G8400 PT W/DXA NO RESULTS DOC: HCPCS | Performed by: NURSE PRACTITIONER

## 2020-11-10 PROCEDURE — 1101F PT FALLS ASSESS-DOCD LE1/YR: CPT | Performed by: NURSE PRACTITIONER

## 2020-11-10 PROCEDURE — G0463 HOSPITAL OUTPT CLINIC VISIT: HCPCS | Performed by: NURSE PRACTITIONER

## 2020-11-10 PROCEDURE — G8427 DOCREV CUR MEDS BY ELIG CLIN: HCPCS | Performed by: NURSE PRACTITIONER

## 2020-11-10 PROCEDURE — G8432 DEP SCR NOT DOC, RNG: HCPCS | Performed by: NURSE PRACTITIONER

## 2020-11-10 RX ORDER — ROSUVASTATIN CALCIUM 5 MG/1
TABLET, COATED ORAL
Qty: 30 TAB | Refills: 1 | Status: SHIPPED | OUTPATIENT
Start: 2020-11-10 | End: 2020-12-07

## 2020-11-10 RX ORDER — ZOSTER VACCINE RECOMBINANT, ADJUVANTED 50 MCG/0.5
KIT INTRAMUSCULAR
Qty: 0.5 ML | Refills: 1 | Status: SHIPPED | OUTPATIENT
Start: 2020-11-10 | End: 2021-04-14 | Stop reason: CLARIF

## 2020-11-10 RX ORDER — ALLOPURINOL 100 MG/1
100 TABLET ORAL 2 TIMES DAILY
Qty: 180 TAB | Refills: 1 | Status: SHIPPED | OUTPATIENT
Start: 2020-11-10 | End: 2021-05-04

## 2020-11-10 NOTE — PATIENT INSTRUCTIONS
Medicare Wellness Visit, Female The best way to live healthy is to have a lifestyle where you eat a well-balanced diet, exercise regularly, limit alcohol use, and quit all forms of tobacco/nicotine, if applicable. Regular preventive services are another way to keep healthy. Preventive services (vaccines, screening tests, monitoring & exams) can help personalize your care plan, which helps you manage your own care. Screening tests can find health problems at the earliest stages, when they are easiest to treat. [facility] follows the current, evidence-based guidelines published by the [facility] Mcdonald ([facility]) when recommending preventive services for our patients. Because we follow these guidelines, sometimes recommendations change over time as research supports it. (For example, mammograms used to be recommended annually. Even though Medicare will still pay for an annual mammogram, the newer guidelines recommend a mammogram every two years for women of average risk). Of course, you and your doctor may decide to screen more often for some diseases, based on your risk and your co-morbidities (chronic disease you are already diagnosed with). Preventive services for you include: - Medicare offers their members a free annual wellness visit, which is time for you and your primary care provider to discuss and plan for your preventive service needs. Take advantage of this benefit every year! 
-All adults over the age of 72 should receive the recommended pneumonia vaccines. Current USPSTF guidelines recommend a series of two vaccines for the best pneumonia protection.  
-All adults should have a flu vaccine yearly and a tetanus vaccine every 10 years.  
-All adults age 48 and older should receive the shingles vaccines (series of two vaccines). -All adults age 38-68 who are overweight should have a diabetes screening test once every three years. -All adults born between [birth year] and 1967 should be screened once for Hepatitis C. 
-Other screening tests and preventive services for persons with diabetes include: an eye exam to screen for diabetic retinopathy, a kidney function test, a foot exam, and stricter control over your cholesterol.  
-Cardiovascular screening for adults with routine risk involves an electrocardiogram (ECG) at intervals determined by your doctor.  
-Colorectal cancer screenings should be done for adults age 54-65 with no increased risk factors for colorectal cancer. There are a number of acceptable methods of screening for this type of cancer. Each test has its own benefits and drawbacks. Discuss with your doctor what is most appropriate for you during your annual wellness visit. The different tests include: colonoscopy (considered the best screening method), a fecal occult blood test, a fecal DNA test, and sigmoidoscopy. 
 
-A bone mass density test is recommended when a woman turns 65 to screen for osteoporosis. This test is only recommended one time, as a screening. Some providers will use this same test as a disease monitoring tool if you already have osteoporosis. -Breast cancer screenings are recommended every other year for women of normal risk, age 54-69. 
-Cervical cancer screenings for women over age 72 are only recommended with certain risk factors. Here is a list of your current Health Maintenance items (your personalized list of preventive services) with a due date: 
Health Maintenance Due Topic Date Due  
 DTaP/Tdap/Td  (1 - Tdap) 04/17/1964  Shingles Vaccine (1 of 2) 04/17/1993  Glaucoma Screening   04/17/2008  Bone Mineral Density   04/17/2008 [address] Annual Well Visit  10/28/2020

## 2020-11-10 NOTE — PROGRESS NOTES
Liberty Blum is a 66 y.o. female who was seen in clinic today (11/10/2020) for Hypertension and Annual Wellness Visit    Assessment & Plan:   Diagnoses and all orders for this visit:    1. Essential hypertension    2. Gout, unspecified cause, unspecified chronicity, unspecified site  Comments:  Refill allopurinol  Orders:  -     allopurinoL (ZYLOPRIM) 100 mg tablet; Take 1 Tab by mouth two (2) times a day. -     URIC ACID; Future    3. Hyperlipidemia, unspecified hyperlipidemia type  -     LIPID PANEL; Future  -     METABOLIC PANEL, COMPREHENSIVE; Future    4. Medicare annual wellness visit, subsequent    Other orders  -     varicella-zoster recombinant, PF, (Shingrix, PF,) 50 mcg/0.5 mL susr injection; 0.5mL by IntraMUSCular route once now and then repeat in 2-6 months        I have discussed the diagnosis with the patient and the intended plan as seen in the above orders. The patient has received an after-visit summary and questions were answered concerning future plans. I have discussed medication side effects and warnings with the patient as well. Patient agreeable with above plan and verbalizes understanding. Follow-up and Dispositions    · Return in about 4 months (around 3/10/2021) for HTN/HLD, virtual follow up. Subjective:   Hypertension ROS: taking medications as instructed, no medication side effects noted, no TIA's, no chest pain on exertion, no dyspnea on exertion. Reports leg swelling has improved. Comments has not had allopurinol in over a month. Requesting refill today. Comments she has been sleeping in a recliner at night due to pain. States her mouth is dry, states she began using a mouth wash and also [facility] has dry mouth lozenges that helps dry mouth at night. States has been taking [place] ProVen to help with weight loss. States she is on her 2nd bottle, denies any adverse effects.   Last seen by Dr. Nohemy Campos on 11/5/2020 chronic back pain, next appt 1/5/2020. Wt Readings from Last 3 Encounters:   11/10/20 209 lb (94.8 kg)   07/09/20 212 lb (96.2 kg)   03/16/20 220 lb (99.8 kg)     Results for orders placed or performed during the hospital encounter of 08/20/20   LIPID PANEL   Result Value Ref Range    LIPID PROFILE          Cholesterol, total 156 <200 MG/DL    Triglyceride 180 (H) <150 MG/DL    HDL Cholesterol 51 40 - 60 MG/DL    LDL, calculated 69 0 - 100 MG/DL    VLDL, calculated 36 MG/DL    CHOL/HDL Ratio 3.1 0 - 5.0     METABOLIC PANEL, COMPREHENSIVE   Result Value Ref Range    Sodium 145 136 - 145 mmol/L    Potassium 4.2 3.5 - 5.5 mmol/L    Chloride 110 100 - 111 mmol/L    CO2 27 21 - 32 mmol/L    Anion gap 8 3.0 - 18 mmol/L    Glucose 88 74 - 99 mg/dL    BUN 25 (H) 7.0 - 18 MG/DL    Creatinine 1.12 0.6 - 1.3 MG/DL    BUN/Creatinine ratio 22 (H) 12 - 20      GFR est AA 57 (L) >60 ml/min/1.73m2    GFR est non-AA 47 (L) >60 ml/min/1.73m2    Calcium 9.0 8.5 - 10.1 MG/DL    Bilirubin, total 0.5 0.2 - 1.0 MG/DL    ALT (SGPT) 19 13 - 56 U/L    AST (SGOT) 17 10 - 38 U/L    Alk. phosphatase 85 45 - 117 U/L    Protein, total 6.5 6.4 - 8.2 g/dL    Albumin 3.4 3.4 - 5.0 g/dL    Globulin 3.1 2.0 - 4.0 g/dL    A-G Ratio 1.1 0.8 - 1.7     TSH 3RD GENERATION   Result Value Ref Range    TSH 2.06 0.36 - 3.74 uIU/mL   T4, FREE   Result Value Ref Range    T4, Free 0.9 0.7 - 1.5 NG/DL      Prior to Admission medications    Medication Sig Start Date End Date Taking? Authorizing Provider   rosuvastatin (CRESTOR) 5 mg tablet TAKE 1 TABLET BY MOUTH EVERY DAY AT NIGHT 11/10/20  Yes Ema Singleton NP   furosemide (LASIX) 20 mg tablet TAKE 1 TABLET BY MOUTH EVERY DAY 9/30/20  Yes Karen POST NP   oxyCODONE IR (ROXICODONE) 5 mg immediate release tablet Take 5 mg by mouth three (3) times daily as needed.    Yes Provider, Historical   montelukast (SINGULAIR) 10 mg tablet TAKE 1 TABLET BY MOUTH EVERY DAY AT NIGHT 9/2/20  Yes Ema Singleton NP   levothyroxine (SYNTHROID) 25 mcg tablet TAKE 1 TAB BY MOUTH DAILY 8/10/20  Yes Estefania Jj MD   olmesartan (BENICAR) 20 mg tablet TAKE 1 TABLET BY MOUTH EVERY DAY 6/5/20  Yes Janet Watson NP   albuterol (PROVENTIL VENTOLIN) 2.5 mg /3 mL (0.083 %) nebu 3 mL by Nebulization route every six (6) hours as needed for Wheezing or Shortness of Breath. 1/7/20  Yes Ronal VELASCO NP   potassium chloride (KLOR-CON M20) 20 mEq tablet Take 1 Tab by mouth daily. 10/28/19  Yes Janet Watson NP   allopurinol (ZYLOPRIM) 100 mg tablet Take 1 Tab by mouth two (2) times a day. 10/28/19  Yes Janet Watson NP   ELIQUIS 5 mg tablet TAKE 1 TABLET BY MOUTH EVERY 12 HOURS  Patient taking differently: 2.5 mg. 9/16/19  Yes Ronal VELASCO NP   acetaminophen (TYLENOL) 325 mg tablet Take 2 Tabs by mouth every six (6) hours as needed for Pain or Fever. 7/25/19  Yes Basia Lomax NP   inhalational spacing device (ACE AEROSOL CLOUD ENHANCER) 1 Each by Does Not Apply route as needed. 2/14/19  Yes Estefania Jj MD   cholecalciferol, vitamin D3, 2,000 unit tab TAKE 1 TABLET BY MOUTH EVERY DAY 1/11/19  Yes Provider, Historical   rosuvastatin (CRESTOR) 5 mg tablet TAKE 1 TABLET BY MOUTH EVERY DAY AT NIGHT 10/14/20 11/10/20  Andrzej POST NP   lisinopriL (PRINIVIL, ZESTRIL) 10 mg tablet TAKE 1 TAB BY MOUTH DAILY 7/17/20   Faye Moreland MD   budesonide (PULMICORT) 0.5 mg/2 mL nbsp 2 mL by Nebulization route two (2) times a day. File under Medicare Part B, ICD J47.0, J45.909 2/10/20   Jason Jefferson MD   revefenacin (YUPELRI) 175 mcg/3 mL nebu nebulizer solution 3 mL by Nebulization route daily. 2/6/20   Jason Jefferson MD   diclofenac (VOLTAREN) 1 % gel Apply 2 g to affected area every six (6) hours. Indications: chronic shoulder pain 7/25/19   Basia Lomax, TRISTIAN   Omeprazole delayed release (PRILOSEC D/R) 20 mg tablet Take 20 mg by mouth daily.     Provider, Historical       The following sections were reviewed & updated as appropriate: PMH, PSH, FH, and SH. Review of Systems   Constitutional: Negative for chills and fever. Respiratory: Negative for shortness of breath. Cardiovascular: Positive for leg swelling (improved). Negative for chest pain and palpitations. Neurological: Negative for dizziness and headaches. Objective:     Visit Vitals  /71 (BP 1 Location: Left arm, BP Patient Position: Sitting)   Pulse 84   Temp 97.5 °F (36.4 °C) (Temporal)   Resp 22   Ht 5' 1\" (1.549 m)   Wt 209 lb (94.8 kg)   SpO2 96%   BMI 39.49 kg/m²      Physical Exam  Constitutional:       Appearance: She is well-developed. HENT:      Head: Normocephalic and atraumatic. Neck:      Musculoskeletal: Normal range of motion and neck supple. Cardiovascular:      Rate and Rhythm: Normal rate and regular rhythm. Heart sounds: Normal heart sounds. No murmur. No friction rub. No gallop. Pulmonary:      Effort: Pulmonary effort is normal.      Breath sounds: Normal breath sounds. No wheezing, rhonchi or rales. Musculoskeletal:      Right lower le+ Pitting Edema present. Left lower le+ Pitting Edema present. Skin:     General: Skin is warm and dry. Neurological:      Mental Status: She is alert and oriented to person, place, and time. Disclaimer: The patient understands our medical plan. Alternatives have been explained and offered. The risks, benefits and significant side effects of all medications have been reviewed. Anticipated time course and progression of condition reviewed. All questions have been addressed. She is encouraged to employ the information provided in the after visit summary, which was reviewed. Where applicable, she is instructed to call the clinic if she has not been notified either by phone or through [address] with the results of her tests or with an appointment plan for any referrals within 1 week(s). No news is not good news; it's no news.  The patient  is to call if her condition worsens or fails to improve or if significant side effects are experienced. Aspects of this note may have been generated using voice recognition software. Despite editing, there may be unrecognized errors. Arun Oh NP     This is the Subsequent Medicare Annual Wellness Exam, performed 12 months or more after the Initial AWV or the last Subsequent AWV    I have reviewed the patient's medical history in detail and updated the computerized patient record. History     Patient Active Problem List   Diagnosis Code    Shoulder impingement, right, with rotator cuff strain M75.40    Spinal stenosis M48.00    Sacroiliitis ([address] M46.1    High cholesterol E78.00    Hypertension I10    Hemochromatosis E83.119    Obesity, morbid ([address] E66.01    MCBRIDE (dyspnea on exertion) R06.00    CAP (community acquired pneumonia) J18.9    Sepsis ([address] A41.9    Hypotension I95.9    Atrial fibrillation with RVR (HCC) I48.91    Abnormal chest CT R93.89    Chronic cough R05     Past Medical History:   Diagnosis Date    Abnormal chest CT 2/6/2020    Adopted     Arthritis     Balance problems     Chronic cough 2/6/2020    MCBRIDE (dyspnea on exertion) 2/28/2019    GERD (gastroesophageal reflux disease)     Hemochromatosis     High cholesterol     Hypertension     Left knee pain     Left shoulder pain     Lumbar pain     Pain of left sacroiliac joint     Shoulder impingement, right, with rotator cuff strain       Past Surgical History:   Procedure Laterality Date    HX CHOLECYSTECTOMY      HX CHOLECYSTECTOMY      HX KNEE REPLACEMENT Bilateral     R knee/ Lknee and femur    HX ORTHOPAEDIC      R trigger finger     HX OTHER SURGICAL      Right little finger    HX OTHER SURGICAL      Right wrist, replaced unlnar).     HX OTHER SURGICAL      Both knees    HX OTHER SURGICAL      Left femur    HX OTHER SURGICAL      Trigger finger surgery    HX OTHER SURGICAL      Left knee replacment revision     HX ROTATOR CUFF REPAIR Right     HX TONSILLECTOMY       Current Outpatient Medications   Medication Sig Dispense Refill    rosuvastatin (CRESTOR) 5 mg tablet TAKE 1 TABLET BY MOUTH EVERY DAY AT NIGHT 30 Tab 1    furosemide (LASIX) 20 mg tablet TAKE 1 TABLET BY MOUTH EVERY DAY 90 Tab 0    oxyCODONE IR (ROXICODONE) 5 mg immediate release tablet Take 5 mg by mouth three (3) times daily as needed.  montelukast (SINGULAIR) 10 mg tablet TAKE 1 TABLET BY MOUTH EVERY DAY AT NIGHT 90 Tab 1    levothyroxine (SYNTHROID) 25 mcg tablet TAKE 1 TAB BY MOUTH DAILY 90 Tab 6    olmesartan (BENICAR) 20 mg tablet TAKE 1 TABLET BY MOUTH EVERY DAY 90 Tab 1    albuterol (PROVENTIL VENTOLIN) 2.5 mg /3 mL (0.083 %) nebu 3 mL by Nebulization route every six (6) hours as needed for Wheezing or Shortness of Breath. 120 Nebule 3    potassium chloride (KLOR-CON M20) 20 mEq tablet Take 1 Tab by mouth daily. 90 Tab 6    allopurinol (ZYLOPRIM) 100 mg tablet Take 1 Tab by mouth two (2) times a day. 180 Tab 11    ELIQUIS 5 mg tablet TAKE 1 TABLET BY MOUTH EVERY 12 HOURS (Patient taking differently: 2.5 mg.) 90 Tab 0    acetaminophen (TYLENOL) 325 mg tablet Take 2 Tabs by mouth every six (6) hours as needed for Pain or Fever. 30 Tab 0    inhalational spacing device (ACE AEROSOL CLOUD ENHANCER) 1 Each by Does Not Apply route as needed. 1 Device 3    cholecalciferol, vitamin D3, 2,000 unit tab TAKE 1 TABLET BY MOUTH EVERY DAY  3    lisinopriL (PRINIVIL, ZESTRIL) 10 mg tablet TAKE 1 TAB BY MOUTH DAILY 90 Tab 6    budesonide (PULMICORT) 0.5 mg/2 mL nbsp 2 mL by Nebulization route two (2) times a day. File under Medicare Part B, ICD J47.0, J45.909 60 Each 3    revefenacin (YUPELRI) 175 mcg/3 mL nebu nebulizer solution 3 mL by Nebulization route daily. 30 Nebule 3    diclofenac (VOLTAREN) 1 % gel Apply 2 g to affected area every six (6) hours.  Indications: chronic shoulder pain 100 g 0    Omeprazole delayed release (PRILOSEC D/R) 20 mg tablet Take 20 mg by mouth daily. Allergies   Allergen Reactions    Ciprofloxacin Other (comments)     Achilles tendonitis/leg pain and swelling    Indomethacin Anaphylaxis, Hives and Swelling    Tomato Other (comments)     Heart burn       Family History   Adopted: Yes     Social History     Tobacco Use    Smoking status: Former Smoker     Packs/day: 0.50     Years: 2.00     Pack years: 1.00     Start date:      Last attempt to quit: 1974     Years since quittin.8    Smokeless tobacco: Never Used    Tobacco comment: quit 20 years ago   Substance Use Topics    Alcohol use: No       Depression Risk Factor Screening:     3 most recent PHQ Screens 11/10/2020   PHQ Not Done -   Little interest or pleasure in doing things Not at all   Feeling down, depressed, irritable, or hopeless Not at all   Total Score PHQ 2 0   Trouble falling or staying asleep, or sleeping too much Not at all   Feeling tired or having little energy Not at all   Poor appetite, weight loss, or overeating Not at all   Feeling bad about yourself - or that you are a failure or have let yourself or your family down Not at all   Trouble concentrating on things such as school, work, reading, or watching TV Not at all   Moving or speaking so slowly that other people could have noticed; or the opposite being so fidgety that others notice Not at all   Thoughts of being better off dead, or hurting yourself in some way Not at all   PHQ 9 Score 0   How difficult have these problems made it for you to do your work, take care of your home and get along with others Not difficult at all       Alcohol Risk Screen   Do you average more than 1 drink per night or more than 7 drinks a week:  No    On any one occasion in the past three months have you have had more than 3 drinks containing alcohol:  No      Functional Ability and Level of Safety:   Hearing: The patient wears hearing aids. Activities of Daily Living:   The home contains: no safety equipment. Patient does total self care     Ambulation: with difficulty, uses a rollator, fell approx 2 months after tripping over roommates foot. Fall Risk:  Fall Risk Assessment, last 12 mths 11/10/2020   Able to walk? Yes   Fall in past 12 months? No   Fall with injury? -   Number of falls in past 12 months -   Fall Risk Score -     Abuse Screen:  Patient is not abused       Cognitive Screening   Has your family/caregiver stated any concerns about your memory: no     Patient Care Team   Patient Care Team:  Lizette Baker NP as PCP - General (Nurse Practitioner)  Lizette Baker NP as PCP - [facility]  Blanca Bowen MD (Pulmonary Disease)  Alise Martin MD (Pain Management)  [facility]    Assessment/Plan   Education and counseling provided:  Are appropriate based on today's review and evaluation    Diagnoses and all orders for this visit:    1. Essential hypertension    2. Gout, unspecified cause, unspecified chronicity, unspecified site  Comments:  Refill allopurinol    3. Hyperlipidemia, unspecified hyperlipidemia type    4.  Medicare annual wellness visit, subsequent        Health Maintenance Due   Topic Date Due    DTaP/Tdap/Td series (1 - Tdap) 04/17/1964    Shingrix Vaccine Age 50> (1 of 2) 04/17/1993    GLAUCOMA SCREENING Q2Y  04/17/2008    Bone Densitometry (Dexa) Screening  04/17/2008    Medicare Yearly Exam  10/28/2020

## 2020-12-07 RX ORDER — ROSUVASTATIN CALCIUM 5 MG/1
TABLET, COATED ORAL
Qty: 30 TAB | Refills: 1 | Status: SHIPPED | OUTPATIENT
Start: 2020-12-07 | End: 2021-01-08

## 2020-12-16 ENCOUNTER — APPOINTMENT (OUTPATIENT)
Dept: FAMILY MEDICINE CLINIC | Age: 75
End: 2020-12-16

## 2020-12-16 ENCOUNTER — HOSPITAL ENCOUNTER (OUTPATIENT)
Dept: LAB | Age: 75
Discharge: HOME | End: 2020-12-16
Payer: MEDICARE

## 2020-12-16 DIAGNOSIS — M10.9 GOUT, UNSPECIFIED CAUSE, UNSPECIFIED CHRONICITY, UNSPECIFIED SITE: ICD-10-CM

## 2020-12-16 DIAGNOSIS — E78.5 HYPERLIPIDEMIA, UNSPECIFIED HYPERLIPIDEMIA TYPE: ICD-10-CM

## 2020-12-16 LAB
ALBUMIN SERPL-MCNC: 3.6 G/DL (ref 3.4–5)
ALBUMIN/GLOB SERPL: 1.1 {RATIO} (ref 0.8–1.7)
ALP SERPL-CCNC: 108 U/L (ref 45–117)
ALT SERPL-CCNC: 14 U/L (ref 13–56)
ANION GAP SERPL CALC-SCNC: 9 MMOL/L (ref 3–18)
AST SERPL-CCNC: 17 U/L (ref 10–38)
BILIRUB SERPL-MCNC: 0.6 MG/DL (ref 0.2–1)
BUN SERPL-MCNC: 13 MG/DL (ref 7–18)
BUN/CREAT SERPL: 14 (ref 12–20)
CALCIUM SERPL-MCNC: 10.6 MG/DL (ref 8.5–10.1)
CHLORIDE SERPL-SCNC: 106 MMOL/L (ref 100–111)
CHOLEST SERPL-MCNC: 168 MG/DL
CO2 SERPL-SCNC: 28 MMOL/L (ref 21–32)
CREAT SERPL-MCNC: 0.92 MG/DL (ref 0.6–1.3)
GLOBULIN SER CALC-MCNC: 3.4 G/DL (ref 2–4)
GLUCOSE SERPL-MCNC: 83 MG/DL (ref 74–99)
HDLC SERPL-MCNC: 47 MG/DL (ref 40–60)
HDLC SERPL: 3.6 {RATIO} (ref 0–5)
LDLC SERPL CALC-MCNC: 75.8 MG/DL (ref 0–100)
LIPID PROFILE,FLP: ABNORMAL
POTASSIUM SERPL-SCNC: 4.2 MMOL/L (ref 3.5–5.5)
PROT SERPL-MCNC: 7 G/DL (ref 6.4–8.2)
SODIUM SERPL-SCNC: 143 MMOL/L (ref 136–145)
TRIGL SERPL-MCNC: 226 MG/DL (ref ?–150)
URATE SERPL-MCNC: 3.2 MG/DL (ref 2.6–7.2)
VLDLC SERPL CALC-MCNC: 45.2 MG/DL

## 2020-12-16 PROCEDURE — 80053 COMPREHEN METABOLIC PANEL: CPT

## 2020-12-16 PROCEDURE — 36415 COLL VENOUS BLD VENIPUNCTURE: CPT

## 2020-12-16 PROCEDURE — 80061 LIPID PANEL: CPT

## 2020-12-16 PROCEDURE — 84550 ASSAY OF BLOOD/URIC ACID: CPT

## 2020-12-21 RX ORDER — OLMESARTAN MEDOXOMIL 20 MG/1
TABLET ORAL
Qty: 90 TAB | Refills: 1 | Status: SHIPPED | OUTPATIENT
Start: 2020-12-21 | End: 2021-07-10

## 2021-01-08 RX ORDER — FUROSEMIDE 20 MG/1
TABLET ORAL
Qty: 90 TAB | Refills: 0 | Status: SHIPPED | OUTPATIENT
Start: 2021-01-08 | End: 2021-04-09

## 2021-01-08 RX ORDER — ROSUVASTATIN CALCIUM 5 MG/1
TABLET, COATED ORAL
Qty: 30 TAB | Refills: 1 | Status: SHIPPED | OUTPATIENT
Start: 2021-01-08 | End: 2021-02-07

## 2021-02-26 RX ORDER — MONTELUKAST SODIUM 10 MG/1
TABLET ORAL
Qty: 90 TAB | Refills: 1 | Status: SHIPPED | OUTPATIENT
Start: 2021-02-26 | End: 2021-04-14 | Stop reason: CLARIF

## 2021-03-10 ENCOUNTER — VIRTUAL VISIT (OUTPATIENT)
Dept: FAMILY MEDICINE CLINIC | Age: 76
End: 2021-03-10
Payer: MEDICARE

## 2021-03-10 DIAGNOSIS — E78.5 HYPERLIPIDEMIA, UNSPECIFIED HYPERLIPIDEMIA TYPE: ICD-10-CM

## 2021-03-10 DIAGNOSIS — E66.01 OBESITY, MORBID (HCC): ICD-10-CM

## 2021-03-10 DIAGNOSIS — M10.9 GOUT, UNSPECIFIED CAUSE, UNSPECIFIED CHRONICITY, UNSPECIFIED SITE: ICD-10-CM

## 2021-03-10 DIAGNOSIS — I48.91 ATRIAL FIBRILLATION, UNSPECIFIED TYPE (HCC): ICD-10-CM

## 2021-03-10 DIAGNOSIS — E03.9 ACQUIRED HYPOTHYROIDISM: ICD-10-CM

## 2021-03-10 DIAGNOSIS — J41.1 CHRONIC BRONCHITIS WITH PRODUCTIVE MUCOPURULENT COUGH (HCC): ICD-10-CM

## 2021-03-10 DIAGNOSIS — I26.99 PULMONARY EMBOLISM WITHOUT ACUTE COR PULMONALE, UNSPECIFIED CHRONICITY, UNSPECIFIED PULMONARY EMBOLISM TYPE (HCC): ICD-10-CM

## 2021-03-10 DIAGNOSIS — I10 ESSENTIAL HYPERTENSION: Primary | ICD-10-CM

## 2021-03-10 DIAGNOSIS — E83.110 HEMOCHROMATOSIS TYPE 1 (HCC): ICD-10-CM

## 2021-03-10 DIAGNOSIS — M46.1 SACROILIITIS (HCC): ICD-10-CM

## 2021-03-10 PROCEDURE — 99443 PR PHYS/QHP TELEPHONE EVALUATION 21-30 MIN: CPT | Performed by: NURSE PRACTITIONER

## 2021-04-09 RX ORDER — FUROSEMIDE 20 MG/1
TABLET ORAL
Qty: 90 TAB | Refills: 0 | Status: SHIPPED | OUTPATIENT
Start: 2021-04-09 | End: 2021-07-10

## 2021-04-13 ENCOUNTER — HOSPITAL ENCOUNTER (OUTPATIENT)
Dept: PREADMISSION TESTING | Age: 76
Discharge: HOME | End: 2021-04-13
Payer: MEDICARE

## 2021-04-13 ENCOUNTER — TRANSCRIBE ORDER (OUTPATIENT)
Dept: REGISTRATION | Age: 76
End: 2021-04-13

## 2021-04-13 DIAGNOSIS — Z01.812 BLOOD TESTS PRIOR TO TREATMENT OR PROCEDURE: ICD-10-CM

## 2021-04-13 DIAGNOSIS — Z01.812 BLOOD TESTS PRIOR TO TREATMENT OR PROCEDURE: Primary | ICD-10-CM

## 2021-04-13 DIAGNOSIS — Z20.828 EXPOSURE TO SARS-ASSOCIATED CORONAVIRUS: ICD-10-CM

## 2021-04-13 LAB
ANION GAP SERPL CALC-SCNC: 5 MMOL/L (ref 3–18)
BUN SERPL-MCNC: 16 MG/DL (ref 7–18)
BUN/CREAT SERPL: 18 (ref 12–20)
CALCIUM SERPL-MCNC: 9.2 MG/DL (ref 8.5–10.1)
CHLORIDE SERPL-SCNC: 111 MMOL/L (ref 100–111)
CO2 SERPL-SCNC: 26 MMOL/L (ref 21–32)
CREAT SERPL-MCNC: 0.87 MG/DL (ref 0.6–1.3)
ERYTHROCYTE [DISTWIDTH] IN BLOOD BY AUTOMATED COUNT: 14.3 % (ref 11.6–14.5)
GLUCOSE SERPL-MCNC: 87 MG/DL (ref 74–99)
HCT VFR BLD AUTO: 40.9 % (ref 35–45)
HGB BLD-MCNC: 13.8 G/DL (ref 12–16)
MCH RBC QN AUTO: 34.5 PG (ref 24–34)
MCHC RBC AUTO-ENTMCNC: 33.7 G/DL (ref 31–37)
MCV RBC AUTO: 102.3 FL (ref 74–97)
PLATELET # BLD AUTO: 209 K/UL (ref 135–420)
PMV BLD AUTO: 10.8 FL (ref 9.2–11.8)
POTASSIUM SERPL-SCNC: 4.4 MMOL/L (ref 3.5–5.5)
RBC # BLD AUTO: 4 M/UL (ref 4.2–5.3)
SODIUM SERPL-SCNC: 142 MMOL/L (ref 136–145)
WBC # BLD AUTO: 9.2 K/UL (ref 4.6–13.2)

## 2021-04-13 PROCEDURE — U0003 INFECTIOUS AGENT DETECTION BY NUCLEIC ACID (DNA OR RNA); SEVERE ACUTE RESPIRATORY SYNDROME CORONAVIRUS 2 (SARS-COV-2) (CORONAVIRUS DISEASE [COVID-19]), AMPLIFIED PROBE TECHNIQUE, MAKING USE OF HIGH THROUGHPUT TECHNOLOGIES AS DESCRIBED BY CMS-2020-01-R: HCPCS

## 2021-04-13 PROCEDURE — 85027 COMPLETE CBC AUTOMATED: CPT

## 2021-04-13 PROCEDURE — 93005 ELECTROCARDIOGRAM TRACING: CPT

## 2021-04-13 PROCEDURE — 36415 COLL VENOUS BLD VENIPUNCTURE: CPT

## 2021-04-13 PROCEDURE — 80048 BASIC METABOLIC PNL TOTAL CA: CPT

## 2021-04-14 LAB
ATRIAL RATE: 74 BPM
CALCULATED P AXIS, ECG09: -9 DEGREES
CALCULATED R AXIS, ECG10: 11 DEGREES
CALCULATED T AXIS, ECG11: 28 DEGREES
DIAGNOSIS, 93000: NORMAL
P-R INTERVAL, ECG05: 160 MS
Q-T INTERVAL, ECG07: 350 MS
QRS DURATION, ECG06: 60 MS
QTC CALCULATION (BEZET), ECG08: 388 MS
SARS-COV-2, COV2NT: NOT DETECTED
VENTRICULAR RATE, ECG03: 74 BPM

## 2021-04-14 RX ORDER — BUDESONIDE AND FORMOTEROL FUMARATE DIHYDRATE 160; 4.5 UG/1; UG/1
2 AEROSOL RESPIRATORY (INHALATION)
Status: ON HOLD | COMMUNITY
End: 2021-07-30

## 2021-04-14 RX ORDER — ALBUTEROL SULFATE 90 UG/1
AEROSOL, METERED RESPIRATORY (INHALATION)
COMMUNITY
End: 2021-07-13 | Stop reason: SDUPTHER

## 2021-04-15 ENCOUNTER — OFFICE VISIT (OUTPATIENT)
Dept: FAMILY MEDICINE CLINIC | Age: 76
End: 2021-04-15
Payer: MEDICARE

## 2021-04-15 VITALS
SYSTOLIC BLOOD PRESSURE: 112 MMHG | WEIGHT: 203 LBS | RESPIRATION RATE: 22 BRPM | OXYGEN SATURATION: 97 % | HEART RATE: 68 BPM | BODY MASS INDEX: 38.33 KG/M2 | TEMPERATURE: 97 F | DIASTOLIC BLOOD PRESSURE: 54 MMHG | HEIGHT: 61 IN

## 2021-04-15 DIAGNOSIS — I10 ESSENTIAL HYPERTENSION: Primary | ICD-10-CM

## 2021-04-15 DIAGNOSIS — R60.9 EDEMA, UNSPECIFIED TYPE: ICD-10-CM

## 2021-04-15 DIAGNOSIS — M48.061 SPINAL STENOSIS OF LUMBAR REGION, UNSPECIFIED WHETHER NEUROGENIC CLAUDICATION PRESENT: ICD-10-CM

## 2021-04-15 DIAGNOSIS — Z01.818 PREOPERATIVE GENERAL PHYSICAL EXAMINATION: ICD-10-CM

## 2021-04-15 DIAGNOSIS — I48.91 ATRIAL FIBRILLATION, UNSPECIFIED TYPE (HCC): ICD-10-CM

## 2021-04-15 PROCEDURE — G8432 DEP SCR NOT DOC, RNG: HCPCS | Performed by: NURSE PRACTITIONER

## 2021-04-15 PROCEDURE — G8400 PT W/DXA NO RESULTS DOC: HCPCS | Performed by: NURSE PRACTITIONER

## 2021-04-15 PROCEDURE — 99214 OFFICE O/P EST MOD 30 MIN: CPT | Performed by: NURSE PRACTITIONER

## 2021-04-15 PROCEDURE — G8536 NO DOC ELDER MAL SCRN: HCPCS | Performed by: NURSE PRACTITIONER

## 2021-04-15 PROCEDURE — G8427 DOCREV CUR MEDS BY ELIG CLIN: HCPCS | Performed by: NURSE PRACTITIONER

## 2021-04-15 PROCEDURE — 1101F PT FALLS ASSESS-DOCD LE1/YR: CPT | Performed by: NURSE PRACTITIONER

## 2021-04-15 PROCEDURE — G0463 HOSPITAL OUTPT CLINIC VISIT: HCPCS | Performed by: NURSE PRACTITIONER

## 2021-04-15 PROCEDURE — G8754 DIAS BP LESS 90: HCPCS | Performed by: NURSE PRACTITIONER

## 2021-04-15 PROCEDURE — G8752 SYS BP LESS 140: HCPCS | Performed by: NURSE PRACTITIONER

## 2021-04-15 PROCEDURE — G8417 CALC BMI ABV UP PARAM F/U: HCPCS | Performed by: NURSE PRACTITIONER

## 2021-04-15 PROCEDURE — 1090F PRES/ABSN URINE INCON ASSESS: CPT | Performed by: NURSE PRACTITIONER

## 2021-04-15 RX ORDER — BUDESONIDE 0.5 MG/2ML
500 INHALANT ORAL 2 TIMES DAILY
Qty: 60 EACH | Refills: 3 | Status: SHIPPED | OUTPATIENT
Start: 2021-04-15 | End: 2021-08-05 | Stop reason: SDUPTHER

## 2021-04-15 NOTE — PATIENT INSTRUCTIONS
Lumbar Spinal Stenosis: Care Instructions Your Care Instructions Stenosis in the spine is a narrowing of the canal that is around the spinal cord and nerve roots in your back. It can happen as part of aging. Sometimes bone and other tissue grow into this canal and press on the nerves that branch out from the spinal cord. This can cause pain, numbness, and weakness. When it happens in the lower part of your back, it is called lumbar spinal stenosis. It can cause problems in the legs, feet, and rear end (buttocks). You may be able to get relief from the symptoms of spinal stenosis by taking pain medicine. Your doctor may suggest physical therapy and exercises to keep your spine strong and flexible. Some people try steroid shots to reduce swelling. If pain and numbness in your legs are still so bad that you cannot do your normal activities, you may need surgery. Follow-up care is a key part of your treatment and safety. Be sure to make and go to all appointments, and call your doctor if you are having problems. It's also a good idea to know your test results and keep a list of the medicines you take. How can you care for yourself at home? · Take an over-the-counter pain medicine. Nonsteroidal anti-inflammatory drugs (NSAIDs) such as ibuprofen or naproxen seem to work best. But if you can't take NSAIDs, you can try acetaminophen. Be safe with medicines. Read and follow all instructions on the label. · Do not take two or more pain medicines at the same time unless the doctor told you to. Many pain medicines have acetaminophen, which is Tylenol. Too much acetaminophen (Tylenol) can be harmful. · Stay at a healthy weight. Being overweight puts extra strain on your spine. · Change positions often when you sit or stand. This can ease pain. It may also reduce pressure on the spinal cord and its nerves. · Avoid doing things that make your symptoms worse.  Walking downhill and standing for a long time may cause pain. 
· Stretch and strengthen your back muscles as your doctor or physical therapist recommends. If your doctor says it is okay to do them, these exercises may help. ? Lie on your back with your knees bent. Gently pull one bent knee to your chest. Put that foot back on the floor, and then pull the other knee to your chest. 
? Do pelvic tilts. Lie on your back with your knees bent. Tighten your stomach muscles. Pull your belly button (navel) in and up toward your ribs. You should feel like your back is pressing to the floor and your hips and pelvis are slightly lifting off the floor. Hold for 6 seconds while breathing smoothly. ? Stand with your back flat against a wall. Slowly slide down until your knees are slightly bent. Hold for 10 seconds, then slide back up the wall. · Remove or change anything in your house that may cause you to fall. Keep walkways clear of clutter, electrical cords, and throw rugs. When should you call for help? Call 911 anytime you think you may need emergency care. For example, call if: 
  · You are unable to move a leg at all. Call your doctor now or seek immediate medical care if: 
  · You have new or worse symptoms in your legs, belly, or buttocks. Symptoms may include: 
? Numbness or tingling. ? Weakness. ? Pain.  
  · You lose bladder or bowel control. Watch closely for changes in your health, and be sure to contact your doctor if: 
  · You have a fever, lose weight, or don't feel well.  
  · You are not getting better as expected. Where can you learn more? Go to http://www.[employer]/ Cami Tucker in the search box to learn more about \"Lumbar Spinal Stenosis: Care Instructions. \" Current as of: November 16, 2020               Content Version: 12.8 © 2727-9067 [employer]. Care instructions adapted under license by [employer] (which disclaims liability or warranty for this information).  If you have questions about a medical condition or this instruction, always ask your healthcare professional. [place] [address] any warranty or liability for your use of this information.

## 2021-04-15 NOTE — PROGRESS NOTES
Preoperative Evaluation    Date of Exam: 4/15/2021    Yash Dowell is a 66 y.o. female (:1945) who presents for preoperative evaluation. Procedure/Surgery: Spine Thoracic Laminectomy/ Permanent Spinal Cord Stimulator  Date of Procedure/Surgery: 2021  Surgeon: Yong Salas MD  [facility]  Primary Physician: Danita Cat NP  Latex Allergy: no    Patient states she did have a trial spine stimulator which did help with her back pain. Patient reports she needs to update her healthcare decision maker due to the recent passing of primary decision maker friend/sister Jaspal Guerra.    Milka Byrddarrion    Problem List:     Patient Active Problem List    Diagnosis Date Noted    Pulmonary embolism without acute cor pulmonale ([address] 03/10/2021    Chronic bronchitis with productive mucopurulent cough ([address] 03/10/2021    Abnormal chest CT 2020    Chronic cough 2020    CAP (community acquired pneumonia) 2019    Sepsis ([address] 2019    Hypotension 2019    Atrial fibrillation with RVR ([address] 2019    MCBRIDE (dyspnea on exertion) 2019    Obesity, morbid ([address] 2019    High cholesterol 2019    Hypertension 2019    Hemochromatosis type 1 ([address] 2019    Sacroiliitis ([address] 2013    Spinal stenosis 2013    Shoulder impingement, right, with rotator cuff strain      Medical History:     Past Medical History:   Diagnosis Date    Abnormal chest CT 2020    Adopted     Arthritis     Balance problems     Chronic cough 2020    MCBRIDE (dyspnea on exertion) 2019    GERD (gastroesophageal reflux disease)     Gout     Hemochromatosis     High cholesterol     Hypertension     Hypothyroidism     Left knee pain     Left shoulder pain     Low blood potassium     Lumbar pain     Pain of left sacroiliac joint     Shoulder impingement, right, with rotator cuff strain Allergies: Allergies   Allergen Reactions    Ciprofloxacin Other (comments)     Achilles tendonitis/leg pain and swelling    Indomethacin Anaphylaxis, Hives and Swelling    Tomato Other (comments)     Heart burn      Medications:     Current Outpatient Medications   Medication Sig    albuterol (PROVENTIL HFA, VENTOLIN HFA, PROAIR HFA) 90 mcg/actuation inhaler Take  by inhalation.  budesonide-formoteroL (Symbicort) 160-4.5 mcg/actuation HFAA Take 2 Puffs by inhalation.  furosemide (LASIX) 20 mg tablet TAKE 1 TABLET BY MOUTH EVERY DAY    potassium chloride (Klor-Con M20) 20 mEq tablet Take 1 Tab by mouth daily.  olmesartan (BENICAR) 20 mg tablet TAKE 1 TABLET BY MOUTH EVERY DAY    allopurinoL (ZYLOPRIM) 100 mg tablet Take 1 Tab by mouth two (2) times a day.  oxyCODONE IR (ROXICODONE) 5 mg immediate release tablet Take 5 mg by mouth three (3) times daily as needed.  levothyroxine (SYNTHROID) 25 mcg tablet TAKE 1 TAB BY MOUTH DAILY    budesonide (PULMICORT) 0.5 mg/2 mL nbsp 2 mL by Nebulization route two (2) times a day. File under Medicare Part B, ICD J47.0, J45.909    albuterol (PROVENTIL VENTOLIN) 2.5 mg /3 mL (0.083 %) nebu 3 mL by Nebulization route every six (6) hours as needed for Wheezing or Shortness of Breath.  acetaminophen (TYLENOL) 325 mg tablet Take 2 Tabs by mouth every six (6) hours as needed for Pain or Fever. (Patient taking differently: Take 500 mg by mouth every six (6) hours as needed for Pain or Fever.)    inhalational spacing device (ACE AEROSOL CLOUD ENHANCER) 1 Each by Does Not Apply route as needed.  cholecalciferol, vitamin D3, 2,000 unit tab TAKE 1 TABLET BY MOUTH EVERY DAY    Omeprazole delayed release (PRILOSEC D/R) 20 mg tablet Take 20 mg by mouth daily.  rosuvastatin (CRESTOR) 5 mg tablet TAKE 1 TABLET BY MOUTH EVERY DAY AT NIGHT     No current facility-administered medications for this visit.       Surgical History:     Past Surgical History: Procedure Laterality Date    HX CHOLECYSTECTOMY      HX KNEE REPLACEMENT Bilateral     R knee/ Lknee and femur    HX ORTHOPAEDIC      R trigger finger     HX OTHER SURGICAL      Right little finger    HX OTHER SURGICAL      Right wrist, replaced unlnar).  HX OTHER SURGICAL      Both knees    HX OTHER SURGICAL      Left femur    HX OTHER SURGICAL      Trigger finger surgery    HX OTHER SURGICAL      Left knee replacment revision     HX ROTATOR CUFF REPAIR Right     HX TONSILLECTOMY       Social History:     Social History     Socioeconomic History    Marital status: SINGLE     Spouse name: Not on file    Number of children: Not on file    Years of education: Not on file    Highest education level: Not on file   Tobacco Use    Smoking status: Former Smoker     Packs/day: 0.50     Years: 2.00     Pack years: 1.00     Start date: 5     Quit date:      Years since quittin.2    Smokeless tobacco: Never Used    Tobacco comment: quit 20 years ago   Substance and Sexual Activity    Alcohol use: No    Drug use: No       Recent use of: No recent use of aspirin (ASA), NSAIDS or steroids. Advised to hold crestor 7 days prior to surgery. Tetanus up to date: tetanus status unknown to the patient      Anesthesia Complications: None  History of abnormal bleeding : None  History of Blood Transfusions: no  Health Care Directive or Living Will: yes    REVIEW OF SYSTEMS:  Review of Systems   Constitutional: Negative for chills and fever. Respiratory: Negative for shortness of breath. Cardiovascular: Negative for chest pain and palpitations. Musculoskeletal: Positive for back pain. Neurological: Negative for dizziness and headaches.      EXAM:   Visit Vitals  BP (!) 112/54 (BP 1 Location: Left arm, BP Patient Position: Sitting, BP Cuff Size: Adult)   Pulse 68   Temp 97 °F (36.1 °C) (Temporal)   Resp 22   Ht 5' 1\" (1.549 m)   Wt 203 lb (92.1 kg)   SpO2 97%   BMI 38.36 kg/m²     Physical Exam  Constitutional:       General: She is not in acute distress. Appearance: She is well-developed. HENT:      Head: Normocephalic and atraumatic. Right Ear: Tympanic membrane normal.      Left Ear: Tympanic membrane normal.      Nose: Nose normal.   Neck:      Musculoskeletal: Normal range of motion and neck supple. Vascular: No carotid bruit. Cardiovascular:      Rate and Rhythm: Normal rate and regular rhythm. Pulses: Normal pulses. Heart sounds: Normal heart sounds. No murmur. No friction rub. No gallop. Pulmonary:      Effort: Pulmonary effort is normal.      Breath sounds: Normal breath sounds. No decreased breath sounds, wheezing, rhonchi or rales. Abdominal:      General: Bowel sounds are normal.      Palpations: Abdomen is soft. Tenderness: There is no abdominal tenderness. Musculoskeletal:      Right lower le+ Pitting Edema present. Left lower le+ Pitting Edema present. Lymphadenopathy:      Cervical: No cervical adenopathy. Skin:     General: Skin is warm and dry. Neurological:      Mental Status: She is alert and oriented to person, place, and time.            DIAGNOSTICS:   Hospital Outpatient Visit on 2021   Component Date Value Ref Range Status    WBC 2021 9.2  4.6 - 13.2 K/uL Final    RBC 2021 4.00* 4.20 - 5.30 M/uL Final    HGB 2021 13.8  12.0 - 16.0 g/dL Final    HCT 2021 40.9  35.0 - 45.0 % Final    MCV 2021 102.3* 74.0 - 97.0 FL Final    MCH 2021 34.5* 24.0 - 34.0 PG Final    MCHC 2021 33.7  31.0 - 37.0 g/dL Final    RDW 2021 14.3  11.6 - 14.5 % Final    PLATELET  116  135 - 420 K/uL Final    MPV 2021 10.8  9.2 - 11.8 FL Final    Sodium 2021 142  136 - 145 mmol/L Final    Potassium 2021 4.4  3.5 - 5.5 mmol/L Final    Chloride 2021 111  100 - 111 mmol/L Final    CO2 2021 26  21 - 32 mmol/L Final    Anion gap 2021 5  3.0 - 18 mmol/L Final    Glucose 04/13/2021 87  74 - 99 mg/dL Final    BUN 04/13/2021 16  7.0 - 18 MG/DL Final    Creatinine 04/13/2021 0.87  0.6 - 1.3 MG/DL Final    BUN/Creatinine ratio 04/13/2021 18  12 - 20   Final    GFR est AA 04/13/2021 >60  >60 ml/min/1.73m2 Final    GFR est non-AA 04/13/2021 >60  >60 ml/min/1.73m2 Final    Comment: (NOTE)  Estimated GFR is calculated using the Modification of Diet in Renal   Disease (MDRD) Study equation, reported for both  Americans   (GFRAA) and non- Americans (GFRNA), and normalized to 1.73m2   body surface area. The physician must decide which value applies to   the patient. The MDRD study equation should only be used in   individuals age 25 or older. It has not been validated for the   following: pregnant women, patients with serious comorbid conditions,   or on certain medications, or persons with extremes of body size,   muscle mass, or nutritional status.       Calcium 04/13/2021 9.2  8.5 - 10.1 MG/DL Final    Ventricular Rate 04/13/2021 74  BPM Final    Atrial Rate 04/13/2021 74  BPM Final    P-R Interval 04/13/2021 160  ms Final    QRS Duration 04/13/2021 60  ms Final    Q-T Interval 04/13/2021 350  ms Final    QTC Calculation (Bezet) 04/13/2021 388  ms Final    Calculated P Axis 04/13/2021 -9  degrees Final    Calculated R Axis 04/13/2021 11  degrees Final    Calculated T Axis 04/13/2021 28  degrees Final    Diagnosis 04/13/2021    Final                    Value:Normal sinus rhythm  Low voltage QRS  Cannot rule out Anterior infarct , age undetermined  Abnormal ECG  When compared with ECG of 25-DEC-2019 08:10,  Minimal criteria for Anterior infarct are now present  Borderline criteria for Inferior infarct are no longer present  Nonspecific T wave abnormality now evident in Anterior leads  Confirmed by Deshaun Castillo ([id number]) on 4/14/2021 6:32:19 AM      SARS-CoV-2 04/13/2021 Not Detected  Not Detected   Final    Comment: (NOTE)  This nucleic acid amplification test was developed and its  performance characteristics determined by [facility]. Nucleic acid amplification tests include RT-PCR and TMA. This test  has not been FDA cleared or approved. This test has been authorized  by FDA under an Emergency Use Authorization (EUA). This test is only  authorized for the duration of time the declaration that  circumstances exist justifying the authorization of the emergency use  of in vitro diagnostic tests for detection of SARS-CoV-2 virus and/or  diagnosis of COVID-19 infection under section 564(b)(1) of the Act,  21 U. S.C. 403FGI-7(X) (1), unless the authorization is terminated or  revoked sooner. When diagnostic testing is negative, the possibility of a false  negative result should be considered in the context of a patient's  recent exposures and the presence of clinical signs and symptoms  consistent with COVID-19. An individual without symptoms of COVID-  19 and who is not shedding SARS-CoV-2                            virus would expect to have a  negative (not detected) result in this assay. Performed At: [address] [id number]  Lemuel Shirley MD [id number]         IMPRESSION:     ICD-10-CM ICD-9-CM    1. Essential hypertension  I10 401.9    2. Preoperative general physical examination  Z01.818 V72.83    3. Atrial fibrillation, unspecified type ([address]  I48.91 427.31    4.  Edema, unspecified type  R60.9 782.3    5. Spinal stenosis of lumbar region, unspecified whether neurogenic claudication present  M48.061 724.02      Orders Placed This Encounter    budesonide (PULMICORT) 0.5 mg/2 mL nbsp      No contraindications to planned surgery          Yue Maldonado NP   4/15/2021          Sera Robertson MD

## 2021-04-16 ENCOUNTER — ANESTHESIA EVENT (OUTPATIENT)
Dept: SURGERY | Age: 76
End: 2021-04-16
Payer: MEDICARE

## 2021-04-19 ENCOUNTER — ANESTHESIA (OUTPATIENT)
Dept: SURGERY | Age: 76
End: 2021-04-19
Payer: MEDICARE

## 2021-04-19 ENCOUNTER — APPOINTMENT (OUTPATIENT)
Dept: GENERAL RADIOLOGY | Age: 76
End: 2021-04-19
Attending: ORTHOPAEDIC SURGERY
Payer: MEDICARE

## 2021-04-19 ENCOUNTER — HOSPITAL ENCOUNTER (OUTPATIENT)
Age: 76
Setting detail: OUTPATIENT SURGERY
Discharge: HOME | End: 2021-04-19
Attending: ORTHOPAEDIC SURGERY | Admitting: ORTHOPAEDIC SURGERY
Payer: MEDICARE

## 2021-04-19 VITALS
DIASTOLIC BLOOD PRESSURE: 46 MMHG | HEIGHT: 61 IN | RESPIRATION RATE: 17 BRPM | WEIGHT: 201 LBS | BODY MASS INDEX: 37.95 KG/M2 | HEART RATE: 53 BPM | SYSTOLIC BLOOD PRESSURE: 111 MMHG | TEMPERATURE: 97.8 F | OXYGEN SATURATION: 93 %

## 2021-04-19 DIAGNOSIS — Z96.89 S/P INSERTION OF SPINAL CORD STIMULATOR: Primary | ICD-10-CM

## 2021-04-19 PROCEDURE — 74011250636 HC RX REV CODE- 250/636: Performed by: ORTHOPAEDIC SURGERY

## 2021-04-19 PROCEDURE — 77030011265 HC ELECTRD BLD HEX COVD -A: Performed by: ORTHOPAEDIC SURGERY

## 2021-04-19 PROCEDURE — 77030036974 HC DEV MOBILE DGTL IPOD TCH STJU -G: Performed by: ORTHOPAEDIC SURGERY

## 2021-04-19 PROCEDURE — 74011000250 HC RX REV CODE- 250: Performed by: ORTHOPAEDIC SURGERY

## 2021-04-19 PROCEDURE — 00630 ANES PX LUMBAR REGION NOS: CPT | Performed by: NURSE ANESTHETIST, CERTIFIED REGISTERED

## 2021-04-19 PROCEDURE — 74011250637 HC RX REV CODE- 250/637: Performed by: NURSE ANESTHETIST, CERTIFIED REGISTERED

## 2021-04-19 PROCEDURE — 76060000034 HC ANESTHESIA 1.5 TO 2 HR: Performed by: ORTHOPAEDIC SURGERY

## 2021-04-19 PROCEDURE — 77030026438 HC STYL ET INTUB CARD -A: Performed by: ANESTHESIOLOGY

## 2021-04-19 PROCEDURE — 2709999900 HC NON-CHARGEABLE SUPPLY: Performed by: ORTHOPAEDIC SURGERY

## 2021-04-19 PROCEDURE — 76210000016 HC OR PH I REC 1 TO 1.5 HR: Performed by: ORTHOPAEDIC SURGERY

## 2021-04-19 PROCEDURE — C1778 LEAD, NEUROSTIMULATOR: HCPCS | Performed by: ORTHOPAEDIC SURGERY

## 2021-04-19 PROCEDURE — 74011250636 HC RX REV CODE- 250/636: Performed by: NURSE ANESTHETIST, CERTIFIED REGISTERED

## 2021-04-19 PROCEDURE — 74011000250 HC RX REV CODE- 250: Performed by: NURSE ANESTHETIST, CERTIFIED REGISTERED

## 2021-04-19 PROCEDURE — 77030028990 HC ADH TISS DERMFLX CHMP -B: Performed by: ORTHOPAEDIC SURGERY

## 2021-04-19 PROCEDURE — 77030003029 HC SUT VCRL J&J -B: Performed by: ORTHOPAEDIC SURGERY

## 2021-04-19 PROCEDURE — C1713 ANCHOR/SCREW BN/BN,TIS/BN: HCPCS | Performed by: ORTHOPAEDIC SURGERY

## 2021-04-19 PROCEDURE — 76010000153 HC OR TIME 1.5 TO 2 HR: Performed by: ORTHOPAEDIC SURGERY

## 2021-04-19 PROCEDURE — 99100 ANES PT EXTEME AGE<1 YR&>70: CPT | Performed by: ANESTHESIOLOGY

## 2021-04-19 PROCEDURE — 76210000020 HC REC RM PH II FIRST 0.5 HR: Performed by: ORTHOPAEDIC SURGERY

## 2021-04-19 PROCEDURE — 77030020271 HC MISC IMPL NEURO: Performed by: ORTHOPAEDIC SURGERY

## 2021-04-19 PROCEDURE — 77030040361 HC SLV COMPR DVT MDII -B: Performed by: ORTHOPAEDIC SURGERY

## 2021-04-19 PROCEDURE — C1767 GENERATOR, NEURO NON-RECHARG: HCPCS | Performed by: ORTHOPAEDIC SURGERY

## 2021-04-19 PROCEDURE — 77030012406 HC DRN WND PENRS BARD -A: Performed by: ORTHOPAEDIC SURGERY

## 2021-04-19 PROCEDURE — 77030012890: Performed by: ORTHOPAEDIC SURGERY

## 2021-04-19 PROCEDURE — 77030027138 HC INCENT SPIROMETER -A: Performed by: ORTHOPAEDIC SURGERY

## 2021-04-19 PROCEDURE — 74011250637 HC RX REV CODE- 250/637: Performed by: ORTHOPAEDIC SURGERY

## 2021-04-19 PROCEDURE — 77030040922 HC BLNKT HYPOTHRM STRY -A: Performed by: ORTHOPAEDIC SURGERY

## 2021-04-19 PROCEDURE — 77030008683 HC TU ET CUF COVD -A: Performed by: ANESTHESIOLOGY

## 2021-04-19 PROCEDURE — 77030013567 HC DRN WND RESERV BARD -A: Performed by: ORTHOPAEDIC SURGERY

## 2021-04-19 PROCEDURE — 00630 ANES PX LUMBAR REGION NOS: CPT | Performed by: ANESTHESIOLOGY

## 2021-04-19 PROCEDURE — 74011000272 HC RX REV CODE- 272: Performed by: ORTHOPAEDIC SURGERY

## 2021-04-19 PROCEDURE — 99100 ANES PT EXTEME AGE<1 YR&>70: CPT | Performed by: NURSE ANESTHETIST, CERTIFIED REGISTERED

## 2021-04-19 DEVICE — ANCHOR LD FOR SPNL CRD STIM CINCH: Type: IMPLANTABLE DEVICE | Site: THORACIC | Status: FUNCTIONAL

## 2021-04-19 DEVICE — LEAD NEUROSTIMULATOR L60CM MR CONDITIONAL PENTA: Type: IMPLANTABLE DEVICE | Site: THORACIC | Status: FUNCTIONAL

## 2021-04-19 DEVICE — GEN IPG 5.3AH PROCLAIM ELITE 5 -- 3660 W/PAT CONTROLLER: Type: IMPLANTABLE DEVICE | Site: THORACIC | Status: FUNCTIONAL

## 2021-04-19 RX ORDER — INSULIN LISPRO 100 [IU]/ML
INJECTION, SOLUTION INTRAVENOUS; SUBCUTANEOUS ONCE
Status: DISCONTINUED | OUTPATIENT
Start: 2021-04-19 | End: 2021-04-19 | Stop reason: HOSPADM

## 2021-04-19 RX ORDER — HYDROCODONE BITARTRATE AND ACETAMINOPHEN 5; 325 MG/1; MG/1
1 TABLET ORAL ONCE
Status: DISCONTINUED | OUTPATIENT
Start: 2021-04-19 | End: 2021-04-19 | Stop reason: HOSPADM

## 2021-04-19 RX ORDER — HYDROMORPHONE HYDROCHLORIDE 1 MG/ML
0.2 INJECTION, SOLUTION INTRAMUSCULAR; INTRAVENOUS; SUBCUTANEOUS AS NEEDED
Status: DISCONTINUED | OUTPATIENT
Start: 2021-04-19 | End: 2021-04-19 | Stop reason: HOSPADM

## 2021-04-19 RX ORDER — CEFAZOLIN SODIUM 2 G/50ML
2 SOLUTION INTRAVENOUS ONCE
Status: COMPLETED | OUTPATIENT
Start: 2021-04-19 | End: 2021-04-19

## 2021-04-19 RX ORDER — SODIUM CHLORIDE 0.9 % (FLUSH) 0.9 %
5-40 SYRINGE (ML) INJECTION EVERY 8 HOURS
Status: DISCONTINUED | OUTPATIENT
Start: 2021-04-19 | End: 2021-04-19 | Stop reason: HOSPADM

## 2021-04-19 RX ORDER — DEXAMETHASONE SODIUM PHOSPHATE 4 MG/ML
INJECTION, SOLUTION INTRA-ARTICULAR; INTRALESIONAL; INTRAMUSCULAR; INTRAVENOUS; SOFT TISSUE AS NEEDED
Status: DISCONTINUED | OUTPATIENT
Start: 2021-04-19 | End: 2021-04-19 | Stop reason: HOSPADM

## 2021-04-19 RX ORDER — SODIUM CHLORIDE 0.9 % (FLUSH) 0.9 %
5-40 SYRINGE (ML) INJECTION AS NEEDED
Status: DISCONTINUED | OUTPATIENT
Start: 2021-04-19 | End: 2021-04-19 | Stop reason: HOSPADM

## 2021-04-19 RX ORDER — SUCCINYLCHOLINE CHLORIDE 20 MG/ML
INJECTION INTRAMUSCULAR; INTRAVENOUS AS NEEDED
Status: DISCONTINUED | OUTPATIENT
Start: 2021-04-19 | End: 2021-04-19 | Stop reason: HOSPADM

## 2021-04-19 RX ORDER — BUPIVACAINE HYDROCHLORIDE 5 MG/ML
INJECTION, SOLUTION EPIDURAL; INTRACAUDAL AS NEEDED
Status: DISCONTINUED | OUTPATIENT
Start: 2021-04-19 | End: 2021-04-19 | Stop reason: HOSPADM

## 2021-04-19 RX ORDER — ONDANSETRON 2 MG/ML
4 INJECTION INTRAMUSCULAR; INTRAVENOUS ONCE
Status: COMPLETED | OUTPATIENT
Start: 2021-04-19 | End: 2021-04-19

## 2021-04-19 RX ORDER — LIDOCAINE HYDROCHLORIDE 10 MG/ML
0.1 INJECTION, SOLUTION EPIDURAL; INFILTRATION; INTRACAUDAL; PERINEURAL AS NEEDED
Status: DISCONTINUED | OUTPATIENT
Start: 2021-04-19 | End: 2021-04-19 | Stop reason: HOSPADM

## 2021-04-19 RX ORDER — ONDANSETRON 2 MG/ML
INJECTION INTRAMUSCULAR; INTRAVENOUS AS NEEDED
Status: DISCONTINUED | OUTPATIENT
Start: 2021-04-19 | End: 2021-04-19 | Stop reason: HOSPADM

## 2021-04-19 RX ORDER — PROPOFOL 10 MG/ML
INJECTION, EMULSION INTRAVENOUS AS NEEDED
Status: DISCONTINUED | OUTPATIENT
Start: 2021-04-19 | End: 2021-04-19 | Stop reason: HOSPADM

## 2021-04-19 RX ORDER — FAMOTIDINE 20 MG/1
20 TABLET, FILM COATED ORAL ONCE
Status: COMPLETED | OUTPATIENT
Start: 2021-04-19 | End: 2021-04-19

## 2021-04-19 RX ORDER — HYDROMORPHONE HYDROCHLORIDE 1 MG/ML
0.5 INJECTION, SOLUTION INTRAMUSCULAR; INTRAVENOUS; SUBCUTANEOUS
Status: DISCONTINUED | OUTPATIENT
Start: 2021-04-19 | End: 2021-04-19 | Stop reason: HOSPADM

## 2021-04-19 RX ORDER — NEOSTIGMINE METHYLSULFATE 1 MG/ML
INJECTION, SOLUTION INTRAVENOUS AS NEEDED
Status: DISCONTINUED | OUTPATIENT
Start: 2021-04-19 | End: 2021-04-19 | Stop reason: HOSPADM

## 2021-04-19 RX ORDER — VANCOMYCIN HYDROCHLORIDE 1 G/20ML
INJECTION, POWDER, LYOPHILIZED, FOR SOLUTION INTRAVENOUS AS NEEDED
Status: DISCONTINUED | OUTPATIENT
Start: 2021-04-19 | End: 2021-04-19 | Stop reason: HOSPADM

## 2021-04-19 RX ORDER — LIDOCAINE HYDROCHLORIDE 20 MG/ML
INJECTION, SOLUTION EPIDURAL; INFILTRATION; INTRACAUDAL; PERINEURAL AS NEEDED
Status: DISCONTINUED | OUTPATIENT
Start: 2021-04-19 | End: 2021-04-19 | Stop reason: HOSPADM

## 2021-04-19 RX ORDER — GLYCOPYRROLATE 0.2 MG/ML
INJECTION INTRAMUSCULAR; INTRAVENOUS AS NEEDED
Status: DISCONTINUED | OUTPATIENT
Start: 2021-04-19 | End: 2021-04-19 | Stop reason: HOSPADM

## 2021-04-19 RX ORDER — SODIUM CHLORIDE, SODIUM LACTATE, POTASSIUM CHLORIDE, CALCIUM CHLORIDE 600; 310; 30; 20 MG/100ML; MG/100ML; MG/100ML; MG/100ML
25 INJECTION, SOLUTION INTRAVENOUS CONTINUOUS
Status: DISCONTINUED | OUTPATIENT
Start: 2021-04-19 | End: 2021-04-19 | Stop reason: HOSPADM

## 2021-04-19 RX ORDER — OXYCODONE HYDROCHLORIDE 5 MG/1
5 TABLET ORAL
Qty: 28 TAB | Refills: 0 | Status: SHIPPED | OUTPATIENT
Start: 2021-04-19 | End: 2021-04-26

## 2021-04-19 RX ORDER — PREGABALIN 50 MG/1
50 CAPSULE ORAL ONCE
Status: COMPLETED | OUTPATIENT
Start: 2021-04-19 | End: 2021-04-19

## 2021-04-19 RX ORDER — FENTANYL CITRATE 50 UG/ML
INJECTION, SOLUTION INTRAMUSCULAR; INTRAVENOUS AS NEEDED
Status: DISCONTINUED | OUTPATIENT
Start: 2021-04-19 | End: 2021-04-19 | Stop reason: HOSPADM

## 2021-04-19 RX ORDER — OXYCODONE HYDROCHLORIDE 5 MG/1
5 TABLET ORAL ONCE
Status: COMPLETED | OUTPATIENT
Start: 2021-04-19 | End: 2021-04-19

## 2021-04-19 RX ORDER — SODIUM CHLORIDE, SODIUM LACTATE, POTASSIUM CHLORIDE, CALCIUM CHLORIDE 600; 310; 30; 20 MG/100ML; MG/100ML; MG/100ML; MG/100ML
50 INJECTION, SOLUTION INTRAVENOUS CONTINUOUS
Status: DISCONTINUED | OUTPATIENT
Start: 2021-04-19 | End: 2021-04-19 | Stop reason: HOSPADM

## 2021-04-19 RX ORDER — ROCURONIUM BROMIDE 10 MG/ML
INJECTION, SOLUTION INTRAVENOUS AS NEEDED
Status: DISCONTINUED | OUTPATIENT
Start: 2021-04-19 | End: 2021-04-19 | Stop reason: HOSPADM

## 2021-04-19 RX ADMIN — PROPOFOL 150 MG: 10 INJECTION, EMULSION INTRAVENOUS at 12:15

## 2021-04-19 RX ADMIN — LIDOCAINE HYDROCHLORIDE 60 MG: 20 INJECTION, SOLUTION EPIDURAL; INFILTRATION; INTRACAUDAL; PERINEURAL at 12:15

## 2021-04-19 RX ADMIN — HYDROMORPHONE HYDROCHLORIDE 0.5 MG: 1 INJECTION, SOLUTION INTRAMUSCULAR; INTRAVENOUS; SUBCUTANEOUS at 14:06

## 2021-04-19 RX ADMIN — FENTANYL CITRATE 100 MCG: 50 INJECTION, SOLUTION INTRAMUSCULAR; INTRAVENOUS at 12:15

## 2021-04-19 RX ADMIN — ROCURONIUM BROMIDE 30 MG: 50 INJECTION INTRAVENOUS at 12:22

## 2021-04-19 RX ADMIN — PREGABALIN 50 MG: 50 CAPSULE ORAL at 09:16

## 2021-04-19 RX ADMIN — SODIUM CHLORIDE, SODIUM LACTATE, POTASSIUM CHLORIDE, AND CALCIUM CHLORIDE 25 ML/HR: 600; 310; 30; 20 INJECTION, SOLUTION INTRAVENOUS at 09:20

## 2021-04-19 RX ADMIN — OXYCODONE HYDROCHLORIDE 5 MG: 5 TABLET ORAL at 14:56

## 2021-04-19 RX ADMIN — ONDANSETRON 4 MG: 2 INJECTION INTRAMUSCULAR; INTRAVENOUS at 14:07

## 2021-04-19 RX ADMIN — CEFAZOLIN SODIUM 1 G: 2 SOLUTION INTRAVENOUS at 12:39

## 2021-04-19 RX ADMIN — FAMOTIDINE 20 MG: 20 TABLET, FILM COATED ORAL at 09:16

## 2021-04-19 RX ADMIN — SUCCINYLCHOLINE CHLORIDE 120 MG: 20 INJECTION, SOLUTION INTRAMUSCULAR; INTRAVENOUS at 12:16

## 2021-04-19 RX ADMIN — CEFAZOLIN SODIUM 2 G: 2 SOLUTION INTRAVENOUS at 12:26

## 2021-04-19 RX ADMIN — ONDANSETRON 4 MG: 2 INJECTION INTRAMUSCULAR; INTRAVENOUS at 13:09

## 2021-04-19 RX ADMIN — Medication 3 MG: at 13:18

## 2021-04-19 RX ADMIN — DEXAMETHASONE SODIUM PHOSPHATE 10 MG: 4 INJECTION, SOLUTION INTRAMUSCULAR; INTRAVENOUS at 12:22

## 2021-04-19 RX ADMIN — HYDROMORPHONE HYDROCHLORIDE 0.2 MG: 1 INJECTION, SOLUTION INTRAMUSCULAR; INTRAVENOUS; SUBCUTANEOUS at 14:18

## 2021-04-19 RX ADMIN — GLYCOPYRROLATE 0.4 MG: 0.2 INJECTION INTRAMUSCULAR; INTRAVENOUS at 13:18

## 2021-04-19 NOTE — DISCHARGE INSTRUCTIONS
.Post-Operative Discharge Instructions after Spine Kurt  [phone]      At your 2-week post-operative visit we will remove any skin staples or sutures, if present. (Appointment was made at the time you scheduled your surgery)    Call office or physician on-call for any of the following:  · Fever greater than 100.5  · Uncontrollable chills  · Drainage from incision  · Pain not controlled by pain medication  · New pain  · New weakness or progressive weakness  · Food sticking in throat or excessive coughing when swallowing    Stop all anti-inflammatories (arthritis medication) such as Ibuprofen, Motrin, Aleve, Voltaren, Relafen, Naproxen, Arthrotec, etc. for 12 weeks ONLY if you had a neck or back Fusion. You may take Tylenol or acetaminophen over the counter. Take pain medication as ordered. Use ice to your back to help with pain. May remove ZOË stockings when discharged from the hospital.    May shower on the third day after surgery. Leave dressing on while you shower; the dressing is waterproof as long as the edges are sealed. Change dressing after 1 week, or sooner if saturated with drainage. Replace with a gauze dressing. No driving until your first post-operative visit. If you must drive, do not take pain medications that may alter your abilities. Lumbar braces and neck collars are for comfort only. Walking is the best therapy after back surgery. Avoid extremes of bending, twisting, or lifting. All post-operative surgical patients should function within the range of the pain. \"If it hurts - do not do it. \"    Follow your back precautions: No bending, lifting or twisting. Make sure to log roll in and out of bed. Best of luck with your back recovery. [address] YOU for choosing the [facility] for you Spine Surgery.            .Patient {ARMBANDS:[id number]}

## 2021-04-19 NOTE — ANESTHESIA PREPROCEDURE EVALUATION
Relevant Problems   No relevant active problems       Anesthetic History   No history of anesthetic complications            Review of Systems / Medical History  Patient summary reviewed and pertinent labs reviewed    Pulmonary    COPD: moderate      Shortness of breath and undiagnosed apnea  Asthma        Neuro/Psych         Psychiatric history    Comments: Anxiety  Chronic pain  Spinal stenosis Cardiovascular    Hypertension        Dysrhythmias : atrial fibrillation  Hyperlipidemia    Exercise tolerance: <4 METS  Comments: H/o DVT and PE  EF 60% 2019   GI/Hepatic/Renal     GERD           Endo/Other      Hypothyroidism  Morbid obesity and arthritis     Other Findings              Physical Exam    Airway  Mallampati: IV  TM Distance: > 6 cm  Neck ROM: normal range of motion   Mouth opening: Normal     Cardiovascular  Regular rate and rhythm,  S1 and S2 normal,  no murmur, click, rub, or gallop             Dental    Dentition: Poor dentition and Upper partial plate     Pulmonary      Decreased breath sounds: bilateral           Abdominal  GI exam deferred       Other Findings            Anesthetic Plan    ASA: 4  Anesthesia type: general          Induction: Intravenous  Anesthetic plan and risks discussed with: Patient

## 2021-04-19 NOTE — INTERVAL H&P NOTE
Update History & Physical    The Patient's History and Physical of April 15,   2021 was reviewed with the patient and I examined the patient. There was no change. The surgical site was confirmed by the patient and me. Plan:  The risk, benefits, expected outcome, and alternative to the recommended procedure have been discussed with the patient. Patient understands and wants to proceed with the procedure.     Electronically signed by Keke Richardson MD on 4/19/2021 at 10:09 AM

## 2021-04-19 NOTE — PERIOP NOTES
Assumed care of pt from OR via bed. Attached to monitor. VSS. OR, MAR and anesthesia report appreciated. Will monitor.

## 2021-04-19 NOTE — ANESTHESIA POSTPROCEDURE EVALUATION
Procedure(s):  SPINE THORACIC LAMINECTOMY/PERMANENT  SPINAL CORD STIMULATOR/C-ARM/DENT.    general    Anesthesia Post Evaluation      Multimodal analgesia: multimodal analgesia used between 6 hours prior to anesthesia start to PACU discharge  Patient location during evaluation: PACU  Patient participation: complete - patient participated  Level of consciousness: awake and alert  Pain management: adequate  Airway patency: patent  Anesthetic complications: no  Cardiovascular status: acceptable  Respiratory status: acceptable  Hydration status: acceptable  Post anesthesia nausea and vomiting:  controlled  Final Post Anesthesia Temperature Assessment:  Normothermia (36.0-37.5 degrees C)      INITIAL Post-op Vital signs:   Vitals Value Taken Time   /45 04/19/21 1437   Temp 36.6 °C (97.9 °F) 04/19/21 1346   Pulse 56 04/19/21 1443   Resp 18 04/19/21 1443   SpO2 91 % 04/19/21 1443   Vitals shown include unvalidated device data. Have Your Skin Lesions Been Treated?: not been treated Is This A New Presentation, Or A Follow-Up?: Skin Lesions How Severe Is Your Skin Lesion?: mild

## 2021-04-19 NOTE — BRIEF OP NOTE
Brief Postoperative Note    Patient: Susana Reeder  YOB: 1945  MRN: [id number]    Date of Procedure: 4/19/2021     Pre-Op Diagnosis: chronic pain    Post-Op Diagnosis: Same as preoperative diagnosis. Procedure(s):  SPINE THORACIC LAMINECTOMY/PERMANENT  SPINAL CORD STIMULATOR/C-ARM/DENT    Surgeon(s):  Sanket Fields MD    Surgical Assistant: Surg Asst-1: Ivan DASILVA    Anesthesia: General     Estimated Blood Loss (mL): Minimal    Complications: None    Specimens: * No specimens in log *     Implants:   Implant Name Type Inv.  Item Serial No.  Lot No. LRB No. Used Action   LEAD NEUROSTIMULATOR L60CM MR CONDITIONAL PENTA - [id number]  LEAD NEUROSTIMULATOR L60CM MR CONDITIONAL PENTA [id number] [employer]_WD  N/A 1 Implanted   ANCHOR LD FOR SPNL CRD STIM CINCH - [id number]  ANCHOR LD FOR SPNL CRD STIM CINCH  ABBOTT_WD [id number] N/A 2 Implanted   GEN IPG 5.3AH PROCLAIM ELITE 5 -- [address] W/PAT CONTROLLER - [id number]  GEN IPG 5.3AH PROCLAIM ELITE 5 -- 3660 W/PAT CONTROLLER [id number] [employer]  N/A 1 Implanted       Drains: * No LDAs found *    Findings: C641    Electronically Signed by Ester Young MD on 4/19/2021 at 1:16 PM

## 2021-04-20 NOTE — OP NOTES
[address] [place] [address]   OPERATIVE REPORT    Name:  Behzad Freeman  MR#:   [id number]  :  1945  ACCOUNT #:  [de-identified]  DATE OF SERVICE:  2021    PREOPERATIVE DIAGNOSIS:  Chronic pain. POSTOPERATIVE DIAGNOSIS:  Chronic pain. PROCEDURE PERFORMED:  T9 laminectomy, implantation of dual lead spinal cord stimulator generator and paddle, Abbott type. SURGEON:  Shawn Rider MD    ASSISTANT:  None. ANESTHESIA:  Tracheal anesthesia. COMPLICATIONS:  None. SPECIMENS REMOVED:  None. IMPLANTS:  Abbott spinal cord stimulator. ESTIMATED BLOOD LOSS:  Minimal.    FINDINGS:  We were able to cover T7-T8 consistent with the patient's successful trial.  The patient's morbid obesity and congenital spinal deformity made the surgery more difficult. OPERATION:  Following induction of tracheal anesthesia, the patient was turned to prone position on spinal frame. The patient was prepped and draped in usual fashion. Midline incision was made. Paramedian incision was made in the thoracodorsal fascia and subperiosteal dissection at T9. A T9 laminectomy was done with resection of interval ligamentum flavum. Hemostasis maintained with Gelfoam, thrombin, and bone wax. The dual lead paddle was advanced in the epidural space until it was overlying T7-T8 consistent with the patient's stressful trial.  It was secured to the interspinous ligament below with two tie-down devices and four nonresorbable sutures. A pocket was made in the left buttock. A tunneling device was used to tunnel the lead to the pocket and then it was attached to the generator and a continuity test done and confirmed. Vancomycin powder was instilled in both incisions, there was no evidence of bleeding. Thoracodorsal fascia was closed with #1 Vicryl, subcutaneous tissues closed with 2-0 Vicryl, skin closed with a 4-0 Monocryl subcuticular suture and Dermabond. A sterile occlusive dressing was placed upon the wound.   All counts were correct.       MD JACOB [id number]  D:  04/19/2021 13:31  T:  04/19/2021 23:27  JOB #:  [id number]

## 2021-05-17 RX ORDER — ROSUVASTATIN CALCIUM 5 MG/1
TABLET, COATED ORAL
Qty: 90 TAB | Refills: 2 | Status: SHIPPED | OUTPATIENT
Start: 2021-05-17 | End: 2021-08-02

## 2021-06-29 RX ORDER — POTASSIUM CHLORIDE 1500 MG/1
TABLET, EXTENDED RELEASE ORAL
Qty: 90 TABLET | Refills: 1 | Status: SHIPPED | OUTPATIENT
Start: 2021-06-29 | End: 2021-08-02

## 2021-07-02 ENCOUNTER — HOSPITAL ENCOUNTER (OUTPATIENT)
Dept: LAB | Age: 76
Discharge: HOME | End: 2021-07-02
Payer: MEDICARE

## 2021-07-02 ENCOUNTER — APPOINTMENT (OUTPATIENT)
Dept: FAMILY MEDICINE CLINIC | Age: 76
End: 2021-07-02

## 2021-07-02 DIAGNOSIS — E03.9 ACQUIRED HYPOTHYROIDISM: ICD-10-CM

## 2021-07-02 DIAGNOSIS — I10 ESSENTIAL HYPERTENSION: ICD-10-CM

## 2021-07-02 DIAGNOSIS — M10.9 GOUT, UNSPECIFIED CAUSE, UNSPECIFIED CHRONICITY, UNSPECIFIED SITE: ICD-10-CM

## 2021-07-02 DIAGNOSIS — E78.5 HYPERLIPIDEMIA, UNSPECIFIED HYPERLIPIDEMIA TYPE: ICD-10-CM

## 2021-07-02 LAB
ALBUMIN SERPL-MCNC: 3.6 G/DL (ref 3.4–5)
ALBUMIN/GLOB SERPL: 1.1 {RATIO} (ref 0.8–1.7)
ALP SERPL-CCNC: 86 U/L (ref 45–117)
ALT SERPL-CCNC: 19 U/L (ref 13–56)
ANION GAP SERPL CALC-SCNC: 8 MMOL/L (ref 3–18)
AST SERPL-CCNC: 18 U/L (ref 10–38)
BILIRUB SERPL-MCNC: 0.8 MG/DL (ref 0.2–1)
BUN SERPL-MCNC: 10 MG/DL (ref 7–18)
BUN/CREAT SERPL: 11 (ref 12–20)
CALCIUM SERPL-MCNC: 9.4 MG/DL (ref 8.5–10.1)
CHLORIDE SERPL-SCNC: 106 MMOL/L (ref 100–111)
CHOLEST SERPL-MCNC: 158 MG/DL
CO2 SERPL-SCNC: 29 MMOL/L (ref 21–32)
CREAT SERPL-MCNC: 0.92 MG/DL (ref 0.6–1.3)
GLOBULIN SER CALC-MCNC: 3.3 G/DL (ref 2–4)
GLUCOSE SERPL-MCNC: 82 MG/DL (ref 74–99)
HDLC SERPL-MCNC: 55 MG/DL (ref 40–60)
HDLC SERPL: 2.9 {RATIO} (ref 0–5)
LDLC SERPL CALC-MCNC: 65.8 MG/DL (ref 0–100)
LIPID PROFILE,FLP: ABNORMAL
POTASSIUM SERPL-SCNC: 3.9 MMOL/L (ref 3.5–5.5)
PROT SERPL-MCNC: 6.9 G/DL (ref 6.4–8.2)
SODIUM SERPL-SCNC: 143 MMOL/L (ref 136–145)
T4 FREE SERPL-MCNC: 1.1 NG/DL (ref 0.7–1.5)
TRIGL SERPL-MCNC: 186 MG/DL (ref ?–150)
TSH SERPL DL<=0.05 MIU/L-ACNC: 3.26 UIU/ML (ref 0.36–3.74)
URATE SERPL-MCNC: 5 MG/DL (ref 2.6–7.2)
VLDLC SERPL CALC-MCNC: 37.2 MG/DL

## 2021-07-02 PROCEDURE — 80053 COMPREHEN METABOLIC PANEL: CPT

## 2021-07-02 PROCEDURE — 36415 COLL VENOUS BLD VENIPUNCTURE: CPT

## 2021-07-02 PROCEDURE — 84443 ASSAY THYROID STIM HORMONE: CPT

## 2021-07-02 PROCEDURE — 80061 LIPID PANEL: CPT

## 2021-07-02 PROCEDURE — 84550 ASSAY OF BLOOD/URIC ACID: CPT

## 2021-07-02 PROCEDURE — 84439 ASSAY OF FREE THYROXINE: CPT

## 2021-07-13 ENCOUNTER — OFFICE VISIT (OUTPATIENT)
Dept: FAMILY MEDICINE CLINIC | Age: 76
End: 2021-07-13
Payer: MEDICARE

## 2021-07-13 VITALS
HEART RATE: 81 BPM | HEIGHT: 61 IN | BODY MASS INDEX: 39.46 KG/M2 | DIASTOLIC BLOOD PRESSURE: 71 MMHG | OXYGEN SATURATION: 96 % | WEIGHT: 209 LBS | TEMPERATURE: 97.7 F | RESPIRATION RATE: 20 BRPM | SYSTOLIC BLOOD PRESSURE: 135 MMHG

## 2021-07-13 DIAGNOSIS — E03.9 ACQUIRED HYPOTHYROIDISM: ICD-10-CM

## 2021-07-13 DIAGNOSIS — I10 ESSENTIAL HYPERTENSION: Primary | ICD-10-CM

## 2021-07-13 DIAGNOSIS — M25.562 LEFT LATERAL KNEE PAIN: ICD-10-CM

## 2021-07-13 DIAGNOSIS — E78.5 HYPERLIPIDEMIA, UNSPECIFIED HYPERLIPIDEMIA TYPE: ICD-10-CM

## 2021-07-13 PROCEDURE — G8536 NO DOC ELDER MAL SCRN: HCPCS | Performed by: NURSE PRACTITIONER

## 2021-07-13 PROCEDURE — G8400 PT W/DXA NO RESULTS DOC: HCPCS | Performed by: NURSE PRACTITIONER

## 2021-07-13 PROCEDURE — G8754 DIAS BP LESS 90: HCPCS | Performed by: NURSE PRACTITIONER

## 2021-07-13 PROCEDURE — G8427 DOCREV CUR MEDS BY ELIG CLIN: HCPCS | Performed by: NURSE PRACTITIONER

## 2021-07-13 PROCEDURE — G8432 DEP SCR NOT DOC, RNG: HCPCS | Performed by: NURSE PRACTITIONER

## 2021-07-13 PROCEDURE — 1101F PT FALLS ASSESS-DOCD LE1/YR: CPT | Performed by: NURSE PRACTITIONER

## 2021-07-13 PROCEDURE — 99214 OFFICE O/P EST MOD 30 MIN: CPT | Performed by: NURSE PRACTITIONER

## 2021-07-13 PROCEDURE — 1090F PRES/ABSN URINE INCON ASSESS: CPT | Performed by: NURSE PRACTITIONER

## 2021-07-13 PROCEDURE — G8752 SYS BP LESS 140: HCPCS | Performed by: NURSE PRACTITIONER

## 2021-07-13 PROCEDURE — G0463 HOSPITAL OUTPT CLINIC VISIT: HCPCS | Performed by: NURSE PRACTITIONER

## 2021-07-13 PROCEDURE — G8417 CALC BMI ABV UP PARAM F/U: HCPCS | Performed by: NURSE PRACTITIONER

## 2021-07-13 RX ORDER — OLMESARTAN MEDOXOMIL 20 MG/1
TABLET ORAL
Qty: 90 TABLET | Refills: 2 | Status: CANCELLED | OUTPATIENT
Start: 2021-07-13

## 2021-07-13 RX ORDER — ALBUTEROL SULFATE 90 UG/1
2 AEROSOL, METERED RESPIRATORY (INHALATION)
Qty: 1 INHALER | Refills: 2 | Status: SHIPPED | OUTPATIENT
Start: 2021-07-13 | End: 2022-08-25

## 2021-07-13 NOTE — PROGRESS NOTES
Sung Moore is a 64 y.o. female who was seen in clinic today (7/13/2021) for Hypertension, Cholesterol Problem, and Thyroid Problem    Assessment & Plan:   Diagnoses and all orders for this visit:    1. Essential hypertension    2. Hyperlipidemia, unspecified hyperlipidemia type    3. Acquired hypothyroidism    4. Left lateral knee pain    Other orders  -     albuterol (PROVENTIL HFA, VENTOLIN HFA, PROAIR HFA) 90 mcg/actuation inhaler; Take 2 Puffs by inhalation every four (4) hours as needed for Wheezing. advised patient may hold benicar and instructed to take 1/2 tab of potassium daily. Instructed to inform provider if she develops any side effects. Instructed to call orthopedist to discuss knee pain  I have discussed the diagnosis with the patient and the intended plan as seen in the above orders. The patient has received an after-visit summary and questions were answered concerning future plans. I have discussed medication side effects and warnings with the patient as well. Patient agreeable with above plan and verbalizes understanding. Follow-up and Dispositions    · Return in about 4 months (around 11/13/2021) for HTN/HLD, hypothyroidism, in office follow up, fasting labs 1 wk prior. Subjective:   Hypertension ROS: taking medications as instructed, no medication side effects noted, home BP monitoring in range, brought BP log and reviewed during visit, no TIA's, no chest pain on exertion, no dyspnea on exertion, no swelling of ankles. Patient states she has not taken Benicar since 7/3/2021. Comments since she has not had medication she reports she feels better and her mind is clearer. States before she felt like a zombi. Patient also reports she has been experiencing left lateral knee pain.     Lab Results   Component Value Date/Time    Sodium 143 07/02/2021 08:25 AM    Potassium 3.9 07/02/2021 08:25 AM    Chloride 106 07/02/2021 08:25 AM    CO2 29 07/02/2021 08:25 AM    Anion gap 8 07/02/2021 08:25 AM    Glucose 82 07/02/2021 08:25 AM    BUN 10 07/02/2021 08:25 AM    Creatinine 0.92 07/02/2021 08:25 AM    BUN/Creatinine ratio 11 (L) 07/02/2021 08:25 AM    GFR est AA >60 07/02/2021 08:25 AM    GFR est non-AA 59 (L) 07/02/2021 08:25 AM    Calcium 9.4 07/02/2021 08:25 AM    Bilirubin, total 0.8 07/02/2021 08:25 AM    Alk. phosphatase 86 07/02/2021 08:25 AM    Protein, total 6.9 07/02/2021 08:25 AM    Albumin 3.6 07/02/2021 08:25 AM    Globulin 3.3 07/02/2021 08:25 AM    A-G Ratio 1.1 07/02/2021 08:25 AM    ALT (SGPT) 19 07/02/2021 08:25 AM    AST (SGOT) 18 07/02/2021 08:25 AM     Lab Results   Component Value Date/Time    Cholesterol, total 158 07/02/2021 08:25 AM    HDL Cholesterol 55 07/02/2021 08:25 AM    LDL, calculated 65.8 07/02/2021 08:25 AM    VLDL, calculated 37.2 07/02/2021 08:25 AM    Triglyceride 186 (H) 07/02/2021 08:25 AM    CHOL/HDL Ratio 2.9 07/02/2021 08:25 AM       Lab Results   Component Value Date/Time    WBC 9.2 04/13/2021 10:36 AM    HGB 13.8 04/13/2021 10:36 AM    HCT 40.9 04/13/2021 10:36 AM    PLATELET 605 05/94/3913 10:36 AM    .3 (H) 04/13/2021 10:36 AM       Wt Readings from Last 3 Encounters:   07/13/21 209 lb (94.8 kg)   04/19/21 201 lb (91.2 kg)   04/15/21 203 lb (92.1 kg)     Temp Readings from Last 3 Encounters:   07/13/21 97.7 °F (36.5 °C) (Temporal)   04/19/21 97.8 °F (36.6 °C)   04/15/21 97 °F (36.1 °C) (Temporal)     BP Readings from Last 3 Encounters:   07/13/21 135/71   04/19/21 (!) 111/46   04/15/21 (!) 112/54     Pulse Readings from Last 3 Encounters:   07/13/21 81   04/19/21 (!) 53   04/15/21 68       Prior to Admission medications    Medication Sig Start Date End Date Taking?  Authorizing Provider   furosemide (LASIX) 20 mg tablet TAKE 1 TABLET BY MOUTH EVERY DAY 7/10/21  Yes Jearld Najjar, NP   rosuvastatin (CRESTOR) 5 mg tablet TAKE 1 TABLET BY MOUTH EVERY DAY AT NIGHT 5/17/21  Yes Jearld Najjar, NP   allopurinoL (ZYLOPRIM) 100 mg tablet Take 1 Tab by mouth daily. 5/4/21  Yes Katlin Luna NP   budesonide (PULMICORT) 0.5 mg/2 mL nbsp 2 mL by Nebulization route two (2) times a day. File under Medicare Part B, ICD J47.0, J45.909 4/15/21  Yes Katlin Luna NP   albuterol (PROVENTIL HFA, VENTOLIN HFA, PROAIR HFA) 90 mcg/actuation inhaler Take  by inhalation. Yes Provider, Historical   budesonide-formoteroL (Symbicort) 160-4.5 mcg/actuation HFAA Take 2 Puffs by inhalation. Yes Provider, Historical   oxyCODONE IR (ROXICODONE) 5 mg immediate release tablet Take 5 mg by mouth three (3) times daily as needed. Yes Provider, Historical   levothyroxine (SYNTHROID) 25 mcg tablet TAKE 1 TAB BY MOUTH DAILY 8/10/20  Yes Apple Vigil MD   albuterol (PROVENTIL VENTOLIN) 2.5 mg /3 mL (0.083 %) nebu 3 mL by Nebulization route every six (6) hours as needed for Wheezing or Shortness of Breath. 1/7/20  Yes Eric VELASCO NP   acetaminophen (TYLENOL) 325 mg tablet Take 2 Tabs by mouth every six (6) hours as needed for Pain or Fever. Patient taking differently: Take 500 mg by mouth every six (6) hours as needed for Pain or Fever. 7/25/19  Yes Peter Roldan NP   inhalational spacing device (ACE AEROSOL CLOUD ENHANCER) 1 Each by Does Not Apply route as needed. 2/14/19  Yes Apple Vigil MD   cholecalciferol, vitamin D3, 2,000 unit tab TAKE 1 TABLET BY MOUTH EVERY DAY 1/11/19  Yes Provider, Historical   olmesartan (BENICAR) 20 mg tablet TAKE 1 TABLET BY MOUTH EVERY DAY  Patient not taking: Reported on 7/13/2021 7/10/21   Hua POST NP   Klor-Con M20 20 mEq tablet TAKE 1 TABLET BY MOUTH EVERY DAY  Patient not taking: Reported on 7/13/2021 6/29/21   Hua POST NP   Omeprazole delayed release (PRILOSEC D/R) 20 mg tablet Take 20 mg by mouth daily. Patient not taking: Reported on 7/13/2021    Provider, Historical     The following sections were reviewed & updated as appropriate: PMH, PSH, FH, and SH.       Review of Systems   Constitutional: Negative for activity change, appetite change, chills, fatigue and fever. Respiratory: Negative for chest tightness and shortness of breath. Cardiovascular: Negative for chest pain. Neurological: Negative for dizziness and headaches. Objective:     Visit Vitals  /71 (BP 1 Location: Left arm, BP Patient Position: Sitting, BP Cuff Size: Adult)   Pulse 81   Temp 97.7 °F (36.5 °C) (Temporal)   Resp 20   Ht 5' 1\" (1.549 m)   Wt 209 lb (94.8 kg)   SpO2 96%   BMI 39.49 kg/m²      Physical Exam  Constitutional:       General: She is not in acute distress. Appearance: She is well-developed. HENT:      Head: Normocephalic and atraumatic. Neck:      Vascular: No carotid bruit. Cardiovascular:      Rate and Rhythm: Normal rate and regular rhythm. Heart sounds: Normal heart sounds. No murmur heard. No friction rub. No gallop. Pulmonary:      Effort: Pulmonary effort is normal.      Breath sounds: Normal breath sounds. No decreased breath sounds, wheezing, rhonchi or rales. Abdominal:      General: Bowel sounds are normal.      Palpations: Abdomen is soft. Tenderness: There is no abdominal tenderness. Musculoskeletal:      Cervical back: Normal range of motion and neck supple. Lymphadenopathy:      Cervical: No cervical adenopathy. Skin:     General: Skin is warm and dry. Neurological:      Mental Status: She is alert and oriented to person, place, and time. Disclaimer: The patient understands our medical plan. Alternatives have been explained and offered. The risks, benefits and significant side effects of all medications have been reviewed. Anticipated time course and progression of condition reviewed. All questions have been addressed. She is encouraged to employ the information provided in the after visit summary, which was reviewed.       Where applicable, she is instructed to call the clinic if she has not been notified either by phone or through [facility] with the results of her tests or with an appointment plan for any referrals within 1 week(s). No news is not good news; it's no news. The patient  is to call if her condition worsens or fails to improve or if significant side effects are experienced. Aspects of this note may have been generated using voice recognition software. Despite editing, there may be unrecognized errors.        Robert Arce, NP

## 2021-07-13 NOTE — PATIENT INSTRUCTIONS
[facility] [place], [place]  [phone]      Begin taking potassium 1/2 tab daily

## 2021-07-30 ENCOUNTER — APPOINTMENT (OUTPATIENT)
Dept: GENERAL RADIOLOGY | Age: 76
DRG: 247 | End: 2021-07-30
Attending: STUDENT IN AN ORGANIZED HEALTH CARE EDUCATION/TRAINING PROGRAM
Payer: MEDICARE

## 2021-07-30 ENCOUNTER — APPOINTMENT (OUTPATIENT)
Dept: NON INVASIVE DIAGNOSTICS | Age: 76
DRG: 247 | End: 2021-07-30
Attending: INTERNAL MEDICINE
Payer: MEDICARE

## 2021-07-30 ENCOUNTER — HOSPITAL ENCOUNTER (INPATIENT)
Age: 76
LOS: 3 days | Discharge: HOME HEALTH | DRG: 247 | End: 2021-08-02
Attending: EMERGENCY MEDICINE | Admitting: STUDENT IN AN ORGANIZED HEALTH CARE EDUCATION/TRAINING PROGRAM
Payer: MEDICARE

## 2021-07-30 ENCOUNTER — APPOINTMENT (OUTPATIENT)
Dept: VASCULAR SURGERY | Age: 76
DRG: 247 | End: 2021-07-30
Attending: INTERNAL MEDICINE
Payer: MEDICARE

## 2021-07-30 DIAGNOSIS — I21.11 ST ELEVATION MYOCARDIAL INFARCTION INVOLVING RIGHT CORONARY ARTERY (HCC): ICD-10-CM

## 2021-07-30 DIAGNOSIS — I21.3 ST ELEVATION (STEMI) MYOCARDIAL INFARCTION (HCC): ICD-10-CM

## 2021-07-30 DIAGNOSIS — I21.3 ST ELEVATION MYOCARDIAL INFARCTION (STEMI), UNSPECIFIED ARTERY (HCC): ICD-10-CM

## 2021-07-30 PROBLEM — Z86.718 HISTORY OF DVT (DEEP VEIN THROMBOSIS): Status: ACTIVE | Noted: 2021-07-30

## 2021-07-30 PROBLEM — I50.32 CHRONIC DIASTOLIC CONGESTIVE HEART FAILURE (HCC): Status: ACTIVE | Noted: 2021-07-30

## 2021-07-30 PROBLEM — Z86.711 HISTORY OF PULMONARY EMBOLISM: Status: ACTIVE | Noted: 2021-07-30

## 2021-07-30 LAB
ACT BLD: 208 SECS (ref 79–138)
ACT BLD: 230 SECS (ref 79–138)
ALBUMIN SERPL-MCNC: 3.5 G/DL (ref 3.4–5)
ALBUMIN/GLOB SERPL: 0.8 {RATIO} (ref 0.8–1.7)
ALP SERPL-CCNC: 90 U/L (ref 45–117)
ALT SERPL-CCNC: 23 U/L (ref 13–56)
ANION GAP SERPL CALC-SCNC: 6 MMOL/L (ref 3–18)
AST SERPL-CCNC: 65 U/L (ref 10–38)
ATRIAL RATE: 80 BPM
ATRIAL RATE: 93 BPM
BASOPHILS # BLD: 0 K/UL (ref 0–0.1)
BASOPHILS NFR BLD: 0 % (ref 0–2)
BILIRUB DIRECT SERPL-MCNC: 0.4 MG/DL (ref 0–0.2)
BILIRUB SERPL-MCNC: 1.1 MG/DL (ref 0.2–1)
BUN SERPL-MCNC: 10 MG/DL (ref 7–18)
BUN/CREAT SERPL: 12 (ref 12–20)
CALCIUM SERPL-MCNC: 9.2 MG/DL (ref 8.5–10.1)
CALCULATED P AXIS, ECG09: 42 DEGREES
CALCULATED P AXIS, ECG09: 8 DEGREES
CALCULATED R AXIS, ECG10: -22 DEGREES
CALCULATED R AXIS, ECG10: -34 DEGREES
CALCULATED T AXIS, ECG11: -8 DEGREES
CALCULATED T AXIS, ECG11: 2 DEGREES
CHLORIDE SERPL-SCNC: 105 MMOL/L (ref 100–111)
CHOLEST SERPL-MCNC: 136 MG/DL
CK MB CFR SERPL CALC: 3.3 % (ref 0–4)
CK MB CFR SERPL CALC: 3.4 % (ref 0–4)
CK MB SERPL-MCNC: 8 NG/ML (ref 5–25)
CK MB SERPL-MCNC: 8.3 NG/ML (ref 5–25)
CK SERPL-CCNC: 237 U/L (ref 26–192)
CK SERPL-CCNC: 249 U/L (ref 26–192)
CO2 SERPL-SCNC: 28 MMOL/L (ref 21–32)
CREAT SERPL-MCNC: 0.85 MG/DL (ref 0.6–1.3)
DIAGNOSIS, 93000: NORMAL
DIAGNOSIS, 93000: NORMAL
DIFFERENTIAL METHOD BLD: ABNORMAL
ECHO AO ROOT DIAM: 3.19 CM
ECHO LV E' LATERAL VELOCITY: 7.18 CM/S
ECHO LV E' SEPTAL VELOCITY: 4.22 CM/S
ECHO LV INTERNAL DIMENSION DIASTOLIC: 3.29 CM (ref 3.9–5.3)
ECHO LV INTERNAL DIMENSION SYSTOLIC: 2.33 CM
ECHO LV IVSD: 0.94 CM (ref 0.6–0.9)
ECHO LV MASS 2D: 80.7 G (ref 67–162)
ECHO LV MASS INDEX 2D: 42.2 G/M2 (ref 43–95)
ECHO LV POSTERIOR WALL DIASTOLIC: 0.86 CM (ref 0.6–0.9)
ECHO LVOT CARDIAC OUTPUT: 6.11 LITER/MINUTE
ECHO LVOT DIAM: 2.31 CM
ECHO LVOT PEAK GRADIENT: 4.75 MMHG
ECHO LVOT PEAK VELOCITY: 108.92 CM/S
ECHO LVOT SV: 89.3 ML
ECHO LVOT VTI: 21.29 CM
ECHO MV A VELOCITY: 62.89 CM/S
ECHO MV E DECELERATION TIME (DT): 229.37 MS
ECHO MV E VELOCITY: 57.09 CM/S
ECHO MV E/A RATIO: 0.91
ECHO MV E/E' LATERAL: 7.95
ECHO MV E/E' RATIO (AVERAGED): 10.74
ECHO MV E/E' SEPTAL: 13.53
ECHO RV TAPSE: 1.2 CM (ref 1.5–2)
ECHO TV REGURGITANT MAX VELOCITY: 205.85 CM/S
ECHO TV REGURGITANT PEAK GRADIENT: 16.95 MMHG
EOSINOPHIL # BLD: 0 K/UL (ref 0–0.4)
EOSINOPHIL NFR BLD: 0 % (ref 0–5)
ERYTHROCYTE [DISTWIDTH] IN BLOOD BY AUTOMATED COUNT: 14.5 % (ref 11.6–14.5)
GLOBULIN SER CALC-MCNC: 4.3 G/DL (ref 2–4)
GLUCOSE SERPL-MCNC: 127 MG/DL (ref 74–99)
HCT VFR BLD AUTO: 42.2 % (ref 35–45)
HDLC SERPL-MCNC: 59 MG/DL (ref 40–60)
HDLC SERPL: 2.3 {RATIO} (ref 0–5)
HGB BLD-MCNC: 14.4 G/DL (ref 12–16)
INR PPP: 1 (ref 0.8–1.2)
LDLC SERPL CALC-MCNC: 59.8 MG/DL (ref 0–100)
LIPID PROFILE,FLP: NORMAL
LVOT MG: 2.63 MMHG
LYMPHOCYTES # BLD: 1.1 K/UL (ref 0.9–3.6)
LYMPHOCYTES NFR BLD: 10 % (ref 21–52)
MCH RBC QN AUTO: 34.1 PG (ref 24–34)
MCHC RBC AUTO-ENTMCNC: 34.1 G/DL (ref 31–37)
MCV RBC AUTO: 100 FL (ref 74–97)
MONOCYTES # BLD: 1.2 K/UL (ref 0.05–1.2)
MONOCYTES NFR BLD: 11 % (ref 3–10)
NEUTS SEG # BLD: 8.4 K/UL (ref 1.8–8)
NEUTS SEG NFR BLD: 78 % (ref 40–73)
P-R INTERVAL, ECG05: 176 MS
P-R INTERVAL, ECG05: 180 MS
PLATELET # BLD AUTO: 214 K/UL (ref 135–420)
PMV BLD AUTO: 11.3 FL (ref 9.2–11.8)
POTASSIUM SERPL-SCNC: 3.4 MMOL/L (ref 3.5–5.5)
PROT SERPL-MCNC: 7.8 G/DL (ref 6.4–8.2)
PROTHROMBIN TIME: 13 SEC (ref 11.5–15.2)
Q-T INTERVAL, ECG07: 322 MS
Q-T INTERVAL, ECG07: 358 MS
QRS DURATION, ECG06: 78 MS
QRS DURATION, ECG06: 82 MS
QTC CALCULATION (BEZET), ECG08: 400 MS
QTC CALCULATION (BEZET), ECG08: 412 MS
RBC # BLD AUTO: 4.22 M/UL (ref 4.2–5.3)
SODIUM SERPL-SCNC: 139 MMOL/L (ref 136–145)
TRIGL SERPL-MCNC: 86 MG/DL (ref ?–150)
TROPONIN I SERPL-MCNC: 13.2 NG/ML (ref 0–0.04)
TROPONIN I SERPL-MCNC: 9.07 NG/ML (ref 0–0.04)
TROPONIN I SERPL-MCNC: 9.98 NG/ML (ref 0–0.04)
TSH SERPL DL<=0.05 MIU/L-ACNC: 2.57 UIU/ML (ref 0.36–3.74)
VENTRICULAR RATE, ECG03: 80 BPM
VENTRICULAR RATE, ECG03: 93 BPM
VLDLC SERPL CALC-MCNC: 17.2 MG/DL
WBC # BLD AUTO: 10.8 K/UL (ref 4.6–13.2)

## 2021-07-30 PROCEDURE — 84484 ASSAY OF TROPONIN QUANT: CPT

## 2021-07-30 PROCEDURE — 77030004558 HC CATH ANGI DX SUPR TORQ CARD -A: Performed by: INTERNAL MEDICINE

## 2021-07-30 PROCEDURE — 92941 PRQ TRLML REVSC TOT OCCL AMI: CPT | Performed by: INTERNAL MEDICINE

## 2021-07-30 PROCEDURE — 74011250637 HC RX REV CODE- 250/637: Performed by: INTERNAL MEDICINE

## 2021-07-30 PROCEDURE — 80048 BASIC METABOLIC PNL TOTAL CA: CPT

## 2021-07-30 PROCEDURE — 74011250637 HC RX REV CODE- 250/637: Performed by: STUDENT IN AN ORGANIZED HEALTH CARE EDUCATION/TRAINING PROGRAM

## 2021-07-30 PROCEDURE — C1769 GUIDE WIRE: HCPCS | Performed by: INTERNAL MEDICINE

## 2021-07-30 PROCEDURE — 65620000000 HC RM CCU GENERAL

## 2021-07-30 PROCEDURE — C8929 TTE W OR WO FOL WCON,DOPPLER: HCPCS

## 2021-07-30 PROCEDURE — 92973 PRQ TRLUML C MCHN ASP THRMBC: CPT | Performed by: INTERNAL MEDICINE

## 2021-07-30 PROCEDURE — C1725 CATH, TRANSLUMIN NON-LASER: HCPCS | Performed by: INTERNAL MEDICINE

## 2021-07-30 PROCEDURE — 99283 EMERGENCY DEPT VISIT LOW MDM: CPT

## 2021-07-30 PROCEDURE — 4A023N7 MEASUREMENT OF CARDIAC SAMPLING AND PRESSURE, LEFT HEART, PERCUTANEOUS APPROACH: ICD-10-PCS | Performed by: INTERNAL MEDICINE

## 2021-07-30 PROCEDURE — 80061 LIPID PANEL: CPT

## 2021-07-30 PROCEDURE — 82550 ASSAY OF CK (CPK): CPT

## 2021-07-30 PROCEDURE — 77030013797 HC KT TRNSDUC PRSSR EDWD -A: Performed by: INTERNAL MEDICINE

## 2021-07-30 PROCEDURE — 99152 MOD SED SAME PHYS/QHP 5/>YRS: CPT | Performed by: INTERNAL MEDICINE

## 2021-07-30 PROCEDURE — 93005 ELECTROCARDIOGRAM TRACING: CPT

## 2021-07-30 PROCEDURE — 74011000250 HC RX REV CODE- 250: Performed by: INTERNAL MEDICINE

## 2021-07-30 PROCEDURE — 99223 1ST HOSP IP/OBS HIGH 75: CPT | Performed by: STUDENT IN AN ORGANIZED HEALTH CARE EDUCATION/TRAINING PROGRAM

## 2021-07-30 PROCEDURE — 77030013519 HC DEV INFL BASIX MRTM -B: Performed by: INTERNAL MEDICINE

## 2021-07-30 PROCEDURE — 74011250636 HC RX REV CODE- 250/636: Performed by: EMERGENCY MEDICINE

## 2021-07-30 PROCEDURE — 99153 MOD SED SAME PHYS/QHP EA: CPT | Performed by: INTERNAL MEDICINE

## 2021-07-30 PROCEDURE — C1760 CLOSURE DEV, VASC: HCPCS | Performed by: INTERNAL MEDICINE

## 2021-07-30 PROCEDURE — 94640 AIRWAY INHALATION TREATMENT: CPT

## 2021-07-30 PROCEDURE — 93454 CORONARY ARTERY ANGIO S&I: CPT | Performed by: INTERNAL MEDICINE

## 2021-07-30 PROCEDURE — 74011250636 HC RX REV CODE- 250/636

## 2021-07-30 PROCEDURE — 77030037392 HC CANN PUMP/FLTR INDIGO PENU -E: Performed by: INTERNAL MEDICINE

## 2021-07-30 PROCEDURE — C1894 INTRO/SHEATH, NON-LASER: HCPCS | Performed by: INTERNAL MEDICINE

## 2021-07-30 PROCEDURE — 99232 SBSQ HOSP IP/OBS MODERATE 35: CPT | Performed by: INTERNAL MEDICINE

## 2021-07-30 PROCEDURE — 77010033678 HC OXYGEN DAILY

## 2021-07-30 PROCEDURE — 85025 COMPLETE CBC W/AUTO DIFF WBC: CPT

## 2021-07-30 PROCEDURE — 36415 COLL VENOUS BLD VENIPUNCTURE: CPT

## 2021-07-30 PROCEDURE — 85347 COAGULATION TIME ACTIVATED: CPT

## 2021-07-30 PROCEDURE — 85610 PROTHROMBIN TIME: CPT

## 2021-07-30 PROCEDURE — 74011000250 HC RX REV CODE- 250: Performed by: STUDENT IN AN ORGANIZED HEALTH CARE EDUCATION/TRAINING PROGRAM

## 2021-07-30 PROCEDURE — 99223 1ST HOSP IP/OBS HIGH 75: CPT | Performed by: INTERNAL MEDICINE

## 2021-07-30 PROCEDURE — 77030016699 HC CATH ANGI DX INFN1 CARD -A: Performed by: INTERNAL MEDICINE

## 2021-07-30 PROCEDURE — 93970 EXTREMITY STUDY: CPT

## 2021-07-30 PROCEDURE — B2111ZZ FLUOROSCOPY OF MULTIPLE CORONARY ARTERIES USING LOW OSMOLAR CONTRAST: ICD-10-PCS | Performed by: INTERNAL MEDICINE

## 2021-07-30 PROCEDURE — B2151ZZ FLUOROSCOPY OF LEFT HEART USING LOW OSMOLAR CONTRAST: ICD-10-PCS | Performed by: INTERNAL MEDICINE

## 2021-07-30 PROCEDURE — 94762 N-INVAS EAR/PLS OXIMTRY CONT: CPT

## 2021-07-30 PROCEDURE — 74011250636 HC RX REV CODE- 250/636: Performed by: STUDENT IN AN ORGANIZED HEALTH CARE EDUCATION/TRAINING PROGRAM

## 2021-07-30 PROCEDURE — C1757 CATH, THROMBECTOMY/EMBOLECT: HCPCS | Performed by: INTERNAL MEDICINE

## 2021-07-30 PROCEDURE — C1887 CATHETER, GUIDING: HCPCS | Performed by: INTERNAL MEDICINE

## 2021-07-30 PROCEDURE — 71045 X-RAY EXAM CHEST 1 VIEW: CPT

## 2021-07-30 PROCEDURE — 027034Z DILATION OF CORONARY ARTERY, ONE ARTERY WITH DRUG-ELUTING INTRALUMINAL DEVICE, PERCUTANEOUS APPROACH: ICD-10-PCS | Performed by: INTERNAL MEDICINE

## 2021-07-30 PROCEDURE — 02C03ZZ EXTIRPATION OF MATTER FROM CORONARY ARTERY, ONE ARTERY, PERCUTANEOUS APPROACH: ICD-10-PCS | Performed by: INTERNAL MEDICINE

## 2021-07-30 PROCEDURE — 84443 ASSAY THYROID STIM HORMONE: CPT

## 2021-07-30 PROCEDURE — 74011000636 HC RX REV CODE- 636: Performed by: INTERNAL MEDICINE

## 2021-07-30 PROCEDURE — 80076 HEPATIC FUNCTION PANEL: CPT

## 2021-07-30 PROCEDURE — C1874 STENT, COATED/COV W/DEL SYS: HCPCS | Performed by: INTERNAL MEDICINE

## 2021-07-30 PROCEDURE — 74011250636 HC RX REV CODE- 250/636: Performed by: INTERNAL MEDICINE

## 2021-07-30 DEVICE — XIENCE SIERRA™ EVEROLIMUS ELUTING CORONARY STENT SYSTEM 2.75 MM X 15 MM / RAPID-EXCHANGE
Type: IMPLANTABLE DEVICE | Status: FUNCTIONAL
Brand: XIENCE SIERRA™

## 2021-07-30 RX ORDER — BUDESONIDE 0.5 MG/2ML
500 INHALANT ORAL
Status: DISCONTINUED | OUTPATIENT
Start: 2021-07-30 | End: 2021-08-02 | Stop reason: HOSPADM

## 2021-07-30 RX ORDER — SODIUM CHLORIDE 0.9 % (FLUSH) 0.9 %
5-40 SYRINGE (ML) INJECTION AS NEEDED
Status: DISCONTINUED | OUTPATIENT
Start: 2021-07-30 | End: 2021-08-02 | Stop reason: HOSPADM

## 2021-07-30 RX ORDER — OLMESARTAN MEDOXOMIL 20 MG/1
20 TABLET ORAL DAILY
COMMUNITY
End: 2021-08-02

## 2021-07-30 RX ORDER — MORPHINE SULFATE 2 MG/ML
INJECTION, SOLUTION INTRAMUSCULAR; INTRAVENOUS
Status: COMPLETED
Start: 2021-07-30 | End: 2021-07-30

## 2021-07-30 RX ORDER — ACETAMINOPHEN 325 MG/1
650 TABLET ORAL
Status: DISCONTINUED | OUTPATIENT
Start: 2021-07-30 | End: 2021-07-30

## 2021-07-30 RX ORDER — METOPROLOL TARTRATE 25 MG/1
25 TABLET, FILM COATED ORAL EVERY 6 HOURS
Status: DISCONTINUED | OUTPATIENT
Start: 2021-07-31 | End: 2021-07-31

## 2021-07-30 RX ORDER — SODIUM CHLORIDE 0.9 % (FLUSH) 0.9 %
5-40 SYRINGE (ML) INJECTION EVERY 8 HOURS
Status: DISCONTINUED | OUTPATIENT
Start: 2021-07-30 | End: 2021-08-02 | Stop reason: HOSPADM

## 2021-07-30 RX ORDER — NITROGLYCERIN 0.4 MG/1
0.4 TABLET SUBLINGUAL
Status: DISCONTINUED | OUTPATIENT
Start: 2021-07-30 | End: 2021-08-02 | Stop reason: HOSPADM

## 2021-07-30 RX ORDER — HEPARIN SODIUM 5000 [USP'U]/ML
70 INJECTION, SOLUTION INTRAVENOUS; SUBCUTANEOUS ONCE
Status: CANCELLED | OUTPATIENT
Start: 2021-07-30 | End: 2021-07-30

## 2021-07-30 RX ORDER — ATROPINE SULFATE 1 MG/ML
0.5 INJECTION, SOLUTION INTRAVENOUS AS NEEDED
Status: DISCONTINUED | OUTPATIENT
Start: 2021-07-30 | End: 2021-08-02 | Stop reason: HOSPADM

## 2021-07-30 RX ORDER — GUAIFENESIN 100 MG/5ML
325 LIQUID (ML) ORAL
Status: DISPENSED | OUTPATIENT
Start: 2021-07-30 | End: 2021-07-30

## 2021-07-30 RX ORDER — ACETAMINOPHEN 650 MG/1
650 SUPPOSITORY RECTAL
Status: DISCONTINUED | OUTPATIENT
Start: 2021-07-30 | End: 2021-07-30

## 2021-07-30 RX ORDER — SODIUM CHLORIDE 9 MG/ML
75 INJECTION, SOLUTION INTRAVENOUS CONTINUOUS
Status: DISCONTINUED | OUTPATIENT
Start: 2021-07-30 | End: 2021-07-30

## 2021-07-30 RX ORDER — HEPARIN SODIUM 1000 [USP'U]/ML
INJECTION, SOLUTION INTRAVENOUS; SUBCUTANEOUS AS NEEDED
Status: DISCONTINUED | OUTPATIENT
Start: 2021-07-30 | End: 2021-07-30 | Stop reason: HOSPADM

## 2021-07-30 RX ORDER — HEPARIN SODIUM 1000 [USP'U]/ML
5000 INJECTION, SOLUTION INTRAVENOUS; SUBCUTANEOUS ONCE
Status: COMPLETED | OUTPATIENT
Start: 2021-07-30 | End: 2021-07-30

## 2021-07-30 RX ORDER — ATROPINE SULFATE 1 MG/ML
0.6 INJECTION, SOLUTION INTRAVENOUS
Status: DISPENSED | OUTPATIENT
Start: 2021-07-30 | End: 2021-07-31

## 2021-07-30 RX ORDER — CLOPIDOGREL BISULFATE 75 MG/1
75 TABLET ORAL DAILY
Status: DISCONTINUED | OUTPATIENT
Start: 2021-07-31 | End: 2021-08-02 | Stop reason: HOSPADM

## 2021-07-30 RX ORDER — METOPROLOL TARTRATE 25 MG/1
25 TABLET, FILM COATED ORAL EVERY 6 HOURS
Status: DISCONTINUED | OUTPATIENT
Start: 2021-07-30 | End: 2021-07-30

## 2021-07-30 RX ORDER — PROMETHAZINE HYDROCHLORIDE 12.5 MG/1
12.5 TABLET ORAL
Status: DISCONTINUED | OUTPATIENT
Start: 2021-07-30 | End: 2021-08-02 | Stop reason: HOSPADM

## 2021-07-30 RX ORDER — GUAIFENESIN 100 MG/5ML
81 LIQUID (ML) ORAL DAILY
Status: DISCONTINUED | OUTPATIENT
Start: 2021-07-31 | End: 2021-08-02 | Stop reason: HOSPADM

## 2021-07-30 RX ORDER — MIDAZOLAM HYDROCHLORIDE 1 MG/ML
INJECTION, SOLUTION INTRAMUSCULAR; INTRAVENOUS AS NEEDED
Status: DISCONTINUED | OUTPATIENT
Start: 2021-07-30 | End: 2021-07-30 | Stop reason: HOSPADM

## 2021-07-30 RX ORDER — POTASSIUM CHLORIDE 20 MEQ/1
20 TABLET, EXTENDED RELEASE ORAL DAILY
Status: DISCONTINUED | OUTPATIENT
Start: 2021-07-30 | End: 2021-08-02 | Stop reason: HOSPADM

## 2021-07-30 RX ORDER — OLMESARTAN MEDOXOMIL 20 MG/1
20 TABLET ORAL DAILY
Status: DISCONTINUED | OUTPATIENT
Start: 2021-07-30 | End: 2021-08-02 | Stop reason: HOSPADM

## 2021-07-30 RX ORDER — ACETAMINOPHEN 325 MG/1
650 TABLET ORAL
Status: DISCONTINUED | OUTPATIENT
Start: 2021-07-30 | End: 2021-08-02 | Stop reason: HOSPADM

## 2021-07-30 RX ORDER — TEMAZEPAM 15 MG/1
15 CAPSULE ORAL
Status: ACTIVE | OUTPATIENT
Start: 2021-07-30 | End: 2021-07-31

## 2021-07-30 RX ORDER — FENTANYL CITRATE 50 UG/ML
INJECTION, SOLUTION INTRAMUSCULAR; INTRAVENOUS AS NEEDED
Status: DISCONTINUED | OUTPATIENT
Start: 2021-07-30 | End: 2021-07-30 | Stop reason: HOSPADM

## 2021-07-30 RX ORDER — ROSUVASTATIN CALCIUM 20 MG/1
20 TABLET, COATED ORAL DAILY
Status: DISCONTINUED | OUTPATIENT
Start: 2021-07-30 | End: 2021-08-02 | Stop reason: HOSPADM

## 2021-07-30 RX ORDER — HEPARIN SODIUM 10000 [USP'U]/100ML
18-36 INJECTION, SOLUTION INTRAVENOUS
Status: DISCONTINUED | OUTPATIENT
Start: 2021-07-30 | End: 2021-07-30

## 2021-07-30 RX ORDER — DOCUSATE SODIUM 100 MG/1
100 CAPSULE, LIQUID FILLED ORAL 2 TIMES DAILY
Status: DISCONTINUED | OUTPATIENT
Start: 2021-07-30 | End: 2021-07-30

## 2021-07-30 RX ORDER — LEVOTHYROXINE SODIUM 25 UG/1
25 TABLET ORAL
Status: DISCONTINUED | OUTPATIENT
Start: 2021-07-30 | End: 2021-08-02 | Stop reason: HOSPADM

## 2021-07-30 RX ORDER — ONDANSETRON 2 MG/ML
4 INJECTION INTRAMUSCULAR; INTRAVENOUS
Status: DISCONTINUED | OUTPATIENT
Start: 2021-07-30 | End: 2021-08-02 | Stop reason: HOSPADM

## 2021-07-30 RX ORDER — SODIUM CHLORIDE 9 MG/ML
75 INJECTION, SOLUTION INTRAVENOUS CONTINUOUS
Status: DISCONTINUED | OUTPATIENT
Start: 2021-07-30 | End: 2021-08-02 | Stop reason: HOSPADM

## 2021-07-30 RX ORDER — EPTIFIBATIDE 2 MG/ML
INJECTION, SOLUTION INTRAVENOUS AS NEEDED
Status: DISCONTINUED | OUTPATIENT
Start: 2021-07-30 | End: 2021-07-30 | Stop reason: HOSPADM

## 2021-07-30 RX ORDER — GUAIFENESIN 100 MG/5ML
81 LIQUID (ML) ORAL DAILY
Status: DISCONTINUED | OUTPATIENT
Start: 2021-07-30 | End: 2021-07-30

## 2021-07-30 RX ORDER — OXYCODONE AND ACETAMINOPHEN 5; 325 MG/1; MG/1
1-2 TABLET ORAL
Status: DISPENSED | OUTPATIENT
Start: 2021-07-30 | End: 2021-07-31

## 2021-07-30 RX ORDER — ALLOPURINOL 100 MG/1
100 TABLET ORAL DAILY
Status: DISCONTINUED | OUTPATIENT
Start: 2021-07-30 | End: 2021-08-02 | Stop reason: HOSPADM

## 2021-07-30 RX ORDER — POLYETHYLENE GLYCOL 3350 17 G/17G
17 POWDER, FOR SOLUTION ORAL DAILY PRN
Status: DISCONTINUED | OUTPATIENT
Start: 2021-07-30 | End: 2021-07-31

## 2021-07-30 RX ORDER — ADHESIVE BANDAGE
30 BANDAGE TOPICAL DAILY PRN
Status: DISCONTINUED | OUTPATIENT
Start: 2021-07-30 | End: 2021-07-30

## 2021-07-30 RX ORDER — LIDOCAINE HYDROCHLORIDE 10 MG/ML
10 INJECTION, SOLUTION EPIDURAL; INFILTRATION; INTRACAUDAL; PERINEURAL
Status: ACTIVE | OUTPATIENT
Start: 2021-07-30 | End: 2021-07-31

## 2021-07-30 RX ORDER — MORPHINE SULFATE 2 MG/ML
2 INJECTION, SOLUTION INTRAMUSCULAR; INTRAVENOUS ONCE
Status: COMPLETED | OUTPATIENT
Start: 2021-07-30 | End: 2021-07-30

## 2021-07-30 RX ORDER — HEPARIN SODIUM 1000 [USP'U]/ML
INJECTION, SOLUTION INTRAVENOUS; SUBCUTANEOUS
Status: COMPLETED
Start: 2021-07-30 | End: 2021-07-30

## 2021-07-30 RX ORDER — LIDOCAINE HYDROCHLORIDE 10 MG/ML
INJECTION, SOLUTION EPIDURAL; INFILTRATION; INTRACAUDAL; PERINEURAL AS NEEDED
Status: DISCONTINUED | OUTPATIENT
Start: 2021-07-30 | End: 2021-07-30 | Stop reason: HOSPADM

## 2021-07-30 RX ORDER — ENOXAPARIN SODIUM 100 MG/ML
1 INJECTION SUBCUTANEOUS EVERY 12 HOURS
Status: DISCONTINUED | OUTPATIENT
Start: 2021-07-30 | End: 2021-07-31

## 2021-07-30 RX ORDER — HEPARIN SODIUM 5000 [USP'U]/ML
5000 INJECTION, SOLUTION INTRAVENOUS; SUBCUTANEOUS EVERY 8 HOURS
Status: DISCONTINUED | OUTPATIENT
Start: 2021-07-30 | End: 2021-07-30

## 2021-07-30 RX ADMIN — TICAGRELOR 90 MG: 90 TABLET ORAL at 17:17

## 2021-07-30 RX ADMIN — FAMOTIDINE 20 MG: 10 INJECTION INTRAVENOUS at 09:30

## 2021-07-30 RX ADMIN — Medication 10 ML: at 07:30

## 2021-07-30 RX ADMIN — ROSUVASTATIN CALCIUM 20 MG: 20 TABLET, COATED ORAL at 09:30

## 2021-07-30 RX ADMIN — OXYCODONE AND ACETAMINOPHEN 1 TABLET: 5; 325 TABLET ORAL at 08:08

## 2021-07-30 RX ADMIN — SODIUM CHLORIDE 75 ML/HR: 900 INJECTION, SOLUTION INTRAVENOUS at 06:39

## 2021-07-30 RX ADMIN — LEVOTHYROXINE SODIUM 25 MCG: 25 TABLET ORAL at 06:32

## 2021-07-30 RX ADMIN — HEPARIN SODIUM 5000 UNITS: 1000 INJECTION, SOLUTION INTRAVENOUS; SUBCUTANEOUS at 03:31

## 2021-07-30 RX ADMIN — ENOXAPARIN SODIUM 90 MG: 100 INJECTION SUBCUTANEOUS at 18:25

## 2021-07-30 RX ADMIN — METOPROLOL TARTRATE 25 MG: 25 TABLET, FILM COATED ORAL at 06:32

## 2021-07-30 RX ADMIN — ONDANSETRON 4 MG: 2 INJECTION INTRAMUSCULAR; INTRAVENOUS at 07:22

## 2021-07-30 RX ADMIN — PERFLUTREN 2 ML: 6.52 INJECTION, SUSPENSION INTRAVENOUS at 13:53

## 2021-07-30 RX ADMIN — SODIUM CHLORIDE 75 ML/HR: 900 INJECTION, SOLUTION INTRAVENOUS at 11:00

## 2021-07-30 RX ADMIN — Medication 10 ML: at 23:16

## 2021-07-30 RX ADMIN — BUDESONIDE 500 MCG: 0.5 SUSPENSION RESPIRATORY (INHALATION) at 08:45

## 2021-07-30 RX ADMIN — OXYCODONE AND ACETAMINOPHEN 1 TABLET: 5; 325 TABLET ORAL at 12:25

## 2021-07-30 RX ADMIN — MORPHINE SULFATE 2 MG: 2 INJECTION, SOLUTION INTRAMUSCULAR; INTRAVENOUS at 05:49

## 2021-07-30 RX ADMIN — Medication 10 ML: at 18:30

## 2021-07-30 RX ADMIN — BUDESONIDE 500 MCG: 0.5 SUSPENSION RESPIRATORY (INHALATION) at 20:07

## 2021-07-30 RX ADMIN — ALLOPURINOL 100 MG: 100 TABLET ORAL at 12:41

## 2021-07-30 RX ADMIN — HEPARIN SODIUM 5000 UNITS: 5000 INJECTION INTRAVENOUS; SUBCUTANEOUS at 12:37

## 2021-07-30 RX ADMIN — SODIUM CHLORIDE 500 ML: 900 INJECTION, SOLUTION INTRAVENOUS at 17:00

## 2021-07-30 RX ADMIN — OXYCODONE AND ACETAMINOPHEN 1 TABLET: 5; 325 TABLET ORAL at 20:55

## 2021-07-30 RX ADMIN — METOPROLOL TARTRATE 25 MG: 25 TABLET, FILM COATED ORAL at 12:37

## 2021-07-30 RX ADMIN — FAMOTIDINE 20 MG: 10 INJECTION INTRAVENOUS at 17:17

## 2021-07-30 RX ADMIN — Medication 10 ML: at 06:36

## 2021-07-30 RX ADMIN — SODIUM CHLORIDE 500 ML: 900 INJECTION, SOLUTION INTRAVENOUS at 16:00

## 2021-07-30 RX ADMIN — PROMETHAZINE HYDROCHLORIDE 12.5 MG: 12.5 TABLET ORAL at 08:08

## 2021-07-30 NOTE — PROGRESS NOTES
0500: Received report on pt. from Cardiac Cath Lab.    0515: Pt. Arrived in room 2308 with Cath lab team. Right femoral vein sheath in place; arterial site with angeioseal in place. Dressings C/D/I. No signs of hematoma. Cardiac Cath Lab Team to return around 7am to remove RFV sheath. 0540: Assessment completed. Pt. A&O x4, anxious. Repeatedly asking to get up or move right leg. RN reminded pt of bedrest orders and why. VSS. HR NSR, in 70's. On 2L NC. Pt. c/o 10/10 right hip/lower back pain. MD paged, orders received, and implemented. Purewick placed and pt. voided 200cc, clear, yellow urine. 0710: Bedside and Verbal shift change report given to RANJAN Patton (oncoming nurse) by Aldo Cope RN (offgoing nurse). Report included the following information SBAR, Kardex, ED Summary, OR Summary, Procedure Summary, Intake/Output, MAR, Cardiac Rhythm NSR and Alarm Parameters . 0720: Pt. C/o nausea and had large emesis. PRN Zofran given. 0725: Cardiac Cath lab team at bedside to remove RFV sheath.

## 2021-07-30 NOTE — PROGRESS NOTES
Discussed with RN and patient does have 1 peripheral IV access through which patient is getting IV fluids. Staff are unable to establish a second IV. Systolic blood pressure has shown improvement and is in the 90s now. Will discontinue IV heparin and instead start subcu Lovenox for the acute DVT.   Discussed with RN

## 2021-07-30 NOTE — ED TRIAGE NOTES
Patient A/O x 4, presented to the ED with complaint of chest pain x 2 days. Patient denies SOB, N/V, abdominal pain. Patient received aspirin 324 mg tab via medic.

## 2021-07-30 NOTE — Clinical Note
TRANSFER - IN REPORT:     Verbal report received from: ER RN. Report consisted of patient's Situation, Background, Assessment and   Recommendations(SBAR). Opportunity for questions and clarification was provided. Assessment completed upon patient's arrival to unit and care assumed. Patient transported with a Registered Nurse.

## 2021-07-30 NOTE — Clinical Note
TRANSFER - OUT REPORT:     Verbal report given to: Daniel Webster. Report consisted of patient's Situation, Background, Assessment and   Recommendations(SBAR). Opportunity for questions and clarification was provided. Patient transported with a Cardiac Cath Tech / Patient Care Tech and Monitor. Patient transported to: T Stepdown, 2308.

## 2021-07-30 NOTE — PROGRESS NOTES
conducted an initial consultation and Spiritual Assessment for Denisha, who is a 64 y.o.,female. Patient's Primary Language is: [place]. According to the patient's EMR Orthodoxy Affiliation is: [place]. The reason the Patient came to the hospital is:   Patient Active Problem List    Diagnosis Date Noted    STEMI (ST elevation myocardial infarction) ([facility] [address] 07/30/2021    History of pulmonary embolism 07/30/2021    History of DVT (deep vein thrombosis) 07/30/2021    Chronic diastolic congestive heart failure ([address] 07/30/2021    Pulmonary embolism without acute cor pulmonale (HCC) 03/10/2021    Chronic bronchitis with productive mucopurulent cough ([address] 03/10/2021    Abnormal chest CT 02/06/2020    Chronic cough 02/06/2020    CAP (community acquired pneumonia) 07/22/2019    Sepsis ([address] 07/22/2019    Hypotension 07/22/2019    Atrial fibrillation with RVR ([address] 07/22/2019    MCBRIDE (dyspnea on exertion) 02/28/2019    Obesity, morbid ([address] 01/29/2019    High cholesterol 01/11/2019    Hypertension 01/11/2019    Hemochromatosis type 1 ([address] 01/11/2019    Sacroiliitis ([facility] [address] 12/31/2013    Spinal stenosis 11/27/2013    Shoulder impingement, right, with rotator cuff strain         The  provided the following Interventions:  Initiated a relationship of care and support. Explored issues of elayne, belief, spirituality and Rastafari/ritual needs while hospitalized. Listened empathically. Provided chaplaincy education. Provided information about Spiritual Care Services. Offered prayer and assurance of continued prayers on patient's behalf. Chart reviewed. The following outcomes where achieved:  Patient shared limited information about both their medical narrative and spiritual journey/beliefs.  confirmed Patient's Orthodoxy Affiliation. Patient processed feeling about current hospitalization.   Patient expressed gratitude for 's visit. Assessment:  Patient does not have any Temple/cultural needs that will affect patient's preferences in health care. There are no spiritual or Temple issues which require intervention at this time. Plan:  Chaplains will continue to follow and will provide pastoral care on an as needed/requested basis.  recommends bedside caregivers page  on duty if patient shows signs of acute spiritual or emotional distress.     [facility] [address]   [phone]

## 2021-07-30 NOTE — PROGRESS NOTES
1500: Pt returned from vascular lab/Echo dept to CVT ICU Rm#[id number], initial post procedure VS: 82/47 MAP 58 NSR 69bpm with rare PACs' Room air O2 sat 94% RR 17bpm. Makenzie Garcia and Alli Polo at pt bedside 500cc NS IVPB ordered. Attempts luis m insert PIV by writeotilia and Lucinda Nguyen unsuccessful-Dr. Izquierdo notified and order for midline catheter and anesthesia order for EJ line if midline not able to be placed today- Dr. Jonathan Garcia notified physicians. 1600: Repeat VSS after 500ml NSS infused IV with BP 81/50 MAP 57mmHg,  at bedside- continue IV NSS at 75cc/hr IV infusion, no further intervention at this time per Makenzie Garcia and Dr. Alli Polo. Pt asymptomatic, denies lightheadedness. 1630: Dr. Jonathan Garcia ordered additional 500cc NSS bolus x1, administered with repeat BP 92/53 MAP 66mmHg, NSR 66bpm with RR 20 O2 saturation 95% on room air. 1640: STEPHANIE Barnes inserted #22g PIV left and #22g PIV right wrist.  1830: pt ambulated from chair to bed, pt callbell within reach. Pt stated \"I feel much better, my hip hardly even hurts at all. Pt talking on phone.

## 2021-07-30 NOTE — Clinical Note
Lesion located in the Proximal RCA. Balloon inserted. Balloon inflated using multiple inflation technique. Lesion #1: Pressure = 10 jun; Duration = 21 sec. Inflation 2: Pressure = 12 jun; Duration = 16 sec.

## 2021-07-30 NOTE — PROGRESS NOTES
RN states that patient does not have an IV access. Discussed with cardiologist.  Discussed with interventional radiology Tylor Prakash. The nurse manager for the floor is going to try to establish an IV. I have also called and discussed with anesthesia. I have also called and discussed with the nursing supervisor.

## 2021-07-30 NOTE — H&P
History & Physical    Patient: Sung Moore MRN: [id number]  CSN: [id number]    YOB: 1945  Age: 64 y.o. Sex: female      DOA: 7/30/2021    Chief Complaint:   Chief Complaint   Patient presents with    Chest Pain          HPI:     Sung Moore is a 64 y.o.  female with hx of acquired hypothyroidism, gout, hyperlipidemia, hypertension, chronic back pain with stimulator in place. Code STEMI called for patient by ED helen. Patient began experiencing chest pain approximately 12 hours ago. Chest pain is left-sided sharp and nonradiating in nature. No radiation into shoulder or jaw. No associated shortness of breath, nausea/vomiting, or abdominal pain. She has no documented cardiac history. She denies history of myocardial infarction and heart failure. During interview patient appeared comfortable. She was able to speak in full sentences without becoming short of breath. She is endorsing current chest pain. [facility] Past [facility] History:   Diagnosis Date    Abnormal chest CT 2/6/2020    Adopted     Arthritis     Balance problems     Chronic cough 2/6/2020    MCBRIDE (dyspnea on exertion) 2/28/2019    GERD (gastroesophageal reflux disease)     Gout     Hemochromatosis     High cholesterol     Hypertension     Hypothyroidism     Left knee pain     Left shoulder pain     Low blood potassium     Lumbar pain     Pain of left sacroiliac joint     Shoulder impingement, right, with rotator cuff strain        Past Surgical History:   Procedure Laterality Date    HX CHOLECYSTECTOMY      HX KNEE REPLACEMENT Bilateral     R knee/ Lknee and femur    HX ORTHOPAEDIC      R trigger finger     HX OTHER SURGICAL      Right little finger    HX OTHER SURGICAL      Right wrist, replaced unlnar).     HX OTHER SURGICAL      Both knees    HX OTHER SURGICAL      Left femur    HX OTHER SURGICAL      Trigger finger surgery    HX OTHER SURGICAL Left knee replacment revision     HX ROTATOR CUFF REPAIR Right     HX TONSILLECTOMY         Family History   Adopted: Yes       Social History     Socioeconomic History    Marital status: SINGLE     Spouse name: Not on file    Number of children: Not on file    Years of education: Not on file    Highest education level: Not on file   Tobacco Use    Smoking status: Former Smoker     Packs/day: 0.50     Years: 2.00     Pack years: 1.00     Start date: 5     Quit date:      Years since quittin.5    Smokeless tobacco: Never Used    Tobacco comment: quit 20 years ago   Vaping Use    Vaping Use: Never used   Substance and Sexual Activity    Alcohol use: No    Drug use: No     Social Determinants of Health     Financial Resource Strain:     Difficulty of Paying Living Expenses:    Food Insecurity:     Worried About Running Out of Food in the Last Year:     Ran Out of Food in the Last Year:    Transportation Needs:     Lack of Transportation (Medical):  Lack of Transportation (Non-Medical):    Physical Activity:     Days of Exercise per Week:     Minutes of Exercise per Session:    Stress:     Feeling of Stress :    Social Connections:     Frequency of Communication with Friends and Family:     Frequency of Social Gatherings with Friends and Family:     Attends Confucianist Services:     Active Member of Clubs or Organizations:     Attends Club or Organization Meetings:     Marital Status:        Prior to Admission medications    Medication Sig Start Date End Date Taking? Authorizing Provider   albuterol (PROVENTIL HFA, VENTOLIN HFA, PROAIR HFA) 90 mcg/actuation inhaler Take 2 Puffs by inhalation every four (4) hours as needed for Wheezing.  21   Ravi POST NP   furosemide (LASIX) 20 mg tablet TAKE 1 TABLET BY MOUTH EVERY DAY 7/10/21   Ravi POST NP   Klor-Con M20 20 mEq tablet TAKE 1 TABLET BY MOUTH EVERY DAY  Patient not taking: Reported on 2021   Elvia Mota, Celina POST NP   rosuvastatin (CRESTOR) 5 mg tablet TAKE 1 TABLET BY MOUTH EVERY DAY AT NIGHT 5/17/21   Conchetta Smoker T, NP   allopurinoL (ZYLOPRIM) 100 mg tablet Take 1 Tab by mouth daily. 5/4/21   Conchetta Smoker T, NP   budesonide (PULMICORT) 0.5 mg/2 mL nbsp 2 mL by Nebulization route two (2) times a day. File under Medicare Part B, ICD J47.0, J45.909 4/15/21   Conchetta Smoker T, NP   budesonide-formoteroL (Symbicort) 160-4.5 mcg/actuation HFAA Take 2 Puffs by inhalation. Provider, Historical   oxyCODONE IR (ROXICODONE) 5 mg immediate release tablet Take 5 mg by mouth three (3) times daily as needed. Provider, Historical   levothyroxine (SYNTHROID) 25 mcg tablet TAKE 1 TAB BY MOUTH DAILY 8/10/20   Dilan Moreland MD   albuterol (PROVENTIL VENTOLIN) 2.5 mg /3 mL (0.083 %) nebu 3 mL by Nebulization route every six (6) hours as needed for Wheezing or Shortness of Breath. 1/7/20   Dino Chavez NP   acetaminophen (TYLENOL) 325 mg tablet Take 2 Tabs by mouth every six (6) hours as needed for Pain or Fever. Patient taking differently: Take 500 mg by mouth every six (6) hours as needed for Pain or Fever. 7/25/19   Roger Pleitez NP   inhalational spacing device (ACE AEROSOL CLOUD ENHANCER) 1 Each by Does Not Apply route as needed. 2/14/19   Charlie Lauren MD   cholecalciferol, vitamin D3, 2,000 unit tab TAKE 1 TABLET BY MOUTH EVERY DAY 1/11/19   Provider, Historical   Omeprazole delayed release (PRILOSEC D/R) 20 mg tablet Take 20 mg by mouth daily. Patient not taking: Reported on 7/13/2021    Provider, Historical       Allergies   Allergen Reactions    Ciprofloxacin Other (comments)     Achilles tendonitis/leg pain and swelling    Indomethacin Anaphylaxis, Hives and Swelling    Tomato Other (comments)     Heart burn         Review of Systems  GENERAL: No fever, chills, malaise, weight changes  HEENT: No change in vision, no earache, tinnitus, sore throat or sinus congestion.    NECK: No pain or stiffness. PULMONARY: No shortness of breath, cough or wheeze. Cardiovascular: no pnd or orthopnea, +CP  GASTROINTESTINAL: No abdominal pain, nausea, vomiting or diarrhea, melena or bright red blood per rectum. GENITOURINARY: +urinary frequency, +urgency, +incontinence, no hesitancy or dysuria. MUSCULOSKELETAL: No joint or muscle pain, +back pain, no recent trauma. DERMATOLOGIC: No rash, no itching, no lesions. ENDOCRINE: No polyuria, polydipsia, no heat or cold intolerance. No recent change in weight. HEMATOLOGICAL: No anemia or easy bruising or bleeding. NEUROLOGIC: No headache, seizures, numbness, tingling or weakness. Physical Exam:     Physical Exam:  Visit Vitals  BP (!) 136/109 (BP 1 Location: Left upper arm, BP Patient Position: At rest)   Pulse 86   Temp 97.9 °F (36.6 °C)   Resp 20   Wt 93 kg (205 lb)   SpO2 99%   BMI 38.73 kg/m²      O2 Device: None (Room air)    Temp (24hrs), Av.9 °F (36.6 °C), Min:97.9 °F (36.6 °C), Max:97.9 °F (36.6 °C)    No intake/output data recorded. No intake/output data recorded. General:  Alert, cooperative, worried appearing, appears stated age. Head: Normocephalic, without obvious abnormality, atraumatic. Eyes:  Conjunctivae/corneas clear. PERRL, EOMs intact. Nose: Nares normal. No drainage or sinus tenderness. Neck: Supple, symmetrical, trachea midline, no adenopathy, thyroid: no enlargement, no carotid bruit and no JVD. Lungs:   Clear to auscultation bilaterally. Heart:  Regular rate, irregular rhythm, Systolic murmur present. Abdomen: Soft, non-tender. Bowel sounds normal.    Extremities: Extremities normal, atraumatic, no cyanosis or edema. Pulses: 2+ and symmetric all extremities. Skin:  No rashes or lesions   Neurologic: AAOx3, No focal motor or sensory deficit. Labs Reviewed: All lab results for the last 24 hours reviewed.   Recent Results (from the past 24 hour(s))   EKG, 12 LEAD, INITIAL Collection Time: 07/30/21  2:48 AM   Result Value Ref Range    Ventricular Rate 90 BPM    Atrial Rate 90 BPM    P-R Interval 184 ms    QRS Duration 54 ms    Q-T Interval 308 ms    QTC Calculation (Bezet) 376 ms    Calculated P Axis 74 degrees    Calculated R Axis -23 degrees    Calculated T Axis 31 degrees    Diagnosis       Normal sinus rhythm  Minimal voltage criteria for LVH, may be normal variant  Septal infarct (cited on or before 27-SEP-2013)  Inferior infarct , possibly acute  ** ** ACUTE MI / STEMI ** **  Consider right ventricular involvement in acute inferior infarct  Abnormal ECG  When compared with ECG of 13-APR-2021 10:59,  Inferior infarct is now present  Questionable change in initial forces of Anteroseptal leads  ST elevation now present in Inferior leads     LIPID PANEL    Collection Time: 07/30/21  3:05 AM   Result Value Ref Range    LIPID PROFILE          Cholesterol, total 136 <200 MG/DL    Triglyceride 86 <150 MG/DL    HDL Cholesterol 59 40 - 60 MG/DL    LDL, calculated 59.8 0 - 100 MG/DL    VLDL, calculated 17.2 MG/DL    CHOL/HDL Ratio 2.3 0 - 5.0     CBC WITH AUTOMATED DIFF    Collection Time: 07/30/21  3:05 AM   Result Value Ref Range    WBC 10.8 4.6 - 13.2 K/uL    RBC 4.22 4.20 - 5.30 M/uL    HGB 14.4 12.0 - 16.0 g/dL    HCT 42.2 35.0 - 45.0 %    .0 (H) 74.0 - 97.0 FL    MCH 34.1 (H) 24.0 - 34.0 PG    MCHC 34.1 31.0 - 37.0 g/dL    RDW 14.5 11.6 - 14.5 %    PLATELET 709 678 - 518 K/uL    MPV 11.3 9.2 - 11.8 FL    NEUTROPHILS 78 (H) 40 - 73 %    LYMPHOCYTES 10 (L) 21 - 52 %    MONOCYTES 11 (H) 3 - 10 %    EOSINOPHILS 0 0 - 5 %    BASOPHILS 0 0 - 2 %    ABS. NEUTROPHILS 8.4 (H) 1.8 - 8.0 K/UL    ABS. LYMPHOCYTES 1.1 0.9 - 3.6 K/UL    ABS. MONOCYTES 1.2 0.05 - 1.2 K/UL    ABS. EOSINOPHILS 0.0 0.0 - 0.4 K/UL    ABS.  BASOPHILS 0.0 0.0 - 0.1 K/UL    DF AUTOMATED     METABOLIC PANEL, BASIC    Collection Time: 07/30/21  3:05 AM   Result Value Ref Range    Sodium 139 136 - 145 mmol/L Potassium 3.4 (L) 3.5 - 5.5 mmol/L    Chloride 105 100 - 111 mmol/L    CO2 28 21 - 32 mmol/L    Anion gap 6 3.0 - 18 mmol/L    Glucose 127 (H) 74 - 99 mg/dL    BUN 10 7.0 - 18 MG/DL    Creatinine 0.85 0.6 - 1.3 MG/DL    BUN/Creatinine ratio 12 12 - 20      GFR est AA >60 >60 ml/min/1.73m2    GFR est non-AA >60 >60 ml/min/1.73m2    Calcium 9.2 8.5 - 10.1 MG/DL   TROPONIN I    Collection Time: 07/30/21  3:05 AM   Result Value Ref Range    Troponin-I, QT 13.20 (HH) 0.0 - 0.045 NG/ML   PROTHROMBIN TIME + INR    Collection Time: 07/30/21  3:05 AM   Result Value Ref Range    Prothrombin time 13.0 11.5 - 15.2 sec    INR 1.0 0.8 - 1.2     HEPATIC FUNCTION PANEL    Collection Time: 07/30/21  3:05 AM   Result Value Ref Range    Protein, total 7.8 6.4 - 8.2 g/dL    Albumin 3.5 3.4 - 5.0 g/dL    Globulin 4.3 (H) 2.0 - 4.0 g/dL    A-G Ratio 0.8 0.8 - 1.7      Bilirubin, total 1.1 (H) 0.2 - 1.0 MG/DL    Bilirubin, direct 0.4 (H) 0.0 - 0.2 MG/DL    Alk. phosphatase 90 45 - 117 U/L    AST (SGOT) 65 (H) 10 - 38 U/L    ALT (SGPT) 23 13 - 56 U/L   TSH 3RD GENERATION    Collection Time: 07/30/21  3:05 AM   Result Value Ref Range    TSH 2.57 0.36 - 3.74 uIU/mL        XR Results (most recent):  Results from [place] encounter on 04/19/21    NC XR TECHNOLOGIST SERVICE    Narrative  Fluoroscopy was provided for a bundled exam for documentation purposes. Impression  Please see above. FLUORO TIME: 00.45    AJB        CT Results  (Last 48 hours)    None            Procedures/imaging: see electronic medical records for all procedures/Xrays and details which were not copied into this note but were reviewed prior to creation of Plan      Assessment/Plan     Active Problems:    STEMI (ST elevation myocardial infarction) ([facility] [address] (7/30/2021)       Assessment  1. STEMI  2. HTN  3. Hyperlipidemia  4. Gout  5. Hypothyroidism   6. Mild hypokalemia     Plan  1. STEMI: Code STEMI called in ED; patient to Cath lab.  Continue ASA, metoprolol, olmesartan. Increased statin from 5 mg to 20 mg. Lipid panel. 2. HTN: continue home medications. 3. HLD: lipid panel ordered. Statin increased. 4. Gout: continue allopurinol  5. Hypothyroidism: TSH within normal range. Continue home dose synthroid. 6. Hypokalemia: daily repletion. 7. Monitor vital signs, weight per unit protocol  8. PT, OT eval and treat  9. Consult CM to assist w/ dc planning  10. Cardiac rehab       Diet: NPO   DVT/GI Prophylaxis: Hep SQ  Code Status: FULL      Discussed with patient at bedside about hospital admission and plan care. He understands and agrees. All questions answered. Disclaimer: Sections of this note are dictated using utilizing voice recognition software. Minor typographical errors may be present. If questions arise, please do not hesitate to contact me or call our department.         Tiffany Harden, DO  [facility]

## 2021-07-30 NOTE — Clinical Note
Lesion located in the Proximal RCA. Balloon inserted. Balloon inflated using multiple inflation technique. Lesion #1: Pressure = 21 jun; Duration = 21 sec. Inflation 2: Pressure = 18 jun; Duration = 13 sec.

## 2021-07-30 NOTE — PROGRESS NOTES
0710: Bedside and Verbal shift change report given to writer by Pk Pa RN. Report included the following information ED Summary, Procedure Summary, Intake/Output, MAR, Accordion, Recent Results, Med Rec Status, Cardiac Rhythm ., Alarm Parameters , Procedure Verification, Quality Measures and Dual Neuro Assessment. 0720: Pt complaining of nausea, vomited, medicated with Zofran by AMBER Patel RN.  0725: Cardiac cath team at bedside to remove RFV 4Fr sheath.

## 2021-07-30 NOTE — Clinical Note
Lesion located in the Proximal RCA. Balloon inserted. Balloon inflated using single inflation technique. Lesion #1: Pressure = 12 jun; Duration = 24 sec.

## 2021-07-30 NOTE — PROGRESS NOTES
0700: Bedside and verbal report received from [facility] Report included the following information ED Summary, Procedure Summary, Intake/Output, MAR, Accordion, Recent Results, Med Rec Status, Cardiac Rhythm ., Alarm Parameters , Procedure Verification, Quality Measures and Dual Neuro  0710 patient vomiting PRN med given oral care done   0715 Cath lab tech at bedside RT groin Venous sheath DC'D DSD applied no bleeding noted pulse +  0735  Care assumed see Critical flowsheet.     0900:    1000:REport given to Mariya WOODRUFF    1100:    1200:    1300:    1400:    1500:    1600:    1700:    1800:

## 2021-07-30 NOTE — Clinical Note
Lesion: Located in the Proximal RCA. Stent inserted. First inflation pressure = 16 jun; inflation time: 25 sec.

## 2021-07-30 NOTE — PROGRESS NOTES
Patient was admitted earlier today by my colleague. I saw patient in follow-up. Patient is sitting in a chair in no apparent distress. Patient denies any chest pain shortness of breath or dizziness. Patient is in good spirits. Blood pressure noted to be low on the monitor. Nurse rechecked and blood pressure was still low with a systolic in the low 37A. Cardiologist came by while I was in the room. Lower extremity Dopplers show acute DVT. Discussed with cardiologist and will give a fluid bolus and will start a heparin drip. Patient's pain. Discussed with patient.   Discussed with RN

## 2021-07-30 NOTE — PROGRESS NOTES
Critical Result Notification    Received and verbally repeated the following test results:  Results for Luli Ware (MRN [id number]) as of 7/30/2021 16:17   Ref. Range 7/30/2021 12:03   CK Latest Ref Range: 26 - 192 U/L 249 (H)   CK-MB Index Latest Ref Range: 0.0 - 4.0 % 3.3   CK - MB Latest Ref Range: <3.6 ng/ml 8.3 (H)   Troponin-I, Qt. Latest Ref Range: 0.0 - 0.045 NG/ML 9.98 (HH)   Notified by lab to Wing Romero and Dr Catrachito castro. Additional comments: no new orders.     Reyna Allen RN

## 2021-07-30 NOTE — Clinical Note
Contrast Dose Calculator:   Patient's age: 64.   Patient's sex: Female. Patient weight (kg) = 93. Creatinine level (mg/dL) = 0.92. Creatinine clearance (mL/min): 74.   Contrast concentration (mg/mL) = 300. MACD = 300 mL. Max Contrast dose per Creatinine Cl calculator = 166.5 mL.

## 2021-07-30 NOTE — CONSULTS
Cardiology Consult Note    Consultation request by Brenda Zamudio DO for advice/opinion related to evaluating STEMI of the inferior wall    Date of  Admission: 7/30/2021  2:43 AM   Primary Care Physician:  Evelyne Ag NP    Consulting Cardiologist: Dr. Dr. Desiree Farrell in the ER     Assessment:     Hospital Problems  Date Reviewed: 7/13/2021        Codes Class Noted POA    * (Principal) STEMI (ST elevation myocardial infarction) ([facility] ICD-10-CM: I21.3  ICD-9-CM: 410.90  7/30/2021 Yes        History of pulmonary embolism ICD-10-CM: Z86.711  ICD-9-CM: V12.55  7/30/2021 Unknown        History of DVT (deep vein thrombosis) ICD-10-CM: Z86.718  ICD-9-CM: V12.51  7/30/2021 Unknown        Chronic diastolic congestive heart failure ([facility] ICD-10-CM: I50.32  ICD-9-CM: 428.32, 428.0  7/30/2021 Yes        Obesity, morbid ([facility] ICD-10-CM: E66.01  ICD-9-CM: 278.01  1/29/2019 Yes        High cholesterol ICD-10-CM: E78.00  ICD-9-CM: 272.0  1/11/2019 Yes        Hypertension ICD-10-CM: I10  ICD-9-CM: 401.9  1/11/2019 Yes        Spinal stenosis ICD-10-CM: M48.00  ICD-9-CM: 724.00  11/27/2013 Yes                   Plan:     Emergent cardiac cath and possible PCI. Further plans depending on the findings. Heparin IV 5000 units given in the ER in addition to aspirin by paramedics. History of Present Illness: This is a 64 y.o. female admitted for STEMI (ST elevation myocardial infarction) ([facility].) [I21.3]. Patient complains of: Chest pain starting around 3 PM yesterday and prior to that had intermittent chest pain for 24 to 48 hours. She has chronic dyspnea on exertion and thought it was related to that. She has had chronic severe leg edema for few years. History of bilateral DVT and pulmonary embolism 2 to 3 years ago. No prior cardiac events in the form of angina. Does have chronic's dyspnea with chronic diastolic CHF and gets diuretics. No palpitations loss of consciousness.       Cardiac risk factors: dyslipidemia, sedentary life style, hypertension, post-menopausal      Review of Symptoms:  Except as stated above include:  Constitutional:  negative  Respiratory:  negative  Cardiovascular:  Negative, positive chest pain  Gastrointestinal: negative  Genitourinary:  negative  Musculoskeletal:  Negative, positive edema  Neurological:  Negative  Dermatological:  Negative  Endocrinological: Negative  Psychological:  Negative         Past Medical History:     Past Medical History:   Diagnosis Date    Abnormal chest CT 2020    Adopted     Arthritis     Balance problems     Chronic cough 2020    MCBRIDE (dyspnea on exertion) 2019    GERD (gastroesophageal reflux disease)     Gout     Hemochromatosis     High cholesterol     Hypertension     Hypothyroidism     Left knee pain     Left shoulder pain     Low blood potassium     Lumbar pain     Pain of left sacroiliac joint     Shoulder impingement, right, with rotator cuff strain          Social History:     Social History     Socioeconomic History    Marital status: SINGLE     Spouse name: Not on file    Number of children: Not on file    Years of education: Not on file    Highest education level: Not on file   Tobacco Use    Smoking status: Former Smoker     Packs/day: 0.50     Years: 2.00     Pack years: 1.00     Start date:      Quit date:      Years since quittin.5    Smokeless tobacco: Never Used    Tobacco comment: quit 20 years ago   Vaping Use    Vaping Use: Never used   Substance and Sexual Activity    Alcohol use: No    Drug use: No     Social Determinants of Health     Financial Resource Strain:     Difficulty of Paying Living Expenses:    Food Insecurity:     Worried About Running Out of Food in the Last Year:     Ran Out of Food in the Last Year:    Transportation Needs:     Lack of Transportation (Medical):      Lack of Transportation (Non-Medical):    Physical Activity:     Days of Exercise per Week:     Minutes of Exercise per Session:    Stress:     Feeling of Stress :    Social Connections:     Frequency of Communication with Friends and Family:     Frequency of Social Gatherings with Friends and Family:     Attends [employer] Services:     Active Member of Clubs or Organizations:     Attends Club or Organization Meetings:     Marital Status:         Family History:     Family History   Adopted: Yes        Medications:      Allergies   Allergen Reactions    Ciprofloxacin Other (comments)     Achilles tendonitis/leg pain and swelling    Indomethacin Anaphylaxis, Hives and Swelling    Tomato Other (comments)     Heart burn        Current Facility-Administered Medications   Medication Dose Route Frequency    sodium chloride (NS) flush 5-40 mL  5-40 mL IntraVENous Q8H    sodium chloride (NS) flush 5-40 mL  5-40 mL IntraVENous PRN    metoprolol tartrate (LOPRESSOR) tablet 25 mg  25 mg Oral Q6H    aspirin chewable tablet 324 mg  324 mg Oral NOW    heparin (porcine) injection 5,000 Units  5,000 Units SubCUTAneous Q8H    heparin (porcine) 1,000 unit/mL injection             Physical Exam:     Visit Vitals  BP (!) 136/109 (BP 1 Location: Left upper arm, BP Patient Position: At rest)   Pulse 86   Temp 97.9 °F (36.6 °C)   Resp 20   Wt 93 kg (205 lb)   SpO2 99%   BMI 38.73 kg/m²       TELE: normal sinus rhythm    BP Readings from Last 3 Encounters:   07/30/21 (!) 136/109   07/13/21 135/71   04/19/21 (!) 111/46     Pulse Readings from Last 3 Encounters:   07/30/21 86   07/13/21 81   04/19/21 (!) 53     Wt Readings from Last 3 Encounters:   07/30/21 93 kg (205 lb)   07/13/21 94.8 kg (209 lb)   04/19/21 91.2 kg (201 lb)       General:  alert, cooperative, mild distress, appears stated age  Neck:  no carotid bruit, no JVD  Lungs:  clear to auscultation bilaterally  Heart:  regular rate and rhythm, S1, S2 normal, no murmur, click, rub or gallop  Abdomen:  abdomen is soft without significant tenderness, masses, organomegaly or guarding  Extremities:  edema 3+ bilateral  Skin: Warm and dry. no hyperpigmentation, vitiligo, or suspicious lesions  Neuro: moves all extremities well, no involuntary movements, reflexes at knee and ankle intact,   Psych: non focal     Data Review:     Recent Labs     07/30/21  0305   WBC 10.8   HGB 14.4   HCT 42.2        No results for input(s): NA, K, CL, CO2, GLU, BUN, CREA, CA, MG, PHOS, ALB, TBIL, ALT, INR, INREXT in the last 72 hours.     No lab exists for component: SGOT,  BNP    Results for orders placed or performed during the hospital encounter of 07/30/21   EKG, 12 LEAD, INITIAL   Result Value Ref Range    Ventricular Rate 90 BPM    Atrial Rate 90 BPM    P-R Interval 184 ms    QRS Duration 54 ms    Q-T Interval 308 ms    QTC Calculation (Bezet) 376 ms    Calculated P Axis 74 degrees    Calculated R Axis -23 degrees    Calculated T Axis 31 degrees    Diagnosis       Normal sinus rhythm  Minimal voltage criteria for LVH, may be normal variant  Septal infarct (cited on or before 27-SEP-2013)  Inferior infarct , possibly acute  ** ** ACUTE MI / STEMI ** **  Consider right ventricular involvement in acute inferior infarct  Abnormal ECG  When compared with ECG of 13-APR-2021 10:59,  Inferior infarct is now present  Questionable change in initial forces of Anteroseptal leads  ST elevation now present in Inferior leads         All Cardiac Markers in the last 24 hours:  No results found for: CPK, CK, CKMMB, CKMB, RCK3, CKMBT, CKNDX, CKND1, REINALDO, TROPT, TROIQ, MACIE, TROPT, TNIPOC, BNP, BNPP    Last Lipid:    Lab Results   Component Value Date/Time    Cholesterol, total 158 07/02/2021 08:25 AM    HDL Cholesterol 55 07/02/2021 08:25 AM    LDL, calculated 65.8 07/02/2021 08:25 AM    Triglyceride 186 (H) 07/02/2021 08:25 AM    CHOL/HDL Ratio 2.9 07/02/2021 08:25 AM       Cardiographics:     EKG Results     Procedure 720 Value Units Date/Time    EKG, 12 LEAD, INITIAL [[id number]]     Order Status: Sent     EKG, 12 LEAD, INITIAL [[id number]] Collected: 07/30/21 0248    Order Status: Completed Updated: 07/30/21 0252     Ventricular Rate 90 BPM      Atrial Rate 90 BPM      P-R Interval 184 ms      QRS Duration 54 ms      Q-T Interval 308 ms      QTC Calculation (Bezet) 376 ms      Calculated P Axis 74 degrees      Calculated R Axis -23 degrees      Calculated T Axis 31 degrees      Diagnosis --     Normal sinus rhythm  Minimal voltage criteria for LVH, may be normal variant  Septal infarct (cited on or before 27-SEP-2013)  Inferior infarct , possibly acute  ** ** ACUTE MI / STEMI ** **  Consider right ventricular involvement in acute inferior infarct  Abnormal ECG  When compared with ECG of 13-APR-2021 10:59,  Inferior infarct is now present  Questionable change in initial forces of Anteroseptal leads  ST elevation now present in Inferior leads          07/22/19    ECHO ADULT COMPLETE 07/23/2019 7/23/2019    Interpretation Summary  · Definity contrast was given to enhance imaging. · Left Ventricle: Normal systolic function (ejection fraction normal). Left ventricle not well visualized. Estimated left ventricular ejection fraction is 56 - 60%. Visually measured ejection fraction. No regional wall motion abnormality noted. Age-appropriate left ventricular diastolic function. · Pulmonary Artery: Pulmonary arterial systolic pressure is 36 mmHg. · Right Ventricle: Mildly dilated right ventricle. Signed by: River Gill MD on 7/23/2019  1:08 PM            XR Results (most recent):  Results from [place] encounter on 04/19/21    [facility] XR TECHNOLOGIST SERVICE    Narrative  Fluoroscopy was provided for a bundled exam for documentation purposes. Impression  Please see above.     FLUORO TIME: 00.45    AJB        Signed By: Victorina Meza MD     July 30, 2021

## 2021-07-30 NOTE — PROGRESS NOTES
1700: Dr. Erica Garber notified nursing supervisor, no IV obtained. Phlebotomist had multiple sticks and obtained blood for APTT, cardiac troponin. Blood not able to be obtained for CBC, BMP, Mg- Dr. Erica Garber. Second NSS 500cc IV fluid bolus ordered. Plan to cancel Heparin infusion, start Lovenox.

## 2021-07-31 LAB
ANION GAP SERPL CALC-SCNC: 4 MMOL/L (ref 3–18)
APTT PPP: 40.6 SEC (ref 23–36.4)
BASOPHILS # BLD: 0 K/UL (ref 0–0.1)
BASOPHILS NFR BLD: 0 % (ref 0–2)
BUN SERPL-MCNC: 17 MG/DL (ref 7–18)
BUN/CREAT SERPL: 22 (ref 12–20)
CALCIUM SERPL-MCNC: 8.5 MG/DL (ref 8.5–10.1)
CHLORIDE SERPL-SCNC: 108 MMOL/L (ref 100–111)
CHOLEST SERPL-MCNC: 75 MG/DL
CO2 SERPL-SCNC: 27 MMOL/L (ref 21–32)
CREAT SERPL-MCNC: 0.78 MG/DL (ref 0.6–1.3)
DIFFERENTIAL METHOD BLD: ABNORMAL
EOSINOPHIL # BLD: 0 K/UL (ref 0–0.4)
EOSINOPHIL NFR BLD: 0 % (ref 0–5)
ERYTHROCYTE [DISTWIDTH] IN BLOOD BY AUTOMATED COUNT: 14.8 % (ref 11.6–14.5)
GLUCOSE SERPL-MCNC: 93 MG/DL (ref 74–99)
HCT VFR BLD AUTO: 38.3 % (ref 35–45)
HDLC SERPL-MCNC: 50 MG/DL (ref 40–60)
HDLC SERPL: 1.5 {RATIO} (ref 0–5)
HGB BLD-MCNC: 12.6 G/DL (ref 12–16)
LDLC SERPL CALC-MCNC: 14.2 MG/DL (ref 0–100)
LIPID PROFILE,FLP: NORMAL
LYMPHOCYTES # BLD: 1 K/UL (ref 0.9–3.6)
LYMPHOCYTES NFR BLD: 6 % (ref 21–52)
MAGNESIUM SERPL-MCNC: 1.7 MG/DL (ref 1.6–2.6)
MCH RBC QN AUTO: 33.2 PG (ref 24–34)
MCHC RBC AUTO-ENTMCNC: 32.9 G/DL (ref 31–37)
MCV RBC AUTO: 101.1 FL (ref 74–97)
MONOCYTES # BLD: 1.7 K/UL (ref 0.05–1.2)
MONOCYTES NFR BLD: 10 % (ref 3–10)
NEUTS SEG # BLD: 13.4 K/UL (ref 1.8–8)
NEUTS SEG NFR BLD: 83 % (ref 40–73)
PLATELET # BLD AUTO: 198 K/UL (ref 135–420)
PMV BLD AUTO: 11.6 FL (ref 9.2–11.8)
POTASSIUM SERPL-SCNC: 3.7 MMOL/L (ref 3.5–5.5)
RBC # BLD AUTO: 3.79 M/UL (ref 4.2–5.3)
SODIUM SERPL-SCNC: 139 MMOL/L (ref 136–145)
TRIGL SERPL-MCNC: 54 MG/DL (ref ?–150)
VLDLC SERPL CALC-MCNC: 10.8 MG/DL
WBC # BLD AUTO: 16.2 K/UL (ref 4.6–13.2)

## 2021-07-31 PROCEDURE — 80061 LIPID PANEL: CPT

## 2021-07-31 PROCEDURE — 74011000250 HC RX REV CODE- 250: Performed by: STUDENT IN AN ORGANIZED HEALTH CARE EDUCATION/TRAINING PROGRAM

## 2021-07-31 PROCEDURE — 99232 SBSQ HOSP IP/OBS MODERATE 35: CPT | Performed by: EMERGENCY MEDICINE

## 2021-07-31 PROCEDURE — 74011250637 HC RX REV CODE- 250/637: Performed by: EMERGENCY MEDICINE

## 2021-07-31 PROCEDURE — 36415 COLL VENOUS BLD VENIPUNCTURE: CPT

## 2021-07-31 PROCEDURE — 77030038269 HC DRN EXT URIN PURWCK BARD -A

## 2021-07-31 PROCEDURE — 74011250637 HC RX REV CODE- 250/637: Performed by: INTERNAL MEDICINE

## 2021-07-31 PROCEDURE — 85025 COMPLETE CBC W/AUTO DIFF WBC: CPT

## 2021-07-31 PROCEDURE — 77010033678 HC OXYGEN DAILY

## 2021-07-31 PROCEDURE — 99232 SBSQ HOSP IP/OBS MODERATE 35: CPT | Performed by: INTERNAL MEDICINE

## 2021-07-31 PROCEDURE — 74011250637 HC RX REV CODE- 250/637: Performed by: STUDENT IN AN ORGANIZED HEALTH CARE EDUCATION/TRAINING PROGRAM

## 2021-07-31 PROCEDURE — 77030012890

## 2021-07-31 PROCEDURE — 74011250636 HC RX REV CODE- 250/636: Performed by: EMERGENCY MEDICINE

## 2021-07-31 PROCEDURE — 74011250636 HC RX REV CODE- 250/636: Performed by: STUDENT IN AN ORGANIZED HEALTH CARE EDUCATION/TRAINING PROGRAM

## 2021-07-31 PROCEDURE — 94640 AIRWAY INHALATION TREATMENT: CPT

## 2021-07-31 PROCEDURE — 83735 ASSAY OF MAGNESIUM: CPT

## 2021-07-31 PROCEDURE — 94762 N-INVAS EAR/PLS OXIMTRY CONT: CPT

## 2021-07-31 PROCEDURE — 74011250637 HC RX REV CODE- 250/637: Performed by: PHYSICIAN ASSISTANT

## 2021-07-31 PROCEDURE — 80048 BASIC METABOLIC PNL TOTAL CA: CPT

## 2021-07-31 PROCEDURE — 85730 THROMBOPLASTIN TIME PARTIAL: CPT

## 2021-07-31 PROCEDURE — 2709999900 HC NON-CHARGEABLE SUPPLY

## 2021-07-31 PROCEDURE — 65620000000 HC RM CCU GENERAL

## 2021-07-31 RX ORDER — POLYETHYLENE GLYCOL 3350 17 G/17G
17 POWDER, FOR SOLUTION ORAL DAILY
Status: DISCONTINUED | OUTPATIENT
Start: 2021-08-01 | End: 2021-07-31 | Stop reason: SDUPTHER

## 2021-07-31 RX ORDER — BISACODYL 5 MG
5 TABLET, DELAYED RELEASE (ENTERIC COATED) ORAL DAILY PRN
Status: DISCONTINUED | OUTPATIENT
Start: 2021-07-31 | End: 2021-08-02 | Stop reason: HOSPADM

## 2021-07-31 RX ORDER — METOPROLOL TARTRATE 25 MG/1
12.5 TABLET, FILM COATED ORAL EVERY 12 HOURS
Status: DISCONTINUED | OUTPATIENT
Start: 2021-07-31 | End: 2021-08-02 | Stop reason: HOSPADM

## 2021-07-31 RX ORDER — NYSTATIN 100000 [USP'U]/G
POWDER TOPICAL 2 TIMES DAILY
Status: DISCONTINUED | OUTPATIENT
Start: 2021-07-31 | End: 2021-08-02 | Stop reason: HOSPADM

## 2021-07-31 RX ORDER — POLYETHYLENE GLYCOL 3350 17 G/17G
17 POWDER, FOR SOLUTION ORAL DAILY
Status: DISCONTINUED | OUTPATIENT
Start: 2021-08-01 | End: 2021-08-02 | Stop reason: HOSPADM

## 2021-07-31 RX ORDER — OXYCODONE HYDROCHLORIDE 5 MG/1
5 TABLET ORAL
Status: DISCONTINUED | OUTPATIENT
Start: 2021-07-31 | End: 2021-08-02 | Stop reason: HOSPADM

## 2021-07-31 RX ADMIN — METOPROLOL TARTRATE 12.5 MG: 25 TABLET, FILM COATED ORAL at 13:02

## 2021-07-31 RX ADMIN — ENOXAPARIN SODIUM 90 MG: 100 INJECTION SUBCUTANEOUS at 06:34

## 2021-07-31 RX ADMIN — APIXABAN 10 MG: 5 TABLET, FILM COATED ORAL at 19:09

## 2021-07-31 RX ADMIN — Medication 10 ML: at 06:00

## 2021-07-31 RX ADMIN — Medication 10 ML: at 20:10

## 2021-07-31 RX ADMIN — FAMOTIDINE 20 MG: 10 INJECTION INTRAVENOUS at 09:41

## 2021-07-31 RX ADMIN — ALLOPURINOL 100 MG: 100 TABLET ORAL at 09:41

## 2021-07-31 RX ADMIN — Medication 10 ML: at 17:00

## 2021-07-31 RX ADMIN — SODIUM CHLORIDE 75 ML/HR: 900 INJECTION, SOLUTION INTRAVENOUS at 13:31

## 2021-07-31 RX ADMIN — ASPIRIN 81 MG: 81 TABLET, CHEWABLE ORAL at 09:41

## 2021-07-31 RX ADMIN — METOPROLOL TARTRATE 12.5 MG: 25 TABLET, FILM COATED ORAL at 20:10

## 2021-07-31 RX ADMIN — Medication 10 ML: at 06:59

## 2021-07-31 RX ADMIN — NYSTATIN: 100000 POWDER TOPICAL at 20:09

## 2021-07-31 RX ADMIN — BUDESONIDE 500 MCG: 0.5 SUSPENSION RESPIRATORY (INHALATION) at 19:56

## 2021-07-31 RX ADMIN — FAMOTIDINE 20 MG: 10 INJECTION INTRAVENOUS at 19:09

## 2021-07-31 RX ADMIN — BUDESONIDE 500 MCG: 0.5 SUSPENSION RESPIRATORY (INHALATION) at 09:20

## 2021-07-31 RX ADMIN — ROSUVASTATIN CALCIUM 20 MG: 20 TABLET, COATED ORAL at 09:41

## 2021-07-31 RX ADMIN — LEVOTHYROXINE SODIUM 25 MCG: 25 TABLET ORAL at 06:34

## 2021-07-31 RX ADMIN — CLOPIDOGREL BISULFATE 75 MG: 75 TABLET ORAL at 09:41

## 2021-07-31 RX ADMIN — SODIUM CHLORIDE 75 ML/HR: 900 INJECTION, SOLUTION INTRAVENOUS at 00:00

## 2021-07-31 NOTE — PROGRESS NOTES
Problem: Pressure Injury - Risk of  Goal: *Prevention of pressure injury  Description: Document Stef Scale and appropriate interventions in the flowsheet. 7/31/2021 1535 by Rosangela Rosado RN  Outcome: Progressing Towards Goal  Note: Pressure Injury Interventions:       Moisture Interventions: Absorbent underpads, Apply protective barrier, creams and emollients, Check for incontinence Q2 hours and as needed, Internal/External urinary devices, Maintain skin hydration (lotion/cream), Minimize layers    Activity Interventions: Assess need for specialty bed, Chair cushion, Increase time out of bed, Pressure redistribution bed/mattress(bed type), PT/OT evaluation    Mobility Interventions: Assess need for specialty bed, Chair cushion, Float heels, Pressure redistribution bed/mattress (bed type), Turn and reposition approx. every two hours(pillow and wedges)    Nutrition Interventions: Document food/fluid/supplement intake    Friction and Shear Interventions: Apply protective barrier, creams and emollients, Minimize layers             7/31/2021 1534 by Rosangela Rosado RN  Outcome: Progressing Towards Goal  Note: Pressure Injury Interventions:       Moisture Interventions: Absorbent underpads, Apply protective barrier, creams and emollients, Check for incontinence Q2 hours and as needed, Internal/External urinary devices, Maintain skin hydration (lotion/cream), Minimize layers    Activity Interventions: Assess need for specialty bed, Chair cushion, Increase time out of bed, Pressure redistribution bed/mattress(bed type), PT/OT evaluation    Mobility Interventions: Assess need for specialty bed, Chair cushion, Float heels, Pressure redistribution bed/mattress (bed type), Turn and reposition approx.  every two hours(pillow and wedges)    Nutrition Interventions: Document food/fluid/supplement intake    Friction and Shear Interventions: Apply protective barrier, creams and emollients, Minimize layers Problem: Risk for Spread of Infection  Goal: Prevent transmission of infectious organism to others  Description: Prevent the transmission of infectious organisms to other patients, staff members, and visitors. 7/31/2021 1535 by Traesure Elias RN  Outcome: Progressing Towards Goal  7/31/2021 1534 by Treasure Elias RN  Outcome: Progressing Towards Goal     Problem: Falls - Risk of  Goal: *Absence of Falls  Description: Document Fani Otoole Fall Risk and appropriate interventions in the flowsheet.   7/31/2021 1535 by Treasure Elias RN  Outcome: Progressing Towards Goal  Note: Fall Risk Interventions:  Mobility Interventions: Assess mobility with egress test, Communicate number of staff needed for ambulation/transfer, Patient to call before getting OOB         Medication Interventions: Evaluate medications/consider consulting pharmacy, Patient to call before getting OOB, Teach patient to arise slowly    Elimination Interventions: Call light in reach, Patient to call for help with toileting needs, Stay With Me (per policy)           6/73/4853 1534 by Treasure Elias RN  Outcome: Progressing Towards Goal  Note: Fall Risk Interventions:  Mobility Interventions: Assess mobility with egress test, Communicate number of staff needed for ambulation/transfer, Patient to call before getting OOB         Medication Interventions: Evaluate medications/consider consulting pharmacy, Patient to call before getting OOB, Teach patient to arise slowly    Elimination Interventions: Call light in reach, Patient to call for help with toileting needs, Stay With Me (per policy)              Problem: Cath Lab Procedures: Post-Cath Day 1  Goal: Activity/Safety  Outcome: Progressing Towards Goal  Goal: Diagnostic Test/Procedures  Outcome: Progressing Towards Goal  Goal: Nutrition/Diet  Outcome: Progressing Towards Goal  Goal: Discharge Planning  Outcome: Progressing Towards Goal  Goal: Medications  Outcome: Progressing Towards Goal  Goal: Respiratory  Outcome: Progressing Towards Goal  Goal: Treatments/Interventions/Procedures  Outcome: Progressing Towards Goal  Goal: Psychosocial  Outcome: Progressing Towards Goal     Problem: Pain  Goal: *Control of Pain  Outcome: Progressing Towards Goal  Goal: *PALLIATIVE CARE:  Alleviation of Pain  Outcome: Progressing Towards Goal

## 2021-07-31 NOTE — PROGRESS NOTES
[facility] Hospitalist Group  Progress Note    Patient: Nimo Barajas Age: 64 y.o. : 1945 MR#: [id number] SSN: [id number]  Date/Time: 2021    Subjective:     Patient is sitting in bed in no apparent distress, awake and alert. Denies any chest pain or shortness of breath. Denies any lightheadedness. RN at bedside    Assessment/Plan:     ST elevation MI  Coronary artery disease, status post PCI and stent  Acute bilateral lower extremity DVTs  Prior history of PE and DVT for which patient was on Eliquis which was discontinued 4 months ago by PCP  History of hypertension, was hypotensive yesterday  Dyslipidemia  Hypothyroidism  COPD without exacerbation  Chronic back pain  Leukocytosis which may be a stress reaction  Patient states she was started on Lasix by her PCP for lower extremity swelling    Plan  Continue dual antiplatelet therapy per cardiology. Continue beta-blocker and statin.   Anticoagulation for DVT  Continue Synthroid  Check UA CNS  Pain control  PT OT  Bowel regimen   Dispositionbased on PT OT      Case discussed with:  [x]Patient  []Family  [x]Nursing  []Case Management  DVT Prophylaxis:  []Lovenox  []Hep SQ  []SCDs  []Coumadin   []eliquis    Objective:   VS:   Visit Vitals  /61   Pulse 65   Temp 97.7 °F (36.5 °C)   Resp 19   Ht 5' 1\" (1.549 m)   Wt 93 kg (205 lb)   SpO2 96%   BMI 38.73 kg/m²      Tmax/24hrs: Temp (24hrs), Av.1 °F (36.7 °C), Min:97.6 °F (36.4 °C), Max:98.8 °F (37.1 °C)    Input/Output:     Intake/Output Summary (Last 24 hours) at 2021 1432  Last data filed at 2021 1400  Gross per 24 hour   Intake 2100 ml   Output 800 ml   Net 1300 ml       General:  Awake, alert  Cardiovascular:  S1S2+, RRR  Pulmonary:  CTA b/l  GI:  Soft, BS+, NT, ND  Extremities:  + edema      Labs:    Recent Results (from the past 24 hour(s))   CARDIAC PANEL,(CK, CKMB & TROPONIN)    Collection Time: 21  5:00 PM   Result Value Ref Range    CK - MB 8.0 (H) <3.6 ng/ml    CK-MB Index 3.4 0.0 - 4.0 %     (H) 26 - 192 U/L    Troponin-I, QT 9.07 (HH) 0.0 - 0.045 NG/ML   LIPID PANEL    Collection Time: 07/31/21  6:20 AM   Result Value Ref Range    LIPID PROFILE          Cholesterol, total 75 <200 MG/DL    Triglyceride 54 <150 MG/DL    HDL Cholesterol 50 40 - 60 MG/DL    LDL, calculated 14.2 0 - 100 MG/DL    VLDL, calculated 10.8 MG/DL    CHOL/HDL Ratio 1.5 0 - 5.0     METABOLIC PANEL, BASIC    Collection Time: 07/31/21  6:20 AM   Result Value Ref Range    Sodium 139 136 - 145 mmol/L    Potassium 3.7 3.5 - 5.5 mmol/L    Chloride 108 100 - 111 mmol/L    CO2 27 21 - 32 mmol/L    Anion gap 4 3.0 - 18 mmol/L    Glucose 93 74 - 99 mg/dL    BUN 17 7.0 - 18 MG/DL    Creatinine 0.78 0.6 - 1.3 MG/DL    BUN/Creatinine ratio 22 (H) 12 - 20      GFR est AA >60 >60 ml/min/1.73m2    GFR est non-AA >60 >60 ml/min/1.73m2    Calcium 8.5 8.5 - 10.1 MG/DL   CBC WITH AUTOMATED DIFF    Collection Time: 07/31/21  6:20 AM   Result Value Ref Range    WBC 16.2 (H) 4.6 - 13.2 K/uL    RBC 3.79 (L) 4.20 - 5.30 M/uL    HGB 12.6 12.0 - 16.0 g/dL    HCT 38.3 35.0 - 45.0 %    .1 (H) 74.0 - 97.0 FL    MCH 33.2 24.0 - 34.0 PG    MCHC 32.9 31.0 - 37.0 g/dL    RDW 14.8 (H) 11.6 - 14.5 %    PLATELET 035 087 - 525 K/uL    MPV 11.6 9.2 - 11.8 FL    NEUTROPHILS 83 (H) 40 - 73 %    LYMPHOCYTES 6 (L) 21 - 52 %    MONOCYTES 10 3 - 10 %    EOSINOPHILS 0 0 - 5 %    BASOPHILS 0 0 - 2 %    ABS. NEUTROPHILS 13.4 (H) 1.8 - 8.0 K/UL    ABS. LYMPHOCYTES 1.0 0.9 - 3.6 K/UL    ABS. MONOCYTES 1.7 (H) 0.05 - 1.2 K/UL    ABS. EOSINOPHILS 0.0 0.0 - 0.4 K/UL    ABS.  BASOPHILS 0.0 0.0 - 0.1 K/UL    DF AUTOMATED     MAGNESIUM    Collection Time: 07/31/21  6:20 AM   Result Value Ref Range    Magnesium 1.7 1.6 - 2.6 mg/dL   PTT    Collection Time: 07/31/21  6:20 AM   Result Value Ref Range    aPTT 40.6 (H) 23.0 - 36.4 SEC     Additional Data Reviewed:      Signed By: Shabbir Nolasco MD     July 31, 2021

## 2021-07-31 NOTE — PROGRESS NOTES
1900 Bedside and verbal report received from Darren Harris RN  report done   2000care assumed  2200 disoriented and tried to go to the bath room easily oriented   0700Bedside and Verbal shift change report given to Richard Banegas RN (oncoming nurse) by Dorinda Hester RN (offgoing nurse). Report included the following information Kardex, Procedure Summary, Intake/Output, Med Rec Status, Cardiac Rhythm ., Alarm Parameters , Procedure Verification and Quality Measures.

## 2021-07-31 NOTE — PROGRESS NOTES
Cardiology Progress Note     Admit Date: 7/30/2021  Attending Cardiologist: Dr. Migue Jamison:                 Hospital Problems  Date Reviewed: 7/13/2021         Codes Class Noted POA     * (Principal) STEMI (ST elevation myocardial infarction) ([facility] [address] ICD-10-CM: I21.3  ICD-9-CM: 410.90   7/30/2021 Yes           History of pulmonary embolism ICD-10-CM: Z86.711  ICD-9-CM: V12.55   7/30/2021 Unknown           History of DVT (deep vein thrombosis) ICD-10-CM: J10.609  ICD-9-CM: V12.51   7/30/2021 Unknown           Chronic diastolic congestive heart failure (HCC) ICD-10-CM: I50.32  ICD-9-CM: 428.32, 428.0   7/30/2021 Yes           Obesity, morbid (HCC) ICD-10-CM: E66.01  ICD-9-CM: 278.01   1/29/2019 Yes           High cholesterol ICD-10-CM: E78.00  ICD-9-CM: 272.0   1/11/2019 Yes           Hypertension ICD-10-CM: I10  ICD-9-CM: 346. 9   1/11/2019 Yes           Spinal stenosis ICD-10-CM: M48.00  ICD-9-CM: 724.00   11/27/2013 Yes                -Inferior ST elevation myocardial infarction status post RCA stent on 07/30/2021  -History of hypertension  -History of hyperlipidemia  -Acute lower extremity DVT     Plan:        Patient denies any chest pain on follow-up  Continue aspirin, Plavix and statin  Patient now with bilateral lower extremity DVT. Lovenox started by primary team  Will discontinue Brilinta and start Plavix as patient will be also on Lovenox  Will reduce metoprolol dosage as blood pressure has been soft. Okay to transfer from cardiac standpoint  Will substitute Eliquis for Lovenox later today. Will need initial DVT dosing.   Would discharge on Protonix.     Subjective:      No chest pain.     Objective:      Patient Vitals for the past 8 hrs:    Temp Pulse Resp BP SpO2   07/30/21 2013     96 %   07/30/21 2012  66      07/30/21 2000  73 15 102/67 (!) 88 %   07/30/21 1903  71 19 (!) 113/92 93 %   07/30/21 1800  74 17 (!) 123/96 97 %   07/30/21 1745  69 19 90/67 96 %   07/30/21 1700  69 21 (!) 92/53 96 %   07/30/21 1600 98.8 °F (37.1 °C) 67 22 (!) 72/56 95 %   07/30/21 1551  64 20 (!) 81/50 96 %   07/30/21 1543  68 22 (!) 87/49 93 %   07/30/21 1504  73 20 (!) 83/63 94 %   07/30/21 1500  69 17 (!) 82/47 94 %          Patient Vitals for the past 96 hrs:    Weight   07/30/21 1318 93 kg (205 lb)   07/30/21 0308 93 kg (205 lb 0.4 oz)         TELE: normal sinus rhythm                Current Facility-Administered Medications   Medication Dose Route Frequency Last Admin    sodium chloride (NS) flush 5-40 mL  5-40 mL IntraVENous Q8H 10 mL at 07/30/21 0636    sodium chloride (NS) flush 5-40 mL  5-40 mL IntraVENous PRN      [START ON 7/31/2021] aspirin chewable tablet 81 mg  81 mg Oral DAILY      polyethylene glycol (MIRALAX) packet 17 g  17 g Oral DAILY PRN      promethazine (PHENERGAN) tablet 12.5 mg  12.5 mg Oral Q6H PRN 12.5 mg at 07/30/21 0507     Or    ondansetron (ZOFRAN) injection 4 mg  4 mg IntraVENous Q6H PRN 4 mg at 07/30/21 0722    famotidine (PF) (PEPCID) 20 mg in 0.9% sodium chloride 10 mL injection  20 mg IntraVENous BID 20 mg at 07/30/21 1717    allopurinoL (ZYLOPRIM) tablet 100 mg  100 mg Oral DAILY 100 mg at 07/30/21 1241    budesonide (PULMICORT) 500 mcg/2 ml nebulizer suspension  500 mcg Nebulization BID  mcg at 07/30/21 2007    levothyroxine (SYNTHROID) tablet 25 mcg  25 mcg Oral 6am 25 mcg at 07/30/21 4118    [Held by provider] olmesartan (BENICAR) tablet 20 mg  20 mg Oral DAILY      rosuvastatin (CRESTOR) tablet 20 mg  20 mg Oral DAILY 20 mg at 07/30/21 0930    [Held by provider] potassium chloride (K-DUR, KLOR-CON) SR tablet 20 mEq  20 mEq Oral DAILY      sodium chloride (NS) flush 5-40 mL  5-40 mL IntraVENous Q8H 10 mL at 07/30/21 1830    sodium chloride (NS) flush 5-40 mL  5-40 mL IntraVENous PRN      oxyCODONE-acetaminophen (PERCOCET) 5-325 mg per tablet 1-2 Tablet  1-2 Tablet Oral Q4H PRN 1 Tablet at 07/30/21 1225    acetaminophen (TYLENOL) tablet 650 mg 650 mg Oral Q4H PRN      temazepam (RESTORIL) capsule 15 mg  15 mg Oral QHS PRN      lidocaine (PF) (XYLOCAINE) 10 mg/mL (1 %) injection 10 mL  10 mL SubCUTAneous ONCE PRN      atropine 1 mg/mL injection 0.6 mg  0.6 mg IntraVENous ONCE PRN      nitroglycerin (NITROSTAT) tablet 0.4 mg  0.4 mg SubLINGual Q5MIN PRN      atropine 1 mg/mL injection 0.5 mg  0.5 mg IntraVENous PRN      ticagrelor (BRILINTA) tablet 90 mg  90 mg Oral BID 90 mg at 07/30/21 1717    [START ON 7/31/2021] metoprolol tartrate (LOPRESSOR) tablet 25 mg  25 mg Oral Q6H      enoxaparin (LOVENOX) injection 90 mg  1 mg/kg SubCUTAneous Q12H 90 mg at 07/30/21 1825    [START ON 7/31/2021] clopidogreL (PLAVIX) tablet 75 mg  75 mg Oral DAILY      0.9% sodium chloride infusion  75 mL/hr IntraVENous CONTINUOUS 75 mL/hr at 07/30/21 1100            Intake/Output Summary (Last 24 hours) at 7/30/2021 2110  Last data filed at 7/30/2021 2000      Gross per 24 hour   Intake 1226.25 ml   Output 1025 ml   Net 201.25 ml         Physical Exam:  General:  alert, cooperative, no distress, appears stated age  Neck:  no JVD  Lungs:  clear to auscultation bilaterally  Heart:  regular rate and rhythm, S1, S2 normal, no murmur, click, rub or gallop  Abdomen:  abdomen is soft without significant tenderness,   Extremities:  LE edema +  Visit Vitals  /67   Pulse 66   Temp 98.8 °F (37.1 °C)   Resp 15   Ht 5' 1\" (1.549 m)   Wt 93 kg (205 lb)   SpO2 96%   BMI 38.73 kg/m²         Data Review:      Labs: Results:         Chemistry     Recent Labs     07/30/21  0305   *      K 3.4*      CO2 28   BUN 10   CREA 0.85   CA 9.2   AGAP 6   BUCR 12   AP 90   TP 7.8   ALB 3.5   GLOB 4.3*   AGRAT 0.8       CBC w/Diff     Recent Labs     07/30/21  0305   WBC 10.8   RBC 4.22   HGB 14.4   HCT 42.2      GRANS 78*   LYMPH 10*   EOS 0       Cardiac Enzymes       Lab Results   Component Value Date/Time      (H) 07/30/2021 05:00 PM      (H) 07/30/2021 12:03 PM     CKMB 8.0 (H) 07/30/2021 05:00 PM     CKMB 8.3 (H) 07/30/2021 12:03 PM     CKND1 3.4 07/30/2021 05:00 PM     CKND1 3.3 07/30/2021 12:03 PM     TROIQ 9.07 (HH) 07/30/2021 05:00 PM     TROIQ 9.98 (HH) 07/30/2021 12:03 PM     TROIQ 13.20 (HH) 07/30/2021 03:05 AM       Coagulation     Recent Labs     07/30/21  0305   PTP 13.0   INR 1.0       Lipid Panel       Lab Results   Component Value Date/Time     Cholesterol, total 136 07/30/2021 03:05 AM     HDL Cholesterol 59 07/30/2021 03:05 AM     LDL, calculated 59.8 07/30/2021 03:05 AM     VLDL, calculated 17.2 07/30/2021 03:05 AM     Triglyceride 86 07/30/2021 03:05 AM     CHOL/HDL Ratio 2.3 07/30/2021 03:05 AM       BNP No results found for: BNP, BNPP, XBNPT   Liver Enzymes     Recent Labs     07/30/21  0305   TP 7.8   ALB 3.5   AP 90       Thyroid Studies       Lab Results   Component Value Date/Time     TSH 2.57 07/30/2021 03:05 AM           Signed By:  Chandra Winchester MD  [facility]     July 30, 2021

## 2021-07-31 NOTE — PROGRESS NOTES
Cardiology Progress Note    Admit Date: 7/30/2021  Attending Cardiologist: Dr. Chevy Raman:     Hospital Problems  Date Reviewed: 7/13/2021        Codes Class Noted POA    * (Principal) STEMI (ST elevation myocardial infarction) ([facility] ICD-10-CM: I21.3  ICD-9-CM: 410.90  7/30/2021 Yes        History of pulmonary embolism ICD-10-CM: Z86.711  ICD-9-CM: V12.55  7/30/2021 Unknown        History of DVT (deep vein thrombosis) ICD-10-CM: Z86.718  ICD-9-CM: V12.51  7/30/2021 Unknown        Chronic diastolic congestive heart failure (HCC) ICD-10-CM: I50.32  ICD-9-CM: 428.32, 428.0  7/30/2021 Yes        Obesity, morbid ([facility] ICD-10-CM: E66.01  ICD-9-CM: 278.01  1/29/2019 Yes        High cholesterol ICD-10-CM: E78.00  ICD-9-CM: 272.0  1/11/2019 Yes        Hypertension ICD-10-CM: I10  ICD-9-CM: 401.9  1/11/2019 Yes        Spinal stenosis ICD-10-CM: M48.00  ICD-9-CM: 724.00  11/27/2013 Yes            -Inferior ST elevation myocardial infarction status post RCA stent on 07/30/2021  -History of hypertension  -History of hyperlipidemia  -Acute lower extremity DVT    Plan:     Patient seen earlier during the day. Late entry of note in the chart  Patient denies any chest pain on follow-up  Continue aspirin, Plavix, beta-blocker and statin  Patient now with bilateral lower extremity DVT. Lovenox started by primary team  Will discontinue Brilinta and start Plavix as patient will be also on Lovenox      Subjective:     No new complaints.  Denies CP    Objective:      Patient Vitals for the past 8 hrs:   Temp Pulse Resp BP SpO2   07/30/21 2013     96 %   07/30/21 2012  66      07/30/21 2000  73 15 102/67 (!) 88 %   07/30/21 1903  71 19 (!) 113/92 93 %   07/30/21 1800  74 17 (!) 123/96 97 %   07/30/21 1745  69 19 90/67 96 %   07/30/21 1700  69 21 (!) 92/53 96 %   07/30/21 1600 98.8 °F (37.1 °C) 67 22 (!) 72/56 95 %   07/30/21 1551  64 20 (!) 81/50 96 %   07/30/21 1543  68 22 (!) 87/49 93 %   07/30/21 1504  73 20 (!) 83/63 94 %   07/30/21 1500  69 17 (!) 82/47 94 %         Patient Vitals for the past 96 hrs:   Weight   07/30/21 1318 93 kg (205 lb)   07/30/21 0308 93 kg (205 lb 0.4 oz)       TELE: normal sinus rhythm               Current Facility-Administered Medications   Medication Dose Route Frequency Last Admin    sodium chloride (NS) flush 5-40 mL  5-40 mL IntraVENous Q8H 10 mL at 07/30/21 0636    sodium chloride (NS) flush 5-40 mL  5-40 mL IntraVENous PRN      [START ON 7/31/2021] aspirin chewable tablet 81 mg  81 mg Oral DAILY      polyethylene glycol (MIRALAX) packet 17 g  17 g Oral DAILY PRN      promethazine (PHENERGAN) tablet 12.5 mg  12.5 mg Oral Q6H PRN 12.5 mg at 07/30/21 3758    Or    ondansetron (ZOFRAN) injection 4 mg  4 mg IntraVENous Q6H PRN 4 mg at 07/30/21 0722    famotidine (PF) (PEPCID) 20 mg in 0.9% sodium chloride 10 mL injection  20 mg IntraVENous BID 20 mg at 07/30/21 1717    allopurinoL (ZYLOPRIM) tablet 100 mg  100 mg Oral DAILY 100 mg at 07/30/21 1241    budesonide (PULMICORT) 500 mcg/2 ml nebulizer suspension  500 mcg Nebulization BID  mcg at 07/30/21 2007    levothyroxine (SYNTHROID) tablet 25 mcg  25 mcg Oral 6am 25 mcg at 07/30/21 5131    [Held by provider] olmesartan (BENICAR) tablet 20 mg  20 mg Oral DAILY      rosuvastatin (CRESTOR) tablet 20 mg  20 mg Oral DAILY 20 mg at 07/30/21 0930    [Held by provider] potassium chloride (K-DUR, KLOR-CON) SR tablet 20 mEq  20 mEq Oral DAILY      sodium chloride (NS) flush 5-40 mL  5-40 mL IntraVENous Q8H 10 mL at 07/30/21 1830    sodium chloride (NS) flush 5-40 mL  5-40 mL IntraVENous PRN      oxyCODONE-acetaminophen (PERCOCET) 5-325 mg per tablet 1-2 Tablet  1-2 Tablet Oral Q4H PRN 1 Tablet at 07/30/21 1225    acetaminophen (TYLENOL) tablet 650 mg  650 mg Oral Q4H PRN      temazepam (RESTORIL) capsule 15 mg  15 mg Oral QHS PRN      lidocaine (PF) (XYLOCAINE) 10 mg/mL (1 %) injection 10 mL  10 mL SubCUTAneous ONCE PRN      atropine 1 mg/mL injection 0.6 mg  0.6 mg IntraVENous ONCE PRN      nitroglycerin (NITROSTAT) tablet 0.4 mg  0.4 mg SubLINGual Q5MIN PRN      atropine 1 mg/mL injection 0.5 mg  0.5 mg IntraVENous PRN      ticagrelor (BRILINTA) tablet 90 mg  90 mg Oral BID 90 mg at 07/30/21 1717    [START ON 7/31/2021] metoprolol tartrate (LOPRESSOR) tablet 25 mg  25 mg Oral Q6H      enoxaparin (LOVENOX) injection 90 mg  1 mg/kg SubCUTAneous Q12H 90 mg at 07/30/21 1825    [START ON 7/31/2021] clopidogreL (PLAVIX) tablet 75 mg  75 mg Oral DAILY      0.9% sodium chloride infusion  75 mL/hr IntraVENous CONTINUOUS 75 mL/hr at 07/30/21 1100         Intake/Output Summary (Last 24 hours) at 7/30/2021 2110  Last data filed at 7/30/2021 2000  Gross per 24 hour   Intake 1226.25 ml   Output 1025 ml   Net 201.25 ml       Physical Exam:  General:  alert, cooperative, no distress, appears stated age  Neck:  no JVD  Lungs:  clear to auscultation bilaterally  Heart:  regular rate and rhythm, S1, S2 normal, no murmur, click, rub or gallop  Abdomen:  abdomen is soft without significant tenderness,   Extremities:  LE edema +  Visit Vitals  /67   Pulse 66   Temp 98.8 °F (37.1 °C)   Resp 15   Ht 5' 1\" (1.549 m)   Wt 93 kg (205 lb)   SpO2 96%   BMI 38.73 kg/m²       Data Review:     Labs: Results:       Chemistry Recent Labs     07/30/21  0305   *      K 3.4*      CO2 28   BUN 10   CREA 0.85   CA 9.2   AGAP 6   BUCR 12   AP 90   TP 7.8   ALB 3.5   GLOB 4.3*   AGRAT 0.8      CBC w/Diff Recent Labs     07/30/21  0305   WBC 10.8   RBC 4.22   HGB 14.4   HCT 42.2      GRANS 78*   LYMPH 10*   EOS 0      Cardiac Enzymes Lab Results   Component Value Date/Time     (H) 07/30/2021 05:00 PM     (H) 07/30/2021 12:03 PM    CKMB 8.0 (H) 07/30/2021 05:00 PM    CKMB 8.3 (H) 07/30/2021 12:03 PM    CKND1 3.4 07/30/2021 05:00 PM    CKND1 3.3 07/30/2021 12:03 PM    TROIQ 9.07 (HH) 07/30/2021 05:00 PM    TROIQ 9.98 () 07/30/2021 12:03 PM    TROIQ 13.20 (HH) 07/30/2021 03:05 AM      Coagulation Recent Labs     07/30/21  0305   PTP 13.0   INR 1.0       Lipid Panel Lab Results   Component Value Date/Time    Cholesterol, total 136 07/30/2021 03:05 AM    HDL Cholesterol 59 07/30/2021 03:05 AM    LDL, calculated 59.8 07/30/2021 03:05 AM    VLDL, calculated 17.2 07/30/2021 03:05 AM    Triglyceride 86 07/30/2021 03:05 AM    CHOL/HDL Ratio 2.3 07/30/2021 03:05 AM      BNP No results found for: BNP, BNPP, XBNPT   Liver Enzymes Recent Labs     07/30/21  0305   TP 7.8   ALB 3.5   AP 90      Thyroid Studies Lab Results   Component Value Date/Time    TSH 2.57 07/30/2021 03:05 AM          Signed By: Machelle Moncada MD     July 30, 2021

## 2021-07-31 NOTE — PROGRESS NOTES
07:15  Received pt in bed, resting peacefully from Ryan Abraham RN. Pt. In contact precautions for h/o ESBL in urine. Pt. With 0.9% saline infusing at 75 ml/hr. 09:30  Pt. With poor po intake for breakfast. Right groin access site open to air. Pannus leaves moisture to site - cleansed with chlorhexadine and covered with bandaid. 10:20  Dr. Gian Alvarez, cardiologist and [place], [place] at bedside. 11:45  Dr. Jonathan Garcia at bedside. 12:15  Pt assisted (one-person assist) to recliner. Pt. Dyspneic with exertion. Upper respiratory wheeze noted. Sp02 maintains at 94-95% on r/a. Pure-wik changed along with tubing and cannister. Josee care given. 19:00  Bedside and Verbal shift change report given to Janes Wilkins RN (oncoming nurse) by Khloe Al RN (offgoing nurse). Report included the following information SBAR, Kardex, ED Summary, Procedure Summary, Intake/Output, MAR, Recent Results and Cardiac Rhythm sinus rhythm.

## 2021-07-31 NOTE — PROGRESS NOTES
2009: Bedside and Verbal shift change report given to [address] [place] [address]. Report included the following information OR Summary, Procedure Summary, Intake/Output, MAR, Accordion, Recent Results, Med Rec Status, Cardiac Rhythm ., Alarm Parameters , Procedure Verification, Quality Measures and Dual Neuro Assessment.

## 2021-08-01 LAB
ANION GAP SERPL CALC-SCNC: 5 MMOL/L (ref 3–18)
ATRIAL RATE: 66 BPM
BASOPHILS # BLD: 0 K/UL (ref 0–0.1)
BASOPHILS NFR BLD: 0 % (ref 0–2)
BUN SERPL-MCNC: 14 MG/DL (ref 7–18)
BUN/CREAT SERPL: 20 (ref 12–20)
CALCIUM SERPL-MCNC: 8.6 MG/DL (ref 8.5–10.1)
CALCULATED P AXIS, ECG09: -3 DEGREES
CALCULATED R AXIS, ECG10: -16 DEGREES
CALCULATED T AXIS, ECG11: 5 DEGREES
CHLORIDE SERPL-SCNC: 110 MMOL/L (ref 100–111)
CO2 SERPL-SCNC: 25 MMOL/L (ref 21–32)
CREAT SERPL-MCNC: 0.7 MG/DL (ref 0.6–1.3)
DIAGNOSIS, 93000: NORMAL
DIFFERENTIAL METHOD BLD: ABNORMAL
EOSINOPHIL # BLD: 0 K/UL (ref 0–0.4)
EOSINOPHIL NFR BLD: 0 % (ref 0–5)
ERYTHROCYTE [DISTWIDTH] IN BLOOD BY AUTOMATED COUNT: 14.6 % (ref 11.6–14.5)
GLUCOSE SERPL-MCNC: 76 MG/DL (ref 74–99)
HCT VFR BLD AUTO: 36.5 % (ref 35–45)
HGB BLD-MCNC: 12.2 G/DL (ref 12–16)
LYMPHOCYTES # BLD: 1 K/UL (ref 0.9–3.6)
LYMPHOCYTES NFR BLD: 10 % (ref 21–52)
MAGNESIUM SERPL-MCNC: 1.9 MG/DL (ref 1.6–2.6)
MCH RBC QN AUTO: 33.9 PG (ref 24–34)
MCHC RBC AUTO-ENTMCNC: 33.4 G/DL (ref 31–37)
MCV RBC AUTO: 101.4 FL (ref 74–97)
MONOCYTES # BLD: 1.1 K/UL (ref 0.05–1.2)
MONOCYTES NFR BLD: 11 % (ref 3–10)
NEUTS SEG # BLD: 8 K/UL (ref 1.8–8)
NEUTS SEG NFR BLD: 78 % (ref 40–73)
P-R INTERVAL, ECG05: 164 MS
PLATELET # BLD AUTO: 240 K/UL (ref 135–420)
PMV BLD AUTO: 11.3 FL (ref 9.2–11.8)
POTASSIUM SERPL-SCNC: 3.6 MMOL/L (ref 3.5–5.5)
Q-T INTERVAL, ECG07: 358 MS
QRS DURATION, ECG06: 78 MS
QTC CALCULATION (BEZET), ECG08: 375 MS
RBC # BLD AUTO: 3.6 M/UL (ref 4.2–5.3)
SODIUM SERPL-SCNC: 140 MMOL/L (ref 136–145)
VENTRICULAR RATE, ECG03: 66 BPM
WBC # BLD AUTO: 10.2 K/UL (ref 4.6–13.2)

## 2021-08-01 PROCEDURE — 94640 AIRWAY INHALATION TREATMENT: CPT

## 2021-08-01 PROCEDURE — 74011250637 HC RX REV CODE- 250/637: Performed by: EMERGENCY MEDICINE

## 2021-08-01 PROCEDURE — 74011250636 HC RX REV CODE- 250/636: Performed by: STUDENT IN AN ORGANIZED HEALTH CARE EDUCATION/TRAINING PROGRAM

## 2021-08-01 PROCEDURE — 94762 N-INVAS EAR/PLS OXIMTRY CONT: CPT

## 2021-08-01 PROCEDURE — 74011250637 HC RX REV CODE- 250/637: Performed by: STUDENT IN AN ORGANIZED HEALTH CARE EDUCATION/TRAINING PROGRAM

## 2021-08-01 PROCEDURE — 97165 OT EVAL LOW COMPLEX 30 MIN: CPT

## 2021-08-01 PROCEDURE — 99232 SBSQ HOSP IP/OBS MODERATE 35: CPT | Performed by: INTERNAL MEDICINE

## 2021-08-01 PROCEDURE — 36415 COLL VENOUS BLD VENIPUNCTURE: CPT

## 2021-08-01 PROCEDURE — 97112 NEUROMUSCULAR REEDUCATION: CPT

## 2021-08-01 PROCEDURE — 99232 SBSQ HOSP IP/OBS MODERATE 35: CPT | Performed by: EMERGENCY MEDICINE

## 2021-08-01 PROCEDURE — 74011250637 HC RX REV CODE- 250/637: Performed by: PHYSICIAN ASSISTANT

## 2021-08-01 PROCEDURE — 80048 BASIC METABOLIC PNL TOTAL CA: CPT

## 2021-08-01 PROCEDURE — 2709999900 HC NON-CHARGEABLE SUPPLY

## 2021-08-01 PROCEDURE — 93005 ELECTROCARDIOGRAM TRACING: CPT

## 2021-08-01 PROCEDURE — 65660000004 HC RM CVT STEPDOWN

## 2021-08-01 PROCEDURE — 77010033678 HC OXYGEN DAILY

## 2021-08-01 PROCEDURE — 97535 SELF CARE MNGMENT TRAINING: CPT

## 2021-08-01 PROCEDURE — 97116 GAIT TRAINING THERAPY: CPT

## 2021-08-01 PROCEDURE — 85025 COMPLETE CBC W/AUTO DIFF WBC: CPT

## 2021-08-01 PROCEDURE — 74011250637 HC RX REV CODE- 250/637: Performed by: INTERNAL MEDICINE

## 2021-08-01 PROCEDURE — 97162 PT EVAL MOD COMPLEX 30 MIN: CPT

## 2021-08-01 PROCEDURE — 74011000250 HC RX REV CODE- 250: Performed by: STUDENT IN AN ORGANIZED HEALTH CARE EDUCATION/TRAINING PROGRAM

## 2021-08-01 PROCEDURE — 83735 ASSAY OF MAGNESIUM: CPT

## 2021-08-01 RX ORDER — PANTOPRAZOLE SODIUM 40 MG/1
40 TABLET, DELAYED RELEASE ORAL
Status: DISCONTINUED | OUTPATIENT
Start: 2021-08-02 | End: 2021-08-02 | Stop reason: HOSPADM

## 2021-08-01 RX ADMIN — CLOPIDOGREL BISULFATE 75 MG: 75 TABLET ORAL at 10:00

## 2021-08-01 RX ADMIN — Medication 10 ML: at 07:01

## 2021-08-01 RX ADMIN — METOPROLOL TARTRATE 12.5 MG: 25 TABLET, FILM COATED ORAL at 20:21

## 2021-08-01 RX ADMIN — Medication 10 ML: at 13:48

## 2021-08-01 RX ADMIN — ROSUVASTATIN CALCIUM 20 MG: 20 TABLET, COATED ORAL at 09:59

## 2021-08-01 RX ADMIN — NYSTATIN: 100000 POWDER TOPICAL at 17:24

## 2021-08-01 RX ADMIN — FAMOTIDINE 20 MG: 10 INJECTION INTRAVENOUS at 09:59

## 2021-08-01 RX ADMIN — BUDESONIDE 500 MCG: 0.5 SUSPENSION RESPIRATORY (INHALATION) at 19:45

## 2021-08-01 RX ADMIN — APIXABAN 10 MG: 5 TABLET, FILM COATED ORAL at 09:59

## 2021-08-01 RX ADMIN — BUDESONIDE 500 MCG: 0.5 SUSPENSION RESPIRATORY (INHALATION) at 07:52

## 2021-08-01 RX ADMIN — Medication 10 ML: at 07:02

## 2021-08-01 RX ADMIN — APIXABAN 10 MG: 5 TABLET, FILM COATED ORAL at 17:24

## 2021-08-01 RX ADMIN — NYSTATIN: 100000 POWDER TOPICAL at 10:00

## 2021-08-01 RX ADMIN — Medication 10 ML: at 21:51

## 2021-08-01 RX ADMIN — METOPROLOL TARTRATE 12.5 MG: 25 TABLET, FILM COATED ORAL at 10:00

## 2021-08-01 RX ADMIN — Medication 10 ML: at 06:00

## 2021-08-01 RX ADMIN — LEVOTHYROXINE SODIUM 25 MCG: 25 TABLET ORAL at 07:00

## 2021-08-01 RX ADMIN — POLYETHYLENE GLYCOL 3350 17 G: 17 POWDER, FOR SOLUTION ORAL at 09:00

## 2021-08-01 RX ADMIN — ALLOPURINOL 100 MG: 100 TABLET ORAL at 09:59

## 2021-08-01 RX ADMIN — ASPIRIN 81 MG: 81 TABLET, CHEWABLE ORAL at 09:59

## 2021-08-01 RX ADMIN — Medication 10 ML: at 13:49

## 2021-08-01 NOTE — PROGRESS NOTES
Problem: Mobility Impaired (Adult and Pediatric)  Goal: *Acute Goals and Plan of Care (Insert Text)  Description: Physical Therapy Goals  Initiated 8/1/2021 and to be accomplished within 7 day(s)  1. Patient will move from supine to sit and sit to supine  in bed with modified independence. 2.  Patient will transfer from bed to chair and chair to bed with modified independence using the least restrictive device. 3.  Patient will perform sit to stand with modified independence. 4.  Patient will ambulate with modified independence for 150 feet with the least restrictive device. 5.  Patient will ascend/descend 3 stairs with handrail(s) with supervision/set-up. PLOF: Mod I with ww occasionally; independent. Lives with roommate in one story home with 3 steps to enter. Outcome: Progressing Towards Goal   PHYSICAL THERAPY EVALUATION    Patient: Mary Kate Solano [de-identified]64 y.o. female)  Date: 8/1/2021  Primary Diagnosis: STEMI (ST elevation myocardial infarction) ([facility] [address] [I21.3]  Procedure(s) (LRB):  CORONARY ANGIOGRAPHY (Right)  Thrombectomy Coronary Vessel (Right)  Insert Stent Wenceslao Coronary (N/A) 2 Days Post-Op   Precautions: Fall, Contact  ASSESSMENT :  Co-treated with OT to maximize patient safety and participation in functional mobility. Seated in recliner upon entry. Supervision for supine to sit. Supervision for amb 20ftx2 with ww to/from toilet. Supervision for balance tasks on commode for ADLs. Min A for sit to stand from commode. Talkative; cues to attend to task and practice breathing techniques. Poor safety awareness. Defensive with education. Returned to seated in chair at end of session. RN present. Educated on need for RN assistance with mobility; verbalized understanding. Call bell in reach. Patient will benefit from skilled intervention to address the above impairments.   Patient's rehabilitation potential is considered to be Good  Factors which may influence rehabilitation potential include:   [] None noted  []         Mental ability/status  [x]         Medical condition  []         Home/family situation and support systems  []         Safety awareness  []         Pain tolerance/management  []         Other:      PLAN :  Recommendations and Planned Interventions:   [x]           Bed Mobility Training             [x]    Neuromuscular Re-Education  [x]           Transfer Training                   []    Orthotic/Prosthetic Training  [x]           Gait Training                          []    Modalities  [x]           Therapeutic Exercises           []    Edema Management/Control  [x]           Therapeutic Activities            [x]    Family Training/Education  [x]           Patient Education  []           Other (comment):    Frequency/Duration: Patient will be followed by physical therapy 3-5 times a week to address goals. Discharge Recommendations: Home Health  Further Equipment Recommendations for Discharge: rolling walker     SUBJECTIVE:   Patient stated This is my show sweetheart.     OBJECTIVE DATA SUMMARY:     Past Medical History:   Diagnosis Date    Abnormal chest CT 2/6/2020    Adopted     Arthritis     Balance problems     Chronic cough 2/6/2020    MCBRIDE (dyspnea on exertion) 2/28/2019    GERD (gastroesophageal reflux disease)     Gout     Hemochromatosis     High cholesterol     Hypertension     Hypothyroidism     Left knee pain     Left shoulder pain     Low blood potassium     Lumbar pain     Pain of left sacroiliac joint     Shoulder impingement, right, with rotator cuff strain      Past Surgical History:   Procedure Laterality Date    HX CHOLECYSTECTOMY      HX KNEE REPLACEMENT Bilateral     R knee/ Lknee and femur    HX ORTHOPAEDIC      R trigger finger     HX OTHER SURGICAL      Right little finger    HX OTHER SURGICAL      Right wrist, replaced unlnar).     HX OTHER SURGICAL      Both knees    HX OTHER SURGICAL      Left femur    HX OTHER SURGICAL      Trigger finger surgery    HX OTHER SURGICAL      Left knee replacment revision     HX ROTATOR CUFF REPAIR Right     HX TONSILLECTOMY       Barriers to Learning/Limitations: None  Compensate with: Visual Cues, Verbal Cues, Tactile Cues and Kinesthetic Cues    Home Situation:  Home Situation  Home Environment: Private residence  # Steps to Enter: 3  Rails to Enter: Yes  One/Two Story Residence: One story  Living Alone: No  Support Systems: Friends \ neighbors  Patient Expects to be Discharged to[de-identified] [employer]  Current DME Used/Available at Home: [employer], rollator, Transfer bench (tub transfer bench on order)  Tub or Shower Type: Tub/Shower combination    Critical Behavior:  Neurologic State: Alert  Orientation Level: Oriented X4  Cognition: Follows commands     Psychosocial  Patient Behaviors: Calm; Cooperative    Strength:    Manual Muscle Testing (LE)         R     L    Hip Flexion:   5/5 5/5  Knee EXT:   5/5 5/5  Knee FLEX:   5/5 5/5  Ankle DF:   5/5 5/5  _________________________________________________   Range Of Motion:  BLE AROM WFL  Functional Mobility:  Transfers:  Sit to Stand: Supervision; Minimal assistance  Stand to Sit: Supervision  Balance:   Sitting: Intact  Standing: Impaired; With support  Standing - Static: Good  Standing - Dynamic : Fair  Ambulation/Gait Training:  Distance (ft):  (20ftx2)   Assistive Device: Walker, rolling  Ambulation - Level of Assistance: Supervision  Neuro Re-education:  Seated dynamic balance 8 minutes  Standing balance 2 minutes    Pain:  Pain level pre-treatment: 0/10   Pain level post-treatment: 0/10     Activity Tolerance:   Good    After treatment:   [x]         Patient left in no apparent distress sitting up in chair  []         Patient left in no apparent distress in bed  [x]         Call bell left within reach  [x]         Nursing notified  []         Caregiver present  []         Bed alarm activated  []         SCDs applied    COMMUNICATION/EDUCATION:   [x]         Role of physical therapy and plan of care in the acute care setting. [x]         Fall prevention education was provided and the patient/caregiver indicated understanding. [x]         Patient/family have participated as able in goal setting and plan of care. []         Patient/family agree to work toward stated goals and plan of care. []         Patient understands intent and goals of therapy, but is neutral about his/her participation. []         Patient is unable to participate in goal setting/plan of care: ongoing with therapy staff.     Thank you for this referral.  Consuelo Uriostegui, PT   Time Calculation: 36 mins    Eval Complexity: History: MEDIUM  Complexity : 1-2 comorbidities / personal factors will impact the outcome/ POC Exam:MEDIUM Complexity : 3 Standardized tests and measures addressing body structure, function, activity limitation and / or participation in recreation  Presentation: MEDIUM Complexity : Evolving with changing characteristics  Clinical Decision Making:Medium Complexity   Clinical judgement; ROM, MMT, functional mobility Overall Complexity:MEDIUM

## 2021-08-01 NOTE — PROGRESS NOTES
07:15  Received pt from Jigna Mckeon RN in chair. Pt. A&O x 4. Denies pain. Pt. On contact precautions r/t h/o ESBL in urine. 08:00  12-lead EKG obtained. Pt. Incontinent of urine. Josee care given. Pure-wik repositioned.

## 2021-08-01 NOTE — PROGRESS NOTES
[facility] Hospitalist Group  Progress Note    Patient: Piper Morejon Age: 64 y.o. : 1945 MR#: [id number] SSN: [id number]  Date/Time: 2021    Subjective:     Patient is sitting in bed in no apparent distress, awake and alert. Denies any chest pain. Has some dyspnea on exertion. Assessment/Plan:     ST elevation MI  Coronary artery disease, status post PCI and stent  Acute bilateral lower extremity DVTs  Prior history of PE and DVT for which patient was on Eliquis which was discontinued 4 months ago by PCP  History of hypertension, was hypotensive yesterday  Dyslipidemia  Hypothyroidism  COPD without exacerbation  Chronic back pain  Leukocytosis which may be a stress reaction  Patient states she was started on Lasix by her PCP for lower extremity swelling    Plan  On aspirin and Plavix, beta-blocker and statin  On Eliquis  At proton pump inhibitor  Continue Synthroid  Pain control  PT OT  Bowel regimen   Dispositionbased on PT OT  Discussed with patient.   Discussed with RN    Case discussed with:  [x]Patient  []Family  [x]Nursing  []Case Management  DVT Prophylaxis:  []Lovenox  []Hep SQ  []SCDs  []Coumadin   [x]eliquis    Objective:   VS:   Visit Vitals  /67   Pulse 68   Temp 97.9 °F (36.6 °C)   Resp 18   Ht 5' 1\" (1.549 m)   Wt 97.3 kg (214 lb 8 oz)   SpO2 95%   BMI 40.53 kg/m²      Tmax/24hrs: Temp (24hrs), Av.9 °F (36.6 °C), Min:97.6 °F (36.4 °C), Max:98.4 °F (36.9 °C)    Input/Output:     Intake/Output Summary (Last 24 hours) at 2021 1644  Last data filed at 2021 1245  Gross per 24 hour   Intake 825 ml   Output 900 ml   Net -75 ml       General:  Awake, alert  Cardiovascular:  S1S2+, RRR  Pulmonary:  CTA b/l  GI:  Soft, BS+, NT, ND  Extremities:  + edema        Labs:    Recent Results (from the past 24 hour(s))   METABOLIC PANEL, BASIC    Collection Time: 21  6:00 AM   Result Value Ref Range    Sodium 140 136 - 145 mmol/L    Potassium 3.6 3.5 - 5.5 mmol/L    Chloride 110 100 - 111 mmol/L    CO2 25 21 - 32 mmol/L    Anion gap 5 3.0 - 18 mmol/L    Glucose 76 74 - 99 mg/dL    BUN 14 7.0 - 18 MG/DL    Creatinine 0.70 0.6 - 1.3 MG/DL    BUN/Creatinine ratio 20 12 - 20      GFR est AA >60 >60 ml/min/1.73m2    GFR est non-AA >60 >60 ml/min/1.73m2    Calcium 8.6 8.5 - 10.1 MG/DL   CBC WITH AUTOMATED DIFF    Collection Time: 08/01/21  6:00 AM   Result Value Ref Range    WBC 10.2 4.6 - 13.2 K/uL    RBC 3.60 (L) 4.20 - 5.30 M/uL    HGB 12.2 12.0 - 16.0 g/dL    HCT 36.5 35.0 - 45.0 %    .4 (H) 74.0 - 97.0 FL    MCH 33.9 24.0 - 34.0 PG    MCHC 33.4 31.0 - 37.0 g/dL    RDW 14.6 (H) 11.6 - 14.5 %    PLATELET 181 227 - 720 K/uL    MPV 11.3 9.2 - 11.8 FL    NEUTROPHILS 78 (H) 40 - 73 %    LYMPHOCYTES 10 (L) 21 - 52 %    MONOCYTES 11 (H) 3 - 10 %    EOSINOPHILS 0 0 - 5 %    BASOPHILS 0 0 - 2 %    ABS. NEUTROPHILS 8.0 1.8 - 8.0 K/UL    ABS. LYMPHOCYTES 1.0 0.9 - 3.6 K/UL    ABS. MONOCYTES 1.1 0.05 - 1.2 K/UL    ABS. EOSINOPHILS 0.0 0.0 - 0.4 K/UL    ABS.  BASOPHILS 0.0 0.0 - 0.1 K/UL    DF AUTOMATED     MAGNESIUM    Collection Time: 08/01/21  6:00 AM   Result Value Ref Range    Magnesium 1.9 1.6 - 2.6 mg/dL   EKG, 12 LEAD, SUBSEQUENT    Collection Time: 08/01/21  8:10 AM   Result Value Ref Range    Ventricular Rate 66 BPM    Atrial Rate 66 BPM    P-R Interval 164 ms    QRS Duration 78 ms    Q-T Interval 358 ms    QTC Calculation (Bezet) 375 ms    Calculated P Axis -3 degrees    Calculated R Axis -16 degrees    Calculated T Axis 5 degrees    Diagnosis       Normal sinus rhythm  Inferior infarct (cited on or before 27-SEP-2013)  Cannot rule out Anterior infarct (cited on or before 27-SEP-2013)  Abnormal ECG  When compared with ECG of 30-JUL-2021 06:50,  No significant change was found  Confirmed by Lora Christensen ([id number]) on 8/1/2021 1:25:45 PM       Additional Data Reviewed:      Signed By: Aurea Blackmon MD     August 1, 2021

## 2021-08-01 NOTE — PROGRESS NOTES
OCCUPATIONAL THERAPY EVALUATION/DISCHARGE    Patient: Sonny Kyle [de-identified]64 y.o. female)  Date: 8/1/2021  Primary Diagnosis: STEMI (ST elevation myocardial infarction) ([facility] [address] [I21.3]  Procedure(s) (LRB):  CORONARY ANGIOGRAPHY (Right)  Thrombectomy Coronary Vessel (Right)  Insert Stent Wenceslao Coronary (N/A) 2 Days Post-Op   Precautions:  Fall, Contact  PLOF: Patient was independent in basic self care tasks and functional mobility PTA. ASSESSMENT AND RECOMMENDATIONS:  Based on the objective data described below, the patient seen for OT evaluation, patient agreeable. Patient performed functional mobility in room with supervision with use of RW. Min cues for walker management and safety. Min A to stand from lower toilet height; educated on recommendation for [facility] over toilet at home. Mod I for jennifer care seated/standing at toilet. Patient noted to get slightly SOB during mobility, educated on breathing techniques and activity pacing to help with SOB. Mod I to perform LE ADLs seated in chair. Skilled acute care occupational therapy is not indicated at this time. Will sign off, and defer to PT for further mobility. Discharge Recommendations: Home Health  Further Equipment Recommendations for Discharge: bedside commode      SUBJECTIVE:   Patient stated I can try.     OBJECTIVE DATA SUMMARY:     Past Medical History:   Diagnosis Date    Abnormal chest CT 2/6/2020    Adopted     Arthritis     Balance problems     Chronic cough 2/6/2020    MCBRIDE (dyspnea on exertion) 2/28/2019    GERD (gastroesophageal reflux disease)     Gout     Hemochromatosis     High cholesterol     Hypertension     Hypothyroidism     Left knee pain     Left shoulder pain     Low blood potassium     Lumbar pain     Pain of left sacroiliac joint     Shoulder impingement, right, with rotator cuff strain      Past Surgical History:   Procedure Laterality Date    HX CHOLECYSTECTOMY      HX KNEE REPLACEMENT Bilateral     R knee/ Lknee and femur    HX ORTHOPAEDIC      R trigger finger     HX OTHER SURGICAL      Right little finger    HX OTHER SURGICAL      Right wrist, replaced unlnar). HX OTHER SURGICAL      Both knees    HX OTHER SURGICAL      Left femur    HX OTHER SURGICAL      Trigger finger surgery    HX OTHER SURGICAL      Left knee replacment revision     HX ROTATOR CUFF REPAIR Right     HX TONSILLECTOMY       Barriers to Learning/Limitations: None    Home Situation:   Home Situation  Home Environment: Private residence  # Steps to Enter: 3  Rails to Enter: Yes  One/Two Story Residence: One story  Living Alone: No  Support Systems: Friends \ neighbors  Patient Expects to be Discharged to[de-identified] [employer]  Current DME Used/Available at Home: Aisha , rollator, Transfer bench (tub transfer bench on order)  Tub or Shower Type: Tub/Shower combination  [x]     Right hand dominant   []     Left hand dominant    Cognitive/Behavioral Status:  Neurologic State: Alert  Orientation Level: Oriented X4  Cognition: Follows commands    Skin: No skin changes noted    Edema: No edema noted    Vision/Perceptual:       Acuity: Within Defined Limits     Coordination: BUE  Coordination: Within functional limits       Balance:  Sitting: Intact  Standing: Impaired; With support  Standing - Static: Good  Standing - Dynamic : Fair    Strength: BUE  Strength: Generally decreased, functional      Tone & Sensation: BUE  Tone: Normal  Sensation: Intact      Range of Motion: BUE  AROM: Generally decreased, functional     Functional Mobility and Transfers for ADLs:  Bed Mobility:  Supine to Sit:  (seated in chair upon arrival)  Scooting: Independent  Transfers:  Sit to Stand: Supervision (min A from low toilet seat height)  Stand to Sit: Supervision   Toilet Transfer : Supervision (with RW, cues for walker managament/safety)      ADL Assessment:(clinical judgement)  Feeding: Independent    Oral Facial Hygiene/Grooming: Modified Independent    Bathing: Supervision    Upper Body Dressing: Independent    Lower Body Dressing: Modified independent    Toileting: Modified independent      Pain:  Pain level pre-treatment: 0/10   Pain level post-treatment: 0/10     Activity Tolerance:   Fair    Please refer to the flowsheet for vital signs taken during this treatment. After treatment:   [x]  Patient left in no apparent distress sitting up in chair  [x]  Patient left in no apparent distress in bed  [x]  Call bell left within reach  [x]  Nursing notified  []  Caregiver present  []  Bed alarm activated    COMMUNICATION/EDUCATION:   [x]      Role of Occupational Therapy in the acute care setting  [x]      Home safety education was provided and the patient/caregiver indicated understanding. [x]      Patient/family have participated as able and agree with findings and recommendations. []      Patient is unable to participate in plan of care at this time. Thank you for this referral.  Theressa Spatz, OTR/L  Time Calculation: 38 mins      Eval Complexity: History: LOW Complexity : Brief history review ; Examination: LOW Complexity : 1-3 performance deficits relating to physical, cognitive , or psychosocial skils that result in activity limitations and / or participation restrictions ;    Decision Making:LOW Complexity : No comorbidities that affect functional and no verbal or physical assistance needed to complete eval tasks

## 2021-08-01 NOTE — PROGRESS NOTES
Cardiology Progress Note    Admit Date: 7/30/2021  Attending Cardiologist: Dr. Francisco Javier Plunkett  Patient seen and examined independently. She has no specific complaints. Continue present cardiac Rx. Agree with assessment and plan as noted below. Colin Garduno MD  Assessment:     -Inferior STEMI, status post RCA stent on 07/30/2021  -History of hypertension  -History of hyperlipidemia  -Acute lower extremity DVT    Plan:     -Continued on DVT dose Eliquis. Continue ASA, Plavix due to PCI/FARHAT. Would stop 81 mg ASA after 1 month. Would prescribe PPI on discharge due to need for triple therapy.  -Otherwise continued on low-dose Lopressor, Crestor 20 mg.  -Benicar on hold due to soft BP's at times.  -Okay to transfer to stepdown from cardiac standpoint, would continue to monitor overnight. Subjective:     No new complaints.      Objective:      Patient Vitals for the past 8 hrs:   Temp Pulse Resp BP SpO2   08/01/21 1300  77 18 123/62 97 %   08/01/21 1239 98.4 °F (36.9 °C) 70 22 124/66 96 %   08/01/21 1100  67 17 109/67    08/01/21 1000  80 16 106/74 98 %   08/01/21 0910  84 24 (!) 119/54 97 %   08/01/21 0800 97.6 °F (36.4 °C) 68 20 127/69 97 %   08/01/21 0750     98 %   08/01/21 0715  64 19 121/64 96 %         Patient Vitals for the past 96 hrs:   Weight   08/01/21 0656 97.3 kg (214 lb 8 oz)   07/30/21 1318 93 kg (205 lb)   07/30/21 0308 93 kg (205 lb 0.4 oz)                  Current Facility-Administered Medications   Medication Dose Route Frequency Last Admin    metoprolol tartrate (LOPRESSOR) tablet 12.5 mg  12.5 mg Oral Q12H 12.5 mg at 08/01/21 1000    apixaban (ELIQUIS) tablet 10 mg  10 mg Oral BID 10 mg at 08/01/21 0959    Followed by   Chani Mcintyre [START ON 8/7/2021] apixaban (ELIQUIS) tablet 5 mg  5 mg Oral BID      nystatin (MYCOSTATIN) 100,000 unit/gram powder   Topical BID Given at 08/01/21 1000    polyethylene glycol (MIRALAX) packet 17 g  17 g Oral DAILY 17 g at 08/01/21 0900    bisacodyL (DULCOLAX) tablet 5 mg  5 mg Oral DAILY PRN      oxyCODONE IR (ROXICODONE) tablet 5 mg  5 mg Oral Q8H PRN      sodium chloride (NS) flush 5-40 mL  5-40 mL IntraVENous Q8H 10 mL at 08/01/21 0600    sodium chloride (NS) flush 5-40 mL  5-40 mL IntraVENous PRN 10 mL at 08/01/21 0702    aspirin chewable tablet 81 mg  81 mg Oral DAILY 81 mg at 08/01/21 0959    promethazine (PHENERGAN) tablet 12.5 mg  12.5 mg Oral Q6H PRN 12.5 mg at 07/30/21 5403    Or    ondansetron (ZOFRAN) injection 4 mg  4 mg IntraVENous Q6H PRN 4 mg at 07/30/21 4419    famotidine (PF) (PEPCID) 20 mg in 0.9% sodium chloride 10 mL injection  20 mg IntraVENous BID 20 mg at 08/01/21 0959    allopurinoL (ZYLOPRIM) tablet 100 mg  100 mg Oral DAILY 100 mg at 08/01/21 0959    budesonide (PULMICORT) 500 mcg/2 ml nebulizer suspension  500 mcg Nebulization BID  mcg at 08/01/21 0752    levothyroxine (SYNTHROID) tablet 25 mcg  25 mcg Oral 6am 25 mcg at 08/01/21 0700    [Held by provider] olmesartan (BENICAR) tablet 20 mg  20 mg Oral DAILY      rosuvastatin (CRESTOR) tablet 20 mg  20 mg Oral DAILY 20 mg at 08/01/21 0959    [Held by provider] potassium chloride (K-DUR, KLOR-CON) SR tablet 20 mEq  20 mEq Oral DAILY      sodium chloride (NS) flush 5-40 mL  5-40 mL IntraVENous Q8H 10 mL at 08/01/21 0701    sodium chloride (NS) flush 5-40 mL  5-40 mL IntraVENous PRN 10 mL at 08/01/21 0701    acetaminophen (TYLENOL) tablet 650 mg  650 mg Oral Q4H PRN      nitroglycerin (NITROSTAT) tablet 0.4 mg  0.4 mg SubLINGual Q5MIN PRN      atropine 1 mg/mL injection 0.5 mg  0.5 mg IntraVENous PRN      clopidogreL (PLAVIX) tablet 75 mg  75 mg Oral DAILY 75 mg at 08/01/21 1000    0.9% sodium chloride infusion  75 mL/hr IntraVENous CONTINUOUS 75 mL/hr at 07/31/21 1331         Intake/Output Summary (Last 24 hours) at 8/1/2021 1345  Last data filed at 8/1/2021 1245  Gross per 24 hour   Intake 1050 ml   Output 1050 ml   Net 0 ml       Physical Exam:  General:  alert, cooperative, no distress, appears stated age  Neck:  supple  Lungs:  clear to auscultation bilaterally  Heart:  regular rate and rhythm  Abdomen:  abdomen is soft without significant tenderness, masses, organomegaly or guarding  Extremities:  Atraumatic, + edema    Visit Vitals  /62   Pulse 77   Temp 98.4 °F (36.9 °C)   Resp 18   Ht 5' 1\" (1.549 m)   Wt 97.3 kg (214 lb 8 oz)   SpO2 97%   BMI 40.53 kg/m²       Data Review:     Labs: Results:       Chemistry Recent Labs     08/01/21  0600 07/31/21 0620 07/30/21 0305   GLU 76 93 127*    139 139   K 3.6 3.7 3.4*    108 105   CO2 25 27 28   BUN 14 17 10   CREA 0.70 0.78 0.85   CA 8.6 8.5 9.2   MG 1.9 1.7  --    AGAP 5 4 6   BUCR 20 22* 12   AP  --   --  90   TP  --   --  7.8   ALB  --   --  3.5   GLOB  --   --  4.3*   AGRAT  --   --  0.8      CBC w/Diff Recent Labs     08/01/21  0600 07/31/21 0620 07/30/21 0305   WBC 10.2 16.2* 10.8   RBC 3.60* 3.79* 4.22   HGB 12.2 12.6 14.4   HCT 36.5 38.3 42.2    198 214   GRANS 78* 83* 78*   LYMPH 10* 6* 10*   EOS 0 0 0      Coagulation Recent Labs     07/31/21 0620 07/30/21 0305   PTP  --  13.0   INR  --  1.0   APTT 40.6*  --        Lipid Panel Lab Results   Component Value Date/Time    Cholesterol, total 75 07/31/2021 06:20 AM    HDL Cholesterol 50 07/31/2021 06:20 AM    LDL, calculated 14.2 07/31/2021 06:20 AM    VLDL, calculated 10.8 07/31/2021 06:20 AM    Triglyceride 54 07/31/2021 06:20 AM    CHOL/HDL Ratio 1.5 07/31/2021 06:20 AM      Liver Enzymes Recent Labs     07/30/21  0305   TP 7.8   ALB 3.5   AP 90      Thyroid Studies Lab Results   Component Value Date/Time    TSH 2.57 07/30/2021 03:05 AM          Signed By: Lacy Hooker PA-C     August 1, 2021

## 2021-08-01 NOTE — ROUTINE PROCESS
Bedside and Verbal shift change report given to Toi Rivera RN, BSN (oncoming nurse) by Kimberlee Soto RN (offgoing nurse). Report included the following information SBAR, Kardex, ED Summary, Procedure Summary, Intake/Output, MAR, Recent Results and Cardiac Rhythm Sinus Rhythm.      Toi Rivera RN, BSN

## 2021-08-01 NOTE — PROGRESS NOTES
2310Report received from Gio Gonzales RN  Patient sleeping easily aroused patient stable . Care assumed . Bedside and Verbal shift change report given to Chin Wilson RN  (oncoming nurse) by Georgiana Nelson RN (offgoing nurse). Report included the following information Kardex, Procedure Summary, Intake/Output, MAR, Accordion, Recent Results, Cardiac Rhythm ., Alarm Parameters , Pre Procedure Checklist and Quality Measures.

## 2021-08-02 ENCOUNTER — HOME HEALTH ADMISSION (OUTPATIENT)
Dept: HOME HEALTH SERVICES | Facility: HOME HEALTH | Age: 76
End: 2021-08-02
Payer: MEDICARE

## 2021-08-02 VITALS
OXYGEN SATURATION: 98 % | SYSTOLIC BLOOD PRESSURE: 141 MMHG | TEMPERATURE: 98.2 F | RESPIRATION RATE: 19 BRPM | DIASTOLIC BLOOD PRESSURE: 83 MMHG | BODY MASS INDEX: 40.87 KG/M2 | WEIGHT: 216.5 LBS | HEART RATE: 62 BPM | HEIGHT: 61 IN

## 2021-08-02 LAB
ANION GAP SERPL CALC-SCNC: 5 MMOL/L (ref 3–18)
BASOPHILS # BLD: 0 K/UL (ref 0–0.1)
BASOPHILS NFR BLD: 0 % (ref 0–2)
BUN SERPL-MCNC: 18 MG/DL (ref 7–18)
BUN/CREAT SERPL: 23 (ref 12–20)
CALCIUM SERPL-MCNC: 8.8 MG/DL (ref 8.5–10.1)
CHLORIDE SERPL-SCNC: 113 MMOL/L (ref 100–111)
CO2 SERPL-SCNC: 24 MMOL/L (ref 21–32)
CREAT SERPL-MCNC: 0.77 MG/DL (ref 0.6–1.3)
DIFFERENTIAL METHOD BLD: ABNORMAL
EOSINOPHIL # BLD: 0.1 K/UL (ref 0–0.4)
EOSINOPHIL NFR BLD: 1 % (ref 0–5)
ERYTHROCYTE [DISTWIDTH] IN BLOOD BY AUTOMATED COUNT: 14.3 % (ref 11.6–14.5)
GLUCOSE SERPL-MCNC: 95 MG/DL (ref 74–99)
HCT VFR BLD AUTO: 37.3 % (ref 35–45)
HGB BLD-MCNC: 12.4 G/DL (ref 12–16)
LYMPHOCYTES # BLD: 1.3 K/UL (ref 0.9–3.6)
LYMPHOCYTES NFR BLD: 13 % (ref 21–52)
MAGNESIUM SERPL-MCNC: 2.1 MG/DL (ref 1.6–2.6)
MCH RBC QN AUTO: 32.9 PG (ref 24–34)
MCHC RBC AUTO-ENTMCNC: 33.2 G/DL (ref 31–37)
MCV RBC AUTO: 98.9 FL (ref 74–97)
MONOCYTES # BLD: 1.1 K/UL (ref 0.05–1.2)
MONOCYTES NFR BLD: 12 % (ref 3–10)
NEUTS SEG # BLD: 6.8 K/UL (ref 1.8–8)
NEUTS SEG NFR BLD: 73 % (ref 40–73)
PLATELET # BLD AUTO: 305 K/UL (ref 135–420)
PMV BLD AUTO: 10.7 FL (ref 9.2–11.8)
POTASSIUM SERPL-SCNC: 3.9 MMOL/L (ref 3.5–5.5)
RBC # BLD AUTO: 3.77 M/UL (ref 4.2–5.3)
SODIUM SERPL-SCNC: 142 MMOL/L (ref 136–145)
WBC # BLD AUTO: 9.3 K/UL (ref 4.6–13.2)

## 2021-08-02 PROCEDURE — 74011250637 HC RX REV CODE- 250/637: Performed by: EMERGENCY MEDICINE

## 2021-08-02 PROCEDURE — 99239 HOSP IP/OBS DSCHRG MGMT >30: CPT | Performed by: EMERGENCY MEDICINE

## 2021-08-02 PROCEDURE — 85025 COMPLETE CBC W/AUTO DIFF WBC: CPT

## 2021-08-02 PROCEDURE — 80048 BASIC METABOLIC PNL TOTAL CA: CPT

## 2021-08-02 PROCEDURE — 36415 COLL VENOUS BLD VENIPUNCTURE: CPT

## 2021-08-02 PROCEDURE — 2709999900 HC NON-CHARGEABLE SUPPLY

## 2021-08-02 PROCEDURE — 83735 ASSAY OF MAGNESIUM: CPT

## 2021-08-02 PROCEDURE — 94640 AIRWAY INHALATION TREATMENT: CPT

## 2021-08-02 PROCEDURE — 74011000250 HC RX REV CODE- 250: Performed by: STUDENT IN AN ORGANIZED HEALTH CARE EDUCATION/TRAINING PROGRAM

## 2021-08-02 PROCEDURE — 74011250637 HC RX REV CODE- 250/637: Performed by: STUDENT IN AN ORGANIZED HEALTH CARE EDUCATION/TRAINING PROGRAM

## 2021-08-02 PROCEDURE — 74011250637 HC RX REV CODE- 250/637: Performed by: PHYSICIAN ASSISTANT

## 2021-08-02 PROCEDURE — 77030027138 HC INCENT SPIROMETER -A

## 2021-08-02 PROCEDURE — 74011250637 HC RX REV CODE- 250/637: Performed by: INTERNAL MEDICINE

## 2021-08-02 RX ORDER — PANTOPRAZOLE SODIUM 40 MG/1
40 TABLET, DELAYED RELEASE ORAL
Qty: 30 TABLET | Refills: 0 | Status: SHIPPED | OUTPATIENT
Start: 2021-08-03 | End: 2021-09-02 | Stop reason: SDUPTHER

## 2021-08-02 RX ORDER — BISACODYL 5 MG
5 TABLET, DELAYED RELEASE (ENTERIC COATED) ORAL
Qty: 15 TABLET | Refills: 0 | Status: SHIPPED | OUTPATIENT
Start: 2021-08-02 | End: 2021-08-11 | Stop reason: ALTCHOICE

## 2021-08-02 RX ORDER — GUAIFENESIN 100 MG/5ML
81 LIQUID (ML) ORAL DAILY
Qty: 30 TABLET | Refills: 0 | Status: SHIPPED | OUTPATIENT
Start: 2021-08-03 | End: 2021-08-23 | Stop reason: SDUPTHER

## 2021-08-02 RX ORDER — POLYETHYLENE GLYCOL 3350 17 G/17G
17 POWDER, FOR SOLUTION ORAL DAILY
Qty: 30 PACKET | Refills: 0 | Status: SHIPPED | OUTPATIENT
Start: 2021-08-03 | End: 2022-05-17

## 2021-08-02 RX ORDER — NITROGLYCERIN 0.4 MG/1
0.4 TABLET SUBLINGUAL
Qty: 20 TABLET | Refills: 0 | Status: ON HOLD | OUTPATIENT
Start: 2021-08-02

## 2021-08-02 RX ORDER — METOPROLOL TARTRATE 25 MG/1
12.5 TABLET, FILM COATED ORAL EVERY 12 HOURS
Qty: 30 TABLET | Refills: 0 | Status: SHIPPED | OUTPATIENT
Start: 2021-08-02 | End: 2021-08-23 | Stop reason: SDUPTHER

## 2021-08-02 RX ORDER — CLOPIDOGREL BISULFATE 75 MG/1
75 TABLET ORAL DAILY
Qty: 30 TABLET | Refills: 0 | Status: SHIPPED | OUTPATIENT
Start: 2021-08-03 | End: 2021-08-23 | Stop reason: SDUPTHER

## 2021-08-02 RX ORDER — ROSUVASTATIN CALCIUM 20 MG/1
20 TABLET, COATED ORAL DAILY
Qty: 30 TABLET | Refills: 0 | Status: SHIPPED | OUTPATIENT
Start: 2021-08-03 | End: 2021-08-23 | Stop reason: SDUPTHER

## 2021-08-02 RX ADMIN — ROSUVASTATIN CALCIUM 20 MG: 20 TABLET, COATED ORAL at 09:33

## 2021-08-02 RX ADMIN — CLOPIDOGREL BISULFATE 75 MG: 75 TABLET ORAL at 09:32

## 2021-08-02 RX ADMIN — ASPIRIN 81 MG: 81 TABLET, CHEWABLE ORAL at 09:32

## 2021-08-02 RX ADMIN — Medication 10 ML: at 05:53

## 2021-08-02 RX ADMIN — ALLOPURINOL 100 MG: 100 TABLET ORAL at 09:32

## 2021-08-02 RX ADMIN — METOPROLOL TARTRATE 12.5 MG: 25 TABLET, FILM COATED ORAL at 09:33

## 2021-08-02 RX ADMIN — APIXABAN 10 MG: 5 TABLET, FILM COATED ORAL at 09:32

## 2021-08-02 RX ADMIN — LEVOTHYROXINE SODIUM 25 MCG: 25 TABLET ORAL at 05:53

## 2021-08-02 RX ADMIN — NYSTATIN: 100000 POWDER TOPICAL at 09:37

## 2021-08-02 RX ADMIN — BUDESONIDE 500 MCG: 0.5 SUSPENSION RESPIRATORY (INHALATION) at 09:59

## 2021-08-02 RX ADMIN — PANTOPRAZOLE SODIUM 40 MG: 40 TABLET, DELAYED RELEASE ORAL at 09:32

## 2021-08-02 NOTE — DISCHARGE INSTRUCTIONS
Learning About Heart Failure  What is heart failure? Heart failure means that your heart muscle doesn't pump as much blood as your body needs. Failure doesn't mean that your heart has stopped. It means that your heart isn't pumping as well as it should. Because your heart cannot pump well, your body tries to make up for it. To do this:  · Your body holds on to salt and water. This increases the amount of blood in your bloodstream.  · Your heart beats faster. · Your heart might get bigger. Your body has an amazing ability to make up for heart failure. It may do such a good job that you don't know you have a disease. But at some point, your heart and body will no longer be able to keep up. Then fluid starts to build up in your lungs and other parts of your body. This fluid buildup is called congestion. It's why some doctors call the disease congestive heart failure. What can you expect when you have heart failure? Heart failure is a lifelong (chronic) disease. Treatment may be able to slow the disease and help you feel better. But heart failure tends to get worse over time. Despite this, there are many steps you can take to feel better and stay healthy longer. Early on, your symptoms may not be too bad. As heart failure gets worse, symptoms typically get worse, and you may need to limit your activities. Heart failure can also get worse suddenly. If this happens, you need emergency care. Then, after treatment, your symptoms may go back to being stable (which means they stay the same) for a long time. Heart failure can lead to other health problems, such as heart rhythm problems. Over time, your treatment options may change, especially as your symptoms get worse. You may want to think about what kind of care you want at the end of your life. What are the symptoms? Symptoms of heart failure start to happen when your heart can't pump enough blood to the rest of your body.   In the early stages of heart failure, you may:  · Feel tired easily. · Be short of breath when you exert yourself. · Feel like your heart is pounding or racing (palpitations). · Feel weak or dizzy. As heart failure gets worse, fluid starts to build up in your lungs and other parts of your body. This may cause you to:  · Feel short of breath even at rest.  · Have swelling (edema), especially in your legs, ankles, and feet. · Gain weight. This may happen over just a day or two, or more slowly. · Cough or wheeze, especially when you lie down. · Feel bloated or sick to your stomach. How is heart failure treated? Heart failure is treated with medicines and steps you take to make lifestyle changes and check your symptoms. Treatment can slow the disease and help you feel better and live longer. · You'll probably take several medicines. · You'll take steps to care for yourself at home. Ru Gauthier watch for changes in your symptoms. You may need to make lifestyle changes, such as limiting sodium, getting regular exercise, not smoking, and eating healthy foods. · You might attend cardiac rehabilitation (rehab) to get education and support that help you make lifestyle changes and stay as healthy as possible. · You may get a heart device. A pacemaker helps your heart pump blood. An ICD can stop abnormal heart rhythms. · As heart failure gets worse, palliative care can help improve your quality of life. You can do advance care planning to decide what kind of care you want at the end of your life. How can you care for yourself? There are many steps you can take to feel better and live longer. These steps are an important part of treatment. They can help you stay active and enjoy life. Take your medicine the right way. Avoid medicines that can make your symptoms worse. Check your weight and symptoms every day. Know what to do if your symptoms get worse. Limit sodium. This helps keep fluid from building up. It may help you feel better. Be active. Exercise regularly, but don't exercise too hard. Be heart-healthy. Eat healthy foods, stay at a healthy weight, limit alcohol, and don't smoke. Stay as healthy as possible. Manage other health problems such as diabetes and high blood pressure. Avoid colds and flu, get help for depression and anxiety, and manage stress. If you think you may have a problem with alcohol or drug use, talk to your doctor. Follow-up care is a key part of your treatment and safety. Be sure to make and go to all appointments, and call your doctor if you are having problems. It's also a good idea to know your test results and keep a list of the medicines you take. Where can you learn more? Go to http://www.gray.com/  Enter [id number] in the search box to learn more about \"Learning About Heart Failure. \"  Current as of: August 31, 2020               Content Version: 12.8  © 2006-2021 [employer]. Care instructions adapted under license by [employer] (which disclaims liability or warranty for this information). If you have questions about a medical condition or this instruction, always ask your healthcare professional. [place] [address] any warranty or liability for your use of this information. Patient Education        Coronary Angioplasty: What to Expect at Home  Your Recovery     Coronary angioplasty is a procedure that is used to open a narrowed or blocked coronary artery. It may also be called a percutaneous coronary intervention (PCI). The doctor opened your narrowed or blocked artery by putting a thin tube, called a catheter, into your heart through a blood vessel. The catheter was inserted into the blood vessel in your groin or arm. The doctor may have placed a small tube, called a stent, in the artery. Your groin or arm may have a bruise and feel sore for a day or two after the procedure. You can do light activities around the house.  But don't do anything strenuous for a day or two. This care sheet gives you a general idea about how long it will take for you to recover. But each person recovers at a different pace. Follow the steps below to get better as quickly as possible. How can you care for yourself at home? Activity    · If the doctor gave you a sedative:  ? For 24 hours, don't do anything that requires attention to detail, such as going to work, making important decisions, or signing any legal documents. It takes time for the medicine's effects to completely wear off.  ? For your safety, do not drive or operate any machinery that could be dangerous. Wait until the medicine wears off and you can think clearly and react easily.     · Do not do strenuous exercise and do not lift, pull, or push anything heavy until your doctor says it is okay. This may be for a day or two. You can walk around the house and do light activity, such as cooking.     · If the catheter was placed in your groin, try not to walk up stairs for the first couple of days.     · If the catheter was placed in your arm near your wrist, do not bend your wrist deeply for the first couple of days. Be careful using your hand to get into and out of a chair or bed.     · Carry your stent identification card with you at all times.     · If your doctor recommends it, get more exercise. Walking is a good choice. Bit by bit, increase the amount you walk every day. Try for at least 30 minutes on most days of the week.     · If you haven't been set up with a cardiac rehab program, talk to your doctor about whether rehab is right for you. Cardiac rehab includes supervised exercise. It also includes help with diet and lifestyle changes and emotional support. Diet    · Drink plenty of fluids to help your body flush out the dye.  If you have kidney, heart, or liver disease and have to limit fluids, talk with your doctor before you increase the amount of fluids you drink.     · Keep eating a heart-healthy diet that has lots of fruits, vegetables, and whole grains. If you have not been eating this way, talk to your doctor. You also may want to talk to a dietitian. This expert can help you to learn about healthy foods and plan meals. Medicines    · Your doctor will tell you if and when you can restart your medicines. Your doctor will also give you instructions about taking any new medicines.     · If you take aspirin or some other blood thinner, ask your doctor if and when to start taking it again. Make sure that you understand exactly what your doctor wants you to do.     · Your doctor will prescribe blood-thinning medicines. You will likely take aspirin plus another antiplatelet, such as clopidogrel (Plavix). It is very important that you take these medicines exactly as directed. These medicines help keep the coronary artery open and reduce your risk of a heart attack.     · Call your doctor if you think you are having a problem with your medicine. Care of the catheter site    · For 1 or 2 days, keep a bandage over the spot where the catheter was inserted. The bandage probably will fall off in this time.     · Put ice or a cold pack on the area for 10 to 20 minutes at a time to help with soreness or swelling. Put a thin cloth between the ice and your skin.     · You may shower 24 to 48 hours after the procedure, if your doctor okays it. Pat the incision dry.     · Do not soak the catheter site until it is healed. Don't take a bath for 1 week, or until your doctor tells you it is okay.     · Watch for bleeding from the site. A small amount of blood (up to the size of a quarter) on the bandage can be normal.     · If you are bleeding, lie down and press on the area for 15 minutes to try to make it stop. If the bleeding does not stop, call your doctor or seek immediate medical care. Follow-up care is a key part of your treatment and safety.  Be sure to make and go to all appointments, and call your doctor if you are having problems. It's also a good idea to know your test results and keep a list of the medicines you take. When should you call for help? Call 911 anytime you think you may need emergency care. For example, call if:    · You passed out (lost consciousness).     · You have severe trouble breathing.     · You have sudden chest pain and shortness of breath, or you cough up blood.     · You have symptoms of a heart attack, such as:  ? Chest pain or pressure. ? Sweating. ? Shortness of breath. ? Nausea or vomiting. ? Pain that spreads from the chest to the neck, jaw, or one or both shoulders or arms. ? Dizziness or lightheadedness. ? A fast or uneven pulse. After calling 911, chew 1 adult-strength aspirin. Wait for an ambulance. Do not try to drive yourself.     · You have been diagnosed with angina, and you have angina symptoms that do not go away with rest or are not getting better within 5 minutes after you take one dose of nitroglycerin. Call your doctor now or seek immediate medical care if:    · You are bleeding from the area where the catheter was put in your artery.     · You have a fast-growing, painful lump at the catheter site.     · You have signs of infection, such as:  ? Increased pain, swelling, warmth, or redness. ? Red streaks leading from the catheter site. ? Pus draining from the catheter site. ? A fever.     · Your leg, arm, or hand is painful, looks blue, or feels cold, numb, or tingly. Watch closely for changes in your health, and be sure to contact your doctor if you have any problems. Where can you learn more? Go to http://www.gray.com/  Enter K136 in the search box to learn more about \"Coronary Angioplasty: What to Expect at Home. \"  Current as of: August 31, 2020               Content Version: 12.8  © 6134-2606 [employer].    Care instructions adapted under license by [employer] (which disclaims liability or warranty for this information). If you have questions about a medical condition or this instruction, always ask your healthcare professional. [place] [address] any warranty or liability for your use of this information.

## 2021-08-02 NOTE — PROGRESS NOTES
1310 Patient ambulated in the hallway from Patient's room to beyond the Nurses Station. Oxygen saturations remains 94 - 96 % on room air with the walking, heart rate at  Low 100's.

## 2021-08-02 NOTE — ROUTINE PROCESS
3339 Received report from Valentín Flores. Patient asleep in the recliner. Call bell within reach. 0830 Patient ambulated to the bathroom. 1130 Patient sitting in the recliner, both feet elevated. Call bell and telephone placed within reach.

## 2021-08-02 NOTE — PROGRESS NOTES
Dr. Che Quezada states patient has been cleared by Cardiology and can be discharged. Primary RN, Kade Morales aware.

## 2021-08-02 NOTE — ROUTINE PROCESS
7059 Patient for discharge today. Instructed on home medications, new prescription handed. Instructed also on medication side effects. Instructed to follow-up with PCP, Cardiology and Pulmonology as scheduled. Patient verbalized understanding. Patient verbalized understanding. Patient unable to sign electronically due to signature pad.

## 2021-08-02 NOTE — PROGRESS NOTES
Problem: Pressure Injury - Risk of  Goal: *Prevention of pressure injury  Description: Document Stef Scale and appropriate interventions in the flowsheet. Outcome: Progressing Towards Goal  Note: Pressure Injury Interventions:       Moisture Interventions: Absorbent underpads, Minimize layers    Activity Interventions: Pressure redistribution bed/mattress(bed type)    Mobility Interventions: HOB 30 degrees or less, Pressure redistribution bed/mattress (bed type)    Nutrition Interventions: Document food/fluid/supplement intake, Discuss nutritional consult with provider, Offer support with meals,snacks and hydration    Friction and Shear Interventions: HOB 30 degrees or less                Problem: Patient Education: Go to Patient Education Activity  Goal: Patient/Family Education  Outcome: Progressing Towards Goal     Problem: Risk for Spread of Infection  Goal: Prevent transmission of infectious organism to others  Description: Prevent the transmission of infectious organisms to other patients, staff members, and visitors. Outcome: Progressing Towards Goal     Problem: Patient Education:  Go to Education Activity  Goal: Patient/Family Education  Outcome: Progressing Towards Goal     Problem: Falls - Risk of  Goal: *Absence of Falls  Description: Document Ritu Galindo Fall Risk and appropriate interventions in the flowsheet.   Outcome: Progressing Towards Goal  Note: Fall Risk Interventions:  Mobility Interventions: Patient to call before getting OOB, Utilize walker, cane, or other assistive device         Medication Interventions: Patient to call before getting OOB, Teach patient to arise slowly    Elimination Interventions: Call light in reach, Patient to call for help with toileting needs, Stay With Me (per policy)              Problem: Patient Education: Go to Patient Education Activity  Goal: Patient/Family Education  Outcome: Progressing Towards Goal     Problem: Patient Education: Go to Patient Education Activity  Goal: Patient/Family Education  Outcome: Progressing Towards Goal     Problem: Cath Lab Procedures: Post-Cath Day 1  Goal: Off Pathway (Use only if patient is Off Pathway)  Outcome: Progressing Towards Goal  Goal: Activity/Safety  Outcome: Progressing Towards Goal  Goal: Diagnostic Test/Procedures  Outcome: Progressing Towards Goal  Goal: Nutrition/Diet  Outcome: Progressing Towards Goal  Goal: Discharge Planning  Outcome: Progressing Towards Goal  Goal: Medications  Outcome: Progressing Towards Goal  Goal: Respiratory  Outcome: Progressing Towards Goal  Goal: Treatments/Interventions/Procedures  Outcome: Progressing Towards Goal  Goal: Psychosocial  Outcome: Progressing Towards Goal     Problem: Cath Lab Procedures: Discharge Outcomes  Goal: *Stable cardiac rhythm  Outcome: Progressing Towards Goal  Goal: *Hemodynamically stable  Outcome: Progressing Towards Goal  Goal: *Optimal pain control at patient's stated goal  Outcome: Progressing Towards Goal  Goal: *Pulses palpable, skin color within defined limits, skin temperature warm  Outcome: Progressing Towards Goal  Goal: *Lungs clear or at baseline  Outcome: Progressing Towards Goal  Goal: *Demonstrates ability to perform prescribed activity without shortness of breath or discomfort  Outcome: Progressing Towards Goal  Goal: *Verbalizes home exercise program, activity guidelines, cardiac precautions  Outcome: Progressing Towards Goal  Goal: *Verbalizes understanding and describes prescribed diet  Outcome: Progressing Towards Goal  Goal: *Verbalizes understanding and describes medication purposes and frequencies  Outcome: Progressing Towards Goal  Goal: *Identifies cardiac risk factors  Outcome: Progressing Towards Goal  Goal: *No signs and symptoms of infection or wound complications  Outcome: Progressing Towards Goal  Goal: *Anxiety reduced or absent  Outcome: Progressing Towards Goal  Goal: *Verbalizes and demonstrates incision care  Outcome: Progressing Towards Goal  Goal: *Understands and describes signs and symptoms to report to providers(Stroke Metric)  Outcome: Progressing Towards Goal  Goal: *Describes follow-up/return visits to physicians  Outcome: Progressing Towards Goal  Goal: *Describes available resources and support systems  Outcome: Progressing Towards Goal  Goal: *Influenza immunization  Outcome: Progressing Towards Goal  Goal: *Pneumococcal immunization  Outcome: Progressing Towards Goal     Problem: Pain  Goal: *Control of Pain  Outcome: Progressing Towards Goal  Goal: *PALLIATIVE CARE:  Alleviation of Pain  Outcome: Progressing Towards Goal     Problem: Patient Education: Go to Patient Education Activity  Goal: Patient/Family Education  Outcome: Progressing Towards Goal     Problem: Patient Education: Go to Patient Education Activity  Goal: Patient/Family Education  Outcome: Progressing Towards Goal

## 2021-08-02 NOTE — PROGRESS NOTES
Cardiology    Patient stable and ready for discharge. Patient will need a follow-up appointment in 2 weeks with Dr. Rebecca Meyers.   Drea Valderrama MD

## 2021-08-02 NOTE — ROUTINE PROCESS
Patient alert and oriented times four. Verbalizes well. Assisted to bathroom. Supervision with ambulation. Noted dyspnea on exertion. Requested to stay up in the recliner. Assisted to recliner and legs elevated. Resting at this time with call bell within reach.     Verner Curia, RN, BSN

## 2021-08-02 NOTE — DISCHARGE SUMMARY
[address] [place] [address] Dr Sid Guo [facility], [address]     DISCHARGE SUMMARY    Name: Gearldean Cranker MRN: [id number]   Age / Sex: 64 y.o. / female CSN: [id number]   YOB: 1945 Length of Stay: 3 days   Admit Date: 7/30/2021 Discharge Date:        PRIMARY CARE PHYSICIAN: Liu Franklin NP      DISCHARGE DIAGNOSES:    ST elevation MI  Coronary artery disease, status post PCI and stent  Acute bilateral lower extremity DVTs  Prior history of PE and DVT for which patient was on Eliquis which was discontinued 4 months ago by PCP  History of hypertension, was hypotensive yesterday  Dyslipidemia  Hypothyroidism  COPD without exacerbation  Chronic back pain  Leukocytosis which may be a stress reaction    CONSULTS CALLED: Cardiology      PROCEDURES DONE: PCI and stent      [address]: This is a 77-year-old female with a history of hypothyroidism and dyslipidemia and hypertension who presented to the ED with chest pains. Patient was noted to have a ST elevation MI. Patient was taken to the Cath Lab and underwent percutaneous intervention and stent placement. Patient was monitored in ICU. Patient remained hemodynamically stable. Patient had lower extremity swelling. Duplex studies were done which showed bilateral DVTs. After discussions with cardiology patient was started on Eliquis. Patient was mobilized. PT OT were consulted. Discharge plans were discussed with the patient. Case management was involved. Patient was cleared for discharge by cardiology. Patient was provided education regarding Eliquis. Patient was discharged home with home health care.       MEDICATIONS ON DISCHARGE:    Current Discharge Medication List      START taking these medications    Details   apixaban (ELIQUIS) 5 mg tablet 2 tabs ( 10 mg ) PO Twice daily through 8/7/2021 and then starting from 8/8/2021 take 1 tab ( 5 mg ) PO twice daily ongoing  Qty: 72 Tablet, Refills: 0  Start date: 8/2/2021      aspirin 81 mg chewable tablet Take 1 Tablet by mouth daily. Qty: 30 Tablet, Refills: 0  Start date: 8/3/2021      bisacodyL (DULCOLAX) 5 mg EC tablet Take 1 Tablet by mouth daily as needed for Constipation. Qty: 15 Tablet, Refills: 0  Start date: 8/2/2021      clopidogreL (PLAVIX) 75 mg tab Take 1 Tablet by mouth daily. Qty: 30 Tablet, Refills: 0  Start date: 8/3/2021      metoprolol tartrate (LOPRESSOR) 25 mg tablet Take 0.5 Tablets by mouth every twelve (12) hours. Qty: 30 Tablet, Refills: 0  Start date: 8/2/2021      nitroglycerin (NITROSTAT) 0.4 mg SL tablet 1 Tablet by SubLINGual route every five (5) minutes as needed for Chest Pain. Up to 3 doses. Qty: 20 Tablet, Refills: 0  Start date: 8/2/2021      pantoprazole (PROTONIX) 40 mg tablet Take 1 Tablet by mouth Daily (before breakfast). Qty: 30 Tablet, Refills: 0  Start date: 8/3/2021      polyethylene glycol (MIRALAX) 17 gram packet Take 1 Packet by mouth daily. Qty: 30 Packet, Refills: 0  Start date: 8/3/2021      !! OTHER Incentive Spirometry- use as directed  Qty: 1 Each, Refills: 0  Start date: 8/2/2021      !! OTHER Graded Compression Stockings b/l LE- Use as directed  Qty: 1 Each, Refills: 0  Start date: 8/2/2021      !! OTHER Check CBC, CMP, Mg in 4 days, results to PCP immediately, Diagnosis- CAD  Qty: 1 Each, Refills: 0  Start date: 8/2/2021       !! - Potential duplicate medications found. Please discuss with provider. CONTINUE these medications which have CHANGED    Details   rosuvastatin (CRESTOR) 20 mg tablet Take 1 Tablet by mouth daily. Qty: 30 Tablet, Refills: 0  Start date: 8/3/2021         CONTINUE these medications which have NOT CHANGED    Details   albuterol (PROVENTIL HFA, VENTOLIN HFA, PROAIR HFA) 90 mcg/actuation inhaler Take 2 Puffs by inhalation every four (4) hours as needed for Wheezing. Qty: 1 Inhaler, Refills: 2      allopurinoL (ZYLOPRIM) 100 mg tablet Take 1 Tab by mouth daily.   Qty: 90 Tab, Refills: 1    Associated Diagnoses: Gout, unspecified cause, unspecified chronicity, unspecified site      budesonide (PULMICORT) 0.5 mg/2 mL nbsp 2 mL by Nebulization route two (2) times a day. File under Medicare Part B, ICD J47.0, J45.909  Qty: 60 Each, Refills: 3    Comments: File under Medicare part B      oxyCODONE IR (ROXICODONE) 5 mg immediate release tablet Take 5 mg by mouth three (3) times daily as needed. levothyroxine (SYNTHROID) 25 mcg tablet TAKE 1 TAB BY MOUTH DAILY  Qty: 90 Tab, Refills: 6    Associated Diagnoses: Acquired hypothyroidism      albuterol (PROVENTIL VENTOLIN) 2.5 mg /3 mL (0.083 %) nebu 3 mL by Nebulization route every six (6) hours as needed for Wheezing or Shortness of Breath. Qty: 120 Nebule, Refills: 3    Associated Diagnoses: Bronchiectasis with acute lower respiratory infection ([address]      acetaminophen (TYLENOL) 325 mg tablet Take 2 Tabs by mouth every six (6) hours as needed for Pain or Fever. Qty: 30 Tab, Refills: 0      inhalational spacing device (ACE AEROSOL CLOUD ENHANCER) 1 Each by Does Not Apply route as needed. Qty: 1 Device, Refills: 3    Associated Diagnoses: Dyspnea, unspecified type         STOP taking these medications       olmesartan (BENICAR) 20 mg tablet Comments:   Reason for Stopping:         furosemide (LASIX) 20 mg tablet Comments:   Reason for Stopping:         Klor-Con M20 20 mEq tablet Comments:   Reason for Stopping:                 DISCHARGE VITAL SIGNS:  Visit Vitals  BP (!) 141/83   Pulse 62   Temp 98.2 °F (36.8 °C)   Resp 19   Ht 5' 1\" (1.549 m)   Wt 97.3 kg (214 lb 8 oz)   SpO2 98%   BMI 40.53 kg/m²         CONDITION ON DISCHARGE: Stable.       DISPOSITION: Home with home health care      FOLLOW-UP RECOMMENDATIONS:   Follow-up Information     Follow up With Specialties Details Why Contact Info    Jes Kinsey NP Nurse Practitioner   [address] [facility]  [address]  [address]  [phone]            OTHER INSTRUCTIONS:        TIME SPENT ON DISCHARGE ACTIVITIES: More than 35 minutes. Dragon medical dictation software was used for portions of this report. Unintended errors may occur.       Signed:  Shane Ernandez MD      8/2/2021

## 2021-08-02 NOTE — ROUTINE PROCESS
Bedside and Verbal shift change report given to Sherren Sheffield, RN, BSN (oncoming nurse) by Raghu Benjamin RN, BSN (offgoing nurse). Report included the following information SBAR, Kardex, ED Summary, Procedure Summary, Intake/Output, MAR, Recent Results and Cardiac Rhythm Sinus Rhythm.      Raghu Benjamin RN, BSN

## 2021-08-02 NOTE — PROGRESS NOTES
Discharge order noted for today. Home Health referral sent to [facility] agency. Met with patient and she is agreeable to the transition plan today. Medicare pt has received, reviewed, and signed 2nd IM letter informing them of their right to appeal the discharge. Signed copy has been placed on pt bedside chart. Pt reports that she already has a rolling walker, rollator, shower chair and has ordered a transfer tub bench. Eliquis discount card placed on pt's chart. Transport has been arranged through one of her friends. Patient's discharge summary and home health orders have been forwarded to [facility] home health agency via [facility] [address] CM called and spoke to [address] Updated bedside RN, Dinorah Vance,  to the transition plan.   Discharge information has been documented on the AVS.       [address]  [phone]

## 2021-08-02 NOTE — PROGRESS NOTES
Problem: Pressure Injury - Risk of  Goal: *Prevention of pressure injury  Description: Document Stef Scale and appropriate interventions in the flowsheet. Outcome: Progressing Towards Goal  Note: Pressure Injury Interventions:       Moisture Interventions: Maintain skin hydration (lotion/cream), Moisture barrier, Absorbent underpads    Activity Interventions: Chair cushion, Increase time out of bed, Pressure redistribution bed/mattress(bed type), PT/OT evaluation    Mobility Interventions: Chair cushion, HOB 30 degrees or less, Pressure redistribution bed/mattress (bed type), PT/OT evaluation, Turn and reposition approx. every two hours(pillow and wedges)    Nutrition Interventions: Document food/fluid/supplement intake    Friction and Shear Interventions: Foam dressings/transparent film/skin sealants, HOB 30 degrees or less, Sit at 90-degree angle                Problem: Risk for Spread of Infection  Goal: Prevent transmission of infectious organism to others  Description: Prevent the transmission of infectious organisms to other patients, staff members, and visitors. Outcome: Progressing Towards Goal     Problem: Falls - Risk of  Goal: *Absence of Falls  Description: Document Brisa [place] Fall Risk and appropriate interventions in the flowsheet.   Outcome: Progressing Towards Goal  Note: Fall Risk Interventions:  Mobility Interventions: Bed/chair exit alarm, OT consult for ADLs, Patient to call before getting OOB, PT Consult for mobility concerns, PT Consult for assist device competence         Medication Interventions: Patient to call before getting OOB, Teach patient to arise slowly, Bed/chair exit alarm    Elimination Interventions: Call light in reach, Patient to call for help with toileting needs              Problem: Pain  Goal: *Control of Pain  Outcome: Progressing Towards Goal

## 2021-08-03 ENCOUNTER — HOME CARE VISIT (OUTPATIENT)
Dept: SCHEDULING | Facility: HOME HEALTH | Age: 76
End: 2021-08-03
Payer: MEDICARE

## 2021-08-03 ENCOUNTER — PATIENT OUTREACH (OUTPATIENT)
Dept: CASE MANAGEMENT | Age: 76
End: 2021-08-03

## 2021-08-03 VITALS
RESPIRATION RATE: 18 BRPM | SYSTOLIC BLOOD PRESSURE: 134 MMHG | OXYGEN SATURATION: 98 % | HEART RATE: 72 BPM | TEMPERATURE: 97.9 F | DIASTOLIC BLOOD PRESSURE: 86 MMHG

## 2021-08-03 PROCEDURE — G0151 HHCP-SERV OF PT,EA 15 MIN: HCPCS

## 2021-08-03 PROCEDURE — 3331090002 HH PPS REVENUE DEBIT

## 2021-08-03 PROCEDURE — 3331090001 HH PPS REVENUE CREDIT

## 2021-08-03 PROCEDURE — 400018 HH-NO PAY CLAIM PROCEDURE

## 2021-08-03 PROCEDURE — 400013 HH SOC

## 2021-08-03 NOTE — PROGRESS NOTES
Care Transitions Initial Call    Call within 2 business days of discharge: Yes     Patient: Job Filter Patient : 1945 MRN: [id number]    Last Discharge [address]       Complaint Diagnosis Description Type Department Provider    21 Chest Pain ST elevation (STEMI) myocardial infarction [facility]) . .. ED to Hosp-Admission (Discharged) (ADMIT) Katty Delaney MD; Micah Child. .. Was this an external facility discharge? No Discharge Facility: [facility]    Challenges to be reviewed by the provider   Additional needs identified to be addressed with provider: no    Patient reports she is feeling better and has no questions or concerns at this time. Method of communication with provider : none     Discussed COVID-19 related testing which was not done at this time. Please see laboratory testing/results for additional verification. Advance Care Planning:   Does patient have an Advance Directive:   Not on file per chart review at this time. Inpatient Readmission Risk score: Unplanned Readmit Risk Score: 15    Was this a readmission? no   Patient stated reason for the admission: n/a     Patients top risk factors for readmission:   - medical condition. Interventions to address risk factors: Obtained and reviewed discharge summary and/or continuity of care documents     Care Transition Nurse (CTN) contacted the patient by telephone to perform post hospital discharge assessment. Verified name and  with patient as identifiers. Provided introduction to self, and explanation of the CTN role. CTN reviewed discharge instructions, medical action plan and red flags with patient who verbalized understanding. Were discharge instructions available to patient? yes. Reviewed appropriate site of care based on symptoms and resources available to patient including: PCP, After hours contact number-PCP office phone number  and When to call 911.  Patient given an opportunity to ask questions and does not have any further questions or concerns at this time. The patient agrees to contact the PCP office for questions related to their healthcare. Medication reconciliation was attempted  with patient, who verbalizes understanding of administration of home medications. Patient declined review of medications. Patient advises that her daughter picked up her medications. They went over themedications 1 by 1 and filled up her bill box. Patient feels that she knows what to take. Referral to Pharm D needed: no      Home Health/Outpatient orders at discharge: yes  [address]: [address]   Date of initial visit: scheduled for 8/3/2021    Durable Medical Equipment ordered at discharge: None, noted at this time. [address]:   Durable Medical Equipment received:     Covid Risk Education    Educated patient about risk for severe COVID-19 due to risk factors according to CDC guidelines. CTN reviewed discharge instructions, medical action plan and red flag symptoms with the patient who verbalized understanding. Discussed COVID vaccination status: yes. Patient advises that she has had 2 Covid-19 vaccines. Education provided on COVID-19 vaccination as appropriate.: n/a       Discussed exposure protocols and quarantine with CDC Guidelines. Patient was given an opportunity to verbalize any questions and concerns and agrees to contact CTN or health care provider for questions related to their healthcare. Was patient discharged with a pulse oximeter? No, per chart review. Discussed follow-up appointments. If no appointment was previously scheduled, appointment scheduling offered: no, as PCP DESTINY appointment is scheduled for 8/4/2021. . Is follow up appointment scheduled within 7 days of discharge?  yes  [facility] follow up appointment(s):   Future Appointments   Date Time Provider Department Center   8/4/2021 To Be Determined RANJAN Angel 8/4/2021 10:00 AM Adal Cuenca, NP [facility] BS AMB   8/5/2021  3:30 PM Simran Germain MD [facility] BS AMB   8/11/2021  2:30 PM Glenn Pickett, NP [facility] BS AMB   11/8/2021  8:00 AM [facility] BS AMB   11/16/2021 10:40 AM Brenna POST, NP [facility] BS AMB     [facility] follow up appointment(s):   None noted at this time. Plan for follow-up call in 5-7 days/as needed  based on severity of symptoms and risk factors. Plan for next call:   - follow up with appointments   - follow up with medications if possible. CTN provided contact information for future needs. Goals Addressed                 This Visit's Progress     Attends follow-up appointments as directed. Target Date:  9/3/2021      8/3/2021   Patient to follow up as below:   -  PCP transition of Care appointment scheduled for 8/4/21  - Cardiology follow up scheduled for 8/11/21  - Pulmonary appt. Scheduled for 8/5/2021         Prevent complications post hospitalization. Target Date: 9/3/2021    8/3/2021   Patient and/or family members were alerted to the availability of physician or other advanced practioners after hours, weekends, and holidays. Please call the PCP office phone number and speak with the answering service for assistance. Patient asked to call [address] emergency assistance as needed. Care Transitions Nurse ( CTN)  contact information provided for follow up as needed.  Supportive resources in place to maintain patient in the community (ie. Home Health, DME equipment, refer to, medication assistant plan, etc.)        Target Date:  9/3/2021     8/3/2021   [facility] to provide skilled home care visits.  Understands red flags post discharge.         Target Date: 9/3/2021    8/3/2021   Care Transitions Nurse (CTN) reviewed red flags with patient as follows:   Please call 911 for immediate assistance for symptoms such as:   - Chest Pain  - Severe shortness of breath   - Change in mental status   - Blue tint to face or lips   - New or worsening symptoms    - high fevers  - other

## 2021-08-03 NOTE — HOME CARE
Received home health referral for [facility] for skilled care, pt, ot, disease and medication education, DME as needed, telehealth, home safety evaluation. Spoke with patient in room;  patient identifiers verified. Explained home care services and routines. Demographics verified including insurance, phone and address confirmed. Patient has the following DME: Rolling walker, Standard walker, BSC and shower chair. Caregivers available:  friend living in same address and friend's dtr. Orders noted and arranged to be processed to central intake. [facility] yesterday 8/2/21. Updated notes today.

## 2021-08-04 ENCOUNTER — HOME CARE VISIT (OUTPATIENT)
Dept: SCHEDULING | Facility: HOME HEALTH | Age: 76
End: 2021-08-04
Payer: MEDICARE

## 2021-08-04 ENCOUNTER — VIRTUAL VISIT (OUTPATIENT)
Dept: FAMILY MEDICINE CLINIC | Age: 76
End: 2021-08-04
Payer: MEDICARE

## 2021-08-04 ENCOUNTER — HOME CARE VISIT (OUTPATIENT)
Dept: HOME HEALTH SERVICES | Facility: HOME HEALTH | Age: 76
End: 2021-08-04
Payer: MEDICARE

## 2021-08-04 VITALS
DIASTOLIC BLOOD PRESSURE: 78 MMHG | RESPIRATION RATE: 18 BRPM | TEMPERATURE: 97.6 F | OXYGEN SATURATION: 100 % | SYSTOLIC BLOOD PRESSURE: 138 MMHG | HEART RATE: 85 BPM

## 2021-08-04 DIAGNOSIS — I10 ESSENTIAL HYPERTENSION: ICD-10-CM

## 2021-08-04 DIAGNOSIS — Z09 HOSPITAL DISCHARGE FOLLOW-UP: Primary | ICD-10-CM

## 2021-08-04 DIAGNOSIS — E03.9 ACQUIRED HYPOTHYROIDISM: ICD-10-CM

## 2021-08-04 PROCEDURE — 99495 TRANSJ CARE MGMT MOD F2F 14D: CPT | Performed by: NURSE PRACTITIONER

## 2021-08-04 PROCEDURE — G0299 HHS/HOSPICE OF RN EA 15 MIN: HCPCS

## 2021-08-04 PROCEDURE — 3331090002 HH PPS REVENUE DEBIT

## 2021-08-04 PROCEDURE — 3331090001 HH PPS REVENUE CREDIT

## 2021-08-04 PROCEDURE — G8427 DOCREV CUR MEDS BY ELIG CLIN: HCPCS | Performed by: NURSE PRACTITIONER

## 2021-08-04 PROCEDURE — 1111F DSCHRG MED/CURRENT MED MERGE: CPT | Performed by: NURSE PRACTITIONER

## 2021-08-04 RX ORDER — LEVOTHYROXINE SODIUM 25 UG/1
TABLET ORAL
Qty: 90 TABLET | Refills: 2 | Status: SHIPPED | OUTPATIENT
Start: 2021-08-04 | End: 2022-04-20

## 2021-08-04 NOTE — PROGRESS NOTES
Sam Rodriguez is a 64 y.o. female, evaluated via audio-only technology on 2021 for Hospital Follow Up  Transitional Care Management Progress Note    Patient: Fraser Curling Roman  : 1945  PCP: Flavio Yao NP    Date of office visit: 2021   Date of admission: 21  Date of discharge: 21  Hospital: [facility]    Call initiated w/i 2 business dates of discharge: Yes   Date of the most recent call to the patient: 8/3/2021  5:26 PM        Assessment/Plan:   Diagnoses and all orders for this visit:    1. Hospital discharge follow-up  -     IN DISCHARGE MEDS RECONCILED W/ CURRENT OUTPATIENT MED LIST    2. Acquired hypothyroidism  -     levothyroxine (SYNTHROID) 25 mcg tablet; TAKE 1 TAB BY MOUTH DAILY    3. Essential hypertension    advised to use albuterol nebulizer every 6 hrs routinely for sob until seen by pulmonologist tomorrow. Continue Pulmicort nebulizer as prescribed. Informed if symptoms worsen she will need to report to the ED. I have discussed the diagnosis with the patient and the intended plan as seen in the above orders. The patient has received an after-visit summary and questions were answered concerning future plans. I have discussed medication side effects and warnings with the patient as well. Patient agreeable with above plan and verbalizes understanding. Follow-up and Dispositions    · Return in about 6 weeks (around 9/15/2021) for swelling/HTN. Subjective:   Sam Rodriguez is a 64 y.o. female presenting today for follow-up after hospital discharge. This encounter and supporting documentation was reviewed if available. Medication reconciliation was performed today. The main problem requiring admission was chest pain. Complications during admission: none  Patient has been using albuterol nebulizer as needed. Reports she is having increased sob with bending over or with minimal exertion since discharge.   Comments she feels like her symptoms are getting worse. Hasn't seen pulmonary in a while. Has appt tomorrow to see Dr. Tim Kimbrough. Comments she hasn't had any chest pain since being discharged. Patient reports her swelling has increased. Reports she is using the compression stockings without improvement. States her legs are also tender to touch. Admitting symptoms have: improved    Medications marked \"taking\" at this time:  Prior to Admission medications    Medication Sig Start Date End Date Taking? Authorizing Provider   nystatin (MYCOSTATIN) powder Apply  to affected area two (2) times a day. thin layer    Provider, Historical   acetaminophen (TYLENOL) 500 mg tablet Take 500 mg by mouth every six (6) hours as needed for Pain. Pain    Provider, Historical   carboxymethylcellulose sodium (REFRESH TEARS OP) Administer 1 Drop to both eyes daily as needed for Other. Dry eyes    Provider, Historical   peg 400-propylene glycol, PF, (Systane Hydration PF) 0.4-0.3 % dpet ophthalmic solution Administer 2 Drops to both eyes four (4) times daily as needed for Ocular Dryness. Dry eyes    Provider, Historical   rosuvastatin (CRESTOR) 20 mg tablet Take 1 Tablet by mouth daily. 8/3/21   Angeli Licea MD   apixaban (ELIQUIS) 5 mg tablet 2 tabs ( 10 mg ) PO Twice daily through 8/7/2021 and then starting from 8/8/2021 take 1 tab ( 5 mg ) PO twice daily ongoing 8/2/21   Angeli Licea MD   aspirin 81 mg chewable tablet Take 1 Tablet by mouth daily. 8/3/21   Angeli Licea MD   bisacodyL (DULCOLAX) 5 mg EC tablet Take 1 Tablet by mouth daily as needed for Constipation. 8/2/21   Angeli Licea MD   clopidogreL (PLAVIX) 75 mg tab Take 1 Tablet by mouth daily. 8/3/21   Angeli Licea MD   metoprolol tartrate (LOPRESSOR) 25 mg tablet Take 0.5 Tablets by mouth every twelve (12) hours. 8/2/21   Angeli Licea MD   nitroglycerin (NITROSTAT) 0.4 mg SL tablet 1 Tablet by SubLINGual route every five (5) minutes as needed for Chest Pain. Up to 3 doses.  8/2/21 Zak Avendano MD   pantoprazole (PROTONIX) 40 mg tablet Take 1 Tablet by mouth Daily (before breakfast). 8/3/21   Zak Avendano MD   polyethylene glycol (MIRALAX) 17 gram packet Take 1 Packet by mouth daily. Patient taking differently: Take 1 Packet by mouth daily. As needed for constipation 8/3/21   Zak Avendano MD   OTHER Incentive Spirometry- use as directed 8/2/21   Zak Avendano MD   OTHER Graded Compression Stockings b/l LE- Use as directed 8/2/21   Zak Avendano MD   OTHER Check CBC, CMP, Mg in 4 days, results to PCP immediately, Diagnosis- CAD 8/2/21   Zak Avendano MD   albuterol (PROVENTIL HFA, VENTOLIN HFA, PROAIR HFA) 90 mcg/actuation inhaler Take 2 Puffs by inhalation every four (4) hours as needed for Wheezing. 7/13/21   Ravi POST NP   allopurinoL (ZYLOPRIM) 100 mg tablet Take 1 Tab by mouth daily. 5/4/21   Ravi POST NP   budesonide (PULMICORT) 0.5 mg/2 mL nbsp 2 mL by Nebulization route two (2) times a day. File under Medicare Part B, ICD J47.0, J45.909 4/15/21   Ravi POST NP   oxyCODONE IR (ROXICODONE) 5 mg immediate release tablet Take 5 mg by mouth three (3) times daily as needed for Pain. Pain    Provider, Historical   levothyroxine (SYNTHROID) 25 mcg tablet TAKE 1 TAB BY MOUTH DAILY 8/10/20   Dean Moreland MD   albuterol (PROVENTIL VENTOLIN) 2.5 mg /3 mL (0.083 %) nebu 3 mL by Nebulization route every six (6) hours as needed for Wheezing or Shortness of Breath. 1/7/20   Shanique Blanchard NP   acetaminophen (TYLENOL) 325 mg tablet Take 2 Tabs by mouth every six (6) hours as needed for Pain or Fever. Patient taking differently: Take 500 mg by mouth every six (6) hours as needed for Pain or Fever. 7/25/19   Rosa Hearn NP   inhalational spacing device (ACE AEROSOL CLOUD ENHANCER) 1 Each by Does Not Apply route as needed.  2/14/19   Ector Boone MD      Patient Active Problem List   Diagnosis Code    Shoulder impingement, right, with rotator cuff strain M75.40    Spinal stenosis M48.00    Sacroiliitis ([address] M46.1    High cholesterol E78.00    Hypertension I10    Hemochromatosis type 1 ([address] E83.110    Obesity, morbid ([address] E66.01    MCBRIDE (dyspnea on exertion) R06.00    CAP (community acquired pneumonia) J18.9    Sepsis ([address] A41.9    Hypotension I95.9    Atrial fibrillation with RVR ([facility]) I48.91    Abnormal chest CT R93.89    Chronic cough R05    Pulmonary embolism without acute cor pulmonale ([facility]) I26.99    Chronic bronchitis with productive mucopurulent cough ([facility]) J41.1    STEMI (ST elevation myocardial infarction) ([facility]) I21.3    History of pulmonary embolism Z86.711    History of DVT (deep vein thrombosis) Z86.718    Chronic diastolic congestive heart failure ([facility]) I50.32     Patient Active Problem List    Diagnosis Date Noted    STEMI (ST elevation myocardial infarction) ([address] 07/30/2021    History of pulmonary embolism 07/30/2021    History of DVT (deep vein thrombosis) 07/30/2021    Chronic diastolic congestive heart failure ([address] 07/30/2021    Pulmonary embolism without acute cor pulmonale (HCC) 03/10/2021    Chronic bronchitis with productive mucopurulent cough ([facility]) 03/10/2021    Abnormal chest CT 02/06/2020    Chronic cough 02/06/2020    CAP (community acquired pneumonia) 07/22/2019    Sepsis ([address] 07/22/2019    Hypotension 07/22/2019    Atrial fibrillation with RVR ([address] 07/22/2019    MCBRIDE (dyspnea on exertion) 02/28/2019    Obesity, morbid ([address] 01/29/2019    High cholesterol 01/11/2019    Hypertension 01/11/2019    Hemochromatosis type 1 ([address] 01/11/2019    Sacroiliitis ([address] 12/31/2013    Spinal stenosis 11/27/2013    Shoulder impingement, right, with rotator cuff strain      Current Outpatient Medications   Medication Sig Dispense Refill    levothyroxine (SYNTHROID) 25 mcg tablet TAKE 1 TAB BY MOUTH DAILY 90 Tablet 2    acetaminophen (TYLENOL) 500 mg tablet Take 500 mg by mouth every six (6) hours as needed for Pain. Pain      rosuvastatin (CRESTOR) 20 mg tablet Take 1 Tablet by mouth daily. 30 Tablet 0    apixaban (ELIQUIS) 5 mg tablet 2 tabs ( 10 mg ) PO Twice daily through 8/7/2021 and then starting from 8/8/2021 take 1 tab ( 5 mg ) PO twice daily ongoing 72 Tablet 0    clopidogreL (PLAVIX) 75 mg tab Take 1 Tablet by mouth daily. 30 Tablet 0    pantoprazole (PROTONIX) 40 mg tablet Take 1 Tablet by mouth Daily (before breakfast). 30 Tablet 0    polyethylene glycol (MIRALAX) 17 gram packet Take 1 Packet by mouth daily. 30 Packet 0    albuterol (PROVENTIL HFA, VENTOLIN HFA, PROAIR HFA) 90 mcg/actuation inhaler Take 2 Puffs by inhalation every four (4) hours as needed for Wheezing. 1 Inhaler 2    oxyCODONE IR (ROXICODONE) 5 mg immediate release tablet Take 5 mg by mouth three (3) times daily as needed for Pain. Pain      albuterol (PROVENTIL VENTOLIN) 2.5 mg /3 mL (0.083 %) nebu 3 mL by Nebulization route every six (6) hours as needed for Wheezing or Shortness of Breath. 120 Nebule 3    formoterol (Perforomist) 20 mcg/2 mL nebu neb solution 2 mL by Nebulization route two (2) times a day. File under Medicare part B, ICD J41.1, Z86.711 60 Each 3    furosemide (LASIX) 20 mg tablet Take 1 Tablet by mouth daily. 30 Tablet 5    potassium chloride SR (Klor-Con 10) 10 mEq tablet Take 10 mEq by mouth daily.  budesonide (PULMICORT) 0.5 mg/2 mL nbsp 2 mL by Nebulization route two (2) times a day. File under Medicare Part B, ICD J47.0, J45.909 60 Each 3    nystatin (MYCOSTATIN) powder Apply  to affected area two (2) times a day. thin layer      carboxymethylcellulose sodium (REFRESH TEARS OP) Administer 1 Drop to both eyes daily as needed for Other. Dry eyes      peg 400-propylene glycol, PF, (Systane Hydration PF) 0.4-0.3 % dpet ophthalmic solution Administer 2 Drops to both eyes four (4) times daily as needed for Ocular Dryness. Dry eyes      aspirin 81 mg chewable tablet Take 1 Tablet by mouth daily. 30 Tablet 0    metoprolol tartrate (LOPRESSOR) 25 mg tablet Take 0.5 Tablets by mouth every twelve (12) hours. 30 Tablet 0    nitroglycerin (NITROSTAT) 0.4 mg SL tablet 1 Tablet by SubLINGual route every five (5) minutes as needed for Chest Pain. Up to 3 doses. 20 Tablet 0    allopurinoL (ZYLOPRIM) 100 mg tablet Take 1 Tab by mouth daily. 90 Tab 1    inhalational spacing device (ACE AEROSOL CLOUD ENHANCER) 1 Each by Does Not Apply route as needed. 1 Device 3     Allergies   Allergen Reactions    Ciprofloxacin Other (comments)     Achilles tendonitis/leg pain and swelling    Indomethacin Anaphylaxis, Hives and Swelling    Tomato Other (comments)     Heart burn     Past Medical History:   Diagnosis Date    Abnormal chest CT 2020    Adopted     Arthritis     Balance problems     Chronic cough 2020    MCBRIDE (dyspnea on exertion) 2019    GERD (gastroesophageal reflux disease)     Gout     Hemochromatosis     High cholesterol     Hypertension     Hypothyroidism     Left knee pain     Left shoulder pain     Low blood potassium     Lumbar pain     Pain of left sacroiliac joint     Shoulder impingement, right, with rotator cuff strain      Past Surgical History:   Procedure Laterality Date    HX CHOLECYSTECTOMY      HX KNEE REPLACEMENT Bilateral     R knee/ Lknee and femur    HX ORTHOPAEDIC      R trigger finger     HX OTHER SURGICAL      Right little finger    HX OTHER SURGICAL      Right wrist, replaced unlnar).     HX OTHER SURGICAL      Both knees    HX OTHER SURGICAL      Left femur    HX OTHER SURGICAL      Trigger finger surgery    HX OTHER SURGICAL      Left knee replacment revision     HX ROTATOR CUFF REPAIR Right     HX TONSILLECTOMY       Family History   Adopted: Yes     Social History     Tobacco Use    Smoking status: Former Smoker     Packs/day: 0.50     Years: 2.00     Pack years: 1.00     Start date: 5     Quit date:      Years since quittin.5    Smokeless tobacco: Never Used    Tobacco comment: quit 20 years ago   Substance Use Topics    Alcohol use: No      Review of Systems   Constitutional: Negative for chills and fever. Respiratory: Positive for cough and shortness of breath. Cardiovascular: Positive for leg swelling. Negative for chest pain and palpitations. Neurological: Negative for dizziness and headaches. Patient-Reported Vitals 3/10/2021   Patient-Reported Weight 198 lbs   Patient-Reported Pulse 68   Patient-Reported Temperature 97.2   Patient-Reported Systolic  079   Patient-Reported Diastolic 68        [id number] [facility], who was evaluated through a patient-initiated, synchronous (real-time) audio only encounter, and/or her healthcare decision maker, is aware that it is a billable service, with coverage as determined by her insurance carrier. She provided verbal consent to proceed: Yes. She has not had a related appointment within my department in the past 7 days or scheduled within the next 24 hours.       Total Time: minutes: 21-30 minutes    Stepan Pierre NP

## 2021-08-04 NOTE — HOME HEALTH
Skilled services/Home bound verification:     Skilled Reason for admission/summary of clinical condition:  Patient needs education on disease process, education and reconciliation of all medications, and assistance with edema stockings, lab work . This patient is homebound for the following reasons Requires considerable and taxing effort to leave the home , Requires the assistance of 1 or more persons to leave the home  and Only leaves the home for medical reasons or Jew services and are infrequent and of short duration for other reasons . Caregiver: friend. Caregiver assists with ADL's, medications, shopping, meal prep. Medications reconciled and all medications are available in the home this visit. The following education was provided regarding medications, medication interactions, and look alike medications (specify): Medications reconciled and patient taught to take protonix separate from other medications. Patient oriented to home health. Medications  are effective at this time. High risk medication teaching regarding anticoagulants, hyperglycemic agents or opiod narcotics performed (specify) Patient on Asprin, Plavix and Elliquis together,    Mark Garcia MD  notified of any discrepancies/medication interactions multiple blood thinners. Home health supplies by type and quantity ordered/delivered this visit include: none this visit    Patient education provided this visit to include: Medication education, hh orientation. Patient/caregiver degree of understanding:good    Home exercise program/Homework provided: Will participate with PT    Pt/Caregiver instructed on plan of care and are agreeable to plan of care at this time. Physician Mark Garcia MD  notified of patient admission to home health and plan of care including anticipated frequency of 2w1, 1w3 and treatments/interventions/modalities of SN. Discharge planning discussed with patient and caregiver. Discharge planning as follows: Will d/c to home/self care when goals are met and patient is stable. Pt/Caregiver did verbalize understanding of discharge planning. Next MD appointment 08/05/21 Pedro Singer NP--cardiology . Patient/caregiver encouraged/instructed to keep appointment as lack of follow through with physician appointment could result in discontinuation of home care services for non-compliance.

## 2021-08-04 NOTE — Clinical Note
Hosea Shepherd may already be aware but when initiating a start of care in home health I am obliged to report high risk medications. This patient is taking Asprin, Plavix and Elliquis. Skilled nursing will be seeing her 2 times this week and 1 time a week for three weeks.    Thank you

## 2021-08-05 ENCOUNTER — OFFICE VISIT (OUTPATIENT)
Dept: PULMONOLOGY | Age: 76
End: 2021-08-05
Payer: MEDICARE

## 2021-08-05 ENCOUNTER — HOME CARE VISIT (OUTPATIENT)
Dept: SCHEDULING | Facility: HOME HEALTH | Age: 76
End: 2021-08-05
Payer: MEDICARE

## 2021-08-05 ENCOUNTER — HOME CARE VISIT (OUTPATIENT)
Dept: HOME HEALTH SERVICES | Facility: HOME HEALTH | Age: 76
End: 2021-08-05
Payer: MEDICARE

## 2021-08-05 VITALS
DIASTOLIC BLOOD PRESSURE: 85 MMHG | HEIGHT: 61 IN | SYSTOLIC BLOOD PRESSURE: 161 MMHG | BODY MASS INDEX: 39.91 KG/M2 | RESPIRATION RATE: 18 BRPM | WEIGHT: 211.4 LBS | OXYGEN SATURATION: 96 % | HEART RATE: 80 BPM | TEMPERATURE: 98 F

## 2021-08-05 DIAGNOSIS — I50.32 CHRONIC DIASTOLIC CONGESTIVE HEART FAILURE (HCC): ICD-10-CM

## 2021-08-05 DIAGNOSIS — J41.1 CHRONIC BRONCHITIS WITH PRODUCTIVE MUCOPURULENT COUGH (HCC): Primary | ICD-10-CM

## 2021-08-05 DIAGNOSIS — Z86.711 HISTORY OF PULMONARY EMBOLISM: ICD-10-CM

## 2021-08-05 DIAGNOSIS — R93.89 ABNORMAL CHEST CT: ICD-10-CM

## 2021-08-05 PROCEDURE — 1101F PT FALLS ASSESS-DOCD LE1/YR: CPT | Performed by: INTERNAL MEDICINE

## 2021-08-05 PROCEDURE — G8753 SYS BP > OR = 140: HCPCS | Performed by: INTERNAL MEDICINE

## 2021-08-05 PROCEDURE — G8400 PT W/DXA NO RESULTS DOC: HCPCS | Performed by: INTERNAL MEDICINE

## 2021-08-05 PROCEDURE — 3331090002 HH PPS REVENUE DEBIT

## 2021-08-05 PROCEDURE — G8417 CALC BMI ABV UP PARAM F/U: HCPCS | Performed by: INTERNAL MEDICINE

## 2021-08-05 PROCEDURE — 1111F DSCHRG MED/CURRENT MED MERGE: CPT | Performed by: INTERNAL MEDICINE

## 2021-08-05 PROCEDURE — 99214 OFFICE O/P EST MOD 30 MIN: CPT | Performed by: INTERNAL MEDICINE

## 2021-08-05 PROCEDURE — G0152 HHCP-SERV OF OT,EA 15 MIN: HCPCS

## 2021-08-05 PROCEDURE — G8754 DIAS BP LESS 90: HCPCS | Performed by: INTERNAL MEDICINE

## 2021-08-05 PROCEDURE — G8536 NO DOC ELDER MAL SCRN: HCPCS | Performed by: INTERNAL MEDICINE

## 2021-08-05 PROCEDURE — G8432 DEP SCR NOT DOC, RNG: HCPCS | Performed by: INTERNAL MEDICINE

## 2021-08-05 PROCEDURE — 1090F PRES/ABSN URINE INCON ASSESS: CPT | Performed by: INTERNAL MEDICINE

## 2021-08-05 PROCEDURE — G8427 DOCREV CUR MEDS BY ELIG CLIN: HCPCS | Performed by: INTERNAL MEDICINE

## 2021-08-05 PROCEDURE — 3331090001 HH PPS REVENUE CREDIT

## 2021-08-05 RX ORDER — FORMOTEROL FUMARATE 20 UG/2ML
20 SOLUTION RESPIRATORY (INHALATION) ONCE
Qty: 15 EACH | Refills: 0 | Status: SHIPPED | COMMUNITY
Start: 2021-08-05 | End: 2021-08-05

## 2021-08-05 RX ORDER — FORMOTEROL FUMARATE 20 UG/2ML
20 SOLUTION RESPIRATORY (INHALATION) 2 TIMES DAILY
Qty: 60 EACH | Refills: 3
Start: 2021-08-05 | End: 2021-08-11 | Stop reason: SDUPTHER

## 2021-08-05 RX ORDER — BUDESONIDE 0.5 MG/2ML
500 INHALANT ORAL 2 TIMES DAILY
Qty: 60 EACH | Refills: 3 | Status: SHIPPED | OUTPATIENT
Start: 2021-08-05 | End: 2022-05-01

## 2021-08-05 RX ORDER — POTASSIUM CHLORIDE 750 MG/1
10 TABLET, FILM COATED, EXTENDED RELEASE ORAL DAILY
COMMUNITY
End: 2022-02-23 | Stop reason: SDUPTHER

## 2021-08-05 NOTE — PROGRESS NOTES
[facility]  Pulmonary, Critical Care, and Sleep Medicine      Pulmonary Office Progress Notes    Name: Sixto Toro     : 1945     Date: 2021        Subjective:     2021   Patient has been experiencing symptoms of increased shortness of breath and on 2021 developed chest pain prompting her to call EMS. She had a STEMI-and route by the ambulance and underwent PCI with stenting. Also diagnosed with acute DVT  She was discharged after treatment but has continued to have symptoms of shortness of breath  Complains of cough with difficulty expectorating mucus. She has some leftover Pulmicort vials which she has been using and had some difficulty getting her medications. Has albuterol via nebulizer which she uses as needed  She now has persistent daily cough which she describes as intense, seems to come from deep inside her chest and productive of whitish foamy mucus. Denies any wheezing but does get tight in her chest.  Denies chest pain  Remains on Eliquis for PE, DVT  Denies orthopnea or paroxysmal nocturnal dyspnea      HPI    Sixto Toro  is a 64 y.o. female with PMH of DVT and pneumonia who presents for evaluation of cough. She was last seen here on 2019 s/p hospitalization and reported significant improvement in symptoms of shortness of breath. Today she appears comfortable, in  no distress however she states that she had a persisting cough for 2 weeks. She describes the cough as daily and productive of yellow/green sputum. She states that she has some increased shortness of breath/wheezing with activity. She denies chest pain or hemoptysis. No fever, chills or orthopnea;  no decreased appetite or weight loss. She has no reports of nasal congestion/drainage or sinus pressure/pain. She complains of chronic back and LE pain/swelling due to osteoarthritis.   Her last PFTs were on 2019 and demonstrated normal flow volume loop with reduced diffusion capacity. She is a former smoker of cigarettes with a 2-pack-year history and quit in 1974. Past Medical History:   Diagnosis Date    Abnormal chest CT 2/6/2020    Adopted     Arthritis     Balance problems     Chronic cough 2/6/2020    MCBRIDE (dyspnea on exertion) 2/28/2019    GERD (gastroesophageal reflux disease)     Gout     Hemochromatosis     High cholesterol     Hypertension     Hypothyroidism     Left knee pain     Left shoulder pain     Low blood potassium     Lumbar pain     Pain of left sacroiliac joint     Shoulder impingement, right, with rotator cuff strain        Allergies   Allergen Reactions    Ciprofloxacin Other (comments)     Achilles tendonitis/leg pain and swelling    Indomethacin Anaphylaxis, Hives and Swelling    Tomato Other (comments)     Heart burn       Current Outpatient Medications   Medication Sig Dispense Refill    potassium chloride SR (Klor-Con 10) 10 mEq tablet Take  by mouth.  levothyroxine (SYNTHROID) 25 mcg tablet TAKE 1 TAB BY MOUTH DAILY 90 Tablet 2    nystatin (MYCOSTATIN) powder Apply  to affected area two (2) times a day. thin layer      carboxymethylcellulose sodium (REFRESH TEARS OP) Administer 1 Drop to both eyes daily as needed for Other. Dry eyes      peg 400-propylene glycol, PF, (Systane Hydration PF) 0.4-0.3 % dpet ophthalmic solution Administer 2 Drops to both eyes four (4) times daily as needed for Ocular Dryness. Dry eyes      rosuvastatin (CRESTOR) 20 mg tablet Take 1 Tablet by mouth daily. 30 Tablet 0    apixaban (ELIQUIS) 5 mg tablet 2 tabs ( 10 mg ) PO Twice daily through 8/7/2021 and then starting from 8/8/2021 take 1 tab ( 5 mg ) PO twice daily ongoing 72 Tablet 0    aspirin 81 mg chewable tablet Take 1 Tablet by mouth daily. 30 Tablet 0    bisacodyL (DULCOLAX) 5 mg EC tablet Take 1 Tablet by mouth daily as needed for Constipation. 15 Tablet 0    clopidogreL (PLAVIX) 75 mg tab Take 1 Tablet by mouth daily.  30 Tablet 0  metoprolol tartrate (LOPRESSOR) 25 mg tablet Take 0.5 Tablets by mouth every twelve (12) hours. 30 Tablet 0    pantoprazole (PROTONIX) 40 mg tablet Take 1 Tablet by mouth Daily (before breakfast). 30 Tablet 0    albuterol (PROVENTIL HFA, VENTOLIN HFA, PROAIR HFA) 90 mcg/actuation inhaler Take 2 Puffs by inhalation every four (4) hours as needed for Wheezing. 1 Inhaler 2    allopurinoL (ZYLOPRIM) 100 mg tablet Take 1 Tab by mouth daily. 90 Tab 1    budesonide (PULMICORT) 0.5 mg/2 mL nbsp 2 mL by Nebulization route two (2) times a day. File under Medicare Part B, ICD J47.0, J45.909 60 Each 3    oxyCODONE IR (ROXICODONE) 5 mg immediate release tablet Take 5 mg by mouth three (3) times daily as needed for Pain. Pain      inhalational spacing device (ACE AEROSOL CLOUD ENHANCER) 1 Each by Does Not Apply route as needed. 1 Device 3    acetaminophen (TYLENOL) 500 mg tablet Take 500 mg by mouth every six (6) hours as needed for Pain. Pain (Patient not taking: Reported on 8/5/2021)      nitroglycerin (NITROSTAT) 0.4 mg SL tablet 1 Tablet by SubLINGual route every five (5) minutes as needed for Chest Pain. Up to 3 doses. (Patient not taking: Reported on 8/5/2021) 20 Tablet 0    polyethylene glycol (MIRALAX) 17 gram packet Take 1 Packet by mouth daily. (Patient not taking: Reported on 8/5/2021) 30 Packet 0    OTHER Incentive Spirometry- use as directed 1 Each 0    OTHER Graded Compression Stockings b/l LE- Use as directed (Patient not taking: Reported on 8/5/2021) 1 Each 0    OTHER Check CBC, CMP, Mg in 4 days, results to PCP immediately, Diagnosis- CAD 1 Each 0    albuterol (PROVENTIL VENTOLIN) 2.5 mg /3 mL (0.083 %) nebu 3 mL by Nebulization route every six (6) hours as needed for Wheezing or Shortness of Breath. (Patient not taking: Reported on 8/5/2021) 120 Nebule 3    acetaminophen (TYLENOL) 325 mg tablet Take 2 Tabs by mouth every six (6) hours as needed for Pain or Fever.  (Patient not taking: Reported on 8/5/2021) 30 Tab 0       Review of Systems:    HEENT: No epistaxis, no nasal drainage, no difficulty in swallowing, no redness in eyes  Respiratory: As stated above in HPI  Cardiovascular: no chest pain, no palpitations, no chronic leg edema, no syncope  Gastrointestinal: no abd pain, no vomiting, no diarrhea, no bleeding symptoms  Genitourinary: No urinary symptoms or hematuria  Integument/breast: No ulcers or rashes  Musculoskeletal: Chronic back and bilateral LE pain due to osteoarthritis  Neurological: No focal weakness, no seizures, no headaches  Behvioral/Psych: No anxiety, no depression  Constitutional: No fever, chills or night sweats. No decreased appetite or weight loss     Objective:     Visit Vitals  BP (!) 161/85 (BP 1 Location: Left upper arm, BP Patient Position: Sitting, BP Cuff Size: Large adult)   Pulse 80   Temp 98 °F (36.7 °C) (Oral)   Resp 18   Ht 5' 1\" (1.549 m)   Wt 95.9 kg (211 lb 6.4 oz)   SpO2 96% Comment: RA Rest   BMI 39.94 kg/m²        PHYSICAL EXAM      General: Oriented to person, place, and time. Well-developed, well-nourished, and in no distress. Obese      Head:   Normocephalic, without obvious abnormality, atraumatic       Eyes:   Pupils reactive, conjunctivae / corneas clear. EOM's intact, no scleral icterus       Nose:   Nares normal, no drainage. Throat:    Lips, mucosa and tongue normal. Teeth and gums normal       Neck:   Supple, symmetrical, trachea midline. No adenopathy or thyroid swelling; no carotid bruit or JVD. CVS:    Regular rate and rhythm. S1S2 normal,  no murmurs       RS:      Symmetrical chest rise, good AE bilaterally. No wheezing no rales or rhonchi, no accessory muscle use      Abd:     Soft, non-tender. No hepatosplenomegaly                                                     Neuro:   non focal, awake, alert and oriented to person, place, time and situation    Extrm:   No clubbing or cyanosis.   Chronic bilateral LE edema, +1 Skin:   no rash    Data review:         PULMONARY FUNCTION TESTS     Date FVC FEV1  FEV1/FVC PET13-22 TLC RV RV/TLC VC DLCO   1/23/2019  95%  97%  76%  92%  97%  98%  101%  96%  74%                                                                       Imaging:  I have personally reviewed the patients radiographs and have reviewed the reports:  XR Results (most recent):  Results from Hospital Encounter encounter on 07/30/21    XR CHEST PORT    Narrative  EXAM: XR CHEST PORT    INDICATION: Acute MI STEMI    COMPARISON: 12/25/2019    FINDINGS: A portable AP radiograph of the chest was obtained at 0-54 hours. The  patient is on a cardiac monitor. Known tiny pulmonary nodules better  characterized on CT. No definite acute findings given differences in technique. .  The cardiac and mediastinal contours and pulmonary vascularity are normal.  No  acute bone findings. .    Impression  No acute cardiopulmonary findings or gross interval change. CT Results (most recent):  Results from Hospital Encounter encounter on 03/06/20    CT CHEST WO CONT    Narrative  CT chest without contrast    HISTORY: Follow-up groundglass lesion    COMPARISON: CT 12/3/19    TECHNIQUE: Helical scans through the chest is obtained from the thoracic inlet  to the diaphragm without IV contrast administration. All CT scans at this facility performed using dose optimization techniques as  appreciated to a performed exam, to include automated exposure control,  adjustment of the mA and or KU according to patient size (including appropriate  matching for site specific examination), or use of iterative reconstruction  technique. FINDINGS: The study is suboptimal due to lack of IV contrast.  Subtle  abnormality can be under detected. Lung Parenchyma: 4 mm groundglass nodule abutting the vascular branch in left  apex on #14/3.  Several 3 mm groundglass nodules in right lung appear stable as  well, 2 at lateral right upper lobe on #15/03, one at anterior right upper lobe  #19/3, one at inferior right upper lobe #21/3, 3 mm nodule at anterior right  lower lobe on #26 and 1 at right lung base #31/3. Thyroid: Atrophic. Mediastinum: No adenopathy. Pleural Space And Chest Wall: No significant pleural pathology. Amorphous soft  tissue density again seen at right axilla as stable adenopathy. Heart: The heart and the pericardium appear normal.    Vasculature: The aorta and the great vessels are unremarkable. Imaged Upper Abdomen: Unremarkable. Osseous Structures: Moderate spondylosis. Impression  IMPRESSION:    1. Several small random lung nodules appear stable. Recommendation as below. 2. Stable mild right axillary soft tissue densities, likely a axillary lymph  node.    -----------------------------------  [employer] RECOMMENDATIONS:    Less than 6 mm nodule:  A. Any solitary nodule(solid, semisolid or groundglass) or multiple solid:  Low Risk: No follow-up; If suspicious morphology or upper lobe location  consider 12 month follow-up. High Risk: Optional CT at 12 months. B. Multiple subsolid/groundglass:  CT in 3 to 6 months. If unchanged consider CT at 2 and 4 years.     Thank you for your referral.       Patient Active Problem List   Diagnosis Code    Shoulder impingement, right, with rotator cuff strain M75.40    Spinal stenosis M48.00    Sacroiliitis ([address] M46.1    High cholesterol E78.00    Hypertension I10    Hemochromatosis type 1 ([address] E83.110    Obesity, morbid ([address] E66.01    MCBRIDE (dyspnea on exertion) R06.00    CAP (community acquired pneumonia) J18.9    Sepsis ([address] A41.9    Hypotension I95.9    Atrial fibrillation with RVR ([facility]) I48.91    Abnormal chest CT R93.89    Chronic cough R05    Pulmonary embolism without acute cor pulmonale ([facility]) I26.99    Chronic bronchitis with productive mucopurulent cough ([facility]) J41.1    STEMI (ST elevation myocardial infarction) ([facility]) I21.3    History of pulmonary embolism Z86.711    History of DVT (deep vein thrombosis) Z86.718    Chronic diastolic congestive heart failure (H[facility]) I50.32       IMPRESSION:   · Persistent shortness of breath and cough with features suggestive of reactive airways and bronchiolitis likely postinfectious inflammatory cough with slow improvement due to inability to consistently continue prescribed treatment-insurance and cost issues. Current symptoms are more suggestive of obstructive bronchitis  · S/p STEMI  · Abnormal CT scan of the chest findings-cluster of ill-defined groundglass nodularity in the superior right lower lobe-question bronchiolar disease and cannot exclude atypical Mycobacterium  · Asthma-moderate persistent secondary to above  · History of PE/ bilateral DVT currently on Eliquis 5 mg twice daily  · ObesityBMI 44.92  · Hemochromatosis      RECOMMENDATIONS:     · Will step up treatment with nebulized medications -patient may benefit from longer acting agents which are better penetrated. Combination of Perforomist and Pulmicort prescribed-prescription sent to [facility] pharmacy.   Samples for Perforomist given to patient  · Albuterol as needed  · Airway clearance measures  · Continue Eliquis 5 mg twice daily  · Increase p.o. fluids, take OTC Mucinex as needed for cough  · Discussed recommendations for preventative immunizations when symptoms resolve  · Follow up in pulmonary clinic in 2-3 months or sooner with worsening of symptoms  · Recommend healthy diet / weight / exercise as tolerated  · Written instructions given to patient and caregiver about all of the above  · Discussed with patient and daughter need for compliance and using treatment as prescribed        Jaden Gongora MD

## 2021-08-05 NOTE — Clinical Note
Therapy Functional Score Assessment  Question   Score   Grooming  2       Upper Dressing 2      Lower Dressing 2      Bathing  5      Toilet Transfer  1    Transfer  2            Ambulation  3   Dyspnea                     2       Pain Interfering with activity 3  Est number therapy visits      1

## 2021-08-05 NOTE — PROGRESS NOTES
Sonny Kyle presents today for   Chief Complaint   Patient presents with   [facility] Follow Up    Shortness of Breath       Is someone accompanying this pt? Daughter    Is the patient using any DME equipment during [address]? No    -DME Company NA    Depression Screening:  3 most recent PHQ Screens 7/13/2021   PHQ Not Done -   Little interest or pleasure in doing things Not at all   Feeling down, depressed, irritable, or hopeless Not at all   Total Score PHQ 2 0   Trouble falling or staying asleep, or sleeping too much -   Feeling tired or having little energy -   Poor appetite, weight loss, or overeating -   Feeling bad about yourself - or that you are a failure or have let yourself or your family down -   Trouble concentrating on things such as school, work, reading, or watching TV -   Moving or speaking so slowly that other people could have noticed; or the opposite being so fidgety that others notice -   Thoughts of being better off dead, or hurting yourself in some way -   PHQ 9 Score -   How difficult have these problems made it for you to do your work, take care of your home and get along with others -       Learning Assessment:  Learning Assessment 7/9/2020   PRIMARY LEARNER Patient   HIGHEST LEVEL OF EDUCATION - PRIMARY LEARNER  -   BARRIERS PRIMARY LEARNER NONE   CO-LEARNER CAREGIVER No   PRIMARY LANGUAGE ENGLISH   LEARNER PREFERENCE PRIMARY DEMONSTRATION     -     -   ANSWERED BY Patient   RELATIONSHIP SELF       Abuse Screening:  Abuse Screening Questionnaire 7/9/2020   Do you ever feel afraid of your partner? N   Are you in a relationship with someone who physically or mentally threatens you? N   Is it safe for you to go home? Y       Fall Risk  Fall Risk Assessment, last 12 mths 7/13/2021   Able to walk? Yes   Fall in past 12 months? 0   Do you feel unsteady? 0   Are you worried about falling 0   Number of falls in past 12 months -   Fall with injury? -         Coordination of Care:  1.  Have you been to the ER, urgent care clinic since your last visit? Hospitalized since your last visit? [facility]  7/21 Heart Attack     2. Have you seen or consulted any other health care providers outside of the [address] [place] [address] since your last visit? Include any pap smears or colon screening.  No

## 2021-08-05 NOTE — LETTER
8/5/2021    Patient: Curtis Camacho   YOB: 1945   Date of Visit: 8/5/2021     Rg Buckley NP  [address]    Dear Rg Buckley NP,      Thank you for referring Ms. Tan Devine to [address] [place] [address] for evaluation. My notes for this consultation are attached. If you have questions, please do not hesitate to call me. I look forward to following your patient along with you.       Sincerely,    Jamie Melendez MD

## 2021-08-06 ENCOUNTER — HOSPITAL ENCOUNTER (OUTPATIENT)
Dept: LAB | Age: 76
Discharge: HOME | End: 2021-08-06
Payer: MEDICARE

## 2021-08-06 ENCOUNTER — DOCUMENTATION ONLY (OUTPATIENT)
Dept: OTHER | Age: 76
End: 2021-08-06

## 2021-08-06 ENCOUNTER — HOME CARE VISIT (OUTPATIENT)
Dept: SCHEDULING | Facility: HOME HEALTH | Age: 76
End: 2021-08-06
Payer: MEDICARE

## 2021-08-06 VITALS
TEMPERATURE: 97.4 F | DIASTOLIC BLOOD PRESSURE: 84 MMHG | RESPIRATION RATE: 18 BRPM | HEART RATE: 79 BPM | SYSTOLIC BLOOD PRESSURE: 135 MMHG | OXYGEN SATURATION: 97 %

## 2021-08-06 LAB
ALBUMIN SERPL-MCNC: 2.9 G/DL (ref 3.4–5)
ALBUMIN/GLOB SERPL: 0.8 {RATIO} (ref 0.8–1.7)
ALP SERPL-CCNC: 84 U/L (ref 45–117)
ALT SERPL-CCNC: 31 U/L (ref 13–56)
ANION GAP SERPL CALC-SCNC: 7 MMOL/L (ref 3–18)
AST SERPL-CCNC: 39 U/L (ref 10–38)
BASOPHILS # BLD: 0.1 K/UL (ref 0–0.1)
BASOPHILS NFR BLD: 1 % (ref 0–2)
BILIRUB SERPL-MCNC: 0.7 MG/DL (ref 0.2–1)
BUN SERPL-MCNC: 13 MG/DL (ref 7–18)
BUN/CREAT SERPL: 17 (ref 12–20)
CALCIUM SERPL-MCNC: 9.1 MG/DL (ref 8.5–10.1)
CHLORIDE SERPL-SCNC: 114 MMOL/L (ref 100–111)
CO2 SERPL-SCNC: 22 MMOL/L (ref 21–32)
CREAT SERPL-MCNC: 0.78 MG/DL (ref 0.6–1.3)
DIFFERENTIAL METHOD BLD: ABNORMAL
EOSINOPHIL # BLD: 0.2 K/UL (ref 0–0.4)
EOSINOPHIL NFR BLD: 2 % (ref 0–5)
ERYTHROCYTE [DISTWIDTH] IN BLOOD BY AUTOMATED COUNT: 14.5 % (ref 11.6–14.5)
GLOBULIN SER CALC-MCNC: 3.6 G/DL (ref 2–4)
GLUCOSE SERPL-MCNC: 74 MG/DL (ref 74–99)
HCT VFR BLD AUTO: 41 % (ref 35–45)
HGB BLD-MCNC: 13.2 G/DL (ref 12–16)
LYMPHOCYTES # BLD: 1.6 K/UL (ref 0.9–3.6)
LYMPHOCYTES NFR BLD: 17 % (ref 21–52)
MAGNESIUM SERPL-MCNC: 1.9 MG/DL (ref 1.6–2.6)
MCH RBC QN AUTO: 33.3 PG (ref 24–34)
MCHC RBC AUTO-ENTMCNC: 32.2 G/DL (ref 31–37)
MCV RBC AUTO: 103.5 FL (ref 74–97)
MONOCYTES # BLD: 1 K/UL (ref 0.05–1.2)
MONOCYTES NFR BLD: 10 % (ref 3–10)
NEUTS SEG # BLD: 6.5 K/UL (ref 1.8–8)
NEUTS SEG NFR BLD: 70 % (ref 40–73)
PLATELET # BLD AUTO: 297 K/UL (ref 135–420)
PMV BLD AUTO: 11.2 FL (ref 9.2–11.8)
POTASSIUM SERPL-SCNC: 4.9 MMOL/L (ref 3.5–5.5)
PROT SERPL-MCNC: 6.5 G/DL (ref 6.4–8.2)
RBC # BLD AUTO: 3.96 M/UL (ref 4.2–5.3)
SODIUM SERPL-SCNC: 143 MMOL/L (ref 136–145)
WBC # BLD AUTO: 9.4 K/UL (ref 4.6–13.2)

## 2021-08-06 PROCEDURE — 3331090001 HH PPS REVENUE CREDIT

## 2021-08-06 PROCEDURE — 80053 COMPREHEN METABOLIC PANEL: CPT

## 2021-08-06 PROCEDURE — 3331090002 HH PPS REVENUE DEBIT

## 2021-08-06 PROCEDURE — 85025 COMPLETE CBC W/AUTO DIFF WBC: CPT

## 2021-08-06 PROCEDURE — 83735 ASSAY OF MAGNESIUM: CPT

## 2021-08-06 PROCEDURE — G0300 HHS/HOSPICE OF LPN EA 15 MIN: HCPCS

## 2021-08-06 NOTE — HOME HEALTH
S:  Patient is a 64 y.o. female s/p STEMI, who was referred to [facility] for PT, OT, SN, [address] services. She also has a [facility] of PE, [facility] The patient has a PLOF of independent, driving. She lives in a 1 story home with a roommate, and there are about 3-4 steps to enter the home. The patient presents today as short of breath after walking from a back room to the front bedroom, about 40-50' with her rollator walker. She appears fatigued, but once rested, is able to engage in the admission visit. Prior Medical History:  Shoulder impingement, right, with rotator cuff strain   Spinal stenosis   Sacroiliitis    High cholesterol    Hypertension    Hemochromatosis type 1   Obesity, morbid    MCBRIDE (dyspnea on exertion)    CAP (community acquired pneumonia)   Sepsis    Hypotension    Atrial fibrillation with RVR   Abnormal chest CT  Chronic cough    Pulmonary embolism without acute cor pulmonale    Chronic bronchitis with productive mucopurulent cough   STEMI (ST elevation myocardial infarction)  7/30/2021    History of pulmonary embolism  7/30/2021    History of DVT (deep vein thrombosis)  7/30/2021    Chronic diastolic congestive heart failure 7/30/2021   . Caregiver:  see section  Medication Reconciliation:  Completed. Patient instructed to inquire re: Plavix, aspirin and Eliquis at her MD appointment tomorrow. Educated patient re:  high risk meds:  Narcotic med and safety re: sedation. Anticoagulant meds: bleeding risk and safety, s/s of bleeding, seek medical help in case of a fall. Education:  Breathing technique:  Pursed lip breathing to restore relaxed breathing when short of breath. Use Incentive Spirometer every hour. Ed re: admission book, HIPPA, [place] Rights and Responsibilities (BOR), safety, fall prevention, edema control. .  O:  See body of note and interventions for gait, transfers, balance/ fall risk, strength, mobility, activity tolerance.   A:  Patient presents with deficits, as described, in activity tolerance, strength, gait and balance that impairs her ability to transfer, navigate her home and that increases her risk for falling and her dependence. HHPT is indicated in order to provide skilled interventions to improve and restore balance, strength, transfers and gait, that will improve her overall safety and lower her risk for falls and injury. Skilled interventions will include: Instruction in ther-ex, HEP for strength, balance improvement, activity tolerance; Training in gait, transfers, balance; Education in fall prevention, safety, energy conservation. PT will monitor vital signs, pain and patient response to therapy. P:  PT 2w4, 1w1. Patient is in agreement. MD:  Verena Leigh NP, Citlali Retana MD notified, see section in case communication.

## 2021-08-06 NOTE — HOME HEALTH
Clinical Condition per EPIC:  \"HPI: Fredy Hughes is a 64 y.o.  female with hx of acquired hypothyroidism, gout, hyperlipidemia, hypertension, chronic back pain with stimulator in place. Code STEMI called for patient by BEA cervantes. Patient began experiencing chest pain approximately 12 hours ago. Chest pain is left-sided sharp and nonradiating in nature. No radiation into shoulder or jaw. No associated shortness of breath, nausea/vomiting, or abdominal pain. She has no documented cardiac history. She denies history of myocardial infarction and heart failure. During interview patient appeared comfortable. She was able to speak in full sentences without becoming short of breath. She is endorsing current chest pain. She will be taken to Cath Lab [facility]. Past Medical History:  Diagnosis Date  o Abnormal chest CT 2/6/2020  o Adopted    o Arthritis    o Balance problems    o Chronic cough 2/6/2020  o MCBRIDE (dyspnea on exertion) 2/28/2019  o GERD (gastroesophageal reflux disease)    o Gout    o Hemochromatosis    o High cholesterol    o Hypertension    o Hypothyroidism    o Left knee pain    o Left shoulder pain    o Low blood potassium    o Lumbar pain    o Pain of left sacroiliac joint    o Shoulder impingement, right, with rotator cuff strain         Past Surgical History:  Procedure Laterality Date  o HX CHOLECYSTECTOMY      o HX KNEE REPLACEMENT Bilateral      R knee/ Lknee and femur  o HX ORTHOPAEDIC        R trigger finger   o HX OTHER SURGICAL        Right little finger  o HX OTHER SURGICAL        Right wrist, replaced unlnar). o HX OTHER SURGICAL        Both knees  o HX OTHER SURGICAL        Left femur  o HX OTHER SURGICAL        Trigger finger surgery  o HX OTHER SURGICAL        Left knee replacment revision   o HX ROTATOR CUFF REPAIR Right    o HX TONSILLECTOMY\"  . Written HEP of the following issued.  pt instructed/demonstrated BUE shoulder press, shoulder flexion, shoulder abduction, shoulder extension, chest press, elbow flexion/extension x 10, x 2 per day. pt demonstrated I UB HEP. UB HEP for pt to maintain functional endurance and strength to perform ADL/IADL tasks and ambulation/transfers to perform tasks with safety and for fall prevention. .  Subjective: Pt stated \"I'm overwhelmed with all of these phone calls and people coming in. I don't want to continue with anything except nursing and telehealth. I don't think I need your therapy or Physical Therapy. I think I'm doing fine. I just need to know what blood tests I need and I want the nurse to come here and do them so I don't have to arrange transportation. \" pt answered door use FWW upon OT arrival. pt agreed tx. pt declining further skilled OT/PT at this time. pt D/C paperwork indicates pt is to have blood draws. OT conversed with Caridad Murcia PTA/manager regarding pt D/C paperwork and possible lab draws. manager consulted with nursing to figure out orders. pt requesting to only continue with nursing and telehealth despite OT intruction regarding purpose and benefit OT/PT. Caregiver involvement: pt friend provides A I/ADL tasks, ambulation, and transfers, shopping, transportation, and A with medications. Medications reconciled and all medications are available in the home this visit. The following education was provided regarding medications, medication interactions, and look a like medications: dosages and adverse reactions. Medications are effective at this time. OT reconcilled medications with pt with no changes   Home health supplies by type and quantity ordered/delivered this visit include: NA   Patient education provided this visit: UB HEP, OT role, energy conservation, fall prevention/safety training ADL/IADL tasks, transfer training, continue diet and medications as instructed per MD, consult MD or urgent care for medical assistance as opposed to ER unless situation emergent.    Rehab potential: pt declined OT  Home exercise program: see above   Continued need for the following skills: Nursing   The following discharge planning was discussed with the pt/caregiver: OT evaluation completed 8.5.21. OT instructed pt importance, benefit, and purpose short term skilled OT for safety training functional transfers and while performing ADL/IADL tasks, balance training, endurance/activity tolerance training, and functional strengthening for improved safety, fall prevention, improved functional level, and decrease caregiver burden however pt continued to decline. pt requires SBA to mod A perform ADL/IADL tasks and SBA for ambulation and transfers execute ADL/IADL tasks. pt declined performing toilet, EOB, and tub shower/shower chair transfers. pt presents with BUE AROM WFL and 3+ to 5/5 MMT grossly. pt demonstrated I UB HEP. pt aware contact MD for OT referral if the need arises. D/C OT at this time per pt decline. pt agrees D/C. D/C pt to care of Dr Storm Hoffman.

## 2021-08-06 NOTE — PROGRESS NOTES
Quality Data Note - per Dr Manuel Giron, his arrival time to the hospital (ED) on 7/30/21 was 03:00.

## 2021-08-06 NOTE — CASE COMMUNICATION
Patient was admitted to [address] [place] [address] Drive 8/3/2021 by PT. Frequency was set to be 2w4, 1w1, to address activity tolerance, strength, gait, safety in the home to restore her PLOF. Patient declines HHPT at this time. She agrees to [facility] SN and Telehealth.     Thank you for your referral.

## 2021-08-07 PROCEDURE — 3331090002 HH PPS REVENUE DEBIT

## 2021-08-07 PROCEDURE — 3331090001 HH PPS REVENUE CREDIT

## 2021-08-08 PROCEDURE — 3331090002 HH PPS REVENUE DEBIT

## 2021-08-08 PROCEDURE — 3331090001 HH PPS REVENUE CREDIT

## 2021-08-09 ENCOUNTER — HOME CARE VISIT (OUTPATIENT)
Dept: HOME HEALTH SERVICES | Facility: HOME HEALTH | Age: 76
End: 2021-08-09
Payer: MEDICARE

## 2021-08-09 PROCEDURE — 3331090002 HH PPS REVENUE DEBIT

## 2021-08-09 PROCEDURE — 3331090001 HH PPS REVENUE CREDIT

## 2021-08-10 VITALS
SYSTOLIC BLOOD PRESSURE: 118 MMHG | DIASTOLIC BLOOD PRESSURE: 76 MMHG | BODY MASS INDEX: 39.21 KG/M2 | OXYGEN SATURATION: 96 % | WEIGHT: 207.5 LBS | HEART RATE: 78 BPM

## 2021-08-10 PROCEDURE — 3331090002 HH PPS REVENUE DEBIT

## 2021-08-10 PROCEDURE — 3331090001 HH PPS REVENUE CREDIT

## 2021-08-10 NOTE — TELEPHONE ENCOUNTER
I have sent a prescription for budesonide and Perforomist via nebulizer to [facility] pharmacy on 8/5/2021

## 2021-08-10 NOTE — HOME HEALTH
Set up telehealth equipment including monitor, BP cuff, scale and pulse ox as needed. Instructed patient in how to use the monitoring devices and when monitoring should take place. Instructed in reason for telehealth monitoring related to disease process of STEMI    Reviewed potential problems with telehealth, troubleshooting techniques and who to contact for questions and problems.

## 2021-08-10 NOTE — TELEPHONE ENCOUNTER
Pt states she is almost out of samples for Perforomist and doesn't feel any difference from using the medication w/ her current budesonide. Wants Dr. Patricia Felix opinion on if she should get a prescription to continue both medications. If so, wants sent to [address] [place] [address]. Please advise [phone].

## 2021-08-11 ENCOUNTER — OFFICE VISIT (OUTPATIENT)
Dept: CARDIOLOGY CLINIC | Age: 76
End: 2021-08-11
Payer: MEDICARE

## 2021-08-11 ENCOUNTER — HOME CARE VISIT (OUTPATIENT)
Dept: HOME HEALTH SERVICES | Facility: HOME HEALTH | Age: 76
End: 2021-08-11
Payer: MEDICARE

## 2021-08-11 VITALS
OXYGEN SATURATION: 98 % | TEMPERATURE: 98.1 F | SYSTOLIC BLOOD PRESSURE: 122 MMHG | DIASTOLIC BLOOD PRESSURE: 76 MMHG | HEART RATE: 79 BPM

## 2021-08-11 VITALS
DIASTOLIC BLOOD PRESSURE: 62 MMHG | OXYGEN SATURATION: 99 % | HEIGHT: 61 IN | BODY MASS INDEX: 39.27 KG/M2 | SYSTOLIC BLOOD PRESSURE: 124 MMHG | WEIGHT: 208 LBS | HEART RATE: 78 BPM

## 2021-08-11 DIAGNOSIS — Z86.718 HISTORY OF DVT (DEEP VEIN THROMBOSIS): ICD-10-CM

## 2021-08-11 DIAGNOSIS — I10 ESSENTIAL HYPERTENSION: ICD-10-CM

## 2021-08-11 DIAGNOSIS — Z09 HOSPITAL DISCHARGE FOLLOW-UP: ICD-10-CM

## 2021-08-11 DIAGNOSIS — E78.00 HIGH CHOLESTEROL: ICD-10-CM

## 2021-08-11 DIAGNOSIS — I48.91 ATRIAL FIBRILLATION WITH RVR (HCC): ICD-10-CM

## 2021-08-11 DIAGNOSIS — I50.32 CHRONIC DIASTOLIC CONGESTIVE HEART FAILURE (HCC): ICD-10-CM

## 2021-08-11 DIAGNOSIS — I21.11 ST ELEVATION MYOCARDIAL INFARCTION INVOLVING RIGHT CORONARY ARTERY (HCC): Primary | ICD-10-CM

## 2021-08-11 PROCEDURE — 1111F DSCHRG MED/CURRENT MED MERGE: CPT | Performed by: NURSE PRACTITIONER

## 2021-08-11 PROCEDURE — 99214 OFFICE O/P EST MOD 30 MIN: CPT | Performed by: NURSE PRACTITIONER

## 2021-08-11 PROCEDURE — 3331090001 HH PPS REVENUE CREDIT

## 2021-08-11 PROCEDURE — 3331090002 HH PPS REVENUE DEBIT

## 2021-08-11 RX ORDER — FORMOTEROL FUMARATE 20 UG/2ML
20 SOLUTION RESPIRATORY (INHALATION) 2 TIMES DAILY
Qty: 60 EACH | Refills: 3 | Status: SHIPPED | OUTPATIENT
Start: 2021-08-11 | End: 2021-08-12 | Stop reason: SDUPTHER

## 2021-08-11 RX ORDER — FUROSEMIDE 20 MG/1
20 TABLET ORAL DAILY
Qty: 30 TABLET | Refills: 5 | Status: SHIPPED | OUTPATIENT
Start: 2021-08-11 | End: 2021-09-24

## 2021-08-11 NOTE — HOME HEALTH
SN reason for visit: STEMI     Caregiver involvement: Patient's cg is friend Esha Newton. she does not live with patient but is available if needed for assistance with iadls, adls, meal prep, medication management, taking to md appointments. Medications reconciled and all medications are available in the home this visit. The following education was provided regarding medications, medication interactions, and look a like medications. Medications  are effective at this time. Home health supplies by type and quantity ordered/delivered this visit include: n/a    Patient education provided this visit:  reviewed cardiac diet- monitoring sodium intake, cholesterol and fat intake. patient aware to limit sodium, no added sodium to diet. reviewed foods to avoid, how to order foods when eating out, how to read nutrition labels and measure sodium, cholesterol and fat intake. patient encouraged to monitor for increase in pain and to continue with current pain management and to notify staff/md if pain becomes excrutiating/intolerable. encouraged patient to get three nutritional meals daily and to stay hydrated, drink plenty of fluids. patient encouraged to supplement nutrition with protein shakes, ensure, boost in order to get nutrition needed and to be sure to stay hydrated- drink plenty of water/fluids throughout the day  patient/cg to continue to take medications as prescribed. patient aware to monitor for effectiveness and to notify staff of any adverse reactions to medications/any changes to medication regimen. reviewed side effects, purposes, dosage, frequencies  lab draw ordered for today- [CMP, CBC, MAG] obtained, labeled and taken to [facility] office, dropped off in [employer]. results to PCP. Patient is a fall risk.  Educated pateint to sit on the side of the chair/bed, take a slow deep breaths, have feet firmly planted before standing up, use cane/walker if available, or have someone to assist.  INSTRUCTED PATIENT AND CG THAT SHOULD ANY NEEDS OR CONCERNS ARISE TO FIRST CALL OUR OFFICE, OR THE DR'S OFFICE  OR GO TO AN URGENT CARE CENTER AND NOT TO THE ED FOR NON-LIFE THREATENING EVENTS. IF IT IS LIFE THREATENING THEN CALL 911 OR GO TO THE CLOSEST ER. Progress toward goals: Pt to remain compliant with diet and medications. Home exercise program: activity as tolerated, trying to get physical activity 4-5 x weekly, 30 minutes daily. stopping activity if causing shortness of breath or chest pain, dizziness or weakness. Continued need for the following skills: Nursing    The following discharge planning was discussed with the pt/caregiver: Patient will be discharged once education has completed, patient is medically stable and pt/cg are able to independently manage medications and disease process.

## 2021-08-11 NOTE — PATIENT INSTRUCTIONS
Begin Lasix 20 mg / day    Have blood work drawn  In 1-2 weeks    Referral to cardiac rehab. Low sodium diet             Heart-Healthy Diet: Care Instructions  Your Care Instructions     A heart-healthy diet has lots of vegetables, fruits, nuts, beans, and whole grains, and is low in salt. It limits foods that are high in saturated fat, such as meats, cheeses, and fried foods. It may be hard to change your diet, but even small changes can lower your risk of heart attack and heart disease. Follow-up care is a key part of your treatment and safety. Be sure to make and go to all appointments, and call your doctor if you are having problems. It's also a good idea to know your test results and keep a list of the medicines you take. How can you care for yourself at home? Watch your portions  · Use food labels to learn what the recommended servings are for the foods you eat. · Eat only the number of calories you need to stay at a healthy weight. If you need to lose weight, eat fewer calories than your body burns (through exercise and other physical activity). Eat more fruits and vegetables  · Eat a variety of fruit and vegetables every day. Dark green, deep orange, red, or yellow fruits and vegetables are especially good for you. Examples include spinach, carrots, peaches, and berries. · Keep carrots, celery, and other veggies handy for snacks. Buy fruit that is in season and store it where you can see it so that you will be tempted to eat it. · Cook dishes that have a lot of veggies in them, such as stir-fries and soups. Limit saturated fat  · Read food labels, and try to avoid saturated fats. They increase your risk of heart disease. · Use olive or canola oil when you cook. · Bake, broil, grill, or steam foods instead of frying them. · Choose lean meats instead of high-fat meats such as hot dogs and sausages. Cut off all visible fat when you prepare meat.   · Eat fish, skinless poultry, and meat alternatives such as soy products instead of high-fat meats. Soy products, such as tofu, may be especially good for your heart. · Choose low-fat or fat-free milk and dairy products. Eat foods high in fiber  · Eat a variety of grain products every day. Include whole-grain foods that have lots of fiber and nutrients. Examples of whole-grain foods include oats, whole wheat bread, and brown rice. · Buy whole-grain breads and cereals, instead of white bread or pastries. Limit salt and sodium  · Limit how much salt and sodium you eat to help lower your blood pressure. · Taste food before you salt it. Add only a little salt when you think you need it. With time, your taste buds will adjust to less salt. · Eat fewer snack items, fast foods, and other high-salt, processed foods. Check food labels for the amount of sodium in packaged foods. · Choose low-sodium versions of canned goods (such as soups, vegetables, and beans). Limit sugar  · Limit drinks and foods with added sugar. These include candy, desserts, and soda pop. Limit alcohol  · Limit alcohol to no more than 2 drinks a day for men and 1 drink a day for women. Too much alcohol can cause health problems. When should you call for help? Watch closely for changes in your health, and be sure to contact your doctor if:    · You would like help planning heart-healthy meals. Where can you learn more? Go to http://www.elias.com/  Enter V137 in the search box to learn more about \"Heart-Healthy Diet: Care Instructions. \"  Current as of: December 17, 2020               Content Version: 12.8  © 7882-8784 [facility]. Care instructions adapted under license by [employer] (which disclaims liability or warranty for this information).  If you have questions about a medical condition or this instruction, always ask your healthcare professional. [place] [address] any warranty or liability for your use of this information.

## 2021-08-11 NOTE — PROGRESS NOTES
HISTORY OF PRESENT ILLNESS  Job Filter is a 64 y.o. female. 2021  Patient [facility]. Status post RCA stent on 2021. She has other PMH of hypertension, hyperlipidemia, and acute lower extremity DVT. She is tolerating all medications. She denies chest pain, shortness of breath or palpitations. She continues to have bilateral lower extremity edema.           Family History   Adopted: Yes       Past Medical History:   Diagnosis Date    Abnormal chest CT 2020    Adopted     Arthritis     Balance problems     Chronic cough 2020    MCBRIDE (dyspnea on exertion) 2019    GERD (gastroesophageal reflux disease)     Gout     Hemochromatosis     High cholesterol     Hypertension     Hypothyroidism     Left knee pain     Left shoulder pain     Low blood potassium     Lumbar pain     Pain of left sacroiliac joint     Shoulder impingement, right, with rotator cuff strain        Past Surgical History:   Procedure Laterality Date    HX CHOLECYSTECTOMY      HX KNEE REPLACEMENT Bilateral     R knee/ Lknee and femur    HX ORTHOPAEDIC      R trigger finger     HX OTHER SURGICAL      Right little finger    HX OTHER SURGICAL      Right wrist, replaced unlnar).     HX OTHER SURGICAL      Both knees    HX OTHER SURGICAL      Left femur    HX OTHER SURGICAL      Trigger finger surgery    HX OTHER SURGICAL      Left knee replacment revision     HX ROTATOR CUFF REPAIR Right     HX TONSILLECTOMY         Social History     Tobacco Use    Smoking status: Former Smoker     Packs/day: 0.50     Years: 2.00     Pack years: 1.00     Start date:      Quit date:      Years since quittin.5    Smokeless tobacco: Never Used    Tobacco comment: quit 20 years ago   Substance Use Topics    Alcohol use: No       Allergies   Allergen Reactions    Ciprofloxacin Other (comments)     Achilles tendonitis/leg pain and swelling    Indomethacin Anaphylaxis, Hives and Swelling    Tomato Other (comments)     Heart burn       Prior to Admission medications    Medication Sig Start Date End Date Taking? Authorizing Provider   furosemide (LASIX) 20 mg tablet Take 1 Tablet by mouth daily. 8/11/21  Yes Glenn Pickett NP   potassium chloride SR (Klor-Con 10) 10 mEq tablet Take  by mouth. Yes Provider, Historical   budesonide (PULMICORT) 0.5 mg/2 mL nbsp 2 mL by Nebulization route two (2) times a day. File under Medicare Part B, ICD J47.0, J45.909 8/5/21  Yes Nikunj Lai MD   formoterol (Perforomist) 20 mcg/2 mL nebu neb solution 2 mL by Nebulization route two (2) times a day. 8/5/21  Yes Nikunj Lai MD   levothyroxine (SYNTHROID) 25 mcg tablet TAKE 1 TAB BY MOUTH DAILY 8/4/21  Yes Flavio Yao NP   nystatin (MYCOSTATIN) powder Apply  to affected area two (2) times a day. thin layer   Yes Provider, Historical   acetaminophen (TYLENOL) 500 mg tablet Take 500 mg by mouth every six (6) hours as needed for Pain. Pain   Yes Provider, Historical   carboxymethylcellulose sodium (REFRESH TEARS OP) Administer 1 Drop to both eyes daily as needed for Other. Dry eyes   Yes Provider, Historical   peg 400-propylene glycol, PF, (Systane Hydration PF) 0.4-0.3 % dpet ophthalmic solution Administer 2 Drops to both eyes four (4) times daily as needed for Ocular Dryness. Dry eyes   Yes Provider, Historical   rosuvastatin (CRESTOR) 20 mg tablet Take 1 Tablet by mouth daily. 8/3/21  Yes Dylan Mora MD   apixaban (ELIQUIS) 5 mg tablet 2 tabs ( 10 mg ) PO Twice daily through 8/7/2021 and then starting from 8/8/2021 take 1 tab ( 5 mg ) PO twice daily ongoing 8/2/21  Yes Dylan Mora MD   aspirin 81 mg chewable tablet Take 1 Tablet by mouth daily. 8/3/21  Yes Dylan Mora MD   clopidogreL (PLAVIX) 75 mg tab Take 1 Tablet by mouth daily.  8/3/21  Yes Dylan Mora MD   metoprolol tartrate (LOPRESSOR) 25 mg tablet Take 0.5 Tablets by mouth every twelve (12) hours. 8/2/21  Yes Srinivasa Agosto MD   nitroglycerin (NITROSTAT) 0.4 mg SL tablet 1 Tablet by SubLINGual route every five (5) minutes as needed for Chest Pain. Up to 3 doses. 8/2/21  Yes Srinivasa Agosto MD   pantoprazole (PROTONIX) 40 mg tablet Take 1 Tablet by mouth Daily (before breakfast). 8/3/21  Yes Srinivasa Agosto MD   polyethylene glycol (MIRALAX) 17 gram packet Take 1 Packet by mouth daily. 8/3/21  Yes Srinivasa Agosto MD   albuterol (PROVENTIL HFA, VENTOLIN HFA, PROAIR HFA) 90 mcg/actuation inhaler Take 2 Puffs by inhalation every four (4) hours as needed for Wheezing. 7/13/21  Yes Tupelo Pump, NP   allopurinoL (ZYLOPRIM) 100 mg tablet Take 1 Tab by mouth daily. 5/4/21  Yes Tupelo Pump, NP   oxyCODONE IR (ROXICODONE) 5 mg immediate release tablet Take 5 mg by mouth three (3) times daily as needed for Pain. Pain   Yes Provider, Historical   albuterol (PROVENTIL VENTOLIN) 2.5 mg /3 mL (0.083 %) nebu 3 mL by Nebulization route every six (6) hours as needed for Wheezing or Shortness of Breath. 1/7/20  Yes Omar Duvall NP   inhalational spacing device (ACE AEROSOL CLOUD ENHANCER) 1 Each by Does Not Apply route as needed. 2/14/19  Yes Dot Burris MD   bisacodyL (DULCOLAX) 5 mg EC tablet Take 1 Tablet by mouth daily as needed for Constipation. Patient not taking: Reported on 8/11/2021 8/2/21 8/11/21  Srinivasa Agosto MD   OTHER Incentive Spirometry- use as directed 8/2/21 8/11/21  Srinivasa Agosto MD   OTHER Graded Compression Stockings b/l LE- Use as directed  Patient not taking: Reported on 8/5/2021 8/2/21 8/11/21  Srinivasa Agosto MD   OTHER Check CBC, CMP, Mg in 4 days, results to PCP immediately, Diagnosis- CAD 8/2/21 8/11/21  Srinivasa Agosto MD   acetaminophen (TYLENOL) 325 mg tablet Take 2 Tabs by mouth every six (6) hours as needed for Pain or Fever.   Patient not taking: Reported on 8/5/2021 7/25/19 8/11/21  Alberta Maldonado NP         Visit Vitals  /62   Pulse 78   Ht 5' 1\" (1.549 m)   Wt 94.3 kg (208 lb)   SpO2 99%   BMI 39.30 kg/m²       Review of Systems   Constitutional: Negative for malaise/fatigue. HENT: Negative for congestion. Eyes: Negative for double vision and redness. Respiratory: Negative for cough, shortness of breath and wheezing. Cardiovascular: Positive for leg swelling. Negative for chest pain, palpitations, orthopnea, claudication and PND. Gastrointestinal: Negative for nausea and vomiting. Musculoskeletal: Negative for falls. Neurological: Negative for dizziness. Endo/Heme/Allergies: Does not bruise/bleed easily. Physical Exam  Vitals and nursing note reviewed. Constitutional:       Appearance: She is well-developed. Neck:      Vascular: No JVD. Cardiovascular:      Rate and Rhythm: Normal rate and regular rhythm. Pulses: Intact distal pulses. Heart sounds: Normal heart sounds. No murmur heard. No friction rub. No gallop. Pulmonary:      Effort: Pulmonary effort is normal. No respiratory distress. Breath sounds: Normal breath sounds. No wheezing or rales. Chest:      Chest wall: No tenderness. Abdominal:      General: There is no distension. Palpations: Abdomen is soft. There is no mass. Tenderness: There is no abdominal tenderness. Musculoskeletal:         General: Normal range of motion. Right lower leg: Edema (2+) present. Left lower leg: Edema (2+) present. Skin:     General: Skin is warm and dry. Neurological:      Mental Status: She is alert and oriented to person, place, and time. 7/30/2021  Indications    ST elevation myocardial infarction (STEMI) involving other coronary artery of inferior wall (HCC) [I21.19 (ICD-10-CM)]   Conclusion       · Single-vessel coronary artery disease with totally occluded proximal right coronary artery. · Successful thrombectomy of the right coronary artery due to extensive thrombus burden.   · Successful PCI of the proximal right coronary artery with residual 0% stenosis deploying a drug-eluting stent as above. Delaying factors in this patient were multiple trials at peripheral IV in the ER and only when IV was available. We successfully cannulated the right femoral vein in addition to right femoral artery without any complications in the lab for venous access. The wiring of the artery was challenging because of the possible  as described above. Due to extensive thrombus in the artery, thrombectomy was used. Aggressive risk factor med modification and medical treatment should continue. Further work-up of this chronic severe edema which may be related to bilateral DVTs in the past and chronic CHF. ASSESSMENT and PLAN    Ms. Reeder has a reminder for a \"due or due soon\" health maintenance. I have asked that she contact her primary care provider for follow-up on this health maintenance. No flowsheet data found. Assessment         ICD-10-CM ICD-9-CM    1. ST elevation myocardial infarction involving right coronary artery (HCC)  I21.11 410.31 REFERRAL TO CARDIAC REHAB   2. Chronic diastolic congestive heart failure (HCC)  I50.32 428.32 furosemide (LASIX) 20 mg tablet     354.2 METABOLIC PANEL, BASIC   3. Atrial fibrillation with RVR (HCC)  I48.91 427.31    4. Essential hypertension  I10 401.9    5. High cholesterol  E78.00 272.0    6. History of DVT (deep vein thrombosis)  Z86.718 V12.51      8/2021  Recent STEMI. Cardiac catheterization and echocardiogram reviewed with patient EF 50% by echo. Continue triple therapy with Eliquis, aspirin, and Plavix due to FARHAT. After 1 month plan to discontinue aspirin. Continued on DVT dose Eliquis. Continue PPI due to need for triple therapy. Referral entered for cardiac rehab. Will begin low-dose Lasix due to bilateral lower extremity edema.   Repeat BMP in 1 week.       Medications Discontinued During This Encounter   Medication Reason    OTHER DUPLICATE ORDER    OTHER DUPLICATE ORDER    OTHER DUPLICATE ORDER    acetaminophen (TYLENOL) 325 mg tablet Therapy Completed    bisacodyL (DULCOLAX) 5 mg EC tablet Therapy Completed       Orders Placed This Encounter    METABOLIC PANEL, BASIC     Standing Status:   Future     Standing Expiration Date:   8/12/2022    REFERRAL TO CARDIAC REHAB     Standing Status:   Future     Standing Expiration Date:   8/11/2022     Referral Priority:   Routine     Referral Type:   Consultation     Referral Reason:   Specialty Services Required     Requested Specialty:   Cardiac Rehabilitation     Number of Visits Requested:   1    furosemide (LASIX) 20 mg tablet     Sig: Take 1 Tablet by mouth daily. Dispense:  30 Tablet     Refill:  5       Follow-up and Dispositions    · Return in about 1 month (around 9/11/2021) for Follow up with Dr. Alicia Noriega.

## 2021-08-12 ENCOUNTER — TELEPHONE (OUTPATIENT)
Dept: PULMONOLOGY | Age: 76
End: 2021-08-12

## 2021-08-12 PROCEDURE — 3331090002 HH PPS REVENUE DEBIT

## 2021-08-12 PROCEDURE — 3331090001 HH PPS REVENUE CREDIT

## 2021-08-12 RX ORDER — FORMOTEROL FUMARATE 20 UG/2ML
20 SOLUTION RESPIRATORY (INHALATION) 2 TIMES DAILY
Qty: 60 EACH | Refills: 3
Start: 2021-08-12 | End: 2022-05-17

## 2021-08-13 PROCEDURE — 3331090001 HH PPS REVENUE CREDIT

## 2021-08-13 PROCEDURE — 3331090002 HH PPS REVENUE DEBIT

## 2021-08-14 ENCOUNTER — HOME CARE VISIT (OUTPATIENT)
Dept: SCHEDULING | Facility: HOME HEALTH | Age: 76
End: 2021-08-14
Payer: MEDICARE

## 2021-08-14 PROCEDURE — G0300 HHS/HOSPICE OF LPN EA 15 MIN: HCPCS

## 2021-08-14 PROCEDURE — 3331090002 HH PPS REVENUE DEBIT

## 2021-08-14 PROCEDURE — 3331090001 HH PPS REVENUE CREDIT

## 2021-08-15 PROCEDURE — 3331090001 HH PPS REVENUE CREDIT

## 2021-08-15 PROCEDURE — 3331090002 HH PPS REVENUE DEBIT

## 2021-08-16 VITALS
DIASTOLIC BLOOD PRESSURE: 80 MMHG | TEMPERATURE: 97.8 F | RESPIRATION RATE: 18 BRPM | OXYGEN SATURATION: 98 % | SYSTOLIC BLOOD PRESSURE: 125 MMHG | HEART RATE: 72 BPM

## 2021-08-16 PROCEDURE — 3331090002 HH PPS REVENUE DEBIT

## 2021-08-16 PROCEDURE — 3331090001 HH PPS REVENUE CREDIT

## 2021-08-16 NOTE — HOME HEALTH
SN reason for visit: Per medical record: Patient is 63 y/o female who was referred to home health for STEMI education. Caregiver involvement: Patient is independent at this time. Family/friends are available should a need arise. Medications reconciled and all medications are available in the home this visit. The following education was provided regarding medications, medication interactions, and look a like medications: patient instructed to continue to take medications as prescribed. patient aware to monitor for effectiveness and to notify staff of any adverse reactions to medications/any changes to medication regimen. reviewed side effects, purposes, dosage, frequencies. Medications  are effective at this time. Home health supplies by type and quantity ordered/delivered this visit include: n/a    Patient education provided this visit: Instruct patient/caregiver to notify home health/provider for the following: new, worsening, or changing angina (chest pain or pressure) symptoms including experiencing the symptoms more often than usual; new or increased shortness of breath. Instruct patient/caregiver to call emergency services for the following: Squeezing pressure or pain in the chest, chest pain unrelieved by current medications or prescribed therapies, discomfort or pain in back, neck, jaw, stomach or arm, shortness of breath, nausea or vomiting, light headedness, or sudden cold sweat. A heart attack, also called myocardial infarction, or MI, is what happens when one of the arteries that supply blood to the heart, also known as the coronary arteries, gets blocked. When this happens, the part of the heart that normally gets blood from that artery is damaged. The longer the time the artery is blocked, the bigger the heart attack. Heart attacks are usually the result of a condition called \"coronary heart disease\" or \"coronary artery disease. \" In this disease, fatty deposits called plaques form on the walls of the coronary arteries. These plaques sometimes break open and cause blood clots to form. Then the blood clot can block off the artery and keep blood from reaching parts of the heart muscle. Instruct patient/caregiver in causes/contributing factors to myocardial infarction such as high blood pressure, high cholesterol, diabetes, obesity, smoking, and sedentary lifestyle. Pt/cg instructed to follow cardiac diet- monitoring sodium intake, cholesterol intake. patient aware to limit sodium, no added sodium to diet. reviewed foods to avoid, how to read nutrition labels and measure sodium intake. MEDICATION ADHERENCE IS AN IMPORTANT COMPONENT OF HTN MANAGEMENT. PATIENT STATES THE IMPORTANCE TO TAKE HTN MEDS SAME TIME EACH DAY. patient instructed to minimize clutter and to maintain clear pathways in order to prevent falls from occurring. Patient is a fall risk. Educated pateint to sit on the side of the chair/bed, take a slow deep breaths, have feet firmly planted before standing up, use cane/walker if available, or have someone to assist. INSTRUCTED PATIENT AND CG THAT SHOULD ANY NEEDS OR CONCERNS ARISE TO FIRST CALL OUR OFFICE, OR THE DR'S OFFICE  OR GO TO AN URGENT CARE CENTER AND NOT TO THE ED FOR NON-LIFE THREATENING EVENTS. IF IT IS LIFE THREATENING THEN CALL 911 OR GO TO THE CLOSEST ER. Progress toward goals: goals/teaching reviewed. patient is progressing towards goals at this time, skilled need to continue care nursing. Home exercise program: activity as tolerated, trying to get physical activity 4-5 x weekly. stopping activity if causing shortness of breath or chest pain. Continued need for the following skills: Nursing and Physical Therapy    The following discharge planning was discussed with the pt/caregiver: Patient will be discharged once education has completed, patient is medically stable and pt/cg are able to independently manage medications and disease process.     Unable to get patient to sign due to computer issues.

## 2021-08-17 PROCEDURE — 3331090001 HH PPS REVENUE CREDIT

## 2021-08-17 PROCEDURE — 3331090002 HH PPS REVENUE DEBIT

## 2021-08-18 PROCEDURE — 3331090001 HH PPS REVENUE CREDIT

## 2021-08-18 PROCEDURE — 3331090002 HH PPS REVENUE DEBIT

## 2021-08-19 PROCEDURE — 3331090002 HH PPS REVENUE DEBIT

## 2021-08-19 PROCEDURE — 3331090001 HH PPS REVENUE CREDIT

## 2021-08-20 ENCOUNTER — PATIENT MESSAGE (OUTPATIENT)
Dept: FAMILY MEDICINE CLINIC | Age: 76
End: 2021-08-20

## 2021-08-20 ENCOUNTER — APPOINTMENT (OUTPATIENT)
Dept: CARDIAC REHAB | Age: 76
End: 2021-08-20

## 2021-08-20 ENCOUNTER — HOME CARE VISIT (OUTPATIENT)
Dept: SCHEDULING | Facility: HOME HEALTH | Age: 76
End: 2021-08-20
Payer: MEDICARE

## 2021-08-20 PROCEDURE — 3331090002 HH PPS REVENUE DEBIT

## 2021-08-20 PROCEDURE — G0300 HHS/HOSPICE OF LPN EA 15 MIN: HCPCS

## 2021-08-20 PROCEDURE — 3331090001 HH PPS REVENUE CREDIT

## 2021-08-21 PROCEDURE — 3331090001 HH PPS REVENUE CREDIT

## 2021-08-21 PROCEDURE — 3331090002 HH PPS REVENUE DEBIT

## 2021-08-22 PROCEDURE — 3331090002 HH PPS REVENUE DEBIT

## 2021-08-22 PROCEDURE — 3331090001 HH PPS REVENUE CREDIT

## 2021-08-23 ENCOUNTER — HOSPITAL ENCOUNTER (OUTPATIENT)
Dept: CARDIAC REHAB | Age: 76
Discharge: HOME | End: 2021-08-23
Payer: MEDICARE

## 2021-08-23 VITALS — WEIGHT: 199 LBS | BODY MASS INDEX: 37.6 KG/M2

## 2021-08-23 PROCEDURE — 93798 PHYS/QHP OP CAR RHAB W/ECG: CPT

## 2021-08-23 PROCEDURE — 3331090001 HH PPS REVENUE CREDIT

## 2021-08-23 PROCEDURE — 3331090002 HH PPS REVENUE DEBIT

## 2021-08-23 RX ORDER — GUAIFENESIN 100 MG/5ML
81 LIQUID (ML) ORAL DAILY
Qty: 100 TABLET | Refills: 5 | Status: SHIPPED | OUTPATIENT
Start: 2021-08-23 | End: 2021-09-24 | Stop reason: ALTCHOICE

## 2021-08-23 RX ORDER — ROSUVASTATIN CALCIUM 20 MG/1
20 TABLET, COATED ORAL DAILY
Qty: 30 TABLET | Refills: 5 | Status: SHIPPED | OUTPATIENT
Start: 2021-08-23 | End: 2021-11-29

## 2021-08-23 RX ORDER — CLOPIDOGREL BISULFATE 75 MG/1
75 TABLET ORAL DAILY
Qty: 30 TABLET | Refills: 5 | Status: SHIPPED | OUTPATIENT
Start: 2021-08-23 | End: 2021-11-29

## 2021-08-23 RX ORDER — METOPROLOL TARTRATE 25 MG/1
12.5 TABLET, FILM COATED ORAL EVERY 12 HOURS
Qty: 90 TABLET | Refills: 1 | Status: SHIPPED | OUTPATIENT
Start: 2021-08-23 | End: 2021-11-29

## 2021-08-23 NOTE — PROGRESS NOTES
CARDIAC REHAB INITIAL ITP FOR REVIEW AND SIGNATURE    Mega Reeder 64 y.o. presented to cardiac rehab for an intake and a six minute walk test today with a primary diagnosis of STEMI/PCI. Patient's EF is 50%. Otto Coles has a history of Hypertension, Coronary artery disease, Obesity, CHF and status post coronary artery stenting. Cardiac risk factors include smoking/ tobacco exposure, dyslipidemia, obesity, hypertension, thyroid dysfunction and these were reviewed with patient. Otto Coles is not  and lives with lives with an adult . PHQ-9, depression score, is 6 and this is considered to be high. The result was discussed with patient who confirms score to be accurate and if above a 5, a copy of the results were sent to patient's PCP. Patient denied chest pain during 6 minute walk and the cardiac rhythm was in Normal Sinus Rhythm. Otto Coles will attend exercise and educational sessions 2 days a week in cardiac rehab for 36 sessions. Goals for Rehab:    Patient name: Otto Coles : 1945         Goals Comments   1. Increase endurance   [x] initial  [] met                  [] not met  [] progressing Be able to walk for 6 minutes without stopping by next recert. Patient walked for 3 minutes during her 6 minute walk test and needed to stop.    2. Goal weight 150lbs   [x] initial  [] met                  [] not met  [] progressing Lose 1-4CZ by next recert     Bozena Mckeon RN 2021 3:55 PM    Cardiac ITP     CR INTAKE from 2021 in [place] [address]      Treatment Diagnosis   Treatment Diagnosis 1 STEMI [place] Finders    STEMI Date 21 B[place] Findbrenda    Treatment Diagnosis 2 PCI [place] Finders    PCI Date 21 B[place] Finders    Referral Date 21 B[place] Findbrenda    Significant Cardiovascular History Chronic atrial fibrillation, History of heart failure      Co-morbidities Pulmonary disease      Individual Treatment Plan   ITP Visit Type Initial Assessment      1st Date of Exercise 08/23/21      ITP Next Review Date 09/22/21 Lakeisha Rodrigues    Visit #/Total Visits 1/36      EF % 50 %      Risk Stratification High      ITP Exercise, Psychosocial, Tobacco, Nutrition, Education      Exercise    Stages of Change Preparation      DASI Total Score 4.5      Assisted Devices Walker      Total Score 3      Test Six minute walk test      Exercise Prescription   Mode Treadmill, Bike, Stepper, Ergometer      Frequency per week 2-3      Duration per session 35-55      Intensity  METS       1.4-3.0      RPE 11-13      Symptoms with Exercise Shortness of breath      Target Heart Rate 85-99      Resistance Training Yes      Exercise Blood Pressures   Resting /86      Peak /79      Is BP WDL?  No      Exercise Activity at Home   Type arm and leg lifts      Frequency everyday      Resistance Training No      Exercise Education   Education Signs/Symptoms to report, Exercise safety, RPE scale, Equipment orientation      Exercise Target Goal   Target Goal(s) Individual exercise RX, BP < 140/90 or < 130/80, if DM or CKD, Aerobic activity 30 + minutes/day  5 days/week      Psychosocial   Stages of Change Preparation      [facility] Total Score 35      PHQ 9 Score 6   0   Psychosocial Intervention   Interventions PCP notified, Cardiologist notified      Currently Taking Psychotropic Meds No      Medication Changes No      Psychosocial Education   Education Coping techniques, Environmental triggers, Impact self care behaviors on health, Signs/Symptoms of depression, Stress management class, Cardiac meds      Psychosocial Target Goals   Target Goal(s) Demonstrates appropriate interaction with others, Engages in self-care behaviors, Assess presence or absence of depression using a valid screening tool, Maximizes coping skills, Positive support group      Uses Stress Mgmt Techniques Yes      Nutrition   Stages of Change Preparation      Diabetes No      Lipids   Date Lipids Drawn 07/30/21      Total 75      HDL 50      LDL 14.2      Triglycerides 54      Lipid Med(s) Crestor      Lipid Med Change(s) No      Weight Management   Weight  90.3 kg (199 lb)      Height  5' 1\" (1.549 m)      BMI 37.68      Waist Circumference  50      Alcohol None      Nutrition Assessment Tool rate your plate      Rate Your Plate Total Score 65      Nutrition Intervention   Dietitian Consult No Ingris Kruger    Nurse/Patient Discussion Yes      Nutrition Class Yes      Diabetes Education Referral No      Lipid Clinic Referral No      Weight Management Referral No  [de-identified]   Nutrition Education   Education Low fat & cholesterol diet, Carb-controlled diet, Low sodium diet, Healthy eating      Nutrition Target Goals   Target Goals Weight loss of 5-10%, Waist size less than 40 inches males and less than 35 inches for females, BMI less than 25      Education   Learning Barrier Ready to learn [employer]   Education Intervention   Education Schedule Given Yes Reid Colin    Patient Education    Education Risk factors, CAD, Med Compliance, Cardiac A&P, Signs/Symptoms of Angina      Hypertension Yes      Hypertension Controlled Yes      Is BP WDL?  Yes      Med(s) Change No      BP Meds Metoprolol      Education Target Goals   Target Goals Medication compliance, Risk factors, Understand target guidelines for lipids, Understand target guidelines for B/P      Physician Response   Exercise     CR INTAKE from 8/23/2021 in [facility]   Any problems changes since your last visit Other (comment)  [initial visit]   Any symptoms while exercising SOB   Psychosocial/Stress Level 0   Resting EKG rhythm SR   Tobacco Use None   Enter O2 Saturation and Liter Flow 98   ITP Next Review Date 09/22/21   Visit Number/Total Visits 1/36   What is plan for next session start exercise Rx   On Call Medical Director Immediately Available [place]   Target Heart Rate(Range) 85-99   Resting HR 76   Resting /86   Recovery HR 77   Recovery /81   Weight 90.3 kg (199 lb)   Exercise EKG Rhythm SR/ST   Exercise Duration 3   Peak    Peak /79   Peak RPE 13   Peak Mets 1.4   SOB 6/10  [during exercise]   O2 Saturation 98   Total Minutes 3

## 2021-08-24 PROCEDURE — 3331090002 HH PPS REVENUE DEBIT

## 2021-08-24 PROCEDURE — 3331090001 HH PPS REVENUE CREDIT

## 2021-08-25 VITALS
HEART RATE: 75 BPM | DIASTOLIC BLOOD PRESSURE: 78 MMHG | SYSTOLIC BLOOD PRESSURE: 118 MMHG | OXYGEN SATURATION: 97 % | TEMPERATURE: 97.2 F

## 2021-08-25 PROCEDURE — 3331090002 HH PPS REVENUE DEBIT

## 2021-08-25 PROCEDURE — 3331090001 HH PPS REVENUE CREDIT

## 2021-08-25 NOTE — HOME HEALTH
SN reason for visit: STEMI Caregiver involvement: Patient's cg is friend Yuri Song. she does not live with patient but is available if needed for assistance with iadls, adls, meal prep, medication management, taking to md appointments. Medications reconciled and all medications are available in the home this visit. The following education was provided regarding medications, medication interactions, and look a like medications. Medications are effective at this time. Home health supplies by type and quantity ordered/delivered this visit include: n/a Patient education provided this visit: reviewed cardiac diet- monitoring sodium intake, cholesterol and fat intake. patient aware to limit sodium, no added sodium to diet. reviewed foods to avoid, how to order foods when eating out, how to read nutrition labels and measure sodium, cholesterol and fat intake. patient encouraged to monitor for increase in pain and to continue with current pain management and to notify staff/md if pain becomes excrutiating/intolerable. encouraged patient to get three nutritional meals daily and to stay hydrated, drink plenty of fluids. patient encouraged to supplement nutrition with protein shakes, ensure, boost in order to get nutrition needed and to be sure to stay hydrated- drink plenty of water/fluids throughout the day patient/cg to continue to take medications as prescribed. patient aware to monitor for effectiveness and to notify staff of any adverse reactions to medications/any changes to medication regimen. reviewed side effects, purposes, dosage, frequencies. activity as tolerated, trying to get physical activity 4-5 x weekly, 30 minutes daily. stopping activity if causing shortness of breath or chest pain, dizziness or weakness. Patient is doing really well at this time. Patient is a fall risk.  Educated pateint to sit on the side of the chair/bed, take a slow deep breaths, have feet firmly planted before standing up, use cane/walker if available, or have someone to assist. INSTRUCTED PATIENT AND CG THAT SHOULD ANY NEEDS OR CONCERNS ARISE TO FIRST CALL OUR OFFICE, OR THE DR'S OFFICE OR GO TO AN URGENT CARE CENTER AND NOT TO THE ED FOR NON-LIFE THREATENING EVENTS. IF IT IS LIFE THREATENING THEN CALL 911 OR GO TO THE CLOSEST ER. Progress toward goals: Pt to remain compliant with diet and medications. Home exercise program: activity as tolerated, trying to get physical activity 4-5 x weekly, 30 minutes daily. stopping activity if causing shortness of breath or chest pain, dizziness or weakness. Continued need for the following skills: Nursing The following discharge planning was discussed with the pt/caregiver: Patient will be discharged once education has completed, patient is medically stable and pt/cg are able to independently manage medications and disease process.

## 2021-08-26 ENCOUNTER — HOME CARE VISIT (OUTPATIENT)
Dept: SCHEDULING | Facility: HOME HEALTH | Age: 76
End: 2021-08-26
Payer: MEDICARE

## 2021-08-26 PROCEDURE — G0300 HHS/HOSPICE OF LPN EA 15 MIN: HCPCS

## 2021-08-26 PROCEDURE — 3331090001 HH PPS REVENUE CREDIT

## 2021-08-26 PROCEDURE — 3331090002 HH PPS REVENUE DEBIT

## 2021-08-27 PROCEDURE — 3331090002 HH PPS REVENUE DEBIT

## 2021-08-27 PROCEDURE — 3331090001 HH PPS REVENUE CREDIT

## 2021-08-28 PROCEDURE — 3331090002 HH PPS REVENUE DEBIT

## 2021-08-28 PROCEDURE — 3331090001 HH PPS REVENUE CREDIT

## 2021-08-29 PROCEDURE — 3331090002 HH PPS REVENUE DEBIT

## 2021-08-29 PROCEDURE — 3331090001 HH PPS REVENUE CREDIT

## 2021-08-30 PROCEDURE — 3331090001 HH PPS REVENUE CREDIT

## 2021-08-30 PROCEDURE — 3331090002 HH PPS REVENUE DEBIT

## 2021-08-31 ENCOUNTER — PATIENT OUTREACH (OUTPATIENT)
Dept: CASE MANAGEMENT | Age: 76
End: 2021-08-31

## 2021-08-31 ENCOUNTER — HOSPITAL ENCOUNTER (OUTPATIENT)
Dept: CARDIAC REHAB | Age: 76
Discharge: HOME | End: 2021-08-31
Payer: MEDICARE

## 2021-08-31 ENCOUNTER — APPOINTMENT (OUTPATIENT)
Dept: CARDIAC REHAB | Age: 76
End: 2021-08-31
Payer: MEDICARE

## 2021-08-31 VITALS — BODY MASS INDEX: 37.03 KG/M2 | WEIGHT: 196 LBS

## 2021-08-31 PROCEDURE — 3331090002 HH PPS REVENUE DEBIT

## 2021-08-31 PROCEDURE — 3331090001 HH PPS REVENUE CREDIT

## 2021-08-31 PROCEDURE — 93798 PHYS/QHP OP CAR RHAB W/ECG: CPT

## 2021-08-31 NOTE — PROGRESS NOTES
Care Transitions Follow Up Call    Challenges to be reviewed by the provider   Additional needs identified to be addressed with provider: yes    Patient had a question regarding her medication. Patient states that she is running out of Protonix and wanted to know if she should continue this or go back to the over the counter medication she was taking which was Omeprazole. Patient referred to PCP or the provider that is managing her GI concerns for follow up. Method of communication with provider : chart routed      Care Transition Nurse (CTN) contacted the patient by telephone to follow up after discharge  on 2021. Verified name and  with patient as identifiers. Patient reports:   - she is fine   - she is attending therapy at [facility] and this was her first day. [facility] follow up appointment(s):   Future Appointments   Date Time Provider Department Center   2021  2:30 PM Vandana Rodriguez RN [facility]   2021  2:30 PM Vandana Rodriguez RN [facility]   2021  2:30 PM Vandana Rodriguez RN [facility]   2021  2:30 PM Vandana Rodriguez RN [facility]   2021  2:30 PM Vandana Rodriguez RN [facility]   2021  2:30 PM Vandana Rodriguez RN [facility]   2021  2:30 PM Vandana Rodriguez RN [facility]   2021 12:45 PM Seth James MD [facility] AMB   2021  2:30 PM Vandana Rodriguez RN [facility]   2021  2:30 PM Vandana Rodriguez RN [facility]   2021  8:00 AM [facility] AMB   2021 10:40 AM Eunice Mcelroy NP [facility] AMB     Non-BS follow up appointment(s):     CTN provided contact information for future needs. Plan for follow-up call in 3-5 days and assess for closure. Plan for next call: Follow up with medication issue/question from patient  See yellow box above.        Goals Addressed    None

## 2021-09-01 PROCEDURE — 3331090002 HH PPS REVENUE DEBIT

## 2021-09-01 PROCEDURE — 3331090001 HH PPS REVENUE CREDIT

## 2021-09-01 NOTE — HOME HEALTH
SN reason for visit: STEMI Caregiver involvement: Patient's cg is friend Laura Hand. she does not live with patient but is available if needed for assistance with iadls, adls, meal prep, medication management, taking to md appointments. Medications reconciled and all medications are available in the home this visit. The following education was provided regarding medications, medication interactions, and look a like medications. Medications are effective at this time. Home health supplies by type and quantity ordered/delivered this visit include: n/a Patient education provided this visit: reviewed cardiac diet- monitoring sodium intake, cholesterol and fat intake. patient aware to limit sodium, no added sodium to diet. reviewed foods to avoid, how to order foods when eating out, how to read nutrition labels and measure sodium, cholesterol and fat intake. patient encouraged to monitor for increase in pain and to continue with current pain management and to notify staff/md if pain becomes excrutiating/intolerable. encouraged patient to get three nutritional meals daily and to stay hydrated, drink plenty of fluids. patient encouraged to supplement nutrition with protein shakes, ensure, boost in order to get nutrition needed and to be sure to stay hydrated- drink plenty of water/fluids throughout the day patient/cg to continue to take medications as prescribed. patient aware to monitor for effectiveness and to notify staff of any adverse reactions to medications/any changes to medication regimen. reviewed side effects, purposes, dosage, frequencies. activity as tolerated, trying to get physical activity 4-5 x weekly, 30 minutes daily. stopping activity if causing shortness of breath or chest pain, dizziness or weakness. Patient is ready to get back to normal.  She did state that she has started cardiac rehab, but was unaware that she needed to let us know. Patient is a fall risk.  Educated pateint to sit on the side of the chair/bed, take a slow deep breaths, have feet firmly planted before standing up, use cane/walker if available, or have someone to assist. INSTRUCTED PATIENT AND CG THAT SHOULD ANY NEEDS OR CONCERNS ARISE TO FIRST CALL OUR OFFICE, OR THE DR'S OFFICE OR GO TO AN URGENT CARE CENTER AND NOT TO THE ED FOR NON-LIFE THREATENING EVENTS. IF IT IS LIFE THREATENING THEN CALL 911 OR GO TO THE CLOSEST ER. Progress toward goals: Pt to remain compliant with diet and medications. Home exercise program: activity as tolerated, trying to get physical activity 4-5 x weekly, 30 minutes daily. stopping activity if causing shortness of breath or chest pain, dizziness or weakness. Continued need for the following skills: Nursing The following discharge planning was discussed with the pt/caregiver: Patient will be discharged once education has completed, patient is medically stable and pt/cg are able to independently manage medications and disease process. Patient aware that she will be discharged at next visit due to starting cardiac rehab.

## 2021-09-02 ENCOUNTER — HOSPITAL ENCOUNTER (OUTPATIENT)
Dept: CARDIAC REHAB | Age: 76
Discharge: HOME | End: 2021-09-02
Payer: MEDICARE

## 2021-09-02 ENCOUNTER — PATIENT OUTREACH (OUTPATIENT)
Dept: CASE MANAGEMENT | Age: 76
End: 2021-09-02

## 2021-09-02 ENCOUNTER — APPOINTMENT (OUTPATIENT)
Dept: CARDIAC REHAB | Age: 76
End: 2021-09-02
Payer: MEDICARE

## 2021-09-02 VITALS — BODY MASS INDEX: 37.03 KG/M2 | WEIGHT: 196 LBS

## 2021-09-02 PROCEDURE — 93798 PHYS/QHP OP CAR RHAB W/ECG: CPT

## 2021-09-02 PROCEDURE — 3331090001 HH PPS REVENUE CREDIT

## 2021-09-02 PROCEDURE — 3331090002 HH PPS REVENUE DEBIT

## 2021-09-02 PROCEDURE — 400018 HH-NO PAY CLAIM PROCEDURE

## 2021-09-02 RX ORDER — PANTOPRAZOLE SODIUM 40 MG/1
40 TABLET, DELAYED RELEASE ORAL
Qty: 30 TABLET | Refills: 2 | Status: SHIPPED | OUTPATIENT
Start: 2021-09-02 | End: 2021-09-07

## 2021-09-02 NOTE — PROGRESS NOTES
Transitions of Care     Patient call note received from provider indicating refill for Protonix was sent to patient's pharmacy. CTN attempted to contact patient via phone call to her listed home and mobile phone numbers for follow up. CTN was unable to reach patient. .   CTN left a voicemail message on patient's mobile phone number requesting a return telephone call. Patient returned telephone call to CTN   Call was missed, CTN returned call to patient. CTN spoke with patient via phone call. Patient verified name and date of birth as identifiers. Patient voiced that she was contacted by her pharmacy that the prescription for Protonix had been refilled by  PCP. Patient did not voice any concerns or questions at this time regarding her medications. Patient voiced that she might quit her therapy and it causes her physical pain due to several  health issues. Patient was encouraged to discuss this with the staff where receives therapy and/or the ordering provider. Patient voiced understanding this information.

## 2021-09-03 PROCEDURE — 3331090002 HH PPS REVENUE DEBIT

## 2021-09-03 PROCEDURE — 3331090001 HH PPS REVENUE CREDIT

## 2021-09-04 PROCEDURE — 3331090001 HH PPS REVENUE CREDIT

## 2021-09-04 PROCEDURE — 3331090002 HH PPS REVENUE DEBIT

## 2021-09-05 PROCEDURE — 3331090001 HH PPS REVENUE CREDIT

## 2021-09-05 PROCEDURE — 3331090002 HH PPS REVENUE DEBIT

## 2021-09-06 PROCEDURE — 3331090002 HH PPS REVENUE DEBIT

## 2021-09-06 PROCEDURE — 3331090001 HH PPS REVENUE CREDIT

## 2021-09-07 ENCOUNTER — APPOINTMENT (OUTPATIENT)
Dept: CARDIAC REHAB | Age: 76
End: 2021-09-07
Payer: MEDICARE

## 2021-09-07 PROCEDURE — 3331090001 HH PPS REVENUE CREDIT

## 2021-09-07 PROCEDURE — 3331090002 HH PPS REVENUE DEBIT

## 2021-09-07 RX ORDER — PANTOPRAZOLE SODIUM 40 MG/1
TABLET, DELAYED RELEASE ORAL
Qty: 90 TABLET | Refills: 1 | Status: SHIPPED | OUTPATIENT
Start: 2021-09-07 | End: 2022-03-02

## 2021-09-08 ENCOUNTER — HOME CARE VISIT (OUTPATIENT)
Dept: SCHEDULING | Facility: HOME HEALTH | Age: 76
End: 2021-09-08
Payer: MEDICARE

## 2021-09-08 VITALS
HEART RATE: 82 BPM | RESPIRATION RATE: 16 BRPM | TEMPERATURE: 97.5 F | SYSTOLIC BLOOD PRESSURE: 122 MMHG | DIASTOLIC BLOOD PRESSURE: 70 MMHG | OXYGEN SATURATION: 98 %

## 2021-09-08 PROCEDURE — 400013 HH SOC

## 2021-09-08 PROCEDURE — 3331090003 HH PPS REVENUE ADJ

## 2021-09-08 PROCEDURE — G0299 HHS/HOSPICE OF RN EA 15 MIN: HCPCS

## 2021-09-08 PROCEDURE — 3331090002 HH PPS REVENUE DEBIT

## 2021-09-08 PROCEDURE — 3331090001 HH PPS REVENUE CREDIT

## 2021-09-08 NOTE — HOME HEALTH
Caregiver involvement: Ailyn Roger had been caregiver, Patient now able to do most ADLS etc by herself with minimal assistance. Medications reconciled and all medications are available in the home this visit. Medications  are somewhat effective at this time. Patient with a lot of breakthrough pain in chronic back issues. Patient says she will call her pain control doctor tomorrow and see him about this. Home health supplies by type and quantity ordered/delivered this visit include: n/a    Patient education provided this visit:MEDICATION ADHERENCE IS AN IMPORTANT COMPONENT OF HTN MANAGEMENT. PATIENT STATES THE IMPORTANCE TO TAKE HTN MEDS SAME TIME EACH DAY. Continue a heart Healthy diet. Watch out for high sodium foods, read labels. Observe for signs of infection. Monitor B/P, take meds as ordered and f/u with PCP. Read labels of foods, stay away from high sodium canned foods and processed meats. We discussed the need for daily weights to observe for increase in fluid retention and to call MD if > 2 lb weight gain in 24 hours. I also went over what CHF is and the importance of diet and medication compliance. We also discussed her blood thinners and what to watch out for with bleeding and when to call MD.      Progress toward goals: Patient is ambulating well with walker in home, she has increased her independence with ADLs and is taking her medications as ordered. Home exercise program:Maintain healthy low sodium  diet, Continue to monitor B/P and observe for signs of infection. Do exercises shown to her by PT to continue to increase strength and endurance and f/u with PCP. Discussed s/s of infection to monitor for, s/s of UTI, who to report to/when. Instructed cg to notify staff/md/seek tx if complications occur.    Patient instructed to maintain clear pathways in home and to minimize clutter to prevent falls from occurring/minimize fall potential.   Patient needing a well balanced diet with the 5 food groups, patient to increase fiber in diet, fresh fruits and vegetables, whole grains and increase water intake. I went over the importance of taking all prescriptions as ordered. I discussed how to avoid extra sodium in her diet. We discussed the signs of infection and when to call MD.  We discussed the high risk for falls and ways to prevent falls in the future. We discussed taking B/P daily and keeping a log. We also discussed the need of a heart healthy diet, and the need to change positions frequently. Continued need for the following skills: None    The following discharge planning was discussed with the pt/caregiver:  Patient agrees no  further care is needed and  requesting agency discharge to follow up with PCP. Patient is aware to follow up with PCP as ordered. Opportunity for questions provided- none at this time. Patient aware to refer any questions after DC to MD office.

## 2021-09-09 ENCOUNTER — APPOINTMENT (OUTPATIENT)
Dept: CARDIAC REHAB | Age: 76
End: 2021-09-09
Payer: MEDICARE

## 2021-09-14 ENCOUNTER — APPOINTMENT (OUTPATIENT)
Dept: CARDIAC REHAB | Age: 76
End: 2021-09-14
Payer: MEDICARE

## 2021-09-16 ENCOUNTER — APPOINTMENT (OUTPATIENT)
Dept: CARDIAC REHAB | Age: 76
End: 2021-09-16
Payer: MEDICARE

## 2021-09-21 ENCOUNTER — APPOINTMENT (OUTPATIENT)
Dept: CARDIAC REHAB | Age: 76
End: 2021-09-21
Payer: MEDICARE

## 2021-09-22 NOTE — PROGRESS NOTES
INCOMPLETE CARDIAC REHAB DISCHARGE NOTE FOR REVIEW AND SIGNATURE    Ladan Reeder  64 y.o. With diagnosis of STEMI/PCI attended phase II cardiac rehab for 3 sessions. Patient is being discharged from the program due to chronic back pain that she feels like is getting worse.     Rosalinda Salcido RN  9/22/2021

## 2021-09-23 ENCOUNTER — APPOINTMENT (OUTPATIENT)
Dept: CARDIAC REHAB | Age: 76
End: 2021-09-23
Payer: MEDICARE

## 2021-09-23 ENCOUNTER — HOSPITAL ENCOUNTER (OUTPATIENT)
Dept: LAB | Age: 76
Discharge: HOME | End: 2021-09-23
Payer: MEDICARE

## 2021-09-23 ENCOUNTER — HOSPITAL ENCOUNTER (OUTPATIENT)
Dept: CARDIAC REHAB | Age: 76
End: 2021-09-23
Payer: MEDICARE

## 2021-09-23 DIAGNOSIS — I50.32 CHRONIC DIASTOLIC CONGESTIVE HEART FAILURE (HCC): ICD-10-CM

## 2021-09-23 LAB
ANION GAP SERPL CALC-SCNC: 6 MMOL/L (ref 3–18)
BUN SERPL-MCNC: 17 MG/DL (ref 7–18)
BUN/CREAT SERPL: 16 (ref 12–20)
CALCIUM SERPL-MCNC: 9.3 MG/DL (ref 8.5–10.1)
CHLORIDE SERPL-SCNC: 106 MMOL/L (ref 100–111)
CO2 SERPL-SCNC: 30 MMOL/L (ref 21–32)
CREAT SERPL-MCNC: 1.06 MG/DL (ref 0.6–1.3)
GLUCOSE SERPL-MCNC: 85 MG/DL (ref 74–99)
POTASSIUM SERPL-SCNC: 4.1 MMOL/L (ref 3.5–5.5)
SODIUM SERPL-SCNC: 142 MMOL/L (ref 136–145)

## 2021-09-23 PROCEDURE — 80048 BASIC METABOLIC PNL TOTAL CA: CPT

## 2021-09-23 PROCEDURE — 36415 COLL VENOUS BLD VENIPUNCTURE: CPT

## 2021-09-24 ENCOUNTER — OFFICE VISIT (OUTPATIENT)
Dept: CARDIOLOGY CLINIC | Age: 76
End: 2021-09-24
Payer: MEDICARE

## 2021-09-24 VITALS
HEART RATE: 56 BPM | OXYGEN SATURATION: 98 % | HEIGHT: 61 IN | SYSTOLIC BLOOD PRESSURE: 137 MMHG | DIASTOLIC BLOOD PRESSURE: 70 MMHG | BODY MASS INDEX: 36.63 KG/M2 | WEIGHT: 194 LBS

## 2021-09-24 DIAGNOSIS — I10 ESSENTIAL HYPERTENSION: ICD-10-CM

## 2021-09-24 DIAGNOSIS — E78.00 HYPERCHOLESTEREMIA: ICD-10-CM

## 2021-09-24 DIAGNOSIS — I48.0 PAROXYSMAL ATRIAL FIBRILLATION (HCC): ICD-10-CM

## 2021-09-24 DIAGNOSIS — I50.32 CHRONIC DIASTOLIC CONGESTIVE HEART FAILURE (HCC): Primary | ICD-10-CM

## 2021-09-24 DIAGNOSIS — I25.118 CORONARY ARTERY DISEASE OF NATIVE ARTERY OF NATIVE HEART WITH STABLE ANGINA PECTORIS (HCC): ICD-10-CM

## 2021-09-24 PROCEDURE — 1090F PRES/ABSN URINE INCON ASSESS: CPT | Performed by: INTERNAL MEDICINE

## 2021-09-24 PROCEDURE — G8432 DEP SCR NOT DOC, RNG: HCPCS | Performed by: INTERNAL MEDICINE

## 2021-09-24 PROCEDURE — 1101F PT FALLS ASSESS-DOCD LE1/YR: CPT | Performed by: INTERNAL MEDICINE

## 2021-09-24 PROCEDURE — G8417 CALC BMI ABV UP PARAM F/U: HCPCS | Performed by: INTERNAL MEDICINE

## 2021-09-24 PROCEDURE — G8400 PT W/DXA NO RESULTS DOC: HCPCS | Performed by: INTERNAL MEDICINE

## 2021-09-24 PROCEDURE — G8754 DIAS BP LESS 90: HCPCS | Performed by: INTERNAL MEDICINE

## 2021-09-24 PROCEDURE — G8536 NO DOC ELDER MAL SCRN: HCPCS | Performed by: INTERNAL MEDICINE

## 2021-09-24 PROCEDURE — G8427 DOCREV CUR MEDS BY ELIG CLIN: HCPCS | Performed by: INTERNAL MEDICINE

## 2021-09-24 PROCEDURE — G8752 SYS BP LESS 140: HCPCS | Performed by: INTERNAL MEDICINE

## 2021-09-24 PROCEDURE — 99214 OFFICE O/P EST MOD 30 MIN: CPT | Performed by: INTERNAL MEDICINE

## 2021-09-24 RX ORDER — FUROSEMIDE 40 MG/1
40 TABLET ORAL
Qty: 90 TABLET | Refills: 1 | Status: SHIPPED | OUTPATIENT
Start: 2021-09-24 | End: 2022-01-07 | Stop reason: SDUPTHER

## 2021-09-24 NOTE — PATIENT INSTRUCTIONS
Medications Discontinued During This Encounter   Medication Reason    nystatin (MYCOSTATIN) powder Therapy Completed    aspirin 81 mg chewable tablet Therapy Completed    furosemide (LASIX) 20 mg tablet         Learning About the Mediterranean Diet  What is the Mediterranean diet? The Mediterranean diet is a style of eating rather than a diet plan. It features foods eaten in [place], [place], [place] and [place], and other countries along the [facility]. It emphasizes eating foods like fish, fruits, vegetables, beans, high-fiber breads and whole grains, nuts, and olive oil. This style of eating includes limited red meat, cheese, and sweets. Why choose the Mediterranean diet? A Mediterranean-style diet may improve heart health. It contains more fat than other heart-healthy diets. But the fats are mainly from nuts, unsaturated oils (such as fish oils and olive oil), and certain nut or seed oils (such as canola, soybean, or flaxseed oil). These fats may help protect the heart and blood vessels. How can you get started on the Mediterranean diet? Here are some things you can do to switch to a more Mediterranean way of eating. What to eat  · Eat a variety of fruits and vegetables each day, such as grapes, blueberries, tomatoes, broccoli, peppers, figs, olives, spinach, eggplant, beans, lentils, and chickpeas. · Eat a variety of whole-grain foods each day, such as oats, brown rice, and whole wheat bread, pasta, and couscous. · Eat fish at least 2 times a week. Try tuna, salmon, mackerel, lake trout, herring, or sardines. · Eat moderate amounts of low-fat dairy products, such as milk, cheese, or yogurt. · Eat moderate amounts of poultry and eggs. · Choose healthy (unsaturated) fats, such as nuts, olive oil, and certain nut or seed oils like canola, soybean, and flaxseed. · Limit unhealthy (saturated) fats, such as butter, palm oil, and coconut oil.  And limit fats found in animal products, such as meat and dairy products made with whole milk. Try to eat red meat only a few times a month in very small amounts. · Limit sweets and desserts to only a few times a week. This includes sugar-sweetened drinks like soda. The Mediterranean diet may also include red wine with your meal1 glass each day for women and up to 2 glasses a day for men. Tips for eating at home  · Use herbs, spices, garlic, lemon zest, and citrus juice instead of salt to add flavor to foods. · Add avocado slices to your sandwich instead of zayas. · Have fish for lunch or dinner instead of red meat. Brush the fish with olive oil, and broil or grill it. · Sprinkle your salad with seeds or nuts instead of cheese. · Cook with olive or canola oil instead of butter or oils that are high in saturated fat. · Switch from 2% milk or whole milk to 1% or fat-free milk. · Dip raw vegetables in a vinaigrette dressing or hummus instead of dips made from mayonnaise or sour cream.  · Have a piece of fruit for dessert instead of a piece of cake. Try baked apples, or have some dried fruit. Tips for eating out  · Try broiled, grilled, baked, or poached fish instead of having it fried or breaded. · Ask your  to have your meals prepared with olive oil instead of butter. · Order dishes made with marinara sauce or sauces made from olive oil. Avoid sauces made from cream or mayonnaise. · Choose whole-grain breads, whole wheat pasta and pizza crust, brown rice, beans, and lentils. · Cut back on butter or margarine on bread. Instead, you can dip your bread in a small amount of olive oil. · Ask for a side salad or grilled vegetables instead of french fries or chips. Where can you learn more? Go to http://www.elias.com/  Enter O407 in the search box to learn more about \"Learning About the Mediterranean Diet. \"  Current as of: December 17, 2020               Content Version: 13.0  © 2648-1585 [facility].    Care instructions adapted under license by [employer] (which disclaims liability or warranty for this information). If you have questions about a medical condition or this instruction, always ask your healthcare professional. [address] any warranty or liability for your use of this information.

## 2021-09-24 NOTE — PROGRESS NOTES
1. Have you been to the ER, urgent care clinic since your last visit? Hospitalized since your last visit? No     2. Have you seen or consulted any other health care providers outside of the [address] [place] [address] since your last visit? Include any pap smears or colon screening. No     3. Since your last visit, have you had any of the following symptoms? shortness of breath and swelling in legs/arms. 4.  Have you had any blood work, X-rays or cardiac testing? Yes Where: [facility]     Requested: YES     In [facility]: YES    5. Where do you normally have your labs drawn? [facility]    6. Do you need any refills today?    No

## 2021-09-24 NOTE — PROGRESS NOTES
HISTORY OF PRESENT ILLNESS  Amanda Wagoner is a 64 y.o. female. Follow-up of CAD, CHF, hypertension, hyperlipidemia, status post STEMI and PCI in 7/21. History of DVT and PE     8/2021  Patient presents to follow-up for hospitalization to [facility] 7/30-8/2/2021 for STEMI. Status post RCA stent on 07/30/2021. She has other PMH of hypertension, hyperlipidemia, and acute lower extremity DVT. She is tolerating all medications. She denies chest pain, shortness of breath or palpitations. She continues to have bilateral lower extremity edema.           Follow-up  Pertinent negatives include no chest pain and no shortness of breath. Shortness of Breath  The history is provided by the patient. This is a new problem. The problem occurs intermittently. The current episode started more than 1 week ago. Associated symptoms include leg swelling. Pertinent negatives include no cough, no wheezing, no PND, no orthopnea, no chest pain, no vomiting and no claudication. The problem's precipitants include exercise ( ft; ). Leg Swelling  The history is provided by the patient. This is a new problem. The current episode started more than 2 days ago. The problem occurs every several days. Pertinent negatives include no chest pain and no shortness of breath. The symptoms are aggravated by standing. The symptoms are relieved by sleep and medications.      Family History   Adopted: Yes       Past Medical History:   Diagnosis Date    Abnormal chest CT 2/6/2020    Adopted     Arthritis     Balance problems     Chronic cough 2/6/2020    MCBRIDE (dyspnea on exertion) 2/28/2019    GERD (gastroesophageal reflux disease)     Gout     Hemochromatosis     High cholesterol     Hypertension     Hypothyroidism     Left knee pain     Left shoulder pain     Low blood potassium     Lumbar pain     Pain of left sacroiliac joint     Shoulder impingement, right, with rotator cuff strain        Past Surgical History:   Procedure Laterality Date    HX CHOLECYSTECTOMY      HX KNEE REPLACEMENT Bilateral     R knee/ Lknee and femur    HX ORTHOPAEDIC      R trigger finger     HX OTHER SURGICAL      Right little finger    HX OTHER SURGICAL      Right wrist, replaced unlnar).  HX OTHER SURGICAL      Both knees    HX OTHER SURGICAL      Left femur    HX OTHER SURGICAL      Trigger finger surgery    HX OTHER SURGICAL      Left knee replacment revision     HX ROTATOR CUFF REPAIR Right     HX TONSILLECTOMY         Social History     Tobacco Use    Smoking status: Former Smoker     Packs/day: 0.50     Years: 2.00     Pack years: 1.00     Start date:      Quit date:      Years since quittin.7    Smokeless tobacco: Never Used    Tobacco comment: quit 20 years ago   Substance Use Topics    Alcohol use: No       Allergies   Allergen Reactions    Ciprofloxacin Other (comments)     Achilles tendonitis/leg pain and swelling    Indomethacin Anaphylaxis, Hives and Swelling    Tomato Other (comments)     Heart burn       Prior to Admission medications    Medication Sig Start Date End Date Taking? Authorizing Provider   pantoprazole (PROTONIX) 40 mg tablet TAKE 1 TABLET BY MOUTH EVERY DAY BEFORE BREAKFAST 21  Yes Liu Franklin NP   rosuvastatin (CRESTOR) 20 mg tablet Take 1 Tablet by mouth daily. 21  Yes Ashley Banuelos NP   metoprolol tartrate (LOPRESSOR) 25 mg tablet Take 0.5 Tablets by mouth every twelve (12) hours. 21  Yes Ashley Banuelos NP   apixaban (ELIQUIS) 5 mg tablet 2 tabs ( 10 mg ) PO Twice daily through 2021 and then starting from 2021 take 1 tab ( 5 mg ) PO twice daily ongoing 21  Yes Ashley Banuelos NP   aspirin 81 mg chewable tablet Take 1 Tablet by mouth daily. 21  Yes Ashley Banuelos NP   clopidogreL (PLAVIX) 75 mg tab Take 1 Tablet by mouth daily.  21  Yes Ashley Banuelos NP   formoterol (Perforomist) 20 mcg/2 mL nebu neb solution 2 mL by Nebulization route two (2) times a day. File under Medicare part B, ICD J41.1, Z86.711 8/12/21  Yes Lance Buck MD   furosemide (LASIX) 20 mg tablet Take 1 Tablet by mouth daily. 8/11/21  Yes Glenn Pickett NP   potassium chloride SR (Klor-Con 10) 10 mEq tablet Take 10 mEq by mouth daily. Yes Provider, Historical   budesonide (PULMICORT) 0.5 mg/2 mL nbsp 2 mL by Nebulization route two (2) times a day. File under Medicare Part B, ICD J47.0, J45.909 8/5/21  Yes Lance Buck MD   levothyroxine (SYNTHROID) 25 mcg tablet TAKE 1 TAB BY MOUTH DAILY 8/4/21  Yes Sadie Valdez NP   acetaminophen (TYLENOL) 500 mg tablet Take 500 mg by mouth every six (6) hours as needed for Pain. Pain   Yes Provider, Historical   carboxymethylcellulose sodium (REFRESH TEARS OP) Administer 1 Drop to both eyes daily as needed for Other. Dry eyes   Yes Provider, Historical   peg 400-propylene glycol, PF, (Systane Hydration PF) 0.4-0.3 % dpet ophthalmic solution Administer 2 Drops to both eyes four (4) times daily as needed for Ocular Dryness. Dry eyes   Yes Provider, Historical   nitroglycerin (NITROSTAT) 0.4 mg SL tablet 1 Tablet by SubLINGual route every five (5) minutes as needed for Chest Pain. Up to 3 doses. 8/2/21  Yes Apoorva Porras MD   polyethylene glycol (MIRALAX) 17 gram packet Take 1 Packet by mouth daily. 8/3/21  Yes Apoorva Porras MD   albuterol (PROVENTIL HFA, VENTOLIN HFA, PROAIR HFA) 90 mcg/actuation inhaler Take 2 Puffs by inhalation every four (4) hours as needed for Wheezing. 7/13/21  Yes Sadie Valdez NP   allopurinoL (ZYLOPRIM) 100 mg tablet Take 1 Tab by mouth daily. 5/4/21  Yes Sadie Valdez NP   oxyCODONE IR (ROXICODONE) 5 mg immediate release tablet Take 5 mg by mouth three (3) times daily as needed for Pain. Pain   Yes Provider, Historical   albuterol (PROVENTIL VENTOLIN) 2.5 mg /3 mL (0.083 %) nebu 3 mL by Nebulization route every six (6) hours as needed for Wheezing or Shortness of Breath.  1/7/20  Yes Romain Mireles Benita VELASCO NP   inhalational spacing device (ACE AEROSOL CLOUD ENHANCER) 1 Each by Does Not Apply route as needed. 2/14/19  Yes Johann Morin MD         Visit Vitals  /70 (BP 1 Location: Left upper arm, BP Patient Position: Sitting, BP Cuff Size: Adult)   Pulse (!) 56   Ht 5' 1\" (1.549 m)   Wt 88 kg (194 lb)   SpO2 98%   BMI 36.66 kg/m²       Review of Systems   Constitutional: Negative for malaise/fatigue. HENT: Negative for congestion. Eyes: Negative for double vision and redness. Respiratory: Negative for cough, shortness of breath and wheezing. Cardiovascular: Positive for leg swelling. Negative for chest pain, palpitations, orthopnea, claudication and PND. Gastrointestinal: Negative for nausea and vomiting. Musculoskeletal: Negative for falls. Neurological: Negative for dizziness. Endo/Heme/Allergies: Does not bruise/bleed easily. Physical Exam  Vitals and nursing note reviewed. Constitutional:       Appearance: She is well-developed. She is obese. Neck:      Vascular: No JVD. Cardiovascular:      Rate and Rhythm: Normal rate and regular rhythm. Pulses: Intact distal pulses. Heart sounds: Normal heart sounds. No murmur heard. No friction rub. No gallop. Pulmonary:      Effort: Pulmonary effort is normal. No respiratory distress. Breath sounds: Normal breath sounds. No wheezing or rales. Chest:      Chest wall: No tenderness. Abdominal:      General: There is no distension. Palpations: Abdomen is soft. There is no mass. Tenderness: There is no abdominal tenderness. Musculoskeletal:         General: Normal range of motion. Right lower leg: Edema (2+) present. Left lower leg: Edema (2+) present. Skin:     General: Skin is warm and dry. Neurological:      Mental Status: She is alert and oriented to person, place, and time.            7/30/2021  Indications    ST elevation myocardial infarction (STEMI) involving other coronary artery of inferior wall (HCC) [I21.19 (ICD-10-CM)]   Conclusion       · Single-vessel coronary artery disease with totally occluded proximal right coronary artery. · Successful thrombectomy of the right coronary artery due to extensive thrombus burden. · Successful PCI of the proximal right coronary artery with residual 0% stenosis deploying a drug-eluting stent as above. Delaying factors in this patient were multiple trials at peripheral IV in the ER and only when IV was available. We successfully cannulated the right femoral vein in addition to right femoral artery without any complications in the lab for venous access. The wiring of the artery was challenging because of the possible  as described above. Due to extensive thrombus in the artery, thrombectomy was used. Aggressive risk factor med modification and medical treatment should continue. Further work-up of this chronic severe edema which may be related to bilateral DVTs in the past and chronic CHF. ASSESSMENT and PLAN    Results for Sascha Kaufman (MRN [id number]) as of 9/24/2021 14:35   Ref. Range 7/31/2021 06:20   Triglyceride Latest Ref Range: <150 MG/DL 54   Cholesterol, total Latest Ref Range: <200 MG/DL 75   HDL Cholesterol Latest Ref Range: 40 - 60 MG/DL 50   CHOL/HDL Ratio Latest Ref Range: 0 - 5.0   1.5   VLDL, calculated Latest Units: MG/DL 10.8   LDL, calculated Latest Ref Range: 0 - 100 MG/DL 14.2     8/2021  Recent STEMI. Cardiac catheterization and echocardiogram reviewed with patient EF 50% by echo. Continue triple therapy with Eliquis, aspirin, and Plavix due to FARHAT. After 1 month plan to discontinue aspirin. Continued on DVT dose Eliquis. Continue PPI due to need for triple therapy. Referral entered for cardiac rehab. Will begin low-dose Lasix due to bilateral lower extremity edema. Repeat BMP in 1 week.         Diagnoses and all orders for this visit:    1.  Chronic diastolic congestive heart failure ([address]  - furosemide (LASIX) 40 mg tablet; Take 1 Tablet by mouth daily as needed ([place]). -     METABOLIC PANEL, BASIC; Future    2. Paroxysmal atrial fibrillation (HCC)    3. Coronary artery disease of native artery of native heart with stable angina pectoris ([facility]    4. Essential hypertension    5. Hypercholesteremia        Pertinent laboratory and test data reviewed and discussed with patient. See patient instructions also for other medical advice given    Medications Discontinued During This Encounter   Medication Reason    nystatin (MYCOSTATIN) powder Therapy Completed    aspirin 81 mg chewable tablet Therapy Completed    furosemide (LASIX) 20 mg tablet        Follow-up and Dispositions    · Return in about 3 months (around 12/24/2021), or if symptoms worsen or fail to improve, for post test.       9/24/2021 CAD stable. Not going to rehab due to back pain. Encouraged to walk by self. AFib staying in sinus rhythm. Blood pressure is controlled. CHF is improving as she walks better but still has NYHA class III. Increase Lasix and follow-up electrolytes. Diet weight and exercise discussed. Mediterranean diet guidelines printed.

## 2021-09-28 ENCOUNTER — APPOINTMENT (OUTPATIENT)
Dept: CARDIAC REHAB | Age: 76
End: 2021-09-28
Payer: MEDICARE

## 2021-09-30 ENCOUNTER — APPOINTMENT (OUTPATIENT)
Dept: CARDIAC REHAB | Age: 76
End: 2021-09-30
Payer: MEDICARE

## 2021-10-06 ENCOUNTER — PATIENT OUTREACH (OUTPATIENT)
Dept: CASE MANAGEMENT | Age: 76
End: 2021-10-06

## 2021-10-06 NOTE — PROGRESS NOTES
Patient has graduated from the Transitions of Care Coordination  program on 10/6/21. Patient/family has the ability to self-manage. Care management goals have been completed at this time. No further care transitions nurse follow up scheduled. Goals Addressed                 This Visit's Progress     COMPLETED: Attends follow-up appointments as directed. Target Date:  9/3/2021      8/3/2021   Patient to follow up as below:   -  PCP transition of Care appointment scheduled for 8/4/21  - Cardiology follow up scheduled for 8/11/21  - Pulmonary appt. Scheduled for 8/5/2021         COMPLETED: Prevent complications post hospitalization. Target Date: 9/3/2021    8/3/2021   Patient and/or family members were alerted to the availability of physician or other advanced practioners after hours, weekends, and holidays. Please call the PCP office phone number and speak with the answering service for assistance. Patient asked to call [address] emergency assistance as needed. Care Transitions Nurse ( CTN)  contact information provided for follow up as needed.  COMPLETED: Supportive resources in place to maintain patient in the community (ie. Home Health, DME equipment, refer to, medication assistant plan, etc.)        Target Date:  9/3/2021     8/3/2021   [facility] to provide skilled home care visits.  COMPLETED: Understands red flags post discharge. Target Date: 9/3/2021    8/3/2021   Care Transitions Nurse (CTN) reviewed red flags with patient as follows:   Please call 911 for immediate assistance for symptoms such as:   - Chest Pain  - Severe shortness of breath   - Change in mental status   - Blue tint to face or lips   - New or worsening symptoms    - high fevers  - other             Patient has care transitions nurse contact information for any further questions, concerns, or needs.   Patient's upcoming visits:    Future Appointments   Date Time Provider Jose Singleton 11/8/2021  8:00 AM [facility] LAB [facility] BS AMB   11/16/2021 10:40 AM Tanya POST NP [facility] BS AMB   12/3/2021  1:15 PM Neeraj Carlton MD [facility] BS AMB

## 2021-10-22 DIAGNOSIS — M10.9 GOUT, UNSPECIFIED CAUSE, UNSPECIFIED CHRONICITY, UNSPECIFIED SITE: ICD-10-CM

## 2021-11-03 RX ORDER — ALLOPURINOL 100 MG/1
TABLET ORAL
Qty: 90 TABLET | Refills: 1 | Status: SHIPPED | OUTPATIENT
Start: 2021-11-03 | End: 2022-02-23

## 2021-11-04 DIAGNOSIS — I10 ESSENTIAL HYPERTENSION: ICD-10-CM

## 2021-11-04 DIAGNOSIS — E03.9 ACQUIRED HYPOTHYROIDISM: Primary | ICD-10-CM

## 2021-11-08 ENCOUNTER — APPOINTMENT (OUTPATIENT)
Dept: FAMILY MEDICINE CLINIC | Age: 76
End: 2021-11-08

## 2021-11-08 ENCOUNTER — HOSPITAL ENCOUNTER (OUTPATIENT)
Dept: LAB | Age: 76
Discharge: HOME | End: 2021-11-08
Payer: MEDICARE

## 2021-11-08 DIAGNOSIS — I10 ESSENTIAL HYPERTENSION: ICD-10-CM

## 2021-11-08 DIAGNOSIS — E03.9 ACQUIRED HYPOTHYROIDISM: ICD-10-CM

## 2021-11-08 LAB
ALBUMIN SERPL-MCNC: 3.8 G/DL (ref 3.4–5)
ALBUMIN/GLOB SERPL: 1.1 {RATIO} (ref 0.8–1.7)
ALP SERPL-CCNC: 74 U/L (ref 45–117)
ALT SERPL-CCNC: 19 U/L (ref 13–56)
ANION GAP SERPL CALC-SCNC: 7 MMOL/L (ref 3–18)
AST SERPL-CCNC: 20 U/L (ref 10–38)
BILIRUB SERPL-MCNC: 0.7 MG/DL (ref 0.2–1)
BUN SERPL-MCNC: 27 MG/DL (ref 7–18)
BUN/CREAT SERPL: 21 (ref 12–20)
CALCIUM SERPL-MCNC: 9.6 MG/DL (ref 8.5–10.1)
CHLORIDE SERPL-SCNC: 102 MMOL/L (ref 100–111)
CHOLEST SERPL-MCNC: 186 MG/DL
CO2 SERPL-SCNC: 30 MMOL/L (ref 21–32)
CREAT SERPL-MCNC: 1.3 MG/DL (ref 0.6–1.3)
ERYTHROCYTE [DISTWIDTH] IN BLOOD BY AUTOMATED COUNT: 15.3 % (ref 11.6–14.5)
GLOBULIN SER CALC-MCNC: 3.5 G/DL (ref 2–4)
GLUCOSE SERPL-MCNC: 76 MG/DL (ref 74–99)
HCT VFR BLD AUTO: 46.4 % (ref 35–45)
HDLC SERPL-MCNC: 59 MG/DL (ref 40–60)
HDLC SERPL: 3.2 {RATIO} (ref 0–5)
HGB BLD-MCNC: 15.1 G/DL (ref 12–16)
LDLC SERPL CALC-MCNC: 83 MG/DL (ref 0–100)
LIPID PROFILE,FLP: ABNORMAL
MCH RBC QN AUTO: 33.3 PG (ref 24–34)
MCHC RBC AUTO-ENTMCNC: 32.5 G/DL (ref 31–37)
MCV RBC AUTO: 102.2 FL (ref 78–100)
PLATELET # BLD AUTO: 229 K/UL (ref 135–420)
PMV BLD AUTO: 11.5 FL (ref 9.2–11.8)
POTASSIUM SERPL-SCNC: 3.8 MMOL/L (ref 3.5–5.5)
PROT SERPL-MCNC: 7.3 G/DL (ref 6.4–8.2)
RBC # BLD AUTO: 4.54 M/UL (ref 4.2–5.3)
SODIUM SERPL-SCNC: 139 MMOL/L (ref 136–145)
T4 FREE SERPL-MCNC: 1 NG/DL (ref 0.7–1.5)
TRIGL SERPL-MCNC: 220 MG/DL (ref ?–150)
TSH SERPL DL<=0.05 MIU/L-ACNC: 2.19 UIU/ML (ref 0.36–3.74)
VLDLC SERPL CALC-MCNC: 44 MG/DL
WBC # BLD AUTO: 6.2 K/UL (ref 4.6–13.2)

## 2021-11-08 PROCEDURE — 84443 ASSAY THYROID STIM HORMONE: CPT

## 2021-11-08 PROCEDURE — 84439 ASSAY OF FREE THYROXINE: CPT

## 2021-11-08 PROCEDURE — 85027 COMPLETE CBC AUTOMATED: CPT

## 2021-11-08 PROCEDURE — 36415 COLL VENOUS BLD VENIPUNCTURE: CPT

## 2021-11-08 PROCEDURE — 80061 LIPID PANEL: CPT

## 2021-11-08 PROCEDURE — 80053 COMPREHEN METABOLIC PANEL: CPT

## 2021-11-15 ENCOUNTER — TELEPHONE (OUTPATIENT)
Dept: FAMILY MEDICINE CLINIC | Age: 76
End: 2021-11-15

## 2021-11-15 NOTE — TELEPHONE ENCOUNTER
Spoke with patient to r/s appt due to provider being out of the office.  Confirmed appt details with patient

## 2021-11-26 DIAGNOSIS — I50.32 CHRONIC DIASTOLIC CONGESTIVE HEART FAILURE (HCC): ICD-10-CM

## 2021-11-29 RX ORDER — ROSUVASTATIN CALCIUM 20 MG/1
TABLET, COATED ORAL
Qty: 90 TABLET | Refills: 1 | Status: SHIPPED | OUTPATIENT
Start: 2021-11-29 | End: 2021-12-03

## 2021-11-29 RX ORDER — CLOPIDOGREL BISULFATE 75 MG/1
TABLET ORAL
Qty: 90 TABLET | Refills: 1 | Status: SHIPPED | OUTPATIENT
Start: 2021-11-29 | End: 2022-08-23 | Stop reason: SDUPTHER

## 2021-11-29 RX ORDER — FUROSEMIDE 20 MG/1
TABLET ORAL
Qty: 90 TABLET | Refills: 1 | Status: SHIPPED | OUTPATIENT
Start: 2021-11-29 | End: 2021-12-03 | Stop reason: ALTCHOICE

## 2021-11-29 RX ORDER — METOPROLOL TARTRATE 25 MG/1
TABLET, FILM COATED ORAL
Qty: 90 TABLET | Refills: 1 | Status: SHIPPED | OUTPATIENT
Start: 2021-11-29 | End: 2022-05-31 | Stop reason: SDUPTHER

## 2021-11-30 RX ORDER — MONTELUKAST SODIUM 10 MG/1
TABLET ORAL
Qty: 90 TABLET | Refills: 1 | Status: SHIPPED | OUTPATIENT
Start: 2021-11-30 | End: 2021-12-03 | Stop reason: ALTCHOICE

## 2021-12-03 ENCOUNTER — OFFICE VISIT (OUTPATIENT)
Dept: CARDIOLOGY CLINIC | Age: 76
End: 2021-12-03
Payer: MEDICARE

## 2021-12-03 VITALS
SYSTOLIC BLOOD PRESSURE: 109 MMHG | HEIGHT: 61 IN | WEIGHT: 177 LBS | BODY MASS INDEX: 33.42 KG/M2 | DIASTOLIC BLOOD PRESSURE: 67 MMHG | HEART RATE: 63 BPM | OXYGEN SATURATION: 98 %

## 2021-12-03 DIAGNOSIS — I25.118 CORONARY ARTERY DISEASE OF NATIVE ARTERY OF NATIVE HEART WITH STABLE ANGINA PECTORIS (HCC): ICD-10-CM

## 2021-12-03 DIAGNOSIS — E78.00 HYPERCHOLESTEREMIA: ICD-10-CM

## 2021-12-03 DIAGNOSIS — I10 ESSENTIAL HYPERTENSION: ICD-10-CM

## 2021-12-03 DIAGNOSIS — I48.0 PAROXYSMAL ATRIAL FIBRILLATION (HCC): ICD-10-CM

## 2021-12-03 DIAGNOSIS — I50.32 CHRONIC DIASTOLIC CONGESTIVE HEART FAILURE (HCC): Primary | ICD-10-CM

## 2021-12-03 PROCEDURE — G8754 DIAS BP LESS 90: HCPCS | Performed by: INTERNAL MEDICINE

## 2021-12-03 PROCEDURE — 1101F PT FALLS ASSESS-DOCD LE1/YR: CPT | Performed by: INTERNAL MEDICINE

## 2021-12-03 PROCEDURE — G8432 DEP SCR NOT DOC, RNG: HCPCS | Performed by: INTERNAL MEDICINE

## 2021-12-03 PROCEDURE — G8417 CALC BMI ABV UP PARAM F/U: HCPCS | Performed by: INTERNAL MEDICINE

## 2021-12-03 PROCEDURE — G8536 NO DOC ELDER MAL SCRN: HCPCS | Performed by: INTERNAL MEDICINE

## 2021-12-03 PROCEDURE — G8400 PT W/DXA NO RESULTS DOC: HCPCS | Performed by: INTERNAL MEDICINE

## 2021-12-03 PROCEDURE — 1090F PRES/ABSN URINE INCON ASSESS: CPT | Performed by: INTERNAL MEDICINE

## 2021-12-03 PROCEDURE — G8427 DOCREV CUR MEDS BY ELIG CLIN: HCPCS | Performed by: INTERNAL MEDICINE

## 2021-12-03 PROCEDURE — 99214 OFFICE O/P EST MOD 30 MIN: CPT | Performed by: INTERNAL MEDICINE

## 2021-12-03 PROCEDURE — G8752 SYS BP LESS 140: HCPCS | Performed by: INTERNAL MEDICINE

## 2021-12-03 RX ORDER — BISACODYL 5 MG
TABLET, DELAYED RELEASE (ENTERIC COATED) ORAL
COMMUNITY
End: 2022-02-23

## 2021-12-03 RX ORDER — TIZANIDINE 2 MG/1
2 TABLET ORAL 3 TIMES DAILY
COMMUNITY
End: 2022-05-17

## 2021-12-03 RX ORDER — ROSUVASTATIN CALCIUM 40 MG/1
40 TABLET, COATED ORAL DAILY
Qty: 180 TABLET | Refills: 1 | Status: ON HOLD | OUTPATIENT
Start: 2021-12-03

## 2021-12-03 NOTE — PROGRESS NOTES
HISTORY OF PRESENT ILLNESS  Rina Calloway is a 64 y.o. female. Follow-up of CAD, CHF, hypertension, hyperlipidemia, status post STEMI and PCI in 7/21. History of DVT and PE     8/2021  Patient presents to follow-up for hospitalization to [facility] 7/30-8/2/2021 for STEMI. Status post RCA stent on 07/30/2021. She has other PMH of hypertension, hyperlipidemia, and acute lower extremity DVT. She is tolerating all medications. She denies chest pain, shortness of breath or palpitations. She continues to have bilateral lower extremity edema.           Follow-up  Pertinent negatives include no chest pain and no shortness of breath. Shortness of Breath  The history is provided by the patient. This is a new problem. The problem occurs intermittently. The current episode started more than 1 week ago. The problem has not changed since onset. Associated symptoms include leg swelling. Pertinent negatives include no cough, no wheezing, no PND, no orthopnea, no chest pain, no vomiting and no claudication. The problem's precipitants include exercise ( ft; ). Leg Swelling  The history is provided by the patient. This is a new problem. The current episode started more than 2 days ago. The problem occurs every several days. The problem has been rapidly improving. Pertinent negatives include no chest pain and no shortness of breath. The symptoms are aggravated by standing. The symptoms are relieved by sleep and medications.      Family History   Adopted: Yes       Past Medical History:   Diagnosis Date    Abnormal chest CT 2/6/2020    Adopted     Arthritis     Balance problems     Chronic cough 2/6/2020    MCBRIDE (dyspnea on exertion) 2/28/2019    GERD (gastroesophageal reflux disease)     Gout     Hemochromatosis     High cholesterol     Hypertension     Hypothyroidism     Left knee pain     Left shoulder pain     Low blood potassium     Lumbar pain     Pain of left sacroiliac joint     Shoulder impingement, right, with rotator cuff strain        Past Surgical History:   Procedure Laterality Date    HX CHOLECYSTECTOMY      HX KNEE REPLACEMENT Bilateral     R knee/ Lknee and femur    HX ORTHOPAEDIC      R trigger finger     HX OTHER SURGICAL      Right little finger    HX OTHER SURGICAL      Right wrist, replaced unlnar).  HX OTHER SURGICAL      Both knees    HX OTHER SURGICAL      Left femur    HX OTHER SURGICAL      Trigger finger surgery    HX OTHER SURGICAL      Left knee replacment revision     HX ROTATOR CUFF REPAIR Right     HX TONSILLECTOMY         Social History     Tobacco Use    Smoking status: Former Smoker     Packs/day: 0.50     Years: 2.00     Pack years: 1.00     Start date:      Quit date:      Years since quittin.11    Smokeless tobacco: Never Used    Tobacco comment: quit 20 years ago   Substance Use Topics    Alcohol use: No       Allergies   Allergen Reactions    Ciprofloxacin Other (comments)     Achilles tendonitis/leg pain and swelling    Indomethacin Anaphylaxis, Hives and Swelling    Tomato Other (comments)     Heart burn       Prior to Admission medications    Medication Sig Start Date End Date Taking? Authorizing Provider   bisacodyL (DULCOLAX) 5 mg EC tablet 1 tablet as needed   Yes Provider, Historical   tiZANidine (ZANAFLEX) 2 mg tablet Take 2 mg by mouth three (3) times daily. Yes Provider, Historical   clopidogreL (PLAVIX) 75 mg tab TAKE 1 TABLET BY MOUTH EVERY DAY 21  Yes Ashley Banuelos NP   metoprolol tartrate (LOPRESSOR) 25 mg tablet TAKE 1/2 TABLETS BY MOUTH EVERY TWELVE (12) HOURS 21  Yes Ashley Banuelos NP   rosuvastatin (CRESTOR) 20 mg tablet TAKE 1 TABLET BY MOUTH EVERY DAY 21  Yes Ashley Banuelos NP   allopurinoL (ZYLOPRIM) 100 mg tablet TAKE 1 TABLET BY MOUTH EVERY DAY 11/3/21  Yes Levon Gunn NP   furosemide (LASIX) 40 mg tablet Take 1 Tablet by mouth daily as needed (Edema).  21  Yes Oni Garcia MD pantoprazole (PROTONIX) 40 mg tablet TAKE 1 TABLET BY MOUTH EVERY DAY BEFORE BREAKFAST 9/7/21  Yes Briana Casillas NP   apixaban (ELIQUIS) 5 mg tablet 2 tabs ( 10 mg ) PO Twice daily through 8/7/2021 and then starting from 8/8/2021 take 1 tab ( 5 mg ) PO twice daily ongoing 8/23/21  Yes Ashley Banuelos NP   formoterol (Perforomist) 20 mcg/2 mL nebu neb solution 2 mL by Nebulization route two (2) times a day. File under Medicare part B, ICD J41.1, Z86.711 8/12/21  Yes Reji Austin MD   potassium chloride SR (Klor-Con 10) 10 mEq tablet Take 10 mEq by mouth daily. Yes Provider, Historical   budesonide (PULMICORT) 0.5 mg/2 mL nbsp 2 mL by Nebulization route two (2) times a day. File under Medicare Part B, ICD J47.0, J45.909 8/5/21  Yes Reji Austin MD   levothyroxine (SYNTHROID) 25 mcg tablet TAKE 1 TAB BY MOUTH DAILY 8/4/21  Yes Briana Casillas NP   acetaminophen (TYLENOL) 500 mg tablet Take 500 mg by mouth every six (6) hours as needed for Pain. Pain   Yes Provider, Historical   carboxymethylcellulose sodium (REFRESH TEARS OP) Administer 1 Drop to both eyes daily as needed for Other. Dry eyes   Yes Provider, Historical   peg 400-propylene glycol, PF, (Systane Hydration PF) 0.4-0.3 % dpet ophthalmic solution Administer 2 Drops to both eyes four (4) times daily as needed for Ocular Dryness. Dry eyes   Yes Provider, Historical   nitroglycerin (NITROSTAT) 0.4 mg SL tablet 1 Tablet by SubLINGual route every five (5) minutes as needed for Chest Pain. Up to 3 doses. 8/2/21  Yes Karlos Mustafa MD   polyethylene glycol (MIRALAX) 17 gram packet Take 1 Packet by mouth daily. 8/3/21  Yes Karlos Mustafa MD   albuterol (PROVENTIL HFA, VENTOLIN HFA, PROAIR HFA) 90 mcg/actuation inhaler Take 2 Puffs by inhalation every four (4) hours as needed for Wheezing. 7/13/21  Yes Briana Casillas NP   oxyCODONE IR (ROXICODONE) 5 mg immediate release tablet Take 5 mg by mouth three (3) times daily as needed for Pain.  Pain Yes Provider, Historical   albuterol (PROVENTIL VENTOLIN) 2.5 mg /3 mL (0.083 %) nebu 3 mL by Nebulization route every six (6) hours as needed for Wheezing or Shortness of Breath. 1/7/20  Yes Cynthia Jain NP   inhalational spacing device (ACE AEROSOL CLOUD ENHANCER) 1 Each by Does Not Apply route as needed. 2/14/19  Yes Abel Ponce MD         Visit Vitals  /67 (BP 1 Location: Left upper arm, BP Patient Position: Sitting, BP Cuff Size: Adult)   Pulse 63   Ht 5' 1\" (1.549 m)   Wt 80.3 kg (177 lb)   SpO2 98%   BMI 33.44 kg/m²       Review of Systems   Constitutional: Negative for malaise/fatigue. HENT: Negative for congestion. Eyes: Negative for double vision and redness. Respiratory: Negative for cough, shortness of breath and wheezing. Cardiovascular: Positive for leg swelling. Negative for chest pain, palpitations, orthopnea, claudication and PND. Gastrointestinal: Negative for nausea and vomiting. Musculoskeletal: Negative for falls. Neurological: Negative for dizziness. Endo/Heme/Allergies: Does not bruise/bleed easily. Physical Exam  Vitals and nursing note reviewed. Constitutional:       Appearance: She is well-developed. She is obese. Neck:      Vascular: No JVD. Cardiovascular:      Rate and Rhythm: Normal rate and regular rhythm. Pulses: Intact distal pulses. Heart sounds: Normal heart sounds. No murmur heard. No friction rub. No gallop. Pulmonary:      Effort: Pulmonary effort is normal. No respiratory distress. Breath sounds: Normal breath sounds. No wheezing or rales. Chest:      Chest wall: No tenderness. Abdominal:      General: There is no distension. Palpations: Abdomen is soft. There is no mass. Tenderness: There is no abdominal tenderness. Musculoskeletal:         General: Normal range of motion. Right lower leg: Edema (trace to 1+) present. Left lower leg: Edema (trace to 1+) present.    Skin:     General: Skin is warm and dry. Neurological:      Mental Status: She is alert and oriented to person, place, and time. 7/30/2021  Indications    ST elevation myocardial infarction (STEMI) involving other coronary artery of inferior wall (HCC) [I21.19 (ICD-10-CM)]   Conclusion       · Single-vessel coronary artery disease with totally occluded proximal right coronary artery. · Successful thrombectomy of the right coronary artery due to extensive thrombus burden. · Successful PCI of the proximal right coronary artery with residual 0% stenosis deploying a drug-eluting stent as above. Delaying factors in this patient were multiple trials at peripheral IV in the ER and only when IV was available. We successfully cannulated the right femoral vein in addition to right femoral artery without any complications in the lab for venous access. The wiring of the artery was challenging because of the possible  as described above. Due to extensive thrombus in the artery, thrombectomy was used. Aggressive risk factor med modification and medical treatment should continue. Further work-up of this chronic severe edema which may be related to bilateral DVTs in the past and chronic CHF.   7/21 echo  Interpretation Summary    · Image quality for this study was technically difficult with poor image quality. · Contrast used: DEFINITY. · LV: Calculated LVEF is 50%. Visually measured ejection fraction. Normal cavity size and wall thickness. Low normal systolic function. Inconclusive left ventricular diastolic function. · Ejection fraction may be underestimated due to poor image quality. Comparison Study Information      Prior Study    There is a prior study available for comparison. Prior study date: 7/23/2019. As compared to the previous study, there are significant changes. Pulmonary hypertension has decreased. ASSESSMENT and PLAN    Results for Fredy Haile (MRN [id number]) as of 9/24/2021 14:35   Ref. Range 7/31/2021 06:20   Triglyceride Latest Ref Range: <150 MG/DL 54   Cholesterol, total Latest Ref Range: <200 MG/DL 75   HDL Cholesterol Latest Ref Range: 40 - 60 MG/DL 50   CHOL/HDL Ratio Latest Ref Range: 0 - 5.0   1.5   VLDL, calculated Latest Units: MG/DL 10.8   LDL, calculated Latest Ref Range: 0 - 100 MG/DL 14.2     8/2021  Recent STEMI. Cardiac catheterization and echocardiogram reviewed with patient EF 50% by echo. Continue triple therapy with Eliquis, aspirin, and Plavix due to FARHAT. After 1 month plan to discontinue aspirin. Continued on DVT dose Eliquis. Continue PPI due to need for triple therapy. Referral entered for cardiac rehab. Will begin low-dose Lasix due to bilateral lower extremity edema. Repeat BMP in 1 week.     9/24/2021 CAD stable. Not going to rehab due to back pain. Encouraged to walk by self. AFib staying in sinus rhythm. Blood pressure is controlled. CHF is improving as she walks better but still has NYHA class III. Increase Lasix and follow-up electrolytes. Diet weight and exercise discussed. Mediterranean diet guidelines printed. Diagnoses and all orders for this visit:    1. Chronic diastolic congestive heart failure (HCC)    2. Paroxysmal atrial fibrillation ([address]    3. Coronary artery disease of native artery of native heart with stable angina pectoris ([address]    4. Essential hypertension    5. Hypercholesteremia        Pertinent laboratory and test data reviewed and discussed with patient.   See patient instructions also for other medical advice given    Medications Discontinued During This Encounter   Medication Reason    furosemide (LASIX) 20 mg tablet Therapy Completed    montelukast (SINGULAIR) 10 mg tablet DISCONTINUED BY ANOTHER CLINICIAN       Follow-up and Dispositions    · Return in about 6 months (around 6/3/2022), or if symptoms worsen or fail to improve, for post test.       12/3/2021 CHF is much better but she is still in NYHA class III clinically which is likely due to deconditioning and leg problems. Recommended and encouraged to exercise little more regularly and may need to get a stationary bike if possible. A. fib is staying in sinus rhythm clinically continue anticoagulation. CAD stable. Blood pressure is controlled. Lipids have gone back up. Increase Lipitor and follow the labs.

## 2021-12-03 NOTE — PROGRESS NOTES
1. Have you been to the ER, urgent care clinic since your last visit? Hospitalized since your last visit? No      2. Have you seen or consulted any other health care providers outside of the [address] [place] [address] since your last visit? Include any pap smears or colon screening. Yes Where:  for pain management     3. Since your last visit, have you had any of the following symptoms? shortness of breath and swelling in legs/arms. 4.  Have you had any blood work, X-rays or cardiac testing? Yes Where: PCP     Requested: NO     In [facility]: YES    5. Where do you normally have your labs drawn? PCP    6. Do you need any refills today?    No

## 2021-12-03 NOTE — PATIENT INSTRUCTIONS
Medications Discontinued During This Encounter   Medication Reason    furosemide (LASIX) 20 mg tablet Therapy Completed    montelukast (SINGULAIR) 10 mg tablet DISCONTINUED BY ANOTHER CLINICIAN    rosuvastatin (CRESTOR) 20 mg tablet           A Healthy Heart: Care Instructions  Your Care Instructions     Coronary artery disease, also called heart disease, occurs when a substance called plaque builds up in the vessels that supply oxygen-rich blood to your heart muscle. This can narrow the blood vessels and reduce blood flow. A heart attack happens when blood flow is completely blocked. A high-fat diet, smoking, and other factors increase the risk of heart disease. Your doctor has found that you have a chance of having heart disease. You can do lots of things to keep your heart healthy. It may not be easy, but you can change your diet, exercise more, and quit smoking. These steps really work to lower your chance of heart disease. Follow-up care is a key part of your treatment and safety. Be sure to make and go to all appointments, and call your doctor if you are having problems. It's also a good idea to know your test results and keep a list of the medicines you take. How can you care for yourself at home? Diet    · Use less salt when you cook and eat. This helps lower your blood pressure. Taste food before salting. Add only a little salt when you think you need it. With time, your taste buds will adjust to less salt.     · Eat fewer snack items, fast foods, canned soups, and other high-salt, high-fat, processed foods.     · Read food labels and try to avoid saturated and trans fats. They increase your risk of heart disease by raising cholesterol levels.     · Limit the amount of solid fat-butter, margarine, and shortening-you eat. Use olive, peanut, or canola oil when you cook. Bake, broil, and steam foods instead of frying them.     · Eat a variety of fruit and vegetables every day.  Dark green, deep orange, red, or yellow fruits and vegetables are especially good for you. Examples include spinach, carrots, peaches, and berries.     · Foods high in fiber can reduce your cholesterol and provide important vitamins and minerals. High-fiber foods include whole-grain cereals and breads, oatmeal, beans, brown rice, citrus fruits, and apples.     · Eat lean proteins. Heart-healthy proteins include seafood, lean meats and poultry, eggs, beans, peas, nuts, seeds, and soy products.     · Limit drinks and foods with added sugar. These include candy, desserts, and soda pop. Lifestyle changes    · If your doctor recommends it, get more exercise. Walking is a good choice. Bit by bit, increase the amount you walk every day. Try for at least 30 minutes on most days of the week. You also may want to swim, bike, or do other activities.     · Do not smoke. If you need help quitting, talk to your doctor about stop-smoking programs and medicines. These can increase your chances of quitting for good. Quitting smoking may be the most important step you can take to protect your heart. It is never too late to quit.     · Limit alcohol to 2 drinks a day for men and 1 drink a day for women. Too much alcohol can cause health problems.     · Manage other health problems such as diabetes, high blood pressure, and high cholesterol. If you think you may have a problem with alcohol or drug use, talk to your doctor. Medicines    · Take your medicines exactly as prescribed. Call your doctor if you think you are having a problem with your medicine.     · If your doctor recommends aspirin, take the amount directed each day. Make sure you take aspirin and not another kind of pain reliever, such as acetaminophen (Tylenol). When should you call for help? Call 911 if you have symptoms of a heart attack.  These may include:    · Chest pain or pressure, or a strange feeling in the chest.     · Sweating.     · Shortness of breath.     · Pain, pressure, or a strange feeling in the back, neck, jaw, or upper belly or in one or both shoulders or arms.     · Lightheadedness or sudden weakness.     · A fast or irregular heartbeat. After you call 911, the  may tell you to chew 1 adult-strength or 2 to 4 low-dose aspirin. Wait for an ambulance. Do not try to drive yourself. Watch closely for changes in your health, and be sure to contact your doctor if you have any problems. Where can you learn more? Go to http://www.elias.com/  Enter F075 in the search box to learn more about \"A Healthy Heart: Care Instructions. \"  Current as of: April 29, 2021               Content Version: 13.0  © 2006-2021 [employer]. Care instructions adapted under license by [employer] (which disclaims liability or warranty for this information). If you have questions about a medical condition or this instruction, always ask your healthcare professional. [place] [address] any warranty or liability for your use of this information.

## 2021-12-09 ENCOUNTER — TRANSCRIBE ORDER (OUTPATIENT)
Dept: REGISTRATION | Age: 76
End: 2021-12-09

## 2021-12-09 ENCOUNTER — HOSPITAL ENCOUNTER (OUTPATIENT)
Dept: GENERAL RADIOLOGY | Age: 76
Discharge: HOME | End: 2021-12-09
Payer: MEDICARE

## 2021-12-09 DIAGNOSIS — R52 PAIN: ICD-10-CM

## 2021-12-09 DIAGNOSIS — R52 PAIN: Primary | ICD-10-CM

## 2021-12-09 PROCEDURE — 72114 X-RAY EXAM L-S SPINE BENDING: CPT

## 2022-01-07 DIAGNOSIS — I50.32 CHRONIC DIASTOLIC CONGESTIVE HEART FAILURE (HCC): ICD-10-CM

## 2022-01-07 RX ORDER — FUROSEMIDE 40 MG/1
40 TABLET ORAL
Qty: 90 TABLET | Refills: 1 | Status: ON HOLD | OUTPATIENT
Start: 2022-01-07

## 2022-01-07 RX ORDER — FUROSEMIDE 40 MG/1
40 TABLET ORAL
Qty: 90 TABLET | Refills: 1 | Status: CANCELLED | OUTPATIENT
Start: 2022-01-07

## 2022-01-14 NOTE — TELEPHONE ENCOUNTER
Patient left a message stating this the 2nd request she has made for this refill (I see no other request made). I show this med was discontinued on 6/29/21. Please sign if appropriate. Last Visit: 8/4/21 with NP Yong Bowles  Next Appointment: 12/29/21 pt cancelled appt  Previous Refill Encounter(s): 12/23/20 #90 with 1 refill    Requested Prescriptions     Pending Prescriptions Disp Refills    potassium chloride (Klor-Con M20) 20 mEq tablet 90 Tablet 1     Sig: Take 1 Tablet by mouth daily.

## 2022-01-19 RX ORDER — POTASSIUM CHLORIDE 20 MEQ/1
20 TABLET, EXTENDED RELEASE ORAL DAILY
Qty: 90 TABLET | Refills: 1 | Status: SHIPPED | OUTPATIENT
Start: 2022-01-19 | End: 2022-05-03

## 2022-01-24 ENCOUNTER — TELEPHONE (OUTPATIENT)
Dept: FAMILY MEDICINE CLINIC | Age: 77
End: 2022-01-24

## 2022-01-24 NOTE — TELEPHONE ENCOUNTER
Pt called states pharmacy advised her that they are out of potassium chloride (Klor-Con M20) 20 mEq tablet, pt has checked with 2 other pharmacies and still out. Pt wants to know if provider can suggest something else for her to take until able to get, says has been out of her medication for 2days.  Please adv [phone]

## 2022-02-23 ENCOUNTER — TELEPHONE (OUTPATIENT)
Dept: FAMILY MEDICINE CLINIC | Age: 77
End: 2022-02-23

## 2022-02-23 ENCOUNTER — OFFICE VISIT (OUTPATIENT)
Dept: FAMILY MEDICINE CLINIC | Age: 77
End: 2022-02-23
Payer: MEDICARE

## 2022-02-23 VITALS
HEART RATE: 78 BPM | BODY MASS INDEX: 35.01 KG/M2 | DIASTOLIC BLOOD PRESSURE: 61 MMHG | SYSTOLIC BLOOD PRESSURE: 121 MMHG | TEMPERATURE: 98.1 F | OXYGEN SATURATION: 97 % | WEIGHT: 185.4 LBS | RESPIRATION RATE: 24 BRPM | HEIGHT: 61 IN

## 2022-02-23 DIAGNOSIS — I50.32 CHRONIC DIASTOLIC CONGESTIVE HEART FAILURE (HCC): ICD-10-CM

## 2022-02-23 DIAGNOSIS — E83.110 HEMOCHROMATOSIS TYPE 1 (HCC): ICD-10-CM

## 2022-02-23 DIAGNOSIS — I10 ESSENTIAL HYPERTENSION: Primary | ICD-10-CM

## 2022-02-23 DIAGNOSIS — E78.5 HYPERLIPIDEMIA, UNSPECIFIED HYPERLIPIDEMIA TYPE: ICD-10-CM

## 2022-02-23 DIAGNOSIS — J41.1 CHRONIC BRONCHITIS WITH PRODUCTIVE MUCOPURULENT COUGH (HCC): ICD-10-CM

## 2022-02-23 DIAGNOSIS — I26.99 PULMONARY EMBOLISM WITHOUT ACUTE COR PULMONALE, UNSPECIFIED CHRONICITY, UNSPECIFIED PULMONARY EMBOLISM TYPE (HCC): ICD-10-CM

## 2022-02-23 DIAGNOSIS — M46.1 SACROILIITIS (HCC): ICD-10-CM

## 2022-02-23 DIAGNOSIS — E66.01 SEVERE OBESITY (BMI 35.0-39.9) WITH COMORBIDITY (HCC): ICD-10-CM

## 2022-02-23 DIAGNOSIS — E03.9 ACQUIRED HYPOTHYROIDISM: ICD-10-CM

## 2022-02-23 PROCEDURE — G8754 DIAS BP LESS 90: HCPCS | Performed by: NURSE PRACTITIONER

## 2022-02-23 PROCEDURE — 1101F PT FALLS ASSESS-DOCD LE1/YR: CPT | Performed by: NURSE PRACTITIONER

## 2022-02-23 PROCEDURE — G8752 SYS BP LESS 140: HCPCS | Performed by: NURSE PRACTITIONER

## 2022-02-23 PROCEDURE — G8536 NO DOC ELDER MAL SCRN: HCPCS | Performed by: NURSE PRACTITIONER

## 2022-02-23 PROCEDURE — G8427 DOCREV CUR MEDS BY ELIG CLIN: HCPCS | Performed by: NURSE PRACTITIONER

## 2022-02-23 PROCEDURE — 1090F PRES/ABSN URINE INCON ASSESS: CPT | Performed by: NURSE PRACTITIONER

## 2022-02-23 PROCEDURE — G8417 CALC BMI ABV UP PARAM F/U: HCPCS | Performed by: NURSE PRACTITIONER

## 2022-02-23 PROCEDURE — G0463 HOSPITAL OUTPT CLINIC VISIT: HCPCS | Performed by: NURSE PRACTITIONER

## 2022-02-23 PROCEDURE — G8400 PT W/DXA NO RESULTS DOC: HCPCS | Performed by: NURSE PRACTITIONER

## 2022-02-23 PROCEDURE — G8432 DEP SCR NOT DOC, RNG: HCPCS | Performed by: NURSE PRACTITIONER

## 2022-02-23 PROCEDURE — 99214 OFFICE O/P EST MOD 30 MIN: CPT | Performed by: NURSE PRACTITIONER

## 2022-02-23 NOTE — PROGRESS NOTES
Benjamin Jorge is a 64 y.o. female who was seen in clinic today (2/23/2022) for Follow-up, Cholesterol Problem, and Hypertension    Assessment & Plan:   Diagnoses and all orders for this visit:    1. Essential hypertension  -     LIPID PANEL; Future  -     METABOLIC PANEL, COMPREHENSIVE; Future  -     CBC WITH AUTOMATED DIFF; Future    2. Severe obesity (BMI 35.0-39. 9) with comorbidity ([address]    3. Sacroiliitis ([address]  Assessment & Plan:   monitored by specialist. No acute findings meriting change in the plan        4. Pulmonary embolism without acute cor pulmonale, unspecified chronicity, unspecified pulmonary embolism type ([address]  Assessment & Plan:   monitored by specialist. No acute findings meriting change in the plan        5. Chronic bronchitis with productive mucopurulent cough ([address]  Assessment & Plan:   monitored by specialist. No acute findings meriting change in the plan        6. Chronic diastolic congestive heart failure ([address]  Assessment & Plan:   monitored by specialist. No acute findings meriting change in the plan        7. Hemochromatosis type 1 ([address]  Assessment & Plan:   monitored by specialist. No acute findings meriting change in the plan        8. Hyperlipidemia, unspecified hyperlipidemia type  -     LIPID PANEL; Future  -     METABOLIC PANEL, COMPREHENSIVE; Future    9. Acquired hypothyroidism  -     TSH 3RD GENERATION; Future  -     T4, FREE; Future    increase water intake to 3 bottles daily  I have discussed the diagnosis with the patient and the intended plan as seen in the above orders. The patient has received an after-visit summary and questions were answered concerning future plans. I have discussed medication side effects and warnings with the patient as well. Patient agreeable with above plan and verbalizes understanding. Follow-up and Dispositions    · Return in about 6 months (around 8/23/2022) for HTN/HLD, in office follow up.          Subjective:   Hypertension ROS: taking medications as instructed, no medication side effects noted, no TIA's, no chest pain on exertion, no dyspnea on exertion, no swelling of ankles. States she has been experiencing bilateral leg swelling and itching. Patient states she does continue to have bilateral shoulder pain. Patient is followed Dr. Harsh Chan for pain management and states he is following her back pain. Patient states she has severe dry mouth. Comments she has used multiple otc treatment with minimal relief. States she also is having trouble with dry eyes. Patient states her eliquis is $450 last months. States the price fluctuates monthly. Lab Results   Component Value Date/Time    Sodium 139 11/08/2021 08:21 AM    Potassium 3.8 11/08/2021 08:21 AM    Chloride 102 11/08/2021 08:21 AM    CO2 30 11/08/2021 08:21 AM    Anion gap 7 11/08/2021 08:21 AM    Glucose 76 11/08/2021 08:21 AM    BUN 27 (H) 11/08/2021 08:21 AM    Creatinine 1.30 11/08/2021 08:21 AM    BUN/Creatinine ratio 21 (H) 11/08/2021 08:21 AM    GFR est AA 48 (L) 11/08/2021 08:21 AM    GFR est non-AA 40 (L) 11/08/2021 08:21 AM    Calcium 9.6 11/08/2021 08:21 AM    Bilirubin, total 0.7 11/08/2021 08:21 AM    Alk.  phosphatase 74 11/08/2021 08:21 AM    Protein, total 7.3 11/08/2021 08:21 AM    Albumin 3.8 11/08/2021 08:21 AM    Globulin 3.5 11/08/2021 08:21 AM    A-G Ratio 1.1 11/08/2021 08:21 AM    ALT (SGPT) 19 11/08/2021 08:21 AM    AST (SGOT) 20 11/08/2021 08:21 AM     Lab Results   Component Value Date/Time    Cholesterol, total 186 11/08/2021 08:21 AM    HDL Cholesterol 59 11/08/2021 08:21 AM    LDL, calculated 83 11/08/2021 08:21 AM    VLDL, calculated 44 11/08/2021 08:21 AM    Triglyceride 220 (H) 11/08/2021 08:21 AM    CHOL/HDL Ratio 3.2 11/08/2021 08:21 AM     No results found for: HBA1C, TEZ6ZKCG, UEC9XNSR  No results found for: Gisela Estrella, VD3RIA, [id number]    Lab Results   Component Value Date/Time    WBC 6.2 11/08/2021 08:21 AM    HGB 15.1 11/08/2021 08:21 AM    HCT 46.4 (H) 11/08/2021 08:21 AM    PLATELET 765 61/48/9103 08:21 AM    .2 (H) 11/08/2021 08:21 AM       Wt Readings from Last 3 Encounters:   02/23/22 185 lb 6.4 oz (84.1 kg)   12/03/21 177 lb (80.3 kg)   09/24/21 194 lb (88 kg)     Temp Readings from Last 3 Encounters:   02/23/22 98.1 °F (36.7 °C) (Temporal)   09/08/21 97.5 °F (36.4 °C) (Temporal)   08/20/21 97.2 °F (36.2 °C)     BP Readings from Last 3 Encounters:   02/23/22 121/61   12/03/21 109/67   09/24/21 137/70     Pulse Readings from Last 3 Encounters:   02/23/22 78   12/03/21 63   09/24/21 (!) 56       Prior to Admission medications    Medication Sig Start Date End Date Taking? Authorizing Provider   apixaban (Eliquis) 5 mg tablet TAKE 2 TABLETS BY MOUTH TWICE A DAY THROUGH 8/7/21,THEN STARTING FROM 8/8 TAKE 1 TAB TWICE A DAY 2/22/22  Yes Ashley Banuelos NP   potassium chloride (Klor-Con M20) 20 mEq tablet Take 1 Tablet by mouth daily. 1/19/22  Yes Hira Feilx NP   furosemide (LASIX) 40 mg tablet Take 1 Tablet by mouth daily as needed (Edema). 1/7/22  Yes Ashley Banuelos NP   bisacodyL (DULCOLAX) 5 mg EC tablet 1 tablet as needed   Yes Provider, Historical   tiZANidine (ZANAFLEX) 2 mg tablet Take 2 mg by mouth three (3) times daily. Yes Provider, Historical   rosuvastatin (CRESTOR) 40 mg tablet Take 1 Tablet by mouth daily. 12/3/21  Yes Karen Geller MD   clopidogreL (PLAVIX) 75 mg tab TAKE 1 TABLET BY MOUTH EVERY DAY 11/29/21  Yes Ashley Banuelos NP   metoprolol tartrate (LOPRESSOR) 25 mg tablet TAKE 1/2 TABLETS BY MOUTH EVERY TWELVE (12) HOURS 11/29/21  Yes Ashley Banuelos NP   allopurinoL (ZYLOPRIM) 100 mg tablet TAKE 1 TABLET BY MOUTH EVERY DAY 11/3/21  Yes Hira Felix NP   pantoprazole (PROTONIX) 40 mg tablet TAKE 1 TABLET BY MOUTH EVERY DAY BEFORE BREAKFAST 9/7/21  Yes Hira Felix NP   formoterol (Perforomist) 20 mcg/2 mL nebu neb solution 2 mL by Nebulization route two (2) times a day. File under Medicare part B, ICD J41.1, Z86.711 8/12/21  Yes Abel Li MD   potassium chloride SR (Klor-Con 10) 10 mEq tablet Take 10 mEq by mouth daily. Yes Provider, Historical   budesonide (PULMICORT) 0.5 mg/2 mL nbsp 2 mL by Nebulization route two (2) times a day. File under Medicare Part B, ICD J47.0, J45.909 8/5/21  Yes Abel Li MD   levothyroxine (SYNTHROID) 25 mcg tablet TAKE 1 TAB BY MOUTH DAILY 8/4/21  Yes Kunal Jones NP   acetaminophen (TYLENOL) 500 mg tablet Take 500 mg by mouth every six (6) hours as needed for Pain. Pain   Yes Provider, Historical   carboxymethylcellulose sodium (REFRESH TEARS OP) Administer 1 Drop to both eyes daily as needed for Other. Dry eyes   Yes Provider, Historical   peg 400-propylene glycol, PF, (Systane Hydration PF) 0.4-0.3 % dpet ophthalmic solution Administer 2 Drops to both eyes four (4) times daily as needed for Ocular Dryness. Dry eyes   Yes Provider, Historical   nitroglycerin (NITROSTAT) 0.4 mg SL tablet 1 Tablet by SubLINGual route every five (5) minutes as needed for Chest Pain. Up to 3 doses. 8/2/21  Yes Karlos Avila MD   polyethylene glycol (MIRALAX) 17 gram packet Take 1 Packet by mouth daily. 8/3/21  Yes Karlos Avila MD   albuterol (PROVENTIL HFA, VENTOLIN HFA, PROAIR HFA) 90 mcg/actuation inhaler Take 2 Puffs by inhalation every four (4) hours as needed for Wheezing. 7/13/21  Yes Kunal Jones NP   oxyCODONE IR (ROXICODONE) 5 mg immediate release tablet Take 5 mg by mouth three (3) times daily as needed for Pain. Pain   Yes Provider, Historical   albuterol (PROVENTIL VENTOLIN) 2.5 mg /3 mL (0.083 %) nebu 3 mL by Nebulization route every six (6) hours as needed for Wheezing or Shortness of Breath. 1/7/20  Yes Fred Amador NP   inhalational spacing device (ACE AEROSOL CLOUD ENHANCER) 1 Each by Does Not Apply route as needed.  2/14/19  Yes Mandy Severino MD         The following sections were reviewed & updated as appropriate: PMH, PSH, FH, and SH. Review of Systems   Constitutional: Negative for activity change, appetite change, chills, fatigue and fever. Respiratory: Negative for chest tightness and shortness of breath. Cardiovascular: Negative for chest pain. Neurological: Negative for dizziness and headaches. Objective:     Visit Vitals  /61 (BP 1 Location: Left upper arm, BP Patient Position: Sitting)   Pulse 78   Temp 98.1 °F (36.7 °C) (Temporal)   Resp 24   Ht 5' 1\" (1.549 m)   Wt 185 lb 6.4 oz (84.1 kg)   SpO2 97%   BMI 35.03 kg/m²      Physical Exam  Constitutional:       General: She is not in acute distress. Appearance: She is well-developed. HENT:      Head: Normocephalic and atraumatic. Neck:      Vascular: No carotid bruit. Cardiovascular:      Rate and Rhythm: Normal rate and regular rhythm. Heart sounds: Normal heart sounds. No murmur heard. No friction rub. No gallop. Pulmonary:      Effort: Pulmonary effort is normal.      Breath sounds: Normal breath sounds. No decreased breath sounds, wheezing, rhonchi or rales. Abdominal:      General: Bowel sounds are normal.      Palpations: Abdomen is soft. Tenderness: There is no abdominal tenderness. Musculoskeletal:      Cervical back: Normal range of motion and neck supple. Right lower le+ Edema present. Left lower le+ Edema present. Lymphadenopathy:      Cervical: No cervical adenopathy. Skin:     General: Skin is warm and dry. Neurological:      Mental Status: She is alert and oriented to person, place, and time. Disclaimer: The patient understands our medical plan. Alternatives have been explained and offered. The risks, benefits and significant side effects of all medications have been reviewed. Anticipated time course and progression of condition reviewed. All questions have been addressed.   She is encouraged to employ the information provided in the after visit summary, which was reviewed. Where applicable, she is instructed to call the clinic if she has not been notified either by phone or through [address] with the results of her tests or with an appointment plan for any referrals within 1 week(s). No news is not good news; it's no news. The patient  is to call if her condition worsens or fails to improve or if significant side effects are experienced. Aspects of this note may have been generated using voice recognition software. Despite editing, there may be unrecognized errors.        Linda Manning NP

## 2022-02-23 NOTE — PROGRESS NOTES
Depression Screening:  3 most recent PHQ Screens 2/23/2022   PHQ Not Done -   Little interest or pleasure in doing things Not at all   Feeling down, depressed, irritable, or hopeless Not at all   Total Score PHQ 2 0   Trouble falling or staying asleep, or sleeping too much -   Feeling tired or having little energy -   Poor appetite, weight loss, or overeating -   Feeling bad about yourself - or that you are a failure or have let yourself or your family down -   Trouble concentrating on things such as school, work, reading, or watching TV -   Moving or speaking so slowly that other people could have noticed; or the opposite being so fidgety that others notice -   Thoughts of being better off dead, or hurting yourself in some way -   PHQ 9 Score -   How difficult have these problems made it for you to do your work, take care of your home and get along with others -       Learning Assessment:  Learning Assessment 7/9/2020   PRIMARY LEARNER Patient   HIGHEST LEVEL OF EDUCATION - PRIMARY LEARNER  -   BARRIERS PRIMARY LEARNER NONE   CO-LEARNER CAREGIVER No   PRIMARY LANGUAGE ENGLISH   LEARNER PREFERENCE PRIMARY DEMONSTRATION     -     -   ANSWERED BY Patient   RELATIONSHIP SELF       Abuse Screening:  Abuse Screening Questionnaire 7/9/2020   Do you ever feel afraid of your partner? N   Are you in a relationship with someone who physically or mentally threatens you? N   Is it safe for you to go home? Y       Fall Risk  Fall Risk Assessment, last 12 mths 2/23/2022   Able to walk? Yes   Fall in past 12 months? 1   Do you feel unsteady? -   Are you worried about falling -   Number of falls in past 12 months 1   Fall with injury? 1       ADL  No flowsheet data found. Travel Screening:    Travel Screening     Question   Response    In the last month, have you been in contact with someone who was confirmed or suspected to have Coronavirus / COVID-19? No / Unsure    Have you had a COVID-19 viral test in the last 14 days?   No Do you have any of the following new or worsening symptoms? None of these    Have you traveled internationally or domestically in the last month? No      Travel History   Travel since 01/23/22    No documented travel since 01/23/22         Health Maintenance reviewed and discussed and ordered per Provider. Health Maintenance Due   Topic Date Due    Hepatitis C Screening  Never done    DTaP/Tdap/Td series (1 - Tdap) Never done    Shingrix Vaccine Age 50> (1 of 2) Never done    Bone Densitometry (Dexa) Screening  Never done    Flu Vaccine (1) 09/01/2021    Pneumococcal 65+ years (2 of 2 - PPSV23) 09/28/2021    Medicare Yearly Exam  11/11/2021    COVID-19 Vaccine (3 - Booster for Pfizer series) 11/19/2021   . Levander Spurling presents today for   Chief Complaint   Patient presents with    Follow-up    Cholesterol Problem    Hypertension       Is someone accompanying this pt? no    Is the patient using any DME equipment during OV? no    Coordination of Care:  1. Have you been to the ER, urgent care clinic since your last visit? Hospitalized since your last visit? no    2. Have you seen or consulted any other health care providers outside of the [address] [place] [address] since your last visit? Include any pap smears or colon screening.  no

## 2022-02-24 NOTE — TELEPHONE ENCOUNTER
Spoke with patient in regards to the cost of her Eliquis. She states that she had to pay $450 for her refill last month which was very expensive for her. This month, however, the cost was $38. She states that the cost of the medication generally fluctuates like that throughout the course of the year. Discussed the Medicare deductible and coverage gap cycle with the patient. Informed her that if she does enter the coverage gap later in the year, we can try to apply for patient assistance for Eliquis. Patient would be eligible if she has spent 3% out of pocket on her medications for the year. She expressed understanding and states that she will let us know. Thank you,  Bea Rubi. KASEY Marin              For Pharmacy Admin Tracking Only     CPA in place:  Yes   Recommendation Provided To: Patient/Caregiver: 1 via Telephone   Intervention Detail: Patient Access Assistance/Sample Provided   Gap Closed?: No   Intervention Accepted By: Patient/Caregiver: 1   Time Spent (min): 30

## 2022-03-02 RX ORDER — PANTOPRAZOLE SODIUM 40 MG/1
TABLET, DELAYED RELEASE ORAL
Qty: 90 TABLET | Refills: 1 | Status: SHIPPED | OUTPATIENT
Start: 2022-03-02 | End: 2022-08-25

## 2022-03-18 PROBLEM — Z86.711 HISTORY OF PULMONARY EMBOLISM: Status: ACTIVE | Noted: 2021-07-30

## 2022-03-18 PROBLEM — R93.89 ABNORMAL CHEST CT: Status: ACTIVE | Noted: 2020-02-06

## 2022-03-18 PROBLEM — J18.9 CAP (COMMUNITY ACQUIRED PNEUMONIA): Status: ACTIVE | Noted: 2019-07-22

## 2022-03-18 PROBLEM — I10 HYPERTENSION: Status: ACTIVE | Noted: 2019-01-11

## 2022-03-18 PROBLEM — I21.3 STEMI (ST ELEVATION MYOCARDIAL INFARCTION) (HCC): Status: ACTIVE | Noted: 2021-07-30

## 2022-03-19 PROBLEM — A41.9 SEPSIS (HCC): Status: ACTIVE | Noted: 2019-07-22

## 2022-03-19 PROBLEM — R06.09 DOE (DYSPNEA ON EXERTION): Status: ACTIVE | Noted: 2019-02-28

## 2022-03-19 PROBLEM — J41.1 CHRONIC BRONCHITIS WITH PRODUCTIVE MUCOPURULENT COUGH (HCC): Status: ACTIVE | Noted: 2021-03-10

## 2022-03-19 PROBLEM — E66.01 OBESITY, MORBID (HCC): Status: ACTIVE | Noted: 2019-01-29

## 2022-03-19 PROBLEM — E78.00 HIGH CHOLESTEROL: Status: ACTIVE | Noted: 2019-01-11

## 2022-03-19 PROBLEM — I50.32 CHRONIC DIASTOLIC CONGESTIVE HEART FAILURE (HCC): Status: ACTIVE | Noted: 2021-07-30

## 2022-03-19 PROBLEM — R05.3 CHRONIC COUGH: Status: ACTIVE | Noted: 2020-02-06

## 2022-03-19 PROBLEM — E83.110: Status: ACTIVE | Noted: 2019-01-11

## 2022-03-20 PROBLEM — I95.9 HYPOTENSION: Status: ACTIVE | Noted: 2019-07-22

## 2022-03-20 PROBLEM — I26.99 PULMONARY EMBOLISM WITHOUT ACUTE COR PULMONALE (HCC): Status: ACTIVE | Noted: 2021-03-10

## 2022-03-20 PROBLEM — I48.91 ATRIAL FIBRILLATION WITH RVR (HCC): Status: ACTIVE | Noted: 2019-07-22

## 2022-03-20 PROBLEM — Z86.718 HISTORY OF DVT (DEEP VEIN THROMBOSIS): Status: ACTIVE | Noted: 2021-07-30

## 2022-04-20 DIAGNOSIS — E03.9 ACQUIRED HYPOTHYROIDISM: ICD-10-CM

## 2022-04-20 RX ORDER — LEVOTHYROXINE SODIUM 25 UG/1
TABLET ORAL
Qty: 90 TABLET | Refills: 2 | Status: ON HOLD | OUTPATIENT
Start: 2022-04-20

## 2022-04-25 ENCOUNTER — NURSE TRIAGE (OUTPATIENT)
Dept: OTHER | Facility: CLINIC | Age: 77
End: 2022-04-25

## 2022-04-25 NOTE — TELEPHONE ENCOUNTER
Received call from Dmitry King at [facility] with [employer] Complaint. Subjective: Caller states \"pain in both legs, swelling and redness, hx of blood clots. \"     Current Symptoms: bilateral leg above swelling/pain, left leg red spots, tender to touch itches, difficulty walking    Onset: several months ago; gradual, worsening    Associated Symptoms: reduced activity    Pain Severity: 10/10; aching and sharp; intermittent    Temperature: Denies      What has been tried: oxycodone, lasix 40 mg. LMP: NA Pregnant: NA    Recommended disposition: See PCP within 24 Hours    Care advice provided, patient verbalizes understanding; denies any other questions or concerns; instructed to call back for any new or worsening symptoms. Patient/Caller agrees with recommended disposition; writer provided warm transfer to [place] at [facility] for appointment scheduling    Attention Provider: Thank you for allowing me to participate in the care of your patient. The patient was connected to triage in response to information provided to the [facility]. Please do not respond through this encounter as the response is not directed to a shared pool.       Reason for Disposition   [1] MODERATE leg swelling (e.g., swelling extends up to knees) AND [2] new-onset or worsening    Protocols used: LEG SWELLING AND EDEMA-ADULT-AH

## 2022-04-28 ENCOUNTER — TRANSCRIBE ORDER (OUTPATIENT)
Dept: SCHEDULING | Age: 77
End: 2022-04-28

## 2022-04-28 DIAGNOSIS — R60.0 LOCALIZED EDEMA: Primary | ICD-10-CM

## 2022-04-28 DIAGNOSIS — I82.403 DVT OF LOWER EXTREMITY, BILATERAL (HCC): ICD-10-CM

## 2022-04-29 ENCOUNTER — HOSPITAL ENCOUNTER (OUTPATIENT)
Dept: VASCULAR SURGERY | Age: 77
Discharge: HOME | End: 2022-04-29
Attending: PHYSICIAN ASSISTANT
Payer: MEDICARE

## 2022-04-29 DIAGNOSIS — R60.0 LOCALIZED EDEMA: ICD-10-CM

## 2022-04-29 DIAGNOSIS — I82.403 DVT OF LOWER EXTREMITY, BILATERAL (HCC): ICD-10-CM

## 2022-04-29 PROCEDURE — 93970 EXTREMITY STUDY: CPT

## 2022-05-01 RX ORDER — BUDESONIDE 0.5 MG/2ML
INHALANT ORAL
Qty: 180 ML | Refills: 1 | Status: SHIPPED | OUTPATIENT
Start: 2022-05-01 | End: 2022-05-02 | Stop reason: SDUPTHER

## 2022-05-02 ENCOUNTER — TELEPHONE (OUTPATIENT)
Dept: PULMONOLOGY | Age: 77
End: 2022-05-02

## 2022-05-02 ENCOUNTER — NURSE TRIAGE (OUTPATIENT)
Dept: OTHER | Facility: CLINIC | Age: 77
End: 2022-05-02

## 2022-05-02 RX ORDER — BUDESONIDE 0.5 MG/2ML
INHALANT ORAL
Qty: 180 ML | Refills: 1 | Status: SHIPPED | OUTPATIENT
Start: 2022-05-02 | End: 2022-05-17

## 2022-05-02 NOTE — TELEPHONE ENCOUNTER
Received call from Randall at [facility] with [employer] Complaint. Subjective: Caller states \"Last week my legs were so swollen. \"     Current Symptoms: Bilateral leg swelling. Legs were red and hot, got better yesterday. Swelling still present but is less. Labs drawn last week. Low on Potassium. Legs are still warm to touch. Pain has subsided. R is more swollen than L. Hard to walk. No chest pain. Winded with activity. No SOB at time of call or at rest.  Swelling is above L knee. Onset: several months ago; intermittent, improving    Associated Symptoms: reduced activity    Pain Severity: 6/10; throbbing; intermittent, moderate    Temperature: denies fever     What has been tried: elevation    LMP: NA Pregnant: NA    Recommended disposition: Go to ED/UCC Now (Or to Office with PCP Approval)    Care advice provided, patient verbalizes understanding; denies any other questions or concerns; instructed to call back for any new or worsening symptoms. Tried to reach pcp office for second level triage x3. All times just rang and rang and then to fast busy signal.  Tried [facility] to see if they could get me in touch with pcp office. When she tried, she was told by [facility] office staff that they do not take nurse triage calls and then hung up on her. Advised patient to ED or UCC for evaluation. Patient is very upset since she has been calling for an appt and sending messages thru MyChart and has never gotten a response. Apologized nurmerous times to the patient for the inconvience. Pateint agreed to go to ED or UCC for evaluation. Attention Provider: Thank you for allowing me to participate in the care of your patient. The patient was connected to triage in response to information provided to the [facility]. Please do not respond through this encounter as the response is not directed to a shared pool.     Reason for Disposition   SEVERE swelling (e.g., swelling extends above knee, entire leg is swollen, weeping fluid)    Protocols used: LEG SWELLING AND EDEMA-ADULT-[place]

## 2022-05-03 RX ORDER — POTASSIUM CHLORIDE 1500 MG/1
TABLET, EXTENDED RELEASE ORAL
Qty: 90 TABLET | Refills: 1 | Status: SHIPPED | OUTPATIENT
Start: 2022-05-03 | End: 2022-08-25

## 2022-05-06 ENCOUNTER — NURSE TRIAGE (OUTPATIENT)
Dept: OTHER | Facility: CLINIC | Age: 77
End: 2022-05-06

## 2022-05-06 NOTE — TELEPHONE ENCOUNTER
Received call from elayne at [facility] with [employer] Complaint. Subjective: Caller states \"swollen legs red and inflamed. they told me to go to the ER and I didn't go the following day going to see the doctor who does blood work. They took one look at my legs and sent me for . Did that , came back alright. Told me low on potassium she gave me four pills. Until I see Dr. Dom Silveira, Dr. Dom Silveira called pharmacy for prescription for klor-con. \"     Current Symptoms: pt reports both side of legs swelling feet, ankle, above the knee down. Pt repos both sides alternate which side is more swollen. Pt wearing compression socks. Pt reports the legs aren't as inflamed this morning but hurt to touch. Also feel warm to the touch. Pt also taking 40 mg water pill- lasix. Pt reports no seeping fluid from legs. Pt denies shortness of breath beyond baseline or chest pains. Pt reports trouble/ getting up without assistance waking due to swelling in the knees. Pt handicap     Onset: intemrittent since being in the hospital, worsening      Associated Symptoms: reduced activity    Pain Severity: legs  6/10; pain; constant, intermittent- can be worse     Temperature: no fever     What has been tried: compression stocking     LMP: NA Pregnant: NA    Recommended disposition: Go to ED/UCC Now (Or to Office with PCP Approval)    Care advice provided, patient verbalizes understanding; denies any other questions or concerns; instructed to call back for any new or worsening symptoms. Writer provided warm transfer to [place] at [facility] for further assessment and resume of care    Attention Provider: Thank you for allowing me to participate in the care of your patient. The patient was connected to triage in response to information provided to the [facility]. Please do not respond through this encounter as the response is not directed to a shared pool.       Reason for Disposition   SEVERE swelling (e.g., swelling extends above knee, entire leg is swollen, weeping fluid)     Pt reports the swelling extends above the knee both sides    Protocols used: LEG SWELLING AND EDEMA-ADULT-[place]

## 2022-05-17 ENCOUNTER — HOSPITAL ENCOUNTER (OUTPATIENT)
Dept: LAB | Age: 77
Discharge: HOME | End: 2022-05-17
Payer: MEDICARE

## 2022-05-17 ENCOUNTER — OFFICE VISIT (OUTPATIENT)
Dept: FAMILY MEDICINE CLINIC | Age: 77
End: 2022-05-17
Payer: MEDICARE

## 2022-05-17 VITALS
DIASTOLIC BLOOD PRESSURE: 68 MMHG | BODY MASS INDEX: 34.81 KG/M2 | HEIGHT: 61 IN | RESPIRATION RATE: 18 BRPM | OXYGEN SATURATION: 98 % | SYSTOLIC BLOOD PRESSURE: 141 MMHG | HEART RATE: 70 BPM | TEMPERATURE: 98 F | WEIGHT: 184.4 LBS

## 2022-05-17 DIAGNOSIS — I10 ESSENTIAL HYPERTENSION: ICD-10-CM

## 2022-05-17 DIAGNOSIS — E78.5 HYPERLIPIDEMIA, UNSPECIFIED HYPERLIPIDEMIA TYPE: ICD-10-CM

## 2022-05-17 DIAGNOSIS — I48.0 PAROXYSMAL ATRIAL FIBRILLATION (HCC): ICD-10-CM

## 2022-05-17 DIAGNOSIS — R60.9 PERIPHERAL EDEMA: Primary | ICD-10-CM

## 2022-05-17 DIAGNOSIS — E03.9 ACQUIRED HYPOTHYROIDISM: ICD-10-CM

## 2022-05-17 DIAGNOSIS — I25.118 CORONARY ARTERY DISEASE OF NATIVE ARTERY OF NATIVE HEART WITH STABLE ANGINA PECTORIS (HCC): ICD-10-CM

## 2022-05-17 LAB
ALBUMIN SERPL-MCNC: 3.4 G/DL (ref 3.4–5)
ALBUMIN/GLOB SERPL: 1.1 {RATIO} (ref 0.8–1.7)
ALP SERPL-CCNC: 82 U/L (ref 45–117)
ALT SERPL-CCNC: 20 U/L (ref 13–56)
ANION GAP SERPL CALC-SCNC: 6 MMOL/L (ref 3–18)
AST SERPL-CCNC: 20 U/L (ref 10–38)
BASOPHILS # BLD: 0 K/UL (ref 0–0.1)
BASOPHILS NFR BLD: 0 % (ref 0–2)
BILIRUB SERPL-MCNC: 1 MG/DL (ref 0.2–1)
BUN SERPL-MCNC: 25 MG/DL (ref 7–18)
BUN/CREAT SERPL: 20 (ref 12–20)
CALCIUM SERPL-MCNC: 9.4 MG/DL (ref 8.5–10.1)
CHLORIDE SERPL-SCNC: 107 MMOL/L (ref 100–111)
CHOLEST SERPL-MCNC: 149 MG/DL
CO2 SERPL-SCNC: 27 MMOL/L (ref 21–32)
CREAT SERPL-MCNC: 1.26 MG/DL (ref 0.6–1.3)
DIFFERENTIAL METHOD BLD: ABNORMAL
EOSINOPHIL # BLD: 0.1 K/UL (ref 0–0.4)
EOSINOPHIL NFR BLD: 1 % (ref 0–5)
ERYTHROCYTE [DISTWIDTH] IN BLOOD BY AUTOMATED COUNT: 15.2 % (ref 11.6–14.5)
GLOBULIN SER CALC-MCNC: 3.1 G/DL (ref 2–4)
GLUCOSE SERPL-MCNC: 94 MG/DL (ref 74–99)
HCT VFR BLD AUTO: 40.3 % (ref 35–45)
HDLC SERPL-MCNC: 63 MG/DL (ref 40–60)
HDLC SERPL: 2.4 {RATIO} (ref 0–5)
HGB BLD-MCNC: 13.3 G/DL (ref 12–16)
IMM GRANULOCYTES # BLD AUTO: 0 K/UL (ref 0–0.04)
IMM GRANULOCYTES NFR BLD AUTO: 0 % (ref 0–0.5)
LDLC SERPL CALC-MCNC: 59.2 MG/DL (ref 0–100)
LIPID PROFILE,FLP: ABNORMAL
LYMPHOCYTES # BLD: 1.3 K/UL (ref 0.9–3.6)
LYMPHOCYTES NFR BLD: 23 % (ref 21–52)
MCH RBC QN AUTO: 32.8 PG (ref 24–34)
MCHC RBC AUTO-ENTMCNC: 33 G/DL (ref 31–37)
MCV RBC AUTO: 99.5 FL (ref 78–100)
MONOCYTES # BLD: 0.7 K/UL (ref 0.05–1.2)
MONOCYTES NFR BLD: 12 % (ref 3–10)
NEUTS SEG # BLD: 3.7 K/UL (ref 1.8–8)
NEUTS SEG NFR BLD: 64 % (ref 40–73)
NRBC # BLD: 0 K/UL (ref 0–0.01)
NRBC BLD-RTO: 0 PER 100 WBC
PLATELET # BLD AUTO: 185 K/UL (ref 135–420)
PMV BLD AUTO: 11.9 FL (ref 9.2–11.8)
POTASSIUM SERPL-SCNC: 3.5 MMOL/L (ref 3.5–5.5)
PROT SERPL-MCNC: 6.5 G/DL (ref 6.4–8.2)
RBC # BLD AUTO: 4.05 M/UL (ref 4.2–5.3)
SODIUM SERPL-SCNC: 140 MMOL/L (ref 136–145)
T4 FREE SERPL-MCNC: 1.1 NG/DL (ref 0.7–1.5)
TRIGL SERPL-MCNC: 134 MG/DL (ref ?–150)
TSH SERPL DL<=0.05 MIU/L-ACNC: 1.25 UIU/ML (ref 0.36–3.74)
VLDLC SERPL CALC-MCNC: 26.8 MG/DL
WBC # BLD AUTO: 5.7 K/UL (ref 4.6–13.2)

## 2022-05-17 PROCEDURE — G0463 HOSPITAL OUTPT CLINIC VISIT: HCPCS | Performed by: NURSE PRACTITIONER

## 2022-05-17 PROCEDURE — G8536 NO DOC ELDER MAL SCRN: HCPCS | Performed by: NURSE PRACTITIONER

## 2022-05-17 PROCEDURE — 84439 ASSAY OF FREE THYROXINE: CPT

## 2022-05-17 PROCEDURE — G8417 CALC BMI ABV UP PARAM F/U: HCPCS | Performed by: NURSE PRACTITIONER

## 2022-05-17 PROCEDURE — 80061 LIPID PANEL: CPT

## 2022-05-17 PROCEDURE — 80053 COMPREHEN METABOLIC PANEL: CPT

## 2022-05-17 PROCEDURE — G8753 SYS BP > OR = 140: HCPCS | Performed by: NURSE PRACTITIONER

## 2022-05-17 PROCEDURE — 85025 COMPLETE CBC W/AUTO DIFF WBC: CPT

## 2022-05-17 PROCEDURE — 1123F ACP DISCUSS/DSCN MKR DOCD: CPT | Performed by: NURSE PRACTITIONER

## 2022-05-17 PROCEDURE — G8400 PT W/DXA NO RESULTS DOC: HCPCS | Performed by: NURSE PRACTITIONER

## 2022-05-17 PROCEDURE — 1101F PT FALLS ASSESS-DOCD LE1/YR: CPT | Performed by: NURSE PRACTITIONER

## 2022-05-17 PROCEDURE — 1090F PRES/ABSN URINE INCON ASSESS: CPT | Performed by: NURSE PRACTITIONER

## 2022-05-17 PROCEDURE — G8754 DIAS BP LESS 90: HCPCS | Performed by: NURSE PRACTITIONER

## 2022-05-17 PROCEDURE — 36415 COLL VENOUS BLD VENIPUNCTURE: CPT

## 2022-05-17 PROCEDURE — 99213 OFFICE O/P EST LOW 20 MIN: CPT | Performed by: NURSE PRACTITIONER

## 2022-05-17 PROCEDURE — 84443 ASSAY THYROID STIM HORMONE: CPT

## 2022-05-17 PROCEDURE — G8432 DEP SCR NOT DOC, RNG: HCPCS | Performed by: NURSE PRACTITIONER

## 2022-05-17 PROCEDURE — G8427 DOCREV CUR MEDS BY ELIG CLIN: HCPCS | Performed by: NURSE PRACTITIONER

## 2022-05-17 NOTE — PROGRESS NOTES
1. \"Have you been to the ER, urgent care clinic since your last visit? Hospitalized since your last visit? \" No    2. \"Have you seen or consulted any other health care providers outside of the [address] [place] [address] since your last visit? \" Pain Managment      3. For patients aged 39-70: Has the patient had a colonoscopy / FIT/ Cologuard? NA - based on age      If the patient is female:    4. For patients aged 41-77: Has the patient had a mammogram within the past 2 years? NA - based on age or sex      11. For patients aged 21-65: Has the patient had a pap smear?  NA - based on age or sex

## 2022-05-17 NOTE — PATIENT INSTRUCTIONS
Shoulder Stretches: Exercises  Introduction  Here are some examples of exercises for you to try. The exercises may be suggested for a condition or for rehabilitation. Start each exercise slowly. Ease off the exercises if you start to have pain. You will be told when to start these exercises and which ones will work best for you. How to do the exercises  Shoulder stretch    1.  a doorway and place one arm against the door frame. Your elbow should be a little higher than your shoulder. 2. Relax your shoulders as you lean forward, allowing your chest and shoulder muscles to stretch. You can also turn your body slightly away from your arm to stretch the muscles even more. 3. Hold for 15 to 30 seconds. 4. Repeat 2 to 4 times with each arm. Shoulder and chest stretch    1. Shoulder and chest stretch  2. While sitting, relax your upper body so you slump slightly in your chair. 3. As you breathe in, straighten your back and open your arms out to the sides. 4. Gently pull your shoulder blades back and downward. 5. Hold for 15 to 30 seconds as your breathe normally. 6. Repeat 2 to 4 times. Overhead stretch    1. Reach up over your head with both arms. 2. Hold for 15 to 30 seconds. 3. Repeat 2 to 4 times. Follow-up care is a key part of your treatment and safety. Be sure to make and go to all appointments, and call your doctor if you are having problems. It's also a good idea to know your test results and keep a list of the medicines you take. Where can you learn more? Go to http://www.gray.com/  Enter S254 in the search box to learn more about \"Shoulder Stretches: Exercises. \"  Current as of: July 1, 2021               Content Version: 13.2  © 2761-6549 [employer]. Care instructions adapted under license by [employer] (which disclaims liability or warranty for this information).  If you have questions about a medical condition or this instruction, always ask your healthcare professional. [place] [address] any warranty or liability for your use of this information. Shoulder Arthritis: Exercises  Introduction  Here are some examples of exercises for you to try. The exercises may be suggested for a condition or for rehabilitation. Start each exercise slowly. Ease off the exercises if you start to have pain. You will be told when to start these exercises and which ones will work best for you. How to do the exercises  Shoulder flexion (lying down)    To make a wand for this exercise, use a piece of PVC pipe or a broom handle with the broom removed. Make the wand about a foot wider than your shoulders. 1. Lie on your back, holding a wand with both hands. Your palms should face down as you hold the wand. 2. Keeping your elbows straight, slowly raise your arms over your head. Raise them until you feel a stretch in your shoulders, upper back, and chest.  3. Hold for 15 to 30 seconds. 4. Repeat 2 to 4 times. Shoulder rotation (lying down)    To make a wand for this exercise, use a piece of PVC pipe or a broom handle with the broom removed. Make the wand about a foot wider than your shoulders. 1. Lie on your back. Hold a wand with both hands with your elbows bent and palms up. 2. Keep your elbows close to your body, and move the wand across your body toward the sore arm. 3. Hold for 8 to 12 seconds. 4. Repeat 2 to 4 times. Shoulder internal rotation with towel    1. Hold a towel above and behind your head with the arm that is not sore. 2. With your sore arm, reach behind your back and grasp the towel. 3. With the arm above your head, pull the towel upward. Do this until you feel a stretch on the front and outside of your sore shoulder. 4. Hold 15 to 30 seconds. 5. Repeat 2 to 4 times. Shoulder blade squeeze    1. Stand with your arms at your sides, and squeeze your shoulder blades together.  Do not raise your shoulders up as you squeeze. 2. Hold 6 seconds. 3. Repeat 8 to 12 times. Resisted rows    For this exercise, you will need elastic exercise material, such as surgical tubing or Thera-Band. 1. Put the band around a solid object at about waist level. (A bedpost will work well.) Each hand should hold an end of the band. 2. With your elbows at your sides and bent to 90 degrees, pull the band back. Your shoulder blades should move toward each other. Return to the starting position. 3. Repeat 8 to 12 times. External rotator strengthening exercise    1. Start by tying a piece of elastic exercise material to a doorknob. You can use surgical tubing or Thera-Band. (You may also hold one end of the band in each hand.)  2. Stand or sit with your shoulder relaxed and your elbow bent 90 degrees. Your upper arm should rest comfortably against your side. Squeeze a rolled towel between your elbow and your body for comfort. This will help keep your arm at your side. 3. Hold one end of the elastic band with the hand of the painful arm. 4. Start with your forearm across your belly. Slowly rotate the forearm out away from your body. Keep your elbow and upper arm tucked against the towel roll or the side of your body until you begin to feel tightness in your shoulder. Slowly move your arm back to where you started. 5. Repeat 8 to 12 times. Internal rotator strengthening exercise    1. Start by tying a piece of elastic exercise material to a doorknob. You can use surgical tubing or Thera-Band. 2. Stand or sit with your shoulder relaxed and your elbow bent 90 degrees. Your upper arm should rest comfortably against your side. Squeeze a rolled towel between your elbow and your body for comfort. This will help keep your arm at your side. 3. Hold one end of the elastic band in the hand of the painful arm. 4. Slowly rotate your forearm toward your body until it touches your belly. Slowly move it back to where you started.   5. Keep your elbow and upper arm firmly tucked against the towel roll or at your side. 6. Repeat 8 to 12 times. Pendulum swing    If you have pain in your back, do not do this exercise. 1. Hold on to a table or the back of a chair with your good arm. Then bend forward a little and let your sore arm hang straight down. This exercise does not use the arm muscles. Rather, use your legs and your hips to create movement that makes your arm swing freely. 2. Use the movement from your hips and legs to guide the slightly swinging arm back and forth like a pendulum (or elephant trunk). Then guide it in circles that start small (about the size of a dinner plate). Make the circles a bit larger each day, as your pain allows. 3. Do this exercise for 5 minutes, 5 to 7 times each day. 4. As you have less pain, try bending over a little farther to do this exercise. This will increase the amount of movement at your shoulder. Follow-up care is a key part of your treatment and safety. Be sure to make and go to all appointments, and call your doctor if you are having problems. It's also a good idea to know your test results and keep a list of the medicines you take. Where can you learn more? Go to http://www.gray.com/  Enter H562 in the search box to learn more about \"Shoulder Arthritis: Exercises. \"  Current as of: July 1, 2021               Content Version: 13.2  © 2006-2022 [employer]. Care instructions adapted under license by [employer] (which disclaims liability or warranty for this information). If you have questions about a medical condition or this instruction, always ask your healthcare professional. [place] [address] any warranty or liability for your use of this information.

## 2022-05-17 NOTE — PROGRESS NOTES
Veronica Khan is a 76 y.o. female who was seen in clinic today (5/17/2022) for No chief complaint on file. Assessment & Plan:   Diagnoses and all orders for this visit:    1. Peripheral edema  -     REFERRAL TO VASCULAR SURGERY    2. Coronary artery disease of native artery of native heart with stable angina pectoris [facility])  Assessment & Plan:   monitored by specialist. No acute findings meriting change in the plan        3. Paroxysmal atrial fibrillation (HCC)    4. Acquired hypothyroidism  -     T4, FREE  -     TSH 3RD GENERATION    5. Essential hypertension  -     CBC WITH AUTOMATED DIFF  -     METABOLIC PANEL, COMPREHENSIVE  -     LIPID PANEL    6. Hyperlipidemia, unspecified hyperlipidemia type  -     METABOLIC PANEL, COMPREHENSIVE  -     LIPID PANEL    I have discussed the diagnosis with the patient and the intended plan as seen in the above orders. The patient has received an after-visit summary and questions were answered concerning future plans. I have discussed medication side effects and warnings with the patient as well. Patient agreeable with above plan and verbalizes understanding. Follow-up and Dispositions    · Return if symptoms worsen or fail to improve, for keep previous scheduled appt. Subjective:   Patient states her legs were very swollen approx several weeks ago. States she was seen by her hematologist on 4/28/2022 and had duplex ordered at that time for leg swelling. Duplex completed on 4/29/2022 which returned negative. Patient states swelling has improved. She is taking lasix daily, wearing support stockings and also keeping her legs elevated. Patient further reports she does adhere to a low sodium diet. 2/23/2022  Subjective:   Hypertension ROS: taking medications as instructed, no medication side effects noted, no TIA's, no chest pain on exertion, no dyspnea on exertion, no swelling of ankles.   States she has been experiencing bilateral leg swelling and itching. Patient states she does continue to have bilateral shoulder pain. Patient is followed Dr. Guille Poe for pain management and states he is following her back pain. Patient states she has severe dry mouth. Comments she has used multiple otc treatment with minimal relief. States she also is having trouble with dry eyes. Patient states her eliquis is $450 last months. States the price fluctuates monthly. Assessment & Plan:   Diagnoses and all orders for this visit:    1. Essential hypertension  -     LIPID PANEL; Future  -     METABOLIC PANEL, COMPREHENSIVE; Future  -     CBC WITH AUTOMATED DIFF; Future    2. Severe obesity (BMI 35.0-39. 9) with comorbidity ([address]    3. Sacroiliitis ([address]  Assessment & Plan:   monitored by specialist. No acute findings meriting change in the plan    4. Pulmonary embolism without acute cor pulmonale, unspecified chronicity, unspecified pulmonary embolism type ([address]  Assessment & Plan:   monitored by specialist. No acute findings meriting change in the plan    5. Chronic bronchitis with productive mucopurulent cough ([address]  Assessment & Plan:   monitored by specialist. No acute findings meriting change in the plan    6. Chronic diastolic congestive heart failure ([address]  Assessment & Plan:   monitored by specialist. No acute findings meriting change in the plan    7. Hemochromatosis type 1 ([address]  Assessment & Plan:   monitored by specialist. No acute findings meriting change in the plan    8. Hyperlipidemia, unspecified hyperlipidemia type  -     LIPID PANEL; Future  -     METABOLIC PANEL, COMPREHENSIVE; Future    9. Acquired hypothyroidism  -     TSH 3RD GENERATION; Future  -     T4, FREE; Future    increase water intake to 3 bottles daily    Follow-up and Dispositions    · Return in about 6 months (around 8/23/2022) for HTN/HLD, in office follow up.     Lab Results   Component Value Date/Time    Sodium 139 11/08/2021 08:21 AM    Potassium 3.8 11/08/2021 08:21 AM    Chloride 102 11/08/2021 08:21 AM    CO2 30 11/08/2021 08:21 AM    Anion gap 7 11/08/2021 08:21 AM    Glucose 76 11/08/2021 08:21 AM    BUN 27 (H) 11/08/2021 08:21 AM    Creatinine 1.30 11/08/2021 08:21 AM    BUN/Creatinine ratio 21 (H) 11/08/2021 08:21 AM    GFR est AA 48 (L) 11/08/2021 08:21 AM    GFR est non-AA 40 (L) 11/08/2021 08:21 AM    Calcium 9.6 11/08/2021 08:21 AM    Bilirubin, total 0.7 11/08/2021 08:21 AM    Alk. phosphatase 74 11/08/2021 08:21 AM    Protein, total 7.3 11/08/2021 08:21 AM    Albumin 3.8 11/08/2021 08:21 AM    Globulin 3.5 11/08/2021 08:21 AM    A-G Ratio 1.1 11/08/2021 08:21 AM    ALT (SGPT) 19 11/08/2021 08:21 AM    AST (SGOT) 20 11/08/2021 08:21 AM     Lab Results   Component Value Date/Time    Cholesterol, total 186 11/08/2021 08:21 AM    HDL Cholesterol 59 11/08/2021 08:21 AM    LDL, calculated 83 11/08/2021 08:21 AM    VLDL, calculated 44 11/08/2021 08:21 AM    Triglyceride 220 (H) 11/08/2021 08:21 AM    CHOL/HDL Ratio 3.2 11/08/2021 08:21 AM     Lab Results   Component Value Date/Time    WBC 6.2 11/08/2021 08:21 AM    HGB 15.1 11/08/2021 08:21 AM    HCT 46.4 (H) 11/08/2021 08:21 AM    PLATELET 155 52/02/4355 08:21 AM    .2 (H) 11/08/2021 08:21 AM     Wt Readings from Last 3 Encounters:   02/23/22 185 lb 6.4 oz (84.1 kg)   12/03/21 177 lb (80.3 kg)   09/24/21 194 lb (88 kg)     Temp Readings from Last 3 Encounters:   02/23/22 98.1 °F (36.7 °C) (Temporal)   09/08/21 97.5 °F (36.4 °C) (Temporal)   08/20/21 97.2 °F (36.2 °C)     BP Readings from Last 3 Encounters:   02/23/22 121/61   12/03/21 109/67   09/24/21 137/70     Pulse Readings from Last 3 Encounters:   02/23/22 78   12/03/21 63   09/24/21 (!) 56     Prior to Admission medications    Medication Sig Start Date End Date Taking?  Authorizing Provider   Klor-Con M20 20 mEq tablet TAKE 1 TABLET BY MOUTH EVERY DAY 5/3/22   Keira POST NP   budesonide (PULMICORT) 0.5 mg/2 mL nbsp INHALE 2 ML VIA NEBULIZER TWICE A DAY, FILE UNDER MEDICARE PART B, ICD J41.1 5/2/22   Akanksha Mata MD   levothyroxine (SYNTHROID) 25 mcg tablet TAKE 1 TABLET BY MOUTH EVERY DAY 4/20/22   Alejolejeff Coad T, NP   pantoprazole (PROTONIX) 40 mg tablet TAKE 1 TABLET BY MOUTH EVERY DAY BEFORE BREAKFAST 3/2/22   Alejoleen Coad T, NP   apixaban (Eliquis) 5 mg tablet TAKE 2 TABLETS BY MOUTH TWICE A DAY THROUGH 8/7/21,THEN STARTING FROM 8/8 TAKE 1 TAB TWICE A DAY 2/22/22   Ashley Banuelos NP   furosemide (LASIX) 40 mg tablet Take 1 Tablet by mouth daily as needed (Edema). 1/7/22   Ashley Banuelos NP   tiZANidine (ZANAFLEX) 2 mg tablet Take 2 mg by mouth three (3) times daily. Patient not taking: Reported on 2/23/2022    Provider, Historical   rosuvastatin (CRESTOR) 40 mg tablet Take 1 Tablet by mouth daily. 12/3/21   Magnolia Anderson MD   clopidogreL (PLAVIX) 75 mg tab TAKE 1 TABLET BY MOUTH EVERY DAY 11/29/21   Ashley Banuelos NP   metoprolol tartrate (LOPRESSOR) 25 mg tablet TAKE 1/2 TABLETS BY MOUTH EVERY TWELVE (12) HOURS 11/29/21   Ashley Banuelos NP   formoterol (Perforomist) 20 mcg/2 mL nebu neb solution 2 mL by Nebulization route two (2) times a day. File under Medicare part B, ICD J41.1, Z86.711  Patient not taking: Reported on 2/23/2022 8/12/21   Akanksha Mata MD   acetaminophen (TYLENOL) 500 mg tablet Take 500 mg by mouth every six (6) hours as needed for Pain. Pain    Provider, Historical   carboxymethylcellulose sodium (REFRESH TEARS OP) Administer 1 Drop to both eyes daily as needed for Other. Dry eyes    Provider, Historical   peg 400-propylene glycol, PF, (Systane Hydration PF) 0.4-0.3 % dpet ophthalmic solution Administer 2 Drops to both eyes four (4) times daily as needed for Ocular Dryness. Dry eyes    Provider, Historical   nitroglycerin (NITROSTAT) 0.4 mg SL tablet 1 Tablet by SubLINGual route every five (5) minutes as needed for Chest Pain. Up to 3 doses.  8/2/21   Jasmyn Benites MD   polyethylene glycol (MIRALAX) 17 gram packet Take 1 Packet by mouth daily. Patient not taking: Reported on 2/23/2022 8/3/21   Diane Blum MD   albuterol (PROVENTIL HFA, VENTOLIN HFA, PROAIR HFA) 90 mcg/actuation inhaler Take 2 Puffs by inhalation every four (4) hours as needed for Wheezing. 7/13/21   Reynaldo POST NP   oxyCODONE IR (ROXICODONE) 5 mg immediate release tablet Take 5 mg by mouth three (3) times daily as needed for Pain. Pain    Provider, Historical   albuterol (PROVENTIL VENTOLIN) 2.5 mg /3 mL (0.083 %) nebu 3 mL by Nebulization route every six (6) hours as needed for Wheezing or Shortness of Breath. 1/7/20   Fany Carson NP   inhalational spacing device (ACE AEROSOL CLOUD ENHANCER) 1 Each by Does Not Apply route as needed. 2/14/19   Tiffany Hernandez MD     The following sections were reviewed & updated as appropriate: PMH, PSH, FH, and SH. Review of Systems   Constitutional: Negative for activity change, appetite change, chills, fatigue and fever. Respiratory: Negative for chest tightness and shortness of breath. Cardiovascular: Negative for chest pain. Neurological: Negative for dizziness and headaches. Objective:     Visit Vitals  BP (!) 141/68 (BP 1 Location: Left upper arm, BP Patient Position: Sitting, BP Cuff Size: Large adult)   Pulse 70   Temp 98 °F (36.7 °C) (Temporal)   Resp 18   Ht 5' 1\" (1.549 m)   Wt 184 lb 6.4 oz (83.6 kg)   SpO2 98%   BMI 34.84 kg/m²        Physical Exam  Constitutional:       General: She is not in acute distress. Appearance: She is well-developed. HENT:      Head: Normocephalic and atraumatic. Neck:      Vascular: No carotid bruit. Cardiovascular:      Rate and Rhythm: Normal rate and regular rhythm. Heart sounds: Normal heart sounds. No murmur heard. No friction rub. No gallop. Pulmonary:      Effort: Pulmonary effort is normal.      Breath sounds: Normal breath sounds. No decreased breath sounds, wheezing, rhonchi or rales.    Abdominal:      General: Bowel sounds are normal.      Palpations: Abdomen is soft. Musculoskeletal:      Cervical back: Normal range of motion and neck supple. Right lower le+ Pitting Edema present. Left lower le+ Pitting Edema present. Lymphadenopathy:      Cervical: No cervical adenopathy. Skin:     General: Skin is warm and dry. Neurological:      Mental Status: She is alert and oriented to person, place, and time. Disclaimer: The patient understands our medical plan. Alternatives have been explained and offered. The risks, benefits and significant side effects of all medications have been reviewed. Anticipated time course and progression of condition reviewed. All questions have been addressed. She is encouraged to employ the information provided in the after visit summary, which was reviewed. Where applicable, she is instructed to call the clinic if she has not been notified either by phone or through [address] with the results of her tests or with an appointment plan for any referrals within 1 week(s). No news is not good news; it's no news. The patient  is to call if her condition worsens or fails to improve or if significant side effects are experienced. Aspects of this note may have been generated using voice recognition software. Despite editing, there may be unrecognized errors.        James Sandoval NP

## 2022-05-20 ENCOUNTER — HOSPITAL ENCOUNTER (OUTPATIENT)
Dept: LAB | Age: 77
Discharge: HOME | End: 2022-05-20
Payer: MEDICARE

## 2022-05-20 DIAGNOSIS — E78.00 HYPERCHOLESTEREMIA: ICD-10-CM

## 2022-05-20 DIAGNOSIS — I50.32 CHRONIC DIASTOLIC CONGESTIVE HEART FAILURE (HCC): ICD-10-CM

## 2022-05-20 LAB
ALBUMIN SERPL-MCNC: 3.4 G/DL (ref 3.4–5)
ALBUMIN/GLOB SERPL: 1.1 {RATIO} (ref 0.8–1.7)
ALP SERPL-CCNC: 84 U/L (ref 45–117)
ALT SERPL-CCNC: 20 U/L (ref 13–56)
ANION GAP SERPL CALC-SCNC: 6 MMOL/L (ref 3–18)
AST SERPL-CCNC: 20 U/L (ref 10–38)
BILIRUB DIRECT SERPL-MCNC: 0.3 MG/DL (ref 0–0.2)
BILIRUB SERPL-MCNC: 0.8 MG/DL (ref 0.2–1)
BUN SERPL-MCNC: 21 MG/DL (ref 7–18)
BUN/CREAT SERPL: 18 (ref 12–20)
CALCIUM SERPL-MCNC: 9.5 MG/DL (ref 8.5–10.1)
CHLORIDE SERPL-SCNC: 108 MMOL/L (ref 100–111)
CHOLEST SERPL-MCNC: 143 MG/DL
CO2 SERPL-SCNC: 28 MMOL/L (ref 21–32)
CREAT SERPL-MCNC: 1.15 MG/DL (ref 0.6–1.3)
GLOBULIN SER CALC-MCNC: 3.2 G/DL (ref 2–4)
GLUCOSE SERPL-MCNC: 88 MG/DL (ref 74–99)
HDLC SERPL-MCNC: 73 MG/DL (ref 40–60)
HDLC SERPL: 2 {RATIO} (ref 0–5)
LDLC SERPL CALC-MCNC: 48.6 MG/DL (ref 0–100)
LIPID PROFILE,FLP: ABNORMAL
POTASSIUM SERPL-SCNC: 3.9 MMOL/L (ref 3.5–5.5)
PROT SERPL-MCNC: 6.6 G/DL (ref 6.4–8.2)
SODIUM SERPL-SCNC: 142 MMOL/L (ref 136–145)
TRIGL SERPL-MCNC: 107 MG/DL (ref ?–150)
VLDLC SERPL CALC-MCNC: 21.4 MG/DL

## 2022-05-20 PROCEDURE — 80048 BASIC METABOLIC PNL TOTAL CA: CPT

## 2022-05-20 PROCEDURE — 80061 LIPID PANEL: CPT

## 2022-05-20 PROCEDURE — 80076 HEPATIC FUNCTION PANEL: CPT

## 2022-05-20 PROCEDURE — 36415 COLL VENOUS BLD VENIPUNCTURE: CPT

## 2022-05-31 ENCOUNTER — OFFICE VISIT (OUTPATIENT)
Dept: VASCULAR SURGERY | Age: 77
End: 2022-05-31
Payer: MEDICARE

## 2022-05-31 VITALS
HEART RATE: 77 BPM | HEIGHT: 61 IN | SYSTOLIC BLOOD PRESSURE: 138 MMHG | OXYGEN SATURATION: 97 % | BODY MASS INDEX: 34.8 KG/M2 | WEIGHT: 184.3 LBS | DIASTOLIC BLOOD PRESSURE: 88 MMHG

## 2022-05-31 DIAGNOSIS — I87.2 VENOUS (PERIPHERAL) INSUFFICIENCY: Primary | ICD-10-CM

## 2022-05-31 DIAGNOSIS — I89.0 LYMPHEDEMA: ICD-10-CM

## 2022-05-31 PROCEDURE — G8752 SYS BP LESS 140: HCPCS | Performed by: PHYSICIAN ASSISTANT

## 2022-05-31 PROCEDURE — 1101F PT FALLS ASSESS-DOCD LE1/YR: CPT | Performed by: PHYSICIAN ASSISTANT

## 2022-05-31 PROCEDURE — G8432 DEP SCR NOT DOC, RNG: HCPCS | Performed by: PHYSICIAN ASSISTANT

## 2022-05-31 PROCEDURE — G8400 PT W/DXA NO RESULTS DOC: HCPCS | Performed by: PHYSICIAN ASSISTANT

## 2022-05-31 PROCEDURE — 1090F PRES/ABSN URINE INCON ASSESS: CPT | Performed by: PHYSICIAN ASSISTANT

## 2022-05-31 PROCEDURE — G8417 CALC BMI ABV UP PARAM F/U: HCPCS | Performed by: PHYSICIAN ASSISTANT

## 2022-05-31 PROCEDURE — 99204 OFFICE O/P NEW MOD 45 MIN: CPT | Performed by: PHYSICIAN ASSISTANT

## 2022-05-31 PROCEDURE — 1123F ACP DISCUSS/DSCN MKR DOCD: CPT | Performed by: PHYSICIAN ASSISTANT

## 2022-05-31 PROCEDURE — G8536 NO DOC ELDER MAL SCRN: HCPCS | Performed by: PHYSICIAN ASSISTANT

## 2022-05-31 PROCEDURE — G8754 DIAS BP LESS 90: HCPCS | Performed by: PHYSICIAN ASSISTANT

## 2022-05-31 PROCEDURE — G8427 DOCREV CUR MEDS BY ELIG CLIN: HCPCS | Performed by: PHYSICIAN ASSISTANT

## 2022-05-31 RX ORDER — METOPROLOL TARTRATE 25 MG/1
TABLET, FILM COATED ORAL
Qty: 90 TABLET | Refills: 1 | OUTPATIENT
Start: 2022-05-31

## 2022-05-31 RX ORDER — METOPROLOL TARTRATE 25 MG/1
12.5 TABLET, FILM COATED ORAL 2 TIMES DAILY
Qty: 90 TABLET | Refills: 1 | Status: SHIPPED | OUTPATIENT
Start: 2022-05-31 | End: 2022-08-23 | Stop reason: SDUPTHER

## 2022-05-31 NOTE — PROGRESS NOTES
Chief Complaint   Patient presents with    New Patient    Swelling         Impression:  76 y.o. female with lateral lower extremity venous insufficiency with lymphedema component. History and Physical    Mando Cleaning is a pleasant 76y.o. year old female who presents today for evaluation for bilateral lower extremity leg edema, fatigue tired legs, and increasing pain with longer distance ambulation. Patient presents today in her shilo chair. Patient states that she does ambulate around the house, she is able to get from her bed to the bathroom and complete her ADLs, but if she needs to go outside she uses her walker or her shilo chair. Patient denies any leg pain with ambulation, but states that by the end of the time of her walk her legs are extremely heavy and tired. She reports that she has had DVTs in the past and her legs get swollen just like when she had her DVT. She reported to her primary care and was sent for imaging which was negative for blood clots. Patient states that she has been begging her primary care doctor to help alleviate her lower extremity edema, and was finally sent to vascular surgery. She denies any chest pain or shortness of breath. Denies any dyspnea on exertion. She does report that she has gotten weak and because of lack of mobility but states that she has not extremely bad back. Patient reports that she has a stimulator presently which does help but she cannot walk longer distances. Patient states that she used to wear compression stockings in the past but has not worn any recently. States that the compression stockings were so tight that they bothered her and she could not be compliant with her usage. Patient states that she does try to elevate her legs is much as possible when sitting in a recliner. She denies elevating her legs above the level of her heart.   Patient states that she used to have a hospital bed that raised up but it has busted and she has not not had 1 for past couple years. She does have a hard time laying in bed flat because of her back and states that sometimes she ends up sleeping in a recliner. She denies elevating her legs above the level of her heart. Patient has a history of some lower extremity ulcerations, none presently. She denies any issues with healing any wounds or cuts in her lower extremities in the past.  She continues to take her daily meds as recommended including Plavix. She does bruise easily but denies any bleeding issues. As stated she tries to remain active, is mainly debilitated due to her back, and is now concerned that her legs are getting worse. She denies any leg pain at rest.  Denies any recent ulceration skin rashes or lesions.   Past Medical History:   Diagnosis Date    Abnormal chest CT 2/6/2020    Adopted     Arthritis     Balance problems     Chronic cough 2/6/2020    MCBRIDE (dyspnea on exertion) 2/28/2019    GERD (gastroesophageal reflux disease)     Gout     Hemochromatosis     High cholesterol     Hypertension     Hypothyroidism     Left knee pain     Left shoulder pain     Low blood potassium     Lumbar pain     Pain of left sacroiliac joint     Shoulder impingement, right, with rotator cuff strain      Patient Active Problem List   Diagnosis Code    Shoulder impingement, right, with rotator cuff strain M75.40    Spinal stenosis M48.00    Sacroiliitis ([facility]) M46.1    High cholesterol E78.00    Hypertension I10    Hemochromatosis type 1 ([facility] E83.110    Obesity, morbid ([facility] E66.01    MCBRIDE (dyspnea on exertion) R06.00    CAP (community acquired pneumonia) J18.9    Sepsis ([facility] A41.9    Hypotension I95.9    Atrial fibrillation with RVR ([facility]) I48.91    Abnormal chest CT R93.89    Chronic cough R05.3    Pulmonary embolism without acute cor pulmonale ([facility]) I26.99    Chronic bronchitis with productive mucopurulent cough ([facility]) J41.1    STEMI (ST elevation myocardial infarction) ([facility] I21.3    History of pulmonary embolism Z86.711    History of DVT (deep vein thrombosis) Z86.718    Chronic diastolic congestive heart failure (HCC) I50.32    Coronary artery disease of native artery of native heart with stable angina pectoris ([facility] I25.118     Past Surgical History:   Procedure Laterality Date    HX CHOLECYSTECTOMY      HX KNEE REPLACEMENT Bilateral     R knee/ Lknee and femur    HX ORTHOPAEDIC      R trigger finger     HX OTHER SURGICAL      Right little finger    HX OTHER SURGICAL      Right wrist, replaced unlnar).  HX OTHER SURGICAL      Both knees    HX OTHER SURGICAL      Left femur    HX OTHER SURGICAL      Trigger finger surgery    HX OTHER SURGICAL      Left knee replacment revision     HX ROTATOR CUFF REPAIR Right     HX TONSILLECTOMY       Current Outpatient Medications   Medication Sig Dispense Refill    metoprolol tartrate (LOPRESSOR) 25 mg tablet Take 0.5 Tablets by mouth two (2) times a day. 90 Tablet 1    Klor-Con M20 20 mEq tablet TAKE 1 TABLET BY MOUTH EVERY DAY 90 Tablet 1    levothyroxine (SYNTHROID) 25 mcg tablet TAKE 1 TABLET BY MOUTH EVERY DAY 90 Tablet 2    pantoprazole (PROTONIX) 40 mg tablet TAKE 1 TABLET BY MOUTH EVERY DAY BEFORE BREAKFAST 90 Tablet 1    apixaban (Eliquis) 5 mg tablet TAKE 2 TABLETS BY MOUTH TWICE A DAY THROUGH 8/7/21,THEN STARTING FROM 8/8 TAKE 1 TAB TWICE A DAY 60 Tablet 5    furosemide (LASIX) 40 mg tablet Take 1 Tablet by mouth daily as needed (Edema). 90 Tablet 1    rosuvastatin (CRESTOR) 40 mg tablet Take 1 Tablet by mouth daily. 180 Tablet 1    clopidogreL (PLAVIX) 75 mg tab TAKE 1 TABLET BY MOUTH EVERY DAY 90 Tablet 1    acetaminophen (TYLENOL) 500 mg tablet Take 500 mg by mouth every six (6) hours as needed for Pain. Pain      carboxymethylcellulose sodium (REFRESH TEARS OP) Administer 1 Drop to both eyes daily as needed for Other.  Dry eyes      peg 400-propylene glycol, PF, (Systane Hydration PF) 0.4-0.3 % dpet ophthalmic solution Administer 2 Drops to both eyes four (4) times daily as needed for Ocular Dryness. Dry eyes      nitroglycerin (NITROSTAT) 0.4 mg SL tablet 1 Tablet by SubLINGual route every five (5) minutes as needed for Chest Pain. Up to 3 doses. 20 Tablet 0    albuterol (PROVENTIL HFA, VENTOLIN HFA, PROAIR HFA) 90 mcg/actuation inhaler Take 2 Puffs by inhalation every four (4) hours as needed for Wheezing. 1 Inhaler 2    oxyCODONE IR (ROXICODONE) 5 mg immediate release tablet Take 5 mg by mouth three (3) times daily as needed for Pain. Pain      inhalational spacing device (ACE AEROSOL CLOUD ENHANCER) 1 Each by Does Not Apply route as needed.  1 Device 3     Allergies   Allergen Reactions    Ciprofloxacin Other (comments)     Achilles tendonitis/leg pain and swelling    Indomethacin Anaphylaxis, Hives and Swelling    Tomato Other (comments)     Heart burn     Social History     Socioeconomic History    Marital status: SINGLE     Spouse name: Not on file    Number of children: Not on file    Years of education: Not on file    Highest education level: Not on file   Occupational History    Not on file   Tobacco Use    Smoking status: Former Smoker     Packs/day: 0.50     Years: 2.00     Pack years: 1.00     Start date: 5     Quit date: 1974     Years since quittin.1    Smokeless tobacco: Never Used    Tobacco comment: quit 20 years ago   Vaping Use    Vaping Use: Never used   Substance and Sexual Activity    Alcohol use: No    Drug use: No    Sexual activity: Not on file     Comment: post menopausal   Other Topics Concern    Not on file   Social History Narrative    Not on file     Social Determinants of Health     Financial Resource Strain:     Difficulty of Paying Living Expenses: Not on file   Food Insecurity:     Worried About [address] in the Last Year: Not on file    Yared of Food in the Last Year: Not on file   Transportation Needs:     Lack of Transportation (Medical): Not on file    Lack of Transportation (Non-Medical): Not on file   Physical Activity:     Days of Exercise per Week: Not on file    Minutes of Exercise per Session: Not on file   Stress:     Feeling of Stress : Not on file   Social Connections:     Frequency of Communication with Friends and Family: Not on file    Frequency of Social Gatherings with Friends and Family: Not on file    Attends Cheondo[employer] Services: Not on file    Active Member of [address] [place] [address] [address] Media or Organizations: Not on file    Attends Club or Organization Meetings: Not on file    Marital Status: Not on file   Intimate Partner Violence:     Fear of Current or Ex-Partner: Not on file    Emotionally Abused: Not on file    Physically Abused: Not on file    Sexually Abused: Not on file   Housing Stability:     Unable to Pay for Housing in the Last Year: Not on file    Number of [place] in the Last Year: Not on file    Unstable Housing in the Last Year: Not on file      Family History   Adopted: Yes       Review of Systems    General: negative for fever/chills. Ambulates short distances without leg pain but cannot ambulate longer distances due to bad back and leg heaviness. Patient reports increasing edema in both lower extremities, no recent ulcerations or skin rashes.    Eyes: negative for vision loss   HENT: negative for cold symptoms   Respiratory negative for shortness of breath   Cardiac: negative for chest pain   Vascular negative for foot pain at night    Gastrointestinal: negative for abdominal pain   Genitourinary: negative for dysuria    Endocrine: negative for excessive thirst   Skin: negative for rash   Neurological: negative for paralysis   Psychiatric: negative for depression          Physical Exam:    Visit Vitals  /88 (BP 1 Location: Right arm, BP Patient Position: Sitting)   Pulse 77   Ht 5' 1\" (1.549 m)   Wt 184 lb 4.9 oz (83.6 kg)   SpO2 97%   BMI 34.82 kg/m²      Constitutional:  Patient is well developed, well nourished, and not distressed. Brought into the room via [place] chair. Is all questions appropriately. Moves all extremities to command. Her best friend is at her side on this visit. HEENT: atraumatic, normocephalic, wearing a mask. Eyes:   Cunjunctivae clear, no scleral icterus  Neck:   No JVD present. Cardiovascular:  Normal rate, regular rhythm, normal heart sounds. No murmur heard. Pulmonary/Chest: Effort normal .  Extremities: Normal range of motion. Neurological:  he  is alert and oriented x3 . Gait normal. Motor & sensory grossly intact in all 4 limbs. Psych: Appropriate mood and affect. Skin:  Skin is warm and dry. No ulcerations  Pulses -femoral -2+ bilaterally                Popliteal -2+ bilaterally                PT/DP -albeit slightly diminished palpable pedal pulses and easily obtain Doppler signals noted. Carotid: Carotid bruits or thrills appreciated bilaterally. Plan:  Instructed on the difference between arterial versus venous insufficiency. Patient instructed on signs and symptoms of claudication and acute limb ischemia. Given that the patient has palpable pedal pulses and no claudication symptoms and no rest pain we will continue with venous insufficiency work-up. Patient instructed on the importance of compression elevation in the face of venous insufficiency. Patient is educated on proper elevation, patient educated on elevation above the level of her heart. Patient instructed that she must lay flat with 2-3 pillows under her legs to get her legs 20 to 30 inches above the level of her heart for proper elevation. Patient instructed that she would benefit greatly by a hospital bed so that she can get proper elevation. Patient states that she has a very hard time laying flat due to her back. Patient willing to try, will discuss with her family doctor about hospital bed, in the meantime she has a wedge pillow she will utilize.   Long discussion with patient about compression therapy. Patient given a prescription for compression stockings 20 to 30 mmHg, and a donning device for assistance and application. Compliance discussed at length and patient willing to try. Patient states that she has quite a few pair of compression stockings at home and she will utilize them. Brief discussion with patient about venous reflux disease and ablation therapy for such disease. Patient instructed if she was to be compliant with compression elevation I will bring her back in 3 months and perform bilateral lower extremity venous reflux imaging. Upon next visit we will discuss results and see if she is a candidate for ablation therapy. Brief discussion with patient about lymphedema as a component of her lower extremity edema. Upon next visit we will discuss with her about utilizing tactile pneumatic compression device. Patient states she understands above, is more than willing to be compliant with above recommendations, and is very pleased with her services, and is willing to proceed as planned. Follow-up in 3 months as indicated. In the meantime patient encouraged to continue to make better lifestyle choices, better nutritional choices, being compliant with compression elevation, and of course increasing activity with daily ambulation. We will discuss details with my attending. The treatment plan was reviewed with the patient in detail. The patient voiced understanding of this plan and all questions and concerns were addressed. The patient agrees with this plan. We discussed the signs and symptoms that would require earlier attention or intervention. I appreciate the opportunity to participate in the care of your patient. I will be sure to keep you informed of any subsequent changes in the treatment plan. If you have any questions or concerns, please feel free to contact me.       Stacey Rice PA-C  Vascular Physician Assistant  [phone]

## 2022-05-31 NOTE — PATIENT INSTRUCTIONS
Lymphedema: Care Instructions  Your Care Instructions     Lymphedema is fluid that builds up in the arms or legs. It is often caused by surgery to remove lymph nodes during cancer treatment, especially breast cancer surgery, which can cause fluid to build up in the arm. It can happen after radiation treatment to an area that involves lymph nodes. It also can be caused by a fractured bone or surgery to fix a fracture. And some medicines also can cause lymphedema. Some people get it for unknown reasons. Normally, lymph nodes trap bacteria and other substances as fluid flows through them. Then, the white cells in the body's defense, or immune, system can destroy the substances. But if there are few or no lymph nodes--or if the lymph system in an arm or leg has been damaged--fluid can build up in the affected arm or leg. You can take simple steps at home to help treat or prevent fluid buildup. Treatment may include raising the arm or leg to let gravity drain the fluid. You also can wear compression stockings or sleeves. Follow-up care is a key part of your treatment and safety. Be sure to make and go to all appointments, and call your doctor if you are having problems. It's also a good idea to know your test results and keep a list of the medicines you take. How can you care for yourself at home? · Wear a compression stocking or sleeve as your doctor suggests. It can help keep fluid from pooling in an arm or leg. Wear it during air travel. · Prop up the swollen arm or leg on a pillow anytime you sit or lie down. Try to keep it above the level of your heart. This will help reduce swelling. · Avoid crossing your legs if your legs are swollen. · Get some exercise on most days of the week. Increase the intensity of exercise slowly. Water aerobics can help reduce swelling by helping fluid move around. Wear your compression stocking or sleeve during exercise, but not during water exercise.   · See a physical therapist. He or she can teach you how to do self-massage to help fluid move around. You also can learn what activities would be best for you. · Keep your feet clean and wear clean socks or stockings every day. Check your feet often for signs of infection, such as redness or heat. Do not walk barefoot. · If you have had lymph nodes removed from under your arm:  ? Do not have blood drawn from the arm on the side of the lymph node surgery. ? Do not allow a blood pressure cuff to be placed on that arm. If you are in the hospital, make sure your nurse and other hospital staff know of your condition. ? Wear gloves when gardening or doing other activities that may lead to cuts on your fingers or hands. · If you have had lymph nodes removed from your groin:  ? Bathe your feet daily in lukewarm, not hot, water. Use a mild soap that has a moisturizer, or use a moisturizer separately. ? Check your feet for blisters or cuts. ? Wear comfortable and supportive shoes that fit properly. ? Wear the correct size of panty hose and stockings. Avoid garters or knee-high or thigh-high stockings. · Ask your doctor how to treat any cuts, scratches, insect bites, or other injuries that may occur. · Use sunscreen and insect repellent when outdoors to protect your skin from sunburn and insect bites. · Wear medical alert jewelry that says you have lymphedema. You can buy these at most drugstores and on the Internet. When should you call for help? Call your doctor now or seek immediate medical care if:    · You have signs of infection, such as:  ? Increased pain, swelling, warmth, or redness. ? Red streaks leading from the area. ? Pus draining from the area. ? A fever. Watch closely for changes in your health, and be sure to contact your doctor if:    · You have new or worse symptoms from lymphedema.     · You do not get better as expected. Where can you learn more?   Go to http://miki-ethan.info/  Enter V398 in the search box to learn more about \"Lymphedema: Care Instructions. \"  Current as of: September 8, 2021               Content Version: 13.2  © 2006-2022 [employer]. Care instructions adapted under license by [employer] (which disclaims liability or warranty for this information). If you have questions about a medical condition or this instruction, always ask your healthcare professional. [address] any warranty or liability for your use of this information. Learning About Venous Insufficiency  What is it? Venous insufficiency is a problem with the flow of blood from the veins of the legs back to the heart. It's also called chronic venous insufficiency or chronic venous stasis. Your veins bring blood back to the heart after it flows through your body. Veins have valves that keep the blood moving in one direction--toward the heart. But with venous insufficiency, the veins of the legs might not work as they should. This can allow blood to leak backward. Fluid can pool in the legs. This can lead to problems that include varicose veins. What causes it? Venous insufficiency is sometimes caused by deep vein thrombosis and high blood pressure inside leg veins. You are more likely to have venous insufficiency if you:  · Are older. · Are female. · Are overweight. · Don't get enough physical activity. · Smoke. · Have a family history of varicose veins. What are the symptoms? Symptoms of venous insufficiency affect the legs. They may include:  · Swelling, often in the ankles. · A rash. · Varicose veins. · Itching. · Cramping. · Skin sores (ulcers). · Aching or a feeling of heaviness. · Changes in skin color. How is it diagnosed?   Your doctor can diagnose venous insufficiency by examining your legs and by using a type of ultrasound test (duplex Doppler) to find out how well blood is flowing in your legs. How is it treated? To reduce swelling and relieve pain caused by venous insufficiency, you can wear compression stockings. They are tighter at the ankles than at the top of the legs. They also can help venous skin ulcers heal. But there are different types of stockings, and they need to fit right. So your doctor will recommend what you need. You also can try to:  · Get more exercise, especially walking. It can increase blood flow. · Avoid standing still or sitting for a long time, which can make the fluid pool in your legs. · Try not to sit with your legs crossed at the knee. · Keep your legs raised above your heart when you're lying down. This reduces swelling. If these treatments don't work, you may need medicine or a procedure to help relieve symptoms. Procedures might be done to close the vein, to remove the vein, or to improve blood flow. Follow-up care is a key part of your treatment and safety. Be sure to make and go to all appointments, and call your doctor if you are having problems. It's also a good idea to know your test results and keep a list of the medicines you take. Current as of: July 6, 2021               Content Version: 13.2  © 2006-2022 [employer], [employer]. Care instructions adapted under license by [employer] (which disclaims liability or warranty for this information). If you have questions about a medical condition or this instruction, always ask your healthcare professional. [place] [address] any warranty or liability for your use of this information.

## 2022-05-31 NOTE — PROGRESS NOTES
1. Have you been to the ER, urgent care clinic since your last visit? No      Hospitalized since your last visit? No     2. Have you seen or consulted any other health care providers outside of the [address] [place] [address] since your last visit? Include any pap smears or colon screening.   No

## 2022-06-03 ENCOUNTER — OFFICE VISIT (OUTPATIENT)
Dept: CARDIOLOGY CLINIC | Age: 77
End: 2022-06-03
Payer: MEDICARE

## 2022-06-03 VITALS
HEART RATE: 68 BPM | WEIGHT: 180 LBS | OXYGEN SATURATION: 94 % | BODY MASS INDEX: 33.99 KG/M2 | HEIGHT: 61 IN | SYSTOLIC BLOOD PRESSURE: 127 MMHG | DIASTOLIC BLOOD PRESSURE: 69 MMHG

## 2022-06-03 DIAGNOSIS — I50.32 CHRONIC DIASTOLIC CONGESTIVE HEART FAILURE (HCC): Primary | ICD-10-CM

## 2022-06-03 DIAGNOSIS — I25.118 CORONARY ARTERY DISEASE OF NATIVE ARTERY OF NATIVE HEART WITH STABLE ANGINA PECTORIS (HCC): ICD-10-CM

## 2022-06-03 DIAGNOSIS — E78.00 HYPERCHOLESTEREMIA: ICD-10-CM

## 2022-06-03 DIAGNOSIS — I48.0 PAROXYSMAL ATRIAL FIBRILLATION (HCC): ICD-10-CM

## 2022-06-03 PROCEDURE — G8752 SYS BP LESS 140: HCPCS | Performed by: INTERNAL MEDICINE

## 2022-06-03 PROCEDURE — 1090F PRES/ABSN URINE INCON ASSESS: CPT | Performed by: INTERNAL MEDICINE

## 2022-06-03 PROCEDURE — G8400 PT W/DXA NO RESULTS DOC: HCPCS | Performed by: INTERNAL MEDICINE

## 2022-06-03 PROCEDURE — 99214 OFFICE O/P EST MOD 30 MIN: CPT | Performed by: INTERNAL MEDICINE

## 2022-06-03 PROCEDURE — 1101F PT FALLS ASSESS-DOCD LE1/YR: CPT | Performed by: INTERNAL MEDICINE

## 2022-06-03 PROCEDURE — G8754 DIAS BP LESS 90: HCPCS | Performed by: INTERNAL MEDICINE

## 2022-06-03 PROCEDURE — 1123F ACP DISCUSS/DSCN MKR DOCD: CPT | Performed by: INTERNAL MEDICINE

## 2022-06-03 PROCEDURE — G8432 DEP SCR NOT DOC, RNG: HCPCS | Performed by: INTERNAL MEDICINE

## 2022-06-03 PROCEDURE — G8427 DOCREV CUR MEDS BY ELIG CLIN: HCPCS | Performed by: INTERNAL MEDICINE

## 2022-06-03 PROCEDURE — G8417 CALC BMI ABV UP PARAM F/U: HCPCS | Performed by: INTERNAL MEDICINE

## 2022-06-03 PROCEDURE — G8536 NO DOC ELDER MAL SCRN: HCPCS | Performed by: INTERNAL MEDICINE

## 2022-06-03 NOTE — PROGRESS NOTES
HISTORY OF PRESENT ILLNESS  Janny Davis is a 76 y.o. female. Follow-up of CAD, CHF, hypertension, hyperlipidemia, status post STEMI and PCI in 7/21. History of DVT and PE     8/2021  Patient presents to follow-up for hospitalization to [facility] 7/30-8/2/2021 for STEMI. Status post RCA stent on 07/30/2021. She has other PMH of hypertension, hyperlipidemia, and acute lower extremity DVT. She is tolerating all medications. She denies chest pain, shortness of breath or palpitations. She continues to have bilateral lower extremity edema.           Follow-up  Associated symptoms include shortness of breath. Pertinent negatives include no chest pain. Shortness of Breath  The history is provided by the patient. This is a new problem. The problem occurs intermittently. The current episode started more than 1 week ago. The problem has been gradually improving. Associated symptoms include leg swelling. Pertinent negatives include no cough, no wheezing, no PND, no orthopnea, no chest pain, no vomiting and no claudication. The problem's precipitants include exercise ( ft; 6/22 limited by knee pain). Leg Swelling  The history is provided by the patient. This is a new problem. The current episode started more than 2 days ago. The problem occurs every several days. The problem has been rapidly improving. Associated symptoms include shortness of breath. Pertinent negatives include no chest pain. The symptoms are aggravated by standing. The symptoms are relieved by sleep and medications.      Family History   Adopted: Yes       Past Medical History:   Diagnosis Date    Abnormal chest CT 2/6/2020    Adopted     Arthritis     Balance problems     Chronic cough 2/6/2020    MCBRIDE (dyspnea on exertion) 2/28/2019    GERD (gastroesophageal reflux disease)     Gout     Hemochromatosis     High cholesterol     Hypertension     Hypothyroidism     Left knee pain     Left shoulder pain     Low blood potassium     Lumbar pain     Pain of left sacroiliac joint     Shoulder impingement, right, with rotator cuff strain        Past Surgical History:   Procedure Laterality Date    HX CHOLECYSTECTOMY      HX KNEE REPLACEMENT Bilateral     R knee/ Lknee and femur    HX ORTHOPAEDIC      R trigger finger     HX OTHER SURGICAL      Right little finger    HX OTHER SURGICAL      Right wrist, replaced unlnar).  HX OTHER SURGICAL      Both knees    HX OTHER SURGICAL      Left femur    HX OTHER SURGICAL      Trigger finger surgery    HX OTHER SURGICAL      Left knee replacment revision     HX ROTATOR CUFF REPAIR Right     HX TONSILLECTOMY         Social History     Tobacco Use    Smoking status: Former Smoker     Packs/day: 0.50     Years: 2.00     Pack years: 1.00     Start date:      Quit date:      Years since quittin.4    Smokeless tobacco: Never Used    Tobacco comment: quit 20 years ago   Substance Use Topics    Alcohol use: No       Allergies   Allergen Reactions    Ciprofloxacin Other (comments)     Achilles tendonitis/leg pain and swelling    Indomethacin Anaphylaxis, Hives and Swelling    Tomato Other (comments)     Heart burn       Prior to Admission medications    Medication Sig Start Date End Date Taking? Authorizing Provider   metoprolol tartrate (LOPRESSOR) 25 mg tablet Take 0.5 Tablets by mouth two (2) times a day.  22  Yes Glenn Pickett NP   Klor-Con M20 20 mEq tablet TAKE 1 TABLET BY MOUTH EVERY DAY 5/3/22  Yes Adriana Spears NP   levothyroxine (SYNTHROID) 25 mcg tablet TAKE 1 TABLET BY MOUTH EVERY DAY 22  Yes Adriana Spears NP   pantoprazole (PROTONIX) 40 mg tablet TAKE 1 TABLET BY MOUTH EVERY DAY BEFORE BREAKFAST 3/2/22  Yes Adriana Spears NP   apixaban (Eliquis) 5 mg tablet TAKE 2 TABLETS BY MOUTH TWICE A DAY THROUGH 21,THEN STARTING FROM  TAKE 1 TAB TWICE A DAY 22  Yes Ashley Banuelos NP   furosemide (LASIX) 40 mg tablet Take 1 Tablet by mouth daily as needed (Edema). 1/7/22  Yes Ashley Banuelos NP   rosuvastatin (CRESTOR) 40 mg tablet Take 1 Tablet by mouth daily. 12/3/21  Yes Gris Gongora MD   clopidogreL (PLAVIX) 75 mg tab TAKE 1 TABLET BY MOUTH EVERY DAY 11/29/21  Yes Ashley Banuelos NP   acetaminophen (TYLENOL) 500 mg tablet Take 500 mg by mouth every six (6) hours as needed for Pain. Pain   Yes Provider, Historical   carboxymethylcellulose sodium (REFRESH TEARS OP) Administer 1 Drop to both eyes daily as needed for Other. Dry eyes   Yes Provider, Historical   peg 400-propylene glycol, PF, (Systane Hydration PF) 0.4-0.3 % dpet ophthalmic solution Administer 2 Drops to both eyes four (4) times daily as needed for Ocular Dryness. Dry eyes   Yes Provider, Historical   nitroglycerin (NITROSTAT) 0.4 mg SL tablet 1 Tablet by SubLINGual route every five (5) minutes as needed for Chest Pain. Up to 3 doses. 8/2/21  Yes Donte Araya MD   albuterol (PROVENTIL HFA, VENTOLIN HFA, PROAIR HFA) 90 mcg/actuation inhaler Take 2 Puffs by inhalation every four (4) hours as needed for Wheezing. 7/13/21  Yes Екатерина Oakley NP   oxyCODONE IR (ROXICODONE) 5 mg immediate release tablet Take 5 mg by mouth three (3) times daily as needed for Pain. Pain   Yes Provider, Historical   inhalational spacing device (ACE AEROSOL CLOUD ENHANCER) 1 Each by Does Not Apply route as needed. 2/14/19  Yes Jeremy Lazo MD         Visit Vitals  /69 (BP 1 Location: Left upper arm, BP Patient Position: Sitting, BP Cuff Size: Adult)   Pulse 68   Ht 5' 1\" (1.549 m)   Wt 81.6 kg (180 lb)   SpO2 94%   BMI 34.01 kg/m²       Review of Systems   Constitutional: Negative for malaise/fatigue. HENT: Negative for congestion. Eyes: Negative for double vision and redness. Respiratory: Positive for shortness of breath. Negative for cough and wheezing. Cardiovascular: Positive for leg swelling. Negative for chest pain, palpitations, orthopnea, claudication and PND. Gastrointestinal: Negative for nausea and vomiting. Musculoskeletal: Negative for falls. Neurological: Negative for dizziness. Endo/Heme/Allergies: Does not bruise/bleed easily. Physical Exam  Vitals and nursing note reviewed. Constitutional:       Appearance: She is well-developed. She is obese. Neck:      Vascular: No JVD. Cardiovascular:      Rate and Rhythm: Normal rate and regular rhythm. Pulses: Intact distal pulses. Heart sounds: Normal heart sounds. No murmur heard. No friction rub. No gallop. Pulmonary:      Effort: Pulmonary effort is normal. No respiratory distress. Breath sounds: Normal breath sounds. No wheezing or rales. Chest:      Chest wall: No tenderness. Abdominal:      General: There is no distension. Palpations: Abdomen is soft. There is no mass. Tenderness: There is no abdominal tenderness. Musculoskeletal:         General: Normal range of motion. Right lower leg: Edema (trace to 1+) present. Left lower leg: Edema (trace to 1+) present. Skin:     General: Skin is warm and dry. Neurological:      Mental Status: She is alert and oriented to person, place, and time. 7/30/2021  Indications    ST elevation myocardial infarction (STEMI) involving other coronary artery of inferior wall (HCC) [I21.19 (ICD-10-CM)]   Conclusion       · Single-vessel coronary artery disease with totally occluded proximal right coronary artery. · Successful thrombectomy of the right coronary artery due to extensive thrombus burden. · Successful PCI of the proximal right coronary artery with residual 0% stenosis deploying a drug-eluting stent as above. Delaying factors in this patient were multiple trials at peripheral IV in the ER and only when IV was available. We successfully cannulated the right femoral vein in addition to right femoral artery without any complications in the lab for venous access.   The wiring of the artery was challenging because of the possible  as described above. Due to extensive thrombus in the artery, thrombectomy was used. Aggressive risk factor med modification and medical treatment should continue. Further work-up of this chronic severe edema which may be related to bilateral DVTs in the past and chronic CHF.   7/21 echo  Interpretation Summary    · Image quality for this study was technically difficult with poor image quality. · Contrast used: DEFINITY. · LV: Calculated LVEF is 50%. Visually measured ejection fraction. Normal cavity size and wall thickness. Low normal systolic function. Inconclusive left ventricular diastolic function. · Ejection fraction may be underestimated due to poor image quality. Comparison Study Information      Prior Study    There is a prior study available for comparison. Prior study date: 7/23/2019. As compared to the previous study, there are significant changes. Pulmonary hypertension has decreased. ASSESSMENT and PLAN    Results for Norma Brush (MRN [id number]) as of 6/3/2022 13:10   Ref. Range 11/8/2021 08:21 12/9/2021 10:49 4/29/2022 11:46 5/17/2022 15:44 5/20/2022 07:26   Triglyceride Latest Ref Range: <150 MG/ (H)   134 107   Cholesterol, total Latest Ref Range: <200 MG/   149 143   HDL Cholesterol Latest Ref Range: 40 - 60 MG/DL 59   63 (H) 73 (H)   CHOL/HDL Ratio Latest Ref Range: 0 - 5.0   3.2   2.4 2.0   VLDL, calculated Latest Units: MG/DL 44   26.8 21.4   LDL, calculated Latest Ref Range: 0 - 100 MG/DL 83   59.2 48.6       8/2021  Recent STEMI. Cardiac catheterization and echocardiogram reviewed with patient EF 50% by echo. Continue triple therapy with Eliquis, aspirin, and Plavix due to FARHAT. After 1 month plan to discontinue aspirin. Continued on DVT dose Eliquis. Continue PPI due to need for triple therapy. Referral entered for cardiac rehab. Will begin low-dose Lasix due to bilateral lower extremity edema.   Repeat BMP in 1 week.     9/24/2021 CAD stable. Not going to rehab due to back pain. Encouraged to walk by self. AFib staying in sinus rhythm. Blood pressure is controlled. CHF is improving as she walks better but still has NYHA class III. Increase Lasix and follow-up electrolytes. Diet weight and exercise discussed. Mediterranean diet guidelines printed. 12/3/2021 CHF is much better but she is still in NYHA class III clinically which is likely due to deconditioning and leg problems. Recommended and encouraged to exercise little more regularly and may need to get a stationary bike if possible. A. fib is staying in sinus rhythm clinically continue anticoagulation. CAD stable. Blood pressure is controlled. Lipids have gone back up. Increase Lipitor and follow the labs. Diagnoses and all orders for this visit:    1. Chronic diastolic congestive heart failure (HCC)  -     METABOLIC PANEL, BASIC; Future    2. Paroxysmal atrial fibrillation (HCC)    3. Coronary artery disease of native artery of native heart with stable angina pectoris ([facility] [address]    4. Hypercholesteremia        Pertinent laboratory and test data reviewed and discussed with patient. See patient instructions also for other medical advice given    There are no discontinued medications. Follow-up and Dispositions    · Return in about 6 months (around 12/3/2022), or if symptoms worsen or fail to improve, for post test.       6/3/2022 CHF compensated. NYHA 2-3. Limited by arthritis. Staying in sinus rhythm. Continue anticoagulation. CAD is stable. Lipids are excellent now. Diet weight and exercise discussed and she is losing weight well gradually.

## 2022-06-03 NOTE — PATIENT INSTRUCTIONS
There are no discontinued medications. Learning About the [address] [email or web address] Diet  What is the Mediterranean diet? The Mediterranean diet is a style of eating rather than a diet plan. It features foods eaten in [place], [place], [place] and [place], and other countries along the [facility]. It emphasizes eating foods like fish, fruits, vegetables, beans, high-fiber breads and whole grains, nuts, and olive oil. This style of eating includes limited red meat, cheese, and sweets. Why choose the Mediterranean diet? A Mediterranean-style diet may improve heart health. It contains more fat than other heart-healthy diets. But the fats are mainly from nuts, unsaturated oils (such as fish oils and olive oil), and certain nut or seed oils (such as canola, soybean, or flaxseed oil). These fats may help protect the heart and blood vessels. How can you get started on the Mediterranean diet? Here are some things you can do to switch to a more Mediterranean way of eating. What to eat  · Eat a variety of fruits and vegetables each day, such as grapes, blueberries, tomatoes, broccoli, peppers, figs, olives, spinach, eggplant, beans, lentils, and chickpeas. · Eat a variety of whole-grain foods each day, such as oats, brown rice, and whole wheat bread, pasta, and couscous. · Eat fish at least 2 times a week. Try tuna, salmon, mackerel, lake trout, herring, or sardines. · Eat moderate amounts of low-fat dairy products, such as milk, cheese, or yogurt. · Eat moderate amounts of poultry and eggs. · Choose healthy (unsaturated) fats, such as nuts, olive oil, and certain nut or seed oils like canola, soybean, and flaxseed. · Limit unhealthy (saturated) fats, such as butter, palm oil, and coconut oil. And limit fats found in animal products, such as meat and dairy products made with whole milk. Try to eat red meat only a few times a month in very small amounts.   · Limit sweets and desserts to only a few times a week. This includes sugar-sweetened drinks like soda. The Mediterranean diet may also include red wine with your meal--1 glass each day for women and up to 2 glasses a day for men. Tips for eating at home  · Use herbs, spices, garlic, lemon zest, and citrus juice instead of salt to add flavor to foods. · Add avocado slices to your sandwich instead of zayas. · Have fish for lunch or dinner instead of red meat. Brush the fish with olive oil, and broil or grill it. · Sprinkle your salad with seeds or nuts instead of cheese. · Cook with olive or canola oil instead of butter or oils that are high in saturated fat. · Switch from 2% milk or whole milk to 1% or fat-free milk. · Dip raw vegetables in a vinaigrette dressing or hummus instead of dips made from mayonnaise or sour cream.  · Have a piece of fruit for dessert instead of a piece of cake. Try baked apples, or have some dried fruit. Tips for eating out  · Try broiled, grilled, baked, or poached fish instead of having it fried or breaded. · Ask your  to have your meals prepared with olive oil instead of butter. · Order dishes made with marinara sauce or sauces made from olive oil. Avoid sauces made from cream or mayonnaise. · Choose whole-grain breads, whole wheat pasta and pizza crust, brown rice, beans, and lentils. · Cut back on butter or margarine on bread. Instead, you can dip your bread in a small amount of olive oil. · Ask for a side salad or grilled vegetables instead of french fries or chips. Where can you learn more? Go to http://miki-ethan.info/  Enter O407 in the search box to learn more about \"Learning About the Mediterranean Diet. \"  Current as of: September 8, 2021               Content Version: 13.2  © 7671-3282 [employer]. Care instructions adapted under license by [employer] (which disclaims liability or warranty for this information).  If you have questions about a medical condition or this instruction, always ask your healthcare professional. [place] [address] any warranty or liability for your use of this information.

## 2022-06-03 NOTE — PROGRESS NOTES
1. Have you been to the ER, urgent care clinic since your last visit? Hospitalized since your last visit? No    2. Have you seen or consulted any other health care providers outside of the [address] [place] [address] since your last visit? Include any pap smears or colon screening. No    3. Since your last visit, have you had any of the following symptoms? shortness of breath. 4.  Have you had any blood work, X-rays or cardiac testing? Yes When: 5/17 Where: [facility]     Requested: NO     In [facility]: YES    5. Where do you normally have your labs drawn? [facility], PCP     6. Do you need any refills today?    No

## 2022-06-09 ENCOUNTER — OFFICE VISIT (OUTPATIENT)
Dept: VASCULAR SURGERY | Age: 77
End: 2022-06-09
Payer: MEDICARE

## 2022-06-09 VITALS
DIASTOLIC BLOOD PRESSURE: 80 MMHG | HEART RATE: 68 BPM | WEIGHT: 179.9 LBS | OXYGEN SATURATION: 98 % | BODY MASS INDEX: 33.96 KG/M2 | SYSTOLIC BLOOD PRESSURE: 130 MMHG | HEIGHT: 61 IN

## 2022-06-09 DIAGNOSIS — I89.0 LYMPHEDEMA: Primary | ICD-10-CM

## 2022-06-09 DIAGNOSIS — I87.2 VENOUS (PERIPHERAL) INSUFFICIENCY: ICD-10-CM

## 2022-06-09 PROCEDURE — G8417 CALC BMI ABV UP PARAM F/U: HCPCS | Performed by: PHYSICIAN ASSISTANT

## 2022-06-09 PROCEDURE — G8432 DEP SCR NOT DOC, RNG: HCPCS | Performed by: PHYSICIAN ASSISTANT

## 2022-06-09 PROCEDURE — 1090F PRES/ABSN URINE INCON ASSESS: CPT | Performed by: PHYSICIAN ASSISTANT

## 2022-06-09 PROCEDURE — 1101F PT FALLS ASSESS-DOCD LE1/YR: CPT | Performed by: PHYSICIAN ASSISTANT

## 2022-06-09 PROCEDURE — 1123F ACP DISCUSS/DSCN MKR DOCD: CPT | Performed by: PHYSICIAN ASSISTANT

## 2022-06-09 PROCEDURE — G8754 DIAS BP LESS 90: HCPCS | Performed by: PHYSICIAN ASSISTANT

## 2022-06-09 PROCEDURE — 99214 OFFICE O/P EST MOD 30 MIN: CPT | Performed by: PHYSICIAN ASSISTANT

## 2022-06-09 PROCEDURE — G8427 DOCREV CUR MEDS BY ELIG CLIN: HCPCS | Performed by: PHYSICIAN ASSISTANT

## 2022-06-09 PROCEDURE — G8536 NO DOC ELDER MAL SCRN: HCPCS | Performed by: PHYSICIAN ASSISTANT

## 2022-06-09 PROCEDURE — G8400 PT W/DXA NO RESULTS DOC: HCPCS | Performed by: PHYSICIAN ASSISTANT

## 2022-06-09 PROCEDURE — G8752 SYS BP LESS 140: HCPCS | Performed by: PHYSICIAN ASSISTANT

## 2022-06-09 NOTE — PROGRESS NOTES
Chief Complaint   Patient presents with    Swelling         Impression:  76 y.o. female with lateral lower extremity venous insufficiency with lymphedema component. History and Physical    Mariza Briones is a pleasant 76y.o. year old female who presents today for re- evaluation for bilateral lower extremity leg edema, fatigue tired legs, and increasing pain with longer distance ambulation. States that since her last visit she has been compliant with compression and elevation. She has purchased 3 pair of compression stockings and is wearing a pair of them presently, patient states that she has been compliant with their usage as recommended. .  She also has purchased a wedge pillow and has been elevating her legs above the level of her heart as recommended. Patient does report some mild relief but continues to get lower extremity edema. Patient presents today in her shilo chair. Patient states that she does ambulate around the house, she is able to get from her bed to the bathroom and complete her ADLs, but if she needs to go outside she uses her walker or her shilo chair. Patient denies any leg pain with ambulation, but states that by the end of the time of her walk her legs are extremely heavy and tired. She reports that she has had DVTs in the past and her legs get swollen just like when she had her DVT. She reported to her primary care and was sent for imaging which was negative for blood clots. Patient states that she has been begging her primary care doctor to help alleviate her lower extremity edema, and was finally sent to vascular surgery. She denies any chest pain or shortness of breath. Denies any dyspnea on exertion. She does report that she has gotten weak and because of lack of mobility but states that she has not extremely bad back. Patient reports that she has a stimulator presently which does help but she cannot walk longer distances.   Patient states that she used to wear compression stockings in the past but has not worn any recently. States that the compression stockings were so tight that they bothered her and she could not be compliant with her usage. Patient states that she does try to elevate her legs is much as possible when sitting in a recliner. She denies elevating her legs above the level of her heart. Patient states that she used to have a hospital bed that raised up but it has busted and she has not not had 1 for past couple years. She does have a hard time laying in bed flat because of her back and states that sometimes she ends up sleeping in a recliner. She denies elevating her legs above the level of her heart. Patient has a history of some lower extremity ulcerations, none presently. She denies any issues with healing any wounds or cuts in her lower extremities in the past.  She continues to take her daily meds as recommended including Plavix. She does bruise easily but denies any bleeding issues. As stated she tries to remain active, is mainly debilitated due to her back, and is now concerned that her legs are getting worse. She denies any leg pain at rest.  Denies any recent ulceration skin rashes or lesions.   Past Medical History:   Diagnosis Date    Abnormal chest CT 2/6/2020    Adopted     Arthritis     Balance problems     Chronic cough 2/6/2020    MCBRIDE (dyspnea on exertion) 2/28/2019    GERD (gastroesophageal reflux disease)     Gout     Hemochromatosis     High cholesterol     Hypertension     Hypothyroidism     Left knee pain     Left shoulder pain     Low blood potassium     Lumbar pain     Pain of left sacroiliac joint     Shoulder impingement, right, with rotator cuff strain      Patient Active Problem List   Diagnosis Code    Shoulder impingement, right, with rotator cuff strain M75.40    Spinal stenosis M48.00    Sacroiliitis (HCC) M46.1    High cholesterol E78.00    Hypertension I10    Hemochromatosis type 1 ([facility] [address] E83.110    Obesity, morbid ([facility]) E66.01    MCBRIDE (dyspnea on exertion) R06.00    CAP (community acquired pneumonia) J18.9    Sepsis ([facility] [address] A41.9    Hypotension I95.9    Atrial fibrillation with RVR ([facility]) I48.91    Abnormal chest CT R93.89    Chronic cough R05.3    Pulmonary embolism without acute cor pulmonale ([facility]) I26.99    Chronic bronchitis with productive mucopurulent cough ([facility]) J41.1    STEMI (ST elevation myocardial infarction) ([facility]) I21.3    History of pulmonary embolism Z86.711    History of DVT (deep vein thrombosis) Z86.718    Chronic diastolic congestive heart failure ([facility]) I50.32    Coronary artery disease of native artery of native heart with stable angina pectoris ([facility]) I25.118     Past Surgical History:   Procedure Laterality Date    HX CHOLECYSTECTOMY      HX KNEE REPLACEMENT Bilateral     R knee/ Lknee and femur    HX ORTHOPAEDIC      R trigger finger     HX OTHER SURGICAL      Right little finger    HX OTHER SURGICAL      Right wrist, replaced unlnar).  HX OTHER SURGICAL      Both knees    HX OTHER SURGICAL      Left femur    HX OTHER SURGICAL      Trigger finger surgery    HX OTHER SURGICAL      Left knee replacment revision     HX ROTATOR CUFF REPAIR Right     HX TONSILLECTOMY       Current Outpatient Medications   Medication Sig Dispense Refill    metoprolol tartrate (LOPRESSOR) 25 mg tablet Take 0.5 Tablets by mouth two (2) times a day. 90 Tablet 1    Klor-Con M20 20 mEq tablet TAKE 1 TABLET BY MOUTH EVERY DAY 90 Tablet 1    levothyroxine (SYNTHROID) 25 mcg tablet TAKE 1 TABLET BY MOUTH EVERY DAY 90 Tablet 2    pantoprazole (PROTONIX) 40 mg tablet TAKE 1 TABLET BY MOUTH EVERY DAY BEFORE BREAKFAST 90 Tablet 1    apixaban (Eliquis) 5 mg tablet TAKE 2 TABLETS BY MOUTH TWICE A DAY THROUGH 8/7/21,THEN STARTING FROM 8/8 TAKE 1 TAB TWICE A DAY 60 Tablet 5    furosemide (LASIX) 40 mg tablet Take 1 Tablet by mouth daily as needed (Edema).  90 Tablet 1    rosuvastatin (CRESTOR) 40 mg tablet Take 1 Tablet by mouth daily. 180 Tablet 1    clopidogreL (PLAVIX) 75 mg tab TAKE 1 TABLET BY MOUTH EVERY DAY 90 Tablet 1    acetaminophen (TYLENOL) 500 mg tablet Take 500 mg by mouth every six (6) hours as needed for Pain. Pain      carboxymethylcellulose sodium (REFRESH TEARS OP) Administer 1 Drop to both eyes daily as needed for Other. Dry eyes      peg 400-propylene glycol, PF, (Systane Hydration PF) 0.4-0.3 % dpet ophthalmic solution Administer 2 Drops to both eyes four (4) times daily as needed for Ocular Dryness. Dry eyes      nitroglycerin (NITROSTAT) 0.4 mg SL tablet 1 Tablet by SubLINGual route every five (5) minutes as needed for Chest Pain. Up to 3 doses. 20 Tablet 0    albuterol (PROVENTIL HFA, VENTOLIN HFA, PROAIR HFA) 90 mcg/actuation inhaler Take 2 Puffs by inhalation every four (4) hours as needed for Wheezing. 1 Inhaler 2    oxyCODONE IR (ROXICODONE) 5 mg immediate release tablet Take 5 mg by mouth three (3) times daily as needed for Pain. Pain      inhalational spacing device (ACE AEROSOL CLOUD ENHANCER) 1 Each by Does Not Apply route as needed.  1 Device 3     Allergies   Allergen Reactions    Ciprofloxacin Other (comments)     Achilles tendonitis/leg pain and swelling    Indomethacin Anaphylaxis, Hives and Swelling    Tomato Other (comments)     Heart burn     Social History     Socioeconomic History    Marital status: SINGLE     Spouse name: Not on file    Number of children: Not on file    Years of education: Not on file    Highest education level: Not on file   Occupational History    Not on file   Tobacco Use    Smoking status: Former Smoker     Packs/day: 0.50     Years: 2.00     Pack years: 1.00     Start date: 5     Quit date:      Years since quittin.1    Smokeless tobacco: Never Used    Tobacco comment: quit 20 years ago   Vaping Use    Vaping Use: Never used   Substance and Sexual Activity    Alcohol use: No    Drug use: No    Sexual activity: Not on file     Comment: post menopausal   Other Topics Concern    Not on file   Social History Narrative    Not on file     Social Determinants of Health     Financial Resource Strain:     Difficulty of Paying Living Expenses: Not on file   Food Insecurity:     Worried About Running Out of Food in the Last Year: Not on file    Yared of Food in the Last Year: Not on file   Transportation Needs:     Lack of Transportation (Medical): Not on file    Lack of Transportation (Non-Medical): Not on file   Physical Activity:     Days of Exercise per Week: Not on file    Minutes of Exercise per Session: Not on file   Stress:     Feeling of Stress : Not on file   Social Connections:     Frequency of Communication with Friends and Family: Not on file    Frequency of Social Gatherings with Friends and Family: Not on file    Attends Mu-ism Services: Not on file    Active Member of [address] [place] [address] [employer] or Organizations: Not on file    Attends Club or Organization Meetings: Not on file    Marital Status: Not on file   Intimate Partner Violence:     Fear of Current or Ex-Partner: Not on file    Emotionally Abused: Not on file    Physically Abused: Not on file    Sexually Abused: Not on file   Housing Stability:     Unable to Pay for Housing in the Last Year: Not on file    Number of [place] in the Last Year: Not on file    Unstable Housing in the Last Year: Not on file      Family History   Adopted: Yes       Review of Systems    General: negative for fever/chills. Ambulates short distances without leg pain but cannot ambulate longer distances due to bad back and leg heaviness. Patient reports increasing edema in both lower extremities, no recent ulcerations or skin rashes.    Eyes: negative for vision loss   HENT: negative for cold symptoms   Respiratory negative for shortness of breath   Cardiac: negative for chest pain   Vascular negative for foot pain at night    Gastrointestinal: negative for abdominal pain   Genitourinary: negative for dysuria    Endocrine: negative for excessive thirst   Skin: negative for rash   Neurological: negative for paralysis   Psychiatric: negative for depression          Physical Exam:    Visit Vitals  /80 (BP 1 Location: Left arm, BP Patient Position: Sitting)   Pulse 68   Ht 5' 1\" (1.549 m)   Wt 179 lb 14.3 oz (81.6 kg)   SpO2 98%   BMI 33.99 kg/m²      Constitutional:  Patient is well developed, well nourished, and not distressed. Brought into the room via [place] chair. Is all questions appropriately. Moves all extremities to command. Her best friend is at her side on this visit. HEENT: atraumatic, normocephalic, wearing a mask. Eyes:   Cunjunctivae clear, no scleral icterus  Neck:   No JVD present. Cardiovascular:  Normal rate, regular rhythm, normal heart sounds. No murmur heard. Pulmonary/Chest: Effort normal .  Extremities: Normal range of motion. Neurological:  he  is alert and oriented x3 . Gait normal. Motor & sensory grossly intact in all 4 limbs. Psych: Appropriate mood and affect. Skin:  Skin is warm and dry. No ulcerations  Pulses -femoral -2+ bilaterally                Popliteal -2+ bilaterally                PT/DP -albeit slightly diminished palpable pedal pulses and easily obtain Doppler signals noted. Carotid: Carotid bruits or thrills appreciated bilaterally. Plan: Bilateral lower extremity lymphedema                                                                                                                                                                                                                            Patient name: Manuelito Ma  : 1945  PRIMARY INSURANCE  [] Commercial or [x] Medicare/MCare      Dx Code   [] I89.0: Lymphedema AND secondary to CA  [x] Q82.0 (tarda): Late onset lymphedema       Conservative Treatment  Has the pt exercised and elevated for >4 weeks without improvement?  [x] Yes [] No   Has the pt worn compression of at least 20-30 mmHg (or bandaging) for >4 weeks without improvement? [x] Yes [] No   Additional notes:     What key term/terms of severity describes this pt in any capacity (i.e. mild, moderate, etc)? [] Hyperplasia - enlarged tissues             [x] Hyperkeratosis - abnormal skin thickening  [x] Hyperpigmentation - discolored patch(es) of skin            [] Lymphorrhea - visible skin weeping  [] Papilloma - wart-like growths                                [] Elephantiasis - severe enlargement & lymphatic obstruction  [x] Fibrosis - thickening and scarring of connective tissue (ACCEPTABLE FOR UPPER EXT MEDICARE, NOT LOWER)    Lymphedema stage  [x] Stage 2 - Moderate. Fluid accumulation with fluctuating skin changes. Pitting edema may present. [] Stage 3 - Severe. Acute swelling (at exam) with trophic skin changes. Non-pitting edema may present     Is there suspected abdominal swelling/fullness due to obesity, trauma, surgical history, pt complaint? [] Yes[x] No  Additional comments:     Has the patient tried and failed 1 time trial of basic pump, resulting in truncal swelling? [] Yes[x] No  Has the patient used basic pneumatic pump for at least 4 weeks daily? [] Yes[x] No    Patient is being seen on this visit, by our tactile representatives and is having a discussion on pneumatic compression device for lower extremity lymphedema. Once again patient instructed on the difference between arterial versus venous insufficiency. Patient instructed on signs and symptoms of claudication and acute limb ischemia. Given that the patient has palpable pedal pulses and no claudication symptoms and no rest pain we will continue with venous insufficiency work-up. Patient instructed on the importance of compression elevation in the face of venous insufficiency. Patient is educated on proper elevation, patient educated on elevation above the level of her heart.   Patient instructed that she must lay flat with 2-3 pillows under her legs to get her legs 20 to 30 inches above the level of her heart for proper elevation. Patient instructed that she would benefit greatly by a hospital bed so that she can get proper elevation. Patient states that she has a very hard time laying flat due to her back. Patient willing to try, will discuss with her family doctor about hospital bed, in the meantime she has a wedge pillow she will utilize. Long discussion with patient about compression therapy. Plan. Compliance discussed at length and patient willing to continue. Patient states that she has quite a few pair of compression stockings at home and she will utilize them. Brief discussion with patient about venous reflux disease and ablation therapy for such disease. Patient instructed if she was to be compliant with compression elevation I will bring her back in 3 months and perform bilateral lower extremity venous reflux imaging. Upon next visit we will discuss results and see if she is a candidate for ablation therapy. Patient states she understands above, is more than willing to be compliant with above recommendations, and is very pleased with her services, and is willing to proceed as planned. Follow-up in 3 months as indicated. In the meantime patient encouraged to continue to make better lifestyle choices, better nutritional choices, being compliant with compression elevation, and of course increasing activity with daily ambulation. We will discuss details with my attending. The treatment plan was reviewed with the patient in detail. The patient voiced understanding of this plan and all questions and concerns were addressed. The patient agrees with this plan. We discussed the signs and symptoms that would require earlier attention or intervention. I appreciate the opportunity to participate in the care of your patient.   I will be sure to keep you informed of any subsequent changes in the treatment plan. If you have any questions or concerns, please feel free to contact me.       Marshal King PA-C  Vascular Physician Assistant  [phone]

## 2022-06-09 NOTE — PROGRESS NOTES
1. Have you been to the ER, urgent care clinic since your last visit? No      Hospitalized since your last visit? No     2. Have you seen or consulted any other health care providers outside of the [address] [place] [address] since your last visit? Include any pap smears or colon screening.   No

## 2022-07-25 ENCOUNTER — DOCUMENTATION ONLY (OUTPATIENT)
Dept: VASCULAR SURGERY | Age: 77
End: 2022-07-25

## 2022-07-25 NOTE — PROGRESS NOTES
Lymphedema Leg Measurements are dated 07/04/22 and are as follows:    Left Ankle 29.2cm  Right Ankle 28.5cm    Left Calf 44cm  Right Calf 32cm    Left Knee 48cm  Right Knee 48.2cm    Left Thigh 55.5cm  Right Thigh 58cm      Hips : 125cm      \"No improvement despite four weeks of compression , elevation, and exercise. \"

## 2022-07-25 NOTE — PROGRESS NOTES
Lymphedema Leg Measurements are as Followed    Left Ankle 26cm  Right Ankle 26.2cm    Left Calf 39cm  Right Calf 29cm    Left Knee 46cm  Right Knee 45.5cm    Left Thigh 55.5cm  Right Thigh 58cm

## 2022-08-23 ENCOUNTER — TELEPHONE (OUTPATIENT)
Dept: CARDIOLOGY CLINIC | Age: 77
End: 2022-08-23

## 2022-08-23 DIAGNOSIS — I48.0 PAROXYSMAL ATRIAL FIBRILLATION (HCC): Primary | ICD-10-CM

## 2022-08-23 RX ORDER — METOPROLOL TARTRATE 25 MG/1
25 TABLET, FILM COATED ORAL DAILY
Qty: 90 TABLET | Refills: 1 | Status: ON HOLD | OUTPATIENT
Start: 2022-08-23

## 2022-08-23 RX ORDER — CLOPIDOGREL BISULFATE 75 MG/1
75 TABLET ORAL DAILY
Qty: 90 TABLET | Refills: 1 | Status: ON HOLD | OUTPATIENT
Start: 2022-08-23

## 2022-08-23 NOTE — TELEPHONE ENCOUNTER
Pt states the metoptolol 25 mg that she has to cut in half crumbles when she cuts it. She is completely out of it and needs a refill.  Please advise

## 2022-08-23 NOTE — TELEPHONE ENCOUNTER
Requested Prescriptions     Pending Prescriptions Disp Refills    apixaban (Eliquis) 5 mg tablet 180 Tablet 1     Sig: TAKE 2 TABLETS BY MOUTH TWICE A DAY THROUGH 8/7/21,THEN STARTING FROM 8/8 TAKE 1 TAB TWICE A DAY    clopidogreL (PLAVIX) 75 mg tab 90 Tablet 1     Sig: Take 1 Tablet by mouth daily.

## 2022-08-24 NOTE — TELEPHONE ENCOUNTER
She can take the whole pill once a day. Please get heart rate and blood pressure chart from home in the next 4 to 5 days.   Prescription sent

## 2022-08-24 NOTE — TELEPHONE ENCOUNTER
Spoke with pt advised can start taking the whole pill once daily. Advised to keep blood pressure/heart rate log for the next 4-5 days and bring it by the office. Pt understood and had no questions or concerns at this time.

## 2022-08-25 RX ORDER — ALBUTEROL SULFATE 90 UG/1
AEROSOL, METERED RESPIRATORY (INHALATION)
Qty: 8.5 EACH | Refills: 2 | Status: ON HOLD | OUTPATIENT
Start: 2022-08-25

## 2022-08-25 RX ORDER — PANTOPRAZOLE SODIUM 40 MG/1
TABLET, DELAYED RELEASE ORAL
Qty: 90 TABLET | Refills: 1 | Status: ON HOLD | OUTPATIENT
Start: 2022-08-25

## 2022-08-25 RX ORDER — POTASSIUM CHLORIDE 1500 MG/1
TABLET, EXTENDED RELEASE ORAL
Qty: 90 TABLET | Refills: 1 | Status: ON HOLD | OUTPATIENT
Start: 2022-08-25

## 2022-08-26 ENCOUNTER — TELEPHONE (OUTPATIENT)
Dept: CARDIOLOGY CLINIC | Age: 77
End: 2022-08-26

## 2022-08-26 NOTE — TELEPHONE ENCOUNTER
Patient called with BP reading x 2 days since increase of Metoprolol.     8/25/22  / 59 wt 183  hr 63  8/26/22  /48  wt 182.1  hr 70

## 2022-08-26 NOTE — TELEPHONE ENCOUNTER
Blood pressure and heart rate are both controlled. Continue the dose of metoprolol once a day as advised.

## 2022-08-26 NOTE — TELEPHONE ENCOUNTER
Called and advised patient to continue metoprolol and call office if any further questions or concerns

## 2022-08-31 DIAGNOSIS — I87.2 VENOUS (PERIPHERAL) INSUFFICIENCY: ICD-10-CM

## 2022-08-31 DIAGNOSIS — I89.0 LYMPHEDEMA: ICD-10-CM

## 2022-09-06 LAB
LEFT CFV RFX: 1.6 S
LEFT FV RFX: 4 S
LEFT GSV BK DIST DIAM: 0.28 CM
LEFT GSV BK MID DIAM: 0.26 CM
LEFT GSV BK PROX DIAM: 0.4 CM
LEFT GSV JUNC DIAM: 0.66 CM
LEFT GSV THIGH DIST DIAM: 0.5 CM
LEFT GSV THIGH MID DIAM: 0.49 CM
LEFT GSV THIGH PROX DIAM: 0.32 CM
LEFT POP RFX: 1.8 S
LEFT SSV DIST DIAM: 0.32 CM
LEFT SSV MID DIAM: 0.25 CM
LEFT SSV PROX DIAM: 0.25 CM
RIGHT FV RFX: 2.6 S
RIGHT GSV AT KNEE DIAM: 0.41 CM
RIGHT GSV BK DIST DIAM: 0.34 CM
RIGHT GSV BK PROX DIAM: 0.41 CM
RIGHT GSV JUNC DIAM: 0.65 CM
RIGHT GSV THIGH MID DIAM: 0.44 CM
RIGHT GSV THIGH PROX DIAM: 0.46 CM
RIGHT POP RFX: 2.3 S
RIGHT SSV DIST RFX: 0.7 S
RIGHT SSV JUNCTION DIAM: 0.36 CM
RIGHT SSV MID DIAM: 0.33 CM

## 2022-09-15 ENCOUNTER — TRANSCRIBE ORDER (OUTPATIENT)
Dept: SCHEDULING | Age: 77
End: 2022-09-15

## 2022-09-15 DIAGNOSIS — E83.110 PIGMENT CIRRHOSIS (HCC): Primary | ICD-10-CM

## 2022-09-20 NOTE — PROGRESS NOTES
Gerardo Merlin    Chief Complaint   Patient presents with    Swelling       History and Physical    Gerardo Merlin is a 76 y.o. 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 She also has hemochromatosis, HLD, spinal stenosis and obesity. she presents today for evaluation of leg edema. she describes bilateral lower extremity edema. She notices that her feet swell during the day. Edema extends to the thighs. She endorses pain in both knees, likely related to arthritis, now s/p bilateral need replacement. Onset of symptoms was several years ago and has been worsening over the past months. Patient walks with a walker in her home. When going out uses scooter. She sleeps in a chair at night due to her back pain. Her legs are in a dependent position the majority of the day. She states she occasionally takes Lasix \"when her legs swell\". But even though she endorses daily edema she is not taking Lasix daily because \"my legs aren't that swollen in the morning\". Patient received lymphedema pump but she states that when she uses the pump it hurts her feet. She has been working with the pump rep to adjust settings. She has been trying to use the pump nightly but hasn't noticed much improvement. She has only had her pump for about a month. The patient is quite a poor historian. She is accompanied by a friend who fills in some of the history. Associated symptoms:   [x] edema  [] varicose veins  [] heaviness/aching  [] fatigue  [x] pain    Aggravating factors include sitting. Relieving factors include compression, elevation. Patient   [x] has   [] has not   been wearing compression stockings for several months now. This has improved her edema slightly.       Relevant history:   [x] female gender  [] Family history of venous disease: n/a patient adopted  [x] history of DVT/PE about 3 years ago  [] history of vein procedure      The most recent PVL was reviewed and discussed with the patient. This shows no superficial venous insufficiency but shows quite significant deep reflux. Interpretation Summary    No evidence of acute deep venous thrombosis in the areas asssessed bilaterally. Technically difficult exam secondary to back pain and inability to withstand probe pressure. Chronic non occlusive deep venous thrombosis in the left popliteal vein. Deep venous reflux bilaterally in the femoral, popliteal and tibial/peroneal veins. Isolated reflux in the distal right SSV. Bilateral GSV and left SSV's are patent without evidence of reflux or thrombus. Significant calf edema noted bilaterally. Lower Extremity Venous Findings    Right Lower Venous    Technically difficult exam due to: patient body habitus, marked edema and inability to withstand probe pressure. No evidence of deep vein thrombosis in the right lower extremity. The peroneal vein(s) are grossly patent but cannot exclude non-occlusive thrombus. The peroneal vein(s) were not well visualized. The right posterior tibial artery has biphasic waveforms. Reflux study patient position: reverse Trendelenburg. Deep venous insufficiency noted in the right common femoral vein, femoral vein and popliteal vein. (Reflux of >1.0 second.) Right great saphenous vein is competent. Right small saphenous vein displays venous insufficiency. Left Lower Venous    Technically difficult exam due to: patient body habitus, marked edema and inability to withstand probe pressure. No evidence of deep vein thrombosis in the left lower extremity. Chronic non-occlusive thrombus present in the left popliteal vein. The distal femoral and peroneal vein(s) are grossly patent but cannot exclude non-occlusive thrombus. The peroneal vein(s) were not well visualized. The left posterior tibial artery has biphasic waveforms.  Reflux study patient position: reverse Trendelenburg. Deep venous insufficiency noted in the left common femoral vein, femoral vein and popliteal vein. (Reflux of >1.0 second.) Left great saphenous vein is competent. Left small saphenous vein is competent.      Patient unable to tolerate compression in distal upper thigh     Right Lower Extremity Venous Measurements     Vein Diam Reflux Time   GSV Junction 0.65 cm              GSV Thigh Prox 0.46 cm              GSV Thigh Mid 0.44 cm              GSV At Knee 0.41 cm              GSV Below Knee Prox 0.41 cm              GSV Below Knee Dist 0.34 cm              SSV Junction 0.36 cm              SSV Mid 0.33 cm              SSV Dist     0.7 s          FV  2.6 s          Pop     2.3 s            Left Lower Extremity Venous Measurements     Vein Diam Reflux Time   GSV Junction 0.66 cm              GSV Thigh Prox 0.32 cm              GSV Thigh Mid 0.49 cm              GSV Thigh Dist 0.5 cm              GSV Below Knee Prox 0.4 cm              GSV Below Knee Mid 0.26 cm              GSV Below Knee Dist 0.28 cm              SSV Prox 0.25 cm              SSV Mid 0.25 cm              SSV Dist 0.32 cm              CFV     1.6 s          FV  4 s          Pop     1.8 s              Past Medical History:   Diagnosis Date    Abnormal chest CT 2/6/2020    Adopted     Arthritis     Balance problems     Chronic cough 2/6/2020    MCBRIDE (dyspnea on exertion) 2/28/2019    GERD (gastroesophageal reflux disease)     Gout     Hemochromatosis     High cholesterol     Hypertension     Hypothyroidism     Left knee pain     Left shoulder pain     Low blood potassium     Lumbar pain     Pain of left sacroiliac joint     Shoulder impingement, right, with rotator cuff strain      Past Surgical History:   Procedure Laterality Date    HX CHOLECYSTECTOMY      HX KNEE REPLACEMENT Bilateral     R knee/ Lknee and femur    HX ORTHOPAEDIC      R trigger finger     HX OTHER SURGICAL      Right little finger    HX OTHER SURGICAL Right wrist, replaced unlnar). HX OTHER SURGICAL      Both knees    HX OTHER SURGICAL      Left femur    HX OTHER SURGICAL      Trigger finger surgery    HX OTHER SURGICAL      Left knee replacment revision     HX ROTATOR CUFF REPAIR Right     HX TONSILLECTOMY       Patient Active Problem List   Diagnosis Code    Shoulder impingement, right, with rotator cuff strain M75.40    Spinal stenosis M48.00    Sacroiliitis ([facility]) M46.1    High cholesterol E78.00    Hypertension I10    Hemochromatosis type 1 ([facility] [address] E83.110    Obesity, morbid ([facility]) E66.01    MCBRIDE (dyspnea on exertion) R06.00    CAP (community acquired pneumonia) J18.9    Sepsis ([facility] [address] A41.9    Hypotension I95.9    Atrial fibrillation with RVR ([facility]) I48.91    Abnormal chest CT R93.89    Chronic cough R05.3    Pulmonary embolism without acute cor pulmonale ([facility]) I26.99    Chronic bronchitis with productive mucopurulent cough ([facility]) J41.1    STEMI (ST elevation myocardial infarction) ([facility]) I21.3    History of pulmonary embolism Z86.711    History of DVT (deep vein thrombosis) Z86.718    Chronic diastolic congestive heart failure ([facility]) I50.32    Coronary artery disease of native artery of native heart with stable angina pectoris ([facility]) I25.118     Current Outpatient Medications   Medication Sig Dispense Refill    Klor-Con M20 20 mEq tablet TAKE 1 TABLET BY MOUTH EVERY DAY 90 Tablet 1    pantoprazole (PROTONIX) 40 mg tablet TAKE 1 TABLET BY MOUTH EVERY DAY BEFORE BREAKFAST 90 Tablet 1    albuterol (PROVENTIL HFA, VENTOLIN HFA, PROAIR HFA) 90 mcg/actuation inhaler INHALE 2 PUFFS BY MOUTH EVERY 4 HOURS AS NEEDED FOR WHEEZE 8.5 Each 2    apixaban (Eliquis) 5 mg tablet Take 1 Tablet by mouth two (2) times a day. 180 Tablet 1    clopidogreL (PLAVIX) 75 mg tab Take 1 Tablet by mouth daily. 90 Tablet 1    metoprolol tartrate (LOPRESSOR) 25 mg tablet Take 1 Tablet by mouth daily.  90 Tablet 1    levothyroxine (SYNTHROID) 25 mcg tablet TAKE 1 TABLET BY MOUTH EVERY DAY 90 Tablet 2    furosemide (LASIX) 40 mg tablet Take 1 Tablet by mouth daily as needed (Edema). 90 Tablet 1    rosuvastatin (CRESTOR) 40 mg tablet Take 1 Tablet by mouth daily. 180 Tablet 1    acetaminophen (TYLENOL) 500 mg tablet Take 500 mg by mouth every six (6) hours as needed for Pain. Pain      carboxymethylcellulose sodium (REFRESH TEARS OP) Administer 1 Drop to both eyes daily as needed for Other. Dry eyes      peg 400-propylene glycol, PF, (Systane Hydration PF) 0.4-0.3 % dpet ophthalmic solution Administer 2 Drops to both eyes four (4) times daily as needed for Ocular Dryness. Dry eyes      nitroglycerin (NITROSTAT) 0.4 mg SL tablet 1 Tablet by SubLINGual route every five (5) minutes as needed for Chest Pain. Up to 3 doses. 20 Tablet 0    oxyCODONE IR (ROXICODONE) 5 mg immediate release tablet Take 5 mg by mouth three (3) times daily as needed for Pain. Pain      inhalational spacing device (ACE AEROSOL CLOUD ENHANCER) 1 Each by Does Not Apply route as needed.  1 Device 3     Allergies   Allergen Reactions    Ciprofloxacin Other (comments)     Achilles tendonitis/leg pain and swelling    Indomethacin Anaphylaxis, Hives and Swelling    Tomato Other (comments)     Heart burn     Social History     Socioeconomic History    Marital status: SINGLE     Spouse name: Not on file    Number of children: Not on file    Years of education: Not on file    Highest education level: Not on file   Occupational History    Not on file   Tobacco Use    Smoking status: Former     Packs/day: 0.50     Years: 2.00     Pack years: 1.00     Types: Cigarettes     Start date: 5     Quit date: 65     Years since quittin.7    Smokeless tobacco: Never    Tobacco comments:     quit 20 years ago   Vaping Use    Vaping Use: Never used   Substance and Sexual Activity    Alcohol use: No    Drug use: No    Sexual activity: Not on file     Comment: post menopausal   Other Topics Concern    Not on file   Social History Narrative    Not on file     Social Determinants of Health     Financial Resource Strain: Not on file   Food Insecurity: Not on file   Transportation Needs: Not on file   Physical Activity: Not on file   Stress: Not on file   Social Connections: Not on file   Intimate Partner Violence: Not on file   Housing Stability: Not on file      Family History   Adopted: Yes       Physical Exam:    Visit Vitals  /74 (BP 1 Location: Left arm, BP Patient Position: Sitting)   Pulse 66   Ht 5' 1\" (1.549 m)   Wt 179 lb 14.3 oz (81.6 kg)   SpO2 98%   BMI 33.99 kg/m²        Constitutional:  Patient is well developed, well nourished, and not distressed. HEENT: atraumatic, normocephalic, wearing a mask. Eyes:   Cunjunctivae clear, no scleral icterus  Cardiovascular:  irregularly irregular  Pulmonary/Chest: Effort normal    Extremities: Normal range of motion. Pitting edema, 2+, bilaterally. Edema extends to the thighs. Digits no cyanosis or clubbing  Neurological:  she  is alert and oriented x3 . Gait abnormal, here on a scooter  Psych: Appropriate mood and affect. Skin:  Skin is warm and dry. No rash noted. No erythema. No ulcers. Impression and Plan:  Yadira Mejias is a 76 y.o. female with bilateral lower extremity edema. BLE edema. No significant superficial venous insufficiency but certainly significant deep insufficiency. This is likely cardiogenic in nature. Discussed with the patient and her friend that there is no intervention I have to offer her that will take care of the swelling.   - Recommended aggressive elevation 3x/day, elevation whenever seated and compression stockings during the day. Stockings need to be donned in the am before legs are swollen  - Patient should avoid sleeping in her recliner if at all possible. She states she does that because of her back. She may have to consider getting a different bed/mattress  - She may benefit from daily lasix.  Asked her to follow up with cardiology to ensure this is appropriate. - She should use her lymphedema pump daily    Ms. Reeder had many reasons/excuses for why she may not be able to follow the above recommendations. I explained to her that if she wants her edema to improve, she will have to find a way to compliance. This cannot be done for her by anyone else. Her friend agrees. Follow up in 3 months    We reviewed the plan with the patient and the patient understands. In excess of 40 minutes were spent in face-to-face time with this patient, more than 50% of which was devoted to discussing diagnosis, treatment plan and possible intervention. Marco Head MD    PLEASE NOTE:  This document has been produced using voice recognition software. Unrecognized errors in transcription may be present.

## 2022-09-22 ENCOUNTER — OFFICE VISIT (OUTPATIENT)
Dept: VASCULAR SURGERY | Age: 77
End: 2022-09-22
Payer: MEDICARE

## 2022-09-22 VITALS
BODY MASS INDEX: 33.96 KG/M2 | SYSTOLIC BLOOD PRESSURE: 120 MMHG | WEIGHT: 179.9 LBS | OXYGEN SATURATION: 98 % | DIASTOLIC BLOOD PRESSURE: 74 MMHG | HEART RATE: 66 BPM | HEIGHT: 61 IN

## 2022-09-22 DIAGNOSIS — R60.0 BILATERAL LEG EDEMA: Primary | ICD-10-CM

## 2022-09-22 PROCEDURE — 1090F PRES/ABSN URINE INCON ASSESS: CPT | Performed by: SURGERY

## 2022-09-22 PROCEDURE — G8427 DOCREV CUR MEDS BY ELIG CLIN: HCPCS | Performed by: SURGERY

## 2022-09-22 PROCEDURE — G8752 SYS BP LESS 140: HCPCS | Performed by: SURGERY

## 2022-09-22 PROCEDURE — 1123F ACP DISCUSS/DSCN MKR DOCD: CPT | Performed by: SURGERY

## 2022-09-22 PROCEDURE — G8400 PT W/DXA NO RESULTS DOC: HCPCS | Performed by: SURGERY

## 2022-09-22 PROCEDURE — 99215 OFFICE O/P EST HI 40 MIN: CPT | Performed by: SURGERY

## 2022-09-22 PROCEDURE — G8510 SCR DEP NEG, NO PLAN REQD: HCPCS | Performed by: SURGERY

## 2022-09-22 PROCEDURE — G8417 CALC BMI ABV UP PARAM F/U: HCPCS | Performed by: SURGERY

## 2022-09-22 PROCEDURE — G8754 DIAS BP LESS 90: HCPCS | Performed by: SURGERY

## 2022-09-22 PROCEDURE — G8536 NO DOC ELDER MAL SCRN: HCPCS | Performed by: SURGERY

## 2022-09-22 PROCEDURE — 1101F PT FALLS ASSESS-DOCD LE1/YR: CPT | Performed by: SURGERY

## 2022-09-22 NOTE — PROGRESS NOTES
1. Have you been to the ER, urgent care clinic since your last visit? No     Hospitalized since your last visit? No     2. Have you seen or consulted any other health care providers outside of the [address] [place] [address] since your last visit? Include any pap smears or colon screening.   No

## 2022-09-28 PROBLEM — R60.0 BILATERAL LEG EDEMA: Status: ACTIVE | Noted: 2022-09-28

## 2022-10-17 ENCOUNTER — HOSPITAL ENCOUNTER (OUTPATIENT)
Dept: ULTRASOUND IMAGING | Age: 77
Discharge: HOME | End: 2022-10-17
Attending: PHYSICIAN ASSISTANT
Payer: MEDICARE

## 2022-10-17 DIAGNOSIS — E83.110 PIGMENT CIRRHOSIS (HCC): ICD-10-CM

## 2022-10-17 PROCEDURE — 76700 US EXAM ABDOM COMPLETE: CPT

## 2022-10-31 ENCOUNTER — APPOINTMENT (OUTPATIENT)
Dept: CT IMAGING | Age: 77
DRG: 948 | End: 2022-10-31
Attending: EMERGENCY MEDICINE
Payer: MEDICARE

## 2022-10-31 ENCOUNTER — HOSPITAL ENCOUNTER (INPATIENT)
Age: 77
LOS: 3 days | Discharge: NURSING, REHAB OR LONG-TERM CARE | DRG: 948 | End: 2022-11-03
Attending: EMERGENCY MEDICINE | Admitting: STUDENT IN AN ORGANIZED HEALTH CARE EDUCATION/TRAINING PROGRAM
Payer: MEDICARE

## 2022-10-31 ENCOUNTER — APPOINTMENT (OUTPATIENT)
Dept: GENERAL RADIOLOGY | Age: 77
DRG: 948 | End: 2022-10-31
Attending: EMERGENCY MEDICINE
Payer: MEDICARE

## 2022-10-31 DIAGNOSIS — N39.0 ACUTE UTI: ICD-10-CM

## 2022-10-31 DIAGNOSIS — R53.1 WEAKNESS: Primary | ICD-10-CM

## 2022-10-31 DIAGNOSIS — R60.0 BILATERAL LEG EDEMA: ICD-10-CM

## 2022-10-31 PROBLEM — R77.8 ELEVATED TROPONIN: Status: ACTIVE | Noted: 2022-10-31

## 2022-10-31 PROBLEM — R79.89 ELEVATED TROPONIN: Status: ACTIVE | Noted: 2022-10-31

## 2022-10-31 LAB
ALBUMIN SERPL-MCNC: 2.9 G/DL (ref 3.4–5)
ALBUMIN/GLOB SERPL: 0.9 {RATIO} (ref 0.8–1.7)
ALP SERPL-CCNC: 70 U/L (ref 45–117)
ALT SERPL-CCNC: 58 U/L (ref 13–56)
ANION GAP SERPL CALC-SCNC: 6 MMOL/L (ref 3–18)
APPEARANCE UR: CLEAR
AST SERPL-CCNC: 89 U/L (ref 10–38)
BACTERIA URNS QL MICRO: ABNORMAL /HPF
BASOPHILS # BLD: 0 K/UL (ref 0–0.1)
BASOPHILS NFR BLD: 0 % (ref 0–2)
BILIRUB DIRECT SERPL-MCNC: 0.2 MG/DL (ref 0–0.2)
BILIRUB SERPL-MCNC: 1 MG/DL (ref 0.2–1)
BILIRUB UR QL: NEGATIVE
BUN SERPL-MCNC: 18 MG/DL (ref 7–18)
BUN/CREAT SERPL: 16 (ref 12–20)
CALCIUM SERPL-MCNC: 8.7 MG/DL (ref 8.5–10.1)
CHLORIDE SERPL-SCNC: 107 MMOL/L (ref 100–111)
CO2 SERPL-SCNC: 27 MMOL/L (ref 21–32)
COLOR UR: YELLOW
COVID-19 RAPID TEST, COVR: NOT DETECTED
CREAT SERPL-MCNC: 1.16 MG/DL (ref 0.6–1.3)
DIFFERENTIAL METHOD BLD: ABNORMAL
EOSINOPHIL # BLD: 0.1 K/UL (ref 0–0.4)
EOSINOPHIL NFR BLD: 1 % (ref 0–5)
EPITH CASTS URNS QL MICRO: ABNORMAL /LPF (ref 0–5)
ERYTHROCYTE [DISTWIDTH] IN BLOOD BY AUTOMATED COUNT: 14.9 % (ref 11.6–14.5)
FLUAV AG NPH QL IA: NEGATIVE
FLUBV AG NOSE QL IA: NEGATIVE
GLOBULIN SER CALC-MCNC: 3.1 G/DL (ref 2–4)
GLUCOSE SERPL-MCNC: 109 MG/DL (ref 74–99)
GLUCOSE UR STRIP.AUTO-MCNC: NEGATIVE MG/DL
HCG SERPL-ACNC: 6 MIU/ML (ref 0–10)
HCT VFR BLD AUTO: 35.6 % (ref 35–45)
HGB BLD-MCNC: 11.7 G/DL (ref 12–16)
HGB UR QL STRIP: ABNORMAL
IMM GRANULOCYTES # BLD AUTO: 0.1 K/UL (ref 0–0.04)
IMM GRANULOCYTES NFR BLD AUTO: 1 % (ref 0–0.5)
KETONES UR QL STRIP.AUTO: NEGATIVE MG/DL
LACTATE BLD-SCNC: 2.02 MMOL/L (ref 0.4–2)
LEUKOCYTE ESTERASE UR QL STRIP.AUTO: ABNORMAL
LYMPHOCYTES # BLD: 1.3 K/UL (ref 0.9–3.6)
LYMPHOCYTES NFR BLD: 15 % (ref 21–52)
MAGNESIUM SERPL-MCNC: 1.9 MG/DL (ref 1.6–2.6)
MCH RBC QN AUTO: 31.2 PG (ref 24–34)
MCHC RBC AUTO-ENTMCNC: 32.9 G/DL (ref 31–37)
MCV RBC AUTO: 94.9 FL (ref 78–100)
MONOCYTES # BLD: 0.9 K/UL (ref 0.05–1.2)
MONOCYTES NFR BLD: 10 % (ref 3–10)
MUCOUS THREADS URNS QL MICRO: ABNORMAL /LPF
NEUTS SEG # BLD: 6.7 K/UL (ref 1.8–8)
NEUTS SEG NFR BLD: 74 % (ref 40–73)
NITRITE UR QL STRIP.AUTO: NEGATIVE
NRBC # BLD: 0 K/UL (ref 0–0.01)
NRBC BLD-RTO: 0 PER 100 WBC
PH UR STRIP: 6.5 [PH] (ref 5–8)
PLATELET # BLD AUTO: 234 K/UL (ref 135–420)
PMV BLD AUTO: 10.9 FL (ref 9.2–11.8)
POTASSIUM SERPL-SCNC: 3.8 MMOL/L (ref 3.5–5.5)
PROT SERPL-MCNC: 6 G/DL (ref 6.4–8.2)
PROT UR STRIP-MCNC: 100 MG/DL
RBC # BLD AUTO: 3.75 M/UL (ref 4.2–5.3)
RBC #/AREA URNS HPF: ABNORMAL /HPF (ref 0–5)
SODIUM SERPL-SCNC: 140 MMOL/L (ref 136–145)
SOURCE, COVRS: NORMAL
SP GR UR REFRACTOMETRY: 1.01 (ref 1–1.03)
TROPONIN-HIGH SENSITIVITY: 55 NG/L (ref 0–54)
UROBILINOGEN UR QL STRIP.AUTO: 1 EU/DL (ref 0.2–1)
WBC # BLD AUTO: 9 K/UL (ref 4.6–13.2)
WBC URNS QL MICRO: ABNORMAL /HPF (ref 0–4)

## 2022-10-31 PROCEDURE — 74011250636 HC RX REV CODE- 250/636: Performed by: EMERGENCY MEDICINE

## 2022-10-31 PROCEDURE — 81001 URINALYSIS AUTO W/SCOPE: CPT

## 2022-10-31 PROCEDURE — 74011000636 HC RX REV CODE- 636: Performed by: EMERGENCY MEDICINE

## 2022-10-31 PROCEDURE — 96374 THER/PROPH/DIAG INJ IV PUSH: CPT

## 2022-10-31 PROCEDURE — 85025 COMPLETE CBC W/AUTO DIFF WBC: CPT

## 2022-10-31 PROCEDURE — 99285 EMERGENCY DEPT VISIT HI MDM: CPT

## 2022-10-31 PROCEDURE — 80048 BASIC METABOLIC PNL TOTAL CA: CPT

## 2022-10-31 PROCEDURE — 87040 BLOOD CULTURE FOR BACTERIA: CPT

## 2022-10-31 PROCEDURE — 74011000250 HC RX REV CODE- 250: Performed by: NURSE PRACTITIONER

## 2022-10-31 PROCEDURE — 99231 SBSQ HOSP IP/OBS SF/LOW 25: CPT | Performed by: NURSE PRACTITIONER

## 2022-10-31 PROCEDURE — 74177 CT ABD & PELVIS W/CONTRAST: CPT

## 2022-10-31 PROCEDURE — 83605 ASSAY OF LACTIC ACID: CPT

## 2022-10-31 PROCEDURE — 87804 INFLUENZA ASSAY W/OPTIC: CPT

## 2022-10-31 PROCEDURE — 83735 ASSAY OF MAGNESIUM: CPT

## 2022-10-31 PROCEDURE — 84484 ASSAY OF TROPONIN QUANT: CPT

## 2022-10-31 PROCEDURE — 74011000250 HC RX REV CODE- 250: Performed by: EMERGENCY MEDICINE

## 2022-10-31 PROCEDURE — 80076 HEPATIC FUNCTION PANEL: CPT

## 2022-10-31 PROCEDURE — 65270000046 HC RM TELEMETRY

## 2022-10-31 PROCEDURE — 84702 CHORIONIC GONADOTROPIN TEST: CPT

## 2022-10-31 PROCEDURE — 71045 X-RAY EXAM CHEST 1 VIEW: CPT

## 2022-10-31 PROCEDURE — 87635 SARS-COV-2 COVID-19 AMP PRB: CPT

## 2022-10-31 PROCEDURE — 87086 URINE CULTURE/COLONY COUNT: CPT

## 2022-10-31 RX ORDER — POLYETHYLENE GLYCOL 3350 17 G/17G
17 POWDER, FOR SOLUTION ORAL DAILY PRN
Status: DISCONTINUED | OUTPATIENT
Start: 2022-10-31 | End: 2022-11-03 | Stop reason: HOSPADM

## 2022-10-31 RX ORDER — ACETAMINOPHEN 325 MG/1
650 TABLET ORAL
Status: DISCONTINUED | OUTPATIENT
Start: 2022-10-31 | End: 2022-11-03 | Stop reason: HOSPADM

## 2022-10-31 RX ORDER — SODIUM CHLORIDE 0.9 % (FLUSH) 0.9 %
5-10 SYRINGE (ML) INJECTION AS NEEDED
Status: DISCONTINUED | OUTPATIENT
Start: 2022-10-31 | End: 2022-11-03 | Stop reason: HOSPADM

## 2022-10-31 RX ORDER — SODIUM CHLORIDE 0.9 % (FLUSH) 0.9 %
5-40 SYRINGE (ML) INJECTION EVERY 8 HOURS
Status: DISCONTINUED | OUTPATIENT
Start: 2022-10-31 | End: 2022-11-03 | Stop reason: HOSPADM

## 2022-10-31 RX ORDER — SODIUM CHLORIDE 0.9 % (FLUSH) 0.9 %
5-40 SYRINGE (ML) INJECTION AS NEEDED
Status: DISCONTINUED | OUTPATIENT
Start: 2022-10-31 | End: 2022-11-03 | Stop reason: HOSPADM

## 2022-10-31 RX ORDER — ONDANSETRON 4 MG/1
4 TABLET, ORALLY DISINTEGRATING ORAL
Status: DISCONTINUED | OUTPATIENT
Start: 2022-10-31 | End: 2022-11-03 | Stop reason: HOSPADM

## 2022-10-31 RX ORDER — METOPROLOL TARTRATE 25 MG/1
25 TABLET, FILM COATED ORAL DAILY
Status: DISCONTINUED | OUTPATIENT
Start: 2022-11-01 | End: 2022-11-03 | Stop reason: HOSPADM

## 2022-10-31 RX ORDER — ROSUVASTATIN CALCIUM 20 MG/1
40 TABLET, COATED ORAL DAILY
Status: DISCONTINUED | OUTPATIENT
Start: 2022-11-01 | End: 2022-11-03 | Stop reason: HOSPADM

## 2022-10-31 RX ORDER — PANTOPRAZOLE SODIUM 40 MG/1
40 TABLET, DELAYED RELEASE ORAL
Status: DISCONTINUED | OUTPATIENT
Start: 2022-11-01 | End: 2022-11-03 | Stop reason: HOSPADM

## 2022-10-31 RX ORDER — SODIUM CHLORIDE 9 MG/ML
125 INJECTION, SOLUTION INTRAVENOUS CONTINUOUS
Status: DISCONTINUED | OUTPATIENT
Start: 2022-10-31 | End: 2022-11-01

## 2022-10-31 RX ORDER — LEVOTHYROXINE SODIUM 25 UG/1
25 TABLET ORAL
Status: DISCONTINUED | OUTPATIENT
Start: 2022-11-01 | End: 2022-11-03 | Stop reason: HOSPADM

## 2022-10-31 RX ORDER — ONDANSETRON 2 MG/ML
4 INJECTION INTRAMUSCULAR; INTRAVENOUS
Status: DISCONTINUED | OUTPATIENT
Start: 2022-10-31 | End: 2022-11-03 | Stop reason: HOSPADM

## 2022-10-31 RX ORDER — UREA 10 %
2 LOTION (ML) TOPICAL 2 TIMES DAILY
Status: DISCONTINUED | OUTPATIENT
Start: 2022-11-01 | End: 2022-11-03 | Stop reason: HOSPADM

## 2022-10-31 RX ORDER — OXYCODONE HYDROCHLORIDE 5 MG/1
5 TABLET ORAL
Status: DISCONTINUED | OUTPATIENT
Start: 2022-10-31 | End: 2022-11-03 | Stop reason: HOSPADM

## 2022-10-31 RX ORDER — CLOPIDOGREL BISULFATE 75 MG/1
75 TABLET ORAL DAILY
Status: DISCONTINUED | OUTPATIENT
Start: 2022-11-01 | End: 2022-11-03 | Stop reason: HOSPADM

## 2022-10-31 RX ADMIN — WATER 2 G: 1 INJECTION INTRAMUSCULAR; INTRAVENOUS; SUBCUTANEOUS at 20:39

## 2022-10-31 RX ADMIN — SODIUM CHLORIDE, PRESERVATIVE FREE 5 ML: 5 INJECTION INTRAVENOUS at 22:00

## 2022-10-31 RX ADMIN — IOPAMIDOL 70 ML: 612 INJECTION, SOLUTION INTRAVENOUS at 20:01

## 2022-10-31 NOTE — ED TRIAGE NOTES
Pt arrives via EMS from home c/o abdullahi knee pain x months. Last night into this am, it got so bad she couldn't walk.

## 2022-10-31 NOTE — ED PROVIDER NOTES
EMERGENCY DEPARTMENT HISTORY AND PHYSICAL EXAM    7:58 PM      Date: 10/31/2022  Patient Name: Miguel Duarte    History of Presenting Illness     Chief Complaint   Patient presents with    Knee Pain    Abdominal Pain    Nausea         History Provided By: Patient  Location/Duration/Severity/Modifying factors   The patient is a 52-year-old female with a history of chronic pain, spinal stimulator due to chronic back pain, GERD, gout, COPD, history of low potassium, hypercholesterolemia, the presents emergency department with 1 week of increasing fatigue after being diagnosed with a urinary tract infection and started on Macrobid. The patient had nausea and vomiting for the first 24 hours after starting the Macrobid has been taking it for the last 6 days. Patient subsequently over the last 24 hours has not been able to stand up and usually can ambulate. Patient said her legs are hurting and they are weak and then she can no longer stand on her own which is unusual.  Patient's family checks on her regularly and said that this is a unusual decline for her. There have been no fevers or chills or known sick contacts however noticed that everything changed when she started having problems with her urinary tract. There are no other known aggravating or alleviating factors. Patient is not a smoker, drinker, nor drug user. Patient is compliant with all of her medications. Patient has not had any diarrhea or constipation. Denies any falls. Supportive daughter is at the bedside providing some of the history.       PCP: Angelica Evans MD    Current Facility-Administered Medications   Medication Dose Route Frequency Provider Last Rate Last Admin    sodium chloride 0.9 % bolus infusion 1,000 mL  1,000 mL IntraVENous ONCE Mony Kelly MD        0.9% sodium chloride infusion  125 mL/hr IntraVENous CONTINUOUS Mayte Finch MD        iopamidoL (ISOVUE 300) 61 % contrast injection  mL   mL IntraVENous RAD ONCE Loren Abrams MD         Current Outpatient Medications   Medication Sig Dispense Refill    Klor-Con M20 20 mEq tablet TAKE 1 TABLET BY MOUTH EVERY DAY 90 Tablet 1    pantoprazole (PROTONIX) 40 mg tablet TAKE 1 TABLET BY MOUTH EVERY DAY BEFORE BREAKFAST 90 Tablet 1    albuterol (PROVENTIL HFA, VENTOLIN HFA, PROAIR HFA) 90 mcg/actuation inhaler INHALE 2 PUFFS BY MOUTH EVERY 4 HOURS AS NEEDED FOR WHEEZE 8.5 Each 2    apixaban (Eliquis) 5 mg tablet Take 1 Tablet by mouth two (2) times a day. 180 Tablet 1    clopidogreL (PLAVIX) 75 mg tab Take 1 Tablet by mouth daily. 90 Tablet 1    metoprolol tartrate (LOPRESSOR) 25 mg tablet Take 1 Tablet by mouth daily. 90 Tablet 1    levothyroxine (SYNTHROID) 25 mcg tablet TAKE 1 TABLET BY MOUTH EVERY DAY 90 Tablet 2    furosemide (LASIX) 40 mg tablet Take 1 Tablet by mouth daily as needed (Edema). 90 Tablet 1    rosuvastatin (CRESTOR) 40 mg tablet Take 1 Tablet by mouth daily. 180 Tablet 1    acetaminophen (TYLENOL) 500 mg tablet Take 500 mg by mouth every six (6) hours as needed for Pain. Pain      carboxymethylcellulose sodium (REFRESH TEARS OP) Administer 1 Drop to both eyes daily as needed for Other. Dry eyes      peg 400-propylene glycol, PF, (Systane Hydration PF) 0.4-0.3 % dpet ophthalmic solution Administer 2 Drops to both eyes four (4) times daily as needed for Ocular Dryness. Dry eyes      nitroglycerin (NITROSTAT) 0.4 mg SL tablet 1 Tablet by SubLINGual route every five (5) minutes as needed for Chest Pain. Up to 3 doses. 20 Tablet 0    oxyCODONE IR (ROXICODONE) 5 mg immediate release tablet Take 5 mg by mouth three (3) times daily as needed for Pain. Pain      inhalational spacing device (ACE AEROSOL CLOUD ENHANCER) 1 Each by Does Not Apply route as needed.  1 Device 3       Past History     Past Medical History:  Past Medical History:   Diagnosis Date    Abnormal chest CT 2/6/2020    Adopted     Arthritis     Balance problems     Chronic cough 2020    MCBRIDE (dyspnea on exertion) 2019    GERD (gastroesophageal reflux disease)     Gout     Hemochromatosis     High cholesterol     Hypertension     Hypothyroidism     Left knee pain     Left shoulder pain     Low blood potassium     Lumbar pain     Pain of left sacroiliac joint     Shoulder impingement, right, with rotator cuff strain        Past Surgical History:  Past Surgical History:   Procedure Laterality Date    HX CHOLECYSTECTOMY      HX KNEE REPLACEMENT Bilateral     R knee/ Lknee and femur    HX ORTHOPAEDIC      R trigger finger     HX OTHER SURGICAL      Right little finger    HX OTHER SURGICAL      Right wrist, replaced unlnar). HX OTHER SURGICAL      Both knees    HX OTHER SURGICAL      Left femur    HX OTHER SURGICAL      Trigger finger surgery    HX OTHER SURGICAL      Left knee replacment revision     HX ROTATOR CUFF REPAIR Right     HX TONSILLECTOMY         Family History:  Family History   Adopted: Yes       Social History:  Social History     Tobacco Use    Smoking status: Former     Packs/day: 0.50     Years: 2.00     Pack years: 1.00     Types: Cigarettes     Start date: 5     Quit date:      Years since quittin.8    Smokeless tobacco: Never    Tobacco comments:     quit 20 years ago   Vaping Use    Vaping Use: Never used   Substance Use Topics    Alcohol use: No    Drug use: No       Allergies: Allergies   Allergen Reactions    Ciprofloxacin Other (comments)     Achilles tendonitis/leg pain and swelling    Indomethacin Anaphylaxis, Hives and Swelling    Tomato Other (comments)     Heart burn         Review of Systems       Review of Systems   Constitutional:  Positive for activity change and fatigue. Negative for fever. HENT:  Negative for congestion and rhinorrhea. Eyes:  Negative for visual disturbance. Respiratory:  Negative for shortness of breath. Cardiovascular:  Negative for chest pain and palpitations.    Gastrointestinal:  Negative for abdominal pain, diarrhea, nausea and vomiting. Genitourinary:  Negative for dysuria and hematuria. Musculoskeletal:  Positive for arthralgias and gait problem. Negative for back pain. Skin:  Negative for rash. Neurological:  Negative for dizziness, weakness and light-headedness. All other systems reviewed and are negative. Physical Exam   Visit Vitals  BP (!) 148/72   Pulse 70   Temp 97.3 °F (36.3 °C)   Resp 18   Ht 5' 1\" (1.549 m)   Wt 81.6 kg (180 lb)   SpO2 99%   BMI 34.01 kg/m²         Physical Exam  Vitals and nursing note reviewed. Constitutional:       General: She is not in acute distress. Appearance: She is well-developed. HENT:      Head: Normocephalic and atraumatic. Right Ear: External ear normal.      Left Ear: External ear normal.      Nose: Nose normal.   Eyes:      General: No scleral icterus. Conjunctiva/sclera: Conjunctivae normal.      Pupils: Pupils are equal, round, and reactive to light. Neck:      Thyroid: No thyromegaly. Vascular: No JVD. Trachea: No tracheal deviation. Cardiovascular:      Rate and Rhythm: Normal rate and regular rhythm. Heart sounds: Normal heart sounds. No murmur heard. No friction rub. No gallop. Pulmonary:      Effort: Pulmonary effort is normal.      Breath sounds: No rhonchi. Chest:      Chest wall: No tenderness. Abdominal:      General: Bowel sounds are normal. There is no distension. Palpations: Abdomen is soft. Tenderness: There is abdominal tenderness. There is no guarding or rebound. Comments: Mild poorly localized pain, has a known abdominal problem and is planned CAT scan per patient   Musculoskeletal:         General: No tenderness. Normal range of motion. Cervical back: Normal range of motion and neck supple. Comments: Diffuse poorly localized back pain greatest in the thoracic spine, no step-off   Lymphadenopathy:      Cervical: No cervical adenopathy.    Skin:     General: Skin is warm and dry. Neurological:      Mental Status: She is alert and oriented to person, place, and time. Cranial Nerves: No cranial nerve deficit. Coordination: Coordination normal.      Comments: Symmetric upper extremity strength, lower extremity strength is 3 out of 5, gait not observed   Psychiatric:         Behavior: Behavior normal.         Thought Content: Thought content normal.         Judgment: Judgment normal.      Comments: Good insight to her care, supportive daughter at the bedside         Diagnostic Study Results     Labs -  Recent Results (from the past 12 hour(s))   CBC WITH AUTOMATED DIFF    Collection Time: 10/31/22  5:30 PM   Result Value Ref Range    WBC 9.0 4.6 - 13.2 K/uL    RBC 3.75 (L) 4.20 - 5.30 M/uL    HGB 11.7 (L) 12.0 - 16.0 g/dL    HCT 35.6 35.0 - 45.0 %    MCV 94.9 78.0 - 100.0 FL    MCH 31.2 24.0 - 34.0 PG    MCHC 32.9 31.0 - 37.0 g/dL    RDW 14.9 (H) 11.6 - 14.5 %    PLATELET 891 567 - 528 K/uL    MPV 10.9 9.2 - 11.8 FL    NRBC 0.0 0  WBC    ABSOLUTE NRBC 0.00 0.00 - 0.01 K/uL    NEUTROPHILS 74 (H) 40 - 73 %    LYMPHOCYTES 15 (L) 21 - 52 %    MONOCYTES 10 3 - 10 %    EOSINOPHILS 1 0 - 5 %    BASOPHILS 0 0 - 2 %    IMMATURE GRANULOCYTES 1 (H) 0.0 - 0.5 %    ABS. NEUTROPHILS 6.7 1.8 - 8.0 K/UL    ABS. LYMPHOCYTES 1.3 0.9 - 3.6 K/UL    ABS. MONOCYTES 0.9 0.05 - 1.2 K/UL    ABS. EOSINOPHILS 0.1 0.0 - 0.4 K/UL    ABS. BASOPHILS 0.0 0.0 - 0.1 K/UL    ABS. IMM.  GRANS. 0.1 (H) 0.00 - 0.04 K/UL    DF AUTOMATED     METABOLIC PANEL, BASIC    Collection Time: 10/31/22  5:30 PM   Result Value Ref Range    Sodium 140 136 - 145 mmol/L    Potassium 3.8 3.5 - 5.5 mmol/L    Chloride 107 100 - 111 mmol/L    CO2 27 21 - 32 mmol/L    Anion gap 6 3.0 - 18 mmol/L    Glucose 109 (H) 74 - 99 mg/dL    BUN 18 7.0 - 18 MG/DL    Creatinine 1.16 0.6 - 1.3 MG/DL    BUN/Creatinine ratio 16 12 - 20      eGFR 48 (L) >60 ml/min/1.73m2    Calcium 8.7 8.5 - 10.1 MG/DL   HEPATIC FUNCTION PANEL Collection Time: 10/31/22  5:30 PM   Result Value Ref Range    Protein, total 6.0 (L) 6.4 - 8.2 g/dL    Albumin 2.9 (L) 3.4 - 5.0 g/dL    Globulin 3.1 2.0 - 4.0 g/dL    A-G Ratio 0.9 0.8 - 1.7      Bilirubin, total 1.0 0.2 - 1.0 MG/DL    Bilirubin, direct 0.2 0.0 - 0.2 MG/DL    Alk. phosphatase 70 45 - 117 U/L    AST (SGOT) 89 (H) 10 - 38 U/L    ALT (SGPT) 58 (H) 13 - 56 U/L   BETA HCG, QT    Collection Time: 10/31/22  5:30 PM   Result Value Ref Range    Beta HCG, QT 6 0 - 10 MIU/ML   MAGNESIUM    Collection Time: 10/31/22  5:30 PM   Result Value Ref Range    Magnesium 1.9 1.6 - 2.6 mg/dL   TROPONIN-HIGH SENSITIVITY    Collection Time: 10/31/22  5:30 PM   Result Value Ref Range    Troponin-High Sensitivity 55 (H) 0 - 54 ng/L   URINALYSIS W/ RFLX MICROSCOPIC    Collection Time: 10/31/22  7:29 PM   Result Value Ref Range    Color YELLOW      Appearance CLEAR      Specific gravity 1.009 1.005 - 1.030      pH (UA) 6.5 5.0 - 8.0      Protein 100 (A) NEG mg/dL    Glucose Negative NEG mg/dL    Ketone Negative NEG mg/dL    Bilirubin Negative NEG      Blood LARGE (A) NEG      Urobilinogen 1.0 0.2 - 1.0 EU/dL    Nitrites Negative NEG      Leukocyte Esterase MODERATE (A) NEG         Radiologic Studies -   US RETROPERITONEUM COMP   Final Result   :      1.  Echogenic native kidneys as well as some asymmetric atrophy of left kidney. Typical of medical renal disease and chronic postinflammatory/post ischemic   sequelae. CT ABD PELV W CONT   Final Result      No CT finding for an acute intra-abdominal or pelvic process      XR CHEST SNGL V   Final Result      No radiographic finding for an acute cardiopulmonary process            Medical Decision Making   I am the first provider for this patient. I reviewed the vital signs, available nursing notes, past medical history, past surgical history, family history and social history. Vital Signs-Reviewed the patient's vital signs.     EKG: Sinus bradycardia at 58, no STEMI, interpreted by me    Records Reviewed: Nursing Notes, Old Medical Records, Previous Radiology Studies, and Previous Laboratory Studies (Time of Review: 7:58 PM)    ED Course: Progress Notes, Reevaluation, and Consults: The patient has persistent pyuria and is failed outpatient therapy for her UTI and now cannot ambulate. After discussion with the hospitalist will admit the patient for further care. Provider Notes (Medical Decision Making):   MDM  Number of Diagnoses or Management Options  Acute UTI [N39.0 (ICD-10-CM)]  Bilateral leg edema [R60.0 (ICD-10-CM)]  Weakness [R53.1 (ICD-10-CM)]  Diagnosis management comments: Patient is a 77-year-old female with a history of hemochromatosis, hypertension, chronic joint pain, spinal stimulator placement, recent urinary tract infection on Macrobid for the past 6 days, the presents emergency department with overall decline but worsened over the last 24 hours and unable to stand on her own now which is unusual.  The patient is afebrile in the emergency department but globally weak mostly in the lower extremities as compared to the upper extremities. The patient has diffuse back pain however has had no new trauma. The patient has a plan for a CT scan of her abdomen so we will get the CT scan of her abdomen, follow basic labs including urinalysis to see if her urinary tract infection has resolved, hydrate as tolerated, and then reevaluate. Procedures          Diagnosis     Clinical Impression:   1. Weakness [R53.1 (ICD-10-CM)]    2. Bilateral leg edema [R60.0 (ICD-10-CM)]    3. Acute UTI [N39.0 (ICD-10-CM)]        Disposition: Admit    Follow-up Information    None          Patient's Medications   Start Taking    No medications on file   Continue Taking    ACETAMINOPHEN (TYLENOL) 500 MG TABLET    Take 500 mg by mouth every six (6) hours as needed for Pain.  Pain    ALBUTEROL (PROVENTIL HFA, VENTOLIN HFA, PROAIR HFA) 90 MCG/ACTUATION INHALER    INHALE 2 PUFFS BY MOUTH EVERY 4 HOURS AS NEEDED FOR WHEEZE    APIXABAN (ELIQUIS) 5 MG TABLET    Take 1 Tablet by mouth two (2) times a day. CARBOXYMETHYLCELLULOSE SODIUM (REFRESH TEARS OP)    Administer 1 Drop to both eyes daily as needed for Other. Dry eyes    CLOPIDOGREL (PLAVIX) 75 MG TAB    Take 1 Tablet by mouth daily. FUROSEMIDE (LASIX) 40 MG TABLET    Take 1 Tablet by mouth daily as needed (Edema). INHALATIONAL SPACING DEVICE (ACE AEROSOL CLOUD ENHANCER)    1 Each by Does Not Apply route as needed. KLOR-CON M20 20 MEQ TABLET    TAKE 1 TABLET BY MOUTH EVERY DAY    LEVOTHYROXINE (SYNTHROID) 25 MCG TABLET    TAKE 1 TABLET BY MOUTH EVERY DAY    METOPROLOL TARTRATE (LOPRESSOR) 25 MG TABLET    Take 1 Tablet by mouth daily. NITROGLYCERIN (NITROSTAT) 0.4 MG SL TABLET    1 Tablet by SubLINGual route every five (5) minutes as needed for Chest Pain. Up to 3 doses. OXYCODONE IR (ROXICODONE) 5 MG IMMEDIATE RELEASE TABLET    Take 5 mg by mouth three (3) times daily as needed for Pain. Pain    PANTOPRAZOLE (PROTONIX) 40 MG TABLET    TAKE 1 TABLET BY MOUTH EVERY DAY BEFORE BREAKFAST    -PROPYLENE GLYCOL, PF, (SYSTANE HYDRATION PF) 0.4-0.3 % DPET OPHTHALMIC SOLUTION    Administer 2 Drops to both eyes four (4) times daily as needed for Ocular Dryness. Dry eyes    ROSUVASTATIN (CRESTOR) 40 MG TABLET    Take 1 Tablet by mouth daily. These Medications have changed    No medications on file   Stop Taking    No medications on file     Disclaimer: Sections of this note are dictated using utilizing voice recognition software. Minor typographical errors may be present. If questions arise, please do not hesitate to contact me or call our department.

## 2022-10-31 NOTE — ED TRIAGE NOTES
Pt arrived via EMS for reported knee pain. Pt states she has had bilateral knee pain for several months and as of yesterday she has not been able to walk with a walker.   Pt also reports nausea and abdominal pain after starting abx last week for a UTI

## 2022-11-01 LAB
ALBUMIN SERPL-MCNC: 2.7 G/DL (ref 3.4–5)
ALBUMIN/GLOB SERPL: 0.8 {RATIO} (ref 0.8–1.7)
ALP SERPL-CCNC: 62 U/L (ref 45–117)
ALT SERPL-CCNC: 54 U/L (ref 13–56)
ANION GAP SERPL CALC-SCNC: 7 MMOL/L (ref 3–18)
AST SERPL-CCNC: 80 U/L (ref 10–38)
ATRIAL RATE: 58 BPM
BASOPHILS # BLD: 0 K/UL (ref 0–0.1)
BASOPHILS NFR BLD: 0 % (ref 0–2)
BILIRUB SERPL-MCNC: 0.8 MG/DL (ref 0.2–1)
BUN SERPL-MCNC: 17 MG/DL (ref 7–18)
BUN/CREAT SERPL: 17 (ref 12–20)
CALCIUM SERPL-MCNC: 9.2 MG/DL (ref 8.5–10.1)
CALCULATED P AXIS, ECG09: -12 DEGREES
CALCULATED R AXIS, ECG10: -21 DEGREES
CALCULATED T AXIS, ECG11: -5 DEGREES
CHLORIDE SERPL-SCNC: 105 MMOL/L (ref 100–111)
CO2 SERPL-SCNC: 27 MMOL/L (ref 21–32)
CREAT SERPL-MCNC: 1 MG/DL (ref 0.6–1.3)
DIAGNOSIS, 93000: NORMAL
DIFFERENTIAL METHOD BLD: ABNORMAL
EOSINOPHIL # BLD: 0 K/UL (ref 0–0.4)
EOSINOPHIL NFR BLD: 0 % (ref 0–5)
ERYTHROCYTE [DISTWIDTH] IN BLOOD BY AUTOMATED COUNT: 15 % (ref 11.6–14.5)
GLOBULIN SER CALC-MCNC: 3.6 G/DL (ref 2–4)
GLUCOSE SERPL-MCNC: 95 MG/DL (ref 74–99)
HCT VFR BLD AUTO: 37.2 % (ref 35–45)
HGB BLD-MCNC: 12.4 G/DL (ref 12–16)
IMM GRANULOCYTES # BLD AUTO: 0 K/UL (ref 0–0.04)
IMM GRANULOCYTES NFR BLD AUTO: 0 % (ref 0–0.5)
LYMPHOCYTES # BLD: 1.3 K/UL (ref 0.9–3.6)
LYMPHOCYTES NFR BLD: 13 % (ref 21–52)
MCH RBC QN AUTO: 31.5 PG (ref 24–34)
MCHC RBC AUTO-ENTMCNC: 33.3 G/DL (ref 31–37)
MCV RBC AUTO: 94.4 FL (ref 78–100)
MONOCYTES # BLD: 1 K/UL (ref 0.05–1.2)
MONOCYTES NFR BLD: 10 % (ref 3–10)
NEUTS SEG # BLD: 7.7 K/UL (ref 1.8–8)
NEUTS SEG NFR BLD: 76 % (ref 40–73)
NRBC # BLD: 0 K/UL (ref 0–0.01)
NRBC BLD-RTO: 0 PER 100 WBC
P-R INTERVAL, ECG05: 164 MS
PLATELET # BLD AUTO: 221 K/UL (ref 135–420)
PMV BLD AUTO: 10.8 FL (ref 9.2–11.8)
POTASSIUM SERPL-SCNC: 4.2 MMOL/L (ref 3.5–5.5)
PROT SERPL-MCNC: 6.3 G/DL (ref 6.4–8.2)
Q-T INTERVAL, ECG07: 404 MS
QRS DURATION, ECG06: 80 MS
QTC CALCULATION (BEZET), ECG08: 396 MS
RBC # BLD AUTO: 3.94 M/UL (ref 4.2–5.3)
SODIUM SERPL-SCNC: 139 MMOL/L (ref 136–145)
VENTRICULAR RATE, ECG03: 58 BPM
WBC # BLD AUTO: 10.1 K/UL (ref 4.6–13.2)

## 2022-11-01 PROCEDURE — 99233 SBSQ HOSP IP/OBS HIGH 50: CPT | Performed by: FAMILY MEDICINE

## 2022-11-01 PROCEDURE — 65270000046 HC RM TELEMETRY

## 2022-11-01 PROCEDURE — 97530 THERAPEUTIC ACTIVITIES: CPT

## 2022-11-01 PROCEDURE — 97166 OT EVAL MOD COMPLEX 45 MIN: CPT

## 2022-11-01 PROCEDURE — 74011250637 HC RX REV CODE- 250/637: Performed by: NURSE PRACTITIONER

## 2022-11-01 PROCEDURE — 36415 COLL VENOUS BLD VENIPUNCTURE: CPT

## 2022-11-01 PROCEDURE — 97535 SELF CARE MNGMENT TRAINING: CPT

## 2022-11-01 PROCEDURE — 97162 PT EVAL MOD COMPLEX 30 MIN: CPT

## 2022-11-01 PROCEDURE — 74011250636 HC RX REV CODE- 250/636: Performed by: NURSE PRACTITIONER

## 2022-11-01 PROCEDURE — 85025 COMPLETE CBC W/AUTO DIFF WBC: CPT

## 2022-11-01 PROCEDURE — 74011000250 HC RX REV CODE- 250: Performed by: EMERGENCY MEDICINE

## 2022-11-01 PROCEDURE — 93005 ELECTROCARDIOGRAM TRACING: CPT

## 2022-11-01 PROCEDURE — 74011250636 HC RX REV CODE- 250/636: Performed by: EMERGENCY MEDICINE

## 2022-11-01 PROCEDURE — 74011000250 HC RX REV CODE- 250: Performed by: NURSE PRACTITIONER

## 2022-11-01 PROCEDURE — 80053 COMPREHEN METABOLIC PANEL: CPT

## 2022-11-01 PROCEDURE — 74011250637 HC RX REV CODE- 250/637: Performed by: STUDENT IN AN ORGANIZED HEALTH CARE EDUCATION/TRAINING PROGRAM

## 2022-11-01 RX ORDER — LORAZEPAM 2 MG/ML
1-2 INJECTION INTRAMUSCULAR ONCE
Status: DISPENSED | OUTPATIENT
Start: 2022-11-02 | End: 2022-11-02

## 2022-11-01 RX ADMIN — METOPROLOL TARTRATE 25 MG: 25 TABLET, FILM COATED ORAL at 08:38

## 2022-11-01 RX ADMIN — OXYCODONE HYDROCHLORIDE 5 MG: 5 TABLET ORAL at 01:38

## 2022-11-01 RX ADMIN — LEVOTHYROXINE SODIUM 25 MCG: 25 TABLET ORAL at 06:00

## 2022-11-01 RX ADMIN — APIXABAN 5 MG: 5 TABLET, FILM COATED ORAL at 01:38

## 2022-11-01 RX ADMIN — PANTOPRAZOLE SODIUM 40 MG: 40 TABLET, DELAYED RELEASE ORAL at 08:38

## 2022-11-01 RX ADMIN — Medication 2 TABLET: at 17:22

## 2022-11-01 RX ADMIN — APIXABAN 5 MG: 5 TABLET, FILM COATED ORAL at 17:22

## 2022-11-01 RX ADMIN — Medication 2 TABLET: at 08:38

## 2022-11-01 RX ADMIN — SODIUM CHLORIDE, PRESERVATIVE FREE 10 ML: 5 INJECTION INTRAVENOUS at 15:22

## 2022-11-01 RX ADMIN — SODIUM CHLORIDE, PRESERVATIVE FREE 10 ML: 5 INJECTION INTRAVENOUS at 07:12

## 2022-11-01 RX ADMIN — APIXABAN 5 MG: 5 TABLET, FILM COATED ORAL at 08:38

## 2022-11-01 RX ADMIN — ONDANSETRON 4 MG: 2 INJECTION INTRAMUSCULAR; INTRAVENOUS at 01:38

## 2022-11-01 RX ADMIN — ROSUVASTATIN CALCIUM 40 MG: 20 TABLET, COATED ORAL at 08:38

## 2022-11-01 RX ADMIN — WATER 2 G: 1 INJECTION INTRAMUSCULAR; INTRAVENOUS; SUBCUTANEOUS at 22:09

## 2022-11-01 RX ADMIN — SODIUM CHLORIDE, PRESERVATIVE FREE 10 ML: 5 INJECTION INTRAVENOUS at 22:10

## 2022-11-01 RX ADMIN — CLOPIDOGREL BISULFATE 75 MG: 75 TABLET ORAL at 08:38

## 2022-11-01 NOTE — PROGRESS NOTES
Problem: Risk for Spread of Infection  Goal: Prevent transmission of infectious organism to others  Description: Prevent the transmission of infectious organisms to other patients, staff members, and visitors. Outcome: Progressing Towards Goal     Problem: Patient Education:  Go to Education Activity  Goal: Patient/Family Education  Outcome: Progressing Towards Goal     Problem: Falls - Risk of  Goal: *Absence of Falls  Description: Document Jd Holbrook Fall Risk and appropriate interventions in the flowsheet.   Outcome: Progressing Towards Goal  Note: Fall Risk Interventions:            Medication Interventions: Bed/chair exit alarm    Elimination Interventions: Call light in reach              Problem: Patient Education: Go to Patient Education Activity  Goal: Patient/Family Education  Outcome: Progressing Towards Goal

## 2022-11-01 NOTE — PROGRESS NOTES
Problem: Self Care Deficits Care Plan (Adult)  Goal: *Acute Goals and Plan of Care (Insert Text)  Description: Occupational Therapy Goals  Initiated 11/1/2022 within 7 day(s). 1.  Patient will perform upper bathing and upper body dressing with supervision/set-up seated at edge of bed. 2.  Patient will perform bed mobility in prep for self care with minimal assistance/contact guard assist.  3.  Patient will perform lower body dressing with minimal assistance/contact guard assist.  4.  Patient will perform toilet transfers with minimal assistance/contact guard assist.  5.  Patient will perform all aspects of toileting with minimal assistance/contact guard assist.  6.  Patient will participate in upper extremity therapeutic exercise/activities with supervision/set-up for 8 minutes. 7.  Patient will utilize energy conservation techniques during functional activities with verbal cues. Prior Level of Function: Pt reports that she is able to ambulate around room with rollator and uses motorized scooter outside. Pt performed bathing with use of bath bench and roommate present for bath transfer and MI for dressing and grooming and toileting. Outcome: Progressing Towards Goal   OCCUPATIONAL THERAPY EVALUATION    Patient: Valentin Powers [de-identified]76 y.o. female)  Date: 11/1/2022  Primary Diagnosis: Acute UTI [N39.0]  Elevated troponin [R77.8]  Weakness [R53.1]       Precautions:   Fall, Contact  PLOF: see above    ASSESSMENT :  Based on the objective data described below, the patient presents with decreased UB strength and AROM, impaired functional mobility, impaired functional activity tolerance, impaired sitting balance impacting independence in self care. Pt required encouragement for OOB activity, however, performed supine>sit with extra time and mod A. Pt performed scooting forward with CGA. Pt performed oral care seated with set up.  Pt demonstrates decreased UB AROM with pt reporting hx of rotator cuff injuries. Pt with 3+ to 4/5 strength generally in BUE. Pt performed scooting laterally while seated with ability to lift bottom from bed slightly with min A. Pt returned to supine with max A. Pt requires max A for scooting up in bed. Pt with all needs in reach at end of session. Patient will benefit from skilled intervention to address the above impairments. Patient's rehabilitation potential is considered to be Fair  Factors which may influence rehabilitation potential include:   []             None noted  []             Mental ability/status  [x]             Medical condition  [x]             Home/family situation and support systems  []             Safety awareness  []             Pain tolerance/management  []             Other:      PLAN :  Recommendations and Planned Interventions:   [x]               Self Care Training                  [x]      Therapeutic Activities  [x]               Functional Mobility Training   []      Cognitive Retraining  [x]               Therapeutic Exercises           [x]      Endurance Activities  [x]               Balance Training                    [x]      Neuromuscular Re-Education  []               Visual/Perceptual Training     [x]      Home Safety Training  [x]               Patient Education                   [x]      Family Training/Education  []               Other (comment):    Frequency/Duration: Patient will be followed by occupational therapy 1-2 times per day/3-6 days per week to address goals. Further Equipment Recommendations for Discharge: bedside commode and rolling walker    [facility]: Current research shows that an AM-PAC score of 17 or less is not associated with a discharge to the patient's home setting. Based on an AM-PAC score of 15/24 and their current ADL deficits; it is recommended that the patient have 5-7 sessions per week of Occupational Therapy at d/c to increase the patient's independence.   Currently, this patient demonstrates the potential endurance, and/or tolerance for 3 hours of therapy each day at d/c. This AMPAC score should be considered in conjunction with interdisciplinary team recommendations to determine the most appropriate discharge setting. Patient's social support, diagnosis, medical stability, and prior level of function should also be taken into consideration. SUBJECTIVE:   Patient stated I can't move my legs at all.     OBJECTIVE DATA SUMMARY:     Past Medical History:   Diagnosis Date    Abnormal chest CT 2/6/2020    Adopted     Arthritis     Balance problems     Chronic cough 2/6/2020    MCBRIDE (dyspnea on exertion) 2/28/2019    GERD (gastroesophageal reflux disease)     Gout     Hemochromatosis     High cholesterol     Hypertension     Hypothyroidism     Left knee pain     Left shoulder pain     Low blood potassium     Lumbar pain     Pain of left sacroiliac joint     Shoulder impingement, right, with rotator cuff strain      Past Surgical History:   Procedure Laterality Date    HX CHOLECYSTECTOMY      HX KNEE REPLACEMENT Bilateral     R knee/ Lknee and femur    HX ORTHOPAEDIC      R trigger finger     HX OTHER SURGICAL      Right little finger    HX OTHER SURGICAL      Right wrist, replaced unlnar).     HX OTHER SURGICAL      Both knees    HX OTHER SURGICAL      Left femur    HX OTHER SURGICAL      Trigger finger surgery    HX OTHER SURGICAL      Left knee replacment revision     HX ROTATOR CUFF REPAIR Right     HX TONSILLECTOMY       Barriers to Learning/Limitations: None  Compensate with: visual, verbal, tactile, kinesthetic cues/model    Home Situation:   Home Situation  Home Environment: Private residence  One/Two Story Residence: One story  Living Alone: No  Support Systems: Other Family Member(s), Child(meg), Friend/Neighbor  Patient Expects to be Discharged to[de-identified] Home  Current DME Used/Available at Home: Raised toilet seat, Tub transfer bench, Walker, rollator, Other (comment) (motorized scooter)  Tub or Shower Type: Tub/Shower combination  [x]  Right hand dominant   []  Left hand dominant    Cognitive/Behavioral Status:  Neurologic State: Alert  Orientation Level: Oriented X4  Cognition: Appropriate for age attention/concentration; Appropriate decision making; Follows commands  Safety/Judgement: Fall prevention;Home safety    Skin: intact  Edema: none noted     Vision/Perceptual:       intact     Coordination: BUE  Coordination: Within functional limits  Fine Motor Skills-Upper: Left Intact; Right Intact    Gross Motor Skills-Upper: Left Intact; Right Intact  Balance:  Sitting: With support; Impaired  Sitting - Static: Good (unsupported)  Sitting - Dynamic: Fair (occasional)  Standing: With support  Standing - Static:  (fair-)  Standing - Dynamic :  (poor+)  Strength: BUE  Strength: Generally decreased, functional   Tone & Sensation: BUE  Tone: Normal   Range of Motion: BUE  AROM: Generally decreased, functional   Functional Mobility and Transfers for ADLs:  Bed Mobility:     Supine to Sit: Moderate assistance  Sit to Supine: Maximum assistance  Scooting: Minimum assistance;Maximum assistance (min sitting scooting and max supine)  Transfers:  Sit to Stand: Moderate assistance (from elevated bed)  Stand to Sit: Minimum assistance     ADL Assessment:    Oral Facial Hygiene/Grooming: Setup    ADL Intervention:   Grooming  Brushing Teeth: Set-up    Cognitive Retraining  Safety/Judgement: Fall prevention;Home safety  Pain:  Pain level pre-treatment: 0/10   Pain level post-treatment: 0/10   Pain Intervention(s): Medication (see MAR); Rest, Ice, Repositioning   Response to intervention: Nurse notified, See doc flow    Activity Tolerance:   Fair   Please refer to the flowsheet for vital signs taken during this treatment.   After treatment:   [] Patient left in no apparent distress sitting up in chair  [x] Patient left in no apparent distress in bed  [x] Call bell left within reach  [] Nursing notified  [] Caregiver present  [x] Bed alarm activated    COMMUNICATION/EDUCATION:   [x] Role of Occupational Therapy in the acute care setting  [x] Home safety education was provided and the patient/caregiver indicated understanding. [x] Patient/family have participated as able in goal setting and plan of care. [x] Patient/family agree to work toward stated goals and plan of care. [] Patient understands intent and goals of therapy, but is neutral about his/her participation. [] Patient is unable to participate in goal setting and plan of care. Thank you for this referral.  Selma Gilford, OT  Time Calculation: 34 mins    Eval Complexity: History: MEDIUM Complexity : Expanded review of history including physical, cognitive and psychosocial  history ; Examination: MEDIUM Complexity : 3-5 performance deficits relating to physical, cognitive , or psychosocial skils that result in activity limitations and / or participation restrictions; Decision Making:MEDIUM Complexity : Patient may present with comorbidities that affect occupational performnce. Miniml to moderate modification of tasks or assistance (eg, physical or verbal ) with assesment(s) is necessary to enable patient to complete evaluation     [employer] AM-PAC® Daily Activity Inpatient Short Form (6-Clicks)*    How much HELP from another person does the patient currently need    (If the patient hasn't done an activity recently, how much help from another person do you think he/she would need if he/she tried?)   Total (Total A or Dep)   A Lot  (Mod to Max A)   A Little (Sup or Min A)   None (Mod I to I)   Putting on and taking off regular lower body clothing? [] 1 [x] 2 [] 3 [] 4   2. Bathing (including washing, rinsing,      drying)? [] 1 [x] 2 [] 3 [] 4   3. Toileting, which includes using toilet, bedpan or urinal?   [] 1 [x] 2 [] 3 [] 4   4. Putting on and taking off regular upper body clothing? [] 1 [] 2 [x] 3 [] 4   5.  Taking care of personal grooming such as brushing teeth?   [] 1 [] 2 [x] 3 [] 4   6. Eating meals?    [] 1 [] 2 [x] 3 [] 4

## 2022-11-01 NOTE — PROGRESS NOTES
Problem: Mobility Impaired (Adult and Pediatric)  Goal: *Acute Goals and Plan of Care (Insert Text)  Description: Physical Therapy Goals  Initiated 11/1/2022 and to be accomplished within 7 day(s)  1. Patient will move from supine to sit and sit to supine  in bed with minimal assistance/contact guard assist.    2.  Patient will transfer from bed to chair and chair to bed with minimal assistance/contact guard assist using the least restrictive device. 3.  Patient will perform sit to stand with minimal assistance/contact guard assist.  4.  Patient will ambulate with minimal assistance/contact guard assist for 20 feet with the least restrictive device. PLOF: pt ambulates in home with rollator, has power wheelchair for community; states that a friend lives with her but isn't necessarily able to help her; sleeps in a recliner/lift chair     Outcome: Progressing Towards Goal   PHYSICAL THERAPY EVALUATION    Patient: Elpidio Zee [de-identified]76 y.o. female)  Date: 11/1/2022  Primary Diagnosis: Acute UTI [N39.0]  Elevated troponin [R77.8]  Weakness [R53.1]       Precautions: fall       PLOF: see above    ASSESSMENT :  Based on the objective data described below, the patient presents with decreased strength, balance and activity tolerance resulting in decreased independence with functional mobility. Pt required mod A for supine to sit transfer with the head of the bed elevated 45 degrees. Pt stood with mod A from the bed with the bed slightly elevated. Pt was able to take 2 sidesteps along the edge of the bed with min/mod A and RW. Pt returned to sitting and then to supine with mod A. Pt scooted up in the bed with mod A and bed in the trendelenburg position. Pt was left in bed with needs in reach. Patient will benefit from skilled intervention to address the above impairments.   Patient's rehabilitation potential is considered to be Good  Factors which may influence rehabilitation potential include:   []         None noted  []         Mental ability/status  [x]         Medical condition  [x]         Home/family situation and support systems  []         Safety awareness  []         Pain tolerance/management  []         Other:      PLAN :  Recommendations and Planned Interventions:   [x]           Bed Mobility Training             []    Neuromuscular Re-Education  [x]           Transfer Training                   []    Orthotic/Prosthetic Training  [x]           Gait Training                          []    Modalities  [x]           Therapeutic Exercises           []    Edema Management/Control  [x]           Therapeutic Activities            []    Family Training/Education  [x]           Patient Education  []           Other (comment):    Frequency/Duration: Patient will be followed by physical therapy 1-2 times per day/4-7 days per week to address goals. Further Equipment Recommendations for Discharge: N/A    AMPAC: Current research shows that an AM-PAC score of 17 (13 without stairs) or less is not associated with a discharge to the patient's home setting. Based on an AM-PAC score of 10/24 (or **/20 if omitting stairs) and their current functional mobility deficits, it is recommended that the patient have 5-7 sessions per week of Physical Therapy at d/c to increase the patient's independence. Currently, this patient demonstrates the potential endurance, and/or tolerance for 3 hours of therapy each day at d/c. This AMPAC score should be considered in conjunction with interdisciplinary team recommendations to determine the most appropriate discharge setting. Patient's social support, diagnosis, medical stability, and prior level of function should also be taken into consideration. SUBJECTIVE:   Patient stated I don't know what has happened, I just haven't been able to move.     OBJECTIVE DATA SUMMARY:     Past Medical History:   Diagnosis Date    Abnormal chest CT 2/6/2020    Adopted     Arthritis     Balance problems     Chronic cough 2/6/2020    MCBRIDE (dyspnea on exertion) 2/28/2019    GERD (gastroesophageal reflux disease)     Gout     Hemochromatosis     High cholesterol     Hypertension     Hypothyroidism     Left knee pain     Left shoulder pain     Low blood potassium     Lumbar pain     Pain of left sacroiliac joint     Shoulder impingement, right, with rotator cuff strain      Past Surgical History:   Procedure Laterality Date    HX CHOLECYSTECTOMY      HX KNEE REPLACEMENT Bilateral     R knee/ Lknee and femur    HX ORTHOPAEDIC      R trigger finger     HX OTHER SURGICAL      Right little finger    HX OTHER SURGICAL      Right wrist, replaced unlnar). HX OTHER SURGICAL      Both knees    HX OTHER SURGICAL      Left femur    HX OTHER SURGICAL      Trigger finger surgery    HX OTHER SURGICAL      Left knee replacment revision     HX ROTATOR CUFF REPAIR Right     HX TONSILLECTOMY       Barriers to Learning/Limitations: None  Compensate with: N/A  Home Situation:  Home Situation  Home Environment: Private residence  One/Two Story Residence: One story  Living Alone: No  Support Systems: Other Family Member(s), Child(meg), Friend/Neighbor  Patient Expects to be Discharged to[de-identified] Home  Current DME Used/Available at Home: Cane, quad, Raised toilet seat (scooter)  Critical Behavior:   Calm and cooperative      Strength:    Strength:  (grossly 2+ to 3/5 B LEs)      Tone & Sensation:   Tone: Normal       Range Of Motion:  AROM: Generally decreased, functional      Functional Mobility:  Bed Mobility:  Supine to Sit: Moderate assistance  Sit to Supine: Moderate assistance  Scooting: Moderate assistance;Maximum assistance  Transfers:  Sit to Stand:  Moderate assistance (from elevated bed)  Stand to Sit: Minimum assistance     Balance:   Sitting - Static: Good (unsupported)  Standing: With support  Standing - Static:  (fair-)  Standing - Dynamic :  (poor+)  Ambulation/Gait Training:  Distance (ft): 2 Feet (ft)  Assistive Device: (side stepping along the edge of the bed)  Ambulation - Level of Assistance: Moderate assistance;Minimal assistance  Gait Abnormalities: Decreased step clearance  Therapeutic Exercises: Ankle pumps, heel slides with assistance, hip abd/adduction, LAQ x10  Pain:  Pain level pre-treatment: 3/10   Pain level post-treatment: 3/10  back  Pain Intervention(s) : Rest,  Repositioning  Response to intervention: Nurse notified, See doc flow    Activity Tolerance:   fair  Please refer to the flowsheet for vital signs taken during this treatment. After treatment:   []         Patient left in no apparent distress sitting up in chair  [x]         Patient left in no apparent distress in bed  [x]         Call bell left within reach  []         Nursing notified  [x]         Caregiver present  [x]         Bed alarm activated  []         SCDs applied    COMMUNICATION/EDUCATION:   [x]         Role of Physical Therapy in the acute care setting. [x]         Fall prevention education was provided and the patient/caregiver indicated understanding. [x]         Patient/family have participated as able in goal setting and plan of care. [x]         Patient/family agree to work toward stated goals and plan of care. []         Patient understands intent and goals of therapy, but is neutral about his/her participation. []         Patient is unable to participate in goal setting/plan of care: ongoing with therapy staff.  []         Other:     Thank you for this referral.  Delonte Rosado, PT   Time Calculation: 23 mins      Eval Complexity: History: HIGH Complexity :3+ comorbidities / personal factors will impact the outcome/ POC Exam:MEDIUM Complexity : 3 Standardized tests and measures addressing body structure, function, activity limitation and / or participation in recreation  Presentation: MEDIUM Complexity : Evolving with changing characteristics  Clinical Decision Making:Medium Complexity    Overall Complexity:MEDIUM    [employer] AM-PAC® Basic Mobility Inpatient Short Form (6-Clicks) Version 2    How much HELP from another person does the patient currently need    (If the patient hasn't done an activity recently, how much help from another person do you think he/she would need if he/she tried?)   Total (Total A or Dep)   A Lot  (Mod to Max A)   A Little (Sup or Min A)   None (Mod I to I)   Turning from your back to your side while in a flat bed without using bedrails? [] 1 [x] 2 [] 3 [] 4   2. Moving from lying on your back to sitting on the side of a flat bed without using bedrails? [] 1 [x] 2 [] 3 [] 4   3. Moving to and from a bed to a chair (including a wheelchair)? [] 1 [x] 2 [] 3 [] 4   4. Standing up from a chair using your arms (e.g., wheelchair, or bedside chair)? [] 1 [x] 2 [] 3 [] 4   5. Walking in hospital room? [] 1 [x] 2 [] 3 [] 4   6. Climbing 3-5 steps with a railing?+   [] 1 [] 2 [] 3 [] 4   +If stair climbing cannot be assessed, skip item #6. Sum responses from items 1-5. Current research shows that an AM-PAC score of 17 (13 without stairs) or less is not associated with a discharge to the patient's home setting. Based on an AM-PAC score of 10/24 (or **/20 if omitting stairs) and their current functional mobility deficits, it is recommended that the patient have 5-7 sessions per week of Physical Therapy at d/c to increase the patient's independence. Currently, this patient demonstrates the potential endurance, and/or tolerance for 3 hours of therapy each day at d/c.

## 2022-11-01 NOTE — H&P
I have personally seen, evaluated and examined the patient. Agree with documentation of assessment and plan as documented by NP [facility] and case discussed. Patient admitted secondary to inability to ambulate, possible failed OP treatment for UTI. Patient states she normally ambulates around her house with walker. However, it took 4 people to get her out of chair today. Denies urinary symptoms of gross hematuria, dysuria, frequency. Finished course of macrobid. Will consult ID to differentiate if this is treatment failure. PT/OT. Visit Vitals  /68 (BP 1 Location: Left upper arm, BP Patient Position: At rest)   Pulse 68   Temp 98 °F (36.7 °C)   Resp 17   Ht 5' 1\" (1.549 m)   Wt 86.2 kg (190 lb)   SpO2 99%   BMI 35.90 kg/m²     General:         Alert, cooperative, no acute distress    HEENT:           NC, Atraumatic. PERRLA, anicteric sclerae. Lungs:            CTA bilaterally. No Wheezing/Rhonchi/Rales  Heart:              RRR, No murmur, No Rubs, No Gallops  Abdomen:      Soft, Non distended, Non tender. +Bowel sounds, no HSM  Extremities:   trace bilateral edema   Psych:              Good insight. Not anxious or agitated. Neurologic:     Alert and oriented X 4. No acute neurological deficits     Anton Hector DO          History & Physical    Patient: Kirstin Pemberton MRN: [id number]  CSN: [id number]    YOB: 1945  Age: 76 y.o. Sex: female      DOA: 10/31/2022  CC: weakness     PCP: Nuala Meigs, MD       HPI:     Kirstin Pemberton is a 76 y.o. female who presents from home with inability to stand and ambulate. Chronic history of chronic back pain, lumbar radiculopathy, spinal stimulator, OA, and lymphedema. Treatment for UTI with macrobid started on Tuesday. Increasing fatigue and weakness within the last 24 hours. Use of walker and scooter for mobility. No aggravating or alleviating factors. No urinary symptoms.    In ER   +urinalysis  Lactic acidosis   Abnormal LFT  Initiated ceftriaxone 2 gm   IVF    ROS  GENERAL: inability to stand and walk, fatigue and weakness   HEENT: No change in vision, no earache, no tinnitus, no sore throat or sinus congestion. NECK: No pain or stiffness. PULMONARY: No shortness of breath, no cough or wheeze. Cardiovascular: no pnd or orthopnea, no CP  GASTROINTESTINAL: No abdominal pain, no nausea, no vomiting or diarrhea, no melena or bright red blood per rectum. GENITOURINARY: No urinary frequency, no urgency, no hesitancy or dysuria. MUSCULOSKELETAL: leg weakness and pain. Baseline swelling  DERMATOLOGIC: No rash, no itching, no lesions. ENDOCRINE: No polyuria, no polydipsia, no heat or cold intolerance. No recent change in weight. HEMATOLOGICAL: No anemia or easy bruising or bleeding. NEUROLOGIC: No headache, no seizures, no numbness, no tingling or weakness. Past Medical History:   Diagnosis Date    Abnormal chest CT 2/6/2020    Adopted     Arthritis     Balance problems     Chronic cough 2/6/2020    MCBRIDE (dyspnea on exertion) 2/28/2019    GERD (gastroesophageal reflux disease)     Gout     Hemochromatosis     High cholesterol     Hypertension     Hypothyroidism     Left knee pain     Left shoulder pain     Low blood potassium     Lumbar pain     Pain of left sacroiliac joint     Shoulder impingement, right, with rotator cuff strain        Past Surgical History:   Procedure Laterality Date    HX CHOLECYSTECTOMY      HX KNEE REPLACEMENT Bilateral     R knee/ Lknee and femur    HX ORTHOPAEDIC      R trigger finger     HX OTHER SURGICAL      Right little finger    HX OTHER SURGICAL      Right wrist, replaced unlnar).     HX OTHER SURGICAL      Both knees    HX OTHER SURGICAL      Left femur    HX OTHER SURGICAL      Trigger finger surgery    HX OTHER SURGICAL      Left knee replacment revision     HX ROTATOR CUFF REPAIR Right     HX TONSILLECTOMY         Family History   Adopted: Yes       Social History     Socioeconomic History Marital status: SINGLE   Tobacco Use    Smoking status: Former     Packs/day: 0.50     Years: 2.00     Pack years: 1.00     Types: Cigarettes     Start date: 5     Quit date: 1974     Years since quittin.9    Smokeless tobacco: Never    Tobacco comments:     quit 20 years ago   Vaping Use    Vaping Use: Never used   Substance and Sexual Activity    Alcohol use: No    Drug use: No       Prior to Admission medications    Medication Sig Start Date End Date Taking? Authorizing Provider   Lactoperoxi/Gluc Oxid/Pot Thio (BIOTENE DRY MOUTH MM) by Mucous Membrane route as needed. Yes Provider, Historical   Klor-Con M20 20 mEq tablet TAKE 1 TABLET BY MOUTH EVERY DAY 22  Yes Tyler Perry NP   pantoprazole (PROTONIX) 40 mg tablet TAKE 1 TABLET BY MOUTH EVERY DAY BEFORE BREAKFAST 22  Yes Tyler Perry NP   apixaban (Eliquis) 5 mg tablet Take 1 Tablet by mouth two (2) times a day. 22  Yes Glenn Pickett NP   clopidogreL (PLAVIX) 75 mg tab Take 1 Tablet by mouth daily. 22  Yes Glenn Pickett NP   metoprolol tartrate (LOPRESSOR) 25 mg tablet Take 1 Tablet by mouth daily. 22  Yes Jen Dhillon MD   levothyroxine (SYNTHROID) 25 mcg tablet TAKE 1 TABLET BY MOUTH EVERY DAY 22  Yes Tyler Perry NP   furosemide (LASIX) 40 mg tablet Take 1 Tablet by mouth daily as needed (Edema). 22  Yes Ashley Banuelos NP   rosuvastatin (CRESTOR) 40 mg tablet Take 1 Tablet by mouth daily. 12/3/21  Yes Jen Dhillon MD   acetaminophen (TYLENOL) 500 mg tablet Take 500 mg by mouth every six (6) hours as needed for Pain. Pain   Yes Provider, Historical   carboxymethylcellulose sodium (REFRESH TEARS OP) Administer 1 Drop to both eyes daily as needed for Other. Dry eyes   Yes Provider, Historical   oxyCODONE IR (ROXICODONE) 5 mg immediate release tablet Take 5 mg by mouth three (3) times daily as needed for Pain.  Pain   Yes Provider, Historical   albuterol (PROVENTIL HFA, VENTOLIN HFA, PROAIR HFA) 90 mcg/actuation inhaler INHALE 2 PUFFS BY MOUTH EVERY 4 HOURS AS NEEDED FOR WHEEZE 8/25/22   Oskar POST NP   peg 400-propylene glycol, PF, (Systane Hydration PF) 0.4-0.3 % dpet ophthalmic solution Administer 2 Drops to both eyes four (4) times daily as needed for Ocular Dryness. Dry eyes    Provider, Historical   nitroglycerin (NITROSTAT) 0.4 mg SL tablet 1 Tablet by SubLINGual route every five (5) minutes as needed for Chest Pain. Up to 3 doses. 8/2/21   Manuel Tarango MD   inhalational spacing device (ACE AEROSOL CLOUD ENHANCER) 1 Each by Does Not Apply route as needed. 2/14/19   Anjelica Richard MD       Allergies   Allergen Reactions    Ciprofloxacin Other (comments)     Achilles tendonitis/leg pain and swelling    Indomethacin Anaphylaxis, Hives and Swelling    Tomato Other (comments)     Heart burn              Physical Exam:      Visit Vitals  BP (!) 148/72   Pulse 70   Temp 97.3 °F (36.3 °C)   Resp 18   Ht 5' 1\" (1.549 m)   Wt 81.6 kg (180 lb)   SpO2 99%   BMI 34.01 kg/m²       Physical Exam:  General:         Alert, cooperative, no acute distress    HEENT:           NC, Atraumatic. PERRLA, anicteric sclerae. Lungs:            CTA bilaterally. No Wheezing/Rhonchi/Rales  Heart:              RRR, No murmur, No Rubs, No Gallops  Abdomen:      Soft, Non distended, Non tender. +Bowel sounds, no HSM  Extremities:   +edema  Psych:              Good insight. Not anxious or agitated. Neurologic:     Alert and oriented X 4. No acute neurological deficits     Lab/Data Review:  Labs: Results:       Chemistry Recent Labs     10/31/22  1730   *      K 3.8      CO2 27   BUN 18   CREA 1.16   CA 8.7   AGAP 6   BUCR 16   AP 70   TP 6.0*   ALB 2.9*   GLOB 3.1   AGRAT 0.9      CBC w/Diff Recent Labs     10/31/22  1730   WBC 9.0   RBC 3.75*   HGB 11.7*   HCT 35.6      GRANS 74*   LYMPH 15*   EOS 1      Coagulation No results for input(s): PTP, INR, APTT, INREXT in the last 72 hours. Iron/Ferritin No results for input(s): IRON in the last 72 hours. No lab exists for component: TIBCCALC   BNP No results for input(s): BNPP in the last 72 hours. Cardiac Enzymes No results for input(s): CPK, CKND1, REINALDO in the last 72 hours. No lab exists for component: CKRMB, TROIP   Liver Enzymes Recent Labs     10/31/22  1730   TP 6.0*   ALB 2.9*   AP 70      Thyroid Studies Lab Results   Component Value Date/Time    TSH 1.25 05/17/2022 03:44 PM          All Micro Results       Procedure Component Value Units Date/Time    CULTURE, BLOOD [[id number]]     Order Status: Sent Specimen: Blood     CULTURE, BLOOD [[id number]]     Order Status: Sent Specimen: Blood     INFLUENZA A & B AG (RAPID TEST) [[id number]] Collected: 10/31/22 1929    Order Status: Completed Specimen: Nasopharyngeal from Nasal washing Updated: 10/31/22 2028     Influenza A Antigen Negative        Comment: A negative result does not exclude influenza virus infection, seasonal or H1N1 due to suboptimal sensitivity. If influenza is circulating in your community, a diagnosis of influenza should be considered based on a patients clinical presentation and empiric antiviral treatment should be considered, if indicated. Influenza B Antigen Negative       COVID-19 RAPID TEST [[id number]] Collected: 10/31/22 1929    Order Status: Completed Specimen: Nasopharyngeal Updated: 10/31/22 2012     Specimen source Nasopharyngeal        COVID-19 rapid test Not detected        Comment: Rapid Abbott ID Now       Rapid NAAT:  The specimen is NEGATIVE for SARS-CoV-2, the novel coronavirus associated with COVID-19. Negative results should be treated as presumptive and, if inconsistent with clinical signs and symptoms or necessary for patient management, should be tested with an alternative molecular assay. Negative results do not preclude SARS-CoV-2 infection and should not be used as the sole basis for patient management decisions.        This test has been authorized by the FDA under an Emergency Use Authorization (EUA) for use by authorized laboratories. Fact sheet for Healthcare Providers: ConventionUpdate.co.nz  Fact sheet for Patients: ConventionUpdate.co.nz       Methodology: Isothermal Nucleic Acid Amplification         CULTURE, URINE [[id number]] Collected: 10/31/22 1929    Order Status: Sent Specimen: Clean catch Updated: 10/31/22 1944            Imaging Reviewed:  CT Results (most recent):  Results from [place] encounter on 10/31/22    CT ABD PELV W CONT    Narrative  CT ABDOMEN/PELVIS WITH CONTRAST    HISTORY: Fatigue. UTI, abdominal pain, nausea. COMPARISON: 7/25/2019. TECHNIQUE: 5 mm helical scans were obtained of the abdomen and pelvis after  uneventful administration of intravenous contrast.  Coronal and sagittal reformations. All CT scans are performed using dose optimization techniques as appropriate to  the performed exam including the following: Automated exposure control,  adjustment of mA and/or kV according to patient size, and use of iterative  reconstructive technique. FINDINGS:    No acute consolidations at the imaged lung bases. No effusions. There is homogeneous enhancement of the liver, spleen, and pancreas. Somewhat  atrophied pancreas    Cholecystectomy. No biliary duct dilatation. .    No adrenal nodules or masses. The kidneys enhance symmetrically. A few subcentimeter hypodensities  bilaterally favorable for cysts. Grossly stable from previous. .  No  hydronephrosis. There is no free intraperitoneal air, free fluid, or fluid collections. No abdominal or pelvic lymphadenopathy. The small and large bowel are normal in caliber. The appendix does not appear  inflamed. Diverticulosis. Small duodenal diverticulum as well. The bladder is under distended and therefore incompletely evaluated. Small  uterus and ovaries. No adnexal masses.     Moderately severe calcifications in the abdominal aorta. No aneurysmal  dilatation. Left hip hardware. Spinal stimulator device. Subacute or chronic fractures of  multiple bilateral lower ribs. .  Degenerative changes of the lumbar spine and  pelvis. Impression  No CT finding for an acute intra-abdominal or pelvic process   XR Results (most recent):  Results from Hospital Encounter encounter on 10/31/22    XR CHEST SNGL V    Narrative  Portable Chest    CPT CODE: 66551    HISTORY: Fatigue. FINDINGS:    Compared with 7/30/2021. Grossly similar positioning of the spinal catheter. Heart size and mediastinal  contour stable. Old right-sided rib fractures. No pulmonary edema, effusion, or  pneumothorax. No focal consolidation. Arthritic changes of the shoulders. Impression  No radiographic finding for an acute cardiopulmonary process         Assessment:   Active Problems:    Weakness (10/31/2022)      Elevated troponin (10/31/2022)      Acute UTI (10/31/2022)            Plan:   Consults ID   Continue ceftriaxone, follow cultures. Probiotics added. PT/OT eval and treat. Pain management as needed. Home oxycontin and tylenol  Monitor metabolic panel, hepatic panel and renal function. IVF    Patient provided her med list. Resume appropriate medications. Eliquis and Plavix. Full code  Family at bedside and updated on admission and plan of care. 35 minutes in total spent today in preparation for visit, review of external notes and test results, review of test results, order placement, obtaining history, physical exam of the patient, and/or counseling the patient concerning diagnosis, test results, treatment plan and speaking with physicians and nurses involved in patient's care. Additional time spent in review and independent interpretation of external notes, imaging, labs via hospital EMR and/or Care Everywhere, as well as pre-charting activity all of which occur on the day of service.     15 minutes were spent in Preparation to see Patient and Obtaining and Reviewing Separate History  5 minutes were spent in Examination, Counseling, & Educating Patient  5 minutes were spent (if any) in [address] with Nursing Staff and Physicians  10 minutes were spent Documenting in EHR and Interpreting Tests Independently    A Total of 35 minutes were spent seeing this Patient.               Luann Bourgeois NP  10/31/2022, 10:26 PM

## 2022-11-01 NOTE — CONSULTS
[facility]  (A Division of [address] [place] [address])      Consultation Note      Date of Admission: 10/31/2022    Date of Note: 11/1/2022      Reason for Referral: Urosepsis  Referring Physician: Edmond Riddle NP    Micro from this admission:   10/31 urine culture: No growth to date  10/31 blood culture: No growth to date x2    Current Antimicrobials:    Prior Antimicrobials:  Rocephin 10/31 to present Macrobid       Assessment:         Abnormal UA: 4-10 WBCs and 1+ bacteria, negative nitrites and a small amount of leukocyte esterase. Not clear if this is consistent with a resolving UTI or underlying cystitis given concurrent blood also noted in the urine. Urine cultures are currently pending. There is no prior UA or urine cultures in the last several weeks to review. New generalized weakness and malaise: Suspect this is multifactorial with a possible component of antibiotic side effect. Bilateral knee pain with chronic lower extremity edema. History of A. fib on Eliquis  History of coronary artery disease with prior STEMI  History of CHF  History of hemochromatosis  Plan:   Consider bilateral renal ultrasounds given protein and blood noted in urine. Continue with ceftriaxone for now we will plan for 3 days if cultures remain negative. Follow-up urine and blood cultures  Trend CBC, BMP    Discussed with Dr. Feliciano Snyder,   [facility]  [address], [address] [facility] [address]  Office: [phone], Ext [phone]  Mobile/Text: [phone]    Lines / Catheters:  Peripheral    HPI:  Ms. Ro Moreno is a 70-year-old female with a history of A. fib with RVR on Eliquis, coronary artery disease with a history of STEMI, CHF, DVT with prior PE, hemochromatosis, spinal stenosis, obesity, hyperlipidemia, hypertension, and lower extremity edema presented to the emergency department on 10/31 with worsening fatigue.   She had reportedly been diagnosed with a urinary tract infection and has been started on Macrobid. She has been taking Macrobid for the last 6 days and has some underlying nausea. She notes that she is gotten progressively weaker and that she cannot stand up or walk due to bilateral knee pain, which has worsened over the last 24 hours prior to her presentation. She denies any urinary frequency or dysuria. She denies any new cough. She has a mild chronic shortness of breath. No documented fevers or chills. She is not sure if the Macrobid had helped her symptoms. Does not have a history of recurrent UTIs in the past.     Since her presentation to the ED she is remained afebrile, pulse ranging between 56 and 70, blood pressures with systolics between 356 and 148. .  WBCs are 10.1. Creatinine is 1.0 upon presentation UA with 4-10 WBCs 1+ bacteria moderate leukocyte esterase negative nitrites large amount of blood. Blood cultures and urine cultures are pending. She was started empirically on ceftriaxone. Past Medical History:   Diagnosis Date    Abnormal chest CT 2/6/2020    Adopted     Arthritis     Balance problems     Chronic cough 2/6/2020    MCBRIDE (dyspnea on exertion) 2/28/2019    GERD (gastroesophageal reflux disease)     Gout     Hemochromatosis     High cholesterol     Hypertension     Hypothyroidism     Left knee pain     Left shoulder pain     Low blood potassium     Lumbar pain     Pain of left sacroiliac joint     Shoulder impingement, right, with rotator cuff strain      Past Surgical History:   Procedure Laterality Date    HX CHOLECYSTECTOMY      HX KNEE REPLACEMENT Bilateral     R knee/ Lknee and femur    HX ORTHOPAEDIC      R trigger finger     HX OTHER SURGICAL      Right little finger    HX OTHER SURGICAL      Right wrist, replaced unlnar).     HX OTHER SURGICAL      Both knees    HX OTHER SURGICAL      Left femur    HX OTHER SURGICAL      Trigger finger surgery    HX OTHER SURGICAL      Left knee replacment revision     HX ROTATOR CUFF REPAIR Right     HX TONSILLECTOMY       Family History   Adopted: Yes     Medications reviewed as below:   Current Facility-Administered Medications   Medication Dose Route Frequency Provider Last Rate Last Admin    apixaban (ELIQUIS) tablet 5 mg  5 mg Oral BID Kelle Pradhan DO   5 mg at 11/01/22 3436    sodium chloride (NS) flush 5-10 mL  5-10 mL IntraVENous PRN Nitin Covarrubias MD        cefTRIAXone (ROCEPHIN) 2 g in sterile water (preservative free) 20 mL IV syringe  2 g IntraVENous Q24H Lollie Crigler I, MD   2 g at 10/31/22 2039    sodium chloride (NS) flush 5-40 mL  5-40 mL IntraVENous Q8H Vida Hernández NP   10 mL at 11/01/22 6300    sodium chloride (NS) flush 5-40 mL  5-40 mL IntraVENous PRN Abbey Hernández NP        acetaminophen (TYLENOL) tablet 650 mg  650 mg Oral Q6H PRN Abbey Hernández NP        Or    acetaminophen (TYLENOL) suppository 650 mg  650 mg Rectal Q6H PRN Vida Hernández NP        polyethylene glycol (MIRALAX) packet 17 g  17 g Oral DAILY PRN Vida Hernández NP        ondansetron (ZOFRAN ODT) tablet 4 mg  4 mg Oral Q8H PRN Vida Hernández NP        Or    ondansetron (ZOFRAN) injection 4 mg  4 mg IntraVENous Q6H PRN Abbey Hernández NP   4 mg at 11/01/22 0138    Lactobacillus Acidoph & Carolgar CRESTWOOD [facility]) tablet 2 Tablet  2 Tablet Oral BID Glen Jama NP   2 Tablet at 11/01/22 5029    clopidogreL (PLAVIX) tablet 75 mg  75 mg Oral DAILY Abbey Hernández NP   75 mg at 11/01/22 9542    levothyroxine (SYNTHROID) tablet 25 mcg  25 mcg Oral 6am Vida Hernández NP   25 mcg at 11/01/22 0600    metoprolol tartrate (LOPRESSOR) tablet 25 mg  25 mg Oral DAILY Vida Hernández NP   25 mg at 11/01/22 3981    oxyCODONE IR (ROXICODONE) tablet 5 mg  5 mg Oral TID PRN Glen Jama NP   5 mg at 11/01/22 0138    pantoprazole (PROTONIX) tablet 40 mg  40 mg Oral ACB Brant-Hilda Caina, NP   40 mg at 22 8376    rosuvastatin (CRESTOR) tablet 40 mg  40 mg Oral DAILY Vida Hernández, NP   40 mg at 22 8524     Allergies   Allergen Reactions    Ciprofloxacin Other (comments)     Achilles tendonitis/leg pain and swelling    Indomethacin Anaphylaxis, Hives and Swelling    Tomato Other (comments)     Heart burn     Social History     Socioeconomic History    Marital status: SINGLE     Spouse name: Not on file    Number of children: Not on file    Years of education: Not on file    Highest education level: Not on file   Occupational History    Not on file   Tobacco Use    Smoking status: Former     Packs/day: 0.50     Years: 2.00     Pack years: 1.00     Types: Cigarettes     Start date: 5     Quit date: 65     Years since quittin.8    Smokeless tobacco: Never    Tobacco comments:     quit 20 years ago   Vaping Use    Vaping Use: Never used   Substance and Sexual Activity    Alcohol use: No    Drug use: No    Sexual activity: Not on file     Comment: post menopausal   Other Topics Concern    Not on file   Social History Narrative    Not on file     Social Determinants of Health     Financial Resource Strain: Not on file   Food Insecurity: Not on file   Transportation Needs: Not on file   Physical Activity: Not on file   Stress: Not on file   Social Connections: Not on file   Intimate Partner Violence: Not on file   Housing Stability: Not on file        Review of Systems    Negative Unless BOLDED    General: fevers, chills, myalgias, arthralgias, unexplained weight loss, malaise, fatigue. HEENT:  headaches,sinus pain or presure, recent URI, recent dental procedures;  tinnitus, hearing loss, catarats, dizziness   PUlMONARY:  cough , shortness of breath, sputum production, hx of asthma or COPD. previous treatement for TB or PPD.   Cardiovascular: chest pain, previous CAD/MI, hx valvular heart disease,  murmurs, peripheral edema, weight gain  GI:   nausea, vomiting, diarrhea, abdominal pain, prior C.diff, heartburn, melena, hematochezia  :  urinary frequency, dysuria, hematuria, bladder incontinence, flank pain   Neurologic:  seizures, syncope, prior CVA/TIA, confusion, memory impairment, neuropathy, weakness of extremities, difficulty speaking/word finding, headache, vision changes  Musculoskeletal:  myalgias, arthralgias, joint pain, joint swelling, back pain  Skin:  Pruritis, rash, chronic stasis changes, diabetic foot ulcers, lymphedema, pressure wounds  Endocrine: polyuria, polydipsia, hair loss  Psych: anxiousness, prior substance abuse, suicidal ideation, depressed mood. Heme-Onc: prior DVT, easy bruising, fatigue, malignancy        Objective:      Visit Vitals  /72   Pulse (!) 59   Temp 97.8 °F (36.6 °C)   Resp 16   Ht 5' 1\" (1.549 m)   Wt 81.6 kg (180 lb)   SpO2 100%   BMI 34.01 kg/m²     Temp (24hrs), Av.5 °F (36.4 °C), Min:97.3 °F (36.3 °C), Max:97.8 °F (36.6 °C)        General:   awake alert and oriented   Skin:   no rashes or skin lesions noted on limited exam   HEENT:  Normocephalic, atraumatic, PERRL, EOMI, no scleral icterus or pallor; no conjunctival hemmohage;  nasal and oral mucous are moist and without evidence of lesions. No thrush. Dentition good. Neck supple, no bruits. Lymph Nodes:   no cervical, axillary or inguinal adenopathy   Lungs:   non-labored, bilaterally clear to aspiration- no crackles wheezes rales or rhonchi   Heart:  RRR, s1 and s2; no murmurs rubs or gallops, 3+ edema, + pedal pulses   Abdomen:  soft, obese, non-distended, active bowel sounds, no hepatomegaly, no splenomegaly. Non-tender   Genitourinary:  deferred   Extremities:   no clubbing, cyanosis; no joint effusions or swelling;  Full ROM of all large joints to the upper and lower extremities; muscle mass appropriate for age   Neurologic:  No gross focal sensory abnormalities; equal muscle strength to upper and lower extremities. Speech appropirate. Cranial nerves intact   Psychiatric:   appropriate and interactive.        Labs: Results:   Chemistry Recent Labs     11/01/22  0509 10/31/22  1730   GLU 95 109*    140   K 4.2 3.8    107   CO2 27 27   BUN 17 18   CREA 1.00 1.16   CA 9.2 8.7   AGAP 7 6   BUCR 17 16   AP 62 70   TP 6.3* 6.0*   ALB 2.7* 2.9*   GLOB 3.6 3.1   AGRAT 0.8 0.9      CBC w/Diff Recent Labs     11/01/22  0509 10/31/22  1730   WBC 10.1 9.0   RBC 3.94* 3.75*   HGB 12.4 11.7*   HCT 37.2 35.6    234   GRANS 76* 74*   LYMPH 13* 15*   EOS 0 1            Lab Results   Component Value Date/Time    Specimen Description: [facility] 09/28/2013 08:45 AM    Lab Results   Component Value Date/Time    Culture result: NO GROWTH AFTER 8 HOURS 10/31/2022 10:20 PM    Culture result: NO GROWTH AFTER 8 HOURS 10/31/2022 10:00 PM    Culture result: NO GROWTH 6 DAYS 12/25/2019 09:10 AM    Culture result: NO GROWTH 6 DAYS 12/25/2019 08:55 AM    Culture result: MODERATE NORMAL RESPIRATORY ELIZABETH 12/03/2019 04:48 PM    Culture result: FEW SERRATIA LIQUIFACIENS (A) 12/03/2019 04:48 PM    Culture result: FEW KLEBSIELLA PNEUMONIAE (A) 12/03/2019 04:48 PM        Results       Procedure Component Value Units Date/Time    CULTURE, BLOOD [[id number]] Collected: 10/31/22 2220    Order Status: Completed Specimen: Blood Updated: 11/01/22 0651     Special Requests: NO SPECIAL REQUESTS        Culture result: NO GROWTH AFTER 8 HOURS       CULTURE, BLOOD [[id number]] Collected: 10/31/22 2200    Order Status: Completed Specimen: Blood Updated: 11/01/22 0651     Special Requests: NO SPECIAL REQUESTS        Culture result: NO GROWTH AFTER 8 HOURS       CULTURE, URINE [[id number]] Collected: 10/31/22 1929    Order Status: Sent Specimen: Clean catch Updated: 11/01/22 0724    INFLUENZA A & B AG (RAPID TEST) [[id number]] Collected: 10/31/22 1929    Order Status: Completed Specimen: Nasopharyngeal from Nasal washing Updated: 10/31/22 2028 Influenza A Antigen Negative        Comment: A negative result does not exclude influenza virus infection, seasonal or H1N1 due to suboptimal sensitivity. If influenza is circulating in your community, a diagnosis of influenza should be considered based on a patients clinical presentation and empiric antiviral treatment should be considered, if indicated. Influenza B Antigen Negative       COVID-19 RAPID TEST [[id number]] Collected: 10/31/22 1929    Order Status: Completed Specimen: Nasopharyngeal Updated: 10/31/22 2012     Specimen source Nasopharyngeal        COVID-19 rapid test Not detected        Comment: Rapid Abbott ID Now       Rapid NAAT:  The specimen is NEGATIVE for SARS-CoV-2, the novel coronavirus associated with COVID-19. Negative results should be treated as presumptive and, if inconsistent with clinical signs and symptoms or necessary for patient management, should be tested with an alternative molecular assay. Negative results do not preclude SARS-CoV-2 infection and should not be used as the sole basis for patient management decisions. This test has been authorized by the FDA under an Emergency Use Authorization (EUA) for use by authorized laboratories. Fact sheet for Healthcare Providers: ConventionUpdate.co.nz  Fact sheet for Patients: ConventionUpdate.co.nz       Methodology: Isothermal Nucleic Acid Amplification                   Imaging:      All imaging reviewed from Admission to present as per radiology interpretation in [facility]

## 2022-11-01 NOTE — PROGRESS NOTES
[facility]  Progress Note    Patient: Elenita Limon Age: 76 y.o. : 1945 MR#: [id number] SSN: [id number]  Date: 2022     Subjective/24-hour events:     Feeling better overall, no nausea/vomiting/diarrhea. Daughter present at bedside. Assessment:   Abnormal urinalysis, concern for UTI  Atrial fibrillation, anticoagulated with Eliquis  Hypothyroidism  Chronic lower extremity edema  Generalized weakness with inability to ambulate  Obesity, BMI 34.01    Plan:   Continue antibiotic therapy as currently ordered. ID recommendations appreciated. MRI L-spine. Check thyroid studies. Synthroid per home dosing for now. PT/OT evaluations. Analgesia, bowel regimen. Plan discussed with patient and daughter at bedside, questions answered. Therapy services:  PT/OT. Equipment:  TBD. Anticipated disposition:  TBD. Case discussed with:  [x]Patient  [x]Family  [x]Nursing  [x]Case Management  DVT Prophylaxis:  [x]Eliquis[]Hep SQ  []SCDs  []Coumadin   []On Heparin gtt    Objective:   VS: Visit Vitals  /64   Pulse 62   Temp 97.9 °F (36.6 °C)   Resp 16   Ht 5' 1\" (1.549 m)   Wt 81.6 kg (180 lb)   SpO2 98%   BMI 34.01 kg/m²      Tmax/24hrs: Temp (24hrs), Av.6 °F (36.4 °C), Min:97.3 °F (36.3 °C), Max:97.9 °F (36.6 °C)  No intake or output data in the 24 hours ending 22 1557    General:  In NAD. Nontoxic-appearing. Cardiovascular: S1, S2. Rate normal.  Pulmonary:  Lungs clear bilaterally, no wheezes. No accessory muscle use. GI:  Abdomen soft, NTTP. Extremities:  Warm, no edema or ischemia. Neuro:  Awake and alert.       Labs:    Recent Results (from the past 24 hour(s))   CBC WITH AUTOMATED DIFF    Collection Time: 10/31/22  5:30 PM   Result Value Ref Range    WBC 9.0 4.6 - 13.2 K/uL    RBC 3.75 (L) 4.20 - 5.30 M/uL    HGB 11.7 (L) 12.0 - 16.0 g/dL    HCT 35.6 35.0 - 45.0 %    MCV 94.9 78.0 - 100.0 FL    MCH 31.2 24.0 - 34.0 PG    MCHC 32.9 31.0 - 37.0 g/dL    RDW 14.9 (H) 11.6 - 14.5 %    PLATELET 612 867 - 318 K/uL    MPV 10.9 9.2 - 11.8 FL    NRBC 0.0 0  WBC    ABSOLUTE NRBC 0.00 0.00 - 0.01 K/uL    NEUTROPHILS 74 (H) 40 - 73 %    LYMPHOCYTES 15 (L) 21 - 52 %    MONOCYTES 10 3 - 10 %    EOSINOPHILS 1 0 - 5 %    BASOPHILS 0 0 - 2 %    IMMATURE GRANULOCYTES 1 (H) 0.0 - 0.5 %    ABS. NEUTROPHILS 6.7 1.8 - 8.0 K/UL    ABS. LYMPHOCYTES 1.3 0.9 - 3.6 K/UL    ABS. MONOCYTES 0.9 0.05 - 1.2 K/UL    ABS. EOSINOPHILS 0.1 0.0 - 0.4 K/UL    ABS. BASOPHILS 0.0 0.0 - 0.1 K/UL    ABS. IMM. GRANS. 0.1 (H) 0.00 - 0.04 K/UL    DF AUTOMATED     METABOLIC PANEL, BASIC    Collection Time: 10/31/22  5:30 PM   Result Value Ref Range    Sodium 140 136 - 145 mmol/L    Potassium 3.8 3.5 - 5.5 mmol/L    Chloride 107 100 - 111 mmol/L    CO2 27 21 - 32 mmol/L    Anion gap 6 3.0 - 18 mmol/L    Glucose 109 (H) 74 - 99 mg/dL    BUN 18 7.0 - 18 MG/DL    Creatinine 1.16 0.6 - 1.3 MG/DL    BUN/Creatinine ratio 16 12 - 20      eGFR 48 (L) >60 ml/min/1.73m2    Calcium 8.7 8.5 - 10.1 MG/DL   HEPATIC FUNCTION PANEL    Collection Time: 10/31/22  5:30 PM   Result Value Ref Range    Protein, total 6.0 (L) 6.4 - 8.2 g/dL    Albumin 2.9 (L) 3.4 - 5.0 g/dL    Globulin 3.1 2.0 - 4.0 g/dL    A-G Ratio 0.9 0.8 - 1.7      Bilirubin, total 1.0 0.2 - 1.0 MG/DL    Bilirubin, direct 0.2 0.0 - 0.2 MG/DL    Alk.  phosphatase 70 45 - 117 U/L    AST (SGOT) 89 (H) 10 - 38 U/L    ALT (SGPT) 58 (H) 13 - 56 U/L   BETA HCG, QT    Collection Time: 10/31/22  5:30 PM   Result Value Ref Range    Beta HCG, QT 6 0 - 10 MIU/ML   MAGNESIUM    Collection Time: 10/31/22  5:30 PM   Result Value Ref Range    Magnesium 1.9 1.6 - 2.6 mg/dL   TROPONIN-HIGH SENSITIVITY    Collection Time: 10/31/22  5:30 PM   Result Value Ref Range    Troponin-High Sensitivity 55 (H) 0 - 54 ng/L   URINALYSIS W/ RFLX MICROSCOPIC    Collection Time: 10/31/22  7:29 PM   Result Value Ref Range    Color YELLOW      Appearance CLEAR      Specific gravity 1.009 1.005 - 1.030      pH (UA) 6.5 5.0 - 8.0      Protein 100 (A) NEG mg/dL    Glucose Negative NEG mg/dL    Ketone Negative NEG mg/dL    Bilirubin Negative NEG      Blood LARGE (A) NEG      Urobilinogen 1.0 0.2 - 1.0 EU/dL    Nitrites Negative NEG      Leukocyte Esterase MODERATE (A) NEG     INFLUENZA A & B AG (RAPID TEST)    Collection Time: 10/31/22  7:29 PM   Result Value Ref Range    Influenza A Antigen Negative NEG      Influenza B Antigen Negative NEG     COVID-19 RAPID TEST    Collection Time: 10/31/22  7:29 PM   Result Value Ref Range    Specimen source Nasopharyngeal      COVID-19 rapid test Not detected NOTD     URINE MICROSCOPIC ONLY    Collection Time: 10/31/22  7:29 PM   Result Value Ref Range    WBC 4 to 10 0 - 4 /hpf    RBC 0 to 3 0 - 5 /hpf    Epithelial cells 1+ 0 - 5 /lpf    Bacteria 1+ (A) NEG /hpf    Mucus FEW (A) NEG /lpf   CULTURE, BLOOD    Collection Time: 10/31/22 10:00 PM    Specimen: Blood   Result Value Ref Range    Special Requests: NO SPECIAL REQUESTS      Culture result: NO GROWTH AFTER 8 HOURS     CULTURE, BLOOD    Collection Time: 10/31/22 10:20 PM    Specimen: Blood   Result Value Ref Range    Special Requests: NO SPECIAL REQUESTS      Culture result: NO GROWTH AFTER 8 HOURS     POC LACTIC ACID    Collection Time: 10/31/22 10:26 PM   Result Value Ref Range    Lactic Acid (POC) 2.02 (HH) 0.40 - 2.00 mmol/L   EKG, 12 LEAD, INITIAL    Collection Time: 11/01/22  1:57 AM   Result Value Ref Range    Ventricular Rate 58 BPM    Atrial Rate 58 BPM    P-R Interval 164 ms    QRS Duration 80 ms    Q-T Interval 404 ms    QTC Calculation (Bezet) 396 ms    Calculated P Axis -12 degrees    Calculated R Axis -21 degrees    Calculated T Axis -5 degrees    Diagnosis       Sinus bradycardia with premature atrial complexes  Inferior infarct (cited on or before 27-SEP-2013)  Anterior infarct (cited on or before 27-SEP-2013)  Abnormal ECG  When compared with ECG of 01-AUG-2021 08:10,  premature atrial complexes are now present    Confirmed by Caterina Hart ([id number]) on 11/1/2022 5:09:05 AM     METABOLIC PANEL, COMPREHENSIVE    Collection Time: 11/01/22  5:09 AM   Result Value Ref Range    Sodium 139 136 - 145 mmol/L    Potassium 4.2 3.5 - 5.5 mmol/L    Chloride 105 100 - 111 mmol/L    CO2 27 21 - 32 mmol/L    Anion gap 7 3.0 - 18 mmol/L    Glucose 95 74 - 99 mg/dL    BUN 17 7.0 - 18 MG/DL    Creatinine 1.00 0.6 - 1.3 MG/DL    BUN/Creatinine ratio 17 12 - 20      eGFR 57 (L) >60 ml/min/1.73m2    Calcium 9.2 8.5 - 10.1 MG/DL    Bilirubin, total 0.8 0.2 - 1.0 MG/DL    ALT (SGPT) 54 13 - 56 U/L    AST (SGOT) 80 (H) 10 - 38 U/L    Alk. phosphatase 62 45 - 117 U/L    Protein, total 6.3 (L) 6.4 - 8.2 g/dL    Albumin 2.7 (L) 3.4 - 5.0 g/dL    Globulin 3.6 2.0 - 4.0 g/dL    A-G Ratio 0.8 0.8 - 1.7     CBC WITH AUTOMATED DIFF    Collection Time: 11/01/22  5:09 AM   Result Value Ref Range    WBC 10.1 4.6 - 13.2 K/uL    RBC 3.94 (L) 4.20 - 5.30 M/uL    HGB 12.4 12.0 - 16.0 g/dL    HCT 37.2 35.0 - 45.0 %    MCV 94.4 78.0 - 100.0 FL    MCH 31.5 24.0 - 34.0 PG    MCHC 33.3 31.0 - 37.0 g/dL    RDW 15.0 (H) 11.6 - 14.5 %    PLATELET 439 390 - 635 K/uL    MPV 10.8 9.2 - 11.8 FL    NRBC 0.0 0  WBC    ABSOLUTE NRBC 0.00 0.00 - 0.01 K/uL    NEUTROPHILS 76 (H) 40 - 73 %    LYMPHOCYTES 13 (L) 21 - 52 %    MONOCYTES 10 3 - 10 %    EOSINOPHILS 0 0 - 5 %    BASOPHILS 0 0 - 2 %    IMMATURE GRANULOCYTES 0 0.0 - 0.5 %    ABS. NEUTROPHILS 7.7 1.8 - 8.0 K/UL    ABS. LYMPHOCYTES 1.3 0.9 - 3.6 K/UL    ABS. MONOCYTES 1.0 0.05 - 1.2 K/UL    ABS. EOSINOPHILS 0.0 0.0 - 0.4 K/UL    ABS. BASOPHILS 0.0 0.0 - 0.1 K/UL    ABS. IMM.  GRANS. 0.0 0.00 - 0.04 K/UL    DF AUTOMATED         Signed By: Anita Ramirez MD     November 1, 2022

## 2022-11-02 ENCOUNTER — APPOINTMENT (OUTPATIENT)
Dept: ULTRASOUND IMAGING | Age: 77
DRG: 948 | End: 2022-11-02
Attending: FAMILY MEDICINE
Payer: MEDICARE

## 2022-11-02 LAB
ANION GAP SERPL CALC-SCNC: 8 MMOL/L (ref 3–18)
BACTERIA SPEC CULT: NORMAL
BASOPHILS # BLD: 0 K/UL (ref 0–0.1)
BASOPHILS NFR BLD: 0 % (ref 0–2)
BUN SERPL-MCNC: 20 MG/DL (ref 7–18)
BUN/CREAT SERPL: 22 (ref 12–20)
CALCIUM SERPL-MCNC: 8.8 MG/DL (ref 8.5–10.1)
CC UR VC: NORMAL
CHLORIDE SERPL-SCNC: 107 MMOL/L (ref 100–111)
CO2 SERPL-SCNC: 27 MMOL/L (ref 21–32)
CREAT SERPL-MCNC: 0.92 MG/DL (ref 0.6–1.3)
DIFFERENTIAL METHOD BLD: ABNORMAL
EOSINOPHIL # BLD: 0.1 K/UL (ref 0–0.4)
EOSINOPHIL NFR BLD: 1 % (ref 0–5)
ERYTHROCYTE [DISTWIDTH] IN BLOOD BY AUTOMATED COUNT: 15.3 % (ref 11.6–14.5)
GLUCOSE SERPL-MCNC: 100 MG/DL (ref 74–99)
HCT VFR BLD AUTO: 32 % (ref 35–45)
HGB BLD-MCNC: 10.4 G/DL (ref 12–16)
IMM GRANULOCYTES # BLD AUTO: 0.1 K/UL (ref 0–0.04)
IMM GRANULOCYTES NFR BLD AUTO: 1 % (ref 0–0.5)
LYMPHOCYTES # BLD: 1.6 K/UL (ref 0.9–3.6)
LYMPHOCYTES NFR BLD: 20 % (ref 21–52)
MCH RBC QN AUTO: 31.4 PG (ref 24–34)
MCHC RBC AUTO-ENTMCNC: 32.5 G/DL (ref 31–37)
MCV RBC AUTO: 96.7 FL (ref 78–100)
MONOCYTES # BLD: 0.9 K/UL (ref 0.05–1.2)
MONOCYTES NFR BLD: 11 % (ref 3–10)
NEUTS SEG # BLD: 5.2 K/UL (ref 1.8–8)
NEUTS SEG NFR BLD: 67 % (ref 40–73)
NRBC # BLD: 0 K/UL (ref 0–0.01)
NRBC BLD-RTO: 0 PER 100 WBC
PLATELET # BLD AUTO: 214 K/UL (ref 135–420)
PMV BLD AUTO: 11.3 FL (ref 9.2–11.8)
POTASSIUM SERPL-SCNC: 2.8 MMOL/L (ref 3.5–5.5)
RBC # BLD AUTO: 3.31 M/UL (ref 4.2–5.3)
SERVICE CMNT-IMP: NORMAL
SODIUM SERPL-SCNC: 142 MMOL/L (ref 136–145)
TSH SERPL DL<=0.05 MIU/L-ACNC: 1.24 UIU/ML (ref 0.36–3.74)
WBC # BLD AUTO: 7.8 K/UL (ref 4.6–13.2)

## 2022-11-02 PROCEDURE — 76770 US EXAM ABDO BACK WALL COMP: CPT

## 2022-11-02 PROCEDURE — 74011250636 HC RX REV CODE- 250/636: Performed by: EMERGENCY MEDICINE

## 2022-11-02 PROCEDURE — 85025 COMPLETE CBC W/AUTO DIFF WBC: CPT

## 2022-11-02 PROCEDURE — 99232 SBSQ HOSP IP/OBS MODERATE 35: CPT | Performed by: STUDENT IN AN ORGANIZED HEALTH CARE EDUCATION/TRAINING PROGRAM

## 2022-11-02 PROCEDURE — 65270000046 HC RM TELEMETRY

## 2022-11-02 PROCEDURE — 74011000250 HC RX REV CODE- 250: Performed by: EMERGENCY MEDICINE

## 2022-11-02 PROCEDURE — 97535 SELF CARE MNGMENT TRAINING: CPT

## 2022-11-02 PROCEDURE — 74011250636 HC RX REV CODE- 250/636: Performed by: STUDENT IN AN ORGANIZED HEALTH CARE EDUCATION/TRAINING PROGRAM

## 2022-11-02 PROCEDURE — 80048 BASIC METABOLIC PNL TOTAL CA: CPT

## 2022-11-02 PROCEDURE — 77030038269 HC DRN EXT URIN PURWCK BARD -A

## 2022-11-02 PROCEDURE — 74011250637 HC RX REV CODE- 250/637: Performed by: STUDENT IN AN ORGANIZED HEALTH CARE EDUCATION/TRAINING PROGRAM

## 2022-11-02 PROCEDURE — 84443 ASSAY THYROID STIM HORMONE: CPT

## 2022-11-02 PROCEDURE — 36415 COLL VENOUS BLD VENIPUNCTURE: CPT

## 2022-11-02 PROCEDURE — 74011250637 HC RX REV CODE- 250/637: Performed by: NURSE PRACTITIONER

## 2022-11-02 PROCEDURE — 74011000250 HC RX REV CODE- 250: Performed by: NURSE PRACTITIONER

## 2022-11-02 RX ORDER — POTASSIUM CHLORIDE 20 MEQ/1
40 TABLET, EXTENDED RELEASE ORAL 2 TIMES DAILY
Status: DISCONTINUED | OUTPATIENT
Start: 2022-11-02 | End: 2022-11-03 | Stop reason: HOSPADM

## 2022-11-02 RX ORDER — POTASSIUM CHLORIDE 7.45 MG/ML
10 INJECTION INTRAVENOUS
Status: DISPENSED | OUTPATIENT
Start: 2022-11-02 | End: 2022-11-02

## 2022-11-02 RX ADMIN — APIXABAN 5 MG: 5 TABLET, FILM COATED ORAL at 17:31

## 2022-11-02 RX ADMIN — ROSUVASTATIN CALCIUM 40 MG: 20 TABLET, COATED ORAL at 08:22

## 2022-11-02 RX ADMIN — METOPROLOL TARTRATE 25 MG: 25 TABLET, FILM COATED ORAL at 08:22

## 2022-11-02 RX ADMIN — POTASSIUM CHLORIDE 10 MEQ: 7.45 INJECTION INTRAVENOUS at 09:15

## 2022-11-02 RX ADMIN — APIXABAN 5 MG: 5 TABLET, FILM COATED ORAL at 08:22

## 2022-11-02 RX ADMIN — Medication 2 TABLET: at 17:31

## 2022-11-02 RX ADMIN — SODIUM CHLORIDE, PRESERVATIVE FREE 10 ML: 5 INJECTION INTRAVENOUS at 21:27

## 2022-11-02 RX ADMIN — POTASSIUM CHLORIDE 10 MEQ: 7.45 INJECTION INTRAVENOUS at 08:22

## 2022-11-02 RX ADMIN — WATER 2 G: 1 INJECTION INTRAMUSCULAR; INTRAVENOUS; SUBCUTANEOUS at 21:17

## 2022-11-02 RX ADMIN — POTASSIUM CHLORIDE 10 MEQ: 7.45 INJECTION INTRAVENOUS at 11:20

## 2022-11-02 RX ADMIN — Medication 2 TABLET: at 08:22

## 2022-11-02 RX ADMIN — POTASSIUM CHLORIDE 40 MEQ: 1500 TABLET, EXTENDED RELEASE ORAL at 08:22

## 2022-11-02 RX ADMIN — PANTOPRAZOLE SODIUM 40 MG: 40 TABLET, DELAYED RELEASE ORAL at 08:22

## 2022-11-02 RX ADMIN — SODIUM CHLORIDE, PRESERVATIVE FREE 10 ML: 5 INJECTION INTRAVENOUS at 14:00

## 2022-11-02 RX ADMIN — OXYCODONE HYDROCHLORIDE 5 MG: 5 TABLET ORAL at 21:27

## 2022-11-02 RX ADMIN — CLOPIDOGREL BISULFATE 75 MG: 75 TABLET ORAL at 08:22

## 2022-11-02 RX ADMIN — LEVOTHYROXINE SODIUM 25 MCG: 25 TABLET ORAL at 08:15

## 2022-11-02 RX ADMIN — POTASSIUM CHLORIDE 40 MEQ: 1500 TABLET, EXTENDED RELEASE ORAL at 17:31

## 2022-11-02 RX ADMIN — SODIUM CHLORIDE, PRESERVATIVE FREE 10 ML: 5 INJECTION INTRAVENOUS at 08:16

## 2022-11-02 NOTE — PROGRESS NOTES
Reason for Admission:  Acute UTI [N39.0]  Elevated troponin [R77.8]  Weakness [R53.1]                 RUR Score:    13%            Plan for utilizing home health:    ARU vs HH                      Likelihood of Readmission:   LOW                         Transition of Care Plan:              Initial assessment completed with patient. Cognitive status of patient: oriented to time, place, person and situation. Face sheet information confirmed:  yes. The patient designates Harriet Courser Dustinvalerio([phone])to participate in her discharge plan and to receive any needed information. This patient lives in a single family home with friend. Patient was able to navigate steps as needed. Prior to hospitalization, patient was considered to be independent with ADLs/IADLS : yes . Patient has a current ACP document on file: no      Transportation to be determined prior to discharge. T  he patient already has Walker, and  rollator  available in the home. Patient is not currently active with home health. Patient has not stayed in a skilled nursing facility or rehab. This patient is on dialysis :no    List of available Home Health agencies were provided and reviewed with the patient prior to discharge. Freedom of choice signed: yes, for any home health agency. Currently, the discharge plan is ARU vs HH    PT is recommending ARU. Patient is in agreement for ARU. Declines SNF. Wants HH if unable to qualify for ARU. CM will continue to monitor and assist with transition of care needs. The patient states that she can obtain her medications from the pharmacy, and take her medications as directed. Patient's current insurance is Medicare Part A & B and [facility]       Care Management Interventions  PCP Verified by CM:  Yes  Mode of Transport at Discharge: Self  Transition of Care Consult (CM Consult): Discharge Planning  Discharge Durable Medical Equipment: No  Physical Therapy Consult: Yes  Occupational Therapy Consult: Yes  Speech Therapy Consult: No  Support Systems: Spouse/Significant Other  Confirm Follow Up Transport: Self  Discharge Location  Patient Expects to be Discharged to[de-identified] Other: (ARU vs )        NICOLAS Alcantar, RN  Pager # [phone]  Care Manager

## 2022-11-02 NOTE — PROGRESS NOTES
11/2/2022 1:00 pm Spoke with Adolfo Martinez RN requesting for the daughter to bring in the patients  for their neurostimulator so patient can have their MRI

## 2022-11-02 NOTE — PROGRESS NOTES
MRI screening form filled out and faxed earlier in shift. .    No response re: pre-med and time of procedure from MRI.

## 2022-11-02 NOTE — PROGRESS NOTES
ARU/IPR REFERRAL CONTACT NOTE  [id number] [place] [address] for Physical Rehabilitation    RE:  Cuauhtemoc Velez     Thank you for the opportunity to review this patient's case for admission to [id number] [place] [address] for Physical Rehabilitation. Based on our pre-admission screening:     [ Rachael Patort Team/Medical Director is following this case. Comments:  Referral received from [facility] acute hospital. Medical director approved for IPR when medically ready. Currently K 2.8 and pending US results. Please be advised admission to ARU will be based on meeting admission requirements. Thank you for this referral.   Please do not hesitate to call if you need further information or have additional questions.      Regards,    Doreen De La Torre PTA   Admissions [facility]  [phone]

## 2022-11-02 NOTE — PROGRESS NOTES
Met with patient at bedside to discuss IPR. Her preference, if she is able,  is to return home  at discharge however is agreeable to IPR if needed. Will continue to follow.

## 2022-11-02 NOTE — PROGRESS NOTES
conducted an initial consultation and Spiritual Assessment for Denisha, who is a 76 y.o.,female. Patients Primary Language is: [place]. According to the patients EMR Protestant Affiliation is: [place]. The reason the Patient came to the hospital is:   Patient Active Problem List    Diagnosis Date Noted    Weakness 10/31/2022    Elevated troponin 10/31/2022    Acute UTI 10/31/2022    Bilateral leg edema 09/28/2022    Coronary artery disease of native artery of native heart with stable angina pectoris ([address] 05/17/2022    STEMI (ST elevation myocardial infarction) ([address] 07/30/2021    History of pulmonary embolism 07/30/2021    History of DVT (deep vein thrombosis) 07/30/2021    Chronic diastolic congestive heart failure ([address] 07/30/2021    Pulmonary embolism without acute cor pulmonale (HCC) 03/10/2021    Chronic bronchitis with productive mucopurulent cough ([address] 03/10/2021    Abnormal chest CT 02/06/2020    Chronic cough 02/06/2020    CAP (community acquired pneumonia) 07/22/2019    Sepsis ([address] 07/22/2019    Hypotension 07/22/2019    Atrial fibrillation with RVR ([address] 07/22/2019    MCBRIDE (dyspnea on exertion) 02/28/2019    Obesity, morbid ([address] 01/29/2019    High cholesterol 01/11/2019    Hypertension 01/11/2019    Hemochromatosis type 1 ([address] 01/11/2019    Sacroiliitis ([address] 12/31/2013    Spinal stenosis 11/27/2013    Shoulder impingement, right, with rotator cuff strain         The  provided the following Interventions:  Initiated a relationship of care and support. Explored issues of elayne, belief, spirituality and Presybeterian/ritual needs while hospitalized. Listened empathically. Provided chaplaincy education. Provided information about Spiritual Care Services. Offered prayer and assurance of continued prayers on patient's behalf. Chart reviewed.     The following outcomes where achieved:  Patient shared limited information about both their medical narrative and spiritual journey/beliefs.  confirmed Patient's Cheondoism Affiliation. Patient processed feeling about current hospitalization. Patient expressed gratitude for 's visit. Assessment:  Patient does not have any Tenriism/cultural needs that will affect patients preferences in health care. There are no spiritual or Tenriism issues which require intervention at this time. Plan:  Chaplains will continue to follow and will provide pastoral care on an as needed/requested basis.  recommends bedside caregivers page  on duty if patient shows signs of acute spiritual or emotional distress.       [facility]   [phone]

## 2022-11-02 NOTE — PROGRESS NOTES
Problem: Self Care Deficits Care Plan (Adult)  Goal: *Acute Goals and Plan of Care (Insert Text)  Description: Occupational Therapy Goals  Initiated 11/1/2022 within 7 day(s). 1.  Patient will perform upper bathing and upper body dressing with supervision/set-up seated at edge of bed. 2.  Patient will perform bed mobility in prep for self care with minimal assistance/contact guard assist.  3.  Patient will perform lower body dressing with minimal assistance/contact guard assist.  4.  Patient will perform toilet transfers with minimal assistance/contact guard assist.  5.  Patient will perform all aspects of toileting with minimal assistance/contact guard assist.  6.  Patient will participate in upper extremity therapeutic exercise/activities with supervision/set-up for 8 minutes. 7.  Patient will utilize energy conservation techniques during functional activities with verbal cues. Prior Level of Function: Pt reports that she is able to ambulate around room with rollator and uses motorized scooter outside. Pt performed bathing with use of bath bench and roommate present for bath transfer and MI for dressing and grooming and toileting. Outcome: Progressing Towards Goal   OCCUPATIONAL THERAPY TREATMENT    Patient: Chano Meyer [de-identified]76 y.o. female)  Date: 11/2/2022  Diagnosis: Acute UTI [N39.0]  Elevated troponin [R77.8]  Weakness [R53.1] <principal problem not specified>      Precautions: Fall, Contact    Chart, occupational therapy assessment, plan of care, and goals were reviewed. ASSESSMENT:  Pt is pleasant and cooperative. Motivated to participate w/therapy. Very talkative. Pt requires increase time w/bed mobility and assist w/trunk to maneuver to EOB. Pt tolerates sitting EOB ~ 15 minutes performing ADL grooming tasks and LB dressing task. Educated on energy conservation techniques w/ADLs. Pt requires assist donning L shoe and may benefit from adaptive equipment training.  Plan for following session. Generalized weakness requires Min Assist w/functional transfer to standing w/RW. Pt performs lateral steps to [facility] for functional mobility/transfer training  to BSC/toilet. Pt c/o LBP 9/10 and request return to supine. Pt left w/all needs within reach. Progression toward goals:  [x]          Improving appropriately and progressing toward goals  []          Improving slowly and progressing toward goals  []          Not making progress toward goals and plan of care will be adjusted     PLAN:  Patient continues to benefit from skilled intervention to address the above impairments. Continue treatment per established plan of care. Further Equipment Recommendations for Discharge:  bedside commode for use of toilet elevation for increase safety w/toilet transfers    AMPAC:   Current research shows that an AM-PAC score of 17 or less is not associated with a discharge to the patient's home setting. Based on an AM-PAC score of 15/24 and their current ADL deficits; it is recommended that the patient have 3-5 sessions per week of Occupational Therapy at d/c to increase the patient's independence. This AMPAC score should be considered in conjunction with interdisciplinary team recommendations to determine the most appropriate discharge setting. Patient's social support, diagnosis, medical stability, and prior level of function should also be taken into consideration. SUBJECTIVE:   Patient stated I was going to the grocery store and everything.     OBJECTIVE DATA SUMMARY:   Cognitive/Behavioral Status:  Neurologic State: Alert  Orientation Level: Oriented X4  Cognition: Follows commands  Safety/Judgement: Fall prevention, Home safety    Functional Mobility and Transfers for ADLs:   Bed Mobility:  Supine to Sit: Additional time; Moderate assistance;Bed Modified  Sit to Supine:  Moderate assistance (assist w/BLE)     Transfers:  Sit to Stand: Minimum assistance    Balance:  Sitting: Intact  Standing: Impaired; With support    ADL Intervention:  Grooming  Position Performed: Seated edge of bed  Brushing Teeth: Set-up    Lower Body Dressing Assistance  Slip on Shoes with Back: Moderate assistance  Leg Crossed Method Used: Yes (LLE only)  Position Performed: Seated edge of bed  Cues: Don    Pain:  Pain level pre-treatment: 0/10   Pain level post-treatment: 0/10  Pt c/o LBP 9/10 w/return to supine. Declines pain medication. Pain relieved w/repositioning and rest.    Activity Tolerance:    Good    Please refer to the flowsheet for vital signs taken during this treatment. After treatment:   []  Patient left in no apparent distress sitting up in chair  [x]  Patient left in no apparent distress in bed  [x]  Call bell left within reach  []  Nursing notified  []  Caregiver present  []  Bed alarm activated    COMMUNICATION/EDUCATION:   [] Role of Occupational Therapy in the acute care setting  [] Home safety education was provided and the patient/caregiver indicated understanding. [] Patient/family have participated as able in working towards goals and plan of care. [x] Patient/family agree to work toward stated goals and plan of care. [] Patient understands intent and goals of therapy, but is neutral about his/her participation. [] Patient is unable to participate in goal setting and plan of care. Thank you for this referral.  VERONIKA Hernandez  Time Calculation: 32 mins    Riri Lewis -PAC® Daily Activity Inpatient Short Form (6-Clicks)*    How much HELP from another person does the patient currently need    (If the patient hasn't done an activity recently, how much help from another person do you think he/she would need if he/she tried?)   Total (Total A or Dep)   A Lot  (Mod to Max A)   A Little (Sup or Min A)   None (Mod I to I)   Putting on and taking off regular lower body clothing? [] 1 [x] 2 [] 3 [] 4   2. Bathing (including washing, rinsing,      drying)? [] 1 [x] 2 [] 3 [] 4   3. Toileting, which includes using toilet, bedpan or urinal?   [] 1 [x] 2 [] 3 [] 4   4. Putting on and taking off regular upper body clothing? [] 1 [] 2 [x] 3 [] 4   5. Taking care of personal grooming such as brushing teeth? [] 1 [] 2 [x] 3 [] 4   6. Eating meals?    [] 1 [] 2 [x] 3 [] 4

## 2022-11-02 NOTE — PROGRESS NOTES
[facility]  Progress Note    Patient: Miguel Duarte Age: 76 y.o. : 1945 MR#: [id number] SSN: [id number]  Date: 2022     Subjective/24-hour events:     Remains afebrile, no new complaints. Patient complains of some burning at IV site with potassium infusion. Assessment:   Abnormal urinalysis, concern for UTI  Atrial fibrillation, anticoagulated with Eliquis  Hypothyroidism  Hyperal kalemia  Chronic lower extremity edema  Generalized weakness with inability to ambulate  Obesity, BMI 34.01    Plan:   ABX per ID. Continue potassium replacement as ordered. Will transition to oral replacement after current bag. Mobilize as tolerated with therapy  Thyroid studies, continue Synthroid at previous home dose. Analgesia as necessary. Regimen. Await inpatient rehab evaluation. Supportive care otherwise. Follow. Therapy services:  PT/OT. Equipment:  TBD. Anticipated disposition:  TBD. Case discussed with:  [x]Patient  [x]Family  [x]Nursing  [x]Case Management  DVT Prophylaxis:  [x]Eliquis[]Hep SQ  []SCDs  []Coumadin   []On Heparin gtt    Objective:   VS: Visit Vitals  /68   Pulse 61   Temp 97.8 °F (36.6 °C)   Resp 20   Ht 5' 1\" (1.549 m)   Wt 86.2 kg (190 lb)   SpO2 98%   BMI 35.90 kg/m²      Tmax/24hrs: Temp (24hrs), Av °F (36.7 °C), Min:97.8 °F (36.6 °C), Max:98.2 °F (36.8 °C)  No intake or output data in the 24 hours ending 22 1351    General:  In NAD. Nontoxic-appearing. Cardiovascular: S1, S2. Rate normal.  Pulmonary:  Lungs clear bilaterally, no wheezes. No accessory muscle use. GI:  Abdomen soft, NTTP. Extremities:  Warm, no edema or ischemia. Neuro:  Awake and alert.       Labs:    Recent Results (from the past 24 hour(s))   TSH 3RD GENERATION    Collection Time: 22  3:36 AM   Result Value Ref Range    TSH 1.24 0.36 - 9.79 uIU/mL   METABOLIC PANEL, BASIC    Collection Time: 22  3:36 AM   Result Value Ref Range Sodium 142 136 - 145 mmol/L    Potassium 2.8 (LL) 3.5 - 5.5 mmol/L    Chloride 107 100 - 111 mmol/L    CO2 27 21 - 32 mmol/L    Anion gap 8 3.0 - 18 mmol/L    Glucose 100 (H) 74 - 99 mg/dL    BUN 20 (H) 7.0 - 18 MG/DL    Creatinine 0.92 0.6 - 1.3 MG/DL    BUN/Creatinine ratio 22 (H) 12 - 20      eGFR >60 >60 ml/min/1.73m2    Calcium 8.8 8.5 - 10.1 MG/DL   CBC WITH AUTOMATED DIFF    Collection Time: 11/02/22  3:36 AM   Result Value Ref Range    WBC 7.8 4.6 - 13.2 K/uL    RBC 3.31 (L) 4.20 - 5.30 M/uL    HGB 10.4 (L) 12.0 - 16.0 g/dL    HCT 32.0 (L) 35.0 - 45.0 %    MCV 96.7 78.0 - 100.0 FL    MCH 31.4 24.0 - 34.0 PG    MCHC 32.5 31.0 - 37.0 g/dL    RDW 15.3 (H) 11.6 - 14.5 %    PLATELET 717 567 - 596 K/uL    MPV 11.3 9.2 - 11.8 FL    NRBC 0.0 0  WBC    ABSOLUTE NRBC 0.00 0.00 - 0.01 K/uL    NEUTROPHILS 67 40 - 73 %    LYMPHOCYTES 20 (L) 21 - 52 %    MONOCYTES 11 (H) 3 - 10 %    EOSINOPHILS 1 0 - 5 %    BASOPHILS 0 0 - 2 %    IMMATURE GRANULOCYTES 1 (H) 0.0 - 0.5 %    ABS. NEUTROPHILS 5.2 1.8 - 8.0 K/UL    ABS. LYMPHOCYTES 1.6 0.9 - 3.6 K/UL    ABS. MONOCYTES 0.9 0.05 - 1.2 K/UL    ABS. EOSINOPHILS 0.1 0.0 - 0.4 K/UL    ABS. BASOPHILS 0.0 0.0 - 0.1 K/UL    ABS. IMM.  GRANS. 0.1 (H) 0.00 - 0.04 K/UL    DF AUTOMATED         Signed By: Deon Stout MD     November 2, 2022

## 2022-11-02 NOTE — PROGRESS NOTES
[facility]  (A Division of [address] [place] [address])    Follow-up Note      Date of Admission: 10/31/2022       Date of Note:  2022          Current Antimicrobials:    Prior Antimicrobials:  Rocephin 10/31 to present Macrobid     Assessment:         Abnormal UA: 4-10 WBCs and 1+ bacteria, negative nitrites and a small amount of leukocyte esterase. Not clear if this is consistent with a resolving UTI or underlying cystitis given concurrent blood also noted in the urine. Urine cultures are currently pending. There is no prior UA or urine cultures in the last several weeks to review. New generalized weakness and malaise: Suspect this is multifactorial with a possible component of antibiotic side effect. Bilateral knee pain with chronic lower extremity edema. History of A. fib on Eliquis  History of coronary artery disease with prior STEMI  History of CHF  History of hemochromatosis    Plan:   Consider bilateral renal ultrasounds given protein and blood noted in urine. Complete ceftriaxone after today's dose  Follow-up blood cultures-remain negative  10/31 urine culture with 50,000 mixed wilfrido without one particular organism isolated. Trend CBC, BMP     Discussed with Dr. Rehana Goyal for d/c from ID standpoint. Mayra Lindo DO  [facility]  [address] \[facility]", [facility]  Office: [phone], Ext [phone]  Mobile/Text: [phone]     Microbiology:  10/31 urine culture: 50,000 mixed wilfrido  10/31 blood culture: No growth to date x2       Lines / Catheters:  peripheral    Subjective:   Patient seen and examined. Feeling ok. Still has knee and leg pain. Still feeling tired. Not as unwell feeling as previously.      Objective:      Visit Vitals  /67   Pulse 62   Temp 98 °F (36.7 °C)   Resp 18   Ht 5' 1\" (1.549 m)   Wt 86.2 kg (190 lb)   SpO2 97%   BMI 35.90 kg/m²     Temp (24hrs), Av °F (36.7 °C), Min:97.8 °F (36.6 °C), Max:98.2 °F (36.8 °C)        General:   awake alert and oriented, non-toxic   Skin:   no rashes or skin lesions noted on limited exam, dry and warm   HEENT:  No scleral icterus or pallor; oral mucosa moist, lips moist   Lymph Nodes:   not assessed today   Lungs:   Non-labored; bilaterally clear to aspiration- no crackles wheezes rales or rhonchi   Heart:  RRR, s1 and s2; no murmurs rubs or gallops; 3+ LE edema, + pedal pulses   Abdomen:  soft, non-distended, active bowel sounds, non-tender   Genitourinary:  deferred   Extremities:   average muscle tone; no contractures, no joint effusions   Neurologic:  No gross focal motor or sensory abnormalities; CN 2-12 intact; Follows commands. Psychiatric:   appropriate and interactive.          Lab results:  Chemistry  Recent Labs     11/02/22 0336 11/01/22  0509 10/31/22  1730   * 95 109*    139 140   K 2.8* 4.2 3.8    105 107   CO2 27 27 27   BUN 20* 17 18   CREA 0.92 1.00 1.16   CA 8.8 9.2 8.7   AGAP 8 7 6   BUCR 22* 17 16   AP  --  62 70   TP  --  6.3* 6.0*   ALB  --  2.7* 2.9*   GLOB  --  3.6 3.1   AGRAT  --  0.8 0.9       CBC w/ Diff  Recent Labs     11/02/22  0336 11/01/22  0509 10/31/22  1730   WBC 7.8 10.1 9.0   RBC 3.31* 3.94* 3.75*   HGB 10.4* 12.4 11.7*   HCT 32.0* 37.2 35.6    221 234   GRANS 67 76* 74*   LYMPH 20* 13* 15*   EOS 1 0 1       Microbiology  All Micro Results       Procedure Component Value Units Date/Time    CULTURE, URINE [[id number]] Collected: 10/31/22 1929    Order Status: Completed Specimen: Clean catch Updated: 11/02/22 0740     Special Requests: NO SPECIAL REQUESTS        [place] Count --        709[address]  COLONIES/mL       Culture result:       MIXED UROGENITAL ELIZABETH ISOLATED          CULTURE, BLOOD [[id number]] Collected: 10/31/22 2220    Order Status: Completed Specimen: Blood Updated: 11/02/22 0652     Special Requests: NO SPECIAL REQUESTS        Culture result: NO GROWTH 2 DAYS       CULTURE, BLOOD [[id number]] Collected: 10/31/22 2200    Order Status: Completed Specimen: Blood Updated: 11/02/22 0652     Special Requests: NO SPECIAL REQUESTS        Culture result: NO GROWTH 2 DAYS       INFLUENZA A & B AG (RAPID TEST) [[id number]] Collected: 10/31/22 1929    Order Status: Completed Specimen: Nasopharyngeal from Nasal washing Updated: 10/31/22 2028     Influenza A Antigen Negative        Comment: A negative result does not exclude influenza virus infection, seasonal or H1N1 due to suboptimal sensitivity. If influenza is circulating in your community, a diagnosis of influenza should be considered based on a patients clinical presentation and empiric antiviral treatment should be considered, if indicated. Influenza B Antigen Negative       COVID-19 RAPID TEST [[id number]] Collected: 10/31/22 1929    Order Status: Completed Specimen: Nasopharyngeal Updated: 10/31/22 2012     Specimen source Nasopharyngeal        COVID-19 rapid test Not detected        Comment: Rapid Abbott ID Now       Rapid NAAT:  The specimen is NEGATIVE for SARS-CoV-2, the novel coronavirus associated with COVID-19. Negative results should be treated as presumptive and, if inconsistent with clinical signs and symptoms or necessary for patient management, should be tested with an alternative molecular assay. Negative results do not preclude SARS-CoV-2 infection and should not be used as the sole basis for patient management decisions. This test has been authorized by the FDA under an Emergency Use Authorization (EUA) for use by authorized laboratories.    Fact sheet for Healthcare Providers: ConventionUpdate.co.nz  Fact sheet for Patients: ConventionUpdate.co.nz       Methodology: Isothermal Nucleic Acid Amplification                  Hannah Weems DO   11/2/2022

## 2022-11-03 ENCOUNTER — TRANSCRIBE ORDER (OUTPATIENT)
Dept: SCHEDULING | Age: 77
End: 2022-11-03

## 2022-11-03 ENCOUNTER — HOSPITAL ENCOUNTER (INPATIENT)
Age: 77
LOS: 12 days | Discharge: HOME HEALTH | DRG: 689 | End: 2022-11-15
Attending: EMERGENCY MEDICINE | Admitting: EMERGENCY MEDICINE
Payer: MEDICARE

## 2022-11-03 VITALS
BODY MASS INDEX: 35.87 KG/M2 | RESPIRATION RATE: 18 BRPM | SYSTOLIC BLOOD PRESSURE: 100 MMHG | HEART RATE: 64 BPM | TEMPERATURE: 97.6 F | HEIGHT: 61 IN | DIASTOLIC BLOOD PRESSURE: 66 MMHG | WEIGHT: 190 LBS | OXYGEN SATURATION: 98 %

## 2022-11-03 DIAGNOSIS — M75.41 IMPINGEMENT SYNDROME OF RIGHT SHOULDER: ICD-10-CM

## 2022-11-03 DIAGNOSIS — N30.00 ACUTE CYSTITIS WITHOUT HEMATURIA: Primary | ICD-10-CM

## 2022-11-03 DIAGNOSIS — E83.110 PIGMENT CIRRHOSIS (HCC): Primary | ICD-10-CM

## 2022-11-03 PROBLEM — N39.0 UTI (URINARY TRACT INFECTION): Status: ACTIVE | Noted: 2022-11-03

## 2022-11-03 LAB — POTASSIUM SERPL-SCNC: 4.8 MMOL/L (ref 3.5–5.5)

## 2022-11-03 PROCEDURE — 74011000250 HC RX REV CODE- 250: Performed by: NURSE PRACTITIONER

## 2022-11-03 PROCEDURE — 74011250637 HC RX REV CODE- 250/637: Performed by: EMERGENCY MEDICINE

## 2022-11-03 PROCEDURE — 77030038269 HC DRN EXT URIN PURWCK BARD -A

## 2022-11-03 PROCEDURE — 74011250637 HC RX REV CODE- 250/637: Performed by: NURSE PRACTITIONER

## 2022-11-03 PROCEDURE — 2709999900 HC NON-CHARGEABLE SUPPLY

## 2022-11-03 PROCEDURE — 65310000000 HC RM PRIVATE REHAB

## 2022-11-03 PROCEDURE — 99238 HOSP IP/OBS DSCHRG MGMT 30/<: CPT | Performed by: FAMILY MEDICINE

## 2022-11-03 PROCEDURE — 97530 THERAPEUTIC ACTIVITIES: CPT

## 2022-11-03 PROCEDURE — 84132 ASSAY OF SERUM POTASSIUM: CPT

## 2022-11-03 PROCEDURE — 74011250637 HC RX REV CODE- 250/637: Performed by: STUDENT IN AN ORGANIZED HEALTH CARE EDUCATION/TRAINING PROGRAM

## 2022-11-03 PROCEDURE — 36415 COLL VENOUS BLD VENIPUNCTURE: CPT

## 2022-11-03 PROCEDURE — 97116 GAIT TRAINING THERAPY: CPT

## 2022-11-03 PROCEDURE — 97535 SELF CARE MNGMENT TRAINING: CPT

## 2022-11-03 RX ORDER — METOPROLOL TARTRATE 25 MG/1
25 TABLET, FILM COATED ORAL DAILY
Status: DISCONTINUED | OUTPATIENT
Start: 2022-11-04 | End: 2022-11-15 | Stop reason: HOSPADM

## 2022-11-03 RX ORDER — ROSUVASTATIN CALCIUM 20 MG/1
40 TABLET, COATED ORAL DAILY
Status: DISCONTINUED | OUTPATIENT
Start: 2022-11-04 | End: 2022-11-15 | Stop reason: HOSPADM

## 2022-11-03 RX ORDER — LEVOTHYROXINE SODIUM 25 UG/1
25 TABLET ORAL
Status: DISCONTINUED | OUTPATIENT
Start: 2022-11-04 | End: 2022-11-15 | Stop reason: HOSPADM

## 2022-11-03 RX ORDER — PANTOPRAZOLE SODIUM 40 MG/1
40 TABLET, DELAYED RELEASE ORAL
Status: DISCONTINUED | OUTPATIENT
Start: 2022-11-04 | End: 2022-11-15 | Stop reason: HOSPADM

## 2022-11-03 RX ORDER — NITROGLYCERIN 0.4 MG/1
0.4 TABLET SUBLINGUAL
Status: DISCONTINUED | OUTPATIENT
Start: 2022-11-03 | End: 2022-11-15 | Stop reason: HOSPADM

## 2022-11-03 RX ORDER — OXYCODONE HYDROCHLORIDE 5 MG/1
5 TABLET ORAL
Status: DISCONTINUED | OUTPATIENT
Start: 2022-11-03 | End: 2022-11-15 | Stop reason: HOSPADM

## 2022-11-03 RX ORDER — ALBUTEROL SULFATE 0.83 MG/ML
2.5 SOLUTION RESPIRATORY (INHALATION)
Status: DISCONTINUED | OUTPATIENT
Start: 2022-11-03 | End: 2022-11-15 | Stop reason: HOSPADM

## 2022-11-03 RX ORDER — DOCUSATE SODIUM 100 MG/1
100 CAPSULE, LIQUID FILLED ORAL 2 TIMES DAILY
Status: DISCONTINUED | OUTPATIENT
Start: 2022-11-03 | End: 2022-11-15 | Stop reason: HOSPADM

## 2022-11-03 RX ORDER — CLOPIDOGREL BISULFATE 75 MG/1
75 TABLET ORAL DAILY
Status: DISCONTINUED | OUTPATIENT
Start: 2022-11-04 | End: 2022-11-15 | Stop reason: HOSPADM

## 2022-11-03 RX ORDER — BISACODYL 5 MG
10 TABLET, DELAYED RELEASE (ENTERIC COATED) ORAL
Status: DISCONTINUED | OUTPATIENT
Start: 2022-11-03 | End: 2022-11-15 | Stop reason: HOSPADM

## 2022-11-03 RX ADMIN — APIXABAN 5 MG: 5 TABLET, FILM COATED ORAL at 19:01

## 2022-11-03 RX ADMIN — APIXABAN 5 MG: 5 TABLET, FILM COATED ORAL at 10:30

## 2022-11-03 RX ADMIN — SODIUM CHLORIDE, PRESERVATIVE FREE 10 ML: 5 INJECTION INTRAVENOUS at 06:33

## 2022-11-03 RX ADMIN — POTASSIUM CHLORIDE 40 MEQ: 1500 TABLET, EXTENDED RELEASE ORAL at 10:31

## 2022-11-03 RX ADMIN — CLOPIDOGREL BISULFATE 75 MG: 75 TABLET ORAL at 10:30

## 2022-11-03 RX ADMIN — OXYCODONE HYDROCHLORIDE 5 MG: 5 TABLET ORAL at 10:35

## 2022-11-03 RX ADMIN — LEVOTHYROXINE SODIUM 25 MCG: 25 TABLET ORAL at 06:33

## 2022-11-03 RX ADMIN — Medication 2 TABLET: at 19:01

## 2022-11-03 RX ADMIN — ACETAMINOPHEN 650 MG: 325 TABLET, FILM COATED ORAL at 19:06

## 2022-11-03 RX ADMIN — PANTOPRAZOLE SODIUM 40 MG: 40 TABLET, DELAYED RELEASE ORAL at 06:33

## 2022-11-03 RX ADMIN — ROSUVASTATIN CALCIUM 40 MG: 20 TABLET, COATED ORAL at 10:31

## 2022-11-03 RX ADMIN — METOPROLOL TARTRATE 25 MG: 25 TABLET, FILM COATED ORAL at 10:30

## 2022-11-03 RX ADMIN — Medication 2 TABLET: at 10:31

## 2022-11-03 RX ADMIN — APIXABAN 5 MG: 5 TABLET, FILM COATED ORAL at 22:40

## 2022-11-03 NOTE — DISCHARGE INSTRUCTIONS
DISCHARGE SUMMARY from Nurse    PATIENT INSTRUCTIONS:    After general anesthesia or intravenous sedation, for 24 hours or while taking prescription Narcotics:  Limit your activities  Do not drive and operate hazardous machinery  Do not make important personal or business decisions  Do  not drink alcoholic beverages  If you have not urinated within 8 hours after discharge, please contact your surgeon on call. Report the following to your surgeon:  Excessive pain, swelling, redness or odor of or around the surgical area  Temperature over 100.5  Nausea and vomiting lasting longer than 4 hours or if unable to take medications  Any signs of decreased circulation or nerve impairment to extremity: change in color, persistent  numbness, tingling, coldness or increase pain  Any questions    What to do at Home:  Recommended activity: Activity as tolerated,     If you experience any of the following symptoms chest pain, shortness of breath, fever greater than 100.5,nausea, vomiting, pain unrelieved by medication, please follow up with Tanna Molina. *  Please give a list of your current medications to your Primary Care Provider. *  Please update this list whenever your medications are discontinued, doses are      changed, or new medications (including over-the-counter products) are added. *  Please carry medication information at all times in case of emergency situations. These are general instructions for a healthy lifestyle:    No smoking/ No tobacco products/ Avoid exposure to second hand smoke  Surgeon General's Warning:  Quitting smoking now greatly reduces serious risk to your health.     Obesity, smoking, and sedentary lifestyle greatly increases your risk for illness    A healthy diet, regular physical exercise & weight monitoring are important for maintaining a healthy lifestyle    You may be retaining fluid if you have a history of heart failure or if you experience any of the following symptoms: Weight gain of 3 pounds or more overnight or 5 pounds in a week, increased swelling in our hands or feet or shortness of breath while lying flat in bed. Please call your doctor as soon as you notice any of these symptoms; do not wait until your next office visit. Patient armband removed and shredded   MyChart Activation    Thank you for requesting access to [employer]. Please follow the instructions below to securely access and download your online medical record. [employer] allows you to send messages to your doctor, view your test results, renew your prescriptions, schedule appointments, and more. How Do I Sign Up? In your internet browser, go to [email or web address]  Click on the First Time User? Click Here link in the Sign In box. You will be redirect to the New Member Sign Up page. Enter your [employer] Access Code exactly as it appears below. You will not need to use this code after youve completed the sign-up process. If you do not sign up before the expiration date, you must request a new code. [employer] Access Code: Activation code not generated  Current [employer] Status: Active (This is the date your [employer] access code will )    Enter the last four digits of your Social Security Number (xxxx) and Date of Birth (mm/dd/yyyy) as indicated and click Submit. You will be taken to the next sign-up page. Create a [employer] ID. This will be your [employer] login ID and cannot be changed, so think of one that is secure and easy to remember. Create a [employer] password. You can change your password at any time. Enter your Password Reset Question and Answer. This can be used at a later time if you forget your password. Enter your e-mail address. You will receive e-mail notification when new information is available in [address] Click Sign Up. You can now view and download portions of your medical record. Click the [place] link to download a portable copy of your medical information.     Additional Information    If you have questions, please visit the Frequently Asked Questions section of the [email or web address] website at [email or web address] Remember, [employer] is NOT to be used for urgent needs. For medical emergencies, dial 911. The discharge information has been reviewed with the {PATIENT PARENT GUARDIAN:53806}. The {PATIENT PARENT GUARDIAN:51112} verbalized understanding. Discharge medications reviewed with the {Dishcarge meds reviewed ZTVL:43306} and appropriate educational materials and side effects teaching were provided.   ___________________________________________________________________________________________________________________________________

## 2022-11-03 NOTE — ROUTINE PROCESS
Bedside and Verbal shift change report given to Cynthia Mcmullen RN (oncoming nurse) by Sandy Ward RN (offgoing nurse). Report included the following information SBAR.

## 2022-11-03 NOTE — PROGRESS NOTES
11/2/22 @ 2021 spoke with RANJAN goodman to see if patient daughter brought patient's stimulator remote and if remote was fully charged. Nurse states she does not know if patient has remote nor if it is fully charged. I explain to the nurse the importance  of having the remote available and fully charged.

## 2022-11-03 NOTE — PROGRESS NOTES
Discharge planning    Discharge order noted for today. Patient has been accepted to  [address] [place] [address] acute rehab. Confirmed with [facility] that bed is available today. Met with patient and agreeable to the transition plan today. Transport to facility has been arranged through staff at  7:30 p.m. Bedside RN, Nell/Alia, has been updated to the transition plan. Discharge information has been updated on the AVS.  Please call report to [phone] to room 175    K.  ELAINE SantoN, RN  Pager # [phone]  Care Manager

## 2022-11-03 NOTE — PROGRESS NOTES
Chano Meyer is a 76 y.o.  female admitted on 10/31 due to Elevated Troponin & UTI from [employer]. Report received from \[facility]\"". Opportunity for questions and clarification was provided. Patient is expected to transfer to the unit by 1930. Assessment will be completed upon patients arrival to unit and care assumed. Berny Mckeon RN BSN        [id number]- Handoff report given to [employer].

## 2022-11-03 NOTE — REHAB NOTE
ARU/IPR REFERRAL CONTACT NOTE  [address] [place] [address] for Physical Rehabilitation      Thank you for the opportunity to review this patient's case for admission to [id number] [place] [address] Physical Rehabilitation. Based on our pre-admission screening:     [x ] This patient meets criteria for admission to [facility] for Physical Rehabilitation. Plan to admit patient to room 175 @730pm.  Please have completed discharge summary signed and in the chart prior to transfer. Please call report to [phone] prior to transfer. Again, Thank you for this referral. Should you have any questions please do not hesitate to call.      Sincerely,  Juan Brown Liaison  [facility] Physical Rehabilitation  [phone]

## 2022-11-03 NOTE — PROGRESS NOTES
[facility]  (A Division of [address] [place] [address])    Follow-up Note      Date of Admission: 10/31/2022       Date of Note:  11/3/2022          Current Antimicrobials:    Prior Antimicrobials:  Rocephin 10/31 to present Macrobid     Assessment:         Abnormal UA: 4-10 WBCs and 1+ bacteria, negative nitrites and a small amount of leukocyte esterase. Not clear if this is consistent with a resolving UTI or underlying cystitis given concurrent blood also noted in the urine. Urine cultures are currently pending. There is no prior UA or urine cultures in the last several weeks to review. New generalized weakness and malaise: Suspect this is multifactorial with a possible component of antibiotic side effect. Bilateral knee pain with chronic lower extremity edema. History of A. fib on Eliquis  History of coronary artery disease with prior STEMI  History of CHF  History of hemochromatosis    Plan:   Completed ceftriaxone   Follow-up blood cultures-remain negative  10/31 urine culture with 50,000 mixed wilfrido without one particular organism isolated. Trend CBC, BMP     Discussed with Dr. Amalia Izquierdo for d/c from ID standpoint. DO [facility]  [address], [address] [facility]  Office: [phone], Ext [phone]  Mobile/Text: [phone]     Microbiology:  10/31 urine culture: 50,000 mixed wilfrido  10/31 blood culture: No growth to date x2       Lines / Catheters:  peripheral    Subjective:   Patient seen and examined. Feeling ok. Still has knee and leg pain. Still feeling tired. No fevers or chills. No dysuria. Increased frequency.  Has burning from IV potassium    Objective:      Visit Vitals  /70   Pulse 66   Temp 97.5 °F (36.4 °C)   Resp 16   Ht 5' 1\" (1.549 m)   Wt 86.2 kg (190 lb)   SpO2 98%   BMI 35.90 kg/m²     Temp (24hrs), Av.9 °F (36.6 °C), Min:97.5 °F (36.4 °C), Max:98.1 °F (36.7 °C)        General: awake alert and oriented, non-toxic   Skin:   no rashes or skin lesions noted on limited exam, dry and warm   HEENT:  No scleral icterus or pallor; oral mucosa moist, lips moist   Lymph Nodes:   not assessed today   Lungs:   Non-labored; bilaterally clear to aspiration- no crackles wheezes rales or rhonchi   Heart:  RRR, s1 and s2; no murmurs rubs or gallops; 3+ LE edema, + pedal pulses   Abdomen:  soft, non-distended, active bowel sounds, non-tender   Genitourinary:  deferred   Extremities:   average muscle tone; no contractures, no joint effusions   Neurologic:  No gross focal motor or sensory abnormalities; CN 2-12 intact; Follows commands. Psychiatric:   appropriate and interactive.          Lab results:  Chemistry  Recent Labs     11/03/22  1043 11/02/22  0336 11/01/22  0509 10/31/22  1730   GLU  --  100* 95 109*   NA  --  142 139 140   K 4.8 2.8* 4.2 3.8   CL  --  107 105 107   CO2  --  27 27 27   BUN  --  20* 17 18   CREA  --  0.92 1.00 1.16   CA  --  8.8 9.2 8.7   AGAP  --  8 7 6   BUCR  --  22* 17 16   AP  --   --  62 70   TP  --   --  6.3* 6.0*   ALB  --   --  2.7* 2.9*   GLOB  --   --  3.6 3.1   AGRAT  --   --  0.8 0.9       CBC w/ Diff  Recent Labs     11/02/22  0336 11/01/22  0509 10/31/22  1730   WBC 7.8 10.1 9.0   RBC 3.31* 3.94* 3.75*   HGB 10.4* 12.4 11.7*   HCT 32.0* 37.2 35.6    221 234   GRANS 67 76* 74*   LYMPH 20* 13* 15*   EOS 1 0 1       Microbiology  All Micro Results       Procedure Component Value Units Date/Time    CULTURE, BLOOD [[id number]] Collected: 10/31/22 2220    Order Status: Completed Specimen: Blood Updated: 11/03/22 0616     Special Requests: NO SPECIAL REQUESTS        Culture result: NO GROWTH 3 DAYS       CULTURE, BLOOD [[id number]] Collected: 10/31/22 2200    Order Status: Completed Specimen: Blood Updated: 11/03/22 0616     Special Requests: NO SPECIAL REQUESTS        Culture result: NO GROWTH 3 DAYS       CULTURE, URINE [[id number]] Collected: 10/31/22 1929 Order Status: Completed Specimen: Clean catch Updated: 11/02/22 0740     Special Requests: NO SPECIAL REQUESTS        Menno Count --        [id number]  COLONIES/mL       Culture result:       MIXED UROGENITAL ELIZABETH ISOLATED          INFLUENZA A & B AG (RAPID TEST) [[id number]] Collected: 10/31/22 1929    Order Status: Completed Specimen: Nasopharyngeal from Nasal washing Updated: 10/31/22 2028     Influenza A Antigen Negative        Comment: A negative result does not exclude influenza virus infection, seasonal or H1N1 due to suboptimal sensitivity. If influenza is circulating in your community, a diagnosis of influenza should be considered based on a patients clinical presentation and empiric antiviral treatment should be considered, if indicated. Influenza B Antigen Negative       COVID-19 RAPID TEST [[id number]] Collected: 10/31/22 1929    Order Status: Completed Specimen: Nasopharyngeal Updated: 10/31/22 2012     Specimen source Nasopharyngeal        COVID-19 rapid test Not detected        Comment: Rapid Abbott ID Now       Rapid NAAT:  The specimen is NEGATIVE for SARS-CoV-2, the novel coronavirus associated with COVID-19. Negative results should be treated as presumptive and, if inconsistent with clinical signs and symptoms or necessary for patient management, should be tested with an alternative molecular assay. Negative results do not preclude SARS-CoV-2 infection and should not be used as the sole basis for patient management decisions. This test has been authorized by the FDA under an Emergency Use Authorization (EUA) for use by authorized laboratories.    Fact sheet for Healthcare Providers: ConventionUpdate.co.nz  Fact sheet for Patients: ConventionUpdate.co.nz       Methodology: Isothermal Nucleic Acid Amplification                  Min Garland DO   11/3/2022

## 2022-11-03 NOTE — PROGRESS NOTES
Problem: Self Care Deficits Care Plan (Adult)  Goal: *Acute Goals and Plan of Care (Insert Text)  Description: Occupational Therapy Goals  Initiated 11/1/2022 within 7 day(s). 1.  Patient will perform upper bathing and upper body dressing with supervision/set-up seated at edge of bed. 2.  Patient will perform bed mobility in prep for self care with minimal assistance/contact guard assist.  3.  Patient will perform lower body dressing with minimal assistance/contact guard assist.  4.  Patient will perform toilet transfers with minimal assistance/contact guard assist.  5.  Patient will perform all aspects of toileting with minimal assistance/contact guard assist.  6.  Patient will participate in upper extremity therapeutic exercise/activities with supervision/set-up for 8 minutes. 7.  Patient will utilize energy conservation techniques during functional activities with verbal cues. Prior Level of Function: Pt reports that she is able to ambulate around room with rollator and uses motorized scooter outside. Pt performed bathing with use of bath bench and roommate present for bath transfer and MI for dressing and grooming and toileting. Outcome: Progressing Towards Goal   OCCUPATIONAL THERAPY TREATMENT    Patient: Leta Devine [de-identified]76 y.o. female)  Date: 11/3/2022  Diagnosis: Acute UTI [N39.0]  Elevated troponin [R77.8]  Weakness [R53.1] <principal problem not specified>      Precautions: Fall, Contact    Chart, occupational therapy assessment, plan of care, and goals were reviewed. ASSESSMENT:  Pt seen for am ADL retraining. (See functional levels below) Pt requires assist w/jennifer-care and may benefit from adaptive equipment training w/hygiene aid for increase independence. Pt educated on energy conservation techniques w/ADLs and stand pivot transfer technique w/functional transfer training for energy conservation. Educated on benefits OOB and encouraged OOB for all meals. No c/o pain this date.   Pt anxious for return to [facility] and d/c home. Progression toward goals:  [x]          Improving appropriately and progressing toward goals  []          Improving slowly and progressing toward goals  []          Not making progress toward goals and plan of care will be adjusted     PLAN:  Patient continues to benefit from skilled intervention to address the above impairments. Continue treatment per established plan of care. Further Equipment Recommendations for Discharge:  N/A, pt reports she has DME    AMPAC: Current research shows that an AM-PAC score of 17 or less is not associated with a discharge to the patient's home setting. Based on an AM-PAC score of 15/24 and their current ADL deficits; it is recommended that the patient have 5-7 sessions per week of Occupational Therapy at d/ to increase the patient's independence. Currently, this patient demonstrates the potential endurance, and/or tolerance for 3 hours of therapy each day at d/. This AMPAC score should be considered in conjunction with interdisciplinary team recommendations to determine the most appropriate discharge setting. Patient's social support, diagnosis, medical stability, and prior level of function should also be taken into consideration. SUBJECTIVE:   Patient stated I'm ok. I had an oxy this morning.     OBJECTIVE DATA SUMMARY:   Cognitive/Behavioral Status:  Neurologic State: Alert  Orientation Level: Oriented X4  Cognition: Follows commands  Safety/Judgement: Fall prevention, Home safety    Functional Mobility and Transfers for ADLs:   Bed Mobility:  Supine to Sit: Additional time; Moderate assistance;Bed Modified  Scooting: Minimum assistance     Transfers:  Sit to Stand: Minimum assistance (w/RW)  Bed to Chair: Minimum assistance (w/RW)    Balance:  Sitting: Intact  Standing: Impaired; With support    ADL Intervention:  Grooming  Position Performed: Seated edge of bed  Washing Face: Set-up  Washing Hands: Set-up  Brushing Teeth: Set-up  Applying Makeup: Set-up    Upper Body Bathing  Bathing Assistance: Stand-by assistance  Position Performed: Seated edge of bed    Lower Body Bathing  Perineal  : Maximum assistance  Position Performed: Standing    Upper [address]: Stand-by assistance    Pain:  Pain level pre-treatment: 0/10   Pain level post-treatment: 0/10    Activity Tolerance:    Good    Please refer to the flowsheet for vital signs taken during this treatment. After treatment:   [x]  Patient left in no apparent distress sitting up in chair  []  Patient left in no apparent distress in bed  [x]  Call bell left within reach  []  Nursing notified  []  Caregiver present  []  Bed alarm activated    COMMUNICATION/EDUCATION:   [] Role of Occupational Therapy in the acute care setting  [] Home safety education was provided and the patient/caregiver indicated understanding. [] Patient/family have participated as able in working towards goals and plan of care. [x] Patient/family agree to work toward stated goals and plan of care. [] Patient understands intent and goals of therapy, but is neutral about his/her participation. [] Patient is unable to participate in goal setting and plan of care. Thank you for this referral.  VERONIKA Lozano  Time Calculation: 43 mins    [facility] AM-PAC® Daily Activity Inpatient Short Form (6-Clicks)*    How much HELP from another person does the patient currently need    (If the patient hasn't done an activity recently, how much help from another person do you think he/she would need if he/she tried?)   Total (Total A or Dep)   A Lot  (Mod to Max A)   A Little (Sup or Min A)   None (Mod I to I)   Putting on and taking off regular lower body clothing? [] 1 [x] 2 [] 3 [] 4   2. Bathing (including washing, rinsing,      drying)? [] 1 [x] 2 [] 3 [] 4   3. Toileting, which includes using toilet, bedpan or urinal?   [] 1 [x] 2 [] 3 [] 4   4.  Putting on and taking off regular upper body clothing? [] 1 [] 2 [x] 3 [] 4   5. Taking care of personal grooming such as brushing teeth? [] 1 [] 2 [x] 3 [] 4   6. Eating meals?    [] 1 [] 2 [x] 3 [] 4

## 2022-11-03 NOTE — DISCHARGE SUMMARY
Discharge Summary    Patient: Tremayne Hart MRN: [id number]  CSN: [id number]    YOB: 1945  Age: 76 y.o. Sex: female    DOA: 10/31/2022 LOS:  LOS: 3 days   Discharge Date:  11/3/2022     Admission Diagnoses: Acute UTI [N39.0]  Elevated troponin [R77.8]  Weakness [R53.1]    Discharge Diagnoses:    Abnormal urinalysis, concern for UTI  Atrial fibrillation, anticoagulated with Eliquis  Hypothyroidism  Hyperal kalemia  Chronic lower extremity edema  Generalized weakness with inability to ambulate  Obesity, BMI 34.01    Discharge Condition: Stable    PHYSICAL EXAM  Visit Vitals  BP (!) 103/58 (BP Patient Position: Sitting)   Pulse 74   Temp 97.4 °F (36.3 °C)   Resp 18   Ht 5' 1\" (1.549 m)   Wt 86.2 kg (190 lb)   SpO2 97%   BMI 35.90 kg/m²       General: In NAD. Nontoxic-appearing. HEENT: NCAT. Sclerae anicteric. Lungs:  Clear, no wheezes. No accessory muscle use. Heart:  RRR. Abdomen: Soft, NT/ND. Extremities: Warm, no edema or ischemia. Psych:   Mood normal.  Neurologic:  Awake and alert, moves extremities spontaneously. Hospital Course:   See admission H&P for full details of HPI. Patient was admitted to the hospital after presenting to the emergency department from home due to lower extremity weakness and inability to ambulate. She reports having been started on an oral antibiotic for a UTI as an outpatient but continued to feel poorly despite taking the medication as ordered. Blood and urine cultures were obtained, empiric antibiotic therapy was initiated. ID evaluation was requested. Patient has completed course of antibiotic therapy per ID recommendations and is without any signs/symptoms of active or ongoing infection. Blood cultures are negative x3 days to this point and urine culture shows only a few thousand organisms with mixed wilfrido. Patient has a history of significant degenerative arthritis and stenosis of the lumbar spine.   MRI was ordered but has not been done to this point due to the fact that patient does not have  available for her neuro stimulator and this was never brought so that MRI could be obtained. It is felt not felt that this needs to be done acutely as patient does not have any bowel or bladder incontinence and has been able to mobilize some with physical and Occupational Therapy here in the hospital.  It appears that she is making progress with therapy and recommendation is for inpatient rehab placement. Patient is felt to have met maximum benefit of medical hospitalization and is stable for transfer to the inpatient rehab unit for continued management. Given her history of degenerative arthritis of the lumbar spine, would still consider follow-up MRI should any new symptoms develop. Consults:   Infectious diseases    Significant Diagnostic Studies/Procedures:   CT ABDOMEN/PELVIS WITH CONTRAST     HISTORY: Fatigue. UTI, abdominal pain, nausea. COMPARISON: 7/25/2019. TECHNIQUE: 5 mm helical scans were obtained of the abdomen and pelvis after  uneventful administration of intravenous contrast.  Coronal and sagittal reformations. All CT scans are performed using dose optimization techniques as appropriate to  the performed exam including the following: Automated exposure control,  adjustment of mA and/or kV according to patient size, and use of iterative  reconstructive technique. FINDINGS:      No acute consolidations at the imaged lung bases. No effusions. There is homogeneous enhancement of the liver, spleen, and pancreas. Somewhat  atrophied pancreas     Cholecystectomy. No biliary duct dilatation. .       No adrenal nodules or masses. The kidneys enhance symmetrically. A few subcentimeter hypodensities  bilaterally favorable for cysts. Grossly stable from previous. .  No  hydronephrosis. There is no free intraperitoneal air, free fluid, or fluid collections. No abdominal or pelvic lymphadenopathy.        The small and large bowel are normal in caliber. The appendix does not appear  inflamed. Diverticulosis. Small duodenal diverticulum as well. The bladder is under distended and therefore incompletely evaluated. Small  uterus and ovaries. No adnexal masses. Moderately severe calcifications in the abdominal aorta. No aneurysmal  dilatation. Left hip hardware. Spinal stimulator device. Subacute or chronic fractures of  multiple bilateral lower ribs. .  Degenerative changes of the lumbar spine and  pelvis. IMPRESSION     No CT finding for an acute intra-abdominal or pelvic process        CXR:  Portable Chest     CPT CODE: 26584     HISTORY: Fatigue. FINDINGS:      Compared with 7/30/2021. Grossly similar positioning of the spinal catheter. Heart size and mediastinal  contour stable. Old right-sided rib fractures. No pulmonary edema, effusion, or  pneumothorax. No focal consolidation. Arthritic changes of the shoulders. IMPRESSION     No radiographic finding for an acute cardiopulmonary process      Discharge Medications:     Current Discharge Medication List        CONTINUE these medications which have NOT CHANGED    Details   Klor-Con M20 20 mEq tablet TAKE 1 TABLET BY MOUTH EVERY DAY  Qty: 90 Tablet, Refills: 1      pantoprazole (PROTONIX) 40 mg tablet TAKE 1 TABLET BY MOUTH EVERY DAY BEFORE BREAKFAST  Qty: 90 Tablet, Refills: 1      apixaban (Eliquis) 5 mg tablet Take 1 Tablet by mouth two (2) times a day. Qty: 180 Tablet, Refills: 1    Associated Diagnoses: Paroxysmal atrial fibrillation (HCC)      clopidogreL (PLAVIX) 75 mg tab Take 1 Tablet by mouth daily. Qty: 90 Tablet, Refills: 1      metoprolol tartrate (LOPRESSOR) 25 mg tablet Take 1 Tablet by mouth daily.   Qty: 90 Tablet, Refills: 1      levothyroxine (SYNTHROID) 25 mcg tablet TAKE 1 TABLET BY MOUTH EVERY DAY  Qty: 90 Tablet, Refills: 2    Associated Diagnoses: Acquired hypothyroidism      furosemide (LASIX) 40 mg tablet Take 1 Tablet by mouth daily as needed (Edema). Qty: 90 Tablet, Refills: 1    Associated Diagnoses: Chronic diastolic congestive heart failure (HCC)      rosuvastatin (CRESTOR) 40 mg tablet Take 1 Tablet by mouth daily. Qty: 180 Tablet, Refills: 1    Associated Diagnoses: Hypercholesteremia      carboxymethylcellulose sodium (REFRESH TEARS OP) Administer 1 Drop to both eyes daily as needed for Other. Dry eyes      oxyCODONE IR (ROXICODONE) 5 mg immediate release tablet Take 5 mg by mouth three (3) times daily as needed for Pain. Pain      albuterol (PROVENTIL HFA, VENTOLIN HFA, PROAIR HFA) 90 mcg/actuation inhaler INHALE 2 PUFFS BY MOUTH EVERY 4 HOURS AS NEEDED FOR WHEEZE  Qty: 8.5 Each, Refills: 2      peg 400-propylene glycol, PF, (Systane Hydration PF) 0.4-0.3 % dpet ophthalmic solution Administer 2 Drops to both eyes four (4) times daily as needed for Ocular Dryness. Dry eyes      nitroglycerin (NITROSTAT) 0.4 mg SL tablet 1 Tablet by SubLINGual route every five (5) minutes as needed for Chest Pain. Up to 3 doses. Qty: 20 Tablet, Refills: 0      inhalational spacing device (ACE AEROSOL CLOUD ENHANCER) 1 Each by Does Not Apply route as needed. Qty: 1 Device, Refills: 3    Associated Diagnoses: Dyspnea, unspecified type           STOP taking these medications       Lactoperoxi/Gluc Oxid/Pot Thio (BIOTENE DRY MOUTH MM) Comments:   Reason for Stopping:         acetaminophen (TYLENOL) 500 mg tablet Comments:   Reason for Stopping:               Activity: As tolerated. Diet: Cardiac. Disposition: Inpatient rehab. Follow-up: PCP following discharge from ARU. Minutes spent on discharge: >30 minutes spent coordinating this discharge. Anita Ramirez MD  [address] [place] [address]    Disclaimer: Sections of this note are dictated using utilizing voice recognition software. Minor typographical errors may be present.  If questions arise, please do not hesitate to contact me or call our department.

## 2022-11-03 NOTE — PROGRESS NOTES
11/2/22 @ 2030 Nurse spoke with daughter, daughter states she will bring remote to hospital tomorrow morning.

## 2022-11-04 LAB
ANION GAP SERPL CALC-SCNC: 4 MMOL/L (ref 3–18)
BASOPHILS # BLD: 0 K/UL (ref 0–0.1)
BASOPHILS NFR BLD: 0 % (ref 0–2)
BUN SERPL-MCNC: 27 MG/DL (ref 7–18)
BUN/CREAT SERPL: 29 (ref 12–20)
CALCIUM SERPL-MCNC: 8.9 MG/DL (ref 8.5–10.1)
CHLORIDE SERPL-SCNC: 113 MMOL/L (ref 100–111)
CO2 SERPL-SCNC: 23 MMOL/L (ref 21–32)
CREAT SERPL-MCNC: 0.94 MG/DL (ref 0.6–1.3)
DIFFERENTIAL METHOD BLD: ABNORMAL
EOSINOPHIL # BLD: 0.1 K/UL (ref 0–0.4)
EOSINOPHIL NFR BLD: 2 % (ref 0–5)
ERYTHROCYTE [DISTWIDTH] IN BLOOD BY AUTOMATED COUNT: 15.6 % (ref 11.6–14.5)
GLUCOSE SERPL-MCNC: 101 MG/DL (ref 74–99)
HCT VFR BLD AUTO: 30.2 % (ref 35–45)
HGB BLD-MCNC: 9.6 G/DL (ref 12–16)
IMM GRANULOCYTES # BLD AUTO: 0 K/UL (ref 0–0.04)
IMM GRANULOCYTES NFR BLD AUTO: 1 % (ref 0–0.5)
LYMPHOCYTES # BLD: 1.6 K/UL (ref 0.9–3.6)
LYMPHOCYTES NFR BLD: 21 % (ref 21–52)
MAGNESIUM SERPL-MCNC: 1.9 MG/DL (ref 1.6–2.6)
MCH RBC QN AUTO: 31 PG (ref 24–34)
MCHC RBC AUTO-ENTMCNC: 31.8 G/DL (ref 31–37)
MCV RBC AUTO: 97.4 FL (ref 78–100)
MONOCYTES # BLD: 0.8 K/UL (ref 0.05–1.2)
MONOCYTES NFR BLD: 10 % (ref 3–10)
NEUTS SEG # BLD: 5.2 K/UL (ref 1.8–8)
NEUTS SEG NFR BLD: 67 % (ref 40–73)
NRBC # BLD: 0 K/UL (ref 0–0.01)
NRBC BLD-RTO: 0 PER 100 WBC
PLATELET # BLD AUTO: 192 K/UL (ref 135–420)
PMV BLD AUTO: 11 FL (ref 9.2–11.8)
POTASSIUM SERPL-SCNC: 4.7 MMOL/L (ref 3.5–5.5)
RBC # BLD AUTO: 3.1 M/UL (ref 4.2–5.3)
SODIUM SERPL-SCNC: 140 MMOL/L (ref 136–145)
WBC # BLD AUTO: 7.7 K/UL (ref 4.6–13.2)

## 2022-11-04 PROCEDURE — 65310000000 HC RM PRIVATE REHAB

## 2022-11-04 PROCEDURE — 99223 1ST HOSP IP/OBS HIGH 75: CPT | Performed by: EMERGENCY MEDICINE

## 2022-11-04 PROCEDURE — 77030027138 HC INCENT SPIROMETER -A

## 2022-11-04 PROCEDURE — 97162 PT EVAL MOD COMPLEX 30 MIN: CPT

## 2022-11-04 PROCEDURE — 2709999900 HC NON-CHARGEABLE SUPPLY

## 2022-11-04 PROCEDURE — 36415 COLL VENOUS BLD VENIPUNCTURE: CPT

## 2022-11-04 PROCEDURE — 74011250636 HC RX REV CODE- 250/636: Performed by: EMERGENCY MEDICINE

## 2022-11-04 PROCEDURE — 97530 THERAPEUTIC ACTIVITIES: CPT

## 2022-11-04 PROCEDURE — 97535 SELF CARE MNGMENT TRAINING: CPT

## 2022-11-04 PROCEDURE — 97116 GAIT TRAINING THERAPY: CPT

## 2022-11-04 PROCEDURE — 77030018842 HC SOL IRR SOD CL 9% BAXT -A

## 2022-11-04 PROCEDURE — 83735 ASSAY OF MAGNESIUM: CPT

## 2022-11-04 PROCEDURE — 80048 BASIC METABOLIC PNL TOTAL CA: CPT

## 2022-11-04 PROCEDURE — 85025 COMPLETE CBC W/AUTO DIFF WBC: CPT

## 2022-11-04 PROCEDURE — 97166 OT EVAL MOD COMPLEX 45 MIN: CPT

## 2022-11-04 PROCEDURE — 97110 THERAPEUTIC EXERCISES: CPT

## 2022-11-04 PROCEDURE — 74011250637 HC RX REV CODE- 250/637: Performed by: EMERGENCY MEDICINE

## 2022-11-04 RX ORDER — THERA TABS 400 MCG
1 TAB ORAL DAILY
Status: DISCONTINUED | OUTPATIENT
Start: 2022-11-05 | End: 2022-11-15 | Stop reason: HOSPADM

## 2022-11-04 RX ORDER — SODIUM CHLORIDE 9 MG/ML
75 INJECTION, SOLUTION INTRAVENOUS CONTINUOUS
Status: DISPENSED | OUTPATIENT
Start: 2022-11-04 | End: 2022-11-05

## 2022-11-04 RX ADMIN — APIXABAN 5 MG: 5 TABLET, FILM COATED ORAL at 08:26

## 2022-11-04 RX ADMIN — LEVOTHYROXINE SODIUM 25 MCG: 25 TABLET ORAL at 07:26

## 2022-11-04 RX ADMIN — PANTOPRAZOLE SODIUM 40 MG: 40 TABLET, DELAYED RELEASE ORAL at 07:26

## 2022-11-04 RX ADMIN — METOPROLOL TARTRATE 25 MG: 25 TABLET, FILM COATED ORAL at 08:26

## 2022-11-04 RX ADMIN — OXYCODONE HYDROCHLORIDE 5 MG: 5 TABLET ORAL at 02:19

## 2022-11-04 RX ADMIN — APIXABAN 5 MG: 5 TABLET, FILM COATED ORAL at 17:29

## 2022-11-04 RX ADMIN — ROSUVASTATIN CALCIUM 40 MG: 20 TABLET, FILM COATED ORAL at 08:26

## 2022-11-04 RX ADMIN — CLOPIDOGREL BISULFATE 75 MG: 75 TABLET ORAL at 08:25

## 2022-11-04 RX ADMIN — SODIUM CHLORIDE 75 ML/HR: 9 INJECTION, SOLUTION INTRAVENOUS at 12:40

## 2022-11-04 NOTE — ROUTINE PROCESS
[facility]    Bedside and Verbal shift change report given to Elizabeth Flores RN (oncoming nurse) by Corey Chen RN (offgoing nurse). Report included the following information SBAR, Kardex, MAR and Recent Results.     Situation:   Code Status: Full Code   Hospital Day: 1   Problem List:   Hospital Problems  Date Reviewed: 9/22/2022            Codes Class Noted POA    UTI (urinary tract infection) ICD-10-CM: N39.0  ICD-9-CM: 599.0  11/3/2022 Unknown           Background:   Past Medical History:   Past Medical History:   Diagnosis Date    Abnormal chest CT 2/6/2020    Adopted     Arthritis     Balance problems     Chronic cough 2/6/2020    MCBRIDE (dyspnea on exertion) 2/28/2019    GERD (gastroesophageal reflux disease)     Gout     Hemochromatosis     High cholesterol     Hypertension     Hypothyroidism     Left knee pain     Left shoulder pain     Low blood potassium     Lumbar pain     Pain of left sacroiliac joint     Shoulder impingement, right, with rotator cuff strain         Assessment:  Changes in Assessment throughout shift: No change to previous assessment      Last Vitals:     Vitals:    11/03/22 2214 11/04/22 0753   BP: 119/71 115/77   Pulse: 65 62   Resp: 19 18   Temp: 97.7 °F (36.5 °C) 97.9 °F (36.6 °C)   SpO2: 100% 97%       PAIN    Pain Assessment    Pain Intensity 1: 0 (11/04/22 1200) Pain Intensity 1: 2 (12/29/14 1105)    Pain Location 1: Back Pain Location 1: Abdomen      Pain Intervention(s) 1: Medication (see MAR)  Patient Stated Pain Goal: 0 Patient Stated Pain Goal: 0  Intervention effective: yes  Other actions taken for pain:      Skin Assessment  Skin color Skin Color: Appropriate for ethnicity  Condition/Temperature    Integrity    Turgor    Weekly Pressure Ulcer Documentation  Pressure  Injury Documentation: No Pressure Injury Noted-Pressure Ulcer Prevention Initiated  Wound Prevention & Protection Methods  Orientation of wound Orientation of Wound Prevention: Posterior  Location of Prevention Location of Wound Prevention: Buttocks, Heel, Sacrum/Coccyx  Dressing Present Dressing Present : No  Dressing Status    Wound Offloading Wound Offloading (Prevention Methods): Bed, pressure redistribution/air, Bed, pressure reduction mattress, Pillows    INTAKE/OUPUT  Date 11/03/22 0700 - 11/04/22 0659 11/04/22 0700 - 11/05/22 0659   Shift 0700-1859 1900-0659 24 Hour Total 0700-1859 1900-0659 24 Hour Total   INTAKE   P.O.  100 100        P. O.  100 100      Shift Total  100 100      OUTPUT   Urine           Urine Occurrence(s)  1 x 1 x 2 x  2 x   Emesis/NG output           Emesis Occurrence(s)  0 x 0 x 0 x  0 x   Stool           Stool Occurrence(s)  0 x 0 x 1 x  1 x   Shift Total         NET  100 100      Weight (kg)             Recommendations:  Patient needs and requests: toileting and education for medication     Pending tests/procedures: BMP     Functional Level/Equipment: Partial (one person) / Bed (comment); Wheelchair;Walker    Fall Precautions:   Fall risk precautions were reinforced with the patient; she was instructed to call for help prior to getting up. The following fall risk precautions were continued: bed/ chair alarms, door signage, yellow bracelet and socks as well as update of the Chiara Ground tool in the patient's room. Talia Score: 3    HEALS Safety Check    A safety check occurred in the patient's room between off going nurse and oncoming nurse listed above. The safety check included the below items  Area Items   H  High Alert Medications Verify all high alert medication drips (heparin, PCA, etc.)   E  Equipment Suction is set up for ALL patients (with lucinda)  Red plugs utilized for all equipment (IV pumps, etc.)  WOWs wiped down at end of shift.   Room stocked with oxygen, suction, and other unit-specific supplies   A  Alarms Bed alarm is set for fall risk patients  Ensure chair alarm is in place and activated if patient is up in a chair   L  Lines Check IV for any infiltration  Gonzalez bag is empty if patient has a Gonzalez   Tubing and IV bags are labeled   S  Safety   Room is clean, patient is clean, and equipment is clean. Hallways are clear from equipment besides carts. Fall bracelet on for fall risk patients  Ensure room is clear and free of clutter  Suction is set up for ALL patients (with lucinda)  Hallways are clear from equipment besides carts.    Isolation precautions followed, supplies available outside room, sign posted     Ernesto Timmons RN

## 2022-11-04 NOTE — WOUND CARE
Physical Exam  Room 175: discussed with primary nurse Luis E Freire. No skin issues at this time. Will turn over care to nursing staff at this time.   Vaishnavi HENRYN, RN, [facility], [address]

## 2022-11-04 NOTE — PROGRESS NOTES
SHIFT CHANGE NOTE FOR [facility]    Bedside and Verbal shift change report given to Daysi Collins RN (oncoming nurse) by Robert Zamora RN (offgoing nurse). Report included the following information SBAR, Kardex, MAR and Recent Results.     Situation:   Code Status: Full Code   Hospital Day: 1   Problem List:   Hospital Problems  Date Reviewed: 9/22/2022            Codes Class Noted POA    UTI (urinary tract infection) ICD-10-CM: N39.0  ICD-9-CM: 599.0  11/3/2022 Unknown           Background:   Past Medical History:   Past Medical History:   Diagnosis Date    Abnormal chest CT 2/6/2020    Adopted     Arthritis     Balance problems     Chronic cough 2/6/2020    MCBRIDE (dyspnea on exertion) 2/28/2019    GERD (gastroesophageal reflux disease)     Gout     Hemochromatosis     High cholesterol     Hypertension     Hypothyroidism     Left knee pain     Left shoulder pain     Low blood potassium     Lumbar pain     Pain of left sacroiliac joint     Shoulder impingement, right, with rotator cuff strain         Assessment:  Changes in Assessment throughout shift: No change to previous assessment    Patient has a central line: no Reasons if yes: n/a    Insertion date:n/a Last dressing date: n/a  Patient has Coelho Cath: no Reasons if yes: n/a   Insertion date: n/a  Shift coelho care completed: NO    Last Vitals:     Vitals:    11/03/22 2214   BP: 119/71   Pulse: 65   Resp: 19   Temp: 97.7 °F (36.5 °C)   SpO2: 100%       PAIN    Pain Assessment    Pain Intensity 1: 0 (11/04/22 0423) Pain Intensity 1: 2 (12/29/14 1105)    Pain Location 1: Back Pain Location 1: Abdomen      Pain Intervention(s) 1: Medication (see MAR)  Patient Stated Pain Goal: 0 Patient Stated Pain Goal: 0  Intervention effective: yes  Other actions taken for pain:      Skin Assessment  Skin color Skin Color: Appropriate for ethnicity  Condition/Temperature    Integrity    Turgor    Weekly Pressure Ulcer Documentation  Pressure  Injury Documentation: No Pressure Injury Noted-Pressure Ulcer Prevention Initiated  Wound Prevention & Protection Methods  Orientation of wound Orientation of Wound Prevention: Posterior  Location of Prevention Location of Wound Prevention: Buttocks, Sacrum/Coccyx  Dressing Present Dressing Present : No  Dressing Status    Wound Offloading Wound Offloading (Prevention Methods): Bed, pressure reduction mattress    INTAKE/OUPUT  Date 11/03/22 0700 - 11/04/22 0659 11/04/22 0700 - 11/05/22 0659   Shift 0700-1859 1900-0659 24 Hour Total 0700-1859 1900-0659 24 Hour Total   INTAKE   P.O.  100 100        P. O.  100 100      Shift Total  100 100      OUTPUT   Urine           Urine Occurrence(s)  1 x 1 x 1 x  1 x   Emesis/NG output           Emesis Occurrence(s)  0 x 0 x 0 x  0 x   Stool           Stool Occurrence(s)  0 x 0 x 0 x  0 x   Shift Total         NET  100 100      Weight (kg)             Recommendations:  Patient needs and requests: pain management      Pending tests/procedures:labs    Functional Level/Equipment: Partial (one person) / Bed (comment)    Fall Precautions:   Fall risk precautions were reinforced with the patient; she was instructed to call for help prior to getting up. The following fall risk precautions were continued: bed/ chair alarms, door signage, yellow bracelet and socks as well as update of the Viji Nap tool in the patient's room. Talia Score: 2    HEALS Safety Check    A safety check occurred in the patient's room between off going nurse and oncoming nurse listed above. The safety check included the below items  Area Items   H  High Alert Medications Verify all high alert medication drips (heparin, PCA, etc.)   E  Equipment Suction is set up for ALL patients (with lucinda)  Red plugs utilized for all equipment (IV pumps, etc.)  WOWs wiped down at end of shift.   Room stocked with oxygen, suction, and other unit-specific supplies   A  Alarms Bed alarm is set for fall risk patients  Ensure chair alarm is in place and activated if patient is up in a chair   L  Lines Check IV for any infiltration  Gonzalez bag is empty if patient has a Gonzalez   Tubing and IV bags are labeled   S  Safety   Room is clean, patient is clean, and equipment is clean. Hallways are clear from equipment besides carts. Fall bracelet on for fall risk patients  Ensure room is clear and free of clutter  Suction is set up for ALL patients (with lucinda)  Hallways are clear from equipment besides carts.    Isolation precautions followed, supplies available outside room, sign posted     Waqar Cook RN

## 2022-11-04 NOTE — PROGRESS NOTES
Problem: Mobility Impaired (Adult and Pediatric)  Goal: *Therapy Goal (Edit Goal, Insert Text)  Description: Physical Therapy Short Term Goals  Initiated 11/4/2022 and to be accomplished within 7 day(s) on 11/11/2022  1. Patient will move from supine to sit and sit to supine  in bed with minimal assistance/contact guard assist.    2.  Patient will transfer from bed to chair and chair to bed with minimal assistance/contact guard assist using the least restrictive device. 3.  Patient will perform sit to stand with minimal assistance/contact guard assist.  4.  Patient will ambulate with minimal assistance/contact guard assist for 75 feet with the least restrictive device. 5.  Patient will perform w/c mobility 150 ft bouts SBA level over even surfaces for improved functional endurance. Physical Therapy Long Term Goals  Initiated 11/4/2022 and to be accomplished within 14 day(s) on 11/18/2022  1. Patient will transfer from bed to chair and chair to bed with supervision using the least restrictive device. 2.  Patient will perform sit to stand with supervision. 3.  Patient will ambulate with supervision/set-up for 150 feet with the least restrictive device. 4.  Patient will perform w/c mobility over even surfaces for 150 ft bouts at mod I level. Outcome: Progressing Towards Goal   PHYSICAL THERAPY EVALUATION    Patient: Rosario Schrader (29 y.o. female)  Date: 11/4/2022  Diagnosis: UTI (urinary tract infection) [N39.0]   Precautions: Fall, Contact  Chart, physical therapy assessment, plan of care and goals were reviewed. Time in:0730  Time BTE:2635  Time In: 1130  Time Out: 1150  Time In: 1200  Time Out: 1971 (concurrent)  Entered Differentiated Treatment minutes: Yes    Patient seen for: Balance activities;Gait training;Patient education;Transfer training; Wheelchair mobility    Pain:  Pt indicating increased pain, particularly left LE, at knees with weightbearing/walking tasks. Relieved with sitting. Patient identified with name and : yes    SUBJECTIVE:     Patient stated I am ready.  Agreeable to PT evaluation and treatment    Patient's Goal for Physical Therapy: To be more independent to be able to walk with rollator at home as she did previously. OBJECTIVE DATA SUMMARY:     Past Medical History:   Diagnosis Date    Abnormal chest CT 2020    Adopted     Arthritis     Balance problems     Chronic cough 2020    MCBRIDE (dyspnea on exertion) 2019    GERD (gastroesophageal reflux disease)     Gout     Hemochromatosis     High cholesterol     Hypertension     Hypothyroidism     Left knee pain     Left shoulder pain     Low blood potassium     Lumbar pain     Pain of left sacroiliac joint     Shoulder impingement, right, with rotator cuff strain      Past Surgical History:   Procedure Laterality Date    HX CHOLECYSTECTOMY      HX KNEE REPLACEMENT Bilateral     R knee/ Lknee and femur    HX ORTHOPAEDIC      R trigger finger     HX OTHER SURGICAL      Right little finger    HX OTHER SURGICAL      Right wrist, replaced unlnar).     HX OTHER SURGICAL      Both knees    HX OTHER SURGICAL      Left femur    HX OTHER SURGICAL      Trigger finger surgery    HX OTHER SURGICAL      Left knee replacment revision     HX ROTATOR CUFF REPAIR Right     HX TONSILLECTOMY         Problem List:    Decreased strength B LE  [x]     Decreased strength trunk/core  [x]     Decreased AROM   [x]     Decreased PROM  [x]    Decreased endurance  [x]     Decreased balance sitting  []     Decreased balance standing  [x]     Pain   [x]     Slow ambulation velocity  [x]    Decreased coordination  []    Decreased safety awareness  [x]      Functional Limitations:   Decreased independence with bed mobility  [x]     Decreased independence with functional transfers  [x]     Decreased independence with ambulation  [x]     Decreased independence with stair negotiation  [x]       Previous Functional Level: Reports uses rollator to walk in house, then into garage using ramp, loads up her rollator and then uses electric scooter to mobilize in community. Drives, lives with a friend who helps with things like carrying in groceries. Patient reports she was needing assistance to lift legs into vehicle ([email or web address]) prior to admission.      Home Environment: Home Environment: Private residence  # Steps to Enter: 3  Wheelchair Ramp: Yes  One/Two Story Residence: One story  Living Alone: No  Support Systems: Friend/Neighbor  Patient Expects to be Discharged to[de-identified] Home  Current DME Used/Available at Home: Walker  Tub or Shower Type: Tub/Shower combination    Barriers to Learning/Limitations: yes;  sensory deficits-vision/hearing/speech and physical  Compensate with: visual, verbal, tactile, kinesthetic cues/model         Outcome Measures: See functional scores     MMT Initial Assessment:    Right Lower Extremity Left Lower Extremity   Hip Flexion  4  4   Knee Extension  4  4+   Knee Flexion  4  4+   Ankle Dorsiflexion  4+ 4+   0/5 No palpable muscle contraction  1/5 Palpable muscle contraction, no joint movement  2-/5 Less than full range of motion in gravity eliminated position  2/5 Able to complete full range of motion in gravity eliminated position  2+/5 Able to initiate movement against gravity  3-/5 More than half but not full range of motion against gravity  3/5 Able to complete full range of motion against gravity  3+/5 Completes full range of motion against gravity with minimal resistance  4-/5 Completes full range of motion against gravity with minimal-moderate resistance  4/5 Completes full range of motion against gravity with moderate resistance  4+/5 Completes full range of motion against gravity with moderate-maximum resistance  5/5 Completes full range of motion against gravity with maximum resistance        GROSS ASSESSMENT Initial Assessment 11/4/2022   AROM Generally decreased, functional   Strength Generally decreased, functional Coordination Within functional limits   Tone Normal   Sensation Impaired (reports numbness around knees, states chronic issue since her knee replacement surgeries)   PROM Generally decreased, functional       POSTURE Initial Assessment 11/4/2022   Posture (WDL) Exceptions to [facility]   Posture Assessment Forward head;Rounded shoulders       BALANCE Initial Assessment 11/4/2022    Sitting - Static: Good (unsupported)  Sitting - Dynamic: Good (unsupported)  Standing - Static: Fair  Standing - Dynamic : Impaired    Ability to  small object from floor: NT due to safety concern       BED/CHAIR/WHEELCHAIR TRANSFERS Initial Assessment 11/4/2022   Rolling Right 2 (Maximal assistance) (due to pain)   Rolling Left 2 (Maximal assistance) (due to pain)   Supine to Sit 3 (Moderate assistance)   Sit to Stand Moderate assistance   Sit to Supine 3 (Moderate assistance)   Transfer Assist Score 3 (Moderate assistance )   Transfer Type Other: Stand step with rollator as per PLOF   Comments  Lifting assistance for getting into standing, steadying support at trunk for turning. Cues for safe hand placement stand to sit. Patient does not usually use bed at home (sleeps in recliner) so head of bed elevated and patient allowed to use bedrails to assist with bed mobility. Car Transfer Moderate assistance (needing assistance to lift legs into car simulator, needing lifting assistance to get into standing)   Car Type car transfer simulator       WHEELCHAIR MOBILITY/MANAGEMENT Initial Assessment 11/4/2022   Able to Propel 156 feet   Assist Level  (min/mod A)   Curbs/ramps assistance required 0 (Not tested)   Wheelchair set up assistance required 3 (Moderate assistance)   Wheelchair management Manages left brake, Manages right brake (needing assistance)   Comments Min/mod A for using bilat LE and UE to propel, needing rest after 156 ft due to fatigue and pain.         GAIT Initial Assessment 11/4/2022   Gait Description (WDL) Exceptions to [facility]   Gait Abnormalities Decreased step clearance;Trendelenburg;Trunk sway increased; Shuffling gait       WALKING INDEPENDENCE Initial Assessment 11/4/2022   Assistive device Gait belt, Walker, rollator   Ambulation assistance - level surface 4 (Minimal assistance)   Distance 20 Feet (ft) (limited by bilat knee pain report, particularly left knee pain)   Comments  Performing x 2 bouts. Patient not putting too much weight on rollator, able to maintain upright trunk position with use. Ambulation assistance - unlevel surface  (NT due to fatigue)       STEPS/STAIRS Initial Assessment 11/4/2022   Steps/Stairs ambulated 0   Rail Use  (NA)   Assistance Level NA (Not applicable)   Comments  N/A   Curbs/Ramps  (NT as patient does not usually perform)       Therapeutic Exercises:   HEP: seated LAQ and hip flex marches 3 x 10 reps    ASSESSMENT :  Based on the objective data described above, the patient presents with decreased strength, endurance, impaired balance, and impaired gait leading to difficulty performing safe and independent functional mobility. Patient will benefit from skilled intervention to address the above impairments. Patient's rehabilitation potential is considered to be Good  Factors which may influence rehabilitation potential include:   []         None noted  []         Mental ability/status  [x]         Medical condition  [x]         Home/family situation and support systems  [x]         Safety awareness  [x]         Pain tolerance/management  []         Other:      PLAN :  See STG and LTG above. Order received from MD for physical therapy services and chart reviewed. Pt to be seen 5 times per week for 3 hours of total therapy per day for 2-3 weeks. Thank you for the referral.    Pt would benefit from skilled physical therapy in order to improve independent functional mobility within the home.  Interventions may include range of motion (AROM, PROM B LE/trunk), motor function (B LE/trunk strengthening/coordination), activity tolerance (vitals, oxygen saturation levels), bed mobility training, balance activities, gait training (progressive ambulation program), wheelchair management and functional transfer training. Patient would also benefit from concurrent and group therapy sessions to promote increased participation in skilled therapy interventions and to provide opportunities for increased social interaction. Discharge Recommendations: Home Health  Further Equipment Recommendations for Discharge: N/A  [id number] Completed: yes       Activity Tolerance:   Fair due to fatigue, dyspnea with exertion and pain in bilat knees    After treatment:   [] Patient left in no apparent distress in bed  [x] Patient left in no apparent distress sitting up in chair  [] Patient left in no apparent distress in w/c mobilizing under own power  [] Patient left in no apparent distress dining area  [] Patient left in no apparent distress mobilizing under own power  [x] Call bell left within reach  [x] Nursing notified  [] Caregiver present  [] Bed alarm activated   [x] Chair alarm activated. COMMUNICATION/EDUCATION:   [x]         Fall prevention education was provided and the patient/caregiver indicated understanding. [x]         Patient/family have participated as able in goal setting and plan of care. [x]         Patient/family agree to work toward stated goals and plan of care. []         Patient understands intent and goals of therapy, but is neutral about his/her participation. []         Patient is unable to participate in goal setting and plan of care.     Thank you for this referral.  Dee Vaughn, PT  11/4/2022

## 2022-11-04 NOTE — PROGRESS NOTES
Problem: Risk for Spread of Infection  Goal: Prevent transmission of infectious organism to others  Description: Prevent the transmission of infectious organisms to other patients, staff members, and visitors. Outcome: Progressing Towards Goal     Problem: Patient Education:  Go to Education Activity  Goal: Patient/Family Education  Outcome: Progressing Towards Goal     Problem: Falls - Risk of  Goal: *Absence of Falls  Description: Document Ronda Perdomo Fall Risk and appropriate interventions in the flowsheet. Outcome: Progressing Towards Goal  Note: Fall Risk Interventions:  Mobility Interventions: Bed/chair exit alarm, Assess mobility with egress test, OT consult for ADLs, Utilize walker, cane, or other assistive device, Utilize gait belt for transfers/ambulation, Patient to call before getting OOB         Medication Interventions: Assess postural VS orthostatic hypotension, Bed/chair exit alarm, Evaluate medications/consider consulting pharmacy, Utilize gait belt for transfers/ambulation, Teach patient to arise slowly    Elimination Interventions: Bed/chair exit alarm, Call light in reach, Elevated toilet seat, Patient to call for help with toileting needs, Toilet paper/wipes in reach, Toileting schedule/hourly rounds    History of Falls Interventions: Bed/chair exit alarm, Consult care management for discharge planning, Door open when patient unattended, Utilize gait belt for transfer/ambulation, Assess for delayed presentation/identification of injury for 48 hrs (comment for end date), Vital signs minimum Q4HRs X 24 hrs (comment for end date)         Problem: Patient Education: Go to Patient Education Activity  Goal: Patient/Family Education  Outcome: Progressing Towards Goal     Problem: Pressure Injury - Risk of  Goal: *Prevention of pressure injury  Description: Document Stef Scale and appropriate interventions in the flowsheet.   Outcome: Progressing Towards Goal  Note: Pressure Injury Interventions:  Sensory Interventions: Assess changes in LOC, Assess need for specialty bed, Avoid rigorous massage over bony prominences, Pressure redistribution bed/mattress (bed type), Turn and reposition approx.  every two hours (pillows and wedges if needed)    Moisture Interventions: Absorbent underpads, Apply protective barrier, creams and emollients, Assess need for specialty bed, Check for incontinence Q2 hours and as needed, Limit adult briefs, Moisture barrier, Offer toileting Q_hr    Activity Interventions: Assess need for specialty bed, Chair cushion, Increase time out of bed, Pressure redistribution bed/mattress(bed type), Trapeze to reposition    Mobility Interventions: PT/OT evaluation, Trapeze to reposition    Nutrition Interventions: Document food/fluid/supplement intake, Offer support with meals,snacks and hydration    Friction and Shear Interventions: HOB 30 degrees or less, Transferring/repositioning devices, Foam dressings/transparent film/skin sealants, Feet elevated on foot rest                Problem: Patient Education: Go to Patient Education Activity  Goal: Patient/Family Education  Outcome: Progressing Towards Goal

## 2022-11-04 NOTE — PROGRESS NOTES
D/c instructions given, signature obtained. Pt transferred to ARU in no distress with table vital signs.  Belongings with pt

## 2022-11-04 NOTE — CONSULTS
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    Obesity, morbid ([facility] E66.01    MCBRIDE (dyspnea on exertion) R06.09    CAP (community acquired pneumonia) J18.9    Sepsis ([facility] A41.9    Hypotension I95.9    Atrial fibrillation with RVR ([facility]) I48.91    Abnormal chest CT R93.89    Chronic cough R05.3    Pulmonary embolism without acute cor pulmonale ([facility]) I26.99    Chronic bronchitis with productive mucopurulent cough ([facility]) J41.1    STEMI (ST elevation myocardial infarction) ([facility] I21.3    History of pulmonary embolism Z86.711    History of DVT (deep vein thrombosis) Z86.718    Chronic diastolic congestive heart failure ([facility]) I50.32    Coronary artery disease of native artery of native heart with stable angina pectoris ([facility]) I25.118    Bilateral leg edema R60.0    Weakness R53.1    Elevated troponin R77.8    Acute UTI N39.0    UTI (urinary tract infection) N39.0       Patient denies history of mental health services and is not currently requiring psychotropic medication for stability.   She stopped tobacco use many decades ago, and does not have history of other substance use nor dependence      OBJECTIVE  GENERAL OBSERVATIONS  Willingness to participate in program: [x] good   [] fair [] indifferent [] poor    General Appearance: Patient observed casually and appropriately dressed and groomed, lying upright in bed, not in any distress and immediately responsive to me on contact    Sensory Impairments: Patient has satisfactory auditory reception and comprehension and responds to all inquiry with intelligibility, as well as initiating much dialogue; she is observed able to voluntary move extremities but emphasizes limitations with bilateral rotator cuff problems    Shinto Affiliation: [facility]    Admission Assessment  Discharge Status   [x] alert  [] lethargic  [] difficult to arouse  [] fluctuating  [] other: Level of Consciousness [x] alert  [] lethargic  [] difficult to arouse  [] fluctuating  [] other:   [x] person  [x] place  [x] time  [x] situation Oriented [x] person  [x] place  [x] time  [x] situation   [x] within normal limits  [] impaired       [] mild        [] moderate        [] severe Attention [] within normal limits  [] impaired       [] mild        [] moderate        [] severe   [x] within normal limits  [x] impaired       [x] mild        [] moderate        [] severe Memory [x] within normal limits  [] impaired       [] mild        [] moderate        [] severe   [x] appropriate to situation  [] depressed  [] anxious  [] angry   [] fearful  [] emotionally labile  [x] other: Patient is entirely calm and composed and not in any distress Mood [x] appropriate to situation  [] depressed  [] anxious  [] angry   [] fearful  [] emotionally labile  [] other:   [x] appropriate  [] flat  [] inappropriate to content of speech Affect [x] appropriate  [] flat  [] inappropriate to content of speech   [x] appropriate  [] aggressive/agitated  [] withdrawn  [] inappropriate  [x] other: She is neither restless nor agitated, appearing calm in the bed Behavior [x] appropriate  [] aggressive/agitated  [] withdrawn  [] inappropriate  [x] other: Patient has reportedly displayed anxiety/tremors during therapy sessions but never observed during my contacts with her.   In fact, she is denying acute feelings of emotional distress   [] good  [x] limited  [] denial  [] none Insight Into Illness [x] good  [] limited  [] denial  [] none   [x] intact  [] impaired       [] mild        [] moderate        [] severe       Describe: Patient appears to be at her baseline of ability and will benefit from cues and prompts for recall of novel information Cognition [x] intact  [] impaired       [] mild        [] moderate        [] severe       Describe:    [x] coping  [] demonstrates poor adjustment  [] undetermined       As evidenced by: She is motivated to improve and hopeful for herself in recovery Patient Adjustment to Disability [x] coping  [] demonstrates poor adjustment  [] undetermined       As evidenced by: She is eager for discharge and continued recovery   [] coping  [] demonstrates poor adjustment  [x] undetermined      As evidenced by: Not available on evaluation but she feels well supported by immediate family Family Adjustment to Disability [x] coping  [] demonstrates poor adjustment  [] undetermined      As evidenced by:      ASSESSMENT  Clinical Impression: Patient is a 76year old, single, having no children born to her, retired ([address] [employer]/),  female. She resides with friend in [place] that is one level with ramp access. Patient emphasizes having excellent family support from immediate family she shares with her roommate of many years, and expects to return to home with support available. Patient describes that she had been entirely independent and still operating her automobile up until the morning of hospital admit, when she could not stand. She has some but limited insight about all parameters of medical circumstance and will benefit from further education to best understand her recovery, identify realistic goals and possible reduce anxiety that otherwise might emerge. Emotionally, patient seems entirely calm and composed and not in any distress. She displays an excellent sense of humor, apparently her \"hallmark,\" and the fact that she is able to banter back and forth is indicative that she is coping quite well.   She denies history of mental health services and does not require psychotropic medication for mood nor behavior stability. Nonetheless, patient will be monitored for any possible, emergent, emotional and/or behavioral difficulty that might emerge and she will be encouraged to persevere in her recovery. Cognitively, patient seems to be at her baseline of ability and should have no difficulty in understand direction and following through. She is entirely alert and oriented. Yet, she might sometimes benefit from cues and prompts with recall of novel information presented to her in therapy. Patient Strengths: Alert, oriented, pleasant, cooperative and presenting with a good sense of humor; she reportedly has been independent before hospitalization    Patient Preferences: Patient expects to return to home with family support available to her    Rehab Potential: Good    Educational Needs: Under each heading list the specific items in which the patient or family will need education/training. Example: hip precautions, use of walker, ADL equipment, neglect, judgment, adjustment, etc.     Special considerations or accommodations for teaching:  [x] Yes     [] No     [] NA  If Yes, explain: Possible recall of novel/complex information Discharge Status    Completed Demonstrated/ Verbalized Understanding    Yes No Yes No   Info regarding disability:  [] [] [] []   Adjustment:  [] [] [] []   Cognition:  [] [] [] []   Other: [] [] [] []   Other: [] [] [] []   If education not completed, explain: [] [] [] []     PLAN  Problem: Limited insight about all recovery  Long Term Goal: Increase insight  Intervention: Patient education  At Discharge - LTG Achieved: [x] Yes [] No If not achieved, explain:    Problem:  Forced dependency  Long Term Goal: Accept forced dependency in recovery  Intervention: Patient education and support   At Discharge - LTG Achieved: [x] Yes [] No If not achieved, explain:    Problem: Safety  Long Term Goal: Maintain safety awareness  Intervention: Patient education and behavioral reinforcement  At Discharge - LTG Achieved: [x] Yes [] No If not achieved, explain:    Problem:   Long Term Goal:   Intervention:   At Discharge - LTG Achieved: [] Yes [] No If not achieved, explain:    Problem:   Long Term Goal:   Intervention:   At Discharge - LTG Achieved: [] Yes [] No If not achieved, explain:    Chago Addison, PHD  11/4/2022 1:48 PM    DISCHARGE STATUS    Clinical Impressions: Patient has participated satisfactorily in ARU therapy program so that she is able to discharge to home with support available to her, as needed. She remains motivated to continue to improve and hopeful for herself in regain more functional capabilities. She is not reporting feeling any acute distress on discharge to home and is appreciative of gains made.     Follow-up Services Recommended Purpose                 Chago Addison, PHD  Discharge Date/Time:

## 2022-11-04 NOTE — PROGRESS NOTES
[x] Psychology  [] Social Work [] Recreational Therapy    INTERVENTION  UNITS/TIME OF SERVICE   Assessment  November 4, 2022   Supportive Counseling    Orientation    Discharge Planning    Resource Linkage              Progress/Current Status    Patient seen for Psychological Evaluation as requested on admission to ARU by MD.  Patient found lying upright in bed and immediately responsive to my contact, presenting as calm and cooperative and obviously with a good sense of humor. She is able to describe recent medical circumstances precipitating initial hospitalization and now acute rehabilitation. She insists that she is motivated to improve and hopeful for herself in recovery. She has been entirely independent and operating an automobile and is desirous for return to [facility]. Patient is not reporting any history of mental health services and is not currently requiring psychotropic medication for mood nor behavior stability. She will be monitored for any possible, emergent, emotional and/or behavioral difficulties and be encouraged to persevere in her recovery.     Bridgette Lockwood, PHD 11/4/2022 1:45 PM

## 2022-11-04 NOTE — ROUTINE PROCESS
Wound Prevention Checklist    Patient: Duglas Rivas [de-identified]76 y.o. female)  Date: 11/4/2022  Diagnosis: UTI (urinary tract infection) [N39.0] <principal problem not specified>    Precautions: Fall, Contact       [x]  Heel prevention boots placed on patient    [x]  Patient turned q2h during shift    []  Lift team ordered    [x]  Patient on [facility] bed/Specialty bed      Each Wound is documented during shift (Stage, Color, drainage, odor, measurements, and dressings)  [x]  [x]  Dual skin check done with RANJAN Ordonez RN

## 2022-11-04 NOTE — ROUTINE PROCESS
Patient insisting to put a pure wick on her. Explain to her that rehab limiting the use of pure wick. Patient verbalize understanding ,  but she's having hard time to turn due to her chronic back pain.

## 2022-11-04 NOTE — PROGRESS NOTES
Problem: Risk for Spread of Infection  Goal: Prevent transmission of infectious organism to others  Description: Prevent the transmission of infectious organisms to other patients, staff members, and visitors. Outcome: Progressing Towards Goal     Problem: Patient Education:  Go to Education Activity  Goal: Patient/Family Education  Outcome: Progressing Towards Goal     Problem: Falls - Risk of  Goal: *Absence of Falls  Description: Document Jd Holbrook Fall Risk and appropriate interventions in the flowsheet.   Outcome: Progressing Towards Goal  Note: Fall Risk Interventions:  Mobility Interventions: PT Consult for mobility concerns         Medication Interventions: Patient to call before getting OOB, Teach patient to arise slowly    Elimination Interventions: Call light in reach    History of Falls Interventions: Door open when patient unattended

## 2022-11-04 NOTE — CONSULTS
Comprehensive Nutrition Assessment    Type and Reason for Visit: Initial, Consult    Nutrition Recommendations/Plan:   Add daily MVI  Add supplement: Magic cup TID  Continue all other nutrition interventions. Encourage/ monitor po intake of meals and supplements. Malnutrition Assessment:  Malnutrition Status:  Mild malnutrition (11/04/22 1708)    Context:  Chronic illness     Findings of the 6 clinical characteristics of malnutrition:   Energy Intake:  75% or less est energy requirements for 1 month or longer  Weight Loss:  No significant weight loss     Body Fat Loss:  Unable to assess,     Muscle Mass Loss:  Unable to assess,    Fluid Accumulation:  No significant fluid accumulation,     Strength:  Not performed     Nutrition History and Allergies:   Past medical hx: arthritis, GERD, gout, high cholesterol, HTN, hypothyroidism, cholecystectomy. Pt reported having poor po intake since July 2022. Pt reported UBW was 256 lb in July 2022; experiencing unplanned wt loss since then. Per chart hx, noted pt weighing 179-184 lb during May to September 2022;  no significant recent changes in weight PTA. Pt was weighing >200 lb in 2019 to 2021 per chart hx. Currently weighing 181.4 lb. Food allergies:  tomato (causes heart burn)     Nutrition Assessment:    Pt reported having poor po intake and unplanned wt loss PTA. Agreeable to nutrition supplement; declined ensure drink, but agreeable to try magic cup. Had fair meal intake today. Tolerating diet. Agreeable to daily MVI. Nutrition Related Findings:    BM 11/4,  11/3.   no edema. IVF, NS at 75 mL/hr. Pt reported having low K; and now drinking a sugar free drink that contains 80 mg K in entire bottle (~20 fluid ounce). Noted K levels are WNL currently.  Wound Type: Surgical incision    Current Nutrition Intake & Therapies:  Average Meal Intake: 26-50%  Average Supplement Intake: None ordered  ADULT DIET Regular; Low Fat/Low Chol/High Fiber/2 gm Na    Anthropometric Measures:  Height: 5' 1\" (154.9 cm)  Ideal Body Weight (IBW): 105 lbs (48 kg)  Admission Body Weight: 181 lb 7 oz  Current Body Wt:  82.3 kg (181 lb 7 oz), 172.8 % IBW. Bed scale  Current BMI (kg/m2): 34.3  Usual Body Weight: 83.5 kg (184 lb) (256 lb per pt report;  noted >200 lb from 2019 to 2021 per chart hx)  % Weight Change (Calculated): -1.4  Weight Adjustment: No adjustment  BMI Category: Obese class 1 (BMI 30.0-34. 9)    Estimated Daily Nutrient Needs:  Energy Requirements Based On: Formula  Weight Used for Energy Requirements: Admission  Energy (kcal/day): 6073-3196  Weight Used for Protein Requirements: Admission  Protein (g/day): 66-82  Method Used for Fluid Requirements: 1 ml/kcal  Fluid (ml/day): 0865-7850    Nutrition Diagnosis:    In context of chronic illness (mild malnutrition) related to early satiety as evidenced by poor intake prior to admission    Nutrition Interventions:   Food and/or Nutrient Delivery: Continue current diet, Vitamin supplement, Start oral nutrition supplement  Nutrition Education/Counseling: Education not indicated  Coordination of Nutrition Care: Continue to monitor while inpatient  Plan of Care discussed with: pt    Goals:     Goals: Meet at least 75% of estimated needs, by next RD assessment       Nutrition Monitoring and Evaluation:   Behavioral-Environmental Outcomes: None identified  Food/Nutrient Intake Outcomes: Diet advancement/tolerance, Food and nutrient intake, Supplement intake, Vitamin/mineral intake  Physical Signs/Symptoms Outcomes: Biochemical data, GI status, Meal time behavior    Discharge Planning:    Continue oral nutrition supplement, Continue current diet    Suma Kohli, [address]  Contact: [phone]

## 2022-11-04 NOTE — PROGRESS NOTES
Problem: Self Care Deficits Care Plan (Adult)  Goal: *Acute Goals and Plan of Care (Insert Text)  Description: Occupational Therapy Goals   Long Term Goals  Initiated (2022) and to be accomplished within 3 week(s); by (2022)   1. Pt will perform self-feeding with Mod I.   2. Pt will perform grooming with Mod I.   3. Pt will perform UB bathing with set-up/ supv using AE and/ or compensatory strategies as needed. 4. Pt will perform LB bathing with SBA/ CGA using AE and/ or compensatory strategies as needed. 5. Pt will perform tub/shower transfer with SBA using LRAD. 6. Pt will perform UB dressing with set-up. 7. Pt will perform LB dressing with SBA using AE and/ or compensatory strategies as needed. 8. Pt will perform toileting task with supv.  9. Pt will perform toilet transfer with supv using LRAD. Short Term Goals   Initiated (2022) and to be accomplished within 7 day(s), (by 2022)   1. Pt will perform self-feeding with set-up. 2. Pt will perform grooming with supv. 3. Pt will perform UB bathing with supv using AE as needed. 4. Pt will perform LB bathing with Min A using AE and/ or compensatory strategies as needed. 5. Pt will perform tub/shower transfer with Min A using LRAD. 6. Pt will perform UB dressing with SBA. 7. Pt will perform LB dressing with Min A using AE and/ or compensatory strategies as needed. 8. Pt will perform toileting task with Min A  9. Pt will perform toilet transfer with Min A using LRAD. Outcome: Progressing Towards Goal  Goal: Interventions  Outcome: Progressing Towards Goal   OCCUPATIONAL THERAPY EVALUATION    Patient: Hetal Reeder 76 y.o.   Date: 2022  Primary Diagnosis: UTI (urinary tract infection) [N39.0]    Patient identified with name and : yes    Past Medical History:   Past Medical History:   Diagnosis Date    Abnormal chest CT 2020    Adopted     Arthritis     Balance problems     Chronic cough 2020    MCBRIDE (dyspnea on exertion) 2/28/2019    GERD (gastroesophageal reflux disease)     Gout     Hemochromatosis     High cholesterol     Hypertension     Hypothyroidism     Left knee pain     Left shoulder pain     Low blood potassium     Lumbar pain     Pain of left sacroiliac joint     Shoulder impingement, right, with rotator cuff strain      Precautions: Fall, Safety, Aspiration, Contact (ESBL urine)    Time In: 0930  Time Out: 1100    Pain:  Pt reports 0/10 pain or discomfort prior to treatment. Pt reports 0/10 pain or discomfort post treatment. No complaints of pain at onset, during, or at conclusion of skilled occupational therapy session. SUBJECTIVE:   Patient stated I was driving myself to the store to buy my own groceries and would use a power scooter in the store.     OBJECTIVE DATA SUMMARY:   VS /77, HR 62 bpm, RR 18 bpm, SpO2 97% room air    [x]  Right hand dominant   []  Left hand dominant    Therapy Diagnosis:   Difficulty with ADLs  [x]     Difficulty with functional transfers  [x]     Difficulty with ambulation  [x]     Difficulty with IADLs  [x]       Problem List:    Decreased strength B UE  [x]     Decreased strength trunk/core  [x]     Decreased AROM   [x]     Decreased endurance  [x]     Decreased balance sitting  [x]     Decreased balance standing  [x]     Pain   [x]  Per chart review; history of chronic back pain (has spinal stimulator for pain control)   Decreased PROM  []       Functional Limitations:   Decreased independence with ADL  [x]     Decreased independence with functional transfers  [x]     Decreased independence with ambulation  [x]     Decreased independence with IADL  [x]       Previous Functional Level: Pre-Morbid Level of Function: Per pt report, patient performed UB ADL's (Mod I); however, received (Min A) for lower body bathing. Pt taking showers at home using tub transfer bench. Pt reporting that her roommate does most of the cooking.  Pt reporting managing her medications and laundry tasks independently. Pt drives herself to grocery store and to appointments. Wheelchair ramp at entrance. DME used at home: raised toilet seat, TTB, walker, rollator, motorized scooter, lift recliner.     Home Environment: Home Situation  Home Environment: Private residence  # Steps to Enter: 3  Wheelchair Ramp: Yes  One/Two Story Residence: One story  Living Alone: No  Support Systems: Friend/Neighbor  Patient Expects to be Discharged to[de-identified] Home  Current DME Used/Available at Home: Walker  Tub or Shower Type: Tub/Shower combination    Barriers to Learning/Limitations: yes;  emotional, sensory deficits-vision/hearing/speech, and physical  Compensate with: visual, verbal, tactile, kinesthetic cues/model    Outcome Measures:      MMT Initial Assessment   Right Upper Extremity  Left Upper Extremity    UE AROM RUE AROM decreased; right shoulder AROM ~90 degrees (history of arthritis and rotator cuff issues) LUE AROM decreased; left shoulder AROM ~90 degrees (history of arthritis and rotator cuff issues)   Shoulder flexion 2+/5 2+/5   Shoulder extension 2+/5 2+/5   Elbow Flexion 3+/5 3+/5   Elbow Extension 3+/5 3+/5    3+/5 3+/5   0/5 No palpable muscle contraction  1/5 Palpable muscle contraction, no joint movement  2-/5 Less than full range of motion in gravity eliminated position  2/5 Able to complete full range of motion in gravity eliminated position  2+/5 Able to initiate movement against gravity  3-/5 More than half but not full range of motion against gravity  3/5 Able to complete full range of motion against gravity  3+/5 Completes full range of motion against gravity with minimal resistance  4-/5 Completes full range of motion against gravity with minimal resistance  4/5 Completes full range of motion against gravity with moderate resistance  5/5 Completes full range of motion against gravity with maximum resistance    Sensation: appears intact RUE/ LUE (light touch)  Coordination: RUE/ LUE FMC (generally decreased) functional    FIM SCORES Initial Assessment   Bladder - level of assist     Bladder - accident frequency score     Bowel - level of assist     Bowel - accident frequency score     Please see [facility] Interdisciplinary Eval: Coordination/Balance Section for details regarding FIM score description. COGNITION/PERCEPTION Initial Assessment   Reading Status     Writing Status     Arousal/Alertness     Orientation Level Oriented X4   Visual Fields     Praxis     Body Scheme       COMPREHENSION MODE Initial Assessment   Primary Mode of Comprehension Auditory   Hearing Aide Bilateral, With patient   Corrective Lenses Glasses   Score 5     EXPRESSION Initial Assessment   Primary Mode of Expression Verbal   Score 5   Comments       SOCIAL INTERACTION/PRAGMATICS Initial Assessment   Score 5   Comments       PROBLEM SOLVING Initial Assessment   Score 4   Comments       MEMORY Initial Assessment   Score 4   Comments       EATING Initial Assessment   Functional Level 5 (set-up/ supv)   Comments Set-up/ supv for self-feeding; assistance to open small packets and containers on tray. GROOMING Initial Assessment   Functional Level 5 (SBA)    Oral Hygiene FIM: 5 SBA   Tasks completed by patient Brushed teeth, Washed face (Combed hair)   Comments Self-care grooming tasks performed (SBA); ADL items set-up on tray table. BATHING Initial Assessment   Functional Level 3 (UB bathing: SBA; LB bathing: Min/ Mod A)   Body parts patient bathed Abdomen, Arm, left, Arm, right, Buttocks, Chest, Lower leg and foot, left, Lower leg and foot, right, Josee area, Thigh, left, Thigh, right   Comments UB bathing (SBA) assistance to wash back; LB bathing performed (Min/ Mod A) using bath wipes. Pt requiring assistance with reaching her distal BLE's for thoroughness and washing buttocks/ sacrum. Some aspects simulated as pt reported having been 'cleaned up' earlier.      TUB/SHOWER TRANSFER INDEPENDENCE Initial Assessment   Score 3 (Stand step xfer (Mod A) using RW; gait belt)   Comments Stand step xfer (Mod A) using RW; gait belt (transfer simulation)     UPPER BODY DRESSING/UNDRESSING Initial Assessment   Functional Level 4 (SBA/ Min A)   Items applied/Steps completed Pullover (4 steps)   Comments Pt doffed/ donned scrub top (SBA/ Min A); pt requiring assistance to adjust shirt down in back due to fit. LOWER BODY DRESSING/UNDRESSING Initial Assessment   Functional Level 3 (Mod A)    Sock and/or Shoe Management FIM: 3   Items applied/Steps completed Shoe, left (1 step), Shoe, right (1 step) (scrub pants (3 steps))   Comments Pt doffed/ donned scrub pants (Mod A) level while seated on elevated seat over toilet; pt doffed/ donned slip on boot/ slippers (Min/ Mod A) seated in w/c using crossed leg technique. TOILETING Initial Assessment   Functional Level 3 (Mod A)   Comments Mod A for CM and hygiene; pt had small BM (elevated seat over toilet); incontinent of bladder at baseline/ pt wears pull-up at home     TOILET TRANSFER INDEPENDENCE Initial Assessment   Transfer score 3 (Stand step xfer (Mod A) using RW; gait belt (to/ from elevated seat over toilet))   Comments Stand step xfer (Mod A) using RW; gait belt (to/ from elevated seat over toilet)     INSTRUMENTAL ADL Initial Assessment (PLOF)   Meal preparation  (Needed some help. Pt reports that her roommate/ friend does most of the cooking.)   Homemaking  (Needed some help.  Pt reports that she was doing her own laundry prior to admission.)   Medicine Management Independent   Financial Management Modified independent      ASSESSMENT :  Based on the objective data described above, the patient presents with decreased (I) with ADL's, decreased functional strength/ endurance, decreased RUE/ LUE AROM, decreased FMC, decreased activity tolerance, chronic back pain (pt has spinal stimulator for pain control), generalized weakness, impaired balance/ coordination, anxiety( fear of falling), and impaired functional mobility/ transfers impeding pt's functional (I) with ADL's and mobility for return to PLOF. Patient received awake and alert seated w/c level at bedside. Oriented x4. Therapist introduced self and role of occupational therapy on inpatient rehab unit. Pt agreeable to participate in occupational therapy evaluation and ADL session at this time. UB ADL's performed (SBA/ Min A); LB ADL's (Mod A) overall. Functional stand step transfer performed (Mod A) using RW (to/ from elevated seat over toilet). (Mod A) for CM and hygiene. Pt reporting urinary incontinence at baseline and wears pull-up at home. Pt requiring (Mod to Max A) during sit <-> stand transitions; limited due weakness and decreased shoulder range of motion. Verbal cues for hand and foot placement for transitioning to/ from seated surface. Intermittent rest breaks as pt fatigues easily. Pt will benefit from use of AE for improved functional (I) during LB ADL's. Pt would benefit from skilled occupational therapy in order to improve independent functional mobility/ADLs,/IADLs within the home. Interventions may include range of motion (AROM, PROM B UE), motor function (B UE/ strengthening/coordination), activity tolerance (vitals, oxygen saturation levels), balance training, ADL/IADL training and functional transfer training. Please see [facility]; Interdisciplinary Eval, Care Plan, and Patient Education for further information regarding occupational therapy examination and plan of care.       PLAN :  Recommendations and Planned Interventions:  [x]               Self Care Training                   [x]        Therapeutic Activities  [x]               Functional Mobility Training    [x]        Cognitive Retraining  [x]               Therapeutic Exercises            [x]        Endurance Activities  [x]               Balance Training                     [x]        Neuromuscular Re-Education  [] Visual/Perceptual Training      [x]   Home Safety Training  [x]               Patient Education                    [x]        Family Training/Education  []               Other (comment):    Frequency/Duration: Patient will be followed by occupational therapy 1-2 times per day/4-7 days per week to address goals. Discharge Recommendations: Home Health with assist  Further Equipment Recommendations for Discharge: TBD  [id number] Completed: yes  Entered Differentiated Treatment minutes: Yes     Please refer to the flow sheet for vital signs taken during this treatment. After treatment:   [x] Patient left in no apparent distress sitting up in wheelchair with needs met  [] Patient left in no apparent distress in bed  [x] Call bell left within reach  [x] Nursing notified  [] Caregiver present  [x] Wheelchair alarm activated    COMMUNICATION/EDUCATION:   [] Home safety education was provided and the patient/caregiver indicated understanding. [x] Patient/family have participated as able in goal setting and plan of care. [x] Patient/family agree to work toward stated goals and plan of care. [] Patient understands intent and goals of therapy, but is neutral about his/her participation. [] Patient is unable to participate in goal setting and plan of care. Order received from MD for occupational therapy services and chart reviewed. Pt to be seen 5 times per week for 3 hours of total therapy per day for 3 weeks.   Thank you for the referral.    Thank you for this referral.  Radha Mcnulty, OT

## 2022-11-04 NOTE — PROGRESS NOTES
conducted an initial consultation and Spiritual Assessment for Denisha, who is a 76 y.o.,female. Patients Primary Language is: [place]. According to the patients EMR Baptism Affiliation is: Sandro Gómez. The reason the Patient came to the hospital is:   Patient Active Problem List    Diagnosis Date Noted    UTI (urinary tract infection) 11/03/2022    Weakness 10/31/2022    Elevated troponin 10/31/2022    Acute UTI 10/31/2022    Bilateral leg edema 09/28/2022    Coronary artery disease of native artery of native heart with stable angina pectoris ([address] 05/17/2022    STEMI (ST elevation myocardial infarction) ([address] 07/30/2021    History of pulmonary embolism 07/30/2021    History of DVT (deep vein thrombosis) 07/30/2021    Chronic diastolic congestive heart failure ([address] 07/30/2021    Pulmonary embolism without acute cor pulmonale (HCC) 03/10/2021    Chronic bronchitis with productive mucopurulent cough ([address] 03/10/2021    Abnormal chest CT 02/06/2020    Chronic cough 02/06/2020    CAP (community acquired pneumonia) 07/22/2019    Sepsis ([address] 07/22/2019    Hypotension 07/22/2019    Atrial fibrillation with RVR ([address] 07/22/2019    MCBRIDE (dyspnea on exertion) 02/28/2019    Obesity, morbid ([address] 01/29/2019    High cholesterol 01/11/2019    Hypertension 01/11/2019    Hemochromatosis type 1 ([address] 01/11/2019    Sacroiliitis ([address] 12/31/2013    Spinal stenosis 11/27/2013    Shoulder impingement, right, with rotator cuff strain         The  provided the following Interventions:  Initiated a relationship of care and support. Listened empathically. Provided information about Spiritual Care Services. Offered prayer and assurance of continued prayers on patient's behalf. Chart reviewed. The following outcomes where achieved:  Patient expressed gratitude for 's visit.     Assessment:  Patient does not have any Gnosticist/cultural needs that will affect patients preferences in health care.  There are no spiritual or Episcopalian issues which require intervention at this time. Plan:  Chaplains will continue to follow and will provide pastoral care on an as needed/requested basis.  recommends bedside caregivers page  on duty if patient shows signs of acute spiritual or emotional distress.       [address] [facility]   [phone]

## 2022-11-04 NOTE — ROUTINE PROCESS
Beto Ag is a 76 y.o.  female admitted on 11/3/2022 from [facility]. Report received from Judy Ding, [place]. Transportation was provided no, and the patient was transferred to her room via Wheelchair. Patient was assisted to bed with assist of 1. The patient was oriented to her room. Fall risk precautions were discussed with the patient; she was instructed to call for help prior to getting up. The following fall risk precautions were initiated: bed/ chair alarms, door signage, yellow bracelet and socks as well as completion of the [employer] Blight tool in the patient's room. Four eyes nurse skin assessment was performed by Anthony Rosenthal RN and Kalani RN.  Skin problems noted: SUNNY Nam RN

## 2022-11-04 NOTE — H&P
[facility] PHYSICAL REHABILITATION  [address] [place] [address], [address]     INPATIENT REHABILITATION  HISTORY AND PHYSICAL  (Post Admission Physician Evaluation)    Name: Hector Reeder CSN: [id number]   Age: 76 y.o. MRN: [id number]   Sex: female Admit Date: 11/3/2022           Primary Rehabilitation Impairment Category (MIS): Miscellaneous    Impairment Group Label: Debility    Etiologic Diagnosis: Urinary tract infection, site not specified      Subjective:     Patient seen and examined. History of the Present Illness: This is a 33-year-old female who was recently admitted to [facility] and was treated for a UTI. Patient also has atrial fibrillation and is on Eliquis and has chronic lower extremity edema. Patient was noted to have generalized weakness and inability to ambulate. Patient was evaluated by therapy and it was felt that patient may benefit from inpatient rehab. Patient was transitioned to the inpatient rehab at [facility] where I saw the patient for the first time. When I saw the patient she was sitting in a chair in no apparent distress. Patient denies any chest pain or shortness of breath. No history of any fever chills or cough. No history of any headaches or numbness. Patient complains of some generalized weakness especially in her lower extremities and states that she has difficulty ambulating. No history of any visual changes. No history of any abdominal pain urinary complaints diarrhea or rectal bleeding. Patient has chronic lower extremity swelling. No history of any rash.       Past Medical History:  Past Medical History:   Diagnosis Date    Abnormal chest CT 2/6/2020    Adopted     Arthritis     Balance problems     Chronic cough 2/6/2020    MCBRIDE (dyspnea on exertion) 2/28/2019    GERD (gastroesophageal reflux disease)     Gout     Hemochromatosis     High cholesterol     Hypertension     Hypothyroidism     Left knee pain     Left shoulder pain Low blood potassium     Lumbar pain     Pain of left sacroiliac joint     Shoulder impingement, right, with rotator cuff strain        Past Surgical History:  Past Surgical History:   Procedure Laterality Date    HX CHOLECYSTECTOMY      HX KNEE REPLACEMENT Bilateral     R knee/ Lknee and femur    HX ORTHOPAEDIC      R trigger finger     HX OTHER SURGICAL      Right little finger    HX OTHER SURGICAL      Right wrist, replaced unlnar). HX OTHER SURGICAL      Both knees    HX OTHER SURGICAL      Left femur    HX OTHER SURGICAL      Trigger finger surgery    HX OTHER SURGICAL      Left knee replacment revision     HX ROTATOR CUFF REPAIR Right     HX TONSILLECTOMY         Allergies: Allergies   Allergen Reactions    Ciprofloxacin Other (comments)     Achilles tendonitis/leg pain and swelling    Indomethacin Anaphylaxis, Hives and Swelling    Tomato Other (comments)     Heart burn         Social History: Patient denies any tobacco use or alcohol use    Family History:  Family History   Adopted: Yes     Patient states that she is adopted and does not know her family's medical history        Transfer Medications (from the Discharge Summary     Prior to Admission Medications   Prescriptions Last Dose Informant Patient Reported? Taking? Klor-Con M20 20 mEq tablet   No No   Sig: TAKE 1 TABLET BY MOUTH EVERY DAY   albuterol (PROVENTIL HFA, VENTOLIN HFA, PROAIR HFA) 90 mcg/actuation inhaler   No No   Sig: INHALE 2 PUFFS BY MOUTH EVERY 4 HOURS AS NEEDED FOR WHEEZE   apixaban (Eliquis) 5 mg tablet   No No   Sig: Take 1 Tablet by mouth two (2) times a day. carboxymethylcellulose sodium (REFRESH TEARS OP) 11/2/2022  Yes Yes   Sig: Administer 1 Drop to both eyes daily as needed for Other. Dry eyes   clopidogreL (PLAVIX) 75 mg tab   No No   Sig: Take 1 Tablet by mouth daily. furosemide (LASIX) 40 mg tablet   No No   Sig: Take 1 Tablet by mouth daily as needed (Edema).    inhalational spacing device (ACE AEROSOL CLOUD ENHANCER)   No No   Si Each by Does Not Apply route as needed. levothyroxine (SYNTHROID) 25 mcg tablet   No No   Sig: TAKE 1 TABLET BY MOUTH EVERY DAY   metoprolol tartrate (LOPRESSOR) 25 mg tablet   No No   Sig: Take 1 Tablet by mouth daily. nitroglycerin (NITROSTAT) 0.4 mg SL tablet   No No   Si Tablet by SubLINGual route every five (5) minutes as needed for Chest Pain. Up to 3 doses. oxyCODONE IR (ROXICODONE) 5 mg immediate release tablet   Yes No   Sig: Take 5 mg by mouth three (3) times daily as needed for Pain. Pain   pantoprazole (PROTONIX) 40 mg tablet   No No   Sig: TAKE 1 TABLET BY MOUTH EVERY DAY BEFORE BREAKFAST   peg 400-propylene glycol, PF, (Systane Hydration PF) 0.4-0.3 % dpet ophthalmic solution   Yes No   Sig: Administer 2 Drops to both eyes four (4) times daily as needed for Ocular Dryness. Dry eyes   rosuvastatin (CRESTOR) 40 mg tablet Unknown  No No   Sig: Take 1 Tablet by mouth daily. Facility-Administered Medications: None       Review Of Systems:   Review of Systems  GENERAL: Patient alert, awake and oriented times 3, able to communicate full sentences and not in distress. HEENT: No change in vision, no earache, tinnitus, sore throat or sinus congestion. NECK: No pain or stiffness. PULMONARY: No shortness of breath, cough or wheeze. Cardiovascular: no pnd or orthopnea, no CP  GASTROINTESTINAL: No abdominal pain, nausea, vomiting or diarrhea, melena or bright red blood per rectum. GENITOURINARY: No urinary frequency, urgency, hesitancy or dysuria. MUSCULOSKELETAL: No back pain, no leg pain  DERMATOLOGIC: No rash, no itching, no lesions. ENDOCRINE: No polyuria, polydipsia, no heat or cold intolerance. No recent change in weight. HEMATOLOGICAL: No anemia or easy bruising or bleeding. NEUROLOGIC: No headache, seizures, numbness, tingling.   Has generalized weakness  Has chronic lower extremity edema      Objective:     Vital Signs:  Patient Vitals for the past 24 hrs:   BP Temp Pulse Resp SpO2   11/04/22 0753 115/77 97.9 °F (36.6 °C) 62 18 97 %   11/03/22 2214 119/71 97.7 °F (36.5 °C) 65 19 100 %        There is no height or weight on file to calculate BMI. Physical Examination:  General:    Sitting in a chair in no acute distress. HEENT:  Pupils equal.  Sclera anicteric. Conjunctiva pink. Mucous membranes                           Moist, no ear or nasal discharge  Neck:  Supple. Trachea midline. No accessory muscle use. No thyromegaly. No jugular venous distention, no carotid bruit  CV:                  Regular rate and rhythm. S1S2+  Lungs:   Clear to auscultation bilaterally. No Wheezing or Rhonchi. No rales. Abdomen:   Soft, non-tender. Not distended. Bowel sounds normal.   Extremities: No cyanosis. ++ edema. Pulses 1+ b/l  Neurologic: Alert and oriented X 3. Follows commands, responds appropriately. Able to move all 4 extremities  Skin:                Warm and dry. No rashes.      Current Medications:  Current Facility-Administered Medications   Medication Dose Route Frequency    0.9% sodium chloride infusion  75 mL/hr IntraVENous CONTINUOUS    albuterol (PROVENTIL VENTOLIN) nebulizer solution 2.5 mg  2.5 mg Nebulization Q4H PRN    apixaban (ELIQUIS) tablet 5 mg  5 mg Oral BID    polyvinyl alcohol-povidon(PF) (REFRESH CLASSIC) 1.4-0.6 % ophthalmic solution 1 Drop  1 Drop Both Eyes DAILY PRN    clopidogreL (PLAVIX) tablet 75 mg  75 mg Oral DAILY    levothyroxine (SYNTHROID) tablet 25 mcg  25 mcg Oral ACB    metoprolol tartrate (LOPRESSOR) tablet 25 mg  25 mg Oral DAILY    nitroglycerin (NITROSTAT) tablet 0.4 mg  0.4 mg SubLINGual Q5MIN PRN    oxyCODONE IR (ROXICODONE) tablet 5 mg  5 mg Oral TID PRN    pantoprazole (PROTONIX) tablet 40 mg  40 mg Oral ACB    rosuvastatin (CRESTOR) tablet 40 mg  40 mg Oral DAILY    docusate sodium (COLACE) capsule 100 mg  100 mg Oral BID    bisacodyL (DULCOLAX) tablet 10 mg  10 mg Oral Q48H PRN Functional Assessment:     Occupational Therapy   Prior Level of Function  Pre-Admission Screen  Post-Admission Evaluation   Eating   Independent Eating   Set up Eating      Grooming   Independent Grooming   Set up Grooming      Upper Body Dressing   Independent Upper Body Dressing   Minimal Assist Upper Body Dressing      Lower Body Dressing   Independent Lower Body Dressing   Moderate Assist Lower Body Dressing      Bladder Management   Modified Independent Bladder Management   Maximal Assist Toileting      Bowel Management   Modified Independent Bowel Management   Not evaluated      Physical Therapy   Prior Level of Function  Pre-Admission Screen  Post-Admission Evaluation   Ambulation   Supervision Ambulation   Maximal Assist Gait  Amount of Assistance: 4 (Minimal assistance)  Distance (ft): 20 Feet (ft) (limited by bilat knee pain report, particularly left knee pain)  Assistive Device: Gait belt, Walker, rollator   Bed Mobility   Not evaluated Bed Mobility   Moderate Assist Bed/Mat Mobility  Rolling Right : 2 (Maximal assistance) (due to pain)  Rolling Left : 2 (Maximal assistance) (due to pain)  Supine to Sit : 3 (Moderate assistance)  Sit to Supine : 3 (Moderate assistance)   Supine to Sit   Not evaluated Supine to Sit   Moderate Assist Bed/Mat Mobility  Rolling Right : 2 (Maximal assistance) (due to pain)  Rolling Left : 2 (Maximal assistance) (due to pain)  Supine to Sit : 3 (Moderate assistance)  Sit to Supine : 3 (Moderate assistance)   Sit to Stand   Modified Independent Sit to Stand   Moderate Assist Bed/Mat Mobility  Rolling Right : 2 (Maximal assistance) (due to pain)  Rolling Left : 2 (Maximal assistance) (due to pain)  Supine to Sit : 3 (Moderate assistance)  Sit to Supine : 3 (Moderate assistance)   Bed/Chair Transfers   Modified Independent Bed/Chair Transfers   Moderate Assist Transfers  Transfer Type:  Other  Other: stand step  Transfer Assistance : 3 (Moderate assistance )  Sit to Stand Assistance: Moderate assistance  Car Transfers: Moderate assistance (needing assistance to lift legs into car simulator, needing lifting assistance to get into standing)  Car Type: car transfer simulator   Toilet Transfers   Modified Independent Toilet Transfers   Not evaluated Toilet Transfers        Speech and Language Pathology  Post-Admission Evaluation                                     Legend:   7 - Independent   6 - Modified Independent   5 - Standby Assistance / Supervision / Set-up   4 - Minimum Assistance / [address]   3 - Moderate Assistance   2 - Maximum Assistance   1 - Total Assistance / Dependent       Labs on Admission:  Recent Results (from the past 24 hour(s))   POTASSIUM    Collection Time: 11/03/22 10:43 AM   Result Value Ref Range    Potassium 4.8 3.5 - 5.5 mmol/L   CBC WITH AUTOMATED DIFF    Collection Time: 11/04/22  6:11 AM   Result Value Ref Range    WBC 7.7 4.6 - 13.2 K/uL    RBC 3.10 (L) 4.20 - 5.30 M/uL    HGB 9.6 (L) 12.0 - 16.0 g/dL    HCT 30.2 (L) 35.0 - 45.0 %    MCV 97.4 78.0 - 100.0 FL    MCH 31.0 24.0 - 34.0 PG    MCHC 31.8 31.0 - 37.0 g/dL    RDW 15.6 (H) 11.6 - 14.5 %    PLATELET 309 993 - 374 K/uL    MPV 11.0 9.2 - 11.8 FL    NRBC 0.0 0  WBC    ABSOLUTE NRBC 0.00 0.00 - 0.01 K/uL    NEUTROPHILS 67 40 - 73 %    LYMPHOCYTES 21 21 - 52 %    MONOCYTES 10 3 - 10 %    EOSINOPHILS 2 0 - 5 %    BASOPHILS 0 0 - 2 %    IMMATURE GRANULOCYTES 1 (H) 0.0 - 0.5 %    ABS. NEUTROPHILS 5.2 1.8 - 8.0 K/UL    ABS. LYMPHOCYTES 1.6 0.9 - 3.6 K/UL    ABS. MONOCYTES 0.8 0.05 - 1.2 K/UL    ABS. EOSINOPHILS 0.1 0.0 - 0.4 K/UL    ABS. BASOPHILS 0.0 0.0 - 0.1 K/UL    ABS. IMM.  GRANS. 0.0 0.00 - 0.04 K/UL    DF AUTOMATED     METABOLIC PANEL, BASIC    Collection Time: 11/04/22  6:11 AM   Result Value Ref Range    Sodium 140 136 - 145 mmol/L    Potassium 4.7 3.5 - 5.5 mmol/L    Chloride 113 (H) 100 - 111 mmol/L    CO2 23 21 - 32 mmol/L    Anion gap 4 3.0 - 18 mmol/L    Glucose 101 (H) 74 - 99 mg/dL    BUN 27 (H) 7.0 - 18 MG/DL    Creatinine 0.94 0.6 - 1.3 MG/DL    BUN/Creatinine ratio 29 (H) 12 - 20      eGFR >60 >60 ml/min/1.73m2    Calcium 8.9 8.5 - 10.1 MG/DL   MAGNESIUM    Collection Time: 11/04/22  6:11 AM   Result Value Ref Range    Magnesium 1.9 1.6 - 2.6 mg/dL             Assessment:     Primary rehabilitation diagnosis  Impaired mobility and ADLs in the setting of UTI with generalized weakness and inability to ambulate    Other comorbid conditions  Atrial fibrillation-on Eliquis  Hypothyroidism  Chronic lower extremity edema  Obesity  Dyslipidemia  Hypertension    Willingness to participate in the program: Good      Rehabilitation Potential: Good      Plan:     1. Medical Issues being followed closely:    [x]  Fall and safety precautions     []  Wound Care     [x]  Bowel and Bladder Function     [x]  Fluid Electrolyte and Nutrition Balance     []  Pain Control      2. Issues that 24 hour rehabilitation nursing is following:    [x]  Fall and safety precautions     []  Wound Care     [x]  Bowel and Bladder Function     [x]  Fluid Electrolyte and Nutrition Balance     []  Pain Control      [x]  Assistance with and education on in-room safety with transfers to and from the bed, wheelchair, toilet and shower. 3. Acute rehabilitation plan of care:    [x]  Patient to be evaluated and treated by:           [x]  Physical Therapy           [x]  Occupational Therapy           []  Speech Therapy     []  Hold Rehab until further notice     5. Medications:    [x]  MAR Reviewed     [x]  Continue Present Medications     6. Chemical DVT Prophylaxis:      []  Enoxaparin     []  Unfractionated Heparin     []  Warfarin     [x]  NOAC     []  Aspirin     []  None     7. Mechanical DVT Prophylaxis:      []  ZOË Stockings     [x]  Sequential Compression Device     []  None     8. GI Prophylaxis:      [x]  PPI     []  H2 Blocker     []  None / Not indicated     9.  Code status:    [x]  Full code     [] Partial code     []  Do not intubate     []  Do not resuscitate         12. Rehabilitation program and expectations from patient, as well as medical issues discussed with the patient. 1. The patient is being admitted to a comprehensive acute inpatient rehabilitation program consisting of at least 180 minutes a day, 5 out of 7 days a week of  combined physical, occupational therapy  2. The patient's prognosis for significant practical improvement within a reasonable period of time appears to be good. 3. The estimated length of stay is 12 days. 4. The patient/family has a good understanding of our discharge process. The patient has potential to make improvement and is in need of at least two of the following multidisciplinary therapies including but not limited to physical, occupational, speech, nutritional services, wound care, prosthetics and orthotics. The patient is expected to be able to return to home with home health therapy and family support. 5. Given the patient's multiple co-morbidities and risk for further medical complications, rehabilitation services could not be safely provided at a lower level of care such as a skilled nursing facility. 6. Physical therapy for therapeutic exercise, progressive mobility, gait training, transfer training, bed mobility training, patient and family education, and wheelchair mobility training. Physical therapy goals to address - extremity function, range of motion, balance, safety awareness, independence in transfers, activity tolerance, independence in bed mobility, and independence in ambulation. 7. Occupational therapy for self-care home management, transfer training, therapeutic exercise, activity, wheelchair mobility training.  Occupational therapy goals to address - extremity function, cognition, balance, activity tolerance, independence in functional transfers, range of motion, safety awareness, independence in ADL  and independence in home management skills. 9. Specialized 24 hour rehabilitation nursing care for bowel and bladder retraining, disease management, pain management, pressure ulcer prevention and management per policy, education on pressure relief techniques, embolism prevention, nutrition management, hydration management, transfer training and   medication distribution. 10. Nutrition and Dietary services will be obtained for assessment of adequate calorie needs, hydration and calorie counts as appropriate. 11. Therapeutic recreation for leisure skills. 15. Rehab psychology for coping skills. 15. Social work services for patient and family counseling and safe discharge planning. MEDICAL PLAN:  We will continue prior to admission medications including the Eliquis and the Plavix. We will continue Synthroid. We will continue Lopressor. We will continue Protonix and Crestor. Labs will be monitored intermittently. Patient appears a little dry so will hydrate overnight and plan to recheck labs in the morning. Patient will be on fall precautions. Rest depending on patient's further course in the rehab. This plan was discussed with the patient and she verbally understanding and agreed. I also discussed regarding patient's level of care and patient wishes to be full code. Patient has been placed on contact isolation given history of ESBL as per infection control policy. I specifically discussed this in detail with the patient and she verbalized understanding. PRECAUTIONS:   1. Safety/fall precautions. 2. Deep venous thrombosis precautions. 3. Aspiration precautions. POTENTIAL BARRIERS TO DISCHARGE:   1. Risk for falls. 3. Current home layout. 4. Family limited in ability to provide constant care. 5. Limited community resources.       RELEVANT CHANGES SINCE PREADMISSION SCREENING: I have compared the patients medical and functional status at the time of the pre-admission screening and on this post-admission evaluation. The preadmission screen and findings from therapy evaluations both support my post admission physician evaluation, deeming this patient to be an appropriate candidate for the IRF. The patient requires multidisciplinary treatment, physician oversight and intensive therapy not provided at a lower level of care. By signing this document, I acknowledge that I have personally performed a full physical examination on this patient within 24 hours of admission to this inpatient rehabilitation facility and have determined the patient to be able to tolerate the above course of treatment at an intensive level for a reasonable period of time. I will be completing a detailed individualized plan of care for this patient by day #4 of the patients stay based upon the Pre-Admission Screen, the Post-Admission Evaluation, and the therapy evaluations. Dragon medical dictation software was used for portions of this report. Unintended errors may occur. Total clinical care time is 75 minutes, including review of chart including all labs, radiology, past medical history, and discussion with patient. Greater than 50% of my time was spent in coordination of care and counseling.       Signed:    Polo Johnson MD      November 4, 2022

## 2022-11-05 LAB
ANION GAP SERPL CALC-SCNC: 4 MMOL/L (ref 3–18)
BASOPHILS # BLD: 0 K/UL (ref 0–0.1)
BASOPHILS NFR BLD: 1 % (ref 0–2)
BUN SERPL-MCNC: 24 MG/DL (ref 7–18)
BUN/CREAT SERPL: 30 (ref 12–20)
CALCIUM SERPL-MCNC: 8.6 MG/DL (ref 8.5–10.1)
CHLORIDE SERPL-SCNC: 115 MMOL/L (ref 100–111)
CO2 SERPL-SCNC: 23 MMOL/L (ref 21–32)
CREAT SERPL-MCNC: 0.8 MG/DL (ref 0.6–1.3)
DIFFERENTIAL METHOD BLD: ABNORMAL
EOSINOPHIL # BLD: 0.1 K/UL (ref 0–0.4)
EOSINOPHIL NFR BLD: 2 % (ref 0–5)
ERYTHROCYTE [DISTWIDTH] IN BLOOD BY AUTOMATED COUNT: 15.6 % (ref 11.6–14.5)
GLUCOSE SERPL-MCNC: 94 MG/DL (ref 74–99)
HCT VFR BLD AUTO: 29 % (ref 35–45)
HGB BLD-MCNC: 9.1 G/DL (ref 12–16)
IMM GRANULOCYTES # BLD AUTO: 0.1 K/UL (ref 0–0.04)
IMM GRANULOCYTES NFR BLD AUTO: 1 % (ref 0–0.5)
LYMPHOCYTES # BLD: 1.6 K/UL (ref 0.9–3.6)
LYMPHOCYTES NFR BLD: 23 % (ref 21–52)
MAGNESIUM SERPL-MCNC: 1.9 MG/DL (ref 1.6–2.6)
MCH RBC QN AUTO: 31.3 PG (ref 24–34)
MCHC RBC AUTO-ENTMCNC: 31.4 G/DL (ref 31–37)
MCV RBC AUTO: 99.7 FL (ref 78–100)
MONOCYTES # BLD: 0.7 K/UL (ref 0.05–1.2)
MONOCYTES NFR BLD: 9 % (ref 3–10)
NEUTS SEG # BLD: 4.6 K/UL (ref 1.8–8)
NEUTS SEG NFR BLD: 65 % (ref 40–73)
NRBC # BLD: 0 K/UL (ref 0–0.01)
NRBC BLD-RTO: 0 PER 100 WBC
PLATELET # BLD AUTO: 192 K/UL (ref 135–420)
PMV BLD AUTO: 10.8 FL (ref 9.2–11.8)
POTASSIUM SERPL-SCNC: 4 MMOL/L (ref 3.5–5.5)
RBC # BLD AUTO: 2.91 M/UL (ref 4.2–5.3)
SODIUM SERPL-SCNC: 142 MMOL/L (ref 136–145)
WBC # BLD AUTO: 7.2 K/UL (ref 4.6–13.2)

## 2022-11-05 PROCEDURE — 80048 BASIC METABOLIC PNL TOTAL CA: CPT

## 2022-11-05 PROCEDURE — 97530 THERAPEUTIC ACTIVITIES: CPT

## 2022-11-05 PROCEDURE — 85025 COMPLETE CBC W/AUTO DIFF WBC: CPT

## 2022-11-05 PROCEDURE — 97535 SELF CARE MNGMENT TRAINING: CPT

## 2022-11-05 PROCEDURE — 74011250637 HC RX REV CODE- 250/637: Performed by: EMERGENCY MEDICINE

## 2022-11-05 PROCEDURE — 65310000000 HC RM PRIVATE REHAB

## 2022-11-05 PROCEDURE — 83735 ASSAY OF MAGNESIUM: CPT

## 2022-11-05 PROCEDURE — 97110 THERAPEUTIC EXERCISES: CPT

## 2022-11-05 PROCEDURE — 77030018842 HC SOL IRR SOD CL 9% BAXT -A

## 2022-11-05 PROCEDURE — 36415 COLL VENOUS BLD VENIPUNCTURE: CPT

## 2022-11-05 RX ADMIN — THERA TABS 1 TABLET: TAB at 09:02

## 2022-11-05 RX ADMIN — OXYCODONE HYDROCHLORIDE 5 MG: 5 TABLET ORAL at 09:59

## 2022-11-05 RX ADMIN — METOPROLOL TARTRATE 25 MG: 25 TABLET, FILM COATED ORAL at 09:02

## 2022-11-05 RX ADMIN — APIXABAN 5 MG: 5 TABLET, FILM COATED ORAL at 09:02

## 2022-11-05 RX ADMIN — ROSUVASTATIN CALCIUM 40 MG: 20 TABLET, FILM COATED ORAL at 09:02

## 2022-11-05 RX ADMIN — LEVOTHYROXINE SODIUM 25 MCG: 25 TABLET ORAL at 06:43

## 2022-11-05 RX ADMIN — CLOPIDOGREL BISULFATE 75 MG: 75 TABLET ORAL at 09:02

## 2022-11-05 RX ADMIN — APIXABAN 5 MG: 5 TABLET, FILM COATED ORAL at 17:34

## 2022-11-05 RX ADMIN — PANTOPRAZOLE SODIUM 40 MG: 40 TABLET, DELAYED RELEASE ORAL at 06:43

## 2022-11-05 NOTE — PROGRESS NOTES
SHIFT CHANGE NOTE FOR [facility]    Bedside and Verbal shift change report given to Shaggy Mixon (oncoming nurse) by Sarah Valdez RN (offgoing nurse). Report included the following information SBAR, Kardex, MAR and Recent Results. Situation:   Code Status: Full Code   Hospital Day: 2   Problem List:   Hospital Problems  Date Reviewed: 9/22/2022            Codes Class Noted POA    UTI (urinary tract infection) ICD-10-CM: N39.0  ICD-9-CM: 599.0  11/3/2022 Unknown         Background:   Past Medical History:   Past Medical History:   Diagnosis Date    Abnormal chest CT 2/6/2020    Adopted     Arthritis     Balance problems     Chronic cough 2/6/2020    MCBRIDE (dyspnea on exertion) 2/28/2019    GERD (gastroesophageal reflux disease)     Gout     Hemochromatosis     High cholesterol     Hypertension     Hypothyroidism     Left knee pain     Left shoulder pain     Low blood potassium     Lumbar pain     Pain of left sacroiliac joint     Shoulder impingement, right, with rotator cuff strain         Assessment:  Changes in Assessment throughout shift: No change to previous assessment    Patient has a central line: no Reasons if yes: Insertion date: Last dressing date:  Patient has Coelho Cath: no Reasons if yes:     Insertion date:  Shift coelho care completed:     Last Vitals:     Vitals:    11/04/22 1727 11/04/22 1954 11/05/22 0748 11/05/22 1606   BP: 98/61 (!) 93/55 128/65 (!) 102/56   Pulse: 64 65 62 74   Resp: 18 18 19 18   Temp: 97.7 °F (36.5 °C) 97 °F (36.1 °C) 97.7 °F (36.5 °C) 98.2 °F (36.8 °C)   SpO2: 99% 99% 99% 98%   Weight:       Height:           PAIN    Pain Assessment    Pain Intensity 1: 0 (11/05/22 1606) Pain Intensity 1: 2 (12/29/14 1105)    Pain Location 1: Knee Pain Location 1: Abdomen    Pain Intervention(s) 1: Medication (see MAR) Pain Intervention(s) 1: Medication (see MAR)  Patient Stated Pain Goal: 0 Patient Stated Pain Goal: 0  Intervention effective: yes  Other actions taken for pain: Medication (see MAR)    Skin Assessment  Skin color Skin Color: Appropriate for ethnicity  Condition/Temperature    Integrity Skin Integrity: Wound (add Wound LDA)  Turgor    Weekly Pressure Ulcer Documentation  Pressure  Injury Documentation: No Pressure Injury Noted-Pressure Ulcer Prevention Initiated  Wound Prevention & Protection Methods  Orientation of wound Orientation of Wound Prevention: Posterior  Location of Prevention Location of Wound Prevention: Buttocks, Sacrum/Coccyx, Heel  Dressing Present Dressing Present : No  Dressing Status    Wound Offloading Wound Offloading (Prevention Methods): Bed, pressure redistribution/air, Bed, pressure reduction mattress, Chair cushion, Elevate heels, Repositioning, Wheelchair    INTAKE/OUPUT  Date 11/04/22 0700 - 11/05/22 0659 11/05/22 0700 - 11/06/22 0659   Shift [id number] 3927-3943 24 Hour Total 6766-5903 8017-8972 24 Hour Total   INTAKE   P.O.  360 360 120  120     P. O.  360 360 120  120   Shift Total(mL/kg)  360(4.4) 360(4.4) 120(1.5)  120(1.5)   OUTPUT   Urine(mL/kg/hr)  0(0) 0(0)        Urine Voided  0 0        Urine Occurrence(s) 3 x 3 x 6 x 4 x  4 x   Emesis/NG output           Emesis Occurrence(s) 0 x  0 x      Stool           Stool Occurrence(s) 2 x 1 x 3 x 2 x  2 x   Shift Total(mL/kg)  0(0) 0(0)      NET  360 360 120  120   Weight (kg) 82.3 82.3 82.3 82.3 82.3 82.3         Recommendations:  Patient needs and requests: Assistance to the restroom, pain management   Pending tests/procedures: None at this time. Functional Level/Equipment: Partial (one person) / Anti-embolic stockings; Bed (comment); Wheelchair;Walker    Fall Precautions:   Fall risk precautions were reinforced with the patient; she was instructed to call for help prior to getting up. The following fall risk precautions were continued: bed/ chair alarms, door signage, yellow bracelet and socks as well as update of the Franklin Embs tool in the patient's room.    Talia Score: 3    HEALS Safety Check    A safety check occurred in the patient's room between off going nurse and oncoming nurse listed above. The safety check included the below items  Area Items   H  High Alert Medications Verify all high alert medication drips (heparin, PCA, etc.)   E  Equipment Suction is set up for ALL patients (with lucinda)  Red plugs utilized for all equipment (IV pumps, etc.)  WOWs wiped down at end of shift. Room stocked with oxygen, suction, and other unit-specific supplies   A  Alarms Bed alarm is set for fall risk patients  Ensure chair alarm is in place and activated if patient is up in a chair   L  Lines Check IV for any infiltration  Gonzalez bag is empty if patient has a Gonzalez   Tubing and IV bags are labeled   S  Safety   Room is clean, patient is clean, and equipment is clean. Hallways are clear from equipment besides carts. Fall bracelet on for fall risk patients  Ensure room is clear and free of clutter  Suction is set up for ALL patients (with lucinda)  Hallways are clear from equipment besides carts.    Isolation precautions followed, supplies available outside room, sign posted     Candido Bhakta RN

## 2022-11-05 NOTE — PROGRESS NOTES
Wound Prevention Checklist    Patient: Mitzi Ron [de-identified]76 y.o. female)  Date: 11/5/2022  Diagnosis: UTI (urinary tract infection) [N39.0] <principal problem not specified>    Precautions: Fall, Contact       []  Heel prevention boots placed on patient    []  Patient turned q2h during shift    []  Lift team ordered    [x]  Patient on Félix bed/Specialty bed    [x]  Each Wound is documented during shift (Stage, Color, drainage, odor, measurements, and dressings)    [x]  Dual skin check done with RANJAN Gusman RN

## 2022-11-05 NOTE — PROGRESS NOTES
Progress Note    Patient: Louisa Ghosh MRN: [id number]  CSN: [id number]    YOB: 1945  Age: 76 y.o. Sex: female    DOA: 11/3/2022 LOS:  LOS: 2 days                    Subjective:     Primary rehab diagnosis:    Impaired mobility and ADLs in the setting of UTI with generalized weakness and inability to ambulate    No acute pt concerns    Review of systems  General: No fevers or chills. Cardiovascular: No chest pain or pressure. No palpitations. Pulmonary: No shortness of breath, cough or wheeze. Gastrointestinal: No abdominal pain, nausea, vomiting or diarrhea. Genitourinary: No urinary frequency, urgency, hesitancy or dysuria. Musculoskeletal: No joint or muscle pain, no back pain, no recent trauma. Neurologic: generalized weakness    Objective:     Physical Exam:  Visit Vitals  /65 (BP 1 Location: Right upper arm, BP Patient Position: At rest)   Pulse 62   Temp 97.7 °F (36.5 °C)   Resp 19   Ht 5' 1\" (1.549 m)   Wt 82.3 kg (181 lb 6.4 oz)   SpO2 99%   BMI 34.28 kg/m²        General:         Alert, cooperative, no acute distress    HEENT: NC, Atraumatic.   anicteric sclerae. Lungs: CTA Bilaterally. No Wheezing/Rhonchi/Rales. Heart:  Regular  rhythm,  No murmur, No Rubs, No Gallops  Abdomen: Soft, Non distended, Non tender. +Bowel sounds  Extremities: No edema  Psych:   Not anxious or agitated. Neurologic:  CN 2-12 grossly intact, Alert and oriented X 3.   No acute neurological                                 Deficits,     Intake and Output:  Current Shift:  11/05 0701 - 11/05 1900  In: 120 [P.O.:120]  Out: -   Last three shifts:  11/03 1901 - 11/05 0700  In: 460 [P.O.:460]  Out: 0     Labs: Results:       Chemistry Recent Labs     11/05/22 0517 11/04/22  0611 11/03/22  1043   GLU 94 101*  --     140  --    K 4.0 4.7 4.8   * 113*  --    CO2 23 23  --    BUN 24* 27*  --    CREA 0.80 0.94  --    CA 8.6 8.9  --    AGAP 4 4  --    BUCR 30* 29*  --       CBC w/Diff Recent Labs     11/05/22  0517 11/04/22  0611   WBC 7.2 7.7   RBC 2.91* 3.10*   HGB 9.1* 9.6*   HCT 29.0* 30.2*    192   GRANS 65 67   LYMPH 23 21   EOS 2 2      Cardiac Enzymes No results for input(s): CPK, CKND1, REINALDO in the last 72 hours. No lab exists for component: CKRMB, TROIP   Coagulation No results for input(s): PTP, INR, APTT, INREXT in the last 72 hours. Lipid Panel Lab Results   Component Value Date/Time    Cholesterol, total 143 05/20/2022 07:26 AM    HDL Cholesterol 73 (H) 05/20/2022 07:26 AM    LDL, calculated 48.6 05/20/2022 07:26 AM    VLDL, calculated 21.4 05/20/2022 07:26 AM    Triglyceride 107 05/20/2022 07:26 AM    CHOL/HDL Ratio 2.0 05/20/2022 07:26 AM      BNP No results for input(s): BNPP in the last 72 hours. Liver Enzymes No results for input(s): TP, ALB, TBIL, AP in the last 72 hours.     No lab exists for component: SGOT, GPT, DBIL   Thyroid Studies Lab Results   Component Value Date/Time    TSH 1.24 11/02/2022 03:36 AM          Procedures/imaging: see electronic medical records for all procedures/Xrays and details which were not copied into this note but were reviewed prior to creation of Plan    Medications:   Current Facility-Administered Medications   Medication Dose Route Frequency    therapeutic multivitamin (THERAGRAN) tablet 1 Tablet  1 Tablet Oral DAILY    albuterol (PROVENTIL VENTOLIN) nebulizer solution 2.5 mg  2.5 mg Nebulization Q4H PRN    apixaban (ELIQUIS) tablet 5 mg  5 mg Oral BID    polyvinyl alcohol-povidon(PF) (REFRESH CLASSIC) 1.4-0.6 % ophthalmic solution 1 Drop  1 Drop Both Eyes DAILY PRN    clopidogreL (PLAVIX) tablet 75 mg  75 mg Oral DAILY    levothyroxine (SYNTHROID) tablet 25 mcg  25 mcg Oral ACB    metoprolol tartrate (LOPRESSOR) tablet 25 mg  25 mg Oral DAILY    nitroglycerin (NITROSTAT) tablet 0.4 mg  0.4 mg SubLINGual Q5MIN PRN    oxyCODONE IR (ROXICODONE) tablet 5 mg  5 mg Oral TID PRN    pantoprazole (PROTONIX) tablet 40 mg  40 mg Oral ACB rosuvastatin (CRESTOR) tablet 40 mg  40 mg Oral DAILY    docusate sodium (COLACE) capsule 100 mg  100 mg Oral BID    bisacodyL (DULCOLAX) tablet 10 mg  10 mg Oral Q48H PRN       Assessment/Plan     Active Problems:    UTI (urinary tract infection) (11/3/2022)      Impaired mobility and ADLs in the setting of UTI with generalized weakness and inability to ambulate  Pt/ot eval and treat    Recent UTI  Completed therapy, monitor for recurrent symtpoms    HTN, HLD, atrial fibrillation  Continue lopressor, crestor  Anticoagulation with eliquis    Hypothyroidism  Continue synthroid 25mcg daily    GERD  Continue protonix        Val Stone MD  11/5/2022

## 2022-11-05 NOTE — PROGRESS NOTES
Problem: Self Care Deficits Care Plan (Adult)  Goal: *Acute Goals and Plan of Care (Insert Text)  Description: Occupational Therapy Goals   Long Term Goals  Initiated (2022) and to be accomplished within 3 week(s); by (2022)   1. Pt will perform self-feeding with Mod I.   2. Pt will perform grooming with Mod I.   3. Pt will perform UB bathing with set-up/ supv using AE and/ or compensatory strategies as needed. 4. Pt will perform LB bathing with SBA/ CGA using AE and/ or compensatory strategies as needed. 5. Pt will perform tub/shower transfer with SBA using LRAD. 6. Pt will perform UB dressing with set-up. 7. Pt will perform LB dressing with SBA using AE and/ or compensatory strategies as needed. 8. Pt will perform toileting task with supv.  9. Pt will perform toilet transfer with supv using LRAD. Short Term Goals   Initiated (2022) and to be accomplished within 7 day(s), (by 2022)   1. Pt will perform self-feeding with set-up. 2. Pt will perform grooming with supv. 3. Pt will perform UB bathing with supv using AE as needed. 4. Pt will perform LB bathing with Min A using AE and/ or compensatory strategies as needed. 5. Pt will perform tub/shower transfer with Min A using LRAD. 6. Pt will perform UB dressing with SBA. 7. Pt will perform LB dressing with Min A using AE and/ or compensatory strategies as needed. 8. Pt will perform toileting task with Min A  9. Pt will perform toilet transfer with Min A using LRAD. Outcome: Progressing Towards Goal  Goal: Interventions  Outcome: Progressing Towards Goal   Occupational Therapy TREATMENT    Patient: Onur Meza Varinder   76 y.o. Patient identified with name and : yes    Date: 2022    First Tx Session  Time In: 905  Time Out[de-identified] 4313        Diagnosis: UTI (urinary tract infection) [N39.0]   Precautions: Fall, Contact  Chart, occupational therapy assessment, plan of care, and goals were reviewed.      Pain:  Pt reports 0/10 pain or discomfort prior to treatment. Pt reports 10/10 pain or discomfort post treatment. (10/10 in BLE knees with functional mobility self propelling w/c, 7/10 following activity while at rest)  Intervention Provided: RN notified and administered medication      SUBJECTIVE:   Patient stated Elias Reyes was a  for .     OBJECTIVE DATA SUMMARY:     THERAPEUTIC ACTIVITY Daily Assessment    Pt engaged in BUE FM activity at table top which involved manipulating therapy putty against mod resistance  (pushing, pulling, twisting) to locate and retrieve small beads from putty, Pt demonstrated ability to locate 15 beads. Pt demonstrated impulsivity following activity slamming ball of therapy putty on the table to see if it would bounce and it hitting the container of beads and beads spilled all over the floor. Pt reporting \"I didn't think it would do that\". OTR provided education for safety and fall prevention. THERAPEUTIC EXERCISE Daily Assessment    Pt completed BUE hand strengthening with therapy putty against mod resistance completing 4/4 exercises following education and demonstration for technique with min 's. Pt reported feeling shaky with activity and noted BUE hand tremors in hands while at rest. Pt reported increased difficulty eating soup and liquids with spoon. Pt reported I just usually eat the big chunks out of my soup not the liquid. OTR provided education for trialing weighted adaptive utensils for self feeding if difficulty persists. OTR provided education for compensatory strategies for self feeding with weighted utensils, resting elbow on table to relieve any strain to BUE shoulders while using weighted utensils for self feeding. Pt verbalized understanding. Pt completed BUE AAROM for BUE shoulders in three directions (shoulder flexion/extension, abduction/adduction) 3x10 reps as tolerated.  OTR encouraged pt to perform exercises in between therapy sessions and meals to increase pt BUE strength and mobility. Pt verbalized understanding. LOWER BODY DRESSING Daily Assessment     Pt performed LB dressing donning BLE socks and shoes at EOB. Pt donning BLE compression socks with SBA and donning BLE shoes with increased time and min-modA to push heel into shoe and tie. MOBILITY/TRANSFERS Daily Assessment     Pt requires VC's for safety and pacing. Pt performed bed mobility supine to sit EOB with min-modA due to decreased BUE strength (prior rotator cuff injuries). Pt performed sit to stand with bed in slightly raised position secondary to pt reporting unable to stand, and Gisell with VC's for hand placement secondary to pt attempting to pull on FWW to stand. Pt requires Gisell for EOB to w/c transfer with FWW and VC's for sequencing secondary to pt plopping into w/c despite VC's to reach back for armrest. Pt demonstrates decreased safety due to quick movements. ASSESSMENT:  Pt is making progress and continues to require VC's for safety due to quick movements and decreased safety with functional mobility (ie: hand placement and sequencing and problem solving). Pt is increasing BUE hand and UB strength for carryover with self cares and functional mobility. OTR provided education for possibility of trialing weighted adaptive utensils per BUE hand tremors if difficulty with self feeding persists. Pt verbalized understanding. Progression toward goals:  []          Improving appropriately and progressing toward goals  [x]          Improving slowly and progressing toward goals  []          Not making progress toward goals and plan of care will be adjusted     PLAN:  Patient continues to benefit from skilled intervention to address the above impairments. Continue treatment per established plan of care.   Discharge Recommendations: Home Health with assist  Further Equipment Recommendations for Discharge: TBD     Activity Tolerance:  fair      Estimated LOS:TBD    Entered Differentiated Treatment minutes: Yes      Please refer to the flowsheet for vital signs taken during this treatment. After treatment:   [x]  Patient left in no apparent distress sitting up in chair   []  Patient left in no apparent distress in bed  [x]  Call bell left within reach  [x]  Nursing notified  []  Caregiver present  []  Bed alarm activated    COMMUNICATION/EDUCATION:   [] Home safety education was provided and the patient/caregiver indicated understanding. [x] Patient/family have participated as able in goal setting and plan of care. [x] Patient/family agree to work toward stated goals and plan of care. [] Patient understands intent and goals of therapy, but is neutral about his/her participation. [] Patient is unable to participate in goal setting and plan of care.       Pamella Seay, OT

## 2022-11-05 NOTE — PROGRESS NOTES
SHIFT CHANGE NOTE FOR [facility]    Bedside and Verbal shift change report given to Jose Aguilar RN (oncoming nurse) by Tiffany Aguirre RN (offgoing nurse). Report included the following information SBAR, Kardex, MAR and Recent Results. Situation:   Code Status: Full Code   Hospital Day: 2   Problem List:   Hospital Problems  Date Reviewed: 9/22/2022            Codes Class Noted POA    UTI (urinary tract infection) ICD-10-CM: N39.0  ICD-9-CM: 599.0  11/3/2022 Unknown           Background:   Past Medical History:   Past Medical History:   Diagnosis Date    Abnormal chest CT 2/6/2020    Adopted     Arthritis     Balance problems     Chronic cough 2/6/2020    MCBRIDE (dyspnea on exertion) 2/28/2019    GERD (gastroesophageal reflux disease)     Gout     Hemochromatosis     High cholesterol     Hypertension     Hypothyroidism     Left knee pain     Left shoulder pain     Low blood potassium     Lumbar pain     Pain of left sacroiliac joint     Shoulder impingement, right, with rotator cuff strain         Assessment:  Changes in Assessment throughout shift: No change to previous assessment    Patient has a central line: no Reasons if yes: Insertion date: Last dressing date:  Patient has Coelho Cath: no Reasons if yes:     Insertion date:  Shift ceolho care completed:     Last Vitals:     Vitals:    11/04/22 1700 11/04/22 1727 11/04/22 1954 11/05/22 0748   BP:  98/61 (!) 93/55 128/65   Pulse:  64 65 62   Resp:  18 18 19   Temp:  97.7 °F (36.5 °C) 97 °F (36.1 °C) 97.7 °F (36.5 °C)   SpO2:  99% 99% 99%   Weight:       Height: 5' 1\" (1.549 m)          PAIN    Pain Assessment    Pain Intensity 1: 0 (11/05/22 0330) Pain Intensity 1: 2 (12/29/14 1105)    Pain Location 1: Back Pain Location 1: Abdomen      Pain Intervention(s) 1: Medication (see MAR)  Patient Stated Pain Goal: 0 Patient Stated Pain Goal: 0  Intervention effective: yes  Other actions taken for pain:      Skin Assessment  Skin color Skin Color: Appropriate for ethnicity  Condition/Temperature    Integrity Skin Integrity: Wound (add Wound LDA)  Turgor    Weekly Pressure Ulcer Documentation  Pressure  Injury Documentation: No Pressure Injury Noted-Pressure Ulcer Prevention Initiated  Wound Prevention & Protection Methods  Orientation of wound Orientation of Wound Prevention: Posterior  Location of Prevention Location of Wound Prevention: Sacrum/Coccyx  Dressing Present Dressing Present : No  Dressing Status    Wound Offloading Wound Offloading (Prevention Methods): Bed, pressure reduction mattress, Bed, pressure redistribution/air, Pillows, Repositioning, Walker, Wheelchair    INTAKE/OUPUT  Date 11/04/22 0700 - 11/05/22 0659 11/05/22 0700 - 11/06/22 0659   Shift 0632-8499 3410-9103 24 Hour Total 0084-0837 9823-4374 24 Hour Total   INTAKE   P.O.  360 360        P. O.  360 360      Shift Total(mL/kg)  360(4.4) 360(4.4)      OUTPUT   Urine(mL/kg/hr)  0(0) 0(0)        Urine Voided  0 0        Urine Occurrence(s) 3 x 3 x 6 x 0 x  0 x   Emesis/NG output           Emesis Occurrence(s) 0 x  0 x      Stool           Stool Occurrence(s) 2 x 1 x 3 x 0 x  0 x   Shift Total(mL/kg)  0(0) 0(0)      NET  360 360      Weight (kg) 82.3 82.3 82.3 82.3 82.3 82.3       Recommendations:  Patient needs and requests: none at this time    Pending tests/procedures: routine labs     Functional Level/Equipment: Partial (one person) / Bed (comment)    Fall Precautions:   Fall risk precautions were reinforced with the patient; she was instructed to call for help prior to getting up. The following fall risk precautions were continued: bed/ chair alarms, door signage, yellow bracelet and socks as well as update of the Rolm Robbi tool in the patient's room. Talia Score: 3    HEALS Safety Check    A safety check occurred in the patient's room between off going nurse and oncoming nurse listed above.     The safety check included the below items  Area Items   H  High Alert Medications Verify all high alert medication drips (heparin, PCA, etc.)   E  Equipment Suction is set up for ALL patients (with lucinda)  Red plugs utilized for all equipment (IV pumps, etc.)  WOWs wiped down at end of shift. Room stocked with oxygen, suction, and other unit-specific supplies   A  Alarms Bed alarm is set for fall risk patients  Ensure chair alarm is in place and activated if patient is up in a chair   L  Lines Check IV for any infiltration  Gonzalez bag is empty if patient has a Gonzalez   Tubing and IV bags are labeled   S  Safety   Room is clean, patient is clean, and equipment is clean. Hallways are clear from equipment besides carts. Fall bracelet on for fall risk patients  Ensure room is clear and free of clutter  Suction is set up for ALL patients (with lucinda)  Hallways are clear from equipment besides carts.    Isolation precautions followed, supplies available outside room, sign posted     Shannan Chavis RN

## 2022-11-05 NOTE — PROGRESS NOTES
Problem: Risk for Spread of Infection  Goal: Prevent transmission of infectious organism to others  Description: Prevent the transmission of infectious organisms to other patients, staff members, and visitors. Outcome: Progressing Towards Goal     Problem: Patient Education:  Go to Education Activity  Goal: Patient/Family Education  Outcome: Progressing Towards Goal     Problem: Falls - Risk of  Goal: *Absence of Falls  Description: Document Gabbylinda Gamma Fall Risk and appropriate interventions in the flowsheet. Outcome: Progressing Towards Goal  Note: Fall Risk Interventions:  Mobility Interventions: Bed/chair exit alarm, Patient to call before getting OOB, Utilize walker, cane, or other assistive device         Medication Interventions: Bed/chair exit alarm, Patient to call before getting OOB, Teach patient to arise slowly    Elimination Interventions: Bed/chair exit alarm, Call light in reach, Patient to call for help with toileting needs    History of Falls Interventions: Bed/chair exit alarm, Door open when patient unattended, Room close to nurse's station         Problem: Patient Education: Go to Patient Education Activity  Goal: Patient/Family Education  Outcome: Progressing Towards Goal     Problem: Pressure Injury - Risk of  Goal: *Prevention of pressure injury  Description: Document Stef Scale and appropriate interventions in the flowsheet.   Outcome: Progressing Towards Goal  Note: Pressure Injury Interventions:  Sensory Interventions: Assess changes in LOC, Keep linens dry and wrinkle-free    Moisture Interventions: Absorbent underpads    Activity Interventions: Increase time out of bed, Pressure redistribution bed/mattress(bed type), Chair cushion    Mobility Interventions: HOB 30 degrees or less, Pressure redistribution bed/mattress (bed type), Chair cushion    Nutrition Interventions: Document food/fluid/supplement intake    Friction and Shear Interventions: Lift sheet, Lift team/patient mobility team, HOB 30 degrees or less                Problem: Patient Education: Go to Patient Education Activity  Goal: Patient/Family Education  Outcome: Progressing Towards Goal

## 2022-11-05 NOTE — PROGRESS NOTES
Problem: Risk for Spread of Infection  Goal: Prevent transmission of infectious organism to others  Description: Prevent the transmission of infectious organisms to other patients, staff members, and visitors. Outcome: Progressing Towards Goal     Problem: Falls - Risk of  Goal: *Absence of Falls  Description: Document Eleanorcony Ramirez Fall Risk and appropriate interventions in the flowsheet. Outcome: Progressing Towards Goal  Note: Fall Risk Interventions:  Mobility Interventions: Bed/chair exit alarm, Communicate number of staff needed for ambulation/transfer, Patient to call before getting OOB, Utilize walker, cane, or other assistive device, Utilize gait belt for transfers/ambulation         Medication Interventions: Bed/chair exit alarm, Patient to call before getting OOB, Teach patient to arise slowly, Utilize gait belt for transfers/ambulation    Elimination Interventions: Bed/chair exit alarm, Call light in reach, Patient to call for help with toileting needs    History of Falls Interventions: Bed/chair exit alarm, Door open when patient unattended, Room close to nurse's station, Utilize gait belt for transfer/ambulation         Problem: Pressure Injury - Risk of  Goal: *Prevention of pressure injury  Description: Document Stef Scale and appropriate interventions in the flowsheet. Outcome: Progressing Towards Goal  Note: Pressure Injury Interventions:  Sensory Interventions: Assess changes in LOC, Assess need for specialty bed, Chair cushion, Keep linens dry and wrinkle-free, Maintain/enhance activity level, Minimize linen layers, Pressure redistribution bed/mattress (bed type), Turn and reposition approx.  every two hours (pillows and wedges if needed)    Moisture Interventions: Absorbent underpads, Assess need for specialty bed, Maintain skin hydration (lotion/cream), Minimize layers    Activity Interventions: Assess need for specialty bed, Chair cushion, Pressure redistribution bed/mattress(bed type)    Mobility Interventions: Assess need for specialty bed, Chair cushion, HOB 30 degrees or less, Pressure redistribution bed/mattress (bed type), Turn and reposition approx.  every two hours(pillow and wedges)    Nutrition Interventions: Document food/fluid/supplement intake    Friction and Shear Interventions: HOB 30 degrees or less, Minimize layers, Transferring/repositioning devices

## 2022-11-05 NOTE — PROGRESS NOTES
Wound Prevention Checklist    Patient: Eli Funes [de-identified]76 y.o. female)  Date: 11/5/2022  Diagnosis: UTI (urinary tract infection) [N39.0] <principal problem not specified>    Precautions: Fall, Contact       []  Heel prevention boots placed on patient    []  Patient turned q2h during shift    []  Lift team ordered    []  Patient on Félix bed/Specialty bed    []  Each Wound is documented during shift (Stage, Color, drainage, odor, measurements, and dressings)    [x]  Dual skin check done with RANJAN Uriarte RN

## 2022-11-06 PROCEDURE — 97530 THERAPEUTIC ACTIVITIES: CPT

## 2022-11-06 PROCEDURE — 97110 THERAPEUTIC EXERCISES: CPT

## 2022-11-06 PROCEDURE — 97150 GROUP THERAPEUTIC PROCEDURES: CPT

## 2022-11-06 PROCEDURE — 74011250637 HC RX REV CODE- 250/637: Performed by: EMERGENCY MEDICINE

## 2022-11-06 PROCEDURE — 65310000000 HC RM PRIVATE REHAB

## 2022-11-06 PROCEDURE — 97535 SELF CARE MNGMENT TRAINING: CPT

## 2022-11-06 RX ADMIN — OXYCODONE HYDROCHLORIDE 5 MG: 5 TABLET ORAL at 09:59

## 2022-11-06 RX ADMIN — THERA TABS 1 TABLET: TAB at 08:20

## 2022-11-06 RX ADMIN — METOPROLOL TARTRATE 25 MG: 25 TABLET, FILM COATED ORAL at 08:20

## 2022-11-06 RX ADMIN — PANTOPRAZOLE SODIUM 40 MG: 40 TABLET, DELAYED RELEASE ORAL at 06:42

## 2022-11-06 RX ADMIN — OXYCODONE HYDROCHLORIDE 5 MG: 5 TABLET ORAL at 16:02

## 2022-11-06 RX ADMIN — CLOPIDOGREL BISULFATE 75 MG: 75 TABLET ORAL at 08:20

## 2022-11-06 RX ADMIN — APIXABAN 5 MG: 5 TABLET, FILM COATED ORAL at 08:20

## 2022-11-06 RX ADMIN — LEVOTHYROXINE SODIUM 25 MCG: 25 TABLET ORAL at 06:41

## 2022-11-06 RX ADMIN — APIXABAN 5 MG: 5 TABLET, FILM COATED ORAL at 17:45

## 2022-11-06 RX ADMIN — ROSUVASTATIN CALCIUM 40 MG: 20 TABLET, FILM COATED ORAL at 08:20

## 2022-11-06 NOTE — PROGRESS NOTES
SHIFT CHANGE NOTE FOR [facility]    Bedside and Verbal shift change report given to Donovan Apodaca RN (oncoming nurse) by Clifford Madison RN (offgoing nurse). Report included the following information SBAR, Kardex, MAR and Recent Results. Situation:   Code Status: Full Code   Hospital Day: 3   Problem List:   Hospital Problems  Date Reviewed: 9/22/2022            Codes Class Noted POA    UTI (urinary tract infection) ICD-10-CM: N39.0  ICD-9-CM: 599.0  11/3/2022 Unknown       Background:   Past Medical History:   Past Medical History:   Diagnosis Date    Abnormal chest CT 2/6/2020    Adopted     Arthritis     Balance problems     Chronic cough 2/6/2020    MCBRIDE (dyspnea on exertion) 2/28/2019    GERD (gastroesophageal reflux disease)     Gout     Hemochromatosis     High cholesterol     Hypertension     Hypothyroidism     Left knee pain     Left shoulder pain     Low blood potassium     Lumbar pain     Pain of left sacroiliac joint     Shoulder impingement, right, with rotator cuff strain         Assessment:  Changes in Assessment throughout shift: No change to previous assessment    Patient has a central line: no Reasons if yes: Insertion date: Last dressing date:  Patient has Coelho Cath: no Reasons if yes:     Insertion date:  Shift coelho care completed:     Last Vitals:     Vitals:    11/04/22 1954 11/05/22 0748 11/05/22 1606 11/05/22 2023   BP: (!) 93/55 128/65 (!) 102/56 110/68   Pulse: 65 62 74 69   Resp: 18 19 18 19   Temp: 97 °F (36.1 °C) 97.7 °F (36.5 °C) 98.2 °F (36.8 °C) 97.8 °F (36.6 °C)   SpO2: 99% 99% 98% 98%   Weight:       Height:           PAIN    Pain Assessment    Pain Intensity 1: 0 (11/06/22 0413) Pain Intensity 1: 2 (12/29/14 1105)    Pain Location 1: Knee Pain Location 1: Abdomen    Pain Intervention(s) 1: Medication (see MAR) Pain Intervention(s) 1: Medication (see MAR)  Patient Stated Pain Goal: 0 Patient Stated Pain Goal: 0  Intervention effective: yes  Other actions taken for pain:      Skin Assessment  Skin color Skin Color: Appropriate for ethnicity  Condition/Temperature    Integrity Skin Integrity: Wound (add Wound LDA)  Turgor    Weekly Pressure Ulcer Documentation  Pressure  Injury Documentation: No Pressure Injury Noted-Pressure Ulcer Prevention Initiated  Wound Prevention & Protection Methods  Orientation of wound Orientation of Wound Prevention: Posterior  Location of Prevention Location of Wound Prevention: Buttocks, Sacrum/Coccyx, Heel  Dressing Present Dressing Present : No  Dressing Status    Wound Offloading Wound Offloading (Prevention Methods): Bed, pressure redistribution/air, Bed, pressure reduction mattress, Chair cushion, Elevate heels, Repositioning, Wheelchair    INTAKE/OUPUT  Date 11/05/22 0700 - 11/06/22 0659 11/06/22 0700 - 11/07/22 0659   Shift 0401-1389 7603-2089 24 Hour Total 7108-9415 3682-1159 24 Hour Total   INTAKE   P.O. 360 200 560        P. O. 360 200 560      Shift Total(mL/kg) 360(4.4) 200(2.4) 560(6.8)      OUTPUT   Urine(mL/kg/hr)           Urine Occurrence(s) 4 x 3 x 7 x      Emesis/NG output           Emesis Occurrence(s)  0 x 0 x      Stool           Stool Occurrence(s) 2 x 1 x 3 x      Shift Total(mL/kg)          200 560      Weight (kg) 82.3 82.3 82.3 82.3 82.3 82.3         Recommendations:  Patient needs and requests: Assistance to the restroom, pain management   Pending tests/procedures: None at this time. Functional Level/Equipment: Partial (one person) /      Fall Precautions:   Fall risk precautions were reinforced with the patient; she was instructed to call for help prior to getting up. The following fall risk precautions were continued: bed/ chair alarms, door signage, yellow bracelet and socks as well as update of the [place] [employer] tool in the patient's room. Talia Score:      HEALS Safety Check    A safety check occurred in the patient's room between off going nurse and oncoming nurse listed above.     The safety check included the below items  Area Items   H  High Alert Medications Verify all high alert medication drips (heparin, PCA, etc.)   E  Equipment Suction is set up for ALL patients (with lucinda)  Red plugs utilized for all equipment (IV pumps, etc.)  WOWs wiped down at end of shift. Room stocked with oxygen, suction, and other unit-specific supplies   A  Alarms Bed alarm is set for fall risk patients  Ensure chair alarm is in place and activated if patient is up in a chair   L  Lines Check IV for any infiltration  Gonzalez bag is empty if patient has a Gonzalez   Tubing and IV bags are labeled   S  Safety   Room is clean, patient is clean, and equipment is clean. Hallways are clear from equipment besides carts. Fall bracelet on for fall risk patients  Ensure room is clear and free of clutter  Suction is set up for ALL patients (with lucinda)  Hallways are clear from equipment besides carts.    Isolation precautions followed, supplies available outside room, sign posted     Juanjo Johnson RN

## 2022-11-06 NOTE — PROGRESS NOTES
Problem: Falls - Risk of  Goal: *Absence of Falls  Description: Document Winifred Skill Fall Risk and appropriate interventions in the flowsheet. Outcome: Progressing Towards Goal  Note: Fall Risk Interventions:  Mobility Interventions: Bed/chair exit alarm, Communicate number of staff needed for ambulation/transfer, Patient to call before getting OOB, Utilize walker, cane, or other assistive device, Utilize gait belt for transfers/ambulation         Medication Interventions: Bed/chair exit alarm, Patient to call before getting OOB, Teach patient to arise slowly, Utilize gait belt for transfers/ambulation    Elimination Interventions: Bed/chair exit alarm, Call light in reach, Patient to call for help with toileting needs    History of Falls Interventions: Bed/chair exit alarm, Door open when patient unattended, Room close to nurse's station, Utilize gait belt for transfer/ambulation         Problem: Risk for Spread of Infection  Goal: Prevent transmission of infectious organism to others  Description: Prevent the transmission of infectious organisms to other patients, staff members, and visitors. Outcome: Progressing Towards Goal     Problem: Pressure Injury - Risk of  Goal: *Prevention of pressure injury  Description: Document Stef Scale and appropriate interventions in the flowsheet. Outcome: Progressing Towards Goal  Note: Pressure Injury Interventions:  Sensory Interventions: Assess changes in LOC, Assess need for specialty bed, Chair cushion, Keep linens dry and wrinkle-free, Maintain/enhance activity level, Minimize linen layers, Pressure redistribution bed/mattress (bed type), Turn and reposition approx.  every two hours (pillows and wedges if needed)    Moisture Interventions: Absorbent underpads, Assess need for specialty bed, Maintain skin hydration (lotion/cream), Minimize layers    Activity Interventions: Assess need for specialty bed, Chair cushion, Increase time out of bed, Pressure redistribution bed/mattress(bed type)    Mobility Interventions: Assess need for specialty bed, Chair cushion, HOB 30 degrees or less, Pressure redistribution bed/mattress (bed type), Turn and reposition approx.  every two hours(pillow and wedges)    Nutrition Interventions: Document food/fluid/supplement intake    Friction and Shear Interventions: Foam dressings/transparent film/skin sealants, HOB 30 degrees or less, Minimize layers, Transferring/repositioning devices

## 2022-11-06 NOTE — PROGRESS NOTES
Progress Note    Patient: Gely Beach MRN: [id number]  CSN: [id number]    YOB: 1945  Age: 76 y.o. Sex: female    DOA: 11/3/2022 LOS:  LOS: 3 days                    Subjective:     Primary rehab diagnosis:    Impaired mobility and ADLs in the setting of UTI with generalized weakness and inability to ambulate    No acute pt concerns    Review of systems  General: No fevers or chills. Cardiovascular: No chest pain or pressure. No palpitations. Pulmonary: No shortness of breath, cough or wheeze. Gastrointestinal: No abdominal pain, nausea, vomiting or diarrhea. Genitourinary: No urinary frequency, urgency, hesitancy or dysuria. Musculoskeletal: No joint or muscle pain, no back pain, no recent trauma. Neurologic: generalized weakness    Objective:     Physical Exam:  Visit Vitals  /61 (BP 1 Location: Right upper arm, BP Patient Position: At rest)   Pulse 66   Temp 97.8 °F (36.6 °C)   Resp 17   Ht 5' 1\" (1.549 m)   Wt 82.3 kg (181 lb 6.4 oz)   SpO2 99%   BMI 34.28 kg/m²        General:         Alert, cooperative, no acute distress    HEENT: NC, Atraumatic.   anicteric sclerae. Lungs: CTA Bilaterally. No Wheezing/Rhonchi/Rales. Heart:  Regular  rhythm,  No murmur, No Rubs, No Gallops  Abdomen: Soft, Non distended, Non tender. +Bowel sounds  Extremities: No edema  Psych:   Not anxious or agitated. Neurologic:  CN 2-12 grossly intact, Alert and oriented X 3.   No acute neurological                                 Deficits,     Intake and Output:  Current Shift:  11/06 0701 - 11/06 1900  In: 480 [P.O.:480]  Out: -   Last three shifts:  11/04 1901 - 11/06 0700  In: 920 [P.O.:920]  Out: 0     Labs: Results:       Chemistry Recent Labs     11/05/22 0517 11/04/22 0611   GLU 94 101*    140   K 4.0 4.7   * 113*   CO2 23 23   BUN 24* 27*   CREA 0.80 0.94   CA 8.6 8.9   AGAP 4 4   BUCR 30* 29*        CBC w/Diff Recent Labs     11/05/22 0517 11/04/22  0611   WBC 7.2 7.7   RBC 2.91* 3.10*   HGB 9.1* 9.6*   HCT 29.0* 30.2*    192   GRANS 65 67   LYMPH 23 21   EOS 2 2        Cardiac Enzymes No results for input(s): CPK, CKND1, REINALDO in the last 72 hours. No lab exists for component: CKRMB, TROIP   Coagulation No results for input(s): PTP, INR, APTT, INREXT, INREXT in the last 72 hours. Lipid Panel Lab Results   Component Value Date/Time    Cholesterol, total 143 05/20/2022 07:26 AM    HDL Cholesterol 73 (H) 05/20/2022 07:26 AM    LDL, calculated 48.6 05/20/2022 07:26 AM    VLDL, calculated 21.4 05/20/2022 07:26 AM    Triglyceride 107 05/20/2022 07:26 AM    CHOL/HDL Ratio 2.0 05/20/2022 07:26 AM      BNP No results for input(s): BNPP in the last 72 hours. Liver Enzymes No results for input(s): TP, ALB, TBIL, AP in the last 72 hours.     No lab exists for component: SGOT, GPT, DBIL   Thyroid Studies Lab Results   Component Value Date/Time    TSH 1.24 11/02/2022 03:36 AM          Procedures/imaging: see electronic medical records for all procedures/Xrays and details which were not copied into this note but were reviewed prior to creation of Plan    Medications:   Current Facility-Administered Medications   Medication Dose Route Frequency    therapeutic multivitamin (THERAGRAN) tablet 1 Tablet  1 Tablet Oral DAILY    albuterol (PROVENTIL VENTOLIN) nebulizer solution 2.5 mg  2.5 mg Nebulization Q4H PRN    apixaban (ELIQUIS) tablet 5 mg  5 mg Oral BID    polyvinyl alcohol-povidon(PF) (REFRESH CLASSIC) 1.4-0.6 % ophthalmic solution 1 Drop  1 Drop Both Eyes DAILY PRN    clopidogreL (PLAVIX) tablet 75 mg  75 mg Oral DAILY    levothyroxine (SYNTHROID) tablet 25 mcg  25 mcg Oral ACB    metoprolol tartrate (LOPRESSOR) tablet 25 mg  25 mg Oral DAILY    nitroglycerin (NITROSTAT) tablet 0.4 mg  0.4 mg SubLINGual Q5MIN PRN    oxyCODONE IR (ROXICODONE) tablet 5 mg  5 mg Oral TID PRN    pantoprazole (PROTONIX) tablet 40 mg  40 mg Oral ACB    rosuvastatin (CRESTOR) tablet 40 mg  40 mg Oral DAILY docusate sodium (COLACE) capsule 100 mg  100 mg Oral BID    bisacodyL (DULCOLAX) tablet 10 mg  10 mg Oral Q48H PRN       Assessment/Plan     Active Problems:    UTI (urinary tract infection) (11/3/2022)    Impaired mobility and ADLs in the setting of UTI with generalized weakness and inability to ambulate  Pt/ot eval and treat    Recent UTI  Completed therapy, monitor for recurrent symtpoms    HTN, HLD, atrial fibrillation  Continue lopressor, crestor  Anticoagulation with eliquis    Hypothyroidism  Continue synthroid 25mcg daily    GERD  Continue protonix        Melany Nichole MD  11/6/2022

## 2022-11-06 NOTE — PROGRESS NOTES
Problem: Self Care Deficits Care Plan (Adult)  Goal: *Acute Goals and Plan of Care (Insert Text)  Description: Occupational Therapy Goals   Long Term Goals  Initiated (2022) and to be accomplished within 3 week(s); by (2022)   1. Pt will perform self-feeding with Mod I.   2. Pt will perform grooming with Mod I.   3. Pt will perform UB bathing with set-up/ supv using AE and/ or compensatory strategies as needed. 4. Pt will perform LB bathing with SBA/ CGA using AE and/ or compensatory strategies as needed. 5. Pt will perform tub/shower transfer with SBA using LRAD. 6. Pt will perform UB dressing with set-up. 7. Pt will perform LB dressing with SBA using AE and/ or compensatory strategies as needed. 8. Pt will perform toileting task with supv.  9. Pt will perform toilet transfer with supv using LRAD. Short Term Goals   Initiated (2022) and to be accomplished within 7 day(s), (by 2022)   1. Pt will perform self-feeding with set-up. 2. Pt will perform grooming with supv. 3. Pt will perform UB bathing with supv using AE as needed. 4. Pt will perform LB bathing with Min A using AE and/ or compensatory strategies as needed. 5. Pt will perform tub/shower transfer with Min A using LRAD. 6. Pt will perform UB dressing with SBA. 7. Pt will perform LB dressing with Min A using AE and/ or compensatory strategies as needed. 8. Pt will perform toileting task with Min A  9. Pt will perform toilet transfer with Min A using LRAD. Outcome: Progressing Towards Goal   Occupational Therapy TREATMENT    Patient: Zeenat Reeder   76 y.o. Patient identified with name and : yes    Date: 2022    First Tx Session  Time In: 0  Time Out[de-identified] 1600    Diagnosis: UTI (urinary tract infection) [N39.0]   Precautions: Fall, Contact  Chart, occupational therapy assessment, plan of care, and goals were reviewed. Pain:  Pt reports 5/10 pain or discomfort prior to treatment.     Pt reports 7/10 pain or discomfort post treatment. Intervention Provided: Nursing providing pain medication at end of session. SUBJECTIVE:   Patient stated I'm from [place].     OBJECTIVE DATA SUMMARY:     THERAPEUTIC ACTIVITY Daily Assessment    Pt participated in group activity with one other pt. Pt played the game Sorry, having to reach forward (outside OREN) to draw/discard cards and to move game piece around board. Pt requiring cuing throughout game for game play directions. Pt participated in FM/dexterity activity for carryover to self-care tasks. Pt used the game Perfection to  small game pieces and place into game board. Pt used B hands to perform activity with extra time. THERAPEUTIC EXERCISE Daily Assessment    Pt participated in BUE strengthening exercises for carryover to self-care tasks. Pt used 1# dowel to perform chest press, bicep curls and forward rows (2 sets x 10 reps each exercise). TOILETING Daily Assessment    Functional Level: 3  Comments: Pt requirng assistance with clothing management and hygiene. MOBILITY/TRANSFERS Daily Assessment    Toilet Transfer Score: 3  Comments: Stand step transfer w/c to elevated seat over toilet. Pt using grab bars to assit with transfer. Pt self-propelled w/c room <> gym using BLE to propel forward. Pt requiring several rest breaks to complete due to increased fatigue. ASSESSMENT:  Pt is quite verbose throughout treatment session, requiring redirection back to task on multiple occasions. Pt demonstrating decreased activity tolerance. Pt limited with full ROM to B shoulders due to pain. Progression toward goals:  []          Improving appropriately and progressing toward goals  [x]          Improving slowly and progressing toward goals  []          Not making progress toward goals and plan of care will be adjusted     PLAN:  Patient continues to benefit from skilled intervention to address the above impairments.   Continue treatment per established plan of care. Discharge Recommendations:  Home Health with assist  Further Equipment Recommendations for Discharge:  TBD     Activity Tolerance:  Fair      Estimated LOS:TBD  [id number] Completed: NA  Entered Differentiated Treatment minutes: Yes      Please refer to the flowsheet for vital signs taken during this treatment. After treatment:   []  Patient left in no apparent distress sitting up in chair   [x]  Patient left in no apparent distress in bed  [x]  Call bell left within reach  [x]  Nursing present  []  Caregiver present  []  Bed alarm activated    COMMUNICATION/EDUCATION:   [] Home safety education was provided and the patient/caregiver indicated understanding. [] Patient/family have participated as able in goal setting and plan of care. [x] Patient/family agree to work toward stated goals and plan of care. [] Patient understands intent and goals of therapy, but is neutral about his/her participation. [] Patient is unable to participate in goal setting and plan of care.       VERONIKA Hernandez/JARED

## 2022-11-06 NOTE — PROGRESS NOTES
SHIFT CHANGE NOTE FOR [facility]    Bedside and Verbal shift change report given to Jimmie Reynoso (oncoming nurse) by Diane Hollins RN (offgoing nurse). Report included the following information SBAR, Kardex, MAR and Recent Results. Situation:   Code Status: Full Code   Hospital Day: 3   Problem List:   Hospital Problems  Date Reviewed: 9/22/2022            Codes Class Noted POA    UTI (urinary tract infection) ICD-10-CM: N39.0  ICD-9-CM: 599.0  11/3/2022 Unknown       Background:   Past Medical History:   Past Medical History:   Diagnosis Date    Abnormal chest CT 2/6/2020    Adopted     Arthritis     Balance problems     Chronic cough 2/6/2020    MCBRIDE (dyspnea on exertion) 2/28/2019    GERD (gastroesophageal reflux disease)     Gout     Hemochromatosis     High cholesterol     Hypertension     Hypothyroidism     Left knee pain     Left shoulder pain     Low blood potassium     Lumbar pain     Pain of left sacroiliac joint     Shoulder impingement, right, with rotator cuff strain         Assessment:  Changes in Assessment throughout shift: No change to previous assessment    Patient has a central line: no Reasons if yes: Insertion date: Last dressing date:  Patient has Coelho Cath: no Reasons if yes:     Insertion date:  Shift coelho care completed:     Last Vitals:     Vitals:    11/05/22 1606 11/05/22 2023 11/06/22 0730 11/06/22 1650   BP: (!) 102/56 110/68 124/61 116/63   Pulse: 74 69 66 65   Resp: 18 19 17 17   Temp: 98.2 °F (36.8 °C) 97.8 °F (36.6 °C) 97.8 °F (36.6 °C) 98 °F (36.7 °C)   SpO2: 98% 98% 99% 99%   Weight:       Height:           PAIN    Pain Assessment    Pain Intensity 1: 0 (11/06/22 1650) Pain Intensity 1: 2 (12/29/14 1105)    Pain Location 1: Back, Spine, sacral Pain Location 1: Abdomen    Pain Intervention(s) 1: Medication (see MAR) Pain Intervention(s) 1: Medication (see MAR)  Patient Stated Pain Goal: 0 Patient Stated Pain Goal: 0  Intervention effective: yes  Other actions taken for pain: Medication (see MAR)    Skin Assessment  Skin color Skin Color: Appropriate for ethnicity  Condition/Temperature    Integrity Skin Integrity: Wound (add Wound LDA)  Turgor    Weekly Pressure Ulcer Documentation  Pressure  Injury Documentation: No Pressure Injury Noted-Pressure Ulcer Prevention Initiated  Wound Prevention & Protection Methods  Orientation of wound Orientation of Wound Prevention: Posterior  Location of Prevention Location of Wound Prevention: Buttocks, Sacrum/Coccyx  Dressing Present Dressing Present : No  Dressing Status    Wound Offloading Wound Offloading (Prevention Methods): Bed, pressure redistribution/air, Bed, pressure reduction mattress, Chair cushion, Foam silicone, Walker, Wheelchair    INTAKE/OUPUT  Date 11/05/22 0700 - 11/06/22 0659 11/06/22 0700 - 11/07/22 0659   Shift 7197-5820 6441-8988 24 Hour Total 2249-4888 7502-4755 24 Hour Total   INTAKE   P.O. 360 200 560 480  480     P. O. 360 200 560 480  480   Shift Total(mL/kg) 360(4.4) 200(2.4) 560(6.8) 480(5.8)  480(5.8)   OUTPUT   Urine(mL/kg/hr)           Urine Occurrence(s) 4 x 3 x 7 x 4 x  4 x   Emesis/NG output           Emesis Occurrence(s)  0 x 0 x      Stool           Stool Occurrence(s) 2 x 1 x 3 x 1 x  1 x   Shift Total(mL/kg)          200 560 480  480   Weight (kg) 82.3 82.3 82.3 82.3 82.3 82.3         Recommendations:  Patient needs and requests: Assistance to the restroom, pain management   Pending tests/procedures: None at this time. Functional Level/Equipment: Partial (one person) / Bed (comment)    Fall Precautions:   Fall risk precautions were reinforced with the patient; she was instructed to call for help prior to getting up. The following fall risk precautions were continued: bed/ chair alarms, door signage, yellow bracelet and socks as well as update of the Lettie Duverney tool in the patient's room.    Talia Score: 3    HEALS Safety Check    A safety check occurred in the patient's room between off going nurse and oncoming nurse listed above. The safety check included the below items  Area Items   H  High Alert Medications Verify all high alert medication drips (heparin, PCA, etc.)   E  Equipment Suction is set up for ALL patients (with lucinda)  Red plugs utilized for all equipment (IV pumps, etc.)  WOWs wiped down at end of shift. Room stocked with oxygen, suction, and other unit-specific supplies   A  Alarms Bed alarm is set for fall risk patients  Ensure chair alarm is in place and activated if patient is up in a chair   L  Lines Check IV for any infiltration  Gonzalez bag is empty if patient has a Gonzalez   Tubing and IV bags are labeled   S  Safety   Room is clean, patient is clean, and equipment is clean. Hallways are clear from equipment besides carts. Fall bracelet on for fall risk patients  Ensure room is clear and free of clutter  Suction is set up for ALL patients (with lucinda)  Hallways are clear from equipment besides carts.    Isolation precautions followed, supplies available outside room, sign posted     Radha Jorge RN

## 2022-11-06 NOTE — PROGRESS NOTES
Wound Prevention Checklist    Patient: lEi Funes [de-identified]76 y.o. female)  Date: 11/6/2022  Diagnosis: UTI (urinary tract infection) [N39.0] <principal problem not specified>    Precautions: Fall, Contact       []  Heel prevention boots placed on patient    []  Patient turned q2h during shift    []  Lift team ordered    [x]  Patient on [place] bed/Specialty bed    [x]  Each Wound is documented during shift (Stage, Color, drainage, odor, measurements, and dressings)    [x]  Dual skin check done with RANJAN Mandel RN

## 2022-11-06 NOTE — PROGRESS NOTES
Problem: Risk for Spread of Infection  Goal: Prevent transmission of infectious organism to others  Description: Prevent the transmission of infectious organisms to other patients, staff members, and visitors. Outcome: Progressing Towards Goal     Problem: Patient Education:  Go to Education Activity  Goal: Patient/Family Education  Outcome: Progressing Towards Goal     Problem: Falls - Risk of  Goal: *Absence of Falls  Description: Document Carmel Litter Fall Risk and appropriate interventions in the flowsheet.   Outcome: Progressing Towards Goal  Note: Fall Risk Interventions:  Mobility Interventions: Bed/chair exit alarm, Communicate number of staff needed for ambulation/transfer, Patient to call before getting OOB, Utilize walker, cane, or other assistive device, Utilize gait belt for transfers/ambulation         Medication Interventions: Bed/chair exit alarm, Patient to call before getting OOB, Teach patient to arise slowly, Utilize gait belt for transfers/ambulation    Elimination Interventions: Bed/chair exit alarm, Call light in reach, Patient to call for help with toileting needs    History of Falls Interventions: Bed/chair exit alarm, Door open when patient unattended, Room close to nurse's station, Utilize gait belt for transfer/ambulation

## 2022-11-07 PROCEDURE — 74011250637 HC RX REV CODE- 250/637: Performed by: EMERGENCY MEDICINE

## 2022-11-07 PROCEDURE — 65310000000 HC RM PRIVATE REHAB

## 2022-11-07 PROCEDURE — 99232 SBSQ HOSP IP/OBS MODERATE 35: CPT | Performed by: HOSPITALIST

## 2022-11-07 PROCEDURE — 97150 GROUP THERAPEUTIC PROCEDURES: CPT

## 2022-11-07 PROCEDURE — 97116 GAIT TRAINING THERAPY: CPT

## 2022-11-07 PROCEDURE — 97530 THERAPEUTIC ACTIVITIES: CPT

## 2022-11-07 PROCEDURE — 97110 THERAPEUTIC EXERCISES: CPT

## 2022-11-07 PROCEDURE — 97535 SELF CARE MNGMENT TRAINING: CPT

## 2022-11-07 PROCEDURE — 97112 NEUROMUSCULAR REEDUCATION: CPT

## 2022-11-07 RX ADMIN — OXYCODONE HYDROCHLORIDE 5 MG: 5 TABLET ORAL at 12:25

## 2022-11-07 RX ADMIN — APIXABAN 5 MG: 5 TABLET, FILM COATED ORAL at 17:28

## 2022-11-07 RX ADMIN — ROSUVASTATIN CALCIUM 40 MG: 20 TABLET, FILM COATED ORAL at 09:38

## 2022-11-07 RX ADMIN — PANTOPRAZOLE SODIUM 40 MG: 40 TABLET, DELAYED RELEASE ORAL at 06:33

## 2022-11-07 RX ADMIN — METOPROLOL TARTRATE 25 MG: 25 TABLET, FILM COATED ORAL at 09:37

## 2022-11-07 RX ADMIN — THERA TABS 1 TABLET: TAB at 09:37

## 2022-11-07 RX ADMIN — CLOPIDOGREL BISULFATE 75 MG: 75 TABLET ORAL at 09:37

## 2022-11-07 RX ADMIN — APIXABAN 5 MG: 5 TABLET, FILM COATED ORAL at 09:37

## 2022-11-07 RX ADMIN — LEVOTHYROXINE SODIUM 25 MCG: 25 TABLET ORAL at 06:33

## 2022-11-07 NOTE — PROGRESS NOTES
Problem: Mobility Impaired (Adult and Pediatric)  Goal: *Therapy Goal (Edit Goal, Insert Text)  Description: Physical Therapy Short Term Goals  Initiated 11/4/2022 and to be accomplished within 7 day(s) on 11/11/2022  1. Patient will move from supine to sit and sit to supine  in bed with minimal assistance/contact guard assist.    2.  Patient will transfer from bed to chair and chair to bed with minimal assistance/contact guard assist using the least restrictive device. 3.  Patient will perform sit to stand with minimal assistance/contact guard assist.  4.  Patient will ambulate with minimal assistance/contact guard assist for 75 feet with the least restrictive device. 5.  Patient will perform w/c mobility 150 ft bouts SBA level over even surfaces for improved functional endurance. Physical Therapy Long Term Goals  Initiated 11/4/2022 and to be accomplished within 14 day(s) on 11/18/2022  1. Patient will transfer from bed to chair and chair to bed with supervision using the least restrictive device. 2.  Patient will perform sit to stand with supervision. 3.  Patient will ambulate with supervision/set-up for 150 feet with the least restrictive device. 4.  Patient will perform w/c mobility over even surfaces for 150 ft bouts at mod I level. Outcome: Progressing Towards Goal   PHYSICAL THERAPY TREATMENT    Patient: Nuria De Souza (31 y.o. female)  Date: 11/7/2022  Diagnosis: UTI (urinary tract infection) [N39.0]   Precautions: Fall, Contact  Chart, physical therapy assessment, plan of care and goals were reviewed. Time BI:8100  Time Out:1000  Entered Differentiated Treatment minutes: Yes    Patient seen for: AM;Gait training  Time In: 1500  Time out: 1600  Patient seen for therapeutic activities and group session. Pain:  Pt pain was reported as 6 pre-treatment. Left leg pain  Pt pain was reported as 5 post-treatment.   Intervention: pain medication administered  PM session-patient with reports of sacral pain, she was given a new WC cushion and it appeared to help decrease her discomfort    Patient identified with name and :yes    SUBJECTIVE:    Upon entering the patient's room, the patient was at the edge of the bed, reaching down to place her compression stockings and she was SOB. Patient was given a moment to catch her breath and she was asked to sit upright to allow improved inspiration and expiration. Patient was administered medication during the patient resting from activities. She had difficulty hearing the assigned nurse when the nurse was reading off the medication that was being administered. Therapist was able to assist with communication to patient regarding the medication. After patient was given medication she began to shake uncontrollably. She was asked by the assigned nurse if she had any symptoms to which the patient responded no. Therapist asked the patient if she had ever suffered from anxiety and she became emotional and reported that she had just received a call from her partner and that her partner of 50 years told her that she could not come back home. Patient was visibly upset, therapist re-assured the patient that she was supported by the staff and that she would benefit from focusing on her healing and functional independence. Patient reported understanding of therapist's education. OBJECTIVE DATA SUMMARY:    Objective:     GROSS ASSESSMENT Daily Assessment     AROM: Generally decreased, functional  PROM: Generally decreased, functional  Strength: Generally decreased, functional  Coordination: Within functional limits  Tone: Normal  Sensation: Impaired        TRANSFERS Daily Assessment     Transfer Type: Other  Other:  (stand step with RW)  Transfer Assistance : 4 (Minimal assistance)  Sit to Stand Assistance:  Moderate assistance        GAIT Daily Assessment    Gait Description (WDL) Exceptions to WDL    Gait Abnormalities Decreased step clearance;Trendelenburg;Trunk sway increased; Shuffling gait    Assistive device Gait belt;Walker, rollator    Ambulation assistance - level surface 4 (Minimal assistance)    Distance  (45 ft)    Ambulation assistance- uneven surface  NT    Comments Patient requires verbal cues to slow down with ambulation, keep BLEs within the base of the walker as BLEs are ER during swing and stance. BALANCE Daily Assessment     Sitting - Static: Good (unsupported)  Sitting - Dynamic: Good (unsupported)  Standing - Static: Fair  Standing - Dynamic : Impaired        WHEELCHAIR MOBILITY Daily Assessment     Able to Propel (ft):  (160 ft)  Functional Level:  (5)  Curbs/Ramps Assist Required (FIM Score): 0 (Not tested)  Wheelchair Setup Assist Required : 4 (Minimal assistance)  Wheelchair Management: Manages left brake;Manages right brake      Group therapy treatment:        Seated balance  Activity   Sets   Reps   Time Spent   Comments   [] Beach ball toss/catch    Emphasis on maintaining sitting balance sitting away from back of chair/supportive surface. [] Balloon tapping       [x] Forward/multi-directional reaching at tabletop activity  1 20  16 min Emphasis on maintaining sitting balance sitting away from back of chair/supportive surface. [x]  [employer]"  1 20 8 min Working on coordination, balance and strength       [x]   Patient appropriate to continue group therapy sessions to promote increased participation in skilled therapy interventions and to provide opportunities for increased social interaction. ASSESSMENT:  Patient is seen for deficits in strength, mobility, transfers, ambulation, safety and balance. Patient is agreeable to participation and she had initiated getting her compression stockings on independently which she was able to perform, however was SOB and needed rest break before any additional activity. Patient was assisted with shoes and required minimal assistance.  Patient is making good progress, however this session, patient was emotional and required extensive education on the need to focus on her recovery and to limit the external activity to delay her progress. Patient reported understanding and she reported appreciating the education. 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 Patient was asked if she wanted referral for pastoral care and she reported that she was seeing the clinical psychologist at this time. Progression toward goals:  []      Improving appropriately and progressing toward goals  [x]      Improving slowly and progressing toward goals  []      Not making progress toward goals and plan of care will be adjusted      PLAN:  Patient continues to benefit from skilled intervention to address the above impairments. Continue treatment per established plan of care. Discharge Recommendations:  Home Physical Therapy  Further Equipment Recommendations for Discharge: need to follow up with what DME patient has or owns at this time. Estimated Discharge Date:2-3 weeks    Activity Tolerance:   Fair tolerance to session 2/2 increased difficulty hearing assigned nurse's calling out of medication as well as increased shaking that she was eventually able to control after sharing that she was upset by a phone call the night before from her friend. Please refer to the flowsheet for vital signs taken during this treatment.     After treatment:   [] Patient left in no apparent distress in bed  [x] Patient left in no apparent distress sitting up in chair  [] Patient left in no apparent distress in w/c mobilizing under own power  [] Patient left in no apparent distress dining area  [] Patient left in no apparent distress mobilizing under own power  [x] Call bell left within reach  [x] Nursing notified  [] Caregiver present  [] Bed alarm activated   [x] Chair alarm activated      Elizabeth Gonzales  11/7/2022

## 2022-11-07 NOTE — PROGRESS NOTES
SHIFT CHANGE NOTE FOR [facility]    Bedside and Verbal shift change report given to Manuela Mosley RN (oncoming nurse) by Tre Ward RN (offgoing nurse). Report included the following information SBAR, Kardex, MAR and Recent Results. Situation:   Code Status: Full Code   Hospital Day: 4   Problem List:   Hospital Problems  Date Reviewed: 9/22/2022            Codes Class Noted POA    UTI (urinary tract infection) ICD-10-CM: N39.0  ICD-9-CM: 599.0  11/3/2022 Unknown       Background:   Past Medical History:   Past Medical History:   Diagnosis Date    Abnormal chest CT 2/6/2020    Adopted     Arthritis     Balance problems     Chronic cough 2/6/2020    MCBRIDE (dyspnea on exertion) 2/28/2019    GERD (gastroesophageal reflux disease)     Gout     Hemochromatosis     High cholesterol     Hypertension     Hypothyroidism     Left knee pain     Left shoulder pain     Low blood potassium     Lumbar pain     Pain of left sacroiliac joint     Shoulder impingement, right, with rotator cuff strain         Assessment:  Changes in Assessment throughout shift: No change to previous assessment    Patient has a central line: no Reasons if yes: Insertion date: Last dressing date:  Patient has Coelho Cath: no Reasons if yes:     Insertion date:  Shift coelho care completed:     Last Vitals:     Vitals:    11/06/22 0730 11/06/22 1650 11/06/22 2057 11/07/22 0748   BP: 124/61 116/63 128/74 (!) 106/57   Pulse: 66 65 69 66   Resp: 17 17 19 18   Temp: 97.8 °F (36.6 °C) 98 °F (36.7 °C) 97.8 °F (36.6 °C) 96.9 °F (36.1 °C)   SpO2: 99% 99% 99% 98%   Weight:       Height:           PAIN    Pain Assessment    Pain Intensity 1: 0 (11/07/22 0430) Pain Intensity 1: 2 (12/29/14 1105)    Pain Location 1: Back, Spine, sacral Pain Location 1: Abdomen    Pain Intervention(s) 1: Medication (see MAR) Pain Intervention(s) 1: Medication (see MAR)  Patient Stated Pain Goal: 0 Patient Stated Pain Goal: 0  Intervention effective: yes  Other actions taken for pain: Skin Assessment  Skin color Skin Color: Appropriate for ethnicity  Condition/Temperature    Integrity Skin Integrity: Wound (add Wound LDA), Tear  Turgor    Weekly Pressure Ulcer Documentation  Pressure  Injury Documentation: No Pressure Injury Noted-Pressure Ulcer Prevention Initiated  Wound Prevention & Protection Methods  Orientation of wound Orientation of Wound Prevention: Posterior  Location of Prevention Location of Wound Prevention: Sacrum/Coccyx  Dressing Present Dressing Present : No  Dressing Status    Wound Offloading Wound Offloading (Prevention Methods): Bed, pressure reduction mattress    INTAKE/OUPUT  Date 11/06/22 0700 - 11/07/22 0659 11/07/22 0700 - 11/08/22 0659   Shift 8524-6937 5977-9727 24 Hour Total 4694-0595 3833-2390 24 Hour Total   INTAKE   P.O. 480 150 630        P. O. 480 150 630      Shift Total(mL/kg) 480(5.8) 150(1.8) 630(7.7)      OUTPUT   Urine(mL/kg/hr)           Urine Occurrence(s) 4 x 3 x 7 x      Emesis/NG output           Emesis Occurrence(s)  0 x 0 x      Stool           Stool Occurrence(s) 1 x 0 x 1 x      Shift Total(mL/kg)          150 630      Weight (kg) 82.3 82.3 82.3 82.3 82.3 82.3         Recommendations:  Patient needs and requests: Assistance to the restroom, pain management   Pending tests/procedures: None at this time. Functional Level/Equipment: Partial (one person) / Bed (comment)    Fall Precautions:   Fall risk precautions were reinforced with the patient; she was instructed to call for help prior to getting up. The following fall risk precautions were continued: bed/ chair alarms, door signage, yellow bracelet and socks as well as update of the Team[employer] tool in the patient's room. Talia Score: 3    HEALS Safety Check    A safety check occurred in the patient's room between off going nurse and oncoming nurse listed above.     The safety check included the below items  Area Items   H  High Alert Medications Verify all high alert medication drips (heparin, PCA, etc.)   E  Equipment Suction is set up for ALL patients (with lucinda)  Red plugs utilized for all equipment (IV pumps, etc.)  WOWs wiped down at end of shift. Room stocked with oxygen, suction, and other unit-specific supplies   A  Alarms Bed alarm is set for fall risk patients  Ensure chair alarm is in place and activated if patient is up in a chair   L  Lines Check IV for any infiltration  Gonzalez bag is empty if patient has a Gonzalez   Tubing and IV bags are labeled   S  Safety   Room is clean, patient is clean, and equipment is clean. Hallways are clear from equipment besides carts. Fall bracelet on for fall risk patients  Ensure room is clear and free of clutter  Suction is set up for ALL patients (with lucinda)  Hallways are clear from equipment besides carts.    Isolation precautions followed, supplies available outside room, sign posted     Susan Healy RN

## 2022-11-07 NOTE — ROUTINE PROCESS
Wound Prevention Checklist    Patient: Louisa Ghosh [de-identified]76 y.o. female)  Date: 11/7/2022  Diagnosis: UTI (urinary tract infection) [N39.0] <principal problem not specified>    Precautions: Fall, Contact       [x]  Heel prevention boots placed on patient    [x]  Patient turned q2h during shift    []  Lift team ordered    [x]  Patient on Félix bed/Specialty bed    [x]  Each Wound is documented during shift (Stage, Color, drainage, odor, measurements, and dressings)    [x]  Dual skin check done with Timoteo Arroyo RN

## 2022-11-07 NOTE — PROGRESS NOTES
Problem: Risk for Spread of Infection  Goal: Prevent transmission of infectious organism to others  Description: Prevent the transmission of infectious organisms to other patients, staff members, and visitors. Outcome: Progressing Towards Goal     Problem: Patient Education:  Go to Education Activity  Goal: Patient/Family Education  Outcome: Progressing Towards Goal     Problem: Falls - Risk of  Goal: *Absence of Falls  Description: Document Chiara Ground Fall Risk and appropriate interventions in the flowsheet. Outcome: Progressing Towards Goal  Note: Fall Risk Interventions:  Mobility Interventions: Assess mobility with egress test, Bed/chair exit alarm, Communicate number of staff needed for ambulation/transfer, Patient to call before getting OOB, Utilize walker, cane, or other assistive device, Utilize gait belt for transfers/ambulation         Medication Interventions: Assess postural VS orthostatic hypotension, Bed/chair exit alarm, Evaluate medications/consider consulting pharmacy, Teach patient to arise slowly, Utilize gait belt for transfers/ambulation    Elimination Interventions: Bed/chair exit alarm, Call light in reach, Elevated toilet seat, Patient to call for help with toileting needs, Toilet paper/wipes in reach, Toileting schedule/hourly rounds    History of Falls Interventions: Bed/chair exit alarm, Consult care management for discharge planning, Door open when patient unattended, Investigate reason for fall, Utilize gait belt for transfer/ambulation, Assess for delayed presentation/identification of injury for 48 hrs (comment for end date), Vital signs minimum Q4HRs X 24 hrs (comment for end date)         Problem: Patient Education: Go to Patient Education Activity  Goal: Patient/Family Education  Outcome: Progressing Towards Goal     Problem: Pressure Injury - Risk of  Goal: *Prevention of pressure injury  Description: Document Stef Scale and appropriate interventions in the flowsheet.   Outcome: Progressing Towards Goal  Note: Pressure Injury Interventions:  Sensory Interventions: Assess changes in LOC, Assess need for specialty bed, Avoid rigorous massage over bony prominences, Sit a 90-degree angle/use footstool if needed, Monitor skin under medical devices    Moisture Interventions: Absorbent underpads, Apply protective barrier, creams and emollients, Assess need for specialty bed, Check for incontinence Q2 hours and as needed, Offer toileting Q_hr, Moisture barrier    Activity Interventions: Assess need for specialty bed, Chair cushion, Increase time out of bed, Pressure redistribution bed/mattress(bed type), Trapeze to reposition    Mobility Interventions: HOB 30 degrees or less, Float heels, Chair cushion, Turn and reposition approx.  every two hours(pillow and wedges)    Nutrition Interventions: Document food/fluid/supplement intake, Offer support with meals,snacks and hydration    Friction and Shear Interventions: Apply protective barrier, creams and emollients, Transferring/repositioning devices, Trapeze to reposition, Transfer aides:transfer board/Marge lift/ceiling lift                Problem: Patient Education: Go to Patient Education Activity  Goal: Patient/Family Education  Outcome: Progressing Towards Goal     Problem: Patient Education: Go to Patient Education Activity  Goal: Patient/Family Education  Outcome: Progressing Towards Goal     Problem: Nutrition Deficit  Goal: *Optimize nutritional status  Outcome: Progressing Towards Goal     Problem: Patient Education: Go to Patient Education Activity  Goal: Patient/Family Education  Outcome: Progressing Towards Goal

## 2022-11-07 NOTE — PROGRESS NOTES
Problem: Self Care Deficits Care Plan (Adult)  Goal: *Acute Goals and Plan of Care (Insert Text)  Description: Occupational Therapy Goals   Long Term Goals  Initiated (2022) and to be accomplished within 3 week(s); by (2022)   1. Pt will perform self-feeding with Mod I.   2. Pt will perform grooming with Mod I.   3. Pt will perform UB bathing with set-up/ supv using AE and/ or compensatory strategies as needed. 4. Pt will perform LB bathing with SBA/ CGA using AE and/ or compensatory strategies as needed. 5. Pt will perform tub/shower transfer with SBA using LRAD. 6. Pt will perform UB dressing with set-up. 7. Pt will perform LB dressing with SBA using AE and/ or compensatory strategies as needed. 8. Pt will perform toileting task with supv.  9. Pt will perform toilet transfer with supv using LRAD. Short Term Goals   Initiated (2022) and to be accomplished within 7 day(s), (by 2022)   1. Pt will perform self-feeding with set-up. 2. Pt will perform grooming with supv. 3. Pt will perform UB bathing with supv using AE as needed. 4. Pt will perform LB bathing with Min A using AE and/ or compensatory strategies as needed. 5. Pt will perform tub/shower transfer with Min A using LRAD. 6. Pt will perform UB dressing with SBA. 7. Pt will perform LB dressing with Min A using AE and/ or compensatory strategies as needed. 8. Pt will perform toileting task with Min A  9. Pt will perform toilet transfer with Min A using LRAD. Outcome: Progressing Towards Goal  Goal: Interventions  Outcome: Progressing Towards Goal   Occupational Therapy TREATMENT    Patient: Keara Reeder   76 y.o.     Patient identified with name and : yes    Date: 2022    First Tx Session  Time In: 4508 (occupational therapy group session)  Time Out: 80    Second Tx Session  Time In: 5  Time Out: 1231    Diagnosis: UTI (urinary tract infection) [N39.0]   Precautions: Fall, Contact, Safety precautions  Chart, occupational therapy assessment, plan of care, and goals were reviewed. Pain: (second tx session)  Pt reports 6/10 pain or discomfort prior to treatment; lower back, bilateral knees, and bilateral shoulders (aching). Pt reports 9 to 10/10 pain or discomfort post treatment; lower back, bilateral knees, and bilateral shoulders (aching). Intervention Provided: repositioning; intermittent rest breaks. Tasks modified based on pt's tolerance. Care coordinated with Victor M Wagoner RN; staff nurse in to administer pain medication. SUBJECTIVE:   Patient stated [facility] did surgery on my right rotator cuff but it didn't fix it; they say that I'm too old for another surgery on the left one.     OBJECTIVE DATA SUMMARY:     THERAPEUTIC ACTIVITY Daily Assessment    Wheelchair mobility performed (SBA/ Min A) level to propel over level surfaces for accessing and navigating within facility environment. Pt requiring rest breaks x2 due to fatigue. Performed for generalized strengthening and range of motion. RUE/ LUE [address] there act performed for matching a deck of cards (50 cards) onto tabletop board; increased task challenge with use of 1 lb wrist weights. Performed for facilitating [address] (tip pinch; in-hand manipulation), bilateral integration, and for visually scanning board (left to right). Performed for increased functional (I) with self-cares. RUE/ LUE [address] tabletop there act performed for putting together a 24 piece puzzle; increased task challenge with use of 1 lb wrist cuffs. Performed for [address] (pincer grasp; in-hand manipulation), functional reaching out-front, bilateral integration, and problem solving (trial and error) for carryover to self-cares. Therapist issued AE (reacher;  sponge, dressing stick; and  shoe horn) during today's therapy session for improved functional (I) and safety during LB ADL's. Pt will benefit from practice in use of AE within context of self-care routine. THERAPEUTIC EXERCISE Daily Assessment    RUE/ LUE there ex performed using graded clothespins for reaching out-front to secure clips onto edge of tabletop container; followed by removal. Performed for pinch strengthening and functional reach out-front for increased functional ability with ADL's. Therapist providing initial instruction with demonstration for alternating pinch patterns (e.g. tip pinch; three jaw johanne). RUE/ LUE AAROM table slides performed (10 reps x2) for shoulder flexion, horizontal abduction/ adduction, and circular patterns (clockwise; counter clockwise). Verbal cues and demonstration for hand placement and for technique. Performed for range of motion for improved functional ability with self-cares. NMRE Daily Assessment    Pt participated in side stepping activity while in stance (Min A/ CGA) level at parallel bar; task performed multiple repetitions (side stepping for 2 steps right <-> left; use of parallel bar for support and steadying). Verbal cues for equal weight bearing through BLE's and for foot placement. Mirror utilized for feedback. Verbal cues for upright posture and positioning. Performed for facilitating weight bearing and for challenging pt's standing balance/ tolerance for improved (I) and safety with ADL's and functional transfers. Seated rest break x2 due to fatigue. GROOMING Daily Assessment    Functional Level: 5 (SBA)  Tasks Completed by Patient: Washed hands  Comments: Hand hygiene performed (SBA) w/c at sink following toileting self-care. TOILETING Daily Assessment    Functional Level: 3 (Mod A)  Comments: Mod A for CM and hygiene; pt voided at this time     LOWER BODY DRESSING Daily Assessment    Items Applied/Steps Completed: Shoe, left (1 step); Shoe, right (1 step)  Comments: Pt doffed/ donned sneakers (Min A) using crossed leg technique seated in w/c. Pt demonstrating ability to tie shoe laces.    Sock and/or Shoe management: 4 Min A MOBILITY/TRANSFERS Daily Assessment    Toilet Transfer Score: 4 (Stand step xfer (Min A) using RW; gait belt (w/c <-> elevated seat over toilet). )  Comments: Stand step xfer (Min A) using RW; gait belt (w/c <-> elevated seat over toilet). Pt requires (Mod A) for sit <-> stand transitions; verbal cues for hand and foot placement. ASSESSMENT:  Patient will benefit from continued skilled occupational therapy interventions to address decreased (I) with ADL's, decreased functional strength/ endurance, decreased RUE/ LUE AROM, decreased [facility], decreased activity tolerance, chronic back pain (pt has spinal stimulator for pain control), generalized weakness, impaired balance/ coordination, anxiety( fear of falling), and impaired functional mobility/ transfers impeding pt's functional (I) with ADL's and mobility for return to PLOF. Care coordinated with Aleshia Reyna RN regarding pt's intermittent hand tremors/ hand shaking; pt appears anxious at times. Staff nurse to follow-up. Edu/ instructed patient in use of controlled breathing for stress relief due to heightened anxiety during sit <-> transitions and transfers using LRAD (RW). Progression toward goals:  []          Improving appropriately and progressing toward goals  [x]          Improving slowly and progressing toward goals  []          Not making progress toward goals and plan of care will be adjusted     PLAN:  Patient continues to benefit from skilled intervention to address the above impairments. Continue treatment per established plan of care. Discharge Recommendations: Home Health with assist  Further Equipment Recommendations for Discharge: TBD      OCCUPATIONAL THERAPY GROUP THERAPY TREATMENT   Time In:  1032  Time Out: 1100     Patient seen for: Group therapy session  Patient identified with name and : yes     SUBJECTIVE:        Patient agreeable to participate in group therapy session. OBJECTIVE DATA SUMMARY:     Objective:       Therex   Sets Reps Comments   Shoulder flexion/extension 2 10 1 lb weighted dowel   Bicep curls        3       10 1 lb weighted dowel   Chest Press        2       10 1 lb weighted dowel   Supination/Pronation        2       15 Yellow flex-bar (6 lb resistance)   Wrist flexion/extension        2       15 Yellow flex-bar (6 lb resistance)   ER/IR        2       15 Yellow flex-bar (6 lb resistance)   Shoulder shrugs (forward; backward)       2       10    Backward shoulder rolls       1        10    Seated marches       1        15    Heel to toe (seated)       1        10        Therac   Sets   Reps   Comments   Balloon tapping there act performed (w/c level) with therapist and fellow group members. Multiple repetitions Pt participated in RUE/ LUE balloon tapping activity with therapist and two fellow group members. Performed for range of motion, functional reach/ grasp, eye-hand coordination, and improved activity tolerance for increased (I) with ADL's. Good participation. Intermittent rest breaks due to fatigue. ASSESSMENT:  Progression toward goals:  []          Patient participated fully in group therapy session and able to tolerate all activities  [x]          Patient able to participate in group therapy session with only minor modifications and/or extended rest breaks needed; tasks modified based on tolerance due to bilateral shoulder discomfort and limited shoulder range of motion. []          Patient not able to tolerate group therapy session. PLAN:  Patient continues to benefit from skilled intervention to address functional impairments. Patient is appropriate to continue group therapy sessions to promote increased participation in skilled therapy interventions and to provide opportunities for increased social interaction.        Activity Tolerance:  Fair; intermittent rest breaks due to fatigue and decreased activity tolerance  Estimated LOS: TBD  Entered Differentiated Treatment minutes: Yes    Please refer to the flowsheet for vital signs taken during this treatment. After treatment:   []  Patient left in no apparent distress sitting up in chair   [x]  Patient left in no apparent distress in bed with needs met at conclusion of second tx session  [x]  Call bell left within reach  [x]  Nursing notified  []  Caregiver present  [x]  Bed alarm activated    COMMUNICATION/EDUCATION:   [] Home safety education was provided and the patient/caregiver indicated understanding. [x] Patient/family have participated as able in goal setting and plan of care. [x] Patient/family agree to work toward stated goals and plan of care. [] Patient understands intent and goals of therapy, but is neutral about his/her participation. [] Patient is unable to participate in goal setting and plan of care.     [address] [facility], OT

## 2022-11-07 NOTE — PROGRESS NOTES
Progress Note    Patient: Mitzi Ron MRN: [id number]  CSN: [id number]    YOB: 1945  Age: 76 y.o. Sex: female    DOA: 11/3/2022 LOS:  LOS: 4 days                    Subjective:     Primary rehab diagnosis:    Impaired mobility and ADLs in the setting of UTI with generalized weakness and inability to ambulate    Patient was seen lying in bed eating lunch. Patient was in no apparent distress. Patient denies any n/v/d, headache, or chest pain. Patient does state that her hands tremor/shake intermittently, but mostly occurs when she gets frustrated with therapy. Currently hands are not shaking during exam.  Patient has no significant medical complaints. Review of systems  General: No fevers or chills. Cardiovascular: No chest pain or pressure. No palpitations. Pulmonary: No shortness of breath, cough or wheeze. Gastrointestinal: No abdominal pain, nausea, vomiting or diarrhea. Genitourinary: No urinary frequency, urgency, hesitancy or dysuria. Musculoskeletal: No joint or muscle pain, no back pain, no recent trauma. Neurologic: generalized weakness    Objective:     Physical Exam:  Visit Vitals  BP (!) 106/57 (BP 1 Location: Right upper arm, BP Patient Position: Lying)   Pulse 66   Temp 96.9 °F (36.1 °C)   Resp 18   Ht 5' 1\" (1.549 m)   Wt 82.3 kg (181 lb 6.4 oz)   SpO2 98%   BMI 34.28 kg/m²        General:         Alert, cooperative, no acute distress    HEENT: NC, Atraumatic.   anicteric sclerae. Lungs: CTA Bilaterally. No Wheezing/Rhonchi/Rales. Heart:  Regular  rhythm,  No murmur, No Rubs, No Gallops  Abdomen: Soft, Non distended, Non tender. +Bowel sounds  Extremities: No edema  Psych:   Not anxious or agitated. Neurologic:  CN 2-12 grossly intact, Alert and oriented X 3.   No acute neurological                                 Deficits,     Intake and Output:  Current Shift:  11/07 0701 - 11/07 1900  In: 480 [P.O.:480]  Out: -   Last three shifts:  11/05 1901 - 11/07 0700  In: 830 [P.O.:830]  Out: -     Labs: Results:       Chemistry Recent Labs     11/05/22 0517   GLU 94      K 4.0   *   CO2 23   BUN 24*   CREA 0.80   CA 8.6   AGAP 4   BUCR 30*        CBC w/Diff Recent Labs     11/05/22 0517   WBC 7.2   RBC 2.91*   HGB 9.1*   HCT 29.0*      GRANS 65   LYMPH 23   EOS 2        Cardiac Enzymes No results for input(s): CPK, CKND1, REINALDO in the last 72 hours. No lab exists for component: CKRMB, TROIP   Coagulation No results for input(s): PTP, INR, APTT, INREXT, INREXT in the last 72 hours. Lipid Panel Lab Results   Component Value Date/Time    Cholesterol, total 143 05/20/2022 07:26 AM    HDL Cholesterol 73 (H) 05/20/2022 07:26 AM    LDL, calculated 48.6 05/20/2022 07:26 AM    VLDL, calculated 21.4 05/20/2022 07:26 AM    Triglyceride 107 05/20/2022 07:26 AM    CHOL/HDL Ratio 2.0 05/20/2022 07:26 AM      BNP No results for input(s): BNPP in the last 72 hours. Liver Enzymes No results for input(s): TP, ALB, TBIL, AP in the last 72 hours.     No lab exists for component: SGOT, GPT, DBIL   Thyroid Studies Lab Results   Component Value Date/Time    TSH 1.24 11/02/2022 03:36 AM          Procedures/imaging: see electronic medical records for all procedures/Xrays and details which were not copied into this note but were reviewed prior to creation of Plan    Medications:   Current Facility-Administered Medications   Medication Dose Route Frequency    therapeutic multivitamin (THERAGRAN) tablet 1 Tablet  1 Tablet Oral DAILY    albuterol (PROVENTIL VENTOLIN) nebulizer solution 2.5 mg  2.5 mg Nebulization Q4H PRN    apixaban (ELIQUIS) tablet 5 mg  5 mg Oral BID    polyvinyl alcohol-povidon(PF) (REFRESH CLASSIC) 1.4-0.6 % ophthalmic solution 1 Drop  1 Drop Both Eyes DAILY PRN    clopidogreL (PLAVIX) tablet 75 mg  75 mg Oral DAILY    levothyroxine (SYNTHROID) tablet 25 mcg  25 mcg Oral ACB    metoprolol tartrate (LOPRESSOR) tablet 25 mg  25 mg Oral DAILY nitroglycerin (NITROSTAT) tablet 0.4 mg  0.4 mg SubLINGual Q5MIN PRN    oxyCODONE IR (ROXICODONE) tablet 5 mg  5 mg Oral TID PRN    pantoprazole (PROTONIX) tablet 40 mg  40 mg Oral ACB    rosuvastatin (CRESTOR) tablet 40 mg  40 mg Oral DAILY    docusate sodium (COLACE) capsule 100 mg  100 mg Oral BID    bisacodyL (DULCOLAX) tablet 10 mg  10 mg Oral Q48H PRN       Assessment/Plan     Active Problems:    UTI (urinary tract infection) (11/3/2022)    Impaired mobility and ADLs in the setting of UTI with generalized weakness and inability to ambulate  Pt/ot eval and treat    Recent UTI  Completed therapy, monitor for recurrent symtpoms    HTN, HLD, atrial fibrillation  Continue lopressor, crestor  Anticoagulation with eliquis    Hypothyroidism  Continue synthroid 25mcg daily    GERD  Continue Protonix    Personal Protective Equipment (face mask and eye goggles) was used while interacting with the patient. Patient was using a surgical mask. Patient's progress in rehabilitation and medical issues discussed with the patient. All questions answered to the best of my ability. Care plan discussed with patient and nurse. Total clinical care time is 30 minutes, including review of chart including all labs, radiology, past medical history, and discussion with patient. Greater than 50% of my time was spent in coordination of care and counseling.     Gal Foley NP  11/7/2022

## 2022-11-07 NOTE — ROUTINE PROCESS
[facility]    Bedside and Verbal shift change report given to Kalani WOODRUFF (oncoming nurse) by Roma Morrison RN (offgoing nurse). Report included the following information SBAR, Kardex, MAR and Recent Results.     Situation:   Code Status: Full Code   Hospital Day: 4   Problem List:   Hospital Problems  Date Reviewed: 9/22/2022            Codes Class Noted POA    UTI (urinary tract infection) ICD-10-CM: N39.0  ICD-9-CM: 599.0  11/3/2022 Unknown           Background:   Past Medical History:   Past Medical History:   Diagnosis Date    Abnormal chest CT 2/6/2020    Adopted     Arthritis     Balance problems     Chronic cough 2/6/2020    MCBRIDE (dyspnea on exertion) 2/28/2019    GERD (gastroesophageal reflux disease)     Gout     Hemochromatosis     High cholesterol     Hypertension     Hypothyroidism     Left knee pain     Left shoulder pain     Low blood potassium     Lumbar pain     Pain of left sacroiliac joint     Shoulder impingement, right, with rotator cuff strain         Assessment:  Changes in Assessment throughout shift: No change to previous assessment      Last Vitals:     Vitals:    11/06/22 1650 11/06/22 2057 11/07/22 0748 11/07/22 1627   BP: 116/63 128/74 (!) 106/57 116/69   Pulse: 65 69 66 75   Resp: 17 19 18 18   Temp: 98 °F (36.7 °C) 97.8 °F (36.6 °C) 96.9 °F (36.1 °C) 97.5 °F (36.4 °C)   SpO2: 99% 99% 98% 99%   Weight:       Height:           PAIN    Pain Assessment    Pain Intensity 1: 0 (11/07/22 1600) Pain Intensity 1: 2 (12/29/14 1105)    Pain Location 1: Back, Shoulder Pain Location 1: Abdomen    Pain Intervention(s) 1: Medication (see MAR) Pain Intervention(s) 1: Medication (see MAR)  Patient Stated Pain Goal: 0 Patient Stated Pain Goal: 0  Intervention effective: yes  Other actions taken for pain: Medication (see MAR)    Skin Assessment  Skin color Skin Color: Appropriate for ethnicity  Condition/Temperature    Integrity Skin Integrity: Wound (add Wound LDA)  Turgor    Weekly Pressure Ulcer Documentation  Pressure  Injury Documentation: No Pressure Injury Noted-Pressure Ulcer Prevention Initiated  Wound Prevention & Protection Methods  Orientation of wound Orientation of Wound Prevention: Posterior  Location of Prevention Location of Wound Prevention: Buttocks, Sacrum/Coccyx, Heel  Dressing Present Dressing Present : No  Dressing Status    Wound Offloading Wound Offloading (Prevention Methods): Bed, pressure redistribution/air, Bed, pressure reduction mattress, Turning, Wheelchair    INTAKE/OUPUT  Date 11/06/22 0700 - 11/07/22 0659 11/07/22 0700 - 11/08/22 0659   Shift 0700-1859 1900-0659 24 Hour Total 0700-1859 1900-0659 24 Hour Total   INTAKE   P.O. 480 150 630 720  720     P. O. 480 150 630 720  720   Shift Total(mL/kg) 480(5.8) 150(1.8) 630(7.7) 720(8.8)  720(8.8)   OUTPUT   Urine(mL/kg/hr)           Urine Occurrence(s) 4 x 3 x 7 x 4 x  4 x   Emesis/NG output           Emesis Occurrence(s)  0 x 0 x      Stool           Stool Occurrence(s) 1 x 0 x 1 x 1 x  1 x   Shift Total(mL/kg)          150 630 720  720   Weight (kg) 82.3 82.3 82.3 82.3 82.3 82.3       Recommendations:  Patient needs and requests: toileting     Pending tests/procedures:      Functional Level/Equipment: Partial (one person) / Bed (comment)    Fall Precautions:   Fall risk precautions were reinforced with the patient; she was instructed to call for help prior to getting up. The following fall risk precautions were continued: bed/ chair alarms, door signage, yellow bracelet and socks as well as update of the Saidam Robbi tool in the patient's room. Talia Score: 3    HEALS Safety Check    A safety check occurred in the patient's room between off going nurse and oncoming nurse listed above.     The safety check included the below items  Area Items   H  High Alert Medications Verify all high alert medication drips (heparin, PCA, etc.)   E  Equipment Suction is set up for ALL patients (with yanker)  Red plugs utilized for all equipment (IV pumps, etc.)  WOWs wiped down at end of shift. Room stocked with oxygen, suction, and other unit-specific supplies   A  Alarms Bed alarm is set for fall risk patients  Ensure chair alarm is in place and activated if patient is up in a chair   L  Lines Check IV for any infiltration  Gonzalez bag is empty if patient has a Gonzalez   Tubing and IV bags are labeled   S  Safety   Room is clean, patient is clean, and equipment is clean. Hallways are clear from equipment besides carts. Fall bracelet on for fall risk patients  Ensure room is clear and free of clutter  Suction is set up for ALL patients (with lucinda)  Hallways are clear from equipment besides carts.    Isolation precautions followed, supplies available outside room, sign posted     Chiara Aguilar RN

## 2022-11-07 NOTE — PROGRESS NOTES
Problem: Risk for Spread of Infection  Goal: Prevent transmission of infectious organism to others  Description: Prevent the transmission of infectious organisms to other patients, staff members, and visitors. Outcome: Progressing Towards Goal     Problem: Patient Education:  Go to Education Activity  Goal: Patient/Family Education  Outcome: Progressing Towards Goal     Problem: Falls - Risk of  Goal: *Absence of Falls  Description: Document Willian Holmer Fall Risk and appropriate interventions in the flowsheet.   Outcome: Progressing Towards Goal  Note: Fall Risk Interventions:  Mobility Interventions: Bed/chair exit alarm         Medication Interventions: Bed/chair exit alarm    Elimination Interventions: Call light in reach    History of Falls Interventions: Bed/chair exit alarm         Problem: Patient Education: Go to Patient Education Activity  Goal: Patient/Family Education  Outcome: Progressing Towards Goal

## 2022-11-08 PROCEDURE — 97110 THERAPEUTIC EXERCISES: CPT

## 2022-11-08 PROCEDURE — 97150 GROUP THERAPEUTIC PROCEDURES: CPT

## 2022-11-08 PROCEDURE — 2709999900 HC NON-CHARGEABLE SUPPLY

## 2022-11-08 PROCEDURE — 97530 THERAPEUTIC ACTIVITIES: CPT

## 2022-11-08 PROCEDURE — 99232 SBSQ HOSP IP/OBS MODERATE 35: CPT | Performed by: HOSPITALIST

## 2022-11-08 PROCEDURE — 97116 GAIT TRAINING THERAPY: CPT

## 2022-11-08 PROCEDURE — 74011250637 HC RX REV CODE- 250/637: Performed by: EMERGENCY MEDICINE

## 2022-11-08 PROCEDURE — 74011250637 HC RX REV CODE- 250/637: Performed by: NURSE PRACTITIONER

## 2022-11-08 PROCEDURE — 65310000000 HC RM PRIVATE REHAB

## 2022-11-08 RX ORDER — ACETAMINOPHEN 500 MG
500 TABLET ORAL
Status: DISCONTINUED | OUTPATIENT
Start: 2022-11-08 | End: 2022-11-15 | Stop reason: HOSPADM

## 2022-11-08 RX ADMIN — OXYCODONE HYDROCHLORIDE 5 MG: 5 TABLET ORAL at 01:33

## 2022-11-08 RX ADMIN — ROSUVASTATIN CALCIUM 40 MG: 20 TABLET, FILM COATED ORAL at 08:31

## 2022-11-08 RX ADMIN — METOPROLOL TARTRATE 25 MG: 25 TABLET, FILM COATED ORAL at 08:30

## 2022-11-08 RX ADMIN — APIXABAN 5 MG: 5 TABLET, FILM COATED ORAL at 17:58

## 2022-11-08 RX ADMIN — LEVOTHYROXINE SODIUM 25 MCG: 25 TABLET ORAL at 06:43

## 2022-11-08 RX ADMIN — PANTOPRAZOLE SODIUM 40 MG: 40 TABLET, DELAYED RELEASE ORAL at 06:43

## 2022-11-08 RX ADMIN — THERA TABS 1 TABLET: TAB at 08:30

## 2022-11-08 RX ADMIN — CLOPIDOGREL BISULFATE 75 MG: 75 TABLET ORAL at 08:30

## 2022-11-08 RX ADMIN — ACETAMINOPHEN 500 MG: 500 TABLET ORAL at 23:05

## 2022-11-08 RX ADMIN — APIXABAN 5 MG: 5 TABLET, FILM COATED ORAL at 08:31

## 2022-11-08 NOTE — PROGRESS NOTES
INTERDISCIPLINARY TEAM FOLLOW-UP  Patient: Benjamin Jorge (31 y.o. female)  Date: 11/8/2022  Diagnosis: UTI (urinary tract infection) [N39.0] <principal problem not specified>      Precautions: Fall, Contact    Met with patient to discuss the findings from 11/8/2022 Team Conference. Patient requested further information and/or had questions:   Yes   Patient with questions regarding her pain management regimen; will follow up with MD and nursing.        Herbie Kearney, PTA

## 2022-11-08 NOTE — PROGRESS NOTES
[facility]  Team Conference  Date: 11/8/2022  ACTIVE MONITORING OF CO-MORBID CONDITIONS/MANAGEMENT OF NEW MEDICAL ISSUES: UTI (urinary tract infection) [N39.0]    **Please refer to patient's electronic medical record to view the care plan and goals**  NURSING:     Making gains   Wound Care: Skin color Skin Color: Appropriate for ethnicity Condition/Temperature     Integrity Skin Integrity: Other (comment)   Turgor     Orientation of wound Orientation of Wound Prevention: Posterior Location of Prevention Location of Wound Prevention: Buttocks, Sacrum/Coccyx, Heel   Dressing Present Dressing Present : No  Dressing Status     Wound Offloading Wound Offloading (Prevention Methods): Bed, pressure redistribution/air, Bed, pressure reduction mattress, Walker, Turning   Pain: Pain Intensity 1: 0 (11/08/22 1200) Pain Intensity 1: Pain Location 1: Back, Coccyx Pain Location 1:    Continent bladder and Continent bowel   Education:  Medication Education, Aspiration Education, and Pain Management   Lab Value Concerns:  No      Occupational Therapy: Making gains   Treatment Barriers:  Fatigue and Pain safety, anxiety   Treatment Areas of Focus/Interventions: Coordination, Strength, ADL Training, Transfer Training, Improved UE Function, Functional Mobility Training, and Other: safety      Accessibility Limitations:  none     Physical Therapy: Making gains   Treatment Barriers:  Fatigue, Pain, Medical Status, and Balance Impairment   Treatment Areas of Focus/Interventions: Balance, Strength, Transfer Training, Mobility, and Gait Training      Accessibility Limitations:none         Family/Caregiver Teaching  No    CMG D/C Date: TBD   Discharge Date: TBD    Rehabilitation goals from IPOC/Treatment plan reviewed:  No changes identified  New Goals if applicable:   none    Plan for upcoming treatment period/intervention for current barriers:  Pt will increase activity tolerance for daily tasks. , Pt will improve bed mobility with reduced assist., Pt will improve safety in fx tasks with reduced cues/assist., Pt will improve transfers with reduced assist., Pt will improve toileting with reduced assist., Pt will improve ADL's with use of adaptive equipment with reduced assist., and Pt will improve pain mgmt for maximum participation in tx program.    Patient needs identified:   Patient/Family Training/Education    Recommended Discharge Plan:   Home with s/o/spouse/family    Follow-up Services:  Home Physical Therapy and Home Occupational Therapy    Equipment needed at discharge:   N/A    Plan/Adjustments to Plan  Medical conditions exist that require a minimum of 3 times/week physician oversight and 24-hour rehabilitation nursing to manage/progress the plan of care, Functional deficits require intensive and coordinated therapies to achieve goals outlined in plan of care, 3 hours therapy 5 days/week OR 15 hours therapy over 7 days, and Continued plan of care as patient is showing progress and /or has an expectation to benefit    Patient's plan of care has been reviewed and/or adjusted. Continue treatment as outlined. Team Conference [address]  Date 11/8/2022    Team members participating in today's conference. Deysi Freeman, TRISTIAN Zamora, PT, Hai Curiel, RANJAN, Alicia Rodriguez, OT, Yennifer Miller, OT    ===============================================================================  Patients plan of care has been reviewed and/or adjusted. Continue treatment as outlined. I certify that I have led this team conference and agree with continue POC as discussed.

## 2022-11-08 NOTE — PROGRESS NOTES
Problem: Risk for Spread of Infection  Goal: Prevent transmission of infectious organism to others  Description: Prevent the transmission of infectious organisms to other patients, staff members, and visitors. Outcome: Progressing Towards Goal     Problem: Patient Education:  Go to Education Activity  Goal: Patient/Family Education  Outcome: Progressing Towards Goal     Problem: Falls - Risk of  Goal: *Absence of Falls  Description: Document Cherylle Cockayne Fall Risk and appropriate interventions in the flowsheet. Outcome: Progressing Towards Goal  Note: Fall Risk Interventions:  Mobility Interventions: Bed/chair exit alarm, Communicate number of staff needed for ambulation/transfer         Medication Interventions: Assess postural VS orthostatic hypotension, Bed/chair exit alarm, Evaluate medications/consider consulting pharmacy, Teach patient to arise slowly    Elimination Interventions: Bed/chair exit alarm, Call light in reach, Elevated toilet seat, Toilet paper/wipes in reach    History of Falls Interventions: Bed/chair exit alarm, Consult care management for discharge planning, Door open when patient unattended, Assess for delayed presentation/identification of injury for 48 hrs (comment for end date), Vital signs minimum Q4HRs X 24 hrs (comment for end date)         Problem: Patient Education: Go to Patient Education Activity  Goal: Patient/Family Education  Outcome: Progressing Towards Goal     Problem: Pressure Injury - Risk of  Goal: *Prevention of pressure injury  Description: Document Stef Scale and appropriate interventions in the flowsheet. Outcome: Progressing Towards Goal  Note: Pressure Injury Interventions:  Sensory Interventions: Assess changes in LOC, Assess need for specialty bed, Avoid rigorous massage over bony prominences, Discuss PT/OT consult with provider, Turn and reposition approx.  every two hours (pillows and wedges if needed), Suspension boots, Sit a 90-degree angle/use footstool if needed, Pressure redistribution bed/mattress (bed type)    Moisture Interventions: Absorbent underpads, Apply protective barrier, creams and emollients, Assess need for specialty bed, Check for incontinence Q2 hours and as needed, Offer toileting Q_hr, Moisture barrier    Activity Interventions: Assess need for specialty bed, Chair cushion, Pressure redistribution bed/mattress(bed type), PT/OT evaluation    Mobility Interventions: Chair cushion, HOB 30 degrees or less, Trapeze to reposition, Turn and reposition approx.  every two hours(pillow and wedges)    Nutrition Interventions: Document food/fluid/supplement intake, Offer support with meals,snacks and hydration    Friction and Shear Interventions: Apply protective barrier, creams and emollients, Feet elevated on foot rest, Foam dressings/transparent film/skin sealants, HOB 30 degrees or less, Lift sheet, Transferring/repositioning devices                Problem: Patient Education: Go to Patient Education Activity  Goal: Patient/Family Education  Outcome: Progressing Towards Goal     Problem: Patient Education: Go to Patient Education Activity  Goal: Patient/Family Education  Outcome: Progressing Towards Goal     Problem: Nutrition Deficit  Goal: *Optimize nutritional status  Outcome: Progressing Towards Goal     Problem: Patient Education: Go to Patient Education Activity  Goal: Patient/Family Education  Outcome: Progressing Towards Goal     Problem: Inpatient Rehab (Adult)  Goal: *LTG: Avoids injury/falls 100% of time related to deficits  Outcome: Progressing Towards Goal  Goal: *LTG: Avoids infection 100% of time related to deficits  Outcome: Progressing Towards Goal  Goal: *LTG: Absence of DVT during hospitalization  Outcome: Progressing Towards Goal  Goal: *LTG: Maintains Skin Integrity With No Evidence of Pressure Injury 100% of Time  Outcome: Progressing Towards Goal  Goal: Interventions  Outcome: Progressing Towards Goal     Problem: Patient Education: Go to Patient Education Activity  Goal: Patient/Family Education  Outcome: Progressing Towards Goal

## 2022-11-08 NOTE — PROGRESS NOTES
Progress Note    Patient: Bryant Dalal MRN: [id number]  CSN: [id number]    YOB: 1945  Age: 76 y.o. Sex: female    DOA: 11/3/2022 LOS:  LOS: 5 days                    Subjective:     Primary rehab diagnosis:    Impaired mobility and ADLs in the setting of UTI with generalized weakness and inability to ambulate    Patient seen and evaluated, sitting up in wheelchair, no acute distress mentions she is doing much better and slowly progressing. She mentions she would like to be able to walk if she can. We will continue PT and OT. Review of systems  General: No fevers or chills. Cardiovascular: No chest pain or pressure. No palpitations. Pulmonary: No shortness of breath, cough or wheeze. Gastrointestinal: No abdominal pain, nausea, vomiting or diarrhea. Genitourinary: No urinary frequency, urgency, hesitancy or dysuria. Musculoskeletal: No joint or muscle pain, no back pain, no recent trauma. Neurologic: generalized weakness    Objective:     Physical Exam:  Visit Vitals  /60 (BP 1 Location: Right upper arm, BP Patient Position: Sitting)   Pulse 65   Temp 97 °F (36.1 °C)   Resp 18   Ht 5' 1\" (1.549 m)   Wt 82.3 kg (181 lb 6.4 oz)   SpO2 99%   BMI 34.28 kg/m²        General:         Alert, cooperative, no acute distress    HEENT: NC, Atraumatic.   anicteric sclerae. Lungs: CTA Bilaterally. No Wheezing/Rhonchi/Rales. Heart:  Regular  rhythm,  No murmur, No Rubs, No Gallops  Abdomen: Soft, Non distended, Non tender. +Bowel sounds  Extremities: No edema  Psych:   Not anxious or agitated. Neurologic:  CN 2-12 grossly intact, Alert and oriented X 3.   No acute neurological                                 Deficits,     Intake and Output:  Current Shift:  11/08 0701 - 11/08 1900  In: 240 [P.O.:240]  Out: -   Last three shifts:  11/06 1901 - 11/08 0700  In: 870 [P.O.:870]  Out: -     Labs: Results:       Chemistry No results for input(s): GLU, NA, K, CL, CO2, BUN, CREA, CA, AGAP, BUCR, TBIL, AP, TP, ALB, GLOB, AGRAT in the last 72 hours. No lab exists for component: GPT     CBC w/Diff No results for input(s): WBC, RBC, HGB, HCT, PLT, GRANS, LYMPH, EOS, HGBEXT, HCTEXT, PLTEXT, HGBEXT, HCTEXT, PLTEXT in the last 72 hours. Cardiac Enzymes No results for input(s): CPK, CKND1, REINALDO in the last 72 hours. No lab exists for component: CKRMB, TROIP   Coagulation No results for input(s): PTP, INR, APTT, INREXT, INREXT in the last 72 hours. Lipid Panel Lab Results   Component Value Date/Time    Cholesterol, total 143 05/20/2022 07:26 AM    HDL Cholesterol 73 (H) 05/20/2022 07:26 AM    LDL, calculated 48.6 05/20/2022 07:26 AM    VLDL, calculated 21.4 05/20/2022 07:26 AM    Triglyceride 107 05/20/2022 07:26 AM    CHOL/HDL Ratio 2.0 05/20/2022 07:26 AM      BNP No results for input(s): BNPP in the last 72 hours. Liver Enzymes No results for input(s): TP, ALB, TBIL, AP in the last 72 hours.     No lab exists for component: SGOT, GPT, DBIL   Thyroid Studies Lab Results   Component Value Date/Time    TSH 1.24 11/02/2022 03:36 AM          Procedures/imaging: see electronic medical records for all procedures/Xrays and details which were not copied into this note but were reviewed prior to creation of Plan    Medications:   Current Facility-Administered Medications   Medication Dose Route Frequency    acetaminophen (TYLENOL) tablet 500 mg  500 mg Oral Q6H PRN    therapeutic multivitamin (THERAGRAN) tablet 1 Tablet  1 Tablet Oral DAILY    albuterol (PROVENTIL VENTOLIN) nebulizer solution 2.5 mg  2.5 mg Nebulization Q4H PRN    apixaban (ELIQUIS) tablet 5 mg  5 mg Oral BID    polyvinyl alcohol-povidon(PF) (REFRESH CLASSIC) 1.4-0.6 % ophthalmic solution 1 Drop  1 Drop Both Eyes DAILY PRN    clopidogreL (PLAVIX) tablet 75 mg  75 mg Oral DAILY    levothyroxine (SYNTHROID) tablet 25 mcg  25 mcg Oral ACB    metoprolol tartrate (LOPRESSOR) tablet 25 mg  25 mg Oral DAILY    nitroglycerin (NITROSTAT) tablet 0.4 mg  0.4 mg SubLINGual Q5MIN PRN    oxyCODONE IR (ROXICODONE) tablet 5 mg  5 mg Oral TID PRN    pantoprazole (PROTONIX) tablet 40 mg  40 mg Oral ACB    rosuvastatin (CRESTOR) tablet 40 mg  40 mg Oral DAILY    docusate sodium (COLACE) capsule 100 mg  100 mg Oral BID    bisacodyL (DULCOLAX) tablet 10 mg  10 mg Oral Q48H PRN       Assessment/Plan     Active Problems:    UTI (urinary tract infection) (11/3/2022)    Impaired mobility and ADLs in the setting of UTI with generalized weakness and inability to ambulate  Pt/ot eval and treat    Recent UTI  Completed therapy, monitor for recurrent symtpoms    HTN, HLD, atrial fibrillation  Continue lopressor, crestor  Anticoagulation with eliquis    Hypothyroidism  Continue synthroid 25mcg daily    GERD  Continue Protonix    Personal Protective Equipment (face mask and eye goggles) was used while interacting with the patient. Patient was using a surgical mask. Patient's progress in rehabilitation and medical issues discussed with the patient. All questions answered to the best of my ability. Care plan discussed with patient and nurse. Total clinical care time is 30 minutes, including review of chart including all labs, radiology, past medical history, and discussion with patient. Greater than 50% of my time was spent in coordination of care and counseling.     Viviana Aguilera MD  11/8/2022

## 2022-11-08 NOTE — PROGRESS NOTES
Problem: Mobility Impaired (Adult and Pediatric)  Goal: *Therapy Goal (Edit Goal, Insert Text)  Description: Physical Therapy Short Term Goals  Initiated 11/4/2022 and to be accomplished within 7 day(s) on 11/11/2022  1. Patient will move from supine to sit and sit to supine  in bed with minimal assistance/contact guard assist.    2.  Patient will transfer from bed to chair and chair to bed with minimal assistance/contact guard assist using the least restrictive device. 3.  Patient will perform sit to stand with minimal assistance/contact guard assist.  4.  Patient will ambulate with minimal assistance/contact guard assist for 75 feet with the least restrictive device. 5.  Patient will perform w/c mobility 150 ft bouts SBA level over even surfaces for improved functional endurance. Physical Therapy Long Term Goals  Initiated 11/4/2022 and to be accomplished within 14 day(s) on 11/18/2022  1. Patient will transfer from bed to chair and chair to bed with supervision using the least restrictive device. 2.  Patient will perform sit to stand with supervision. 3.  Patient will ambulate with supervision/set-up for 150 feet with the least restrictive device. 4.  Patient will perform w/c mobility over even surfaces for 150 ft bouts at mod I level. Outcome: Progressing Towards Goal   PHYSICAL THERAPY TREATMENT    Patient: Bard Brian (64 y.o. female)  Date: 11/8/2022  Diagnosis: UTI (urinary tract infection) [N39.0]   Precautions: Fall, Contact  Chart, physical therapy assessment, plan of care and goals were reviewed. Time In:1030  Time Out:1130  Time In: 1330  Time Out: 1400    Entered Differentiated Treatment minutes: Yes    Patient seen for: Balance activities;Gait training;Patient education; Therapeutic exercise;Transfer training; Wheelchair mobility    Pain:  Patient reporting significant pain at times in bilat knees, shoulders and back needing modification of treatment.      Patient identified with name and : yes    SUBJECTIVE:      Patient agreeable to treatment. Observed to become tearful at times and when asked, patient reporting that she gets frustrated at times when she can't do something and it makes her tearful and have increased tremors. OBJECTIVE DATA SUMMARY:    Objective:       TRANSFERS Daily Assessment     Transfer Type: Other  Other: stand step with RW  Transfer Assistance : 4 (Contact guard assistance)  Sit to Stand Assistance: Minimal assistance    CG/min A with transfers, needing cues for coming forward over base of support and pushing up into standing using bilat LE. GAIT Daily Assessment    Gait Description (WDL) Exceptions to WDL    Gait Abnormalities Decreased step clearance;Trendelenburg;Trunk sway increased; Shuffling gait    Assistive device Gait belt;Walker, rolling    Ambulation assistance - level surface 4 (Minimal assistance)    Distance 54 Feet (ft) (88 ft, 90 ft, then 54 ft)    Ambulation assistance- uneven surface  NT    Comments Patient performing gait training with patient observed to have improved gait endurance but still having increased pain in shoulders or knees after a while.          BALANCE Daily Assessment     Sitting - Static: Good (unsupported)  Sitting - Dynamic: Good (unsupported)  Standing - Static: Fair  Standing - Dynamic : Impaired        WHEELCHAIR MOBILITY Daily Assessment     Able to Propel (ft): 270 feet  Functional Level: 5  Curbs/Ramps Assist Required (FIM Score): 0 (Not tested)  Wheelchair Setup Assist Required : 4 (Minimal assistance)  Wheelchair Management: Manages left brake;Manages right brake    W/C mobility 270 ft x 2 reps      Group therapy treatment:    Seated  EXERCISE   Sets   Reps   Active Active Assist   Comments   Ankle Pumps   [] []    Long Arc Quads 3 10 [x] [] No weight   Seated Marching 3 10 [x] [] No weight   Heel slides   [] []    Hip Abd/ER clamshells w/ T-band 2 10 [x] [] Green Tband   Hip Abd SLRs   [] []    Hip Add Isometrics   [] []    Heel taps on floor   [] []    Wheelchair pushups   [] []        Standing balance  Activity   Sets   Reps   Time Spent   Comments   [] Beach ball toss/catch       [x] Balloon tapping   3 min bouts, 2 reps    [] Forward/multi-directional reaching with tabletop activity        [] Bean bag toss to central target       [] Ball tossing into basketball hoop         [x]   Patient appropriate to continue group therapy sessions to promote increased participation in skilled therapy interventions and to provide opportunities for increased social interaction. ASSESSMENT:  Patient would continue to benefit from skilled PT to improve ability to perform safe functional mobility. Progression toward goals:  []      Improving appropriately and progressing toward goals  [x]      Improving slowly and progressing toward goals  []      Not making progress toward goals and plan of care will be adjusted      PLAN:  Patient continues to benefit from skilled intervention to address the above impairments. Continue treatment per established plan of care.   Discharge Recommendations:  Home Health  Further Equipment Recommendations for Discharge:  rolling walker       Estimated Discharge Date: TBD    Activity Tolerance:   Fair due to pain and fatigue, dyspnea with exertion    After treatment:   [] Patient left in no apparent distress in bed  [x] Patient left in no apparent distress sitting up in chair  [x] Patient left in no apparent distress in w/c mobilizing under own power  [] Patient left in no apparent distress dining area  [] Patient left in no apparent distress mobilizing under own power  [x] Call bell left within reach  [x] Nursing notified  [] Caregiver present  [] Bed alarm activated   [x] Chair alarm activated      Dee Vaughn, PT  11/8/2022

## 2022-11-08 NOTE — ROUTINE PROCESS
Wound Prevention Checklist    Patient: Artem Holm [de-identified]76 y.o. female)  Date: 11/8/2022  Diagnosis: UTI (urinary tract infection) [N39.0] <principal problem not specified>    Precautions: Fall, Contact       []  Heel prevention boots placed on patient    []  Patient turned q2h during shift    []  Lift team ordered    [x]  Patient on [place] bed/Specialty bed    [x]  Each Wound is documented during shift (Stage, Color, drainage, odor, measurements, and dressings)    [x]  Dual skin check done with RANJAN Titus RN

## 2022-11-08 NOTE — ROUTINE PROCESS
[facility]    Bedside and Verbal shift change report given to Kalani WOODRUFF (oncoming nurse) by Dwight Arroyo RN (offgoing nurse). Report included the following information SBAR, Kardex, MAR and Recent Results.     Situation:   Code Status: Full Code   Hospital Day: 5   Problem List:   Hospital Problems  Date Reviewed: 9/22/2022            Codes Class Noted POA    UTI (urinary tract infection) ICD-10-CM: N39.0  ICD-9-CM: 599.0  11/3/2022 Unknown           Background:   Past Medical History:   Past Medical History:   Diagnosis Date    Abnormal chest CT 2/6/2020    Adopted     Arthritis     Balance problems     Chronic cough 2/6/2020    MCBRIDE (dyspnea on exertion) 2/28/2019    GERD (gastroesophageal reflux disease)     Gout     Hemochromatosis     High cholesterol     Hypertension     Hypothyroidism     Left knee pain     Left shoulder pain     Low blood potassium     Lumbar pain     Pain of left sacroiliac joint     Shoulder impingement, right, with rotator cuff strain         Assessment:  Changes in Assessment throughout shift: No change to previous assessment    Last Vitals:     Vitals:    11/07/22 0748 11/07/22 1627 11/07/22 2119 11/08/22 0818   BP: (!) 106/57 116/69 98/69 (!) 111/56   Pulse: 66 75 76 69   Resp: 18 18 20 18   Temp: 96.9 °F (36.1 °C) 97.5 °F (36.4 °C) 97 °F (36.1 °C) 97.1 °F (36.2 °C)   SpO2: 98% 99% 95% 99%   Weight:       Height:           PAIN    Pain Assessment    Pain Intensity 1: 0 (11/08/22 1200) Pain Intensity 1: 2 (12/29/14 1105)    Pain Location 1: Back, Coccyx Pain Location 1: Abdomen    Pain Intervention(s) 1: Medication (see MAR), Position Pain Intervention(s) 1: Medication (see MAR)  Patient Stated Pain Goal: 0 Patient Stated Pain Goal: 0  Intervention effective: yes  Other actions taken for pain:      Skin Assessment  Skin color Skin Color: Appropriate for ethnicity  Condition/Temperature    Integrity Skin Integrity: Other (comment)  Turgor    Weekly Pressure Ulcer Documentation  Pressure  Injury Documentation: No Pressure Injury Noted-Pressure Ulcer Prevention Initiated  Wound Prevention & Protection Methods  Orientation of wound Orientation of Wound Prevention: Posterior  Location of Prevention Location of Wound Prevention: Buttocks, Sacrum/Coccyx, Heel  Dressing Present Dressing Present : No  Dressing Status    Wound Offloading Wound Offloading (Prevention Methods): Bed, pressure redistribution/air, Bed, pressure reduction mattress, Walker, Turning    INTAKE/OUPUT  Date 11/07/22 0700 - 11/08/22 0659 11/08/22 0700 - 11/09/22 0659   Shift 0700-1859 1900-0659 24 Hour Total 0700-1859 1900-0659 24 Hour Total   INTAKE   P.O. 720  720 240  240     P. O. 720  720 240  240   Shift Total(mL/kg) 720(8.8)  720(8.8) 240(2.9)  240(2.9)   OUTPUT   Urine(mL/kg/hr)           Urine Occurrence(s) 4 x 3 x 7 x 2 x  2 x   Stool           Stool Occurrence(s) 1 x 1 x 2 x 1 x  1 x   Shift Total(mL/kg)           720 240  240   Weight (kg) 82.3 82.3 82.3 82.3 82.3 82.3       Recommendations:  Patient needs and requests: toileting     Pending tests/procedures:      Functional Level/Equipment: Partial (one person) / Bed (comment)    Fall Precautions:   Fall risk precautions were reinforced with the patient; she was instructed to call for help prior to getting up. The following fall risk precautions were continued: bed/ chair alarms, door signage, yellow bracelet and socks as well as update of the GOOM Viridiana tool in the patient's room. Talia Score: 3    HEALS Safety Check    A safety check occurred in the patient's room between off going nurse and oncoming nurse listed above. The safety check included the below items  Area Items   H  High Alert Medications Verify all high alert medication drips (heparin, PCA, etc.)   E  Equipment Suction is set up for ALL patients (with viancaker)  Red plugs utilized for all equipment (IV pumps, etc.)  WOWs wiped down at end of shift.   Room stocked with oxygen, suction, and other unit-specific supplies   A  Alarms Bed alarm is set for fall risk patients  Ensure chair alarm is in place and activated if patient is up in a chair   L  Lines Check IV for any infiltration  Gonzalez bag is empty if patient has a Gonzalez   Tubing and IV bags are labeled   S  Safety   Room is clean, patient is clean, and equipment is clean. Hallways are clear from equipment besides carts. Fall bracelet on for fall risk patients  Ensure room is clear and free of clutter  Suction is set up for ALL patients (with lucinda)  Hallways are clear from equipment besides carts.    Isolation precautions followed, supplies available outside room, sign posted     Yumi Rankin RN

## 2022-11-08 NOTE — PROGRESS NOTES
Problem: Risk for Spread of Infection  Goal: Prevent transmission of infectious organism to others  Description: Prevent the transmission of infectious organisms to other patients, staff members, and visitors.   Outcome: Progressing Towards Goal     Problem: Nutrition Deficit  Goal: *Optimize nutritional status  Outcome: Progressing Towards Goal     Problem: Inpatient Rehab (Adult)  Goal: *LTG: Avoids injury/falls 100% of time related to deficits  Outcome: Progressing Towards Goal  Goal: *LTG: Avoids infection 100% of time related to deficits  Outcome: Progressing Towards Goal  Goal: *LTG: Absence of DVT during hospitalization  Outcome: Progressing Towards Goal  Goal: *LTG: Maintains Skin Integrity With No Evidence of Pressure Injury 100% of Time  Outcome: Progressing Towards Goal  Goal: Interventions  Outcome: Progressing Towards Goal

## 2022-11-08 NOTE — ROUTINE PROCESS
Wound Prevention Checklist    Patient: Beto Ag [de-identified]76 y.o. female)  Date: 11/8/2022  Diagnosis: UTI (urinary tract infection) [N39.0] <principal problem not specified>    Precautions: Fall, Contact       [x]  Heel prevention boots placed on patient    [x]  Patient turned q2h during shift    []  Lift team ordered    []  Patient on Félix bed/Specialty bed    [x]  Each Wound is documented during shift (Stage, Color, drainage, odor, measurements, and dressings)    [x]  Dual skin check done with Raji Rankin RN

## 2022-11-08 NOTE — PROGRESS NOTES
Problem: Self Care Deficits Care Plan (Adult)  Goal: *Acute Goals and Plan of Care (Insert Text)  Description: Occupational Therapy Goals   Long Term Goals  Initiated (2022) and to be accomplished within 3 week(s); by (2022)   1. Pt will perform self-feeding with Mod I.   2. Pt will perform grooming with Mod I.   3. Pt will perform UB bathing with set-up/ supv using AE and/ or compensatory strategies as needed. 4. Pt will perform LB bathing with SBA/ CGA using AE and/ or compensatory strategies as needed. 5. Pt will perform tub/shower transfer with SBA using LRAD. 6. Pt will perform UB dressing with set-up. 7. Pt will perform LB dressing with SBA using AE and/ or compensatory strategies as needed. 8. Pt will perform toileting task with supv.  9. Pt will perform toilet transfer with supv using LRAD. Short Term Goals   Initiated (2022) and to be accomplished within 7 day(s), (by 2022)   1. Pt will perform self-feeding with set-up. 2. Pt will perform grooming with supv. 3. Pt will perform UB bathing with supv using AE as needed. 4. Pt will perform LB bathing with Min A using AE and/ or compensatory strategies as needed. 5. Pt will perform tub/shower transfer with Min A using LRAD. 6. Pt will perform UB dressing with SBA. 7. Pt will perform LB dressing with Min A using AE and/ or compensatory strategies as needed. 8. Pt will perform toileting task with Min A  9. Pt will perform toilet transfer with Min A using LRAD. Outcome: Progressing Towards Goal  Goal: Interventions  Outcome: Progressing Towards Goal   Occupational Therapy TREATMENT    Patient: Darcie Reeder   76 y.o.     Patient identified with name and : yes    Date: 2022    First Tx Session  Time In: 0229 (occupational therapy group session)  Time Out: 3151    Second Tx Session  Time In: 1200  Time Out: 1230    Diagnosis: UTI (urinary tract infection) [N39.0]   Precautions: Fall, Contact precautions, Safety  Chart, occupational therapy assessment, plan of care, and goals were reviewed. Pain:  Pt reports 4/10 pain or discomfort prior to treatment; bilateral shoulders (aching) constant and increasing with certain movements (history of rotator cuff issues)   Pt reports 5/10 pain or discomfort post treatment; bilateral shoulders (aching) constant and increasing with certain movements (history of rotator cuff issues)   Intervention Provided: repositioning; intermittent rest breaks. Tasks modified based on pt's tolerance. Patient denies need for pain medication at this time. SUBJECTIVE:   Patient stated \"I just went to the bathroom, I don't need to go right now. \"     OBJECTIVE DATA SUMMARY:     THERAPEUTIC ACTIVITY Daily Assessment    RUTAYLOR/ KRISTIE [address] tabletop there act performed using small colored pegs for replicating a visual pattern (~65 pegs) onto square pegboard. Increased task challenge using 1 lb wrist weights. Task performed for FM skills (tip pinch; in-hand manipulation) and functional reach out-front for increased (I) with self-cares. Wheelchair mobility performed (SBA) level using BUE's/ BLE's to propel over level surfaces for accessing facility environment. Pt requiring rest break x1 due to fatigue. Performed for generalized strengthening, improved activity tolerance, and range of motion. THERAPEUTIC EXERCISE Daily Assessment    GIN/ KRISTIE RINCON table slides performed (15 reps x1) for shoulder flexion, horizontal abduction/ adduction, and circular patterns (clockwise; counter clockwise). Verbal cues and demonstration for hand placement and for technique. Performed for range of motion for improved functional ability with self-cares. GIN/ KRISTIE manley ex performed (multiple repetitions) using velcro board for hand strengthening (/ pinch strengthening), coordination, and digit range of motion for improved functional ability with ADL's.  Therapist providing initial instruction with demonstration for hand positioning and technique. OCCUPATIONAL THERAPY GROUP THERAPY TREATMENT  Time In: 1427  Time Out: 4616   Patient seen for: Occupational therapy group session     SUBJECTIVE:        Patient agreeable to participate in group therapy session. OBJECTIVE DATA SUMMARY:     Objective: Therex   Sets Reps Comments   Shoulder flexion/extension 2 10    Bicep curls        2        15      2 lb hand weight   Chest Press        2        15      2 lb hand weight   Supination/Pronation        1        15 Yellow flexbar   Wrist flexion/extension        1        15 Yellow flexbar   ER/IR        1        15 Yellow flexbar   Shoulder shrugs        1        10    Shoulder backward/ forward rolls        1        10    Shoulder protraction/ retraction        1         5    RUE/ ALBERTE AAROM (use of hand clasped technique) for shoulder flexion, elbow flexion/ extension, elbow flexion/ extension 'v' pattern, and forearm pronation/ supination        1        15 Verbal cues with demonstration for use of hand clasped technique and for upper extremity positioning. Therac   Sets   Reps   Comments   Beach ball toss/catching activity   Multiple reps Pt participated in [employer] ball toss/ catching activity with therapist and fellow group members. Performed for range of motion, functional reach/ grasp, eye-hand coordination, and improved activity tolerance for increased (I) with ADL's. Good participation. Intermittent rest breaks due to fatigue. ASSESSMENT:  Progression toward goals:  []          Patient participated fully in group therapy session and able to tolerate all activities  [x]          Patient able to participate in group therapy session with only minor modifications and/or extended rest breaks needed; tasks modified based on tolerance due to bilateral shoulder discomfort and limited shoulder range of motion. []          Patient not able to tolerate group therapy session.    PLAN:  Patient continues to benefit from skilled intervention to address functional impairments. Patient is appropriate to continue group therapy sessions to promote increased participation in skilled therapy interventions and to provide opportunities for increased social interaction. ASSESSMENT:  Patient will benefit from continued skilled occupational therapy interventions to address decreased (I) with ADL's, decreased functional strength/ endurance, decreased RUE/ LUE AROM, decreased [address], decreased activity tolerance, chronic back pain (pt has spinal stimulator for pain control), generalized weakness, impaired balance/ coordination, anxiety( fear of falling), and impaired functional mobility/ transfers impeding pt's functional (I) with ADL's and mobility for return to PLOF. Progression toward goals:  []          Improving appropriately and progressing toward goals  [x]          Improving slowly and progressing toward goals  []          Not making progress toward goals and plan of care will be adjusted     PLAN:  Patient continues to benefit from skilled intervention to address the above impairments. Continue treatment per established plan of care. Discharge Recommendations: Home Health with assist  Further Equipment Recommendations for Discharge: TBD      Activity Tolerance:  Fair(+); intermittent rest breaks due to fatigue and decreased activity tolerance  Estimated LOS: TBD  Entered Differentiated Treatment minutes: Yes    Please refer to the flowsheet for vital signs taken during this treatment. After treatment:   [x]  Patient left in no apparent distress sitting up in wheelchair with needs met  []  Patient left in no apparent distress in bed  [x]  Call bell left within reach  [x]  Nursing notified  []  Caregiver present  [x]  Wheelchair alarm activated    COMMUNICATION/EDUCATION:   [] Home safety education was provided and the patient/caregiver indicated understanding.   [x] Patient/family have participated as able in goal setting and plan of care. [x] Patient/family agree to work toward stated goals and plan of care. [] Patient understands intent and goals of therapy, but is neutral about his/her participation. [] Patient is unable to participate in goal setting and plan of care.     [address] [facility], OT

## 2022-11-08 NOTE — PROGRESS NOTES
[facility] PHYSICAL REHABILITATION  [address] [place] [address], [address]  OVERALL PLAN OF CARE    Name: Curtis Reeder CSN: [id number]   Age: 76 y.o. MRN: [id number]   Sex: female Admit Date: 11/3/2022     [unfilled]      ANTICIPATED INTERVENTIONS THAT SUPPORT THE MEDICAL NECESSITY OF THIS ADMISSION:    I. Physical Therapy              A. Intensity: 2 hour per day              B. Frequency: 5 times per week              C. Duration: 2 weeks              D. Long Term Goals:    Initiated 11/4/2022 and to be accomplished within 14 day(s) on 11/18/2022  1. Patient will transfer from bed to chair and chair to bed with supervision using the least restrictive device. 2.  Patient will perform sit to stand with supervision. 3.  Patient will ambulate with supervision/set-up for 150 feet with the least restrictive device. 4.  Patient will perform w/c mobility over even surfaces for 150 ft bouts at mod I level. E. Interventions:   Patient continues to benefit from skilled intervention to address the above impairments. Continue treatment per established plan of care. Discharge Recommendations:  Home Health  Further Equipment Recommendations for Discharge:  rolling walker     II. Occupational Therapy              A. Intensity: 2 hour per day              B. Frequency: 5 times per week              C. Duration: 2 weeks              D. Long Term Goals:    Initiated (11/4/2022) and to be accomplished within 3 week(s); by (11/25/2022)   1. Pt will perform self-feeding with Mod I.   2. Pt will perform grooming with Mod I.   3. Pt will perform UB bathing with set-up/ supv using AE and/ or compensatory strategies as needed. 4. Pt will perform LB bathing with SBA/ CGA using AE and/ or compensatory strategies as needed. 5. Pt will perform tub/shower transfer with SBA using LRAD. 6. Pt will perform UB dressing with set-up.   7. Pt will perform LB dressing with SBA using AE and/ or compensatory strategies as needed. 8. Pt will perform toileting task with supv.  9. Pt will perform toilet transfer with supv using LRAD. E. Interventions:   Patient continues to benefit from skilled intervention to address the above impairments. Continue treatment per established plan of care. Discharge Recommendations: Home Health with assist  Further Equipment Recommendations for Discharge: TBD           PHYSICIAN'S ASSESSMENT OF FINDINGS:    Are the established goals sufficient for achieving the optimal level of function? [x]  Yes      []  No    What changes would you recommend to the goals as written? None      Are the interventions noted sufficient for achieving the optimal level of function? [x]  Yes      []  No    What changes would you recommend to the interventions noted? If therapy staff is unable to provide 3 hr of total therapy per day in 5 days due to medical issues or decreased patient tolerance, may modify treatment schedule to 15 hr/week.       Estimated length of stay: 2 weeks      Medical rehabilitation prognosis:    []  Excellent     [x]  Good     []  Fair     []  Guarded       Discharge Destination:     [x]  Home     []  [address]     []  Juan Araya     []  Renny Liu     []  Jennifer     []  Other:       Signed:    Neetu Junior MD    November 8, 2022

## 2022-11-08 NOTE — ROUTINE PROCESS
SHIFT CHANGE NOTE FOR [facility]    Bedside and Verbal shift change report given to Nika Braswell (oncoming nurse) by Olya Hansen RN (offgoing nurse). Report included the following information SBAR, Kardex, MAR and Recent Results.     Situation:   Code Status: Full Code   Hospital Day: 5   Problem List:   Hospital Problems  Date Reviewed: 9/22/2022            Codes Class Noted POA    UTI (urinary tract infection) ICD-10-CM: N39.0  ICD-9-CM: 599.0  11/3/2022 Unknown           Background:   Past Medical History:   Past Medical History:   Diagnosis Date    Abnormal chest CT 2/6/2020    Adopted     Arthritis     Balance problems     Chronic cough 2/6/2020    MCBRIDE (dyspnea on exertion) 2/28/2019    GERD (gastroesophageal reflux disease)     Gout     Hemochromatosis     High cholesterol     Hypertension     Hypothyroidism     Left knee pain     Left shoulder pain     Low blood potassium     Lumbar pain     Pain of left sacroiliac joint     Shoulder impingement, right, with rotator cuff strain         Assessment:  Changes in Assessment throughout shift: No change to previous assessment    Patient has a central line: no Reasons if yes: n/a  Insertion date:n/a Last dressing date:n/a  Patient has Coelho Cath: no Reasons if yes: n/a   Insertion date:n/a  Shift coelho care completed: NO    Last Vitals:     Vitals:    11/06/22 2057 11/07/22 0748 11/07/22 1627 11/07/22 2119   BP: 128/74 (!) 106/57 116/69 98/69   Pulse: 69 66 75 76   Resp: 19 18 18 20   Temp: 97.8 °F (36.6 °C) 96.9 °F (36.1 °C) 97.5 °F (36.4 °C) 97 °F (36.1 °C)   SpO2: 99% 98% 99% 95%   Weight:       Height:           PAIN    Pain Assessment    Pain Intensity 1: 0 (11/08/22 0400) Pain Intensity 1: 2 (12/29/14 1105)    Pain Location 1: Back, Coccyx Pain Location 1: Abdomen    Pain Intervention(s) 1: Medication (see MAR), Position Pain Intervention(s) 1: Medication (see MAR)  Patient Stated Pain Goal: 0 Patient Stated Pain Goal: 0  Intervention effective: yes  Other actions taken for pain: Medication (see MAR); Position    Skin Assessment  Skin color Skin Color: Appropriate for ethnicity, Ecchymosis (comment)  Condition/Temperature    Integrity Skin Integrity: Tear  Turgor    Weekly Pressure Ulcer Documentation  Pressure  Injury Documentation: No Pressure Injury Noted-Pressure Ulcer Prevention Initiated  Wound Prevention & Protection Methods  Orientation of wound Orientation of Wound Prevention: Posterior  Location of Prevention Location of Wound Prevention: Buttocks, Sacrum/Coccyx  Dressing Present Dressing Present : No  Dressing Status    Wound Offloading Wound Offloading (Prevention Methods): Adaptive equipment, Bed, pressure reduction mattress, Repositioning, Turning, Wheelchair, Foam silicone    INTAKE/OUPUT  Date 11/07/22 0700 - 11/08/22 0659 11/08/22 0700 - 11/09/22 0659   Shift 4530-4740 1263-7527 24 Hour Total 2851-0428 2035-5599 24 Hour Total   INTAKE   P.O. 720  720        P. O. 720  720      Shift Total(mL/kg) 720(8.8)  720(8.8)      OUTPUT   Urine(mL/kg/hr)           Urine Occurrence(s) 4 x 2 x 6 x      Stool           Stool Occurrence(s) 1 x 1 x 2 x      Shift Total(mL/kg)           720      Weight (kg) 82.3 82.3 82.3 82.3 82.3 82.3       Recommendations:  Patient needs and requests: assistance with ADL's. Pain management. Pending tests/procedures: none at this time     Functional Level/Equipment: Partial (one person) / Bed (comment); Theador Corpus; Wheelchair    Fall Precautions:   Fall risk precautions were reinforced with the patient; she was instructed to call for help prior to getting up. The following fall risk precautions were continued: bed/ chair alarms, door signage, yellow bracelet and socks as well as update of the Chiara Ground tool in the patient's room. Talia Score: 3    HEALS Safety Check    A safety check occurred in the patient's room between off going nurse and oncoming nurse listed above.     The safety check included the below items  Area Items   H  High Alert Medications Verify all high alert medication drips (heparin, PCA, etc.)   E  Equipment Suction is set up for ALL patients (with lucinda)  Red plugs utilized for all equipment (IV pumps, etc.)  WOWs wiped down at end of shift. Room stocked with oxygen, suction, and other unit-specific supplies   A  Alarms Bed alarm is set for fall risk patients  Ensure chair alarm is in place and activated if patient is up in a chair   L  Lines Check IV for any infiltration  Gonzalez bag is empty if patient has a Gonzalez   Tubing and IV bags are labeled   S  Safety   Room is clean, patient is clean, and equipment is clean. Hallways are clear from equipment besides carts. Fall bracelet on for fall risk patients  Ensure room is clear and free of clutter  Suction is set up for ALL patients (with lucinda)  Hallways are clear from equipment besides carts.    Isolation precautions followed, supplies available outside room, sign posted     Aj Reyes RN

## 2022-11-08 NOTE — PROGRESS NOTES
Pt is a 76 y.o. female admitted to ARU for UTI (urinary tract infection) [N39.0]. Pt is alert and oriented, alone in the room. Previous Functional Level: Per pt report, patient performed UB ADL's (Mod I); however, received (Min A) for lower body bathing. Pt taking showers at home using tub transfer bench. Pt reporting that her roommate does most of the cooking. Pt reporting managing her medications and laundry tasks independently. Pt drives herself to grocery store and to appointments. Wheelchair ramp at entrance. DME used at home: raised toilet seat, TTB, walker, rollator, motorized scooter, lift recliner. Home Environment: Private residence  Steps to enter: 3 TAMMY front door and ramp leading into a side entrance of the home  Support System: Friend/Neighbor    Patient has received services from: [address] [place] [address] patient has currenlty: quad cane, straight cane, transfer bench, rollator, and power wheelchair, pt reports that she has two lift recliner chairs   Pt states that at [facility] they will be returning to her home with her roommate Deatrice Pain  x Irineo Crigler- primary decision maker  [] NO  NOK: daughter Darien Sampson education  session: DATE: TBD - pt would like to speak with her daugther prior to team calling. Pt reports that roommate is available to assist but not comfortable attending training  Designated caregiver will attend: NAME /CONTACT INFO:  TBD - possibly daughter     [facility]    PCP:  Janene Gunn MD     COVID VACCINE: Yes    Reviewed DC planning, IDR's. Patient verbalized understanding. Team conference and  caregiver  education were all shared with patient. Permission to reach out to daughter to schedule and share information about discharge after patient has opportunity to speak with daughter first.     Anticipate patient will be discharged from the  ARU to  home with support of roommate and possibly daughter.          to follow.       Pelon Whitley, RT

## 2022-11-09 PROCEDURE — 97530 THERAPEUTIC ACTIVITIES: CPT

## 2022-11-09 PROCEDURE — 97110 THERAPEUTIC EXERCISES: CPT

## 2022-11-09 PROCEDURE — 97116 GAIT TRAINING THERAPY: CPT

## 2022-11-09 PROCEDURE — 94761 N-INVAS EAR/PLS OXIMETRY MLT: CPT

## 2022-11-09 PROCEDURE — 77030040392 HC DRSG OPTIFOAM MDII -A

## 2022-11-09 PROCEDURE — 97150 GROUP THERAPEUTIC PROCEDURES: CPT

## 2022-11-09 PROCEDURE — 97535 SELF CARE MNGMENT TRAINING: CPT

## 2022-11-09 PROCEDURE — 99232 SBSQ HOSP IP/OBS MODERATE 35: CPT | Performed by: HOSPITALIST

## 2022-11-09 PROCEDURE — 74011250637 HC RX REV CODE- 250/637: Performed by: NURSE PRACTITIONER

## 2022-11-09 PROCEDURE — 74011250637 HC RX REV CODE- 250/637: Performed by: EMERGENCY MEDICINE

## 2022-11-09 PROCEDURE — 65310000000 HC RM PRIVATE REHAB

## 2022-11-09 RX ORDER — FUROSEMIDE 20 MG/1
20 TABLET ORAL DAILY
Status: DISCONTINUED | OUTPATIENT
Start: 2022-11-09 | End: 2022-11-15 | Stop reason: HOSPADM

## 2022-11-09 RX ADMIN — LEVOTHYROXINE SODIUM 25 MCG: 25 TABLET ORAL at 06:54

## 2022-11-09 RX ADMIN — THERA TABS 1 TABLET: TAB at 08:26

## 2022-11-09 RX ADMIN — FUROSEMIDE 20 MG: 20 TABLET ORAL at 16:35

## 2022-11-09 RX ADMIN — OXYCODONE HYDROCHLORIDE 5 MG: 5 TABLET ORAL at 08:28

## 2022-11-09 RX ADMIN — METOPROLOL TARTRATE 25 MG: 25 TABLET, FILM COATED ORAL at 08:26

## 2022-11-09 RX ADMIN — CLOPIDOGREL BISULFATE 75 MG: 75 TABLET ORAL at 08:26

## 2022-11-09 RX ADMIN — APIXABAN 5 MG: 5 TABLET, FILM COATED ORAL at 16:44

## 2022-11-09 RX ADMIN — APIXABAN 5 MG: 5 TABLET, FILM COATED ORAL at 08:26

## 2022-11-09 RX ADMIN — PANTOPRAZOLE SODIUM 40 MG: 40 TABLET, DELAYED RELEASE ORAL at 06:54

## 2022-11-09 RX ADMIN — OXYCODONE HYDROCHLORIDE 5 MG: 5 TABLET ORAL at 19:12

## 2022-11-09 RX ADMIN — DOCUSATE SODIUM 100 MG: 100 CAPSULE, LIQUID FILLED ORAL at 08:26

## 2022-11-09 RX ADMIN — ROSUVASTATIN CALCIUM 40 MG: 20 TABLET, FILM COATED ORAL at 08:26

## 2022-11-09 NOTE — ROUTINE PROCESS
Wound Prevention Checklist    Patient: Kirstin Pemberton [de-identified]76 y.o. female)  Date: 11/9/2022  Diagnosis: UTI (urinary tract infection) [N39.0] <principal problem not specified>    Precautions: Fall, Contact       []  Heel prevention boots placed on patient    []  Patient turned q2h during shift    []  Lift team ordered    [x]  Patient on Félix bed/Specialty bed    [x]  Each Wound is documented during shift (Stage, Color, drainage, odor, measurements, and dressings)    [x]  Dual skin check done with Beckie Christian RN

## 2022-11-09 NOTE — PROGRESS NOTES
Problem: Mobility Impaired (Adult and Pediatric)  Goal: *Therapy Goal (Edit Goal, Insert Text)  Description: Physical Therapy Short Term Goals  Initiated 11/4/2022 and to be accomplished within 7 day(s) on 11/11/2022  1. Patient will move from supine to sit and sit to supine  in bed with minimal assistance/contact guard assist.    2.  Patient will transfer from bed to chair and chair to bed with minimal assistance/contact guard assist using the least restrictive device. 3.  Patient will perform sit to stand with minimal assistance/contact guard assist.  4.  Patient will ambulate with minimal assistance/contact guard assist for 75 feet with the least restrictive device. 5.  Patient will perform w/c mobility 150 ft bouts SBA level over even surfaces for improved functional endurance. Physical Therapy Long Term Goals  Initiated 11/4/2022 and to be accomplished within 14 day(s) on 11/18/2022  1. Patient will transfer from bed to chair and chair to bed with supervision using the least restrictive device. 2.  Patient will perform sit to stand with supervision. 3.  Patient will ambulate with supervision/set-up for 150 feet with the least restrictive device. 4.  Patient will perform w/c mobility over even surfaces for 150 ft bouts at mod I level. Outcome: Progressing Towards Goal   PHYSICAL THERAPY TREATMENT    Patient: Mitzi Ron (89 y.o. female)  Date: 11/9/2022  Diagnosis: UTI (urinary tract infection) [N39.0]   Precautions: Fall, Contact  Chart, physical therapy assessment, plan of care and goals were reviewed. Time In:0800  Time [id number]  Time In: 1333  Time Out: 0992  Entered Differentiated Treatment minutes: Yes    Patient seen for: Balance activities; Patient education;Gait training;Transfer training; Therapeutic exercise; Wheelchair mobility    Pain:  Patient with pain report of 10/10 initially decreasing to 6/10 following pain medication.  Pain reported primarily in bilat LE and knees, but also in low back and shoulders. Patient identified with name and : yes    SUBJECTIVE:      Patient agreeable to treatment. Observed to continue to get frustrated, have increased pain, with increased tearfulness and tremors worsening with increased emotions. OBJECTIVE DATA SUMMARY:    Objective:     TRANSFERS Daily Assessment     Transfer Type: Other  Other: stand step with RW and with rollator  Transfer Assistance : 4 (Contact guard assistance)  Sit to Stand Assistance: Contact guard assistance    CGA with transfers, needing bilat UE to assist with pushing up into standing, tactile cue for appropriate forward weightshift over base of support. Performing 2 x 3 reps sit to stand from 19\" height surface, CGA/min A with more assistance needed during second set with fatigue and increased pain reported left knee. GAIT Daily Assessment    Gait Description (WDL) Exceptions to WDL    Gait Abnormalities Decreased step clearance;Trendelenburg;Trunk sway increased; Shuffling gait    Assistive device Walker, rollator, RW    Ambulation assistance - level surface 4 (Contact guard assistance)    Distance 45 Feet (ft) with rollator, able to perform gait  ft bouts with RW    Ambulation assistance- uneven surface  (NT)    Comments With more appropriate height RW, able to perform gait for longer distances, performing 3 bouts. Last bout 170 ft with patient observed to have significantly increased left knee pain so nurse notified. STEPS/STAIRS Daily Assessment     Steps/Stairs ambulated      Assistance Required NA (Not applicable)    Rail Use      Comments  Patient does not typically negotiate stairs.     Curbs/Ramps  (NT)        BALANCE Daily Assessment     Sitting - Static: Good (unsupported)  Sitting - Dynamic: Good (unsupported)  Standing - Static: Fair (intermittently able to let go of UE support)  Standing - Dynamic : Impaired        WHEELCHAIR MOBILITY Daily Assessment     Able to Propel (ft): 270 feet  Functional Level: 5  Curbs/Ramps Assist Required (FIM Score): 0 (Not tested)  Wheelchair Setup Assist Required : 4 (Minimal assistance)  Wheelchair Management: Manages left brake;Manages right brake    Performing x 2 reps        THERAPEUTIC EXERCISES Daily Assessment       See below         Seated  EXERCISE   Sets   Reps   Active Active Assist   Comments   Ankle Pumps   [] []      Long Arc Quads 3 15 [x]  []  1.5# cuff weights    Seated Marching 3 15 [x]  []   1.5# cuff weights   Heel slides     []  []      Hip Abd/ER clamshells w/ T-band 3  15 [x]  []  Green Tband    Hip Abd SLRs   []  []      Hip Add Isometrics   []  []     Heel taps on floor     []  []      Wheelchair pushups 2   5 [x]  []   Min A for appropriate forward weightshift of trunk          ASSESSMENT:  Patient continues to slowly improve with functional LE strength for increased ease of transfers. Progression toward goals:  []      Improving appropriately and progressing toward goals  [x]      Improving slowly and progressing toward goals  []      Not making progress toward goals and plan of care will be adjusted      PLAN:  Patient continues to benefit from skilled intervention to address the above impairments. Continue treatment per established plan of care.   Discharge Recommendations:  Home Health  Further Equipment Recommendations for Discharge:  N/A      Estimated Discharge Date: TBD    Activity Tolerance:   Fair due to pain    After treatment:   [] Patient left in no apparent distress in bed  [x] Patient left in no apparent distress sitting up in chair  [] Patient left in no apparent distress in w/c mobilizing under own power  [] Patient left in no apparent distress dining area  [] Patient left in no apparent distress mobilizing under own power  [x] Call bell left within reach  [x] Nursing notified  [] Caregiver present  [] Bed alarm activated   [x] Chair alarm activated      Tonya Denise, PT  11/9/2022

## 2022-11-09 NOTE — PROGRESS NOTES
Problem: Risk for Spread of Infection  Goal: Prevent transmission of infectious organism to others  Description: Prevent the transmission of infectious organisms to other patients, staff members, and visitors. Outcome: Progressing Towards Goal     Problem: Falls - Risk of  Goal: *Absence of Falls  Description: Document Ximena Duval Fall Risk and appropriate interventions in the flowsheet. Outcome: Progressing Towards Goal  Note: Fall Risk Interventions:  Mobility Interventions: Assess mobility with egress test, Bed/chair exit alarm, Communicate number of staff needed for ambulation/transfer, OT consult for ADLs, Patient to call before getting OOB, PT Consult for mobility concerns, PT Consult for assist device competence, Strengthening exercises (ROM-active/passive), Utilize gait belt for transfers/ambulation, Utilize walker, cane, or other assistive device         Medication Interventions: Bed/chair exit alarm, Evaluate medications/consider consulting pharmacy, Patient to call before getting OOB, Teach patient to arise slowly, Utilize gait belt for transfers/ambulation    Elimination Interventions: Bed/chair exit alarm, Call light in reach, Patient to call for help with toileting needs, Stay With Me (per policy)    History of Falls Interventions: Bed/chair exit alarm, Consult care management for discharge planning, Door open when patient unattended, Evaluate medications/consider consulting pharmacy, Utilize gait belt for transfer/ambulation         Problem: Pressure Injury - Risk of  Goal: *Prevention of pressure injury  Description: Document Stef Scale and appropriate interventions in the flowsheet.   Outcome: Progressing Towards Goal  Note: Pressure Injury Interventions:  Sensory Interventions: Assess changes in LOC, Assess need for specialty bed, Avoid rigorous massage over bony prominences, Chair cushion, Discuss PT/OT consult with provider, Float heels, Keep linens dry and wrinkle-free, Maintain/enhance activity level, Minimize linen layers, Pressure redistribution bed/mattress (bed type), Use 30-degree side-lying position    Moisture Interventions: Absorbent underpads, Maintain skin hydration (lotion/cream), Minimize layers    Activity Interventions: Assess need for specialty bed, Chair cushion, Increase time out of bed, Pressure redistribution bed/mattress(bed type), PT/OT evaluation    Mobility Interventions: Assess need for specialty bed, Chair cushion, Float heels, HOB 30 degrees or less, Pressure redistribution bed/mattress (bed type), PT/OT evaluation, Turn and reposition approx.  every two hours(pillow and wedges)    Nutrition Interventions: Document food/fluid/supplement intake    Friction and Shear Interventions: Apply protective barrier, creams and emollients, Foam dressings/transparent film/skin sealants, Feet elevated on foot rest, HOB 30 degrees or less, Minimize layers, Sit at 90-degree angle                Problem: Nutrition Deficit  Goal: *Optimize nutritional status  Outcome: Progressing Towards Goal

## 2022-11-09 NOTE — ROUTINE PROCESS
SHIFT CHANGE NOTE FOR [facility]    Bedside and Verbal shift change report given to Kalani Corona RN(oncoming nurse) by Jayjay Arce RN (offgoing nurse). Report included the following information SBAR, Kardex, MAR and Recent Results.     Situation:   Code Status: Full Code   Hospital Day: 6   Problem List:   Hospital Problems  Date Reviewed: 9/22/2022            Codes Class Noted POA    UTI (urinary tract infection) ICD-10-CM: N39.0  ICD-9-CM: 599.0  11/3/2022 Unknown       Background:   Past Medical History:   Past Medical History:   Diagnosis Date    Abnormal chest CT 2/6/2020    Adopted     Arthritis     Balance problems     Chronic cough 2/6/2020    MCBRIDE (dyspnea on exertion) 2/28/2019    GERD (gastroesophageal reflux disease)     Gout     Hemochromatosis     High cholesterol     Hypertension     Hypothyroidism     Left knee pain     Left shoulder pain     Low blood potassium     Lumbar pain     Pain of left sacroiliac joint     Shoulder impingement, right, with rotator cuff strain         Assessment:  Changes in Assessment throughout shift:      Patient has a central line: no Reasons if yes: n/a  Insertion date:n/a Last dressing date:n/a  Patient has Coelho Cath: no Reasons if yes: na   Insertion date:n/a  Shift coelho care completed: NO    Last Vitals:     Vitals:    11/08/22 1548 11/08/22 1958 11/09/22 0750 11/09/22 1552   BP: 115/60 135/71 137/89 99/65   Pulse: 65 70 76 68   Resp: 18 22 18 18   Temp: 97 °F (36.1 °C) 97 °F (36.1 °C) 97.7 °F (36.5 °C) 98.3 °F (36.8 °C)   SpO2: 99% 98% 99% 98%   Weight:       Height:           PAIN    Pain Assessment    Pain Intensity 1: 0 (11/09/22 1230) Pain Intensity 1: 2 (12/29/14 1105)    Pain Location 1: Back, Generalized, Leg Pain Location 1: Abdomen    Pain Intervention(s) 1: Medication (see MAR) Pain Intervention(s) 1: Medication (see MAR)  Patient Stated Pain Goal: Unable to verbalize/indicate pain (asleep) Patient Stated Pain Goal: 0  Intervention effective: yes  Other actions taken for pain: Medication (see MAR)    Skin Assessment  Skin color Skin Color: Appropriate for ethnicity  Condition/Temperature Skin Condition/Temp: Dry, Warm  Integrity Skin Integrity: Tear  Turgor    Weekly Pressure Ulcer Documentation  Pressure  Injury Documentation: No Pressure Injury Noted-Pressure Ulcer Prevention Initiated  Wound Prevention & Protection Methods  Orientation of wound Orientation of Wound Prevention: Posterior  Location of Prevention Location of Wound Prevention: Sacrum/Coccyx  Dressing Present Dressing Present : No  Dressing Status    Wound Offloading Wound Offloading (Prevention Methods): Bed, pressure reduction mattress    INTAKE/OUPUT  Date 11/08/22 0700 - 11/09/22 0659 11/09/22 0700 - 11/10/22 0659   Shift 9446-9111 5914-4814 24 Hour Total 9597-8131 7041-4375 24 Hour Total   INTAKE   P.O. 480  480 400  400     P. O. 480  480 400  400   Shift Total(mL/kg) 480(5.8)  480(5.8) 400(4.9)  400(4.9)   OUTPUT   Urine(mL/kg/hr)           Urine Occurrence(s) 3 x 3 x 6 x 4 x  4 x   Stool           Stool Occurrence(s) 1 x 1 x 2 x 1 x  1 x   Shift Total(mL/kg)           480 400  400   Weight (kg) 82.3 82.3 82.3 82.3 82.3 82.3         Recommendations:  Patient needs and requests: assistance with ADL's, decrease swelling on BLE    Pending tests/procedures: none at this time     Functional Level/Equipment: Partial (one person) /      Fall Precautions:   Fall risk precautions were reinforced with the patient; she was instructed to call for help prior to getting up. The following fall risk precautions were continued: bed/ chair alarms, door signage, yellow bracelet and socks as well as update of the Chiara Ground tool in the patient's room. Talia Score: 3    HEALS Safety Check    A safety check occurred in the patient's room between off going nurse and oncoming nurse listed above.     The safety check included the below items  Area Items   H  High Alert Medications Verify all high alert medication drips (heparin, PCA, etc.)   E  Equipment Suction is set up for ALL patients (with lucinda)  Red plugs utilized for all equipment (IV pumps, etc.)  WOWs wiped down at end of shift. Room stocked with oxygen, suction, and other unit-specific supplies   A  Alarms Bed alarm is set for fall risk patients  Ensure chair alarm is in place and activated if patient is up in a chair   L  Lines Check IV for any infiltration  Gonzalez bag is empty if patient has a Gonzalez   Tubing and IV bags are labeled   S  Safety   Room is clean, patient is clean, and equipment is clean. Hallways are clear from equipment besides carts. Fall bracelet on for fall risk patients  Ensure room is clear and free of clutter  Suction is set up for ALL patients (with lucinda)  Hallways are clear from equipment besides carts.    Isolation precautions followed, supplies available outside room, sign posted     Berny Mckeon RN

## 2022-11-09 NOTE — ROUTINE PROCESS
SHIFT CHANGE NOTE FOR [facility]    Bedside and Verbal shift change report given to RANJAN Arciniega (oncoming nurse) by Juanis Monteiro RN (offgoing nurse). Report included the following information SBAR, Kardex, MAR and Recent Results.     Situation:   Code Status: Full Code   Hospital Day: 6   Problem List:   Hospital Problems  Date Reviewed: 9/22/2022            Codes Class Noted POA    UTI (urinary tract infection) ICD-10-CM: N39.0  ICD-9-CM: 599.0  11/3/2022 Unknown         Background:   Past Medical History:   Past Medical History:   Diagnosis Date    Abnormal chest CT 2/6/2020    Adopted     Arthritis     Balance problems     Chronic cough 2/6/2020    MCBRIDE (dyspnea on exertion) 2/28/2019    GERD (gastroesophageal reflux disease)     Gout     Hemochromatosis     High cholesterol     Hypertension     Hypothyroidism     Left knee pain     Left shoulder pain     Low blood potassium     Lumbar pain     Pain of left sacroiliac joint     Shoulder impingement, right, with rotator cuff strain         Assessment:  Changes in Assessment throughout shift:      Patient has a central line: no Reasons if yes: n/a  Insertion date:n/a Last dressing date:n/a  Patient has Coelho Cath: no Reasons if yes: na   Insertion date:n/a  Shift coelho care completed: NO    Last Vitals:     Vitals:    11/08/22 1548 11/08/22 1958 11/09/22 0750 11/09/22 1552   BP: 115/60 135/71 137/89 99/65   Pulse: 65 70 76 68   Resp: 18 22 18 18   Temp: 97 °F (36.1 °C) 97 °F (36.1 °C) 97.7 °F (36.5 °C) 98.3 °F (36.8 °C)   SpO2: 99% 98% 99% 98%   Weight:       Height:           PAIN    Pain Assessment    Pain Intensity 1: 0 (11/09/22 1230) Pain Intensity 1: 2 (12/29/14 1105)    Pain Location 1: Back, Generalized, Leg Pain Location 1: Abdomen    Pain Intervention(s) 1: Medication (see MAR) Pain Intervention(s) 1: Medication (see MAR)  Patient Stated Pain Goal: Unable to verbalize/indicate pain (asleep) Patient Stated Pain Goal: 0  Intervention effective: yes  Other actions taken for pain: Medication (see MAR)    Skin Assessment  Skin color Skin Color: Appropriate for ethnicity  Condition/Temperature Skin Condition/Temp: Dry, Warm  Integrity Skin Integrity: Tear  Turgor    Weekly Pressure Ulcer Documentation  Pressure  Injury Documentation: No Pressure Injury Noted-Pressure Ulcer Prevention Initiated  Wound Prevention & Protection Methods  Orientation of wound Orientation of Wound Prevention: Posterior  Location of Prevention Location of Wound Prevention: Sacrum/Coccyx  Dressing Present Dressing Present : No  Dressing Status    Wound Offloading Wound Offloading (Prevention Methods): Bed, pressure reduction mattress    INTAKE/OUPUT  Date 11/08/22 0700 - 11/09/22 0659 11/09/22 0700 - 11/10/22 0659   Shift 0131-1193 5995-7636 24 Hour Total 9514-0364 1694-3881 24 Hour Total   INTAKE   P.O. 480  480 400  400     P. O. 480  480 400  400   Shift Total(mL/kg) 480(5.8)  480(5.8) 400(4.9)  400(4.9)   OUTPUT   Urine(mL/kg/hr)           Urine Occurrence(s) 3 x 3 x 6 x 1 x  1 x   Stool           Stool Occurrence(s) 1 x 1 x 2 x 1 x  1 x   Shift Total(mL/kg)           480 400  400   Weight (kg) 82.3 82.3 82.3 82.3 82.3 82.3         Recommendations:  Patient needs and requests: assistance with ADL's, decrease swelling on BLE    Pending tests/procedures: none at this time     Functional Level/Equipment: Partial (one person) /      Fall Precautions:   Fall risk precautions were reinforced with the patient; she was instructed to call for help prior to getting up. The following fall risk precautions were continued: bed/ chair alarms, door signage, yellow bracelet and socks as well as update of the [employer] tool in the patient's room. Talia Score: 3    HEALS Safety Check    A safety check occurred in the patient's room between off going nurse and oncoming nurse listed above.     The safety check included the below items  Area Items   H  High Alert Medications Verify all high alert medication drips (heparin, PCA, etc.)   E  Equipment Suction is set up for ALL patients (with lucinda)  Red plugs utilized for all equipment (IV pumps, etc.)  WOWs wiped down at end of shift. Room stocked with oxygen, suction, and other unit-specific supplies   A  Alarms Bed alarm is set for fall risk patients  Ensure chair alarm is in place and activated if patient is up in a chair   L  Lines Check IV for any infiltration  Gonzalez bag is empty if patient has a Gonzalez   Tubing and IV bags are labeled   S  Safety   Room is clean, patient is clean, and equipment is clean. Hallways are clear from equipment besides carts. Fall bracelet on for fall risk patients  Ensure room is clear and free of clutter  Suction is set up for ALL patients (with lucinda)  Hallways are clear from equipment besides carts.    Isolation precautions followed, supplies available outside room, sign posted     Shreya Patel RN

## 2022-11-09 NOTE — PROGRESS NOTES
Comprehensive Nutrition Assessment    Type and Reason for Visit: Reassess, Consult    Nutrition Recommendations/Plan:   Modify supplements: decrease Magic Cup to once daily (290 kcal, 9 gm protein each)  Update food/ beverages preferences in diet order  Continue daily MVI  Continue all other nutrition interventions. Encourage/ monitor po intake of meals and supplements. Malnutrition Assessment:  Malnutrition Status:  Mild malnutrition (11/04/22 7702)    Context:  Chronic illness     Findings of the 6 clinical characteristics of malnutrition:   Energy Intake:  75% or less est energy requirements for 1 month or longer  Weight Loss:  No significant weight loss     Body Fat Loss:  Unable to assess,     Muscle Mass Loss:  Unable to assess,    Fluid Accumulation:  No significant fluid accumulation,     Strength:  Not performed     Nutrition Assessment:    Pt reported good appetite and meal intake; stated eating most of her meals (90%). tolerating diet. Likes/ consuming magic cup supplements. Discussed decreasing frequency of magic cup from TID to BID since meal intake improved; pt agreed with plan, but stated she is okay to receive magic cup once daily. food/ beverage preferences discussed    Nutrition Related Findings:     11/8 Wound Type: Surgical incision    Current Nutrition Intake & Therapies:  Average Meal Intake: %  Average Supplement Intake: %  ADULT DIET Regular; Low Fat/Low Chol/High Fiber/2 gm Na  ADULT ORAL NUTRITION SUPPLEMENT Breakfast, Lunch, Dinner; Frozen Supplement    Anthropometric Measures:  Height: 5' 1\" (154.9 cm)  Ideal Body Weight (IBW): 105 lbs (48 kg)  Admission Body Weight: 181 lb 7 oz  Current Body Wt:  82.3 kg (181 lb 7 oz), 172.8 % IBW.  Bed scale  Current BMI (kg/m2): 34.3  Usual Body Weight: 83.5 kg (184 lb) (256 lb per pt report;  noted >200 lb from 2019 to 2021 per chart hx)  % Weight Change (Calculated): -1.4  Weight Adjustment: No adjustment  BMI Category: Obese class 1 (BMI 30.0-34. 9)    Estimated Daily Nutrient Needs:  Energy Requirements Based On: Formula  Weight Used for Energy Requirements: Admission  Energy (kcal/day): 6173-4472  Weight Used for Protein Requirements: Admission  Protein (g/day): 66-82  Method Used for Fluid Requirements: 1 ml/kcal  Fluid (ml/day): 8746-7432    Nutrition Diagnosis:    In context of chronic illness (mild malnutrition) related to early satiety as evidenced by poor intake prior to admission    Nutrition Interventions:   Food and/or Nutrient Delivery: Continue current diet, Vitamin supplement, Modify oral nutrition supplement  Nutrition Education/Counseling: Education not indicated  Coordination of Nutrition Care: Continue to monitor while inpatient  Plan of Care discussed with: pt    Goals:  Previous Goal Met: Goal(s) achieved  Goals: Meet at least 75% of estimated needs, by next RD assessment       Nutrition Monitoring and Evaluation:   Behavioral-Environmental Outcomes: None identified  Food/Nutrient Intake Outcomes: Diet advancement/tolerance, Food and nutrient intake, Supplement intake, Vitamin/mineral intake  Physical Signs/Symptoms Outcomes: Biochemical data, GI status, Meal time behavior    Discharge Planning:    Continue current diet    Marquis Barroso, [address]  Contact: [phone]

## 2022-11-09 NOTE — PROGRESS NOTES
Progress Note    Patient: Chano Meyer MRN: [id number]  CSN: [id number]    YOB: 1945  Age: 76 y.o. Sex: female    DOA: 11/3/2022 LOS:  LOS: 6 days                    Subjective:     Primary rehab diagnosis:    Impaired mobility and ADLs in the setting of UTI with generalized weakness and inability to ambulate    Patient seen and evaluated, sitting up in wheelchair, no acute distress. Patient mentioned that her feet and legs are swelling. Patient stated that she took diuretic in the past.     Review of chart reveals history of chronic edema, including evaluations done by vascular surgery in September. Patient encouraged to elevate feet as much as possible. Patient sits in wheelchair with legs in a dependent position most of the day. Will resume Lasix, Continue PT and OT. Will review chart and make plan of care changes as appropriate. Review of systems  General: No fevers or chills. Cardiovascular: No chest pain or pressure. No palpitations. Pulmonary: No shortness of breath, cough or wheeze. Gastrointestinal: No abdominal pain, nausea, vomiting or diarrhea. Genitourinary: No urinary frequency, urgency, hesitancy or dysuria. Musculoskeletal: No joint or muscle pain, no back pain, no recent trauma. Neurologic: generalized weakness    Objective:     Physical Exam:  Visit Vitals  /89 (BP 1 Location: Left upper arm, BP Patient Position: Sitting)   Pulse 76   Temp 97.7 °F (36.5 °C)   Resp 18   Ht 5' 1\" (1.549 m)   Wt 82.3 kg (181 lb 6.4 oz)   SpO2 99%   BMI 34.28 kg/m²        General:         Alert, cooperative, no acute distress    HEENT: NC, Atraumatic.   anicteric sclerae. Lungs: CTA Bilaterally. No Wheezing/Rhonchi/Rales. Heart:  Regular  rhythm,  No murmur, No Rubs, No Gallops  Abdomen: Soft, Non distended, Non tender. +Bowel sounds  Extremities: + edema  Psych:   Not anxious or agitated. Neurologic:  CN 2-12 grossly intact, Alert and oriented X 3.   No acute neurological deficits,     Intake and Output:  Current Shift:  11/09 0701 - 11/09 1900  In: 400 [P.O.:400]  Out: -   Last three shifts:  11/07 1901 - 11/09 0700  In: 480 [P.O.:480]  Out: -     Labs: Results:       Chemistry No results for input(s): GLU, NA, K, CL, CO2, BUN, CREA, CA, AGAP, BUCR, TBIL, AP, TP, ALB, GLOB, AGRAT in the last 72 hours. No lab exists for component: GPT     CBC w/Diff No results for input(s): WBC, RBC, HGB, HCT, PLT, GRANS, LYMPH, EOS, HGBEXT, HCTEXT, PLTEXT, HGBEXT, HCTEXT, PLTEXT in the last 72 hours. Cardiac Enzymes No results for input(s): CPK, CKND1, REINALDO in the last 72 hours. No lab exists for component: CKRMB, TROIP   Coagulation No results for input(s): PTP, INR, APTT, INREXT, INREXT in the last 72 hours. Lipid Panel Lab Results   Component Value Date/Time    Cholesterol, total 143 05/20/2022 07:26 AM    HDL Cholesterol 73 (H) 05/20/2022 07:26 AM    LDL, calculated 48.6 05/20/2022 07:26 AM    VLDL, calculated 21.4 05/20/2022 07:26 AM    Triglyceride 107 05/20/2022 07:26 AM    CHOL/HDL Ratio 2.0 05/20/2022 07:26 AM      BNP No results for input(s): BNPP in the last 72 hours. Liver Enzymes No results for input(s): TP, ALB, TBIL, AP in the last 72 hours.     No lab exists for component: SGOT, GPT, DBIL   Thyroid Studies Lab Results   Component Value Date/Time    TSH 1.24 11/02/2022 03:36 AM          Procedures/imaging: see electronic medical records for all procedures/Xrays and details which were not copied into this note but were reviewed prior to creation of Plan    Medications:   Current Facility-Administered Medications   Medication Dose Route Frequency    pneumococcal 23-valent (PNEUMOVAX 23) injection 0.5 mL  0.5 mL IntraMUSCular PRIOR TO DISCHARGE    acetaminophen (TYLENOL) tablet 500 mg  500 mg Oral Q6H PRN    therapeutic multivitamin (THERAGRAN) tablet 1 Tablet  1 Tablet Oral DAILY    albuterol (PROVENTIL VENTOLIN) nebulizer solution 2.5 mg  2.5 mg Nebulization Q4H PRN    apixaban (ELIQUIS) tablet 5 mg  5 mg Oral BID    polyvinyl alcohol-povidon(PF) (REFRESH CLASSIC) 1.4-0.6 % ophthalmic solution 1 Drop  1 Drop Both Eyes DAILY PRN    clopidogreL (PLAVIX) tablet 75 mg  75 mg Oral DAILY    levothyroxine (SYNTHROID) tablet 25 mcg  25 mcg Oral ACB    metoprolol tartrate (LOPRESSOR) tablet 25 mg  25 mg Oral DAILY    nitroglycerin (NITROSTAT) tablet 0.4 mg  0.4 mg SubLINGual Q5MIN PRN    oxyCODONE IR (ROXICODONE) tablet 5 mg  5 mg Oral TID PRN    pantoprazole (PROTONIX) tablet 40 mg  40 mg Oral ACB    rosuvastatin (CRESTOR) tablet 40 mg  40 mg Oral DAILY    docusate sodium (COLACE) capsule 100 mg  100 mg Oral BID    bisacodyL (DULCOLAX) tablet 10 mg  10 mg Oral Q48H PRN       Assessment/Plan     Active Problems:    UTI (urinary tract infection) (11/3/2022)    Impaired mobility and ADLs in the setting of UTI with generalized weakness and inability to ambulate  Pt/ot eval and treat    Recent UTI  Completed therapy, monitor for recurrent symtpoms    HTN, HLD, atrial fibrillation  Continue lopressor, crestor  Anticoagulation with eliquis    Hypothyroidism  Continue synthroid 25mcg daily    GERD  Continue Protonix    BLE Chronic edema  Lasix 20 mg Daily. Personal Protective Equipment (face mask and eye goggles) was used while interacting with the patient. Patient was using a surgical mask. Patient's progress in rehabilitation and medical issues discussed with the patient. All questions answered to the best of my ability. Care plan discussed with patient and nurse. Total clinical care time is 30 minutes, including review of chart including all labs, radiology, past medical history, and discussion with patient. Greater than 50% of my time was spent in coordination of care and counseling.     Kinsey Neil NP  11/9/2022

## 2022-11-09 NOTE — ROUTINE PROCESS
SHIFT CHANGE NOTE FOR [facility]    Bedside and Verbal shift change report given to Jeb Zhou (oncoming nurse) by Crissy Sommers RN (offgoing nurse). Report included the following information SBAR, Kardex, MAR and Recent Results.     Situation:   Code Status: Full Code   Hospital Day: 6   Problem List:   Hospital Problems  Date Reviewed: 9/22/2022            Codes Class Noted POA    UTI (urinary tract infection) ICD-10-CM: N39.0  ICD-9-CM: 599.0  11/3/2022 Unknown           Background:   Past Medical History:   Past Medical History:   Diagnosis Date    Abnormal chest CT 2/6/2020    Adopted     Arthritis     Balance problems     Chronic cough 2/6/2020    MCBRIDE (dyspnea on exertion) 2/28/2019    GERD (gastroesophageal reflux disease)     Gout     Hemochromatosis     High cholesterol     Hypertension     Hypothyroidism     Left knee pain     Left shoulder pain     Low blood potassium     Lumbar pain     Pain of left sacroiliac joint     Shoulder impingement, right, with rotator cuff strain         Assessment:  Changes in Assessment throughout shift: No change to previous assessment    Patient has a central line: no Reasons if yes: n/a  Insertion date:n/a Last dressing date:n/a  Patient has Coelho Cath: no Reasons if yes: na   Insertion date:n/a  Shift coelho care completed: NO    Last Vitals:     Vitals:    11/07/22 2119 11/08/22 0818 11/08/22 1548 11/08/22 1958   BP: 98/69 (!) 111/56 115/60 135/71   Pulse: 76 69 65 70   Resp: 20 18 18 22   Temp: 97 °F (36.1 °C) 97.1 °F (36.2 °C) 97 °F (36.1 °C) 97 °F (36.1 °C)   SpO2: 95% 99% 99% 98%   Weight:       Height:           PAIN    Pain Assessment    Pain Intensity 1: 0 (11/09/22 0404) Pain Intensity 1: 2 (12/29/14 1105)    Pain Location 1: Back, Coccyx Pain Location 1: Abdomen    Pain Intervention(s) 1: Medication (see MAR) Pain Intervention(s) 1: Medication (see MAR)  Patient Stated Pain Goal: Unable to verbalize/indicate pain (asleep) Patient Stated Pain Goal: 0  Intervention effective: yes  Other actions taken for pain: Medication (see MAR)    Skin Assessment  Skin color Skin Color: Appropriate for ethnicity, Ecchymosis (comment)  Condition/Temperature Skin Condition/Temp: Dry, Flaky, Warm  Integrity Skin Integrity: Tear (right arm)  Turgor    Weekly Pressure Ulcer Documentation  Pressure  Injury Documentation: No Pressure Injury Noted-Pressure Ulcer Prevention Initiated  Wound Prevention & Protection Methods  Orientation of wound Orientation of Wound Prevention: Posterior  Location of Prevention Location of Wound Prevention: Buttocks, Heel, Sacrum/Coccyx  Dressing Present Dressing Present : No  Dressing Status    Wound Offloading Wound Offloading (Prevention Methods): Adaptive equipment, Bed, pressure reduction mattress, Chair cushion, Elevate heels, Pillows, Repositioning, Turning, Wheelchair, Walker    INTAKE/OUPUT  Date 11/08/22 0700 - 11/09/22 0659 11/09/22 0700 - 11/10/22 0659   Shift 1413-5768 8669-1937 24 Hour Total 3820-6089 6434-8187 24 Hour Total   INTAKE   P.O. 480  480        P. O. 480  480      Shift Total(mL/kg) 480(5.8)  480(5.8)      OUTPUT   Urine(mL/kg/hr)           Urine Occurrence(s) 3 x 2 x 5 x      Stool           Stool Occurrence(s) 1 x 1 x 2 x      Shift Total(mL/kg)           480      Weight (kg) 82.3 82.3 82.3 82.3 82.3 82.3       Recommendations:  Patient needs and requests: assistance with ADL's, decrease swelling on BLE    Pending tests/procedures: none at this time     Functional Level/Equipment: Partial (one person) / Guerline Draper; Wheelchair    Fall Precautions:   Fall risk precautions were reinforced with the patient; she was instructed to call for help prior to getting up. The following fall risk precautions were continued: bed/ chair alarms, door signage, yellow bracelet and socks as well as update of the Charlton Hedge tool in the patient's room.    Talia Score: 3    HEALS Safety Check    A safety check occurred in the patient's room between off going nurse and oncoming nurse listed above. The safety check included the below items  Area Items   H  High Alert Medications Verify all high alert medication drips (heparin, PCA, etc.)   E  Equipment Suction is set up for ALL patients (with lucinda)  Red plugs utilized for all equipment (IV pumps, etc.)  WOWs wiped down at end of shift. Room stocked with oxygen, suction, and other unit-specific supplies   A  Alarms Bed alarm is set for fall risk patients  Ensure chair alarm is in place and activated if patient is up in a chair   L  Lines Check IV for any infiltration  Gonzalez bag is empty if patient has a Gonzalez   Tubing and IV bags are labeled   S  Safety   Room is clean, patient is clean, and equipment is clean. Hallways are clear from equipment besides carts. Fall bracelet on for fall risk patients  Ensure room is clear and free of clutter  Suction is set up for ALL patients (with lucinad)  Hallways are clear from equipment besides carts.    Isolation precautions followed, supplies available outside room, sign posted     Chantel Kinney RN

## 2022-11-09 NOTE — PROGRESS NOTES
[x] Psychology  [] Social Work [] Recreational Therapy    INTERVENTION  UNITS/TIME OF SERVICE   Assessment    Supportive Counseling  November 8, 2022   Orientation    Discharge Planning    Resource Linkage              Progress/Current Status    Patient seen for individual support  and follow up this morning on ARU and found lying supine in bed, appearing calm and composed and immediately able to engage in discussion. I had been led to believe that patient was possibly informed that she would not be able to return to her residence post ARU, as expected; however, when asked about any possible changes in plans for herself and especially post ARU, she denied that anything has changed. She continues to expect to return to home \"as long as I can do for myself. \"  Perhaps, prior uncertainty about her returning to own home was related to specifics about whether she would have adequate support in the home, if not doing more for self. Regardless, at this time, she insists that she is motivated to improve and working to the best of her ability. She otherwise did not verbalize any current, acute concerns and insists that she remains hopeful for herself to improve.     Ana Thurman, THE [facility] 11/9/2022 9:46 AM

## 2022-11-09 NOTE — PROGRESS NOTES
Problem: Self Care Deficits Care Plan (Adult)  Goal: *Acute Goals and Plan of Care (Insert Text)  Description: Occupational Therapy Goals   Long Term Goals  Initiated (2022) and to be accomplished within 3 week(s); by (2022)   1. Pt will perform self-feeding with Mod I.   2. Pt will perform grooming with Mod I.   3. Pt will perform UB bathing with set-up/ supv using AE and/ or compensatory strategies as needed. 4. Pt will perform LB bathing with SBA/ CGA using AE and/ or compensatory strategies as needed. 5. Pt will perform tub/shower transfer with SBA using LRAD. 6. Pt will perform UB dressing with set-up. 7. Pt will perform LB dressing with SBA using AE and/ or compensatory strategies as needed. 8. Pt will perform toileting task with supv.  9. Pt will perform toilet transfer with supv using LRAD. Short Term Goals   Initiated (2022) and to be accomplished within 7 day(s), (by 2022)   1. Pt will perform self-feeding with set-up. 2. Pt will perform grooming with supv. 3. Pt will perform UB bathing with supv using AE as needed. 4. Pt will perform LB bathing with Min A using AE and/ or compensatory strategies as needed. 5. Pt will perform tub/shower transfer with Min A using LRAD. 6. Pt will perform UB dressing with SBA. 7. Pt will perform LB dressing with Min A using AE and/ or compensatory strategies as needed. 8. Pt will perform toileting task with Min A  9. Pt will perform toilet transfer with Min A using LRAD. Outcome: Progressing Towards Goal  Goal: Interventions  Outcome: Progressing Towards Goal   Occupational Therapy TREATMENT    Patient: Nhi Reeder   76 y.o.     Patient identified with name and : yes    Date: 2022    First Tx Session  Time In: 932  Time Out: 56    Second Tx Session  Time In: 1031 (occupational therapy group session)  Time Out: 1059    Third Tx Session  Time In: 1100  Time Out: 1135    Diagnosis: UTI (urinary tract infection) [N39.0]   Precautions: Fall, Contact, Safety precautions  Chart, occupational therapy assessment, plan of care, and goals were reviewed. Pain:  Pt reports 5/10 pain or discomfort prior to treatment; left shoulder and left knee (aching/ soreness)  Pt reports 5/10 pain or discomfort post treatment; left shoulder and left knee (aching/ soreness)  Intervention Provided: repositioning; intermittent rest breaks. Tasks modified based on tolerance. Pt denies need for pain medication at this time; will continue to assess. SUBJECTIVE:   Patient stated I had pain medication earlier this morning, I don't need any right now.     OBJECTIVE DATA SUMMARY:     THERAPEUTIC ACTIVITY Daily Assessment    Wheelchair mobility performed (SBA) level to propel over level surfaces for accessing and navigating within facility environment. Pt requiring rest breaks x1 due to fatigue. Performed for generalized strengthening and range of motion. Functional transfer training performed (Min A) using RW to address self-care toileting goal and self-care grooming at sink. Gait belt for safety. RUE/ LUE FMC there act performed using small wooden parquetry shapes to replicate visual pattern on wooden board; ~15 wooden pieces x2 trials. Performed for FM skills (pincer grasp; in-hand manipulation) and functional reach/ grasp for improved functional ability with self-cares. RUE/ LUE there act performed for reaching out-front to insert large colored pegs onto vertical foam pegboard surface. Therapist providing verbal cues for peg color selection and for placement location on board; vc's for alternating right/ left upper extremities. Performed for range of motion, [address] (tip pinch; in-hand manipulation), and activity tolerance for increased (I) with ADL's. THERAPEUTIC EXERCISE Daily Assessment    RUE/ LUE there ex performed using tabletop shoulder arch for bringing plastic rings across midline to opposite side, x26 plastic rings each hand. Performed for range of motion, functional reach across midline, and activity tolerance for increased (I) with self-cares. Intermittent rest breaks due to fatigue. GROOMING Daily Assessment    Functional Level: 5 (SBA (standing at sink; RW level); set-up w/c level)  Tasks Completed by Patient: Brushed teeth; Washed hands (Combed hair)  Comments: Self-care grooming tasks performed (SBA) in stance at sink; RW level (gait belt for safety) for hand hygiene and brushing her teeth. Pt combed her hair (set-up) seated in w/c. Oral hygiene: 5     TOILETING Daily Assessment    Functional Level: 4 (Min A (CM and hygiene) pt voided at this time; wears pull-up. Pt reports requiring some assistance (Min/ Mod A) with hygiene following bowel movements for thoroughness.)  Comments: Min A (CM and hygiene) pt voided at this time; wears pull-up. Pt reports requiring some assistance (Min/ Mod A) with hygiene following bowel movements for thoroughness. LOWER BODY DRESSING Daily Assessment    Functional Level: 4 (Min A)  Items Applied/Steps Completed: Shoe, left (1 step); Shoe, right (1 step); Sock, left (1 step); Sock, right (1 step) (SBA/ Min A for donning knee high compression stockings and her sneakers; w/c level)  Comments: SBA/ Min A for donning knee high compression stockings and her sneakers; wc level using crossed leg technique. Increased time. Sock and/or Shoe management: 4 SBA/ Min A      MOBILITY/TRANSFERS Daily Assessment    Toilet Transfer Score: 4 (Stand step xfer (Min A) RW; gait belt (to/ from elevated seat over toilet))  Comments: Stand step xfer (Min A) RW; gait belt (to/ from elevated seat over toilet)      OCCUPATIONAL THERAPY GROUP THERAPY TREATMENT   Time In: 1031  Time Out: 1059     Patient seen for: Group therapy session      SUBJECTIVE:        Patient agreeable to participate in occupational therapy group session. OBJECTIVE DATA SUMMARY:     Objective:       Therex   Sets Reps Comments   Shoulder flexion/ extension; to ~90 degrees 2 15 AAROM using hand clasped technique   Elbow flexion/ extension; elbow flexion/ extension 'v' pattern        2        15 AAROM using hand clasped technique   Forearm pronation/ supination        2        15 AAROM using hand clasped technique   Bicep curls (palms up; palms down)       3       10 2 lb dowel   Chest Press        2       15 2 lb dowel   Supination/Pronation       1       15 Yellow flexbar   ER/IR       1       15 Yellow flexbar   Stirring motion; holding bar vertically       2       10 2 lb dowel   Shoulder shrugs       2       10        [employer]   Sets   Reps   [employer]   Beach ball toss/catch in combination with question/ answer opportunities to facilitate active engagement among group members (e.g. favorite holiday, sports, tv-show, pet, vacation etc.). Multiple repetitions Pt participated in [address] [employer] ball toss/ catching activity with therapist and fellow group member. Performed for range of motion, functional reach/ grasp, eye-hand coordination, and improved activity tolerance for increased (I) with ADL's. Good participation. Intermittent rest breaks due to fatigue. Balloon tapping activity using oversized racket  Multiple repetitons Pt participated in RUE/ LUE balloon tapping activity with therapist and fellow group member. Performed for range of motion, functional reach/ grasp, eye-hand coordination, and improved activity tolerance for increased (I) with ADL's. ASSESSMENT:  Progression toward goals:  []          Patient participated fully in group therapy session and able to tolerate all activities  [x]          Patient able to participate in group therapy session with only minor modifications and/or extended rest breaks needed; tasks modified based on tolerance due to bilateral shoulder limitations (history of rotator cuff issues)  []          Patient not able to tolerate group therapy session.    PLAN:  Patient continues to benefit from skilled intervention to address functional impairments. Patient is appropriate to continue group therapy sessions to promote increased participation in skilled therapy interventions and to provide opportunities for increased social interaction. ASSESSMENT:  Patient will benefit from continued skilled occupational therapy interventions to address decreased (I) with ADL's, decreased functional strength/ endurance, decreased RUE/ LUE AROM, decreased [address], decreased activity tolerance, chronic back pain (pt has spinal stimulator for pain control), generalized weakness, impaired balance/ coordination, anxiety( fear of falling), and impaired functional mobility/ transfers impeding pt's functional (I) with ADL's and mobility for return to PLOF. Progression toward goals:  []          Improving appropriately and progressing toward goals  [x]          Improving slowly and progressing toward goals  []          Not making progress toward goals and plan of care will be adjusted     PLAN:  Patient continues to benefit from skilled intervention to address the above impairments. Continue treatment per established plan of care. Discharge Recommendations:  Home Health with support/ assist  Further Equipment Recommendations for Discharge:  BSC; patient has a tub bench at home     Activity Tolerance:  Fair+; intermittent rest breaks due to fatigue  Estimated LOS: TBD   Entered Differentiated Treatment minutes: Yes    Please refer to the flowsheet for vital signs taken during this treatment. After treatment:   [x]  Patient left in no apparent distress sitting up in wheelchair with needs met  []  Patient left in no apparent distress in bed  [x]  Call bell left within reach  [x]  Nursing notified  []  Caregiver present  [x]  Wheelchair alarm activated    COMMUNICATION/EDUCATION:   [x] Home safety education was provided and the patient/caregiver indicated understanding.   [x] Patient/family have participated as able in goal setting and plan of care. [x] Patient/family agree to work toward stated goals and plan of care. [] Patient understands intent and goals of therapy, but is neutral about his/her participation. [] Patient is unable to participate in goal setting and plan of care.     [id number], OT

## 2022-11-10 PROCEDURE — 74011250637 HC RX REV CODE- 250/637: Performed by: NURSE PRACTITIONER

## 2022-11-10 PROCEDURE — 97116 GAIT TRAINING THERAPY: CPT

## 2022-11-10 PROCEDURE — 97530 THERAPEUTIC ACTIVITIES: CPT

## 2022-11-10 PROCEDURE — 65310000000 HC RM PRIVATE REHAB

## 2022-11-10 PROCEDURE — 94761 N-INVAS EAR/PLS OXIMETRY MLT: CPT

## 2022-11-10 PROCEDURE — 99232 SBSQ HOSP IP/OBS MODERATE 35: CPT | Performed by: EMERGENCY MEDICINE

## 2022-11-10 PROCEDURE — 74011250637 HC RX REV CODE- 250/637: Performed by: EMERGENCY MEDICINE

## 2022-11-10 PROCEDURE — 97535 SELF CARE MNGMENT TRAINING: CPT

## 2022-11-10 PROCEDURE — 97110 THERAPEUTIC EXERCISES: CPT

## 2022-11-10 RX ADMIN — APIXABAN 5 MG: 5 TABLET, FILM COATED ORAL at 16:41

## 2022-11-10 RX ADMIN — THERA TABS 1 TABLET: TAB at 08:18

## 2022-11-10 RX ADMIN — LEVOTHYROXINE SODIUM 25 MCG: 25 TABLET ORAL at 06:34

## 2022-11-10 RX ADMIN — PANTOPRAZOLE SODIUM 40 MG: 40 TABLET, DELAYED RELEASE ORAL at 06:34

## 2022-11-10 RX ADMIN — APIXABAN 5 MG: 5 TABLET, FILM COATED ORAL at 08:18

## 2022-11-10 RX ADMIN — FUROSEMIDE 20 MG: 20 TABLET ORAL at 08:18

## 2022-11-10 RX ADMIN — ROSUVASTATIN CALCIUM 40 MG: 20 TABLET, FILM COATED ORAL at 08:18

## 2022-11-10 RX ADMIN — CLOPIDOGREL BISULFATE 75 MG: 75 TABLET ORAL at 08:18

## 2022-11-10 NOTE — PROGRESS NOTES
Problem: Risk for Spread of Infection  Goal: Prevent transmission of infectious organism to others  Description: Prevent the transmission of infectious organisms to other patients, staff members, and visitors.   Outcome: Progressing Towards Goal     Problem: Inpatient Rehab (Adult)  Goal: *LTG: Avoids injury/falls 100% of time related to deficits  Outcome: Progressing Towards Goal  Goal: *LTG: Avoids infection 100% of time related to deficits  Outcome: Progressing Towards Goal  Goal: *LTG: Absence of DVT during hospitalization  Outcome: Progressing Towards Goal  Goal: *LTG: Maintains Skin Integrity With No Evidence of Pressure Injury 100% of Time  Outcome: Progressing Towards Goal  Goal: Interventions  Outcome: Progressing Towards Goal

## 2022-11-10 NOTE — PROGRESS NOTES
Problem: Self Care Deficits Care Plan (Adult)  Goal: *Acute Goals and Plan of Care (Insert Text)  Description: Occupational Therapy Goals   Long Term Goals  Initiated (11/4/2022) and to be accomplished within 3 week(s); by (11/25/2022)   1. Pt will perform self-feeding with Mod I.   2. Pt will perform grooming with Mod I.   3. Pt will perform UB bathing with set-up/ supv using AE and/ or compensatory strategies as needed. 4. Pt will perform LB bathing with SBA/ CGA using AE and/ or compensatory strategies as needed. 5. Pt will perform tub/shower transfer with SBA using LRAD. 6. Pt will perform UB dressing with set-up. 7. Pt will perform LB dressing with SBA using AE and/ or compensatory strategies as needed. 8. Pt will perform toileting task with supv.  9. Pt will perform toilet transfer with supv using LRAD. Short Term Goals   Initiated (11/4/2022) and to be accomplished within 7 day(s), (by 11/11/2022). Reassessed on 11/10/2022. 1. Pt will perform self-feeding with set-up. (Goal met 11/10/2022)  2. Pt will perform grooming with supv. (Goal met 11/10/2022)  3. Pt will perform UB bathing with supv using AE as needed. (Goal met 11/10/2022)  4. Pt will perform LB bathing with Min A using AE and/ or compensatory strategies as needed. (Goal met 11/10/2022)  5. Pt will perform tub/shower transfer with Min A using LRAD. (Goal met 11/10/2022)  6. Pt will perform UB dressing with SBA. (Goal met 11/10/2022)  7. Pt will perform LB dressing with Min A using AE and/ or compensatory strategies as needed. (Goal met 11/10/2022)  8. Pt will perform toileting task with Min A. (Goal met 11/10/2022)  9. Pt will perform toilet transfer with Min A using LRAD.  (Goal met 11/10/2022)     Outcome: Progressing Towards Goal  Goal: Interventions  Outcome: Progressing Towards Goal   OT WEEKLY PROGRESS NOTE  Patient Name:Meaghan A Varinder      Time In: 4474  Time Out: 6012    Medical Diagnosis:  UTI (urinary tract infection) [N39.0] <principal problem not specified>   Precautions: Fall, Contact, Safety precautions  Chart, occupational therapy assessment, plan of care, and goals were reviewed. Pain at start of tx:4/10 pain or discomfort; bilateral shoulder and knees (with certain movements; aching/ soreness)  Pain at stop of tx:4/10 pain or discomfort; bilateral shoulder and knees (with certain movements; aching/ soreness)  Interventions: repositioning, intermittent rest breaks. Tasks modified based on pt's tolerance. Pt denies need for pain medication at this time; will continue to assess. Patient identified with name and : yes  Subjective: \"the shower feels great\" and \"it's nice to wash my hair. \"         Objective: VS /66, HR 66 bpm, SpO2 97% on room air, RR 18 bpm (Vinicio Young, RN is aware of low BP; pt is asymptomatic at this time)   Outcome Measures:  AROM: RUE/ LUE AROM WFL; except for Right/ Left shoulder flexion ~90 degrees; pt reports being limited due to rotator cuff issues (left shoulder more impaired than right side)    COGNITION/PERCEPTION Initial Assessment Weekly Progress Assessment 11/10/2022   Premorbid Reading Status       Premorbid Writing Status       Arousal/Alertness       Orientation Level Oriented X4 Oriented X4   Visual Fields       Praxis       Body Scheme       COMPREHENSION MODE Initial Assessment Weekly Progress Assessment 11/10/2022   Primary Mode of Comprehension Auditory Auditory   Hearing Aide Bilateral, With patient Bilateral;With patient   Corrective Lenses Glasses Glasses   Score 5 5     EXPRESSION Initial Assessment Weekly Progress Assessment 11/10/2022   Primary Mode of Expression Verbal Verbal   Score 5 5   Comments         SOCIAL INTERACTION/ PRAGMATICS Initial Assessment Weekly Progress Assessment 11/10/2022   Score 5 5   Comments         PROBLEM SOLVING Initial Assessment Weekly Progress Assessment 11/10/2022   Score 4 5   Comments         MEMORY Initial Assessment Weekly Progress Assessment 11/10/2022   Score 4 5   Comments         EATING Initial Assessment Weekly Progress Assessment 11/10/2022   Functional Level 5 (set-up/ supv) Functional Level: 6 (set-up/ Mod I)   Comments Set-up/ supv for self-feeding; assistance to open small packets and containers on tray. Comments: Self-feeding (set-up/ Mod I); pt able to open small packets and lids on breakfast tray. GROOMING Initial Assessment Weekly Progress Assessment 11/10/2022   Functional Level 5 (SBA) Functional Level: 5 (supv in stance at sink/ RW; set-up in w/c)   Tasks completed by patient Brushed teeth, Washed face (Combed hair) Tasks Completed by Patient: Brushed teeth;Brushed hair; Washed face   Comments Self-care grooming tasks performed (SBA); ADL items set-up on tray table. Comments: Pt washed her face (set-up) seated on TTB/ ADL shower. Oral hygiene performed (supv) in stance at sink; pt combed her hair (set-up) seated in w/c. BATHING Initial Assessment Weekly Progress Assessment 11/10/2022   Functional Level 3 (UB bathing: SBA; LB bathing: Min/ Mod A) Functional Level: 4 (UB bathing: set-up/ supv (TTB); LB bathing: CGA/ Min A (TTB))   Body parts patient bathed Abdomen, Arm, left, Arm, right, Buttocks, Chest, Lower leg and foot, left, Lower leg and foot, right, Josee area, Thigh, left, Thigh, right Body Parts Patient Bathed: Abdomen;Arm, left;Arm, right;Buttocks; Chest;Lower leg and foot, left; Lower leg and foot, right;Josee area; Thigh, left; Thigh, right   Comments UB bathing (SBA) assistance to wash back; LB bathing performed (Min/ Mod A) using bath wipes. Pt requiring assistance with reaching her distal BLE's for thoroughness and washing buttocks/ sacrum. Some aspects simulated as pt reported having been 'cleaned up' earlier. Comments: UB bathing performed (set-up/ supv) seated on TTB. LB bathing performed seated on TTB; edu/ instructed on use of LH sponge to reach to distal BLE's.  Pt required (CGA/ Min A) to wash buttock/ sacrum for thoroughness; edu/ instructed on use of weight shift from side to side to wash buttocks. Attempted sit to stand transition with use of grab bar; however, pt unable to achieve stance at this time due to fatigue and heightened anxiety. TUB/SHOWER TRANSFER INDEPENDENCE Initial Assessment Weekly Progress Assessment 11/10/2022   Score 3 (Stand step xfer (Mod A) using RW; gait belt) Tub/Shower Transfer Score: 4 (Stand step transfer (CGA) RW; gait belt (bed to seated position on TTB). Stand step transfer (CGA) at conclusion of shower (TTB > w/c); use of grab bar and gait belt.)   Comments Stand step xfer (Mod A) using RW; gait belt (transfer simulation) Comments: Stand step transfer (CGA) RW; gait belt (bed to seated position on TTB). Stand step transfer (CGA) at conclusion of shower (TTB > w/c); use of grab bar and gait belt. UPPER BODY DRESSING/UNDRESSING Initial Assessment Weekly Progress Assessment 11/10/2022   Functional Level 4 (SBA/ Min A) Functional Level: 5 (set-up/ supv)   Items applied/Steps completed Pullover (4 steps) Items Applied/Steps Completed: Pullover (4 steps)   Comments Pt doffed/ donned scrub top (SBA/ Min A); pt requiring assistance to adjust shirt down in back due to fit. Comments: Pt doffed/ donned pullover shirt (set-up/ supv) seated on TTB. LOWER BODY DRESSING/UNDRESSING Initial Assessment Weekly Progress Assessment 11/10/2022   Functional Level 3 (Mod A) Functional Level: 4 (CGA for pants/ pull-up with use of grab bar; (supv) for donning compression socks and sneakers)   Items applied/Steps completed Shoe, left (1 step), Shoe, right (1 step) (scrub pants (3 steps)) Items Applied/Steps Completed: Elastic waist pants (3 steps); Shoe, left (1 step); Shoe, right (1 step); Sock, left (1 step); Sock, right (1 step) (Pull-up (3 steps))   Comments Pt doffed/ donned scrub pants (Mod A) level while seated on elevated seat over toilet; pt doffed/ donned slip on boot/ slippers (Min/ Mod A) seated in w/c using crossed leg technique. Comments: CGA for donning pants/ pull-up with use of grab bar; use of reacher to thread clothing over distal BLE's from w/c level. Pt requiring (supv) for donning compression socks and sneakers w/c level with use of crossed leg technique. Edu/ instructed in use of AE for improved ability with LB dressing tasks e.g. reacher and dressing stick. TOILETING Initial Assessment Weekly Progress Assessment 11/10/2022   Functional Level 3 (Mod A) Functional Level: 4 (CGA/ Min A)   Comments Mod A for CM and hygiene; pt had small BM (elevated seat over toilet) Comments: CGA/ Min A for CM and hygiene     TOILET TRANSFER INDEPENDENCE Initial Assessment Weekly Progress Assessment 11/10/2022   Transfer score 3 (Stand step xfer (Mod A) using RW; gait belt (to/ from elevated seat over toilet)) Toilet Transfer Score: 4 (Stand step xfer (CGA) RW; gait belt (to/ from elevated seat over toilet); pt requiring increased level of assistance when fatigued and/ or pain)   Comments Stand step xfer (Mod A) using RW; gait belt (to/ from elevated seat over toilet) Comments: Stand step xfer (CGA) RW; gait belt (to/ from elevated seat over toilet); pt requiring increased level of assistance when fatigued and/ or pain          ASSESSMENT:  Patient improving slowly and progressing toward goals having achieved 9/9 STG's this reporting period. Today's skilled treatment session focused on ADL training (ADL shower using TTB), functional transfer training using LRAD (RW), and edu/ instruction in use of AE for improved functional (I) with LB ADL's. Edu/ instructed on use of energy conservation strategies (e.g. pacing, positioning, use of pursed lip breathing) for carryover to self-care performance. Intermittent rest breaks due to fatigue and dyspnea on exertion. UB ADL's progressed to (set-up/ supv); LB ADL's performed (CGA/ Min A) with use of AE and increased time.  Pt demonstrating gains and progress with ADL performance and functional transfers using RW. Patient will benefit from continued skilled occupational therapy interventions to address decreased (I) with ADL's, decreased functional strength/ endurance, decreased RUE/ LUE AROM, decreased [address], decreased activity tolerance, chronic back pain (pt has spinal stimulator for pain control), generalized weakness, impaired balance/ coordination, anxiety( fear of falling), and impaired functional mobility/ transfers impeding pt's functional (I) with ADL's and mobility for return to PLOF. Progression toward goals:  []          Improving appropriately and progressing toward goals  [x]          Improving slowly and progressing toward goals  []          Not making progress toward goals and plan of care will be adjusted     PLAN:  Patient continues to benefit from skilled intervention to address the above impairments. Continue treatment per established plan of care. Discharge Recommendations:  Home Health therapy with family support/ assist  Further Equipment Recommendations for Discharge:  transfer bench (pt has a tub bench at home) and [facility]     Please refer to the flow sheet for vital signs taken during this treatment. After treatment:   [x]  Patient left in no apparent distress sitting up in wheelchair with needs met  []  Patient left in no apparent distress in bed  [x]  Call bell left within reach  [x]  Nursing notified  []  Caregiver present  [x]  Wheelchair alarm activated    COMMUNICATION/EDUCATION:   [x] Home safety education was provided and the patient/caregiver indicated understanding. [x] Patient/family have participated as able in goal setting and plan of care. [x] Patient/family agree to work toward stated goals and plan of care. [] Patient understands intent and goals of therapy, but is neutral about his/her participation. [] Patient is unable to participate in goal setting and plan of care.        Plan of Care: Please see Care Plan for updated STG/LTGs.    Family Training:  to be scheduled  Estimated LOS: TBD  Entered Differentiated Treatment minutes: Yes    [address] [facility], OT  11/10/2022

## 2022-11-10 NOTE — ROUTINE PROCESS
Wound Prevention Checklist    Patient: Miguel Duarte [de-identified]76 y.o. female)  Date: 11/10/2022  Diagnosis: UTI (urinary tract infection) [N39.0] <principal problem not specified>    Precautions: Fall, Contact       []  Heel prevention boots placed on patient    []  Patient turned q2h during shift    []  Lift team ordered    [x]  Patient on Félix bed/Specialty bed    [x]  Each Wound is documented during shift (Stage, Color, drainage, odor, measurements, and dressings)    [x]  Dual skin check done with John Larios RN

## 2022-11-10 NOTE — ROUTINE PROCESS
SHIFT CHANGE NOTE FOR [facility]    Bedside and Verbal shift change report given to Sugey Lee (oncoming nurse) by Lisandro Espinoza RN (offgoing nurse). Report included the following information SBAR, Kardex, MAR and Recent Results.     Situation:   Code Status: Full Code   Hospital Day: 7   Problem List:   Hospital Problems  Date Reviewed: 9/22/2022            Codes Class Noted POA    UTI (urinary tract infection) ICD-10-CM: N39.0  ICD-9-CM: 599.0  11/3/2022 Unknown           Background:   Past Medical History:   Past Medical History:   Diagnosis Date    Abnormal chest CT 2/6/2020    Adopted     Arthritis     Balance problems     Chronic cough 2/6/2020    MCBRIDE (dyspnea on exertion) 2/28/2019    GERD (gastroesophageal reflux disease)     Gout     Hemochromatosis     High cholesterol     Hypertension     Hypothyroidism     Left knee pain     Left shoulder pain     Low blood potassium     Lumbar pain     Pain of left sacroiliac joint     Shoulder impingement, right, with rotator cuff strain         Assessment:  Changes in Assessment throughout shift: No change to previous assessment    Patient has a central line: no Reasons if yes: n/a  Insertion date:n/a Last dressing date:n/a  Patient has Coelho Cath: no Reasons if yes: n/a   Insertion date:n/a  Shift coelho care completed: NO    Last Vitals:     Vitals:    11/08/22 1958 11/09/22 0750 11/09/22 1552 11/09/22 2030   BP: 135/71 137/89 99/65 136/73   Pulse: 70 76 68 70   Resp: 22 18 18 20   Temp: 97 °F (36.1 °C) 97.7 °F (36.5 °C) 98.3 °F (36.8 °C) 98.2 °F (36.8 °C)   SpO2: 98% 99% 98% 99%   Weight:       Height:           PAIN    Pain Assessment    Pain Intensity 1: 0 (11/10/22 0406) Pain Intensity 1: 2 (12/29/14 1105)    Pain Location 1: Back, Generalized, Leg Pain Location 1: Abdomen    Pain Intervention(s) 1: Medication (see MAR) Pain Intervention(s) 1: Medication (see MAR)  Patient Stated Pain Goal: Unable to verbalize/indicate pain Patient Stated Pain Goal: 0  Intervention effective: yes  Other actions taken for pain:      Skin Assessment  Skin color Skin Color: Appropriate for ethnicity  Condition/Temperature Skin Condition/Temp: Dry, Warm  Integrity Skin Integrity: Tear (right arm)  Turgor    Weekly Pressure Ulcer Documentation  Pressure  Injury Documentation: No Pressure Injury Noted-Pressure Ulcer Prevention Initiated  Wound Prevention & Protection Methods  Orientation of wound Orientation of Wound Prevention: Posterior  Location of Prevention Location of Wound Prevention: Buttocks, Sacrum/Coccyx  Dressing Present Dressing Present : No  Dressing Status    Wound Offloading Wound Offloading (Prevention Methods): Adaptive equipment, Bed, pressure reduction mattress, Walker, Wheelchair    INTAKE/OUPUT  Date 11/09/22 0700 - 11/10/22 0659 11/10/22 0700 - 11/11/22 0659   Shift 6435-0574 7045-4849 24 Hour Total 3866-1876 8997-6120 24 Hour Total   INTAKE   P.O. 700  700        P. O. 700  700      Shift Total(mL/kg) 700(8.5)  700(8.5)      OUTPUT   Urine(mL/kg/hr)           Urine Occurrence(s) 5 x 2 x 7 x      Stool           Stool Occurrence(s) 3 x 0 x 3 x      Shift Total(mL/kg)           700      Weight (kg) 82.3 82.3 82.3 82.3 82.3 82.3       Recommendations:  Patient needs and requests: pain management, decrease swelling on BLE, assistance with ADL's    Pending tests/procedures: none at this time     Functional Level/Equipment: Partial (one person) / Bed (comment); Will Klippel; Wheelchair    Fall Precautions:   Fall risk precautions were reinforced with the patient; she was instructed to call for help prior to getting up. The following fall risk precautions were continued: bed/ chair alarms, door signage, yellow bracelet and socks as well as update of the Viji Nap tool in the patient's room. Talia Score: 3    HEALS Safety Check    A safety check occurred in the patient's room between off going nurse and oncoming nurse listed above.     The safety check included the below items  Area Items   H  High Alert Medications Verify all high alert medication drips (heparin, PCA, etc.)   E  Equipment Suction is set up for ALL patients (with lucinda)  Red plugs utilized for all equipment (IV pumps, etc.)  WOWs wiped down at end of shift. Room stocked with oxygen, suction, and other unit-specific supplies   A  Alarms Bed alarm is set for fall risk patients  Ensure chair alarm is in place and activated if patient is up in a chair   L  Lines Check IV for any infiltration  Gonzalez bag is empty if patient has a Gonzalez   Tubing and IV bags are labeled   S  Safety   Room is clean, patient is clean, and equipment is clean. Hallways are clear from equipment besides carts. Fall bracelet on for fall risk patients  Ensure room is clear and free of clutter  Suction is set up for ALL patients (with lucinda)  Hallways are clear from equipment besides carts.    Isolation precautions followed, supplies available outside room, sign posted     Tammy Ovalles RN

## 2022-11-10 NOTE — PROGRESS NOTES
Problem: Mobility Impaired (Adult and Pediatric)  Goal: *Therapy Goal (Edit Goal, Insert Text)  Description: Physical Therapy Short Term Goals  Initiated 2022 and to be accomplished within 7 day(s) on 2022  1. Patient will move from supine to sit and sit to supine  in bed with minimal assistance/contact guard assist.    2.  Patient will transfer from bed to chair and chair to bed with minimal assistance/contact guard assist using the least restrictive device. 3.  Patient will perform sit to stand with minimal assistance/contact guard assist.  4.  Patient will ambulate with minimal assistance/contact guard assist for 75 feet with the least restrictive device. 5.  Patient will perform w/c mobility 150 ft bouts SBA level over even surfaces for improved functional endurance. Physical Therapy Long Term Goals  Initiated 2022 and to be accomplished within 14 day(s) on 2022  1. Patient will transfer from bed to chair and chair to bed with supervision using the least restrictive device. 2.  Patient will perform sit to stand with supervision. 3.  Patient will ambulate with supervision/set-up for 150 feet with the least restrictive device. 4.  Patient will perform w/c mobility over even surfaces for 150 ft bouts at mod I level. Outcome: Progressing Towards Goal   PHYSICAL THERAPY TREATMENT    Patient: Mitzi Ron (28 y.o. female)  Date: 11/10/2022  Diagnosis: UTI (urinary tract infection) [N39.0]   Precautions: Fall, Contact  Chart, physical therapy assessment, plan of care and goals were reviewed. Time In:1207  Time XBS:0268  Time In: 4179  Time Out: 1510  Entered Differentiated Treatment minutes: Yes    Patient seen for: Patient education; Therapeutic exercise;Transfer training; Wheelchair mobility    Pain:  Patient continues to reports significant bilat LE swelling and pain in knees (more so left knee), pain in low back and in shoulders    Patient identified with name and : yes    SUBJECTIVE:      Patient agreeable to treatment. OBJECTIVE DATA SUMMARY:    Objective:     TRANSFERS Daily Assessment     Transfer Type: Other  Other: stand step with RW  Transfer Assistance : 4 (Contact guard assistance)  Sit to Stand Assistance: Contact guard assistance    CGA for transfers needing tactile cue for appropriate anterior weightshift over base of support. Performing x 4 reps, then x 3 reps from 19\" height surface. GAIT Daily Assessment    Gait Description (WDL) Exceptions to WDL    Gait Abnormalities Decreased step clearance;Trendelenburg;Trunk sway increased; Shuffling gait    Assistive device Gait belt;Walker, rolling    Ambulation assistance - level surface 4 (Contact guard assistance)    Distance 80 Feet (ft)    Ambulation assistance- uneven surface  NT    Comments Patient performing gait with RW, needing CGA for safety. BALANCE Daily Assessment     Sitting - Static: Good (unsupported)  Sitting - Dynamic: Good (unsupported)  Standing - Static: Fair  Standing - Dynamic : Impaired    Bean bag toss to improve standing balance and endurance, alternating use of bilat UE with one UE support on RW. Progressing to no UE support. WHEELCHAIR MOBILITY Daily Assessment     Able to Propel (ft): 270 feet  Functional Level: 5  Curbs/Ramps Assist Required (FIM Score): 0 (Not tested)  Wheelchair Setup Assist Required : 4 (Minimal assistance)  Wheelchair Management: Manages left brake;Manages right brake        THERAPEUTIC EXERCISES Daily Assessment       Seated LAQ and hip flex marches 3 x 15 reps, hip abd green Tband 3 x 15 reps. ASSESSMENT:  Patient continues to slowly improve with ability to get into standing position.    Progression toward goals:  []      Improving appropriately and progressing toward goals  [x]      Improving slowly and progressing toward goals  []      Not making progress toward goals and plan of care will be adjusted      PLAN:  Patient continues to benefit from skilled intervention to address the above impairments. Continue treatment per established plan of care. Discharge Recommendations:  Home Health  Further Equipment Recommendations for Discharge:  N/A      Estimated Discharge Date: TBD, recommend 11/18/2022    Activity Tolerance:   Fair due to pain report and fatigue/dyspnea with exertion.     After treatment:   [] Patient left in no apparent distress in bed  [x] Patient left in no apparent distress sitting up in chair  [] Patient left in no apparent distress in w/c mobilizing under own power  [] Patient left in no apparent distress dining area  [] Patient left in no apparent distress mobilizing under own power  [x] Call bell left within reach  [x] Nursing notified  [] Caregiver present  [] Bed alarm activated   [x] Chair alarm activated      Ba Coleman, PT  11/10/2022

## 2022-11-10 NOTE — PROGRESS NOTES
Progress Note    Patient: Elenita Limon MRN: [id number]  CSN: [id number]    YOB: 1945  Age: 76 y.o.   Sex: female    DOA: 11/3/2022 LOS:  LOS: 7 days                    Subjective:     Primary rehab diagnosis:    Impaired mobility and ADLs in the setting of UTI with generalized weakness and inability to ambulate    Patient is sitting in a chair in no apparent distress, awake and follows commands    Objective:     Physical Exam:  Visit Vitals  /66 (BP 1 Location: Right upper arm, BP Patient Position: Supine)   Pulse 66   Temp 98.5 °F (36.9 °C)   Resp 18   Ht 5' 1\" (1.549 m)   Wt 82.3 kg (181 lb 6.4 oz)   SpO2 97%   BMI 34.28 kg/m²        General:  Awake, alert  Cardiovascular:  S1S2+, RRR  Pulmonary:  CTA b/l  GI:  Soft, BS+, NT, ND  Extremities:  ++edema    Functional Progress:    PHYSICAL THERAPY    ON ADMISSION MOST RECENT   Wheelchair Mobility/Management  Able to Propel (ft): 156 feet  Functional Level:  (min/mod A)  Curbs/Ramps Assist Required (FIM Score): 0 (Not tested)  Wheelchair Setup Assist Required : 3 (Moderate assistance)  Wheelchair Management: Manages left brake, Manages right brake (needing assistance) Wheelchair Mobility/Management  Able to Propel (ft): 270 feet  Functional Level: 5  Curbs/Ramps Assist Required (FIM Score): 0 (Not tested)  Wheelchair Setup Assist Required : 4 (Minimal assistance)  Wheelchair Management: Manages left brake, Manages right brake     Gait  Amount of Assistance: 4 (Minimal assistance)  Distance (ft): 20 Feet (ft) (limited by bilat knee pain report, particularly left knee pain)  Assistive Device: Gait belt, Walker, rollator Gait  Amount of Assistance: 4 (Contact guard assistance)  Distance (ft): 80 Feet (ft)  Assistive Device: Gait belt, Walker, rolling     Balance-Sitting/Standing  Sitting - Static: Good (unsupported)  Sitting - Dynamic: Good (unsupported)  Standing - Static: Fair  Standing - Dynamic : Impaired Balance-Sitting/Standing  Sitting - Static: Good (unsupported)  Sitting - Dynamic: Good (unsupported)  Standing - Static: Fair  Standing - Dynamic : Impaired     Bed/Mat Mobility  Rolling Right : 2 (Maximal assistance) (due to pain)  Rolling Left : 2 (Maximal assistance) (due to pain)  Supine to Sit : 3 (Moderate assistance)  Sit to Supine : 3 (Moderate assistance) Bed/Mat Mobility  Rolling Right : 2 (Maximal assistance) (due to pain)  Rolling Left : 2 (Maximal assistance) (due to pain)  Supine to Sit : 3 (Moderate assistance)  Sit to Supine : 3 (Moderate assistance)     Transfers  Transfer Type: Other  Other: stand step  Transfer Assistance : 3 (Moderate assistance )  Sit to Stand Assistance: Moderate assistance  Car Transfers: Moderate assistance (needing assistance to lift legs into car simulator, needing lifting assistance to get into standing)  Car Type: car transfer simulator Transfers  Transfer Type: Other  Other: stand step with RW  Transfer Assistance : 4 (Contact guard assistance)  Sit to Stand Assistance: Contact guard assistance  Car Transfers:  Moderate assistance (needing assistance to lift legs into car simulator, needing lifting assistance to get into standing)  Car Type: car transfer simulator     Steps or Stairs  Steps/Stairs Ambulated (#): 0  Level of Assist : NA (Not applicable)  Rail Use:  (NA) Steps or Stairs  Steps/Stairs Ambulated (#): 0  Level of Assist : NA (Not applicable)  Rail Use:  (NA)       Functional Progress:    OCCUPATIONAL THERAPY    ON ADMISSION MOST RECENT   Eating  Functional Level: 5 (set-up/ supv)   Eating  Functional Level: 6     Grooming  Functional Level: 5 (SBA)   Grooming  Functional Level: 5 (supv in stance at sink/ RW; set-up in w/c)     Bathing  Functional Level: 3 (UB bathing: SBA; LB bathing: Min/ Mod A)   Bathing  Functional Level: 4 (UB bathing: set-up/ supv (TTB); LB bathing: CGA/ Min A (TTB))     Upper Body Dressing  Functional Level: 4 (SBA/ Min A)   Upper Body Dressing  Functional Level: 5 (set-up/ supv)     Lower Body Dressing  Functional Level: 3 (Mod A)   Lower Body Dressing  Functional Level: 4 (CGA for pants/ pull-up with use of grab bar; (supv) for donning compression socks and sneakers)     Toileting  Functional Level: 3 (Mod A)   Toileting  Functional Level: 4 (CGA/ Min A)     Toilet Transfers  Toilet Transfer Score: 3 (Stand step xfer (Mod A) using RW; gait belt (to/ from elevated seat over toilet))   Toilet Transfers  Toilet Transfer Score: 4 (Stand step xfer (CGA) RW; gait belt (to/ from elevated seat over toilet); pt requiring increased level of assistance when fatigued and/ or pain)     Tub /Shower Transfers  Tub/Shower Transfer Score: 3 (Stand step xfer (Mod A) using RW; gait belt)   Tub/Shower Transfers  Tub/Shower Transfer Score: 4 (Stand step transfer (CGA) RW; gait belt (bed to seated position on TTB). Stand step transfer (CGA) at conclusion of shower (TTB > w/c); use of grab bar and gait belt.)       Legend:   7 - Independent   6 - Modified Independent   5 - Standby Assistance / Supervision / Set-up   4 - Minimum Assistance / Contact Guard Assistance   3 - Moderate Assistance   2 - Maximum Assistance   1 - Total Assistance / Dependent           Labs: Results:       Chemistry No results for input(s): GLU, NA, K, CL, CO2, BUN, CREA, CA, AGAP, BUCR, TBIL, AP, TP, ALB, GLOB, AGRAT in the last 72 hours. No lab exists for component: GPT     CBC w/Diff No results for input(s): WBC, RBC, HGB, HCT, PLT, GRANS, LYMPH, EOS, HGBEXT, HCTEXT, PLTEXT, HGBEXT, HCTEXT, PLTEXT in the last 72 hours. Cardiac Enzymes No results for input(s): CPK, CKND1, REINALDO in the last 72 hours. No lab exists for component: CKRMB, TROIP   Coagulation No results for input(s): PTP, INR, APTT, INREXT, INREXT in the last 72 hours.     Lipid Panel Lab Results   Component Value Date/Time    Cholesterol, total 143 05/20/2022 07:26 AM    HDL Cholesterol 73 (H) 05/20/2022 07:26 AM    LDL, calculated 48.6 05/20/2022 07:26 AM    VLDL, calculated 21.4 05/20/2022 07:26 AM    Triglyceride 107 05/20/2022 07:26 AM    CHOL/HDL Ratio 2.0 05/20/2022 07:26 AM      BNP No results for input(s): BNPP in the last 72 hours. Liver Enzymes No results for input(s): TP, ALB, TBIL, AP in the last 72 hours.     No lab exists for component: SGOT, GPT, DBIL   Thyroid Studies Lab Results   Component Value Date/Time    TSH 1.24 11/02/2022 03:36 AM          Procedures/imaging: see electronic medical records for all procedures/Xrays and details which were not copied into this note but were reviewed prior to creation of Plan    Medications:   Current Facility-Administered Medications   Medication Dose Route Frequency    pneumococcal 23-valent (PNEUMOVAX 23) injection 0.5 mL  0.5 mL IntraMUSCular PRIOR TO DISCHARGE    furosemide (LASIX) tablet 20 mg  20 mg Oral DAILY    acetaminophen (TYLENOL) tablet 500 mg  500 mg Oral Q6H PRN    therapeutic multivitamin (THERAGRAN) tablet 1 Tablet  1 Tablet Oral DAILY    albuterol (PROVENTIL VENTOLIN) nebulizer solution 2.5 mg  2.5 mg Nebulization Q4H PRN    apixaban (ELIQUIS) tablet 5 mg  5 mg Oral BID    polyvinyl alcohol-povidon(PF) (REFRESH CLASSIC) 1.4-0.6 % ophthalmic solution 1 Drop  1 Drop Both Eyes DAILY PRN    clopidogreL (PLAVIX) tablet 75 mg  75 mg Oral DAILY    levothyroxine (SYNTHROID) tablet 25 mcg  25 mcg Oral ACB    metoprolol tartrate (LOPRESSOR) tablet 25 mg  25 mg Oral DAILY    nitroglycerin (NITROSTAT) tablet 0.4 mg  0.4 mg SubLINGual Q5MIN PRN    oxyCODONE IR (ROXICODONE) tablet 5 mg  5 mg Oral TID PRN    pantoprazole (PROTONIX) tablet 40 mg  40 mg Oral ACB    rosuvastatin (CRESTOR) tablet 40 mg  40 mg Oral DAILY    docusate sodium (COLACE) capsule 100 mg  100 mg Oral BID    bisacodyL (DULCOLAX) tablet 10 mg  10 mg Oral Q48H PRN       Assessment/Plan     Active Problems:    UTI (urinary tract infection) (11/3/2022)    Impaired mobility and ADLs in the setting of UTI with generalized weakness and inability to ambulate  Physical therapy and Occupational Therapy    Recent UTI  Completed therapy, monitor for recurrent symtpoms    HTN,   Continue Lopressor    Atrial fibrillation  Continue beta-blocker and Eliquis    Dyslipidemia-continue Crestor    Hypothyroidism  Continue Synthroid    GERD  On Protonix    BLE Chronic edema  Lasix 20 mg Daily.,  Monitor       Discussed with patient    Dragon medical dictation software was used for portions of this report. Unintended errors may occur.       Hamida Clark MD  11/10/2022

## 2022-11-11 PROCEDURE — 65310000000 HC RM PRIVATE REHAB

## 2022-11-11 PROCEDURE — 97530 THERAPEUTIC ACTIVITIES: CPT

## 2022-11-11 PROCEDURE — 97110 THERAPEUTIC EXERCISES: CPT

## 2022-11-11 PROCEDURE — 94761 N-INVAS EAR/PLS OXIMETRY MLT: CPT

## 2022-11-11 PROCEDURE — 74011250637 HC RX REV CODE- 250/637: Performed by: EMERGENCY MEDICINE

## 2022-11-11 PROCEDURE — 99232 SBSQ HOSP IP/OBS MODERATE 35: CPT | Performed by: EMERGENCY MEDICINE

## 2022-11-11 PROCEDURE — 97112 NEUROMUSCULAR REEDUCATION: CPT

## 2022-11-11 PROCEDURE — 74011250637 HC RX REV CODE- 250/637: Performed by: NURSE PRACTITIONER

## 2022-11-11 PROCEDURE — 2709999900 HC NON-CHARGEABLE SUPPLY

## 2022-11-11 PROCEDURE — 97116 GAIT TRAINING THERAPY: CPT

## 2022-11-11 RX ADMIN — LEVOTHYROXINE SODIUM 25 MCG: 25 TABLET ORAL at 06:47

## 2022-11-11 RX ADMIN — THERA TABS 1 TABLET: TAB at 08:03

## 2022-11-11 RX ADMIN — CLOPIDOGREL BISULFATE 75 MG: 75 TABLET ORAL at 08:03

## 2022-11-11 RX ADMIN — ACETAMINOPHEN 500 MG: 500 TABLET ORAL at 11:42

## 2022-11-11 RX ADMIN — PANTOPRAZOLE SODIUM 40 MG: 40 TABLET, DELAYED RELEASE ORAL at 06:47

## 2022-11-11 RX ADMIN — ROSUVASTATIN CALCIUM 40 MG: 20 TABLET, FILM COATED ORAL at 08:03

## 2022-11-11 RX ADMIN — APIXABAN 5 MG: 5 TABLET, FILM COATED ORAL at 17:33

## 2022-11-11 RX ADMIN — ACETAMINOPHEN 500 MG: 500 TABLET ORAL at 22:41

## 2022-11-11 RX ADMIN — METOPROLOL TARTRATE 25 MG: 25 TABLET, FILM COATED ORAL at 08:03

## 2022-11-11 RX ADMIN — APIXABAN 5 MG: 5 TABLET, FILM COATED ORAL at 08:03

## 2022-11-11 RX ADMIN — FUROSEMIDE 20 MG: 20 TABLET ORAL at 08:03

## 2022-11-11 NOTE — PROGRESS NOTES
Problem: Self Care Deficits Care Plan (Adult)  Goal: *Acute Goals and Plan of Care (Insert Text)  Description: Occupational Therapy Goals   Long Term Goals  Initiated (2022) and to be accomplished within 3 week(s); by (2022)   1. Pt will perform self-feeding with Mod I.   2. Pt will perform grooming with Mod I.   3. Pt will perform UB bathing with set-up/ supv using AE and/ or compensatory strategies as needed. 4. Pt will perform LB bathing with SBA/ CGA using AE and/ or compensatory strategies as needed. 5. Pt will perform tub/shower transfer with SBA using LRAD. 6. Pt will perform UB dressing with set-up. 7. Pt will perform LB dressing with SBA using AE and/ or compensatory strategies as needed. 8. Pt will perform toileting task with supv.  9. Pt will perform toilet transfer with supv using LRAD. Short Term Goals   Initiated (2022) and to be accomplished within 7 day(s), (by 2022). Reassessed on 11/10/2022. 1. Pt will perform self-feeding with set-up. (Goal met 11/10/2022)  2. Pt will perform grooming with supv. (Goal met 11/10/2022)  3. Pt will perform UB bathing with supv using AE as needed. (Goal met 11/10/2022)  4. Pt will perform LB bathing with Min A using AE and/ or compensatory strategies as needed. (Goal met 11/10/2022)  5. Pt will perform tub/shower transfer with Min A using LRAD. (Goal met 11/10/2022)  6. Pt will perform UB dressing with SBA. (Goal met 11/10/2022)  7. Pt will perform LB dressing with Min A using AE and/ or compensatory strategies as needed. (Goal met 11/10/2022)  8. Pt will perform toileting task with Min A. (Goal met 11/10/2022)  9. Pt will perform toilet transfer with Min A using LRAD. (Goal met 11/10/2022)     Outcome: Progressing Towards Goal  Goal: Interventions  Outcome: Progressing Towards Goal   Occupational Therapy TREATMENT    Patient: Charlton Pickup Varinder   76 y.o.     Patient identified with name and : yes    Date: 2022    First Tx Session  Time In: 0930  Time Out: 56    Second Tx Session  Time In: 1401  Time Out: 1431    Diagnosis: UTI (urinary tract infection) [N39.0]   Precautions: Fall, Contact precautions  Chart, occupational therapy assessment, plan of care, and goals were reviewed. Pain:  Pain at start of tx: 4/10 pain or discomfort; bilateral shoulder and knees (with certain movements; aching/ soreness)  Pain at stop of tx: 4/10 pain or discomfort; bilateral shoulder and knees (with certain movements; aching/ soreness)  Interventions: repositioning, intermittent rest breaks. Tasks modified based on pt's tolerance. Pt denies need for pain medication at this time; will continue to assess. SUBJECTIVE:   Patient stated My roommate does the cooking, cleaning/ house-making, and helps me with bathing and \"I have a highlander SUV. \"    OBJECTIVE DATA SUMMARY:     THERAPEUTIC ACTIVITY Daily Assessment    Wheelchair mobility performed (supv) level to propel over level surfaces for accessing and navigating within facility environment. Pt requiring rest breaks x1 due to fatigue. Performed for generalized strengthening and range of motion. RUE/ LUE there act performed for reaching out-front to insert large colored pegs onto vertical foam pegboard surface. Therapist providing verbal cues for peg color selection and for placement location on board; vc's for alternating right/ left upper extremities. Performed for range of motion, [address] [place] [address] (tip pinch; in-hand manipulation), and activity tolerance for increased (I) with ADL's. RUE/ LUE [address] there act performed for matching a deck of cards (50 cards) onto tabletop board; increased task challenge with use of 1 lb wrist weights. Performed for facilitating [address] [place] (tip pinch; in-hand manipulation), bilateral integration, and for visually scanning board (left to right). Performed for increased functional (I) with self-cares.      Functional distance ambulation performed (CGA) using RW; gait belt (~130 ft). Verbal cues for hand placement and body positioning within frame of walker. Verbal cues for safe handling of AD (RW). Patient requiring seated rest break due to fatigue. THERAPEUTIC EXERCISE Daily Assessment    RUE/ LUE there ex performed using tabletop shoulder arch for bringing plastic rings across midline to opposite side, x12 plastic rings each hand for two trials. Performed for range of motion, functional reach across midline, and activity tolerance for increased (I) with self-cares. Intermittent rest breaks due to fatigue. RUE/ LUE AAROM table slides performed (15 reps x2) for shoulder flexion, horizontal abduction/ adduction, and circular patterns (clockwise; counter clockwise). Verbal cues and demonstration for hand placement and for technique. Performed for range of motion for improved functional ability with self-cares. RUE/ LUE ball presses performed (15 reps x1) for strengthening and facilitating proprioceptive input through both palms. Physio-ball supported by therapist; verbal cues for hand placement out-front onto ball and for slight anterior weight shift during ball presses. Intermittent rest breaks due to fatigue. NMRE Daily Assessment    Patient participated in ball pass activity with therapist using swiss-therapy ball (orange ball) while seated on high/ low table. Verbal cues for holding ball between both palms and for slight anterior weight shift to facilitate weight bearing through BLE's while passing ball to/ from therapist. Performed 10 reps x3; seated on high/ low mat table. RUE/ LUE diagonal movement patterns (hip <-> shoulder) performed while holding physio-ball between both hands; performed seated on high/ low mat table for challenging pt's unsupported sitting balance during a functional reaching activity. Intermittent rest breaks as pt fatigues easily.       ASSESSMENT:  Patient will benefit from continued skilled occupational therapy interventions to address decreased (I) with ADL's, decreased functional strength/ endurance, decreased RUE/ LUE AROM, decreased FMC, decreased activity tolerance, chronic back pain (pt has spinal stimulator for pain control), generalized weakness, impaired balance/ coordination, anxiety( fear of falling), and impaired functional mobility/ transfers impeding pt's functional (I) with ADL's and mobility for return to PLOF. Progression toward goals:  []          Improving appropriately and progressing toward goals  [x]          Improving slowly and progressing toward goals  []          Not making progress toward goals and plan of care will be adjusted     PLAN:  Patient continues to benefit from skilled intervention to address the above impairments. Continue treatment per established plan of care. Discharge Recommendations:  Home Health therapy with family support/ assist  Further Equipment Recommendations for Discharge:  transfer bench (pt has a tub bench at home) and [facility]      Activity Tolerance:  Fair+; intermittent rest breaks due to fatigue   Estimated LOS: TBD  Entered Differentiated Treatment minutes: Yes    Please refer to the flowsheet for vital signs taken during this treatment. After treatment:   [x]  Patient left in no apparent distress sitting up in wheelchair with needs met  []  Patient left in no apparent distress in bed  [x]  Call bell left within reach  [x]  Nursing notified  []  Caregiver present  [x]  Wheelchair alarm activated    COMMUNICATION/EDUCATION:   [] Home safety education was provided and the patient/caregiver indicated understanding. [x] Patient/family have participated as able in goal setting and plan of care. [x] Patient/family agree to work toward stated goals and plan of care. [] Patient understands intent and goals of therapy, but is neutral about his/her participation. [] Patient is unable to participate in goal setting and plan of care.     [address] [facility], OT

## 2022-11-11 NOTE — PROGRESS NOTES
Problem: Self Care Deficits Care Plan (Adult)  Goal: *Acute Goals and Plan of Care (Insert Text)  Description: Occupational Therapy Goals   Long Term Goals  Initiated (2022) and to be accomplished within 3 week(s); by (2022)   1. Pt will perform self-feeding with Mod I.   2. Pt will perform grooming with Mod I.   3. Pt will perform UB bathing with set-up/ supv using AE and/ or compensatory strategies as needed. 4. Pt will perform LB bathing with SBA/ CGA using AE and/ or compensatory strategies as needed. 5. Pt will perform tub/shower transfer with SBA using LRAD. 6. Pt will perform UB dressing with set-up. 7. Pt will perform LB dressing with SBA using AE and/ or compensatory strategies as needed. 8. Pt will perform toileting task with supv.  9. Pt will perform toilet transfer with supv using LRAD. Short Term Goals   Initiated (2022) and to be accomplished within 7 day(s), (by 2022). Reassessed on 11/10/2022. 1. Pt will perform self-feeding with set-up. (Goal met 11/10/2022)  2. Pt will perform grooming with supv. (Goal met 11/10/2022)  3. Pt will perform UB bathing with supv using AE as needed. (Goal met 11/10/2022)  4. Pt will perform LB bathing with Min A using AE and/ or compensatory strategies as needed. (Goal met 11/10/2022)  5. Pt will perform tub/shower transfer with Min A using LRAD. (Goal met 11/10/2022)  6. Pt will perform UB dressing with SBA. (Goal met 11/10/2022)  7. Pt will perform LB dressing with Min A using AE and/ or compensatory strategies as needed. (Goal met 11/10/2022)  8. Pt will perform toileting task with Min A. (Goal met 11/10/2022)  9. Pt will perform toilet transfer with Min A using LRAD. (Goal met 11/10/2022)     Outcome: Progressing Towards Goal   Occupational Therapy TREATMENT    Patient: John Reeder   76 y.o.     Patient identified with name and : yes    Date: 2022    First Tx Session  Time In: 1200  Time Out[de-identified] 1230    Diagnosis: UTI (urinary tract infection) [N39.0]   Precautions: Fall, Contact  Chart, occupational therapy assessment, plan of care, and goals were reviewed. Pain:  Pt reports no pain or discomfort prior to treatment. Pt reports no pain or discomfort post treatment. Intervention Provided: NA      SUBJECTIVE:   Patient stated I have a bird at home.     OBJECTIVE DATA SUMMARY:     THERAPEUTIC ACTIVITY Daily Assessment    Pt participated in FM/dexterity activity for carryover to self-care tasks. Pt used the game Perfection to  small game pieces using pincer grasp and in-hand manipulate piece to orient correctly to place into game board. Pt performed activity with left UE and then again with right UE. Pt required several rest breaks to complete. For increased challenge, 1# weights were attached to wrist.     THERAPEUTIC EXERCISE Daily Assessment    Pt participated in B hand strengthening exercises for carryover to functional tasks. Pt used DigiFlex (red) to perform 2 sets x 20 reps. Pt utilized FlexiBar (yellow) to perform forearm supination/pronation (2 sets x 15 reps each direction). MOBILITY/TRANSFERS Daily Assessment    Pt self-propelled w/c gym to room using BLE to propel forward. ASSESSMENT:  Pt making progress with improving BUE strength and increasing activity tolerance. Progression toward goals:  []          Improving appropriately and progressing toward goals  [x]          Improving slowly and progressing toward goals  []          Not making progress toward goals and plan of care will be adjusted     PLAN:  Patient continues to benefit from skilled intervention to address the above impairments. Continue treatment per established plan of care.   Discharge Recommendations:  Home Health therapy with family support/ assist  Further Equipment Recommendations for Discharge:  bedside commode     Activity Tolerance:  Fair      Estimated LOS:TBD  [id number] Completed: NA  Entered Differentiated Treatment minutes: Yes      Please refer to the flowsheet for vital signs taken during this treatment. After treatment:   [x]  Patient left in no apparent distress sitting up in chair with lunch  []  Patient left in no apparent distress in bed  [x]  Call bell left within reach  []  Nursing notified  []  Caregiver present  []  Bed alarm activated    COMMUNICATION/EDUCATION:   [] Home safety education was provided and the patient/caregiver indicated understanding. [] Patient/family have participated as able in goal setting and plan of care. [x] Patient/family agree to work toward stated goals and plan of care. [] Patient understands intent and goals of therapy, but is neutral about his/her participation. [] Patient is unable to participate in goal setting and plan of care.       VERONIKA Marte/JARED

## 2022-11-11 NOTE — ROUTINE PROCESS
Wound Prevention Checklist    Patient: Syeda Hollins [facility]76 y.o. female)  Date: 11/11/2022  Diagnosis: UTI (urinary tract infection) [N39.0] <principal problem not specified>    Precautions: Fall, Contact       []  Heel prevention boots placed on patient    []  Patient turned q2h during shift    []  Lift team ordered    [x]  Patient on Félix bed/Specialty bed    [x]  Each Wound is documented during shift (Stage, Color, drainage, odor, measurements, and dressings)    [x]  Dual skin check done with Tati Ghotra RN

## 2022-11-11 NOTE — PROGRESS NOTES
Problem: Risk for Spread of Infection  Goal: Prevent transmission of infectious organism to others  Description: Prevent the transmission of infectious organisms to other patients, staff members, and visitors. Outcome: Progressing Towards Goal     Problem: Patient Education:  Go to Education Activity  Goal: Patient/Family Education  Outcome: Progressing Towards Goal     Problem: Falls - Risk of  Goal: *Absence of Falls  Description: Document Carmel Litter Fall Risk and appropriate interventions in the flowsheet.   Outcome: Progressing Towards Goal  Note: Fall Risk Interventions:  Mobility Interventions: Bed/chair exit alarm         Medication Interventions: Bed/chair exit alarm    Elimination Interventions: Call light in reach    History of Falls Interventions: Bed/chair exit alarm

## 2022-11-11 NOTE — ROUTINE PROCESS
SHIFT CHANGE NOTE FOR [facility]    Bedside and Verbal shift change report given to Jaimie WOODRUFF (oncoming nurse) by Juanjo Johnson RN (offgoing nurse). Report included the following information SBAR, Kardex, MAR and Recent Results.     Situation:   Code Status: Full Code   Hospital Day: 8   Problem List:   Hospital Problems  Date Reviewed: 9/22/2022            Codes Class Noted POA    UTI (urinary tract infection) ICD-10-CM: N39.0  ICD-9-CM: 599.0  11/3/2022 Unknown         Background:   Past Medical History:   Past Medical History:   Diagnosis Date    Abnormal chest CT 2/6/2020    Adopted     Arthritis     Balance problems     Chronic cough 2/6/2020    MCBRIDE (dyspnea on exertion) 2/28/2019    GERD (gastroesophageal reflux disease)     Gout     Hemochromatosis     High cholesterol     Hypertension     Hypothyroidism     Left knee pain     Left shoulder pain     Low blood potassium     Lumbar pain     Pain of left sacroiliac joint     Shoulder impingement, right, with rotator cuff strain         Assessment:  Changes in Assessment throughout shift: No change to previous assessment    Patient has a central line: no Reasons if yes: n/a  Insertion date:n/a Last dressing date:n/a  Patient has Coelho Cath: no Reasons if yes: n/a   Insertion date:n/a  Shift coelho care completed: NO    Last Vitals:     Vitals:    11/09/22 2030 11/10/22 0710 11/10/22 1542 11/10/22 2005   BP: 136/73 103/66 116/72 109/65   Pulse: 70 66 76 83   Resp: 20 18 18 20   Temp: 98.2 °F (36.8 °C) 98.5 °F (36.9 °C) 98.5 °F (36.9 °C) 96.8 °F (36 °C)   SpO2: 99% 97% 98% 100%   Weight:       Height:           PAIN    Pain Assessment    Pain Intensity 1: 0 (11/11/22 0401) Pain Intensity 1: 2 (12/29/14 1105)    Pain Location 1: Back, Generalized, Leg Pain Location 1: Abdomen    Pain Intervention(s) 1: Medication (see MAR) Pain Intervention(s) 1: Medication (see MAR)  Patient Stated Pain Goal: Unable to verbalize/indicate pain Patient Stated Pain Goal: 0  Intervention effective: yes  Other actions taken for pain:      Skin Assessment  Skin color Skin Color: Appropriate for ethnicity  Condition/Temperature Skin Condition/Temp: Dry, Warm  Integrity Skin Integrity: Tear  Turgor    Weekly Pressure Ulcer Documentation  Pressure  Injury Documentation: No Pressure Injury Noted-Pressure Ulcer Prevention Initiated  Wound Prevention & Protection Methods  Orientation of wound Orientation of Wound Prevention: Posterior  Location of Prevention Location of Wound Prevention: Sacrum/Coccyx  Dressing Present Dressing Present : No  Dressing Status    Wound Offloading Wound Offloading (Prevention Methods): Bed, pressure reduction mattress    INTAKE/OUPUT  Date 11/10/22 0700 - 11/11/22 0659 11/11/22 0700 - 11/12/22 0659   Shift 7813-2272 3338-8035 24 Hour Total 1771-8042 9145-9744 24 Hour Total   INTAKE   P.O. 480  480        P. O. 480  480      Shift Total(mL/kg) 480(5.8)  480(5.8)      OUTPUT   Urine(mL/kg/hr)           Urine Occurrence(s) 5 x 2 x 7 x      Emesis/NG output           Emesis Occurrence(s)  0 x 0 x      Stool           Stool Occurrence(s) 4 x 0 x 4 x      Shift Total(mL/kg)           480      Weight (kg) 82.3 82.3 82.3 82.3 82.3 82.3         Recommendations:  Patient needs and requests: pain management, decrease swelling on BLE, assistance with ADL's    Pending tests/procedures: none at this time     Functional Level/Equipment: Partial (one person) /      Fall Precautions:   Fall risk precautions were reinforced with the patient; she was instructed to call for help prior to getting up. The following fall risk precautions were continued: bed/ chair alarms, door signage, yellow bracelet and socks as well as update of the [employer] tool in the patient's room. Talia Score: 3    HEALS Safety Check    A safety check occurred in the patient's room between off going nurse and oncoming nurse listed above.     The safety check included the below items  Area Items   H  High Alert Medications Verify all high alert medication drips (heparin, PCA, etc.)   E  Equipment Suction is set up for ALL patients (with lucinda)  Red plugs utilized for all equipment (IV pumps, etc.)  WOWs wiped down at end of shift. Room stocked with oxygen, suction, and other unit-specific supplies   A  Alarms Bed alarm is set for fall risk patients  Ensure chair alarm is in place and activated if patient is up in a chair   L  Lines Check IV for any infiltration  Gonzalez bag is empty if patient has a Gonzalez   Tubing and IV bags are labeled   S  Safety   Room is clean, patient is clean, and equipment is clean. Hallways are clear from equipment besides carts. Fall bracelet on for fall risk patients  Ensure room is clear and free of clutter  Suction is set up for ALL patients (with lucinda)  Hallways are clear from equipment besides carts.    Isolation precautions followed, supplies available outside room, sign posted     Earl Caldera RN

## 2022-11-11 NOTE — ROUTINE PROCESS
SHIFT CHANGE NOTE FOR [facility]    Bedside and Verbal shift change report given to Shannan Crain RN(oncoming nurse) by Wenceslao Nuñez RN (offgoing nurse). Report included the following information SBAR, Kardex, MAR and Recent Results.     Situation:   Code Status: Full Code   Hospital Day: 7   Problem List:   Hospital Problems  Date Reviewed: 9/22/2022            Codes Class Noted POA    UTI (urinary tract infection) ICD-10-CM: N39.0  ICD-9-CM: 599.0  11/3/2022 Unknown       Background:   Past Medical History:   Past Medical History:   Diagnosis Date    Abnormal chest CT 2/6/2020    Adopted     Arthritis     Balance problems     Chronic cough 2/6/2020    MCBRIDE (dyspnea on exertion) 2/28/2019    GERD (gastroesophageal reflux disease)     Gout     Hemochromatosis     High cholesterol     Hypertension     Hypothyroidism     Left knee pain     Left shoulder pain     Low blood potassium     Lumbar pain     Pain of left sacroiliac joint     Shoulder impingement, right, with rotator cuff strain         Assessment:  Changes in Assessment throughout shift: No change to previous assessment    Patient has a central line: no Reasons if yes: n/a  Insertion date:n/a Last dressing date:n/a  Patient has Coelho Cath: no Reasons if yes: na   Insertion date:n/a  Shift coelho care completed: NO    Last Vitals:     Vitals:    11/09/22 1552 11/09/22 2030 11/10/22 0710 11/10/22 1542   BP: 99/65 136/73 103/66 116/72   Pulse: 68 70 66 76   Resp: 18 20 18 18   Temp: 98.3 °F (36.8 °C) 98.2 °F (36.8 °C) 98.5 °F (36.9 °C) 98.5 °F (36.9 °C)   SpO2: 98% 99% 97% 98%   Weight:       Height:           PAIN    Pain Assessment    Pain Intensity 1: 0 (11/10/22 1600) Pain Intensity 1: 2 (12/29/14 1105)    Pain Location 1: Back, Generalized, Leg Pain Location 1: Abdomen    Pain Intervention(s) 1: Medication (see MAR) Pain Intervention(s) 1: Medication (see MAR)  Patient Stated Pain Goal: Unable to verbalize/indicate pain Patient Stated Pain Goal: 0  Intervention effective: yes  Other actions taken for pain:      Skin Assessment  Skin color Skin Color: Appropriate for ethnicity  Condition/Temperature Skin Condition/Temp: Dry, Warm  Integrity Skin Integrity: Tear  Turgor    Weekly Pressure Ulcer Documentation  Pressure  Injury Documentation: No Pressure Injury Noted-Pressure Ulcer Prevention Initiated  Wound Prevention & Protection Methods  Orientation of wound Orientation of Wound Prevention: Posterior  Location of Prevention Location of Wound Prevention: Sacrum/Coccyx  Dressing Present Dressing Present : No  Dressing Status    Wound Offloading Wound Offloading (Prevention Methods): Bed, pressure reduction mattress    INTAKE/OUPUT  Date 11/09/22 1900 - 11/10/22 0659 11/10/22 0700 - 11/11/22 0659   Shift 4894-6416 24 Hour Total 6912-7216 1344-3370 24 Hour Total   INTAKE   P.O.  700 480  480     P. O.  700 480  480   Shift Total(mL/kg)  700(8.5) 480(5.8)  480(5.8)   OUTPUT   Urine(mL/kg/hr)          Urine Occurrence(s) 3 x 8 x 5 x  5 x   Stool          Stool Occurrence(s) 0 x 3 x 4 x  4 x   Shift Total(mL/kg)        NET  700 480  480   Weight (kg) 82.3 82.3 82.3 82.3 82.3         Recommendations:  Patient needs and requests: assistance with ADL's, decrease swelling on BLE    Pending tests/procedures: none at this time     Functional Level/Equipment: Partial (one person) /      Fall Precautions:   Fall risk precautions were reinforced with the patient; she was instructed to call for help prior to getting up. The following fall risk precautions were continued: bed/ chair alarms, door signage, yellow bracelet and socks as well as update of the [employer]el Litter tool in the patient's room. Talia Score: 3    HEALS Safety Check    A safety check occurred in the patient's room between off going nurse and oncoming nurse listed above.     The safety check included the below items  Area Items   H  High Alert Medications Verify all high alert medication drips (heparin, PCA, etc.)   E  Equipment Suction is set up for ALL patients (with lucinda)  Red plugs utilized for all equipment (IV pumps, etc.)  WOWs wiped down at end of shift. Room stocked with oxygen, suction, and other unit-specific supplies   A  Alarms Bed alarm is set for fall risk patients  Ensure chair alarm is in place and activated if patient is up in a chair   L  Lines Check IV for any infiltration  Gonzalez bag is empty if patient has a Gonzalez   Tubing and IV bags are labeled   S  Safety   Room is clean, patient is clean, and equipment is clean. Hallways are clear from equipment besides carts. Fall bracelet on for fall risk patients  Ensure room is clear and free of clutter  Suction is set up for ALL patients (with lucinda)  Hallways are clear from equipment besides carts.    Isolation precautions followed, supplies available outside room, sign posted     Deena Ventura RN

## 2022-11-11 NOTE — PROGRESS NOTES
Problem: Mobility Impaired (Adult and Pediatric)  Goal: *Therapy Goal (Edit Goal, Insert Text)  Description: Physical Therapy Short Term Goals  Initiated 11/4/2022, re-assessed 11/11/22, and to be accomplished within 7 day(s) on 11/11/2022  1. Patient will move from supine to sit and sit to supine  in bed with minimal assistance/contact guard assist.   (Goal discontinued- pt has an implant (stimulator) in her back for her arthritic pain, and sleeps in a recliner chair at home)  2. Patient will transfer from bed to chair and chair to bed with minimal assistance/contact guard assist using the least restrictive device. (Goal met, 11/11/22)  3. Patient will perform sit to stand with minimal assistance/contact guard assist. (Goal met 11/11/22)  4. Patient will ambulate with minimal assistance/contact guard assist for 75 feet with the least restrictive device. (Goal met 11/11/22)  5. Patient will perform w/c mobility 150 ft bouts SBA level over even surfaces for improved functional endurance. (Goal met 11/11/22)    Physical Therapy Long Term Goals  Initiated 11/4/2022 and to be accomplished within 14 day(s) on 11/18/2022  1. Patient will transfer from bed to chair and chair to bed with supervision using the least restrictive device. 2.  Patient will perform sit to stand with supervision. 3.  Patient will ambulate with supervision/set-up for 150 feet with the least restrictive device. 4.  Patient will perform w/c mobility over even surfaces for 150 ft bouts at mod I level. Outcome: Progressing Towards Goal   PHYSICAL THERAPY WEEKLY PROGRESS NOTE    Patient: Mitzi Ron [de-identified]76 y.o. female)  Date: 11/11/2022  Diagnosis: UTI (urinary tract infection) [N39.0]   Precautions: Fall, Contact  Chart, physical therapy assessment, plan of care and goals were reviewed.       Time in:1106  Time out:1202  Entered Differentiated Treatment minutes: Yes    Patient seen for: Balance activities;Gait training;Patient education; Therapeutic exercise;Transfer training      Pain:  Pt pain was reported as  10/10 pre-treatment. Pt pain was reported as 8/10 post-treatment. Interventions: pt received pain meds during PT session; mobility; repositioning    Patient identified with name and : yes    SUBJECTIVE:     Pt agreeable to participate in PT. When lying in bed in supine, pt stated \"I can't roll on that side (left side) because I have a device implanted to help with my arthritic back pain\" pt also notes she sleeps in a recliner chair at home. When lying on her back in supine in bed, pt stated \"It hurts so bad\" and got teary. When sitting up in w/c or standing, pt with less c/o pain. OBJECTIVE DATA SUMMARY:   Pt seen for attempt at bed mobility; toilet transfers; standing balance at sink as well as standing balance challenges in gym; gait training; and w/c mobility training.     GROSS ASSESSMENT Weekly Progress Assessment 2022   AROM Generally decreased, functional   Strength Generally decreased, functional   Coordination Within functional limits   Tone Normal   Sensation Impaired   PROM Generally decreased, functional       POSTURE Weekly Progress Assessment 2022   Posture (WDL) Exceptions to WDL   Posture Assessment Forward head;Rounded shoulders       BALANCE Weekly Progress Assessment 2022    Sitting - Static: Good (unsupported)  Sitting - Dynamic: Good (unsupported)  Standing - Static: Fair  Standing - Dynamic : Impaired     BED/CHAIR/WHEELCHAIR TRANSFERS Initial Assessment Weekly Progress Assessment 2022   Rolling Right 2 (Maximal assistance) (due to pain) 3 (Moderate assistance )/2 (maximal assistance) depending on pain level   Rolling Left 2 (Maximal assistance) (due to pain) 3 (Moderate assistance )/ 2 (maximal assistance) (unable to roll fully on L side due to implant for arthritis pain in LB on left side and painful)   Supine to Sit 3 (Moderate assistance) 3 (Moderate assistance) (increased time)   Sit to Stand Moderate assistance Contact guard assistance/minimal assistance   Sit to Supine 3 (Moderate assistance) 2 (Maximal assistance) (needed help to raise B LE's onto bed, and in a lot of pain today)   Transfer Type Other Other (stand step w/RW)   Transfer Assistance Needed 3 (Moderate assistance ) 4 (Contact guard assistance)   Comments    Pt needed cues for safe technique. Sit <> stand x 5 reps, x2 sets from 18\" arm chair with SBA/CGA; CGA from w/c. Stand step with RW with CGA but needed min A when attempted stand step w/c to bed without AD and reaching out for PT's arms or bed for support. Car Transfer Moderate assistance (needing assistance to lift legs into car simulator, needing lifting assistance to get into standing)  Same as at [facility]. Not reassessed recently   Car Type car transfer simulator         WHEELCHAIR MOBILITY/MANAGEMENT Initial Assessment Weekly Progress Assessment 11/11/2022   Able to Propel (dist) 156 feet 200 feet   Assistance Required  (min/mod A) SBA/close supervision   Curbs/ramps assistance required 0 (Not tested) 0 (Not tested)   Wheelchair set up assistance required 3 (Moderate assistance)  4 (minimal assistance)   Wheelchair management Manages left brake, Manages right brake (needing assistance) Manages left brake;Manages right brake   Comments  Pt uses B UE's to propel as B LE's don't reach floor very well.        GAIT Weekly Progress Assessment 11/11/2022   Gait Description (WDL) Exceptions to WDL   Gait Abnormalities Decreased step clearance;Shuffling gait;Trendelenburg;Trunk sway increased       WALKING INDEPENDENCE Initial Assessment Weekly Progress Assessment 11/11/2022   Assistive device Gait belt, Walker, rollator Walker, rolling   Ambulation assistance - level surface 4 (Minimal assistance) 4 (Contact guard assistance)   Distance 20 Feet (ft) (limited by bilat knee pain report, particularly left knee pain) 150 Feet (ft)   Comments   Abnormal gait pattern as noted above; decreased pace and narrowed OREN   Ambulation assistance - unlevel surfaces  (NT due to fatigue)  NT this week, but will need to address prior to d/c       STEPS/STAIRS Initial Assessment Weekly Progress Assessment 11/11/2022   Steps/Stairs ambulated 0     Assistance Required   NA (Not applicable)   Rail Use  (NA)     Comments    Pt did not perform steps as part of PLOF, has a ramp   Curbs/Ramps  (NT as patient does not usually perform)         Neuro Re-Education:  Standing balance without UE support of RW, with CGA, during ball toss/catch activity, reaching in various planes; stood for 1 minute:45 seconds. Alternating foot taps on 2\" stool x 5 reps, with RW support and CGA  Alternating foot taps on 4\" stool x 5 reps, with RW support and min A/CGA      ASSESSMENT:  Pt tolerated session except for bed mobility which caused significant increased pain in LB. STG for bed mobility has been discontinued as pt has an stimulator implanted in her LB for arthritic pain and pt normally sleeps in a recliner chair. Pt has made good progress and has met 4/5 STG's. Continue with PT intervention and progress to LTG's. Will add on a goal for ramp negotiation with RW as pt usually ambulates up/down her ramp with a RW to enter/exit her home. Will need to address gait on uneven surfaces outside also. Progression toward goals:  [x]      Improving appropriately and progressing toward goals  []      Improving slowly and progressing toward goals  []      Not making progress toward goals and plan of care will be adjusted     PLAN:  Patient continues to benefit from skilled intervention to address the above impairments. Continue treatment per established plan of care.   Discharge Recommendations:  Home Health  Further Equipment Recommendations for Discharge:  n/a, pt  has equipment at home already     Estimated Discharge Date: TBD, recommend 11/18/22    Activity Tolerance:   Fair +; pt without c/o dizziness or SOB, but does have limitations due to pain in LB, B knees, and B shoulders  Please refer to the flowsheet for vital signs taken during this treatment.   After treatment:   [] Patient left in no apparent distress in bed  [x] Patient left in no apparent distress sitting up in chair (handed off to OT for continued therapy intervention)  [] Patient left in no apparent distress in w/c mobilizing under own power  [] Patient left in no apparent distress dining area  [] Patient left in no apparent distress mobilizing under own power  [] Call bell left within reach  [] Nursing notified  [] Caregiver present  [] Bed alarm activated   [x] Chair alarm activated      Carolynn Vargas, PT  11/11/2022

## 2022-11-11 NOTE — PROGRESS NOTES
Progress Note    Patient: Aurelia Patton MRN: [id number]  CSN: [id number]    YOB: 1945  Age: 76 y.o. Sex: female    DOA: 11/3/2022 LOS:  LOS: 8 days                    Subjective:     Primary rehab diagnosis:    Impaired mobility and ADLs in the setting of UTI with generalized weakness and inability to ambulate    Patient is sitting in a chair in no apparent distress, awake and alert. Denies any trouble breathing. In good spirits. Denies any nausea vomiting or abdominal pain.     Objective:     Physical Exam:  Visit Vitals  BP (!) (P) 111/54 (BP 1 Location: Left upper arm, BP Patient Position: At rest)   Pulse (P) 70   Temp (!) (P) 96.6 °F (35.9 °C)   Resp (P) 16   Ht 5' 1\" (1.549 m)   Wt 82.3 kg (181 lb 6.4 oz)   SpO2 100%   BMI 34.28 kg/m²        General:  Awake, alert  Cardiovascular:  S1S2+, RRR  Pulmonary:  CTA b/l  GI:  Soft, BS+, NT, ND  Extremities:  ++ edema      Functional Progress:    PHYSICAL THERAPY    ON ADMISSION MOST RECENT   Wheelchair Mobility/Management  Able to Propel (ft): 156 feet  Functional Level:  (min/mod A)  Curbs/Ramps Assist Required (FIM Score): 0 (Not tested)  Wheelchair Setup Assist Required : 3 (Moderate assistance)  Wheelchair Management: Manages left brake, Manages right brake (needing assistance) Wheelchair Mobility/Management  Able to Propel (ft): 200 feet  Functional Level: 5  Curbs/Ramps Assist Required (FIM Score): 0 (Not tested)  Wheelchair Setup Assist Required : 4 (Minimal assistance)  Wheelchair Management: Manages left brake, Manages right brake     Gait  Amount of Assistance: 4 (Minimal assistance)  Distance (ft): 20 Feet (ft) (limited by bilat knee pain report, particularly left knee pain)  Assistive Device: Gait belt, Walker, rollator Gait  Amount of Assistance: 4 (Contact guard assistance)  Distance (ft): 150 Feet (ft)  Assistive Device: Walker, rolling     Balance-Sitting/Standing  Sitting - Static: Good (unsupported)  Sitting - Dynamic: Good (unsupported)  Standing - Static: Fair  Standing - Dynamic : Impaired Balance-Sitting/Standing  Sitting - Static: Good (unsupported)  Sitting - Dynamic: Good (unsupported)  Standing - Static: Fair  Standing - Dynamic : Impaired     Bed/Mat Mobility  Rolling Right : 2 (Maximal assistance) (due to pain)  Rolling Left : 2 (Maximal assistance) (due to pain)  Supine to Sit : 3 (Moderate assistance)  Sit to Supine : 3 (Moderate assistance) Bed/Mat Mobility  Rolling Right : 3 (Moderate assistance )  Rolling Left : 3 (Moderate assistance ) (unable to roll fully on L side due to implant for arthritis pain in LB on left side and painful)  Supine to Sit : 3 (Moderate assistance) (increased time)  Sit to Supine : 2 (Maximal assistance) (needed help to raise B LE's onto bed, and in a lot of pain today)     Transfers  Transfer Type: Other  Other: stand step  Transfer Assistance : 3 (Moderate assistance )  Sit to Stand Assistance: Moderate assistance  Car Transfers: Moderate assistance (needing assistance to lift legs into car simulator, needing lifting assistance to get into standing)  Car Type: car transfer simulator Transfers  Transfer Type: Other (stand step w/RW)  Other: toilet transfer with CGA and use of RW or grab bar  Transfer Assistance : 4 (Contact guard assistance)  Sit to Stand Assistance: Contact guard assistance  Car Transfers:  Moderate assistance (needing assistance to lift legs into car simulator, needing lifting assistance to get into standing)  Car Type: car transfer simulator     Steps or Stairs  Steps/Stairs Ambulated (#): 0  Level of Assist : NA (Not applicable)  Rail Use:  (NA) Steps or Stairs  Steps/Stairs Ambulated (#): 0  Level of Assist : NA (Not applicable)  Rail Use:  (NA)       Functional Progress:    OCCUPATIONAL THERAPY    ON ADMISSION MOST RECENT   Eating  Functional Level: 5 (set-up/ supv)   Eating  Functional Level: 6     Grooming  Functional Level: 5 (SBA)   Grooming  Functional Level: 5 (supv in stance at sink/ RW; set-up in w/c)     Bathing  Functional Level: 3 (UB bathing: SBA; LB bathing: Min/ Mod A)   Bathing  Functional Level: 4 (UB bathing: set-up/ supv (TTB); LB bathing: CGA/ Min A (TTB))     Upper Body Dressing  Functional Level: 4 (SBA/ Min A)   Upper Body Dressing  Functional Level: 5 (set-up/ supv)     Lower Body Dressing  Functional Level: 3 (Mod A)   Lower Body Dressing  Functional Level: 4 (CGA for pants/ pull-up with use of grab bar; (supv) for donning compression socks and sneakers)     Toileting  Functional Level: 3 (Mod A)   Toileting  Functional Level: 4 (CGA/ Min A)     Toilet Transfers  Toilet Transfer Score: 3 (Stand step xfer (Mod A) using RW; gait belt (to/ from elevated seat over toilet))   Toilet Transfers  Toilet Transfer Score: 4 (Stand step xfer (CGA) RW; gait belt (to/ from elevated seat over toilet); pt requiring increased level of assistance when fatigued and/ or pain)     Tub /Shower Transfers  Tub/Shower Transfer Score: 3 (Stand step xfer (Mod A) using RW; gait belt)   Tub/Shower Transfers  Tub/Shower Transfer Score: 4 (Stand step transfer (CGA) RW; gait belt (bed to seated position on TTB). Stand step transfer (CGA) at conclusion of shower (TTB > w/c); use of grab bar and gait belt.)       Legend:   7 - Independent   6 - Modified Independent   5 - Standby Assistance / Supervision / Set-up   4 - Minimum Assistance / Contact Guard Assistance   3 - Moderate Assistance   2 - Maximum Assistance   1 - Total Assistance / Dependent           Labs: Results:       Chemistry No results for input(s): GLU, NA, K, CL, CO2, BUN, CREA, CA, AGAP, BUCR, TBIL, AP, TP, ALB, GLOB, AGRAT in the last 72 hours. No lab exists for component: GPT     CBC w/Diff No results for input(s): WBC, RBC, HGB, HCT, PLT, GRANS, LYMPH, EOS, HGBEXT, HCTEXT, PLTEXT, HGBEXT, HCTEXT, PLTEXT in the last 72 hours. Cardiac Enzymes No results for input(s): CPK, CKND1, REINALDO in the last 72 hours.     No lab exists for component: CKRMB, TROIP   Coagulation No results for input(s): PTP, INR, APTT, INREXT, INREXT in the last 72 hours. Lipid Panel Lab Results   Component Value Date/Time    Cholesterol, total 143 05/20/2022 07:26 AM    HDL Cholesterol 73 (H) 05/20/2022 07:26 AM    LDL, calculated 48.6 05/20/2022 07:26 AM    VLDL, calculated 21.4 05/20/2022 07:26 AM    Triglyceride 107 05/20/2022 07:26 AM    CHOL/HDL Ratio 2.0 05/20/2022 07:26 AM      BNP No results for input(s): BNPP in the last 72 hours. Liver Enzymes No results for input(s): TP, ALB, TBIL, AP in the last 72 hours.     No lab exists for component: SGOT, GPT, DBIL   Thyroid Studies Lab Results   Component Value Date/Time    TSH 1.24 11/02/2022 03:36 AM          Procedures/imaging: see electronic medical records for all procedures/Xrays and details which were not copied into this note but were reviewed prior to creation of Plan    Medications:   Current Facility-Administered Medications   Medication Dose Route Frequency    pneumococcal 23-valent (PNEUMOVAX 23) injection 0.5 mL  0.5 mL IntraMUSCular PRIOR TO DISCHARGE    furosemide (LASIX) tablet 20 mg  20 mg Oral DAILY    acetaminophen (TYLENOL) tablet 500 mg  500 mg Oral Q6H PRN    therapeutic multivitamin (THERAGRAN) tablet 1 Tablet  1 Tablet Oral DAILY    albuterol (PROVENTIL VENTOLIN) nebulizer solution 2.5 mg  2.5 mg Nebulization Q4H PRN    apixaban (ELIQUIS) tablet 5 mg  5 mg Oral BID    polyvinyl alcohol-povidon(PF) (REFRESH CLASSIC) 1.4-0.6 % ophthalmic solution 1 Drop  1 Drop Both Eyes DAILY PRN    clopidogreL (PLAVIX) tablet 75 mg  75 mg Oral DAILY    levothyroxine (SYNTHROID) tablet 25 mcg  25 mcg Oral ACB    metoprolol tartrate (LOPRESSOR) tablet 25 mg  25 mg Oral DAILY    nitroglycerin (NITROSTAT) tablet 0.4 mg  0.4 mg SubLINGual Q5MIN PRN    oxyCODONE IR (ROXICODONE) tablet 5 mg  5 mg Oral TID PRN    pantoprazole (PROTONIX) tablet 40 mg  40 mg Oral ACB    rosuvastatin (CRESTOR) tablet 40 mg 40 mg Oral DAILY    docusate sodium (COLACE) capsule 100 mg  100 mg Oral BID    bisacodyL (DULCOLAX) tablet 10 mg  10 mg Oral Q48H PRN       Assessment/Plan     Active Problems:    UTI (urinary tract infection) (11/3/2022)    Impaired mobility and ADLs in the setting of UTI with generalized weakness and inability to ambulate  PT and OT    Recent UTI  Completed therapy, monitor for recurrent symtpoms    HTN,   Continue Lopressor    Atrial fibrillation  -Beta-blocker and Eliquis    Dyslipidemia-on Crestor    Hypothyroidism  Synthroid    GERD  Protonix    BLE Chronic edema  Lasix      Discussed with patient  Check labs in a.m. Dragon medical dictation software was used for portions of this report. Unintended errors may occur.       Patricia Keller MD  11/11/2022

## 2022-11-12 LAB
ANION GAP SERPL CALC-SCNC: 5 MMOL/L (ref 3–18)
BUN SERPL-MCNC: 32 MG/DL (ref 7–18)
BUN/CREAT SERPL: 39 (ref 12–20)
CALCIUM SERPL-MCNC: 9 MG/DL (ref 8.5–10.1)
CHLORIDE SERPL-SCNC: 110 MMOL/L (ref 100–111)
CO2 SERPL-SCNC: 28 MMOL/L (ref 21–32)
CREAT SERPL-MCNC: 0.82 MG/DL (ref 0.6–1.3)
GLUCOSE SERPL-MCNC: 88 MG/DL (ref 74–99)
MAGNESIUM SERPL-MCNC: 2.1 MG/DL (ref 1.6–2.6)
POTASSIUM SERPL-SCNC: 3.6 MMOL/L (ref 3.5–5.5)
SODIUM SERPL-SCNC: 143 MMOL/L (ref 136–145)

## 2022-11-12 PROCEDURE — 74011250637 HC RX REV CODE- 250/637: Performed by: EMERGENCY MEDICINE

## 2022-11-12 PROCEDURE — 94761 N-INVAS EAR/PLS OXIMETRY MLT: CPT

## 2022-11-12 PROCEDURE — 74011250637 HC RX REV CODE- 250/637: Performed by: NURSE PRACTITIONER

## 2022-11-12 PROCEDURE — 80048 BASIC METABOLIC PNL TOTAL CA: CPT

## 2022-11-12 PROCEDURE — 65310000000 HC RM PRIVATE REHAB

## 2022-11-12 PROCEDURE — 97530 THERAPEUTIC ACTIVITIES: CPT

## 2022-11-12 PROCEDURE — 83735 ASSAY OF MAGNESIUM: CPT

## 2022-11-12 PROCEDURE — 36415 COLL VENOUS BLD VENIPUNCTURE: CPT

## 2022-11-12 RX ADMIN — APIXABAN 5 MG: 5 TABLET, FILM COATED ORAL at 09:05

## 2022-11-12 RX ADMIN — FUROSEMIDE 20 MG: 20 TABLET ORAL at 09:05

## 2022-11-12 RX ADMIN — CLOPIDOGREL BISULFATE 75 MG: 75 TABLET ORAL at 09:05

## 2022-11-12 RX ADMIN — METOPROLOL TARTRATE 25 MG: 25 TABLET, FILM COATED ORAL at 09:05

## 2022-11-12 RX ADMIN — ACETAMINOPHEN 500 MG: 500 TABLET ORAL at 09:13

## 2022-11-12 RX ADMIN — ROSUVASTATIN CALCIUM 40 MG: 20 TABLET, FILM COATED ORAL at 09:04

## 2022-11-12 RX ADMIN — APIXABAN 5 MG: 5 TABLET, FILM COATED ORAL at 17:33

## 2022-11-12 RX ADMIN — PANTOPRAZOLE SODIUM 40 MG: 40 TABLET, DELAYED RELEASE ORAL at 06:34

## 2022-11-12 RX ADMIN — THERA TABS 1 TABLET: TAB at 09:05

## 2022-11-12 RX ADMIN — LEVOTHYROXINE SODIUM 25 MCG: 25 TABLET ORAL at 06:34

## 2022-11-12 NOTE — ROUTINE PROCESS
SHIFT CHANGE NOTE FOR [facility]    Bedside and Verbal shift change report given to Linda Palmer RN(oncoming nurse) by Salma Corrigan RN (offgoing nurse). Report included the following information SBAR, Kardex, MAR and Recent Results.     Situation:   Code Status: Full Code   Hospital Day: 8   Problem List:   Hospital Problems  Date Reviewed: 9/22/2022            Codes Class Noted POA    UTI (urinary tract infection) ICD-10-CM: N39.0  ICD-9-CM: 599.0  11/3/2022 Unknown       Background:   Past Medical History:   Past Medical History:   Diagnosis Date    Abnormal chest CT 2/6/2020    Adopted     Arthritis     Balance problems     Chronic cough 2/6/2020    MCBRIDE (dyspnea on exertion) 2/28/2019    GERD (gastroesophageal reflux disease)     Gout     Hemochromatosis     High cholesterol     Hypertension     Hypothyroidism     Left knee pain     Left shoulder pain     Low blood potassium     Lumbar pain     Pain of left sacroiliac joint     Shoulder impingement, right, with rotator cuff strain         Assessment:  Changes in Assessment throughout shift:      Patient has a central line: no Reasons if yes: n/a  Insertion date:n/a Last dressing date:n/a  Patient has Coelho Cath: no Reasons if yes: na   Insertion date:n/a  Shift coelho care completed: NO    Last Vitals:     Vitals:    11/10/22 1542 11/10/22 2005 11/11/22 0803 11/11/22 1614   BP: 116/72 109/65 129/63 (!) (P) 111/54   Pulse: 76 83 74 (P) 70   Resp: 18 20 19 (P) 16   Temp: 98.5 °F (36.9 °C) 96.8 °F (36 °C) 97.7 °F (36.5 °C) (!) (P) 96.6 °F (35.9 °C)   SpO2: 98% 100% 97% 100%   Weight:       Height:           PAIN    Pain Assessment    Pain Intensity 1: 0 (11/11/22 1222) Pain Intensity 1: 2 (12/29/14 1105)    Pain Location 1: Generalized Pain Location 1: Abdomen    Pain Intervention(s) 1: Medication (see MAR) Pain Intervention(s) 1: Medication (see MAR)  Patient Stated Pain Goal: Unable to verbalize/indicate pain Patient Stated Pain Goal: 0  Intervention effective: yes  Other actions taken for pain: Medication (see MAR)    Skin Assessment  Skin color Skin Color: Appropriate for ethnicity  Condition/Temperature Skin Condition/Temp: Dry, Flaky  Integrity Skin Integrity: Tear  Turgor    Weekly Pressure Ulcer Documentation  Pressure  Injury Documentation: No Pressure Injury Noted-Pressure Ulcer Prevention Initiated  Wound Prevention & Protection Methods  Orientation of wound Orientation of Wound Prevention: Posterior  Location of Prevention Location of Wound Prevention: Sacrum/Coccyx  Dressing Present Dressing Present : No  Dressing Status    Wound Offloading Wound Offloading (Prevention Methods): Bed, pressure reduction mattress    INTAKE/OUPUT  Date 11/10/22 1900 - 11/11/22 0659 11/11/22 0700 - 11/12/22 0659   Shift 3100-2212 24 Hour Total 9402-7801 5872-1074 24 Hour Total   INTAKE   P.O.  480        P. O.  480      Shift Total(mL/kg)  480(5.8)      OUTPUT   Urine(mL/kg/hr)          Urine Occurrence(s) 2 x 7 x 2 x  2 x   Emesis/NG output          Emesis Occurrence(s) 0 x 0 x      Stool          Stool Occurrence(s) 0 x 4 x 1 x  1 x   Shift Total(mL/kg)        NET  480      Weight (kg) 82.3 82.3 82.3 82.3 82.3         Recommendations:  Patient needs and requests: assistance with ADL's, decrease swelling on BLE    Pending tests/procedures: none at this time     Functional Level/Equipment:   / Wheelchair    Fall Precautions:   Fall risk precautions were reinforced with the patient; she was instructed to call for help prior to getting up. The following fall risk precautions were continued: bed/ chair alarms, door signage, yellow bracelet and socks as well as update of the [employer] tool in the patient's room. Talia Score:      HEALS Safety Check    A safety check occurred in the patient's room between off going nurse and oncoming nurse listed above.     The safety check included the below items  Area Items   H  High Alert Medications Verify all high alert medication drips (heparin, PCA, etc.)   E  Equipment Suction is set up for ALL patients (with lucinda)  Red plugs utilized for all equipment (IV pumps, etc.)  WOWs wiped down at end of shift. Room stocked with oxygen, suction, and other unit-specific supplies   A  Alarms Bed alarm is set for fall risk patients  Ensure chair alarm is in place and activated if patient is up in a chair   L  Lines Check IV for any infiltration  Gonzalez bag is empty if patient has a Gonzalez   Tubing and IV bags are labeled   S  Safety   Room is clean, patient is clean, and equipment is clean. Hallways are clear from equipment besides carts. Fall bracelet on for fall risk patients  Ensure room is clear and free of clutter  Suction is set up for ALL patients (with lucinda)  Hallways are clear from equipment besides carts.    Isolation precautions followed, supplies available outside room, sign posted     Perfecto Neal RN

## 2022-11-12 NOTE — PROGRESS NOTES
Problem: Self Care Deficits Care Plan (Adult)  Goal: *Acute Goals and Plan of Care (Insert Text)  Description: Occupational Therapy Goals   Long Term Goals  Initiated (2022) and to be accomplished within 3 week(s); by (2022)   1. Pt will perform self-feeding with Mod I.   2. Pt will perform grooming with Mod I.   3. Pt will perform UB bathing with set-up/ supv using AE and/ or compensatory strategies as needed. 4. Pt will perform LB bathing with SBA/ CGA using AE and/ or compensatory strategies as needed. 5. Pt will perform tub/shower transfer with SBA using LRAD. 6. Pt will perform UB dressing with set-up. 7. Pt will perform LB dressing with SBA using AE and/ or compensatory strategies as needed. 8. Pt will perform toileting task with supv.  9. Pt will perform toilet transfer with supv using LRAD. Short Term Goals   Initiated (2022) and to be accomplished within 7 day(s), (by 2022). Reassessed on 11/10/2022. 1. Pt will perform self-feeding with set-up. (Goal met 11/10/2022)  2. Pt will perform grooming with supv. (Goal met 11/10/2022)  3. Pt will perform UB bathing with supv using AE as needed. (Goal met 11/10/2022)  4. Pt will perform LB bathing with Min A using AE and/ or compensatory strategies as needed. (Goal met 11/10/2022)  5. Pt will perform tub/shower transfer with Min A using LRAD. (Goal met 11/10/2022)  6. Pt will perform UB dressing with SBA. (Goal met 11/10/2022)  7. Pt will perform LB dressing with Min A using AE and/ or compensatory strategies as needed. (Goal met 11/10/2022)  8. Pt will perform toileting task with Min A. (Goal met 11/10/2022)  9. Pt will perform toilet transfer with Min A using LRAD. (Goal met 11/10/2022)     Outcome: Progressing Towards Goal   Occupational Therapy TREATMENT    Patient: Onur Meza Varinder   76 y.o.     Patient identified with name and : yes    Date: 2022    First Tx Session  Time In: 1132  Time Out[de-identified] 1230      Diagnosis: UTI (urinary tract infection) [N39.0]   Precautions: Fall, Contact  Chart, occupational therapy assessment, plan of care, and goals were reviewed. Pain:  Pt reports 3/10 pain or discomfort prior to treatment. Pt reports 3/10 pain or discomfort post treatment. Intervention Provided: c/o discomfort with swelling in BLE, does not tolerate SCDs well- has a similar device used at home with preferred compression      SUBJECTIVE:   Patient stated I'm Judy Coulter go home, walk down my ramp, load my walker (rollator) into the golf cart and go for a drive.     OBJECTIVE DATA SUMMARY:     THERAPEUTIC ACTIVITY Daily Assessment    Pt performing UE task placing pegs into upright pegboard, alternating which hand (both efficient). Completes task in standing with minor lateral weight shifts    Completes x3/5 reps of shoulder ladder with 1# dowel, dyspnea notable after 1st rep, does not complete last 2 reps. Challenges with control in moving closer to shoulder height due to decreased overhead mobility. Engaging in [employer] while standing at table x3 minutes. MOBILITY/TRANSFERS Daily Assessment     Pt using BLE to propel w/c through hallways                ASSESSMENT:  Pt verbalizing energy conservation techniques for home. Progressing with dynamic balance for safety during ADLs. Progression toward goals:  [x]          Improving appropriately and progressing toward goals  []          Improving slowly and progressing toward goals  []          Not making progress toward goals and plan of care will be adjusted     PLAN:  Patient continues to benefit from skilled intervention to address the above impairments. Continue treatment per established plan of care. Discharge Recommendations: To Be Determined  Further Equipment Recommendations for Discharge:  N/A     Activity Tolerance:  Good      Estimated LOS:Anticipate d/c 11/15      Please refer to the flowsheet for vital signs taken during this treatment.   After treatment:   [x]  Patient left in no apparent distress sitting up in chair   []  Patient left in no apparent distress in bed  [x]  Call bell left within reach  []  Nursing notified  []  Caregiver present  [x]  Chair alarm activated    COMMUNICATION/EDUCATION:   [x] Home safety education was provided and the patient/caregiver indicated understanding. [x] Patient/family have participated as able in goal setting and plan of care. [x] Patient/family agree to work toward stated goals and plan of care. [] Patient understands intent and goals of therapy, but is neutral about his/her participation. [] Patient is unable to participate in goal setting and plan of care.       Faviola Franklin, OT

## 2022-11-12 NOTE — PROGRESS NOTES
Problem: Risk for Spread of Infection  Goal: Prevent transmission of infectious organism to others  Description: Prevent the transmission of infectious organisms to other patients, staff members, and visitors. Outcome: Progressing Towards Goal     Problem: Falls - Risk of  Goal: *Absence of Falls  Description: Document Shanique Arrieta Fall Risk and appropriate interventions in the flowsheet. Outcome: Progressing Towards Goal  Note: Fall Risk Interventions:  Mobility Interventions: Bed/chair exit alarm, Patient to call before getting OOB    Mentation Interventions: Adequate sleep, hydration, pain control, Bed/chair exit alarm    Medication Interventions: Bed/chair exit alarm, Patient to call before getting OOB    Elimination Interventions: Bed/chair exit alarm, Call light in reach    History of Falls Interventions: Bed/chair exit alarm         Problem: Pressure Injury - Risk of  Goal: *Prevention of pressure injury  Description: Document Stef Scale and appropriate interventions in the flowsheet.   Outcome: Progressing Towards Goal  Note: Pressure Injury Interventions:  Sensory Interventions: Assess changes in LOC, Assess need for specialty bed    Moisture Interventions: Absorbent underpads, Apply protective barrier, creams and emollients    Activity Interventions: Assess need for specialty bed, Pressure redistribution bed/mattress(bed type)    Mobility Interventions: Assess need for specialty bed, Pressure redistribution bed/mattress (bed type)    Nutrition Interventions: Document food/fluid/supplement intake    Friction and Shear Interventions: HOB 30 degrees or less

## 2022-11-12 NOTE — REHAB NOTE
SHIFT CHANGE NOTE FOR [facility]    Bedside and Verbal shift change report given to KYRIE Dawkins(oncoming nurse) by Gregorio Membreno (offgoing nurse). Report included the following information SBAR, Kardex, MAR and Recent Results. Situation:   Code Status: Full Code   Reason for Admission: [facility] Day: 9   Problem List:   Hospital Problems  Date Reviewed: 9/22/2022            Codes Class Noted POA    UTI (urinary tract infection) ICD-10-CM: N39.0  ICD-9-CM: 599.0  11/3/2022 Unknown           Background:   Past Medical History:   Past Medical History:   Diagnosis Date    Abnormal chest CT 2/6/2020    Adopted     Arthritis     Balance problems     Chronic cough 2/6/2020    MCBRIDE (dyspnea on exertion) 2/28/2019    GERD (gastroesophageal reflux disease)     Gout     Hemochromatosis     High cholesterol     Hypertension     Hypothyroidism     Left knee pain     Left shoulder pain     Low blood potassium     Lumbar pain     Pain of left sacroiliac joint     Shoulder impingement, right, with rotator cuff strain       Patient taking anticoagulants Eloquis,Plavix    Patient has a defibrillator: no     Assessment:  Changes in Assessment throughout shift: none    Patient has central line: no Reasons if yes: Insertion date: Last dressing date:  Patient has Gonzalez Cath: no Reasons if yes:     Insertion date:    Last Vitals:     Vitals:    11/10/22 2005 11/11/22 0803 11/11/22 1614 11/11/22 1954   BP: 109/65 129/63 (!) (P) 111/54 110/68   Pulse: 83 74 (P) 70 63   Resp: 20 19 (P) 16 16   Temp: 96.8 °F (36 °C) 97.7 °F (36.5 °C) (!) (P) 96.6 °F (35.9 °C) 98.1 °F (36.7 °C)   SpO2: 100% 97% 100% 98%   Weight:       Height:           PAIN    Pain Assessment    Pain Intensity 1: 0 (11/12/22 0400) Pain Intensity 1: 2 (12/29/14 1105)    Pain Location 1: Leg Pain Location 1: Abdomen    Pain Intervention(s) 1: Medication (see MAR) Pain Intervention(s) 1: Medication (see MAR)  Patient Stated Pain Goal: 0 Patient Stated Pain Goal: 0  Intervention effective: yes    Other actions taken for pain:reposition    Skin Assessment  Skin color Skin Color: Appropriate for ethnicity  Condition/Temperature Skin Condition/Temp: Dry, Warm, Flaky  Integrity Skin Integrity: Tear (healed)  Turgor    Weekly Pressure Ulcer Documentation  Pressure  Injury Documentation: No Pressure Injury Noted-Pressure Ulcer Prevention Initiated  Wound Prevention & Protection Methods  Orientation of wound Orientation of Wound Prevention: Posterior  Location of Prevention Location of Wound Prevention: Buttocks, Sacrum/Coccyx  Dressing Present Dressing Present : No  Dressing Status    Wound Offloading Wound Offloading (Prevention Methods): Bed, pressure redistribution/air    INTAKE/OUPUT  Date 11/11/22 0700 - 11/12/22 0659 11/12/22 0700 - 11/13/22 0659   Shift 4892-5565 7287-8062 24 Hour Total 0138-1741 9167-8098 24 Hour Total   INTAKE   Shift Total(mL/kg)         OUTPUT   Urine(mL/kg/hr)           Urine Occurrence(s) 2 x 3 x 5 x      Stool           Stool Occurrence(s) 1 x 0 x 1 x      Shift Total(mL/kg)         NET         Weight (kg) 82.3 82.3 82.3 82.3 82.3 82.3       Recommendations:  Patient needs and requests: toileting,reposition,assist with ADL's    Diet: Regular Cardiac    Pending tests/procedures: labs    Functional Level/Equipment: assist x 1/wheelchair    Estimated Discharge Date: 11/15/22 Posted on Whiteboard in [facility] Room: yes     HEALS Safety Check    A safety check occurred in the patient's room between off going nurse and oncoming nurse listed above. The safety check included the below items  Area Items   H  High Alert Medications Verify all high alert medication drips (heparin, PCA, etc.)   E  Equipment Suction is set up for ALL patients (with yanker)  Red plugs utilized for all equipment (IV pumps, etc.)  WOWs wiped down at end of shift.   Room stocked with oxygen, suction, and other unit-specific supplies   A  Alarms Bed alarm is set for fall risk patients  Ensure chair alarm is in place and activated if patient is up in a chair   L  Lines Check IV for any infiltration  Gonzalez bag is empty if patient has a Gonzalez   Tubing and IV bags are labeled   S  Safety   Room is clean, patient is clean, and equipment is clean. Hallways are clear from equipment besides carts. Fall bracelet on for fall risk patients  Ensure room is clear and free of clutter  Suction is set up for ALL patients (with lucinda)  Hallways are clear from equipment besides carts.    Isolation precautions followed, supplies available outside room, sign posted

## 2022-11-13 PROCEDURE — 94761 N-INVAS EAR/PLS OXIMETRY MLT: CPT

## 2022-11-13 PROCEDURE — 74011250637 HC RX REV CODE- 250/637: Performed by: EMERGENCY MEDICINE

## 2022-11-13 PROCEDURE — 74011250637 HC RX REV CODE- 250/637: Performed by: NURSE PRACTITIONER

## 2022-11-13 PROCEDURE — 65310000000 HC RM PRIVATE REHAB

## 2022-11-13 RX ADMIN — APIXABAN 5 MG: 5 TABLET, FILM COATED ORAL at 09:08

## 2022-11-13 RX ADMIN — ACETAMINOPHEN 500 MG: 500 TABLET ORAL at 21:51

## 2022-11-13 RX ADMIN — THERA TABS 1 TABLET: TAB at 09:08

## 2022-11-13 RX ADMIN — FUROSEMIDE 20 MG: 20 TABLET ORAL at 09:08

## 2022-11-13 RX ADMIN — METOPROLOL TARTRATE 25 MG: 25 TABLET, FILM COATED ORAL at 09:08

## 2022-11-13 RX ADMIN — APIXABAN 5 MG: 5 TABLET, FILM COATED ORAL at 17:51

## 2022-11-13 RX ADMIN — ROSUVASTATIN CALCIUM 40 MG: 20 TABLET, FILM COATED ORAL at 09:08

## 2022-11-13 RX ADMIN — CLOPIDOGREL BISULFATE 75 MG: 75 TABLET ORAL at 09:08

## 2022-11-13 RX ADMIN — PANTOPRAZOLE SODIUM 40 MG: 40 TABLET, DELAYED RELEASE ORAL at 06:31

## 2022-11-13 RX ADMIN — LEVOTHYROXINE SODIUM 25 MCG: 25 TABLET ORAL at 06:31

## 2022-11-13 NOTE — PROGRESS NOTES
Progress Note    Patient: Tremayne Hart MRN: [id number]  CSN: [id number]    YOB: 1945  Age: 76 y.o. Sex: female    DOA: 11/3/2022 LOS:  LOS: 10 days                    Subjective:     Primary rehab diagnosis:    Impaired mobility and ADLs in the setting of UTI with generalized weakness and inability to ambulate    No acute pt concerns    Review of systems  General: No fevers or chills. Cardiovascular: No chest pain or pressure. No palpitations. Pulmonary: No shortness of breath, cough or wheeze. Gastrointestinal: No abdominal pain, nausea, vomiting or diarrhea. Genitourinary: No  dysuria. Musculoskeletal: No joint or muscle pain, no back pain, no recent trauma. Neurologic: generalized weakness    Objective:     Physical Exam:  Visit Vitals  /61 (BP 1 Location: Right upper arm, BP Patient Position: Supine)   Pulse 67   Temp 97.1 °F (36.2 °C)   Resp 18   Ht 5' 1\" (1.549 m)   Wt 82.3 kg (181 lb 6.4 oz)   SpO2 97%   BMI 34.28 kg/m²        General:         Alert,  no acute distress    HEENT: NC, Atraumatic.   anicteric sclerae. Lungs: CTA Bilaterally. No Wheezing/Rhonchi/Rales. Heart:  Regular  rhythm,  No murmur, No Rubs, No Gallops  Abdomen: Soft, Non distended, Non tender.  +  Extremities: No edema  Psych:   Not anxious or agitated. Neurologic:  CN 2-12 grossly intact, Alert and oriented X 3. No acute neurological                                 Deficits,     Intake and Output:  Current Shift:  11/13 0701 - 11/13 1900  In: 540 [P.O.:540]  Out: -   Last three shifts:  11/11 1901 - 11/13 0700  In: 480 [P.O.:480]  Out: -     Labs: Results:       Chemistry Recent Labs     11/12/22  0525   GLU 88      K 3.6      CO2 28   BUN 32*   CREA 0.82   CA 9.0   AGAP 5   BUCR 39*      CBC w/Diff No results for input(s): WBC, RBC, HGB, HCT, PLT, GRANS, LYMPH, EOS, HGBEXT, HCTEXT, PLTEXT, HGBEXT, HCTEXT, PLTEXT in the last 72 hours.    Cardiac Enzymes No results for input(s): CPK, CKND1, REINALDO in the last 72 hours. No lab exists for component: CKRMB, TROIP   Coagulation No results for input(s): PTP, INR, APTT, INREXT, INREXT in the last 72 hours. Lipid Panel Lab Results   Component Value Date/Time    Cholesterol, total 143 05/20/2022 07:26 AM    HDL Cholesterol 73 (H) 05/20/2022 07:26 AM    LDL, calculated 48.6 05/20/2022 07:26 AM    VLDL, calculated 21.4 05/20/2022 07:26 AM    Triglyceride 107 05/20/2022 07:26 AM    CHOL/HDL Ratio 2.0 05/20/2022 07:26 AM      BNP No results for input(s): BNPP in the last 72 hours. Liver Enzymes No results for input(s): TP, ALB, TBIL, AP in the last 72 hours.     No lab exists for component: SGOT, GPT, DBIL   Thyroid Studies Lab Results   Component Value Date/Time    TSH 1.24 11/02/2022 03:36 AM          Procedures/imaging: see electronic medical records for all procedures/Xrays and details which were not copied into this note but were reviewed prior to creation of Plan    Medications:   Current Facility-Administered Medications   Medication Dose Route Frequency    pneumococcal 23-valent (PNEUMOVAX 23) injection 0.5 mL  0.5 mL IntraMUSCular PRIOR TO DISCHARGE    furosemide (LASIX) tablet 20 mg  20 mg Oral DAILY    acetaminophen (TYLENOL) tablet 500 mg  500 mg Oral Q6H PRN    therapeutic multivitamin (THERAGRAN) tablet 1 Tablet  1 Tablet Oral DAILY    albuterol (PROVENTIL VENTOLIN) nebulizer solution 2.5 mg  2.5 mg Nebulization Q4H PRN    apixaban (ELIQUIS) tablet 5 mg  5 mg Oral BID    polyvinyl alcohol-povidon(PF) (REFRESH CLASSIC) 1.4-0.6 % ophthalmic solution 1 Drop  1 Drop Both Eyes DAILY PRN    clopidogreL (PLAVIX) tablet 75 mg  75 mg Oral DAILY    levothyroxine (SYNTHROID) tablet 25 mcg  25 mcg Oral ACB    metoprolol tartrate (LOPRESSOR) tablet 25 mg  25 mg Oral DAILY    nitroglycerin (NITROSTAT) tablet 0.4 mg  0.4 mg SubLINGual Q5MIN PRN    oxyCODONE IR (ROXICODONE) tablet 5 mg  5 mg Oral TID PRN    pantoprazole (PROTONIX) tablet 40 mg  40 mg Oral ACB    rosuvastatin (CRESTOR) tablet 40 mg  40 mg Oral DAILY    docusate sodium (COLACE) capsule 100 mg  100 mg Oral BID    bisacodyL (DULCOLAX) tablet 10 mg  10 mg Oral Q48H PRN       Assessment/Plan     Active Problems:    UTI (urinary tract infection) (11/3/2022)    Impaired mobility and ADLs in the setting of UTI with generalized weakness and inability to ambulate  Pt/ot eval and treat    Recent UTI  Completed therapy, monitor for recurrent symtpoms    HTN, HLD, atrial fibrillation  Continue lopressor, crestor  Anticoagulation with eliquis  and plavix'  monitor cbc    Hypothyroidism  Continue synthroid 25mcg daily    GERD  Continue protonix        Ulysses Brighter, MD  11/13/2022 3:57  PM

## 2022-11-13 NOTE — PROGRESS NOTES
Problem: Risk for Spread of Infection  Goal: Prevent transmission of infectious organism to others  Description: Prevent the transmission of infectious organisms to other patients, staff members, and visitors. Outcome: Progressing Towards Goal     Problem: Patient Education:  Go to Education Activity  Goal: Patient/Family Education  Outcome: Progressing Towards Goal     Problem: Falls - Risk of  Goal: *Absence of Falls  Description: Document Karen Marking Fall Risk and appropriate interventions in the flowsheet. Outcome: Progressing Towards Goal  Note: Fall Risk Interventions:  Mobility Interventions: Bed/chair exit alarm    Mentation Interventions: Adequate sleep, hydration, pain control, Bed/chair exit alarm    Medication Interventions: Bed/chair exit alarm    Elimination Interventions: Bed/chair exit alarm    History of Falls Interventions: Bed/chair exit alarm         Problem: Patient Education: Go to Patient Education Activity  Goal: Patient/Family Education  Outcome: Progressing Towards Goal     Problem: Pressure Injury - Risk of  Goal: *Prevention of pressure injury  Description: Document Stef Scale and appropriate interventions in the flowsheet.   Outcome: Progressing Towards Goal  Note: Pressure Injury Interventions:  Sensory Interventions: Assess need for specialty bed, Assess changes in LOC    Moisture Interventions: Absorbent underpads    Activity Interventions: Assess need for specialty bed, Pressure redistribution bed/mattress(bed type)    Mobility Interventions: Assess need for specialty bed    Nutrition Interventions: Document food/fluid/supplement intake    Friction and Shear Interventions: HOB 30 degrees or less                Problem: Patient Education: Go to Patient Education Activity  Goal: Patient/Family Education  Outcome: Progressing Towards Goal     Problem: Nutrition Deficit  Goal: *Optimize nutritional status  Outcome: Progressing Towards Goal     Problem: Inpatient Rehab (Adult)  Goal: *LTG: Avoids injury/falls 100% of time related to deficits  Outcome: Progressing Towards Goal  Goal: *LTG: Avoids infection 100% of time related to deficits  Outcome: Progressing Towards Goal  Goal: *LTG: Absence of DVT during hospitalization  Outcome: Progressing Towards Goal  Goal: *LTG: Maintains Skin Integrity With No Evidence of Pressure Injury 100% of Time  Outcome: Progressing Towards Goal  Goal: Interventions  Outcome: Progressing Towards Goal     Problem: Patient Education: Go to Patient Education Activity  Goal: Patient/Family Education  Outcome: Progressing Towards Goal

## 2022-11-13 NOTE — PROGRESS NOTES
Problem: Risk for Spread of Infection  Goal: Prevent transmission of infectious organism to others  Description: Prevent the transmission of infectious organisms to other patients, staff members, and visitors. Outcome: Progressing Towards Goal     Problem: Falls - Risk of  Goal: *Absence of Falls  Description: Document Adalgisajamie Keys Fall Risk and appropriate interventions in the flowsheet. Outcome: Progressing Towards Goal  Note: Fall Risk Interventions:  Mobility Interventions: Bed/chair exit alarm, Patient to call before getting OOB    Mentation Interventions: Adequate sleep, hydration, pain control, Bed/chair exit alarm    Medication Interventions: Assess postural VS orthostatic hypotension, Bed/chair exit alarm, Patient to call before getting OOB, Teach patient to arise slowly    Elimination Interventions: Bed/chair exit alarm, Call light in reach, Patient to call for help with toileting needs    History of Falls Interventions: Bed/chair exit alarm         Problem: Pressure Injury - Risk of  Goal: *Prevention of pressure injury  Description: Document Stef Scale and appropriate interventions in the flowsheet.   Outcome: Progressing Towards Goal  Note: Pressure Injury Interventions:  Sensory Interventions: Assess changes in LOC, Assess need for specialty bed    Moisture Interventions: Absorbent underpads, Apply protective barrier, creams and emollients    Activity Interventions: Assess need for specialty bed, Chair cushion, Pressure redistribution bed/mattress(bed type)    Mobility Interventions: Assess need for specialty bed, Chair cushion    Nutrition Interventions: Document food/fluid/supplement intake    Friction and Shear Interventions: HOB 30 degrees or less, Apply protective barrier, creams and emollients                Problem: Patient Education: Go to Patient Education Activity  Goal: Patient/Family Education  Outcome: Progressing Towards Goal

## 2022-11-13 NOTE — PROGRESS NOTES
[facility]    Bedside and Verbal shift change report given to Nano Hickey LPN (oncoming nurse) by David Maxwell LPN (offgoing nurse). Report included the following information SBAR, Kardex, MAR and Recent Results.     Situation:   Code Status: Full Code   Hospital Day: 10   Problem List:   Hospital Problems  Date Reviewed: 9/22/2022            Codes Class Noted POA    UTI (urinary tract infection) ICD-10-CM: N39.0  ICD-9-CM: 599.0  11/3/2022 Unknown           Background:   Past Medical History:   Past Medical History:   Diagnosis Date    Abnormal chest CT 2/6/2020    Adopted     Arthritis     Balance problems     Chronic cough 2/6/2020    MCBRIDE (dyspnea on exertion) 2/28/2019    GERD (gastroesophageal reflux disease)     Gout     Hemochromatosis     High cholesterol     Hypertension     Hypothyroidism     Left knee pain     Left shoulder pain     Low blood potassium     Lumbar pain     Pain of left sacroiliac joint     Shoulder impingement, right, with rotator cuff strain         Assessment:  Changes in Assessment throughout shift: No change to previous assessment    Patient has a central line: no   Patient has Gonzalez Cath: no   Last Vitals:     Vitals:    11/12/22 1545 11/12/22 2004 11/13/22 0740 11/13/22 1610   BP: 116/62 (!) 131/56 107/61 (!) 111/59   Pulse: 72 68 67 77   Resp: 18 18 18 18   Temp: 98.1 °F (36.7 °C) 98 °F (36.7 °C) 97.1 °F (36.2 °C) 98.1 °F (36.7 °C)   SpO2: 99% 99% 97% 99%   Weight:       Height:           PAIN    Pain Assessment    Pain Intensity 1: 0 (11/13/22 1200) Pain Intensity 1: 2 (12/29/14 1105)    Pain Location 1: Head, Leg Pain Location 1: Abdomen    Pain Intervention(s) 1: Medication (see MAR) Pain Intervention(s) 1: Medication (see MAR)  Patient Stated Pain Goal: 0 Patient Stated Pain Goal: 0  Intervention effective: yes  Other actions taken for pain:      Skin Assessment  Skin color Skin Color: Appropriate for ethnicity  Condition/Temperature Skin Condition/Temp: Warm, Dry  Integrity Skin Integrity: Tear  Turgor    Weekly Pressure Ulcer Documentation  Pressure  Injury Documentation: No Pressure Injury Noted-Pressure Ulcer Prevention Initiated  Wound Prevention & Protection Methods  Orientation of wound Orientation of Wound Prevention: Posterior  Location of Prevention Location of Wound Prevention: Buttocks, Sacrum/Coccyx  Dressing Present Dressing Present : No  Dressing Status    Wound Offloading Wound Offloading (Prevention Methods): Bed, pressure redistribution/air    INTAKE/OUPUT  Date 11/12/22 0700 - 11/13/22 0659 11/13/22 0700 - 11/14/22 0659   Shift 0700-1859 1900-0659 24 Hour Total 7665-0229 5461-7629 24 Hour Total   INTAKE   P.O. 480  480 840  840     P. O. 480  480 840  840   Shift Total(mL/kg) 480(5.8)  480(5.8) 840(10.2)  840(10.2)   OUTPUT   Urine(mL/kg/hr)           Urine Occurrence(s) 4 x 2 x 6 x 2 x  2 x   Emesis/NG output           Emesis Occurrence(s)  0 x 0 x      Stool           Stool Occurrence(s) 1 x 0 x 1 x 2 x  2 x   Shift Total(mL/kg)           480 840  840   Weight (kg) 82.3 82.3 82.3 82.3 82.3 82.3       Recommendations:  Patient needs and requests: Medication/Toileting    Pending tests/procedures: Labs Pending     Functional Level/Equipment: Partial (one person) / Wheelchair;Walker    Fall Precautions:   Fall risk precautions were reinforced with the patient; she was instructed to call for help prior to getting up. The following fall risk precautions were continued: bed/ chair alarms, door signage, yellow bracelet and socks as well as update of the Karen Marking tool in the patient's room. Talia Score: 3    HEALS Safety Check    A safety check occurred in the patient's room between off going nurse and oncoming nurse listed above.     The safety check included the below items  Area Items   H  High Alert Medications Verify all high alert medication drips (heparin, PCA, etc.)   E  Equipment Suction is set up for ALL patients (with yanker)  Red plugs utilized for all equipment (IV pumps, etc.)  WOWs wiped down at end of shift. Room stocked with oxygen, suction, and other unit-specific supplies   A  Alarms Bed alarm is set for fall risk patients  Ensure chair alarm is in place and activated if patient is up in a chair   L  Lines Check IV for any infiltration  Gonzalez bag is empty if patient has a Gonzalez   Tubing and IV bags are labeled   S  Safety   Room is clean, patient is clean, and equipment is clean. Hallways are clear from equipment besides carts. Fall bracelet on for fall risk patients  Ensure room is clear and free of clutter  Suction is set up for ALL patients (with viancaker)  Hallways are clear from equipment besides carts.    Isolation precautions followed, supplies available outside room, sign posted     Kassidy Ramírez LPN

## 2022-11-13 NOTE — PROGRESS NOTES
Progress Note    Patient: Casandra Rob MRN: [id number]  CSN: [id number]    YOB: 1945  Age: 76 y.o. Sex: female    DOA: 11/3/2022 LOS:  LOS: 9 days                    Subjective:     Primary rehab diagnosis:    Impaired mobility and ADLs in the setting of UTI with generalized weakness and inability to ambulate    No acute pt concerns  Pt seen and examined review chart dioscussed with patient and nursing staff    Review of systems  General: No fevers or chills. Cardiovascular: No chest pain or pressure. No palpitations. Pulmonary: No shortness of breath, cough or wheeze. Gastrointestinal: No abdominal pain, nausea, vomiting or diarrhea. Genitourinary: No urinary frequency, urgency, hesitancy or dysuria. Musculoskeletal: No joint or muscle pain, no back pain, no recent trauma. Neurologic: generalized weakness    Objective:     Physical Exam:  Visit Vitals  BP (!) 131/56 (BP 1 Location: Right upper arm, BP Patient Position: Lying)   Pulse 68   Temp 98 °F (36.7 °C)   Resp 18   Ht 5' 1\" (1.549 m)   Wt 82.3 kg (181 lb 6.4 oz)   SpO2 99%   BMI 34.28 kg/m²        General:         Alert, cooperative, no acute distress    HEENT: NC, Atraumatic.   anicteric sclerae. Lungs: CTA Bilaterally. No Wheezing/Rhonchi/Rales. Heart:  Regular  rhythm,  No murmur, No Rubs, No Gallops  Abdomen: Soft, Non distended, Non tender. +Bowel sounds  Extremities: No edema  Psych:   Not anxious or agitated. Neurologic:  CN 2-12 grossly intact, Alert and oriented X 3. No acute neurological                                 Deficits,     Intake and Output:  Current Shift:  No intake/output data recorded.   Last three shifts:  11/11 0701 - 11/12 1900  In: 480 [P.O.:480]  Out: -     Labs: Results:       Chemistry Recent Labs     11/12/22  0525   GLU 88      K 3.6      CO2 28   BUN 32*   CREA 0.82   CA 9.0   AGAP 5   BUCR 39*      CBC w/Diff No results for input(s): WBC, RBC, HGB, HCT, PLT, GRANS, LYMPH, EOS, HGBEXT, HCTEXT, PLTEXT, HGBEXT, HCTEXT, PLTEXT in the last 72 hours. Cardiac Enzymes No results for input(s): CPK, CKND1, REINALDO in the last 72 hours. No lab exists for component: CKRMB, TROIP   Coagulation No results for input(s): PTP, INR, APTT, INREXT, INREXT in the last 72 hours. Lipid Panel Lab Results   Component Value Date/Time    Cholesterol, total 143 05/20/2022 07:26 AM    HDL Cholesterol 73 (H) 05/20/2022 07:26 AM    LDL, calculated 48.6 05/20/2022 07:26 AM    VLDL, calculated 21.4 05/20/2022 07:26 AM    Triglyceride 107 05/20/2022 07:26 AM    CHOL/HDL Ratio 2.0 05/20/2022 07:26 AM      BNP No results for input(s): BNPP in the last 72 hours. Liver Enzymes No results for input(s): TP, ALB, TBIL, AP in the last 72 hours.     No lab exists for component: SGOT, GPT, DBIL   Thyroid Studies Lab Results   Component Value Date/Time    TSH 1.24 11/02/2022 03:36 AM          Procedures/imaging: see electronic medical records for all procedures/Xrays and details which were not copied into this note but were reviewed prior to creation of Plan    Medications:   Current Facility-Administered Medications   Medication Dose Route Frequency    pneumococcal 23-valent (PNEUMOVAX 23) injection 0.5 mL  0.5 mL IntraMUSCular PRIOR TO DISCHARGE    furosemide (LASIX) tablet 20 mg  20 mg Oral DAILY    acetaminophen (TYLENOL) tablet 500 mg  500 mg Oral Q6H PRN    therapeutic multivitamin (THERAGRAN) tablet 1 Tablet  1 Tablet Oral DAILY    albuterol (PROVENTIL VENTOLIN) nebulizer solution 2.5 mg  2.5 mg Nebulization Q4H PRN    apixaban (ELIQUIS) tablet 5 mg  5 mg Oral BID    polyvinyl alcohol-povidon(PF) (REFRESH CLASSIC) 1.4-0.6 % ophthalmic solution 1 Drop  1 Drop Both Eyes DAILY PRN    clopidogreL (PLAVIX) tablet 75 mg  75 mg Oral DAILY    levothyroxine (SYNTHROID) tablet 25 mcg  25 mcg Oral ACB    metoprolol tartrate (LOPRESSOR) tablet 25 mg  25 mg Oral DAILY    nitroglycerin (NITROSTAT) tablet 0.4 mg  0.4 mg SubLINGual Q5MIN PRN    oxyCODONE IR (ROXICODONE) tablet 5 mg  5 mg Oral TID PRN    pantoprazole (PROTONIX) tablet 40 mg  40 mg Oral ACB    rosuvastatin (CRESTOR) tablet 40 mg  40 mg Oral DAILY    docusate sodium (COLACE) capsule 100 mg  100 mg Oral BID    bisacodyL (DULCOLAX) tablet 10 mg  10 mg Oral Q48H PRN       Assessment/Plan     Active Problems:    UTI (urinary tract infection) (11/3/2022)    Impaired mobility and ADLs in the setting of UTI with generalized weakness and inability to ambulate  Pt/ot eval and treat    Recent UTI  Completed therapy, monitor for recurrent symtpoms    HTN, HLD, atrial fibrillation  Continue lopressor, crestor  Anticoagulation with eliquis  and plavix'  monitor cbc    Hypothyroidism  Continue synthroid 25mcg daily    GERD  Continue protonix        Kwabena Oakley MD  11/12/2022 9:47 PM

## 2022-11-13 NOTE — ROUTINE PROCESS
SHIFT CHANGE NOTE FOR [facility]    Bedside and Verbal shift change report given to Edith Vazquez RN (oncoming nurse) by Penelope Condon LPN (offgoing nurse). Report included the following information SBAR, Kardex, MAR and Recent Results.     Situation:   Code Status: Full Code   Hospital Day: 10   Problem List:   Hospital Problems  Date Reviewed: 9/22/2022            Codes Class Noted POA    UTI (urinary tract infection) ICD-10-CM: N39.0  ICD-9-CM: 599.0  11/3/2022 Unknown       Background:   Past Medical History:   Past Medical History:   Diagnosis Date    Abnormal chest CT 2/6/2020    Adopted     Arthritis     Balance problems     Chronic cough 2/6/2020    MCBRIDE (dyspnea on exertion) 2/28/2019    GERD (gastroesophageal reflux disease)     Gout     Hemochromatosis     High cholesterol     Hypertension     Hypothyroidism     Left knee pain     Left shoulder pain     Low blood potassium     Lumbar pain     Pain of left sacroiliac joint     Shoulder impingement, right, with rotator cuff strain         Assessment:  Changes in Assessment throughout shift: No change to previous assessment    Patient has a central line: no Reasons if yes: n/a  Insertion date:n/a Last dressing date:n/a  Patient has Coelho Cath: no Reasons if yes: na   Insertion date:n/a  Shift coelho care completed: NO    Last Vitals:     Vitals:    11/11/22 1954 11/12/22 0745 11/12/22 1545 11/12/22 2004   BP: 110/68 129/80 116/62 (!) 131/56   Pulse: 63 73 72 68   Resp: 16 18 18 18   Temp: 98.1 °F (36.7 °C) 97.6 °F (36.4 °C) 98.1 °F (36.7 °C) 98 °F (36.7 °C)   SpO2: 98% 96% 99% 99%   Weight:       Height:           PAIN    Pain Assessment    Pain Intensity 1: 0 (11/13/22 0440) Pain Intensity 1: 2 (12/29/14 1105)    Pain Location 1: Head, Leg Pain Location 1: Abdomen    Pain Intervention(s) 1: Medication (see MAR) Pain Intervention(s) 1: Medication (see MAR)  Patient Stated Pain Goal: 0 Patient Stated Pain Goal: 0  Intervention effective: yes  Other actions taken for pain:      Skin Assessment  Skin color Skin Color: Appropriate for ethnicity  Condition/Temperature Skin Condition/Temp: Warm, Dry  Integrity Skin Integrity: Tear  Turgor    Weekly Pressure Ulcer Documentation  Pressure  Injury Documentation: No Pressure Injury Noted-Pressure Ulcer Prevention Initiated  Wound Prevention & Protection Methods  Orientation of wound Orientation of Wound Prevention: Posterior  Location of Prevention Location of Wound Prevention: Buttocks, Sacrum/Coccyx  Dressing Present Dressing Present : No  Dressing Status    Wound Offloading Wound Offloading (Prevention Methods): Bed, pressure redistribution/air, Bed, pressure reduction mattress, Repositioning    INTAKE/OUPUT  Date 11/12/22 0700 - 11/13/22 0659 11/13/22 0700 - 11/14/22 0659   Shift 2653-6169 1477-7562 24 Hour Total 0262-8620 0016-6577 24 Hour Total   INTAKE   P.O. 480  480        P. O. 480  480      Shift Total(mL/kg) 480(5.8)  480(5.8)      OUTPUT   Urine(mL/kg/hr)           Urine Occurrence(s) 4 x 2 x 6 x      Emesis/NG output           Emesis Occurrence(s)  0 x 0 x      Stool           Stool Occurrence(s) 1 x 0 x 1 x      Shift Total(mL/kg)           480      Weight (kg) 82.3 82.3 82.3 82.3 82.3 82.3         Recommendations:  Patient needs and requests: assistance with ADL's, decrease swelling on BLE    Pending tests/procedures: none at this time     Functional Level/Equipment: Partial (one person) / Wheelchair;Walker    Fall Precautions:   Fall risk precautions were reinforced with the patient; she was instructed to call for help prior to getting up. The following fall risk precautions were continued: bed/ chair alarms, door signage, yellow bracelet and socks as well as update of the Sherill Amilcar tool in the patient's room. Talia Score: 3    HEALS Safety Check    A safety check occurred in the patient's room between off going nurse and oncoming nurse listed above.     The safety check included the below items  Area Items H  High Alert Medications Verify all high alert medication drips (heparin, PCA, etc.)   E  Equipment Suction is set up for ALL patients (with lucinda)  Red plugs utilized for all equipment (IV pumps, etc.)  WOWs wiped down at end of shift. Room stocked with oxygen, suction, and other unit-specific supplies   A  Alarms Bed alarm is set for fall risk patients  Ensure chair alarm is in place and activated if patient is up in a chair   L  Lines Check IV for any infiltration  Gonzalez bag is empty if patient has a Gonzalez   Tubing and IV bags are labeled   S  Safety   Room is clean, patient is clean, and equipment is clean. Hallways are clear from equipment besides carts. Fall bracelet on for fall risk patients  Ensure room is clear and free of clutter  Suction is set up for ALL patients (with lucinda)  Hallways are clear from equipment besides carts.    Isolation precautions followed, supplies available outside room, sign posted     Valentino Sheridan LPN

## 2022-11-13 NOTE — ROUTINE PROCESS
SHIFT CHANGE NOTE FOR [facility]    Bedside and Verbal shift change report given to Timi Mcgill LPN(oncoming nurse) by Kris Millan LPN (offgoing nurse). Report included the following information SBAR, Kardex, MAR and Recent Results.     Situation:   Code Status: Full Code   Hospital Day: 10   Problem List:   Hospital Problems  Date Reviewed: 9/22/2022            Codes Class Noted POA    UTI (urinary tract infection) ICD-10-CM: N39.0  ICD-9-CM: 599.0  11/3/2022 Unknown       Background:   Past Medical History:   Past Medical History:   Diagnosis Date    Abnormal chest CT 2/6/2020    Adopted     Arthritis     Balance problems     Chronic cough 2/6/2020    MCBRIDE (dyspnea on exertion) 2/28/2019    GERD (gastroesophageal reflux disease)     Gout     Hemochromatosis     High cholesterol     Hypertension     Hypothyroidism     Left knee pain     Left shoulder pain     Low blood potassium     Lumbar pain     Pain of left sacroiliac joint     Shoulder impingement, right, with rotator cuff strain         Assessment:  Changes in Assessment throughout shift: No change to previous assessment    Patient has a central line: no Reasons if yes: n/a  Insertion date:n/a Last dressing date:n/a  Patient has Coelho Cath: no Reasons if yes: na   Insertion date:n/a  Shift coelho care completed: NO    Last Vitals:     Vitals:    11/11/22 1954 11/12/22 0745 11/12/22 1545 11/12/22 2004   BP: 110/68 129/80 116/62 (!) 131/56   Pulse: 63 73 72 68   Resp: 16 18 18 18   Temp: 98.1 °F (36.7 °C) 97.6 °F (36.4 °C) 98.1 °F (36.7 °C) 98 °F (36.7 °C)   SpO2: 98% 96% 99% 99%   Weight:       Height:           PAIN    Pain Assessment    Pain Intensity 1: 0 (11/13/22 0440) Pain Intensity 1: 2 (12/29/14 1105)    Pain Location 1: Head, Leg Pain Location 1: Abdomen    Pain Intervention(s) 1: Medication (see MAR) Pain Intervention(s) 1: Medication (see MAR)  Patient Stated Pain Goal: 0 Patient Stated Pain Goal: 0  Intervention effective: yes  Other actions taken for pain:      Skin Assessment  Skin color Skin Color: Appropriate for ethnicity  Condition/Temperature Skin Condition/Temp: Warm, Dry  Integrity Skin Integrity: Tear  Turgor    Weekly Pressure Ulcer Documentation  Pressure  Injury Documentation: No Pressure Injury Noted-Pressure Ulcer Prevention Initiated  Wound Prevention & Protection Methods  Orientation of wound Orientation of Wound Prevention: Posterior  Location of Prevention Location of Wound Prevention: Buttocks, Sacrum/Coccyx  Dressing Present Dressing Present : No  Dressing Status    Wound Offloading Wound Offloading (Prevention Methods): Bed, pressure redistribution/air, Bed, pressure reduction mattress, Repositioning    INTAKE/OUPUT  Date 11/12/22 0700 - 11/13/22 0659 11/13/22 0700 - 11/14/22 0659   Shift [id number] 0692-8442 24 Hour Total 1658-3934 9351-3923 24 Hour Total   INTAKE   P.O. 480  480        P. O. 480  480      Shift Total(mL/kg) 480(5.8)  480(5.8)      OUTPUT   Urine(mL/kg/hr)           Urine Occurrence(s) 4 x 2 x 6 x      Emesis/NG output           Emesis Occurrence(s)  0 x 0 x      Stool           Stool Occurrence(s) 1 x 0 x 1 x      Shift Total(mL/kg)           480      Weight (kg) 82.3 82.3 82.3 82.3 82.3 82.3         Recommendations:  Patient needs and requests: assistance with ADL's, decrease swelling on BLE    Pending tests/procedures: none at this time     Functional Level/Equipment: Partial (one person) / Wheelchair;Walker    Fall Precautions:   Fall risk precautions were reinforced with the patient; she was instructed to call for help prior to getting up. The following fall risk precautions were continued: bed/ chair alarms, door signage, yellow bracelet and socks as well as update of the Dhingana Viridiana tool in the patient's room. Talia Score: 3    HEALS Safety Check    A safety check occurred in the patient's room between off going nurse and oncoming nurse listed above.     The safety check included the below items  Area Items H  High Alert Medications Verify all high alert medication drips (heparin, PCA, etc.)   E  Equipment Suction is set up for ALL patients (with lucinda)  Red plugs utilized for all equipment (IV pumps, etc.)  WOWs wiped down at end of shift. Room stocked with oxygen, suction, and other unit-specific supplies   A  Alarms Bed alarm is set for fall risk patients  Ensure chair alarm is in place and activated if patient is up in a chair   L  Lines Check IV for any infiltration  Gonzalez bag is empty if patient has a Gonzalez   Tubing and IV bags are labeled   S  Safety   Room is clean, patient is clean, and equipment is clean. Hallways are clear from equipment besides carts. Fall bracelet on for fall risk patients  Ensure room is clear and free of clutter  Suction is set up for ALL patients (with lucinda)  Hallways are clear from equipment besides carts.    Isolation precautions followed, supplies available outside room, sign posted     Noelle Vasquez LPN

## 2022-11-14 PROCEDURE — 97110 THERAPEUTIC EXERCISES: CPT

## 2022-11-14 PROCEDURE — 65310000000 HC RM PRIVATE REHAB

## 2022-11-14 PROCEDURE — 99232 SBSQ HOSP IP/OBS MODERATE 35: CPT | Performed by: HOSPITALIST

## 2022-11-14 PROCEDURE — 97530 THERAPEUTIC ACTIVITIES: CPT

## 2022-11-14 PROCEDURE — 74011250637 HC RX REV CODE- 250/637: Performed by: NURSE PRACTITIONER

## 2022-11-14 PROCEDURE — 97116 GAIT TRAINING THERAPY: CPT

## 2022-11-14 PROCEDURE — 97535 SELF CARE MNGMENT TRAINING: CPT

## 2022-11-14 PROCEDURE — 94761 N-INVAS EAR/PLS OXIMETRY MLT: CPT

## 2022-11-14 PROCEDURE — 74011250637 HC RX REV CODE- 250/637: Performed by: EMERGENCY MEDICINE

## 2022-11-14 RX ADMIN — ACETAMINOPHEN 500 MG: 500 TABLET ORAL at 22:02

## 2022-11-14 RX ADMIN — APIXABAN 5 MG: 5 TABLET, FILM COATED ORAL at 17:01

## 2022-11-14 RX ADMIN — APIXABAN 5 MG: 5 TABLET, FILM COATED ORAL at 07:57

## 2022-11-14 RX ADMIN — PANTOPRAZOLE SODIUM 40 MG: 40 TABLET, DELAYED RELEASE ORAL at 06:43

## 2022-11-14 RX ADMIN — LEVOTHYROXINE SODIUM 25 MCG: 25 TABLET ORAL at 06:43

## 2022-11-14 RX ADMIN — CLOPIDOGREL BISULFATE 75 MG: 75 TABLET ORAL at 07:57

## 2022-11-14 RX ADMIN — FUROSEMIDE 20 MG: 20 TABLET ORAL at 07:57

## 2022-11-14 RX ADMIN — METOPROLOL TARTRATE 25 MG: 25 TABLET, FILM COATED ORAL at 07:57

## 2022-11-14 RX ADMIN — THERA TABS 1 TABLET: TAB at 07:57

## 2022-11-14 RX ADMIN — ROSUVASTATIN CALCIUM 40 MG: 20 TABLET, FILM COATED ORAL at 07:57

## 2022-11-14 NOTE — PROGRESS NOTES
Problem: Mobility Impaired (Adult and Pediatric)  Goal: *Therapy Goal (Edit Goal, Insert Text)  Description: Physical Therapy Short Term Goals  Initiated 11/4/2022, re-assessed 11/14/22, for d/c 11/15/2022  1. Patient will move from supine to sit and sit to supine  in bed with minimal assistance/contact guard assist.   (Goal discontinued- pt has an implant in her back for her arthritic pain, and sleeps in a recliner chair at home)  2. Patient will transfer from bed to chair and chair to bed with minimal assistance/contact guard assist using the least restrictive device. (Goal met, 11/11/22)  3. Patient will perform sit to stand with minimal assistance/contact guard assist. (Goal met 11/11/22)  4. Patient will ambulate with minimal assistance/contact guard assist for 75 feet with the least restrictive device. (Goal met 11/11/22)  5. Patient will perform w/c mobility 150 ft bouts SBA level over even surfaces for improved functional endurance. (Goal met 11/11/22)    Physical Therapy Long Term Goals  Initiated 11/4/2022 and to be accomplished within 14 day(s) on 11/18/2022  1. Patient will transfer from bed to chair and chair to bed with supervision using the least restrictive device. (D/c'd)  2. Patient will perform sit to stand with supervision.(SBA)  3. Patient will ambulate with supervision/set-up for 150 feet with the least restrictive device. (SBA)  4. Patient will perform w/c mobility over even surfaces for 150 ft bouts at mod I level. (MET 11/14/2022)  5. Patient will ambulate up/down an incline/decline ramp with RW and supervision for entry/exit of home. (Not assessed due to weather)      Outcome: Progressing Towards Goal         PHYSICAL THERAPY DISCHARGE NOTE    Patient: Missy Silveira (73 y.o. female)  Date: 11/14/2022  Diagnosis: UTI (urinary tract infection) [N39.0]   Precautions: Fall, Contact  Chart, physical therapy assessment, plan of care and goals were reviewed.     Time in:1134  Time SI  Entered Differentiated Treatment minutes: No    Patient seen for: Wheelchair mobility;Gait training;Transfer training (stair training)    Time in:1405  Time out:1435  Entered Differentiated Treatment minutes: No    Patient seen for: Family training;Gait training; Therapeutic exercise    Pain:  Pt pain was reported as  no c/o pre-treatment. Pt pain was reported as low back after stairs post-treatment. Intervention: NA     Patient identified with name and : yes     SUBJECTIVE:     Patient stated:\"I don't sleep in a bed, I sleep in a recliner\" and \"I don't ever get in a car that low, I have a Highlander. \"     OBJECTIVE DATA SUMMARY:     GROSS ASSESSMENT Discharge Assessment 2022   AROM Generally decreased, functional   Strength Generally decreased, functional   Coordination Within functional limits   Tone Normal   Sensation Impaired   PROM Generally decreased, functional       POSTURE Discharge Assessment 2022   Posture (WDL) Exceptions to [facility]   Posture Assessment Forward head;Rounded shoulders           BALANCE Discharge Assessment 2022    Sitting - Static: Good (unsupported)  Sitting - Dynamic: Good (unsupported)  Standing - Static: Fair  Standing - Dynamic : Impaired    Ability to  small object from floor: not safe to assess       BED/CHAIR/WHEELCHAIR TRANSFERS Initial Assessment Discharge Assessment   Rolling Right 2 (Maximal assistance) (due to pain) 3 (Moderate assistance )   Rolling Left 2 (Maximal assistance) (due to pain) 3 (Moderate assistance )   Supine to Sit 3 (Moderate assistance) 3 (Moderate assistance)   Sit to Stand Moderate assistance Stand-by assistance   Sit to Supine 3 (Moderate assistance) 2 (Maximal assistance)   Transfer Assistance Level 3 (Moderate assistance ) 5 (Stand-by assistance)   Transfer Type Other Other   Comments    Pt performed sit to stand with SBA and stand step txfr with RW and SBA.     Car Transfer Moderate assistance (needing assistance to lift legs into car simulator, needing lifting assistance to get into standing)  Pt refused to practice in simulator due to low seat. Car Type car transfer simulator  NT       WHEELCHAIR MOBILITY/MANAGEMENT Initial Assessment Discharge Assessment   Able to Propel 156 feet 270 feet   Assistance Level  (min/mod A) Beatriz with BUE   Curbs/ramps assistance required 0 (Not tested) 0 (Not tested)   Wheelchair set up assistance required 3 (Moderate assistance) 5 (Supervision/setup)   Wheelchair management Manages left brake, Manages right brake (needing assistance) Manages left brake;Manages right brake       GAIT Discharge Assessment 11/14/2022   Gait Description (WDL) Exceptions to WDL   Gait Abnormalities Decreased step clearance;Shuffling gait;Trendelenburg;Trunk sway increased       WALKING INDEPENDENCE Initial Assessment Discharge Assessment   Assistive device Gait belt, Walker, rollator Walker, rolling   Ambulation assistance - level surface 4 (Minimal assistance) 5 (Stand-by assistance)   Distance 20 Feet (ft) (limited by bilat knee pain report, particularly left knee pain) 150 Feet (ft) (x2 trials)  270 Feet (ft)   Comments    Pt ambulated [id number] with RW and w/c follow with SBA. During PM session for family training, pt ambulated 270ft with RW and w/c follow and SBA. Pt demo'd increased lateral trunk sway and left trendelenburg. Ambulation assistance - unlevel surface  (NT due to fatigue)  NT       STEPS/STAIRS Initial Assessment Discharge Assessment   Steps/Stairs ambulated NA (Not applicable)  6 4\" steps   Rail Use  (NA)  BHR   Assistance Level   CGA   Comments    Pt negotiated up and down 3 4\" steps x2 trials with BHR and CGA, performing step through pattern.     Curbs/Ramps  (NT as patient does not usually perform)  NT       Therapeutic Exercises:   Seated BLE LAQ, hip flexion, APs, hip add with pillow and abd with green tband 2x15 each    ASSESSMENT:  Pt has made good progress with functional mobility within pt's willingness. Pt refused bed mobility due to sleeping in recliner and refused car txfr training in simulator due to seat being low. LTGs: met 1/5     PLAN:  Pt would benefit from continued skilled physical therapy in order to improve independent functional mobility at home  level. Interventions may include range of motion (AROM, PROM B LE/trunk), motor function (B LE/trunk strengthening/coordination), activity tolerance (vitals, oxygen saturation levels), bed mobility training, balance activities, gait training (progressive ambulation program), and functional transfer training. Discharge Recommendations:  Home Health but pt declined  Further Equipment Recommendations for Discharge:  rolling walker       Activity Tolerance:   fair  Please refer to the flowsheet for vital signs taken during this treatment.   After treatment:   [] Patient left in no apparent distress in bed  [] Patient left in no apparent distress sitting up in chair  [x] Patient left in no apparent distress in w/c mobilizing under own power  [] Patient left in no apparent distress dining area  [] Patient left in no apparent distress mobilizing under own power  [] Call bell left within reach  [] Nursing notified  [] Caregiver present  [] Bed alarm activated   [x] Chair alarm activated      [id number] Completed: done      Render Comfort, PTA  11/14/2022

## 2022-11-14 NOTE — PROGRESS NOTES
Problem: Risk for Spread of Infection  Goal: Prevent transmission of infectious organism to others  Description: Prevent the transmission of infectious organisms to other patients, staff members, and visitors. Outcome: Progressing Towards Goal     Problem: Patient Education:  Go to Education Activity  Goal: Patient/Family Education  Outcome: Progressing Towards Goal     Problem: Falls - Risk of  Goal: *Absence of Falls  Description: Document Viji Nap Fall Risk and appropriate interventions in the flowsheet. Outcome: Progressing Towards Goal  Note: Fall Risk Interventions:  Mobility Interventions: Bed/chair exit alarm, Patient to call before getting OOB    Mentation Interventions: Bed/chair exit alarm, Evaluate medications/consider consulting pharmacy    Medication Interventions: Bed/chair exit alarm, Evaluate medications/consider consulting pharmacy, Patient to call before getting OOB    Elimination Interventions: Bed/chair exit alarm, Call light in reach, Patient to call for help with toileting needs    History of Falls Interventions: Bed/chair exit alarm, Evaluate medications/consider consulting pharmacy         Problem: Patient Education: Go to Patient Education Activity  Goal: Patient/Family Education  Outcome: Progressing Towards Goal     Problem: Pressure Injury - Risk of  Goal: *Prevention of pressure injury  Description: Document Stef Scale and appropriate interventions in the flowsheet.   Outcome: Progressing Towards Goal  Note: Pressure Injury Interventions:  Sensory Interventions: Assess changes in LOC    Moisture Interventions: Absorbent underpads    Activity Interventions: Chair cushion, Increase time out of bed, Pressure redistribution bed/mattress(bed type)    Mobility Interventions: Chair cushion, HOB 30 degrees or less, Pressure redistribution bed/mattress (bed type)    Nutrition Interventions: Document food/fluid/supplement intake    Friction and Shear Interventions: HOB 30 degrees or less Problem: Patient Education: Go to Patient Education Activity  Goal: Patient/Family Education  Outcome: Progressing Towards Goal     Problem: Patient Education: Go to Patient Education Activity  Goal: Patient/Family Education  Outcome: Progressing Towards Goal     Problem: Nutrition Deficit  Goal: *Optimize nutritional status  Outcome: Progressing Towards Goal     Problem: Patient Education: Go to Patient Education Activity  Goal: Patient/Family Education  Outcome: Progressing Towards Goal     Problem: Inpatient Rehab (Adult)  Goal: *LTG: Avoids injury/falls 100% of time related to deficits  Outcome: Progressing Towards Goal  Goal: *LTG: Avoids infection 100% of time related to deficits  Outcome: Progressing Towards Goal  Goal: *LTG: Absence of DVT during hospitalization  Outcome: Progressing Towards Goal  Goal: *LTG: Maintains Skin Integrity With No Evidence of Pressure Injury 100% of Time  Outcome: Progressing Towards Goal  Goal: Interventions  Outcome: Progressing Towards Goal     Problem: Patient Education: Go to Patient Education Activity  Goal: Patient/Family Education  Outcome: Progressing Towards Goal

## 2022-11-14 NOTE — PROGRESS NOTES
Problem: Self Care Deficits Care Plan (Adult)  Goal: *Acute Goals and Plan of Care (Insert Text)  Description: Occupational Therapy Goals   Long Term Goals  Initiated (11/4/2022) and to be accomplished within 3 week(s); by (11/25/2022) (Reassessed for discharge)   1. Pt will perform self-feeding with Mod I. (Goal met 11/14/2022)  2. Pt will perform grooming with Mod I. (Goal met 11/14/2022)  3. Pt will perform UB bathing with set-up/ supv using AE and/ or compensatory strategies as needed. (Goal met 11/14/2022)  4. Pt will perform LB bathing with SBA/ CGA using AE and/ or compensatory strategies as needed. (Goal met 11/14/2022)  5. Pt will perform tub/shower transfer with SBA using LRAD. (Goal met 11/14/2022)  6. Pt will perform UB dressing with set-up. (Goal met 11/14/2022)  7. Pt will perform LB dressing with SBA using AE and/ or compensatory strategies as needed. (Goal met 11/14/2022)  8. Pt will perform toileting task with supv. (Goal met 11/14/2022)  9. Pt will perform toilet transfer with supv using LRAD. (Goal met 11/14/2022)    Short Term Goals   Initiated (11/4/2022) and to be accomplished within 7 day(s), (by 11/11/2022). Reassessed on 11/10/2022. 1. Pt will perform self-feeding with set-up. (Goal met 11/10/2022)  2. Pt will perform grooming with supv. (Goal met 11/10/2022)  3. Pt will perform UB bathing with supv using AE as needed. (Goal met 11/10/2022)  4. Pt will perform LB bathing with Min A using AE and/ or compensatory strategies as needed. (Goal met 11/10/2022)  5. Pt will perform tub/shower transfer with Min A using LRAD. (Goal met 11/10/2022)  6. Pt will perform UB dressing with SBA. (Goal met 11/10/2022)  7. Pt will perform LB dressing with Min A using AE and/ or compensatory strategies as needed. (Goal met 11/10/2022)  8. Pt will perform toileting task with Min A. (Goal met 11/10/2022)  9. Pt will perform toilet transfer with Min A using LRAD.  (Goal met 11/10/2022)     Outcome: Resolved/Met  Goal: Interventions  Outcome: Resolved/Met   OCCUPATIONAL THERAPY DISCHARGE    Patient: Louie Arango [facility]76 y.o. female)  Date: 2022    First Tx Session  Time In: 56  Time Out: 36    Second Tx Session  Time In: 9640 (Caregiver edu session with pt and pt's daughter in attendance)  Time Out: 1400    Primary Diagnosis: UTI (urinary tract infection) [N39.0] <principal problem not specified>    Precautions: Fall, Contact, Aspiration, Safety precautions    Barriers to Learning/Limitations: yes;  physical  Compensate with: visual, verbal, tactile, kinesthetic cues/model     Patient identified with name and : yes    SUBJECTIVE:   Patient stated I feel ready to go home.     OBJECTIVE DATA SUMMARY:     Past Medical History:   Diagnosis Date    Abnormal chest CT 2020    Adopted     Arthritis     Balance problems     Chronic cough 2020    MCBRIDE (dyspnea on exertion) 2019    GERD (gastroesophageal reflux disease)     Gout     Hemochromatosis     High cholesterol     Hypertension     Hypothyroidism     Left knee pain     Left shoulder pain     Low blood potassium     Lumbar pain     Pain of left sacroiliac joint     Shoulder impingement, right, with rotator cuff strain      Past Surgical History:   Procedure Laterality Date    HX CHOLECYSTECTOMY      HX KNEE REPLACEMENT Bilateral     R knee/ Lknee and femur    HX ORTHOPAEDIC      R trigger finger     HX OTHER SURGICAL      Right little finger    HX OTHER SURGICAL      Right wrist, replaced unlnar). HX OTHER SURGICAL      Both knees    HX OTHER SURGICAL      Left femur    HX OTHER SURGICAL      Trigger finger surgery    HX OTHER SURGICAL      Left knee replacment revision     HX ROTATOR CUFF REPAIR Right     HX TONSILLECTOMY       Prior Level of Function/Home Situation: Previous Functional Level: Pre-Morbid Level of Function: Per pt report, patient performed UB ADL's (Mod I); however, received (Min A) for lower body bathing.  Pt taking showers at home using tub transfer bench. Pt reporting that her roommate does most of the cooking. Pt reporting managing her medications and laundry tasks independently. Pt drives herself to grocery store and to appointments. Wheelchair ramp at entrance. DME used at home: raised toilet seat, TTB, walker, rollator, motorized scooter, lift recliner. Home Situation  Home Environment: Private residence  # Steps to Enter: 3  Wheelchair Ramp: Yes  One/Two Story Residence: One story  Living Alone: No  Support Systems: Friend/Neighbor (Jamey Ear (roommate))  Patient Expects to be Discharged to[de-identified] Home  Current DME Used/Available at Home: Lift chair, Raised toilet seat, Transfer bench, Walker, rollator, Walker, rolling, Wheelchair, power  Tub or Shower Type: Tub/Shower combination  [x]     Right hand dominant   []     Left hand dominant    Therapeutic Exercise:  GIN/ KRISTIE RINCON self-range of motion exercises (15 reps x2) for shoulder flexion to ~90 degrees, elbow flexion/ extension, elbow flexion/ extension 'v' pattern, forearm pronation/ supination, rock the baby stretch, shoulder internal/ external rotation, and wrist flexion; use of hand clasped technique. HEP handout provided for carryover of exercises to home environment. Pain:  Pt reports 0/10 pain or discomfort prior to treatment. Pt reports 0/10 pain or discomfort post treatment. No complaints of pain at onset, during, or at conclusion of skilled occupational therapy session.    Problem List:    Decreased strength B UE  [x]     Decreased strength trunk/core  [x]     Decreased AROM   [x]     Decreased PROM  []     Decreased balance sitting  []     Decreased balance standing  [x]     Decreased endurance  [x]     Pain  [x]  Per chart review; history of chronic back pain (has spinal stimulator for pain control)     Functional Limitations:   Decreased independence with ADL  [x]     Decreased independence with functional transfers  [x]     Decreased independence with ambulation  [x]     Decreased independence with IADL  [x]       Outcome Measures:      MMT Initial Assessment   Right Upper Extremity  Left Upper Extremity    UE AROM RUE AROM decreased; right shoulder AROM ~90 degrees (history of arthritis and rotator cuff issues) LUE AROM decreased; right shoulder AROM ~90 degrees (history of arthritis and rotator cuff issues)   Shoulder flexion 2+/5 2+/5   Shoulder extension 2+/5 2+/5   Elbow Flexion 3+/5 3+/5   Elbow Extension 3+/5 3+/5    3+/5 3+/5     MMT Discharge Assessment   Right Upper Extremity  Left Upper Extremity    UE AROM  RUE AROM (decreased) functional; right shoulder AROM ~90 degrees (history of arthritis and rotator cuff issues) LUE AROM (decreased) functional; right shoulder AROM ~90 degrees (history of arthritis and rotator cuff issues)   Shoulder flexion 2+/5 2+/5   Shoulder extension 2+/5 2+/5   Elbow Flexion 4-/5 4-/5   Elbow Extension 4-/5 4-/5    3+/5 3+/5       0/5 No palpable muscle contraction  1/5 Palpable muscle contraction, no joint movement  2-/5 Less than full range of motion in gravity eliminated position  2/5 Able to complete full range of motion in gravity eliminated position  2+/5 Able to initiate movement against gravity  3-/5 More than half but not full range of motion against gravity  3/5 Able to complete full range of motion against gravity  3+/5 Completes full range of motion against gravity with minimal resistance  4-/5 Completes full range of motion against gravity with minimal resistance  4/5 Completes full range of motion against gravity with moderate resistance  5/5 Completes full range of motion against gravity with maximum resistance    Coordination: RUE/ LUE FMC (generally decreased) functional   Sensation: RUE/ LUE appears intact to light touch    FIM SCORES Initial Assessment Discharge Assessment   Eating 5 (set-up/ supv) Functional Level: 6 (Mod I)   Grooming 5 (SBA) Functional Level: 6 (Mod I)    Oral Hygiene FIM: 6 Mod I    Bathing 3 (UB bathing: SBA; LB bathing: Min/ Mod A) Functional Level: 5 (UB bathing: set-up seated on TTB; LB bathing: SBA (use of TTB; ADL shower))  Body Parts Patient Bathed: Abdomen;Arm, left;Arm, right;Buttocks; Chest;Lower leg and foot, left; Lower leg and foot, right;Jennifer area; Thigh, left; Thigh, right  Comments: UB bathing performed (set-up) seated on TTB. LB bathing performed (SBA) using tub bench; pt requiring (SBA) for aspects performed in stance due to wet shower environment e.g. for washing jennifer area and sacrum using washcloth. Use of grab bar for steadying for aspects performed in stance. Verbal cues for use of LH bath sponge to wash distal BLE's from seated position on tub bench. Upper Body Dressing 4 (SBA/ Min A) Functional Level: 5 (set-up/ Mod I)  Items Applied/Steps Completed: Pullover (4 steps)  Comments: Pt doffed/ donned pullover shirt (set-up/ Mod I) seated on tub bench. Lower Body Dressing 3 (Mod A) Functional Level: 5 (set-up/ supv)     Comments: LB dressing performed (set-up/ supv) for donning elastic waist pants and pull-up w/c level; use of grab bar in bathroom for steadying while pulling clothing up over hips to waist. Pt able to thread clothing over both feet. Pt donned knee high compression socks and sneakers (set-up) level using crossed leg technique with increased time. Sock and/or Shoe Management FIM: 5 set-up   Toileting 3 (Mod A) Functional Level: 5 (supervision/ set-up)  Comments: Toileting performed (supv/ set-up) for CM and hygiene; elevated seat over toilet. Tub/Shower Transfer 3 (Stand step xfer (Mod A) using RW; gait belt) Tub/Shower Transfer Score: 5 (Stand step transfer (SBA) using RW (to seated position on TTB))  Comments: Stand step transfer performed (SBA) using RW (to seated position on tub bench).      Toilet Transfer 3 (Stand step xfer (Mod A) using RW; gait belt (to/ from elevated seat over toilet)) Toilet Transfer Score: 5 (Stand step transfer (supv) using RW (to/ from elevated seat over toilet))  Comments: Stand step transfer (supv) using RW (to/ from elevated seat over toilet)    Comprehension 5 Score: 6   Expression 5 Score: 5   Social Interaction 5 Score: 5   Problem Solving 4 Score: 5   Memory 4 Score: 5   Please see [facility] Interdisciplinary Eval: Coordination/Balance Section for details regarding FIM score description. Activity Tolerance:   Good(-); intermittent rest breaks due to fatigue    ASSESSMENT:  Patient improved appropriately and progressed toward goals having achieved 9/9 LTG's and 9/9 STG's during her inpatient rehab stay. Therapist reassessed and updated goals based on pt's current functional ability and demonstrated performance. Pt participated in ADL shower and self-care performance tasks during today's session. UB ADL's performed (set-up/ Mod I); LB ADL's advanced to (supv/ SBA). Toileting progressed to (supervision/ set-up) for CM and hygiene. Functional toilet transfer performed (supv) using RW. Therapist reviewed GIN/ KRISTIE RINCON HEP with handout provided for carryover of exercises to home environment. Second TX session: Caregiver edu session with pt and pt's daughter Eloy Moncada) in attendance. Therapist discussed pt's current level of assistance with ADL's and functional transfers using RW. Patient demonstrated toilet transfer using AD (RW) to/ from elevated seat over toilet and completed self-care toileting at this time. Patient requiring supervision for thoroughness of hygiene. Reviewed AE for improved functional ability with LB ADL's e.g. LH sponge; reacher. Discussed GIN/ KRISTIE RINCON HEP for carryover to home environment. Patient stating that she is declining home health therapy;  is aware.    Progression toward goals:  [x]      Improving appropriately and progressing toward goals  []      Improving slowly and progressing toward goals  []      Not making progress toward goals and plan of care will be adjusted PLAN:  Pt would benefit from continued skilled occupational therapy in order to improve ADL function and IADL with use of least restrictive device. Interventions may include range of motion (AROM, PROM B UE/trunk), motor function (B UE/trunk strengthening/coordination), activity tolerance (vitals, oxygen saturation levels), ADL, balance activities, IADL, and functional transfer training. Discharge Recommendations: Home Health therapy with family assist/ support; pt declining home health therapy  Further Equipment Recommendations for Discharge: bedside commode; tub transfer bench (pt has a TTB at home)  IRF FRITZ completed: yes  Entered Differentiated Treatment minutes: Yes     Please refer to the flow sheet for vital signs taken during this treatment. After treatment:   [x]  Patient left in no apparent distress sitting up in wheelchair with needs met  []  Patient left in no apparent distress in bed  [x]  Call bell left within reach  [x]  Nursing notified  []  Caregiver present  [x]  Wheelchair alarm activated    COMMUNICATION/EDUCATION:   Communication/Collaboration:  [x]      Home safety education was provided and the patient/caregiver indicated understanding. [x]      Patient/family have participated as able and agree with findings and recommendations. []      Patient is unable to participate in plan of care at this time.     Oral [place], [place]  11/14/2022

## 2022-11-14 NOTE — PROGRESS NOTES
Progress Note    Patient: Treasure Ribera MRN: [id number]  CSN: [id number]    YOB: 1945  Age: 76 y.o. Sex: female    DOA: 11/3/2022 LOS:  LOS: 11 days                    Subjective:     Primary rehab diagnosis:    Impaired mobility and ADLs in the setting of UTI with generalized weakness and inability to ambulate    Patient was seen at bedside in recliner in no apparent distress. Patient denies any n/v/d, SOB, chest pain, or headache. Daughter is present at bedside during visit. Patient has done very well in rehab. Looking forward to discharge tomorrow. Patient has no significant medical concerns/complaints. Review of systems  General: No fevers or chills. Cardiovascular: No chest pain or pressure. No palpitations. Pulmonary: No shortness of breath, cough or wheeze. Gastrointestinal: No abdominal pain, nausea, vomiting or diarrhea. Genitourinary: No  dysuria. Musculoskeletal: No joint or muscle pain, no back pain, no recent trauma. Neurologic: generalized weakness    Objective:     Physical Exam:  Visit Vitals  /68 (BP 1 Location: Right upper arm, BP Patient Position: Sitting)   Pulse 64   Temp 98.3 °F (36.8 °C)   Resp 19   Ht 5' 1\" (1.549 m)   Wt 82.3 kg (181 lb 6.4 oz)   SpO2 100%   BMI 34.28 kg/m²        General:         Alert,  no acute distress    HEENT: NC, Atraumatic.   anicteric sclerae. Lungs: CTA Bilaterally. No Wheezing/Rhonchi/Rales. Heart:  Regular  rhythm,  No murmur, No Rubs, No Gallops  Abdomen: Soft, Non distended, Non tender.  +  Extremities: No edema  Psych:   Not anxious or agitated. Neurologic:  CN 2-12 grossly intact, Alert and oriented X 3. No acute neurological                                 Deficits,     Intake and Output:  Current Shift:  No intake/output data recorded.   Last three shifts:  11/12 1901 - 11/14 0700  In: 840 [P.O.:840]  Out: -     Labs: Results:       Chemistry Recent Labs     11/12/22  0525   GLU 88      K 3.6    CO2 28   BUN 32*   CREA 0.82   CA 9.0   AGAP 5   BUCR 39*        CBC w/Diff No results for input(s): WBC, RBC, HGB, HCT, PLT, GRANS, LYMPH, EOS, HGBEXT, HCTEXT, PLTEXT, HGBEXT, HCTEXT, PLTEXT in the last 72 hours. Cardiac Enzymes No results for input(s): CPK, CKND1, REINALDO in the last 72 hours. No lab exists for component: CKRMB, TROIP   Coagulation No results for input(s): PTP, INR, APTT, INREXT, INREXT in the last 72 hours. Lipid Panel Lab Results   Component Value Date/Time    Cholesterol, total 143 05/20/2022 07:26 AM    HDL Cholesterol 73 (H) 05/20/2022 07:26 AM    LDL, calculated 48.6 05/20/2022 07:26 AM    VLDL, calculated 21.4 05/20/2022 07:26 AM    Triglyceride 107 05/20/2022 07:26 AM    CHOL/HDL Ratio 2.0 05/20/2022 07:26 AM      BNP No results for input(s): BNPP in the last 72 hours. Liver Enzymes No results for input(s): TP, ALB, TBIL, AP in the last 72 hours.     No lab exists for component: SGOT, GPT, DBIL   Thyroid Studies Lab Results   Component Value Date/Time    TSH 1.24 11/02/2022 03:36 AM          Procedures/imaging: see electronic medical records for all procedures/Xrays and details which were not copied into this note but were reviewed prior to creation of Plan    Medications:   Current Facility-Administered Medications   Medication Dose Route Frequency    pneumococcal 23-valent (PNEUMOVAX 23) injection 0.5 mL  0.5 mL IntraMUSCular PRIOR TO DISCHARGE    furosemide (LASIX) tablet 20 mg  20 mg Oral DAILY    acetaminophen (TYLENOL) tablet 500 mg  500 mg Oral Q6H PRN    therapeutic multivitamin (THERAGRAN) tablet 1 Tablet  1 Tablet Oral DAILY    albuterol (PROVENTIL VENTOLIN) nebulizer solution 2.5 mg  2.5 mg Nebulization Q4H PRN    apixaban (ELIQUIS) tablet 5 mg  5 mg Oral BID    polyvinyl alcohol-povidon(PF) (REFRESH CLASSIC) 1.4-0.6 % ophthalmic solution 1 Drop  1 Drop Both Eyes DAILY PRN    clopidogreL (PLAVIX) tablet 75 mg  75 mg Oral DAILY    levothyroxine (SYNTHROID) tablet 25 mcg 25 mcg Oral ACB    metoprolol tartrate (LOPRESSOR) tablet 25 mg  25 mg Oral DAILY    nitroglycerin (NITROSTAT) tablet 0.4 mg  0.4 mg SubLINGual Q5MIN PRN    oxyCODONE IR (ROXICODONE) tablet 5 mg  5 mg Oral TID PRN    pantoprazole (PROTONIX) tablet 40 mg  40 mg Oral ACB    rosuvastatin (CRESTOR) tablet 40 mg  40 mg Oral DAILY    docusate sodium (COLACE) capsule 100 mg  100 mg Oral BID    bisacodyL (DULCOLAX) tablet 10 mg  10 mg Oral Q48H PRN       Assessment/Plan     Active Problems:    UTI (urinary tract infection) (11/3/2022)    Impaired mobility and ADLs in the setting of UTI with generalized weakness and inability to ambulate  Pt/ot eval and treat    Recent UTI  Completed therapy, monitor for recurrent symtpoms    HTN, HLD, atrial fibrillation  Continue lopressor, crestor  Anticoagulation with eliquis  and plavix'  monitor cbc    Hypothyroidism  Continue synthroid 25mcg daily    GERD  Continue protonix    Personal Protective Equipment (face mask and eye goggles) was used while interacting with the patient. Patient was using a surgical mask. Patient's progress in rehabilitation and medical issues discussed with the patient. All questions answered to the best of my ability. Care plan discussed with patient and nurse. Total clinical care time is 30 minutes, including review of chart including all labs, radiology, past medical history, and discussion with patient. Greater than 50% of my time was spent in coordination of care and counseling.     Woodrow Feliz NP  11/14/2022 3:57  PM

## 2022-11-14 NOTE — PROGRESS NOTES
Problem: Risk for Spread of Infection  Goal: Prevent transmission of infectious organism to others  Description: Prevent the transmission of infectious organisms to other patients, staff members, and visitors. Outcome: Progressing Towards Goal     Problem: Patient Education:  Go to Education Activity  Goal: Patient/Family Education  Outcome: Progressing Towards Goal     Problem: Falls - Risk of  Goal: *Absence of Falls  Description: Document Chiara Ground Fall Risk and appropriate interventions in the flowsheet. Outcome: Progressing Towards Goal  Note: Fall Risk Interventions:  Mobility Interventions: Bed/chair exit alarm, Communicate number of staff needed for ambulation/transfer    Mentation Interventions: Adequate sleep, hydration, pain control, Bed/chair exit alarm, Door open when patient unattended    Medication Interventions: Assess postural VS orthostatic hypotension, Bed/chair exit alarm, Patient to call before getting OOB    Elimination Interventions: Bed/chair exit alarm    History of Falls Interventions: Bed/chair exit alarm         Problem: Patient Education: Go to Patient Education Activity  Goal: Patient/Family Education  Outcome: Progressing Towards Goal     Problem: Pressure Injury - Risk of  Goal: *Prevention of pressure injury  Description: Document Stef Scale and appropriate interventions in the flowsheet.   Outcome: Progressing Towards Goal  Note: Pressure Injury Interventions:  Sensory Interventions: Assess changes in LOC    Moisture Interventions: Absorbent underpads, Apply protective barrier, creams and emollients, Assess need for specialty bed    Activity Interventions: Assess need for specialty bed    Mobility Interventions: Assess need for specialty bed, Pressure redistribution bed/mattress (bed type)    Nutrition Interventions: Document food/fluid/supplement intake    Friction and Shear Interventions: HOB 30 degrees or less                Problem: Patient Education: Go to Patient Education Activity  Goal: Patient/Family Education  Outcome: Progressing Towards Goal     Problem: Nutrition Deficit  Goal: *Optimize nutritional status  Outcome: Progressing Towards Goal     Problem: Inpatient Rehab (Adult)  Goal: *LTG: Avoids injury/falls 100% of time related to deficits  Outcome: Progressing Towards Goal  Goal: *LTG: Avoids infection 100% of time related to deficits  Outcome: Progressing Towards Goal  Goal: *LTG: Absence of DVT during hospitalization  Outcome: Progressing Towards Goal  Goal: *LTG: Maintains Skin Integrity With No Evidence of Pressure Injury 100% of Time  Outcome: Progressing Towards Goal  Goal: Interventions  Outcome: Progressing Towards Goal     Problem: Patient Education: Go to Patient Education Activity  Goal: Patient/Family Education  Outcome: Progressing Towards Goal

## 2022-11-14 NOTE — PROGRESS NOTES
[x] Psychology  [] Social Work [] Recreational Therapy    INTERVENTION  UNITS/TIME OF SERVICE   Assessment     Supportive Counseling November 11, 2022   Orientation    Discharge Planning    Resource Linkage              Progress/Current Status    Late entry for individual support  and follow up with patient this morning on ARU. She is found lying upright in bed and immediately responsive to me on contact. She is feeling gratified with gains being made in rehabilitation effort, with increasing distances in gait reflective of her progress. On the other hand, she continues to need some reassurance that she has the potential to continue to improve in regard to return to home. She does insist that she wants to regain as much functional independence as possible. She continues to feel well supported by her family and has no doubt that she will be supported on her return to home. At this time, patient is not describing any acute feelings of emotional distress though she acknowledges involuntary tremors, perhaps anxiety, occurring with various therapy directions. She was suggested to take time to try and calm herself, when able to identify that sudden onset anxiety may be causing interference.       Jing Velez, [facility] 11/14/2022 9:36 AM

## 2022-11-14 NOTE — PROGRESS NOTES
[facility]    Bedside and Verbal shift change report given to JOESPH MOYA COSTELLO [facility] RN (oncoming nurse) by Tiffanie Waterman LPN (offgoing nurse). Report included the following information SBAR, Kardex, MAR and Recent Results.     Situation:   Code Status: Full Code   Hospital Day: 11   Problem List:   Hospital Problems  Date Reviewed: 9/22/2022            Codes Class Noted POA    UTI (urinary tract infection) ICD-10-CM: N39.0  ICD-9-CM: 599.0  11/3/2022 Unknown         Background:   Past Medical History:   Past Medical History:   Diagnosis Date    Abnormal chest CT 2/6/2020    Adopted     Arthritis     Balance problems     Chronic cough 2/6/2020    MCBRIDE (dyspnea on exertion) 2/28/2019    GERD (gastroesophageal reflux disease)     Gout     Hemochromatosis     High cholesterol     Hypertension     Hypothyroidism     Left knee pain     Left shoulder pain     Low blood potassium     Lumbar pain     Pain of left sacroiliac joint     Shoulder impingement, right, with rotator cuff strain         Assessment:  Changes in Assessment throughout shift: No change to previous assessment    Patient has a central line: no   Patient has Gonzalez Cath: no   Last Vitals:     Vitals:    11/12/22 2004 11/13/22 0740 11/13/22 1610 11/13/22 2151   BP: (!) 131/56 107/61 (!) 111/59 122/67   Pulse: 68 67 77 65   Resp: 18 18 18 18   Temp: 98 °F (36.7 °C) 97.1 °F (36.2 °C) 98.1 °F (36.7 °C) 97.8 °F (36.6 °C)   SpO2: 99% 97% 99% 95%   Weight:       Height:           PAIN    Pain Assessment    Pain Intensity 1: 0 (11/14/22 0000) Pain Intensity 1: 2 (12/29/14 1105)    Pain Location 1: Foot Pain Location 1: Abdomen    Pain Intervention(s) 1: Medication (see MAR) Pain Intervention(s) 1: Medication (see MAR)  Patient Stated Pain Goal: 0 Patient Stated Pain Goal: 0  Intervention effective: yes  Other actions taken for pain:      Skin Assessment  Skin color Skin Color: Appropriate for ethnicity  Condition/Temperature Skin Condition/Temp: Flaky, Warm  Integrity Skin Integrity: Tear  Turgor    Weekly Pressure Ulcer Documentation  Pressure  Injury Documentation: No Pressure Injury Noted-Pressure Ulcer Prevention Initiated  Wound Prevention & Protection Methods  Orientation of wound Orientation of Wound Prevention: Posterior  Location of Prevention Location of Wound Prevention: Buttocks  Dressing Present Dressing Present : No  Dressing Status    Wound Offloading Wound Offloading (Prevention Methods): Repositioning    INTAKE/OUPUT  Date 11/13/22 0700 - 11/14/22 0659 11/14/22 0700 - 11/15/22 0659   Shift 0700-1859 1900-0659 24 Hour Total 0700-1859 1900-0659 24 Hour Total   INTAKE   P.O. 840  840        P. O. 840  840      Shift Total(mL/kg) 840(10.2)  840(10.2)      OUTPUT   Urine(mL/kg/hr)           Urine Occurrence(s) 2 x 0 x 2 x      Emesis/NG output           Emesis Occurrence(s)  0 x 0 x      Stool           Stool Occurrence(s) 2 x 0 x 2 x      Shift Total(mL/kg)           840      Weight (kg) 82.3 82.3 82.3 82.3 82.3 82.3         Recommendations:  Patient needs and requests: Medication/Toileting    Pending tests/procedures: Labs Pending     Functional Level/Equipment: Partial (one person) / Saratha Elbe; Wheelchair    Fall Precautions:   Fall risk precautions were reinforced with the patient; she was instructed to call for help prior to getting up. The following fall risk precautions were continued: bed/ chair alarms, door signage, yellow bracelet and socks as well as update of the Shanique Embs tool in the patient's room. Taila Score: 3    HEALS Safety Check    A safety check occurred in the patient's room between off going nurse and oncoming nurse listed above.     The safety check included the below items  Area Items   H  High Alert Medications Verify all high alert medication drips (heparin, PCA, etc.)   E  Equipment Suction is set up for ALL patients (with lucinda)  Red plugs utilized for all equipment (IV pumps, etc.)  WOWs wiped down at end of shift.  Room stocked with oxygen, suction, and other unit-specific supplies   A  Alarms Bed alarm is set for fall risk patients  Ensure chair alarm is in place and activated if patient is up in a chair   L  Lines Check IV for any infiltration  Gonzalez bag is empty if patient has a Gonzalez   Tubing and IV bags are labeled   S  Safety   Room is clean, patient is clean, and equipment is clean. Hallways are clear from equipment besides carts. Fall bracelet on for fall risk patients  Ensure room is clear and free of clutter  Suction is set up for ALL patients (with lucinda)  Hallways are clear from equipment besides carts.    Isolation precautions followed, supplies available outside room, sign posted     Juarez Summers LPN

## 2022-11-15 VITALS
HEART RATE: 68 BPM | BODY MASS INDEX: 34.25 KG/M2 | OXYGEN SATURATION: 97 % | TEMPERATURE: 97.9 F | WEIGHT: 181.4 LBS | SYSTOLIC BLOOD PRESSURE: 112 MMHG | RESPIRATION RATE: 19 BRPM | DIASTOLIC BLOOD PRESSURE: 56 MMHG | HEIGHT: 61 IN

## 2022-11-15 PROCEDURE — 74011250637 HC RX REV CODE- 250/637: Performed by: EMERGENCY MEDICINE

## 2022-11-15 PROCEDURE — 74011250637 HC RX REV CODE- 250/637: Performed by: NURSE PRACTITIONER

## 2022-11-15 PROCEDURE — 99239 HOSP IP/OBS DSCHRG MGMT >30: CPT | Performed by: HOSPITALIST

## 2022-11-15 RX ORDER — OXYCODONE HYDROCHLORIDE 5 MG/1
5 TABLET ORAL
Qty: 12 TABLET | Refills: 0 | Status: SHIPPED | OUTPATIENT
Start: 2022-11-15 | End: 2022-11-20

## 2022-11-15 RX ORDER — THERA TABS 400 MCG
1 TAB ORAL DAILY
Qty: 30 TABLET | Refills: 0 | Status: SHIPPED | OUTPATIENT
Start: 2022-11-15

## 2022-11-15 RX ORDER — ACETAMINOPHEN 500 MG
500 TABLET ORAL
Qty: 30 TABLET | Refills: 0 | Status: SHIPPED
Start: 2022-11-15

## 2022-11-15 RX ADMIN — ROSUVASTATIN CALCIUM 40 MG: 20 TABLET, FILM COATED ORAL at 09:39

## 2022-11-15 RX ADMIN — THERA TABS 1 TABLET: TAB at 09:40

## 2022-11-15 RX ADMIN — PANTOPRAZOLE SODIUM 40 MG: 40 TABLET, DELAYED RELEASE ORAL at 07:07

## 2022-11-15 RX ADMIN — FUROSEMIDE 20 MG: 20 TABLET ORAL at 09:39

## 2022-11-15 RX ADMIN — OXYCODONE HYDROCHLORIDE 5 MG: 5 TABLET ORAL at 04:19

## 2022-11-15 RX ADMIN — METOPROLOL TARTRATE 25 MG: 25 TABLET, FILM COATED ORAL at 09:39

## 2022-11-15 RX ADMIN — LEVOTHYROXINE SODIUM 25 MCG: 25 TABLET ORAL at 07:07

## 2022-11-15 RX ADMIN — CLOPIDOGREL BISULFATE 75 MG: 75 TABLET ORAL at 09:39

## 2022-11-15 RX ADMIN — APIXABAN 5 MG: 5 TABLET, FILM COATED ORAL at 09:39

## 2022-11-15 NOTE — PROGRESS NOTES
Problem: Risk for Spread of Infection  Goal: Prevent transmission of infectious organism to others  Description: Prevent the transmission of infectious organisms to other patients, staff members, and visitors. Outcome: Progressing Towards Goal     Problem: Patient Education:  Go to Education Activity  Goal: Patient/Family Education  Outcome: Progressing Towards Goal     Problem: Falls - Risk of  Goal: *Absence of Falls  Description: Document Roberto Carlos Blight Fall Risk and appropriate interventions in the flowsheet. Outcome: Progressing Towards Goal  Note: Fall Risk Interventions:  Mobility Interventions: Bed/chair exit alarm    Mentation Interventions: Bed/chair exit alarm    Medication Interventions: Bed/chair exit alarm    Elimination Interventions: Bed/chair exit alarm, Call light in reach, Patient to call for help with toileting needs    History of Falls Interventions: Bed/chair exit alarm         Problem: Patient Education: Go to Patient Education Activity  Goal: Patient/Family Education  Outcome: Progressing Towards Goal     Problem: Pressure Injury - Risk of  Goal: *Prevention of pressure injury  Description: Document Stef Scale and appropriate interventions in the flowsheet.   Outcome: Progressing Towards Goal  Note: Pressure Injury Interventions:  Sensory Interventions: Assess changes in LOC    Moisture Interventions: Absorbent underpads    Activity Interventions: Chair cushion, Increase time out of bed, Pressure redistribution bed/mattress(bed type)    Mobility Interventions: Chair cushion, HOB 30 degrees or less, Pressure redistribution bed/mattress (bed type)    Nutrition Interventions: Document food/fluid/supplement intake    Friction and Shear Interventions: HOB 30 degrees or less                Problem: Patient Education: Go to Patient Education Activity  Goal: Patient/Family Education  Outcome: Progressing Towards Goal     Problem: Nutrition Deficit  Goal: *Optimize nutritional status  Outcome: Progressing Towards Goal     Problem: Inpatient Rehab (Adult)  Goal: *LTG: Avoids injury/falls 100% of time related to deficits  Outcome: Progressing Towards Goal  Goal: *LTG: Avoids infection 100% of time related to deficits  Outcome: Progressing Towards Goal  Goal: *LTG: Absence of DVT during hospitalization  Outcome: Progressing Towards Goal  Goal: *LTG: Maintains Skin Integrity With No Evidence of Pressure Injury 100% of Time  Outcome: Progressing Towards Goal  Goal: Interventions  Outcome: Progressing Towards Goal     Problem: Patient Education: Go to Patient Education Activity  Goal: Patient/Family Education  Outcome: Progressing Towards Goal

## 2022-11-15 NOTE — DISCHARGE SUMMARY
[address]  [address] [place] [address], [address]     INPATIENT REHABILITATION  DISCHARGE SUMMARY    Name: Benjamin Jorge MRN: [id number]   Age / Sex: 76 y.o. / female CSN: [id number]   YOB: 1945 Length of Stay: 12 days   Admit Date: 11/3/2022 Discharge Date: 11/15/2022       PRIMARY CARE PHYSICIAN: Adriana Maldonado MD      DISCHARGE DIAGNOSES:    Primary Rehabilitation Diagnosis  1. Impaired Mobility and ADLs  2. UTI with generalized weakness and inability to ambulate    Comorbidities  Patient Active Problem List   Diagnosis Code    Shoulder impingement, right, with rotator cuff strain M75.40    Spinal stenosis M48.00    Sacroiliitis ([facility] M46.1    High cholesterol E78.00    Hypertension I10    Hemochromatosis type 1 ([facility] E83.110    Obesity, morbid ([facility] E66.01    MCBRIDE (dyspnea on exertion) R06.09    CAP (community acquired pneumonia) J18.9    Sepsis ([address] A41.9    Hypotension I95.9    Atrial fibrillation with RVR ([facility]) I48.91    Abnormal chest CT R93.89    Chronic cough R05.3    Pulmonary embolism without acute cor pulmonale ([facility]) I26.99    Chronic bronchitis with productive mucopurulent cough ([facility]) J41.1    STEMI (ST elevation myocardial infarction) ([facility] I21.3    History of pulmonary embolism Z86.711    History of DVT (deep vein thrombosis) Z86.718    Chronic diastolic congestive heart failure ([facility]) I50.32    Coronary artery disease of native artery of native heart with stable angina pectoris ([facility]) I25.118    Bilateral leg edema R60.0    Weakness R53.1    Elevated troponin R77.8    Acute UTI N39.0    UTI (urinary tract infection) N39.0       CONSULTS CALLED: None      PROCEDURES DONE: None      BRIEF HISTORY: (from the brief history and physical completed by Dr. Amita Donahue)  This is a 66-year-old female who [facility] and was treated for a UTI.   Patient also has atrial fibrillation and is on Eliquis and has chronic lower extremity edema. Patient was noted to have generalized weakness and inability to ambulate. Patient was evaluated by therapy and it was felt that patient may benefit from inpatient rehab. Patient was transitioned to the inpatient rehab at [facility] where I saw the patient for the first time. When I saw the patient she was sitting in a chair in no apparent distress. Patient denies any chest pain or shortness of breath. No history of any fever chills or cough. No history of any headaches or numbness. Patient complains of some generalized weakness especially in her lower extremities and states that she has difficulty ambulating. No history of any visual changes. No history of any abdominal pain urinary complaints diarrhea or rectal bleeding. Patient has chronic lower extremity swelling. No history of any rash. COURSE IN THE HOSPITAL: Upon admission to the [facility], the patient underwent physical therapy, occupational therapy and speech therapy. The patient was able to actively participate in the rehabilitation activities and progressed well. On discharge, the patient was able to perform the following activities:    1.  Occupational Therapy    ON ADMISSION ON DISCHARGE   Eating  Functional Level: 5 (set-up/ supv)   Eating  Functional Level: 6 (Mod I)     Grooming  Functional Level: 5 (SBA)   Grooming  Functional Level: 6 (Mod I)     Bathing  Functional Level: 3 (UB bathing: SBA; LB bathing: Min/ Mod A)   Bathing  Functional Level: 5 (UB bathing: set-up seated on TTB; LB bathing: SBA (use of TTB; ADL shower))     Upper Body Dressing  Functional Level: 4 (SBA/ Min A)   Upper Body Dressing  Functional Level: 5 (set-up/ Mod I)     Lower Body Dressing  Functional Level: 3 (Mod A)   Lower Body Dressing  Functional Level: 5 (set-up/ supv)     Toileting  Functional Level: 3 (Mod A)   Toileting  Functional Level: 5 (supervision/ set-up)     Toilet Transfers  Toilet Transfer Score: 3 (Stand step xfer (Mod A) using RW; gait belt (to/ from elevated seat over toilet))   Toilet Transfers  Toilet Transfer Score: 5 (Stand step transfer (supv) using RW (to/ from elevated seat over toilet))     Tub /Shower Transfers  Tub/Shower Transfer Score: 3 (Stand step xfer (Mod A) using RW; gait belt)   Tub/Shower Transfers  Tub/Shower Transfer Score: 5 (Stand step transfer (SBA) using RW (to seated position on TTB))       2.  Physical Therapy    ON ADMISSION ON DISCHARGE   Wheelchair Mobility/Management  Able to Propel (ft): 156 feet  Functional Level:  (min/mod A)  Curbs/Ramps Assist Required (FIM Score): 0 (Not tested)  Wheelchair Setup Assist Required : 3 (Moderate assistance)  Wheelchair Management: Manages left brake, Manages right brake (needing assistance) Wheelchair Mobility/Management  Able to Propel (ft): 270 feet  Functional Level: 6  Curbs/Ramps Assist Required (FIM Score): 0 (Not tested)  Wheelchair Setup Assist Required : 5 (Supervision/setup)  Wheelchair Management: Manages left brake, Manages right brake     Gait  Amount of Assistance: 4 (Minimal assistance)  Distance (ft): 20 Feet (ft) (limited by bilat knee pain report, particularly left knee pain)  Assistive Device: Gait belt, Walker, rollator Gait  Amount of Assistance: 5 (Stand-by assistance)  Distance (ft): 270 Feet (ft)  Assistive Device: Walker, rolling     Balance-Sitting/Standing  Sitting - Static: Good (unsupported)  Sitting - Dynamic: Good (unsupported)  Standing - Static: Fair  Standing - Dynamic : Impaired Balance-Sitting/Standing  Sitting - Static: Good (unsupported)  Sitting - Dynamic: Good (unsupported)  Standing - Static: Fair  Standing - Dynamic : Impaired     Bed/Mat Mobility  Rolling Right : 2 (Maximal assistance) (due to pain)  Rolling Left : 2 (Maximal assistance) (due to pain)  Supine to Sit : 3 (Moderate assistance)  Sit to Supine : 3 (Moderate assistance) Bed/Mat Mobility  Rolling Right : 3 (Moderate assistance )  Rolling Left : 3 (Moderate assistance )  Supine to Sit : 3 (Moderate assistance)  Sit to Supine : 2 (Maximal assistance)     Transfers  Transfer Type: Other  Other: stand step  Transfer Assistance : 3 (Moderate assistance )  Sit to Stand Assistance: Moderate assistance  Car Transfers: Moderate assistance (needing assistance to lift legs into car simulator, needing lifting assistance to get into standing)  Car Type: car transfer simulator Transfers  Transfer Type: Other  Other: stand step txfr with RW  Transfer Assistance : 5 (Stand-by assistance)  Sit to Stand Assistance: Stand-by assistance  Car Transfers:  Moderate assistance (needing assistance to lift legs into car simulator, needing lifting assistance to get into standing)  Car Type: car transfer simulator     Steps or Stairs  Steps/Stairs Ambulated (#): 0  Level of Assist : NA (Not applicable)  Rail Use:  (NA) Steps or Stairs  Steps/Stairs Ambulated (#): 0  Level of Assist : NA (Not applicable)  Rail Use:  (NA)       3. Speech and Language Pathology    ON ADMISSION ON DISCHARGE   Comprehension (Native Language)  Primary Mode of Comprehension: Auditory  Score: 5 Comprehension (Native Language)  Primary Mode of Comprehension: Auditory  Score: 6     Expression (Native Language)  Primary Mode of Expression: Verbal  Score: 5   Expression (Native Language)  Primary Mode of Expression: Verbal  Score: 5     Social Interaction/Pragmatics  Score: 5 Social Interaction/Pragmatics  Score: 5     Problem Solving  Score: 4   Problem Solving  Score: 5     Memory  Score: 4 Memory  Score: 5       Legend:   7 - Independent   6 - Modified Independent   5 - Standby Assistance / Supervision / Set-up   4 - Minimum Assistance / Contact Guard Assistance   3 - Moderate Assistance   2 - Maximum Assistance   1 - Total Assistance / Dependent       ACUTE MEDICAL ISSUES ADDRESSED IN INPATIENT REHABILITATION FACILITY:     Impaired mobility and ADLs in the setting of UTI with generalized weakness and inability to ambulate  Pt/ot eval and treat     Recent UTI  Completed therapy, monitor for recurrent symtpoms     HTN, HLD, atrial fibrillation  Continue lopressor, crestor  Anticoagulation with eliquis  and plavix'  monitor cbc     Hypothyroidism  Continue synthroid 25mcg daily     GERD  Continue protonix    MEDICATIONS ON DISCHARGE:    Current Discharge Medication List        START taking these medications    Details   therapeutic multivitamin (THERAGRAN) tablet Take 1 Tablet by mouth daily. Indications: treatment to prevent vitamin deficiency  Qty: 30 Tablet, Refills: 0  Start date: 11/15/2022      !! OTHER Incentive Spirometry - Use as instructed. Qty: 1 Each, Refills: 0  Start date: 11/15/2022      !! OTHER CBC, BMP, and Mg in 4 days. Results to PCP immediately. DX: UTI  Qty: 1 Each, Refills: 0  Start date: 11/15/2022       !! - Potential duplicate medications found. Please discuss with provider. CONTINUE these medications which have CHANGED    Details   acetaminophen (TYLENOL) 500 mg tablet Take 1 Tablet by mouth every six (6) hours as needed for Pain. Qty: 30 Tablet, Refills: 0  Start date: 11/15/2022      oxyCODONE IR (ROXICODONE) 5 mg immediate release tablet Take 1 Tablet by mouth three (3) times daily as needed for Pain for up to 5 days. Max Daily Amount: 15 mg.  Qty: 12 Tablet, Refills: 0  Start date: 11/15/2022, End date: 11/20/2022    Associated Diagnoses: Impingement syndrome of right shoulder           CONTINUE these medications which have NOT CHANGED    Details   carboxymethylcellulose sodium (REFRESH TEARS OP) Administer 1 Drop to both eyes daily as needed for Other.  Dry eyes      pantoprazole (PROTONIX) 40 mg tablet TAKE 1 TABLET BY MOUTH EVERY DAY BEFORE BREAKFAST  Qty: 90 Tablet, Refills: 1      albuterol (PROVENTIL HFA, VENTOLIN HFA, PROAIR HFA) 90 mcg/actuation inhaler INHALE 2 PUFFS BY MOUTH EVERY 4 HOURS AS NEEDED FOR WHEEZE  Qty: 8.5 Each, Refills: 2      apixaban (Eliquis) 5 mg tablet Take 1 Tablet by mouth two (2) times a day. Qty: 180 Tablet, Refills: 1    Associated Diagnoses: Paroxysmal atrial fibrillation (HCC)      clopidogreL (PLAVIX) 75 mg tab Take 1 Tablet by mouth daily. Qty: 90 Tablet, Refills: 1      metoprolol tartrate (LOPRESSOR) 25 mg tablet Take 1 Tablet by mouth daily. Qty: 90 Tablet, Refills: 1      levothyroxine (SYNTHROID) 25 mcg tablet TAKE 1 TABLET BY MOUTH EVERY DAY  Qty: 90 Tablet, Refills: 2    Associated Diagnoses: Acquired hypothyroidism      furosemide (LASIX) 40 mg tablet Take 1 Tablet by mouth daily as needed (Edema). Qty: 90 Tablet, Refills: 1    Associated Diagnoses: Chronic diastolic congestive heart failure (HCC)      rosuvastatin (CRESTOR) 40 mg tablet Take 1 Tablet by mouth daily. Qty: 180 Tablet, Refills: 1    Associated Diagnoses: Hypercholesteremia      peg 400-propylene glycol, PF, (Systane Hydration PF) 0.4-0.3 % dpet ophthalmic solution Administer 2 Drops to both eyes four (4) times daily as needed for Ocular Dryness. Dry eyes      nitroglycerin (NITROSTAT) 0.4 mg SL tablet 1 Tablet by SubLINGual route every five (5) minutes as needed for Chest Pain. Up to 3 doses. Qty: 20 Tablet, Refills: 0      inhalational spacing device (ACE AEROSOL CLOUD ENHANCER) 1 Each by Does Not Apply route as needed.   Qty: 1 Device, Refills: 3    Associated Diagnoses: Dyspnea, unspecified type           STOP taking these medications       Lactoperoxi/Gluc Oxid/Pot Thio (BIOTENE DRY MOUTH MM) Comments:   Reason for Stopping:         Klor-Con M20 20 mEq tablet Comments:   Reason for Stopping:               DISCHARGE VITAL SIGNS:  Visit Vitals  BP (!) 112/56 (BP 1 Location: Right upper arm, BP Patient Position: Sitting)   Pulse 68   Temp 97.9 °F (36.6 °C)   Resp 19   Ht 5' 1\" (1.549 m)   Wt 82.3 kg (181 lb 6.4 oz)   SpO2 97%   BMI 34.28 kg/m²       DISCHARGE PHYSICAL EXAMINATION:  General:         Alert,  no acute distress    HEENT:           NC, Atraumatic.   anicteric sclerae. Lungs:            CTA Bilaterally. No Wheezing/Rhonchi/Rales. Heart:              Regular  rhythm,  No murmur, No Rubs, No Gallops  Abdomen:      Soft, Non distended, Non tender.  +  Extremities:   No edema  Psych:              Not anxious or agitated. Neurologic:     CN 2-12 grossly intact, Alert and oriented X 3. No acute neurological deficits,     CONDITION ON DISCHARGE: Stable. DISPOSITION:  Patient clinically improved and was discharged to home with home health physical therapy, occupational therapy, and skilled nursing. The patient is temporarily homebound secondary to functional deficits due to UTI with generalized weakness and inability to ambulate. The patient can ambulate using rolling walker. The patient would benefit from continued skilled physical therapy to improve independent functional mobility within the home with use of least restrictive device. The patient would also benefit from continued skilled occupational therapy to improve self-care and functional mobility within the home with use of least restrictive device. Short-term skilled nursing is needed for medication reconciliation and disease education. Medical social worker for needs assessment and community resources. FOLLOW-UP RECOMMENDATIONS:   Follow-up Information       Follow up With Specialties Details Why Contact Info    Conrad Kwon MD Internal Medicine Physician Follow up in 7 day(s)  [address]      Poly Galarza NP Nurse Practitioner   [address] [address]  [phone]              [facility]:  1. Diet. Adult Diet Regular; Low Fat/Low Chol/High Fiber/2 gm Na  2. Activity. As tolerated. 3. Safety / fall precautions. TIME SPENT ON DISCHARGE ACTIVITIES: 38 minutes. Signed:  Clyde Hightower NP    11/15/2022  .

## 2022-11-15 NOTE — PROGRESS NOTES
SHIFT CHANGE NOTE FOR [facility]    Bedside and Verbal shift change report given to 405Leo Stacy Quinton (oncoming nurse) by Estuardo John LPN (offgoing nurse). Report included the following information SBAR, Kardex, MAR and Recent Results.     Situation:   Code Status: Full Code   Hospital Day: 12   Problem List:   Hospital Problems  Date Reviewed: 9/22/2022            Codes Class Noted POA    UTI (urinary tract infection) ICD-10-CM: N39.0  ICD-9-CM: 599.0  11/3/2022 Unknown       Background:   Past Medical History:   Past Medical History:   Diagnosis Date    Abnormal chest CT 2/6/2020    Adopted     Arthritis     Balance problems     Chronic cough 2/6/2020    MCBRIDE (dyspnea on exertion) 2/28/2019    GERD (gastroesophageal reflux disease)     Gout     Hemochromatosis     High cholesterol     Hypertension     Hypothyroidism     Left knee pain     Left shoulder pain     Low blood potassium     Lumbar pain     Pain of left sacroiliac joint     Shoulder impingement, right, with rotator cuff strain         Assessment:  Changes in Assessment throughout shift: No change to previous assessment    Patient has a central line: no   Patient has Gonzalez Cath: no   Last Vitals:     Vitals:    11/13/22 2151 11/14/22 0749 11/14/22 1630 11/14/22 2000   BP: 122/67 126/68 130/62 127/63   Pulse: 65 64 68 71   Resp: 18 19 18 17   Temp: 97.8 °F (36.6 °C) 98.3 °F (36.8 °C) 98.1 °F (36.7 °C) 97.7 °F (36.5 °C)   SpO2: 95% 100% 99% 97%   Weight:       Height:           PAIN    Pain Assessment    Pain Intensity 1: 0 (11/15/22 0017) Pain Intensity 1: 2 (12/29/14 1105)    Pain Location 1: Coccyx Pain Location 1: Abdomen    Pain Intervention(s) 1: Medication (see MAR) Pain Intervention(s) 1: Medication (see MAR)  Patient Stated Pain Goal: 0 Patient Stated Pain Goal: 0  Intervention effective: yes  Other actions taken for pain:      Skin Assessment  Skin color Skin Color: Appropriate for ethnicity  Condition/Temperature Skin Condition/Temp: Warm  Integrity Skin Integrity: Intact  Turgor    Weekly Pressure Ulcer Documentation  Pressure  Injury Documentation: Pressure Injury Noted-See Wound LDA to Document  Wound Prevention & Protection Methods  Orientation of wound Orientation of Wound Prevention: Posterior  Location of Prevention Location of Wound Prevention: Sacrum/Coccyx  Dressing Present Dressing Present : No (refused)  Dressing Status    Wound Offloading Wound Offloading (Prevention Methods): Bed, pressure reduction mattress, Repositioning    INTAKE/OUPUT  Date 11/14/22 0700 - 11/15/22 0659 11/15/22 0700 - 11/16/22 0659   Shift 0700-1859 1900-0659 24 Hour Total 0700-1859 1900-0659 24 Hour Total   INTAKE   Shift Total(mL/kg)         OUTPUT   Urine(mL/kg/hr)           Urine Occurrence(s) 1 x 1 x 2 x      Emesis/NG output           Emesis Occurrence(s)  0 x 0 x      Stool           Stool Occurrence(s) 1 x 0 x 1 x      Shift Total(mL/kg)         NET         Weight (kg) 82.3 82.3 82.3 82.3 82.3 82.3         Recommendations:  Patient needs and requests: Medication/Toileting    Pending tests/procedures: Labs Pending     Functional Level/Equipment: Partial (one person) / Bed (comment)    Fall Precautions:   Fall risk precautions were reinforced with the patient; she was instructed to call for help prior to getting up. The following fall risk precautions were continued: bed/ chair alarms, door signage, yellow bracelet and socks as well as update of the Shanique Embs tool in the patient's room. Talia Score: 3    HEALS Safety Check    A safety check occurred in the patient's room between off going nurse and oncoming nurse listed above. The safety check included the below items  Area Items   H  High Alert Medications Verify all high alert medication drips (heparin, PCA, etc.)   E  Equipment Suction is set up for ALL patients (with yanker)  Red plugs utilized for all equipment (IV pumps, etc.)  WOWs wiped down at end of shift.   Room stocked with oxygen, suction, and other unit-specific supplies   A  Alarms Bed alarm is set for fall risk patients  Ensure chair alarm is in place and activated if patient is up in a chair   L  Lines Check IV for any infiltration  Gonzalez bag is empty if patient has a Gonzalez   Tubing and IV bags are labeled   S  Safety   Room is clean, patient is clean, and equipment is clean. Hallways are clear from equipment besides carts. Fall bracelet on for fall risk patients  Ensure room is clear and free of clutter  Suction is set up for ALL patients (with lucinda)  Hallways are clear from equipment besides carts.    Isolation precautions followed, supplies available outside room, sign posted     Tonia Smith LPN

## 2022-11-15 NOTE — DISCHARGE INSTRUCTIONS
------------------------------------------------------------------------------------------------------------    DISCHARGE INSTRUCTIONS    1. Make sure that when you request refills at the pharmacy that the refill requests are sent to your PCP (NOT to the prescriber at the [facility]) to avoid any delays in getting your medication refills. The physician at the [facility] for [address] will not able to order medications or refills after discharge -- Please understand that though we would like to help, it is simply not safe for our physician to order you a medication that we cannot monitor. 2. If any of the prescribed medications require a PRIOR AUTHORIZATION, contact your Primary Care Physician or specialist to EITHER complete prior authorizations and paperwork on your behalf OR prescribe an alternative medication. -- Please understand that though we would like to help, it is simply not safe for our physician to order you a medication that we cannot monitor. 3. If any of your prescriptions medications [facility] [place] [address] needs a refill (and either the dosage, frequency or instructions was not changed), kindly contact your Primary Care Physician for refills. -------------------------------------------------------------------------------------------------------------------        DISCHARGE SUMMARY from Nurse    PATIENT INSTRUCTIONS:    After general anesthesia or intravenous sedation, for 24 hours or while taking prescription Narcotics:  Limit your activities  Do not drive and operate hazardous machinery  Do not make important personal or business decisions  Do  not drink alcoholic beverages  If you have not urinated within 8 hours after discharge, please contact your surgeon on call.     Report the following to your surgeon:  Excessive pain, swelling, redness or odor of or around the surgical area  Temperature over 100.5  Nausea and vomiting lasting longer than 4 hours or if unable to take medications  Any signs of decreased circulation or nerve impairment to extremity: change in color, persistent  numbness, tingling, coldness or increase pain  Any questions    What to do at Home:  Recommended activity: Activity as tolerated, and as directed by your physician. If you experience any of the following symptoms chest pain or difficulty breathing, please follow up with the emergency dept. *  Please give a list of your current medications to your Primary Care Provider. *  Please update this list whenever your medications are discontinued, doses are      changed, or new medications (including over-the-counter products) are added. *  Please carry medication information at all times in case of emergency situations. These are general instructions for a healthy lifestyle:    No smoking/ No tobacco products/ Avoid exposure to second hand smoke  Surgeon General's Warning:  Quitting smoking now greatly reduces serious risk to your health. Obesity, smoking, and sedentary lifestyle greatly increases your risk for illness    A healthy diet, regular physical exercise & weight monitoring are important for maintaining a healthy lifestyle    You may be retaining fluid if you have a history of heart failure or if you experience any of the following symptoms:  Weight gain of 3 pounds or more overnight or 5 pounds in a week, increased swelling in our hands or feet or shortness of breath while lying flat in bed. Please call your doctor as soon as you notice any of these symptoms; do not wait until your next office visit. The discharge information has been reviewed with the patient. The patient verbalized understanding.   Discharge medications reviewed with the patient and appropriate educational materials and side effects teaching were provided. ___________________________________________________________________________________________________________________________________          Patient armband removed and shredded            MyChart Activation    Thank you for requesting access to [employer]. Please follow the instructions below to securely access and download your online medical record. [employer] allows you to send messages to your doctor, view your test results, renew your prescriptions, schedule appointments, and more. How Do I Sign Up? In your internet browser, go to [email or web address]  Click on the First Time User? Click Here link in the Sign In box. You will be redirect to the New Member Sign Up page. Enter your [employer] Access Code exactly as it appears below. You will not need to use this code after youve completed the sign-up process. If you do not sign up before the expiration date, you must request a new code. [employer] Access Code: Activation code not generated  Current [employer] Status: Active (This is the date your [employer] access code will )    Enter the last four digits of your Social Security Number (xxxx) and Date of Birth (mm/dd/yyyy) as indicated and click Submit. You will be taken to the next sign-up page. Create a [employer] ID. This will be your [employer] login ID and cannot be changed, so think of one that is secure and easy to remember. Create a [employer] password. You can change your password at any time. Enter your Password Reset Question and Answer. This can be used at a later time if you forget your password. Enter your e-mail address. You will receive e-mail notification when new information is available in [address] Click Sign Up. You can now view and download portions of your medical record. Click the [place] link to download a portable copy of your medical information.     Additional Information    If you have questions, please visit the Frequently Asked Questions section of the MyChart website at [email or web address] Remember, [email or web address] is NOT to be used for urgent needs. For medical emergencies, dial 911.

## 2022-11-15 NOTE — ROUTINE PROCESS
0800 PT. Awake sitting up in bed no change in assessment pt. Reported to be feeling fine ready for discharge home. 0930 Pt.  Sitting up in chair eating breakfast.

## 2022-11-15 NOTE — PROGRESS NOTES
NP discussed and went over in detail with patient at bedside prior to admission, current, and discharge medications. NP explained what medications to stop, resume, and continue upon discharge. NP discussed and answered questions regarding follow up care/pcp/specialist appointments, and expectations for discharge process. Patient verbalized understanding.      Jurgen Ortega NP  11/15/2022

## 2022-11-15 NOTE — PROGRESS NOTES
[facility]    Bedside and Verbal shift change report given to Jonatan Reno LPN (oncoming nurse) by Tigist Noel RN (offgoing nurse). Report included the following information SBAR, Kardex, MAR and Recent Results.     Situation:   Code Status: Full Code   Hospital Day: 11   Problem List:   Hospital Problems  Date Reviewed: 9/22/2022            Codes Class Noted POA    UTI (urinary tract infection) ICD-10-CM: N39.0  ICD-9-CM: 599.0  11/3/2022 Unknown         Background:   Past Medical History:   Past Medical History:   Diagnosis Date    Abnormal chest CT 2/6/2020    Adopted     Arthritis     Balance problems     Chronic cough 2/6/2020    MCBRIDE (dyspnea on exertion) 2/28/2019    GERD (gastroesophageal reflux disease)     Gout     Hemochromatosis     High cholesterol     Hypertension     Hypothyroidism     Left knee pain     Left shoulder pain     Low blood potassium     Lumbar pain     Pain of left sacroiliac joint     Shoulder impingement, right, with rotator cuff strain         Assessment:  Changes in Assessment throughout shift: No change to previous assessment    Patient has a central line: no   Patient has Gonzalez Cath: no   Last Vitals:     Vitals:    11/13/22 1610 11/13/22 2151 11/14/22 0749 11/14/22 1630   BP: (!) 111/59 122/67 126/68 130/62   Pulse: 77 65 64 68   Resp: 18 18 19 18   Temp: 98.1 °F (36.7 °C) 97.8 °F (36.6 °C) 98.3 °F (36.8 °C) 98.1 °F (36.7 °C)   SpO2: 99% 95% 100% 99%   Weight:       Height:           PAIN    Pain Assessment    Pain Intensity 1: 0 (11/14/22 1606) Pain Intensity 1: 2 (12/29/14 1105)    Pain Location 1: Foot Pain Location 1: Abdomen    Pain Intervention(s) 1: Medication (see MAR) Pain Intervention(s) 1: Medication (see MAR)  Patient Stated Pain Goal: 0 Patient Stated Pain Goal: 0  Intervention effective: yes  Other actions taken for pain:      Skin Assessment  Skin color Skin Color: Appropriate for ethnicity  Condition/Temperature Skin Condition/Temp: Dry, Warm  Integrity Skin Integrity: Intact  Turgor    Weekly Pressure Ulcer Documentation  Pressure  Injury Documentation: No Pressure Injury Noted-Pressure Ulcer Prevention Initiated  Wound Prevention & Protection Methods  Orientation of wound Orientation of Wound Prevention: Posterior  Location of Prevention Location of Wound Prevention: Sacrum/Coccyx  Dressing Present Dressing Present : No  Dressing Status    Wound Offloading Wound Offloading (Prevention Methods): Bed, pressure reduction mattress    INTAKE/OUPUT  Date 11/13/22 1900 - 11/14/22 0659 11/14/22 0700 - 11/15/22 0659   Shift 1867-7099 24 Hour Total 6476-9546 9773-1844 24 Hour Total   INTAKE   P.O.  840        P. O.  840      Shift Total(mL/kg)  840(10.2)      OUTPUT   Urine(mL/kg/hr)          Urine Occurrence(s) 1 x 3 x 1 x  1 x   Emesis/NG output          Emesis Occurrence(s) 0 x 0 x      Stool          Stool Occurrence(s) 0 x 2 x 1 x  1 x   Shift Total(mL/kg)        NET  840      Weight (kg) 82.3 82.3 82.3 82.3 82.3         Recommendations:  Patient needs and requests: Medication/Toileting    Pending tests/procedures: Labs Pending     Functional Level/Equipment: Partial (one person) /      Fall Precautions:   Fall risk precautions were reinforced with the patient; she was instructed to call for help prior to getting up. The following fall risk precautions were continued: bed/ chair alarms, door signage, yellow bracelet and socks as well as update of the [employer] tool in the patient's room. Talia Score: 3    HEALS Safety Check    A safety check occurred in the patient's room between off going nurse and oncoming nurse listed above. The safety check included the below items  Area Items   H  High Alert Medications Verify all high alert medication drips (heparin, PCA, etc.)   E  Equipment Suction is set up for ALL patients (with lucinda)  Red plugs utilized for all equipment (IV pumps, etc.)  WOWs wiped down at end of shift.   Room stocked with oxygen, suction, and other unit-specific supplies   A  Alarms Bed alarm is set for fall risk patients  Ensure chair alarm is in place and activated if patient is up in a chair   L  Lines Check IV for any infiltration  Gonzalez bag is empty if patient has a Gonzalez   Tubing and IV bags are labeled   S  Safety   Room is clean, patient is clean, and equipment is clean. Hallways are clear from equipment besides carts. Fall bracelet on for fall risk patients  Ensure room is clear and free of clutter  Suction is set up for ALL patients (with lucinda)  Hallways are clear from equipment besides carts.    Isolation precautions followed, supplies available outside room, sign posted     Shreya Patel RN

## 2022-11-17 ENCOUNTER — TELEPHONE (OUTPATIENT)
Dept: FAMILY MEDICINE CLINIC | Age: 77
End: 2022-11-17

## 2022-11-18 DIAGNOSIS — E78.00 HYPERCHOLESTEREMIA: ICD-10-CM

## 2022-11-18 RX ORDER — ROSUVASTATIN CALCIUM 40 MG/1
TABLET, COATED ORAL
Qty: 90 TABLET | Refills: 3 | Status: SHIPPED | OUTPATIENT
Start: 2022-11-18

## 2022-11-21 ENCOUNTER — TRANSCRIBE ORDER (OUTPATIENT)
Dept: SCHEDULING | Age: 77
End: 2022-11-21

## 2022-11-21 DIAGNOSIS — R93.89 ABNORMAL RADIOLOGICAL FINDINGS IN SKIN AND SUBCUTANEOUS TISSUE: Primary | ICD-10-CM

## 2022-11-30 PROBLEM — R77.8 ELEVATED TROPONIN: Status: RESOLVED | Noted: 2022-10-31 | Resolved: 2022-11-30

## 2022-11-30 PROBLEM — R79.89 ELEVATED TROPONIN: Status: RESOLVED | Noted: 2022-10-31 | Resolved: 2022-11-30

## 2022-12-02 ENCOUNTER — OFFICE VISIT (OUTPATIENT)
Dept: CARDIOLOGY CLINIC | Age: 77
End: 2022-12-02
Payer: MEDICARE

## 2022-12-02 VITALS
OXYGEN SATURATION: 99 % | HEART RATE: 68 BPM | DIASTOLIC BLOOD PRESSURE: 64 MMHG | WEIGHT: 186 LBS | HEIGHT: 60 IN | BODY MASS INDEX: 36.52 KG/M2 | SYSTOLIC BLOOD PRESSURE: 130 MMHG

## 2022-12-02 DIAGNOSIS — I48.0 PAROXYSMAL ATRIAL FIBRILLATION (HCC): ICD-10-CM

## 2022-12-02 DIAGNOSIS — E78.00 HYPERCHOLESTEREMIA: ICD-10-CM

## 2022-12-02 DIAGNOSIS — I50.32 CHRONIC DIASTOLIC CONGESTIVE HEART FAILURE (HCC): Primary | ICD-10-CM

## 2022-12-02 DIAGNOSIS — I10 ESSENTIAL HYPERTENSION: ICD-10-CM

## 2022-12-02 DIAGNOSIS — I25.118 CORONARY ARTERY DISEASE OF NATIVE ARTERY OF NATIVE HEART WITH STABLE ANGINA PECTORIS (HCC): ICD-10-CM

## 2022-12-02 RX ORDER — ASPIRIN 81 MG/1
81 TABLET ORAL DAILY
Qty: 100 TABLET | Status: SHIPPED | OUTPATIENT
Start: 2022-12-02

## 2022-12-02 RX ORDER — FUROSEMIDE 40 MG/1
40 TABLET ORAL
Qty: 90 TABLET | Refills: 1 | Status: SHIPPED | OUTPATIENT
Start: 2022-12-02

## 2022-12-02 NOTE — PROGRESS NOTES
1. Have you been to the ER, urgent care clinic since your last visit? Hospitalized since your last visit? Yes When: Mv Where: 11/15 Reason for visit: kidney infection    2. Have you seen or consulted any other health care providers outside of the [address] [place] [address] since your last visit? Include any pap smears or colon screening. No     3. Since your last visit, have you had any of the following symptoms?      no    4. Have you had any blood work, X-rays or cardiac testing?      yes/MV      Requested: YES     In [facility]: YES    5. Where do you normally have your labs drawn? 6. Do you need any refills today?

## 2022-12-02 NOTE — PATIENT INSTRUCTIONS
Medications Discontinued During This Encounter   Medication Reason    clopidogreL (PLAVIX) 75 mg tab Therapy Completed    apixaban (Eliquis) 5 mg tablet Therapy Completed    apixaban (Eliquis) 5 mg tablet Therapy Completed    furosemide (LASIX) 40 mg tablet          Avoiding Triggers With Heart Failure: Care Instructions  Your Care Instructions     Triggers are anything that make your heart failure flare up. A flare-up is also called \"sudden heart failure\" or \"acute heart failure. \" When you have a flare-up, fluid builds up in your lungs, and you have problems breathing. You might need to go to the hospital. By watching for changes in your condition and avoiding triggers, you can prevent heart failure flare-ups. Follow-up care is a key part of your treatment and safety. Be sure to make and go to all appointments, and call your doctor if you are having problems. It's also a good idea to know your test results and keep a list of the medicines you take. How can you care for yourself at home? Watch for changes in your weight and condition  Weigh yourself without clothing at the same time each day. Record your weight. Call your doctor if you have sudden weight gain, such as more than 2 to 3 pounds in a day or 5 pounds in a week. (Your doctor may suggest a different range of weight gain.) A sudden weight gain may mean that your heart failure is getting worse. Keep a daily record of your symptoms. Write down any changes in how you feel, such as new shortness of breath, cough, or problems eating. Also record if your ankles are more swollen than usual and if you feel more tired than usual. Note anything that you ate or did that could have triggered these changes. Limit sodium  Sodium causes your body to hold on to extra water. This may cause your heart failure symptoms to get worse. People get most of their sodium from processed foods. Fast food and restaurant meals also tend to be very high in sodium.   Your doctor may suggest that you limit sodium. Your doctor can tell you how much sodium is right for you. This includes limiting sodium in cooked and packaged foods. Read food labels on cans and food packages. They tell you how much sodium you get in one serving. Check the serving size. If you eat more than one serving, you are getting more sodium. Be aware that sodium can come in forms other than salt, including monosodium glutamate (MSG), sodium citrate, and sodium bicarbonate (baking soda). MSG is often added to Asian food. You can sometimes ask for food without MSG or salt. Slowly reducing salt will help you adjust to the taste. Take the salt shaker off the table. Flavor your food with garlic, lemon juice, onion, vinegar, herbs, and spices instead of salt. Do not use soy sauce, steak sauce, onion salt, garlic salt, mustard, or ketchup on your food, unless it is labeled \"low-sodium\" or \"low-salt. \"  Make your own salad dressings, sauces, and ketchup without adding salt. Use fresh or frozen ingredients, instead of canned ones, whenever you can. Choose low-sodium canned goods. Eat less processed food and food from restaurants, including fast food. Exercise as directed  Moderate, regular exercise is very good for your heart. It improves your blood flow and helps control your weight. But too much exercise can stress your heart and cause a heart failure flare-up. Check with your doctor before you start an exercise program.  Walking is an easy way to get exercise. Start out slowly. Gradually increase the length and pace of your walk. Swimming, riding a bike, and using a treadmill are also good forms of exercise. When you exercise, watch for signs that your heart is working too hard. You are pushing yourself too hard if you cannot talk while you are exercising. If you become short of breath or dizzy or have chest pain, stop, sit down, and rest.  Do not exercise when you do not feel well.   Take medicines correctly  Take your medicines exactly as prescribed. Call your doctor if you think you are having a problem with your medicine. Make a list of all the medicines you take. Include those prescribed to you by other doctors and any over-the-counter medicines, vitamins, or supplements you take. Take this list with you when you go to any doctor. Take your medicines at the same time every day. It may help you to post a list of all the medicines you take every day and what time of day you take them. Make taking your medicine as simple as you can. Plan times to take your medicines when you are doing other things, such as eating a meal or getting ready for bed. This will make it easier to remember to take your medicines. Get organized. Use helpful tools, such as daily or weekly pill containers. When should you call for help? Call 911  if you have symptoms of sudden heart failure such as: You have severe trouble breathing. You cough up pink, foamy mucus. You have a new irregular or rapid heartbeat. Call your doctor now or seek immediate medical care if:    You have new or increased shortness of breath. You are dizzy or lightheaded, or you feel like you may faint. You have sudden weight gain, such as more than 2 to 3 pounds in a day or 5 pounds in a week. (Your doctor may suggest a different range of weight gain.)     You have increased swelling in your legs, ankles, or feet. You are suddenly so tired or weak that you cannot do your usual activities. Watch closely for changes in your health, and be sure to contact your doctor if you develop new symptoms. Where can you learn more? Go to http://www.gray.com/  Enter V089 in the search box to learn more about \"Avoiding Triggers With Heart Failure: Care Instructions. \"  Current as of: January 10, 2022               Content Version: 13.4  © 2006-2022 [employer], [place] [employer]e.    Care instructions adapted under license by [employer] (which disclaims liability or warranty for this information). If you have questions about a medical condition or this instruction, always ask your healthcare professional. [address] any warranty or liability for your use of this information.

## 2022-12-02 NOTE — PROGRESS NOTES
HISTORY OF PRESENT ILLNESS  Bryant Dalal is a 76 y.o. female. Follow-up of CAD, CHF, hypertension, hyperlipidemia, status post STEMI and PCI in 7/21. History of DVT and PE     8/2021  Patient presents to follow-up for hospitalization to [facility] 7/30-8/2/2021 for STEMI. Status post RCA stent on 07/30/2021. She has other PMH of hypertension, hyperlipidemia, and acute lower extremity DVT. She is tolerating all medications. She denies chest pain, shortness of breath or palpitations. She continues to have bilateral lower extremity edema. Follow-up  Associated symptoms include shortness of breath. Pertinent negatives include no chest pain and no headaches. Shortness of Breath  The history is provided by the Patient. This is a new problem. The problem occurs intermittently. The current episode started more than 1 week ago. The problem has not changed since onset. Associated symptoms include leg swelling. Pertinent negatives include no fever, no headaches, no cough, no wheezing, no PND, no orthopnea, no chest pain, no vomiting, no rash and no claudication. The problem's precipitants include exercise ( ft; 6/22 limited by knee pain). Leg Swelling  The history is provided by the Patient. This is a new problem. The current episode started more than 2 days ago. The problem occurs every several days. The problem has not changed since onset. Associated symptoms include shortness of breath. Pertinent negatives include no chest pain and no headaches. The symptoms are aggravated by standing. The symptoms are relieved by sleep and medications.    Family History   Adopted: Yes       Past Medical History:   Diagnosis Date    Abnormal chest CT 2/6/2020    Adopted     Arthritis     Balance problems     Chronic cough 2/6/2020    MCBRIDE (dyspnea on exertion) 2/28/2019    GERD (gastroesophageal reflux disease)     Gout     Hemochromatosis     High cholesterol     Hypertension     Hypothyroidism     Left knee pain Left shoulder pain     Low blood potassium     Lumbar pain     Pain of left sacroiliac joint     Shoulder impingement, right, with rotator cuff strain        Past Surgical History:   Procedure Laterality Date    HX CHOLECYSTECTOMY      HX KNEE REPLACEMENT Bilateral     R knee/ Lknee and femur    HX ORTHOPAEDIC      R trigger finger     HX OTHER SURGICAL      Right little finger    HX OTHER SURGICAL      Right wrist, replaced unlnar). HX OTHER SURGICAL      Both knees    HX OTHER SURGICAL      Left femur    HX OTHER SURGICAL      Trigger finger surgery    HX OTHER SURGICAL      Left knee replacment revision     HX ROTATOR CUFF REPAIR Right     HX TONSILLECTOMY         Social History     Tobacco Use    Smoking status: Former     Packs/day: 0.50     Years: 2.00     Pack years: 1.00     Types: Cigarettes     Start date: 5     Quit date:      Years since quittin.9    Smokeless tobacco: Never    Tobacco comments:     quit 20 years ago   Substance Use Topics    Alcohol use: No       Allergies   Allergen Reactions    Ciprofloxacin Other (comments)     Achilles tendonitis/leg pain and swelling    Indomethacin Anaphylaxis, Hives and Swelling    Tomato Other (comments)     Heart burn       Prior to Admission medications    Medication Sig Start Date End Date Taking? Authorizing Provider   rosuvastatin (CRESTOR) 40 mg tablet TAKE 1 TABLET BY MOUTH EVERY DAY 22  Yes Herrera Milian MD   therapeutic multivitamin [facility]) tablet Take 1 Tablet by mouth daily. Indications: treatment to prevent vitamin deficiency 11/15/22  Yes Carrie Arambula NP   OTHER Incentive Spirometry - Use as instructed. 11/15/22  Yes Chiara NINO NP   acetaminophen (TYLENOL) 500 mg tablet Take 1 Tablet by mouth every six (6) hours as needed for Pain.  11/15/22  Yes Hellen Arambula NP   pantoprazole (PROTONIX) 40 mg tablet TAKE 1 TABLET BY MOUTH EVERY DAY BEFORE BREAKFAST 22  Yes Kunal Jones NP albuterol (PROVENTIL HFA, VENTOLIN HFA, PROAIR HFA) 90 mcg/actuation inhaler INHALE 2 PUFFS BY MOUTH EVERY 4 HOURS AS NEEDED FOR WHEEZE 8/25/22  Yes Alyssa POST NP   apixaban (Eliquis) 5 mg tablet Take 1 Tablet by mouth two (2) times a day. 8/23/22  Yes Glenn Pickett NP   clopidogreL (PLAVIX) 75 mg tab Take 1 Tablet by mouth daily. 8/23/22  Yes Glenn Pickett NP   metoprolol tartrate (LOPRESSOR) 25 mg tablet Take 1 Tablet by mouth daily. 8/23/22  Yes Johan Wasserman MD   levothyroxine (SYNTHROID) 25 mcg tablet TAKE 1 TABLET BY MOUTH EVERY DAY 4/20/22  Yes Gabrielle Polanco NP   furosemide (LASIX) 40 mg tablet Take 1 Tablet by mouth daily as needed (Edema). 1/7/22  Yes Ashley Banuelos NP   carboxymethylcellulose sodium (REFRESH TEARS OP) Administer 1 Drop to both eyes daily as needed for Other. Dry eyes   Yes Provider, Historical   peg 400-propylene glycol, PF, (Systane Hydration PF) 0.4-0.3 % dpet ophthalmic solution Administer 2 Drops to both eyes four (4) times daily as needed for Ocular Dryness. Dry eyes   Yes Provider, Historical   nitroglycerin (NITROSTAT) 0.4 mg SL tablet 1 Tablet by SubLINGual route every five (5) minutes as needed for Chest Pain. Up to 3 doses. 8/2/21  Yes Fish Hogan MD   inhalational spacing device (ACE AEROSOL CLOUD ENHANCER) 1 Each by Does Not Apply route as needed. 2/14/19  Yes Tiffanie Moreland MD   OTHER CBC, BMP, and Mg in 4 days. Results to PCP immediately. DX: UTI 11/15/22   Shayy NINO NP         Visit Vitals  /64 (BP 1 Location: Left upper arm, BP Patient Position: Sitting)   Pulse 68   Ht 5' (1.524 m)   Wt 84.4 kg (186 lb)   SpO2 99%   BMI 36.33 kg/m²       Review of Systems   Constitutional:  Negative for chills, fever, malaise/fatigue and weight loss. HENT:  Negative for congestion and nosebleeds. Eyes:  Negative for double vision, discharge and redness. Respiratory:  Positive for shortness of breath. Negative for cough and wheezing. Cardiovascular:  Positive for leg swelling. Negative for chest pain, palpitations, orthopnea, claudication and PND. Gastrointestinal:  Negative for diarrhea, nausea and vomiting. Genitourinary:  Negative for dysuria and hematuria. Musculoskeletal:  Negative for falls and joint pain. Skin:  Negative for rash. Neurological:  Negative for dizziness, seizures, loss of consciousness and headaches. Endo/Heme/Allergies:  Negative for polydipsia. Does not bruise/bleed easily. Psychiatric/Behavioral:  Negative for depression and substance abuse. The patient does not have insomnia. Physical Exam  Vitals and nursing note reviewed. Constitutional:       Appearance: She is well-developed. She is obese. Neck:      Vascular: Hepatojugular reflux present. No JVD. Cardiovascular:      Rate and Rhythm: Normal rate and regular rhythm. Pulses: Intact distal pulses. Heart sounds: Normal heart sounds. No murmur heard. No friction rub. No gallop. Pulmonary:      Effort: Pulmonary effort is normal. No respiratory distress. Breath sounds: Normal breath sounds. No wheezing or rales. Chest:      Chest wall: No tenderness. Abdominal:      General: There is no distension. Palpations: Abdomen is soft. There is no mass. Tenderness: There is no abdominal tenderness. Musculoskeletal:         General: Normal range of motion. Right lower leg: Edema (1-2) present. Left lower leg: Edema (1-2) present. Skin:     General: Skin is warm and dry. Neurological:      Mental Status: She is alert and oriented to person, place, and time. 7/30/2021  Indications    ST elevation myocardial infarction (STEMI) involving other coronary artery of inferior wall (HCC) [I21.19 (ICD-10-CM)]   Conclusion       Single-vessel coronary artery disease with totally occluded proximal right coronary artery.   Successful thrombectomy of the right coronary artery due to extensive thrombus burden. Successful PCI of the proximal right coronary artery with residual 0% stenosis deploying a drug-eluting stent as above. Delaying factors in this patient were multiple trials at peripheral IV in the ER and only when IV was available. We successfully cannulated the right femoral vein in addition to right femoral artery without any complications in the lab for venous access. The wiring of the artery was challenging because of the possible  as described above. Due to extensive thrombus in the artery, thrombectomy was used. Aggressive risk factor med modification and medical treatment should continue. Further work-up of this chronic severe edema which may be related to bilateral DVTs in the past and chronic CHF.   7/21 echo  Interpretation Summary    Image quality for this study was technically difficult with poor image quality. Contrast used: DEFINITY. LV: Calculated LVEF is 50%. Visually measured ejection fraction. Normal cavity size and wall thickness. Low normal systolic function. Inconclusive left ventricular diastolic function. Ejection fraction may be underestimated due to poor image quality. Comparison Study Information      Prior Study    There is a prior study available for comparison. Prior study date: 7/23/2019. As compared to the previous study, there are significant changes. Pulmonary hypertension has decreased. ASSESSMENT and PLAN    Results for Sridhar Jennings (MRN [id number]) as of 6/3/2022 13:10   Ref.  Range 11/8/2021 08:21 12/9/2021 10:49 4/29/2022 11:46 5/17/2022 15:44 5/20/2022 07:26   Triglyceride Latest Ref Range: <150 MG/ (H)   134 107   Cholesterol, total Latest Ref Range: <200 MG/   149 143   HDL Cholesterol Latest Ref Range: 40 - 60 MG/DL 59   63 (H) 73 (H)   CHOL/HDL Ratio Latest Ref Range: 0 - 5.0   3.2   2.4 2.0   VLDL, calculated Latest Units: MG/DL 44   26.8 21.4   LDL, calculated Latest Ref Range: 0 - 100 MG/DL 83   59.2 48.6 8/2021  Recent STEMI. Cardiac catheterization and echocardiogram reviewed with patient EF 50% by echo. Continue triple therapy with Eliquis, aspirin, and Plavix due to FARHAT. After 1 month plan to discontinue aspirin. Continued on DVT dose Eliquis. Continue PPI due to need for triple therapy. Referral entered for cardiac rehab. Will begin low-dose Lasix due to bilateral lower extremity edema. Repeat BMP in 1 week. 9/24/2021 CAD stable. Not going to rehab due to back pain. Encouraged to walk by self. AFib staying in sinus rhythm. Blood pressure is controlled. CHF is improving as she walks better but still has NYHA class III. Increase Lasix and follow-up electrolytes. Diet weight and exercise discussed. Mediterranean diet guidelines printed. 12/3/2021 CHF is much better but she is still in NYHA class III clinically which is likely due to deconditioning and leg problems. Recommended and encouraged to exercise little more regularly and may need to get a stationary bike if possible. A. fib is staying in sinus rhythm clinically continue anticoagulation. CAD stable. Blood pressure is controlled. Lipids have gone back up. Increase Lipitor and follow the labs. 6/3/2022 CHF compensated. NYHA 2-3. Limited by arthritis. Staying in sinus rhythm. Continue anticoagulation. CAD is stable. Lipids are excellent now. Diet weight and exercise discussed and she is losing weight well gradually. Diagnoses and all orders for this visit:    1. Chronic diastolic congestive heart failure (HCC)  -     furosemide (LASIX) 40 mg tablet; Take 1 Tablet by mouth two (2) times daily as needed (Edema). 2. Coronary artery disease of native artery of native heart with stable angina pectoris (HCC)    3. Paroxysmal atrial fibrillation ([address]    4. Hypercholesteremia    5. Essential hypertension    Other orders  -     aspirin delayed-release 81 mg tablet; Take 1 Tablet by mouth daily.         Pertinent laboratory and test data reviewed and discussed with patient. See patient instructions also for other medical advice given    Medications Discontinued During This Encounter   Medication Reason    clopidogreL (PLAVIX) 75 mg tab Therapy Completed    apixaban (Eliquis) 5 mg tablet Therapy Completed    apixaban (Eliquis) 5 mg tablet Therapy Completed    furosemide (LASIX) 40 mg tablet        Follow-up and Dispositions    Return in about 3 months (around 3/2/2023), or if symptoms worsen or fail to improve, for with ekg. 12/2/2022 CHF is compensated but she has significant edema today which is dependent. Increase Lasix as needed watching the blood pressure closely. CAD is stable. Discontinue clopidogrel. Researched her records thoroughly and did not find any evidence of documented atrial fibrillation. It was mentioned in July 2019 that it was seen intermittently. Not sure as there are no scanned records on that time. Will discontinue Eliquis. Aspirin will be started. Blood pressure is controlled. Lipids are controlled.

## 2022-12-03 PROBLEM — N39.0 UTI (URINARY TRACT INFECTION): Status: RESOLVED | Noted: 2022-11-03 | Resolved: 2022-12-03

## 2022-12-09 ENCOUNTER — HOSPITAL ENCOUNTER (OUTPATIENT)
Dept: CT IMAGING | Age: 77
End: 2022-12-09
Attending: PHYSICIAN ASSISTANT
Payer: MEDICARE

## 2022-12-09 DIAGNOSIS — R93.89 ABNORMAL RADIOLOGICAL FINDINGS IN SKIN AND SUBCUTANEOUS TISSUE: ICD-10-CM

## 2022-12-09 LAB — CREAT UR-MCNC: 1.1 MG/DL (ref 0.6–1.3)

## 2022-12-09 PROCEDURE — 74170 CT ABD WO CNTRST FLWD CNTRST: CPT

## 2022-12-09 PROCEDURE — 74011000636 HC RX REV CODE- 636

## 2022-12-09 PROCEDURE — 82565 ASSAY OF CREATININE: CPT

## 2022-12-09 RX ADMIN — IOPAMIDOL 78 ML: 755 INJECTION, SOLUTION INTRAVENOUS at 11:48

## 2023-01-05 ENCOUNTER — OFFICE VISIT (OUTPATIENT)
Dept: VASCULAR SURGERY | Age: 78
End: 2023-01-05
Payer: MEDICARE

## 2023-01-05 VITALS
DIASTOLIC BLOOD PRESSURE: 68 MMHG | BODY MASS INDEX: 36.53 KG/M2 | HEIGHT: 60 IN | SYSTOLIC BLOOD PRESSURE: 124 MMHG | HEART RATE: 88 BPM | OXYGEN SATURATION: 98 % | WEIGHT: 186.07 LBS

## 2023-01-05 DIAGNOSIS — I82.4Y3 DEEP VEIN THROMBOSIS (DVT) OF PROXIMAL VEIN OF BOTH LOWER EXTREMITIES, UNSPECIFIED CHRONICITY (HCC): ICD-10-CM

## 2023-01-05 DIAGNOSIS — R60.0 BILATERAL LEG EDEMA: Primary | ICD-10-CM

## 2023-01-05 DIAGNOSIS — I89.0 LYMPHEDEMA: ICD-10-CM

## 2023-01-05 DIAGNOSIS — E66.01 SEVERE OBESITY (BMI 35.0-39.9) WITH COMORBIDITY (HCC): ICD-10-CM

## 2023-01-05 PROCEDURE — 1123F ACP DISCUSS/DSCN MKR DOCD: CPT | Performed by: PHYSICIAN ASSISTANT

## 2023-01-05 PROCEDURE — G8536 NO DOC ELDER MAL SCRN: HCPCS | Performed by: PHYSICIAN ASSISTANT

## 2023-01-05 PROCEDURE — G8432 DEP SCR NOT DOC, RNG: HCPCS | Performed by: PHYSICIAN ASSISTANT

## 2023-01-05 PROCEDURE — 1101F PT FALLS ASSESS-DOCD LE1/YR: CPT | Performed by: PHYSICIAN ASSISTANT

## 2023-01-05 PROCEDURE — 99214 OFFICE O/P EST MOD 30 MIN: CPT | Performed by: PHYSICIAN ASSISTANT

## 2023-01-05 PROCEDURE — G8417 CALC BMI ABV UP PARAM F/U: HCPCS | Performed by: PHYSICIAN ASSISTANT

## 2023-01-05 PROCEDURE — G8427 DOCREV CUR MEDS BY ELIG CLIN: HCPCS | Performed by: PHYSICIAN ASSISTANT

## 2023-01-05 PROCEDURE — 3074F SYST BP LT 130 MM HG: CPT | Performed by: PHYSICIAN ASSISTANT

## 2023-01-05 PROCEDURE — 1090F PRES/ABSN URINE INCON ASSESS: CPT | Performed by: PHYSICIAN ASSISTANT

## 2023-01-05 PROCEDURE — G8400 PT W/DXA NO RESULTS DOC: HCPCS | Performed by: PHYSICIAN ASSISTANT

## 2023-01-05 PROCEDURE — 3078F DIAST BP <80 MM HG: CPT | Performed by: PHYSICIAN ASSISTANT

## 2023-01-05 NOTE — PROGRESS NOTES
1. Have you been to the ER, urgent care clinic since your last visit? Yes/ [facility]/ oct      Hospitalized since your last visit? Yes from 10/31/22-11/3/22     2. Have you seen or consulted any other health care providers outside of the [address] [place] [address] since your last visit? Include any pap smears or colon screening.   No

## 2023-01-05 NOTE — PROGRESS NOTES
01/05/23        Valerio Reeder        History and Physical    Eugene Strong is a 76 y.o. female The primary encounter diagnosis was Bilateral leg edema. Diagnoses of Severe obesity (BMI 35.0-39. 9) with comorbidity ([address] Lymphedema, and Deep vein thrombosis (DVT) of proximal vein of both lower extremities, unspecified chronicity ([address] were also pertinent to this visit. Samir Pisano returns today for reevaluation for her known bilateral lower extremity venous insufficiency, lymphedema, with an abdominal component. Patient states that she has done extremely well since last visit. States that her legs are feeling so much better and that she has much better control of her lower extremity edema. Still presently does get edema when her legs are dangling too long, but states that she tries to get her legs above the level of her heart is much as possible. She states that it is difficult because she does not have the right modality, was hoping that she can talk about getting a hospital bed for assistance. Patient states that she continues to wear compression stockings as recommended and she has no noted ulcerations on this visit. Patient continues to utilize her pneumatic compression device given to her by tactile and states that she feels this is made a huge difference and has been able to be calm more mobile since. Overall the patient feels that she is in better shape on this visit and states that she is willing to continue with this modality of treatment. She is very appreciative of our services. Presently she denies any ulceration skin rashes or skin lesions in either lower extremity. Continues to utilize compression stockings as recommended. Continues to use her 1 hour a day treatment for lymphedema. Continues to take her daily meds as recommended.     Physical Exam:    Visit Vitals  /68 (BP 1 Location: Right arm, BP Patient Position: Sitting)   Pulse 88   Ht 5' (1.524 m)   Wt 186 lb 1.1 oz (84.4 kg)   SpO2 98%   BMI 36.34 kg/m²   General -alert and oriented x3. Came into the room via wheelchair and walker. HEENT-PERRLA exactly movements intact, no scleral icterus, mucous membranes pink and moist  Neck-no JVD, no carotid bruits  Heart-regular rate and rhythm no murmurs, rubs or gallops  Chest-clear to auscultation bilaterally no wheezes, rhonchi or rubs  Abdomen-soft, nontender, no palpable organomegaly or masses, no palpable pulsatile masses  Extremities-no clubbing, cyanosis. Some mild bilateral pedal edema, but decreased since last visit. . No wounds or gangrenous changes to the toes or feet. Skin is intact. Feet are pink warm and well-perfused bilaterally  Pulses-carotid, radial, brachial, femoral 2+ bilaterally. Bilateral doppler signals dorsalis pedis, posterior tibial       Past Medical History:   Diagnosis Date    Abnormal chest CT 2/6/2020    Adopted     Arthritis     Balance problems     Chronic cough 2/6/2020    MCBRIDE (dyspnea on exertion) 2/28/2019    GERD (gastroesophageal reflux disease)     Gout     Hemochromatosis     High cholesterol     Hypertension     Hypothyroidism     Left knee pain     Left shoulder pain     Low blood potassium     Lumbar pain     Pain of left sacroiliac joint     Shoulder impingement, right, with rotator cuff strain      Past Surgical History:   Procedure Laterality Date    HX CHOLECYSTECTOMY      HX KNEE REPLACEMENT Bilateral     R knee/ Lknee and femur    HX ORTHOPAEDIC      R trigger finger     HX OTHER SURGICAL      Right little finger    HX OTHER SURGICAL      Right wrist, replaced unlnar).     HX OTHER SURGICAL      Both knees    HX OTHER SURGICAL      Left femur    HX OTHER SURGICAL      Trigger finger surgery    HX OTHER SURGICAL      Left knee replacment revision     HX ROTATOR CUFF REPAIR Right     HX TONSILLECTOMY       Patient Active Problem List   Diagnosis Code    Shoulder impingement, right, with rotator cuff strain M75.40    Spinal stenosis M48.00 Sacroiliitis ([facility]) M46.1    High cholesterol E78.00    Hypertension I10    Hemochromatosis type 1 ([facility] E83.110    Obesity, morbid ([facility]) E66.01    MCBRIDE (dyspnea on exertion) R06.09    CAP (community acquired pneumonia) J18.9    Sepsis ([facility] A41.9    Hypotension I95.9    Atrial fibrillation with RVR ([facility]) I48.91    Abnormal chest CT R93.89    Chronic cough R05.3    Pulmonary embolism without acute cor pulmonale ([facility]) I26.99    Chronic bronchitis with productive mucopurulent cough ([facility]) J41.1    STEMI (ST elevation myocardial infarction) ([facility]) I21.3    History of pulmonary embolism Z86.711    History of DVT (deep vein thrombosis) Z86.718    Chronic diastolic congestive heart failure ([facility]) I50.32    Coronary artery disease of native artery of native heart with stable angina pectoris ([facility]) I25.118    Bilateral leg edema R60.0    Weakness R53.1    Elevated troponin R77.8    Acute UTI N39.0    UTI (urinary tract infection) N39.0     Current Outpatient Medications   Medication Sig Dispense Refill    furosemide (LASIX) 40 mg tablet Take 1 Tablet by mouth two (2) times daily as needed (Edema). 90 Tablet 1    aspirin delayed-release 81 mg tablet Take 1 Tablet by mouth daily. 100 Tablet PRN    rosuvastatin (CRESTOR) 40 mg tablet TAKE 1 TABLET BY MOUTH EVERY DAY 90 Tablet 3    therapeutic multivitamin (THERAGRAN) tablet Take 1 Tablet by mouth daily. Indications: treatment to prevent vitamin deficiency 30 Tablet 0    OTHER Incentive Spirometry - Use as instructed. 1 Each 0    OTHER CBC, BMP, and Mg in 4 days. Results to PCP immediately. DX: UTI 1 Each 0    acetaminophen (TYLENOL) 500 mg tablet Take 1 Tablet by mouth every six (6) hours as needed for Pain.  30 Tablet 0    pantoprazole (PROTONIX) 40 mg tablet TAKE 1 TABLET BY MOUTH EVERY DAY BEFORE BREAKFAST 90 Tablet 1    albuterol (PROVENTIL HFA, VENTOLIN HFA, PROAIR HFA) 90 mcg/actuation inhaler INHALE 2 PUFFS BY MOUTH EVERY 4 HOURS AS NEEDED FOR WHEEZE 8.5 Each 2    metoprolol tartrate (LOPRESSOR) 25 mg tablet Take 1 Tablet by mouth daily. 90 Tablet 1    levothyroxine (SYNTHROID) 25 mcg tablet TAKE 1 TABLET BY MOUTH EVERY DAY 90 Tablet 2    carboxymethylcellulose sodium (REFRESH TEARS OP) Administer 1 Drop to both eyes daily as needed for Other. Dry eyes      peg 400-propylene glycol, PF, (Systane Hydration PF) 0.4-0.3 % dpet ophthalmic solution Administer 2 Drops to both eyes four (4) times daily as needed for Ocular Dryness. Dry eyes      nitroglycerin (NITROSTAT) 0.4 mg SL tablet 1 Tablet by SubLINGual route every five (5) minutes as needed for Chest Pain. Up to 3 doses. 20 Tablet 0    inhalational spacing device (ACE AEROSOL CLOUD ENHANCER) 1 Each by Does Not Apply route as needed.  1 Device 3     Allergies   Allergen Reactions    Ciprofloxacin Other (comments)     Achilles tendonitis/leg pain and swelling    Indomethacin Anaphylaxis, Hives and Swelling    Tomato Other (comments)     Heart burn     Social History     Socioeconomic History    Marital status: SINGLE     Spouse name: Not on file    Number of children: Not on file    Years of education: Not on file    Highest education level: Not on file   Occupational History    Not on file   Tobacco Use    Smoking status: Former     Packs/day: 0.50     Years: 2.00     Pack years: 1.00     Types: Cigarettes     Start date: 5     Quit date: 65     Years since quittin.0    Smokeless tobacco: Never    Tobacco comments:     quit 20 years ago   Vaping Use    Vaping Use: Never used   Substance and Sexual Activity    Alcohol use: No    Drug use: No    Sexual activity: Not on file     Comment: post menopausal   Other Topics Concern    Not on file   Social History Narrative    Not on file     Social Determinants of Health     Financial Resource Strain: Not on file   Food Insecurity: Not on file   Transportation Needs: Not on file   Physical Activity: Not on file   Stress: Not on file   Social Connections: Not on file   Intimate Partner Violence: Not on file   Housing Stability: Not on file     Family History   Adopted: Yes       Impression and Plan:  Arian Shah is a 76 y.o. female with lateral lower extremity venous insufficiency, lymphedema, with abdominal component. Patient instructed to continue present Rx -maintain daily meds as ordered including aspirin and statin medication  Patient applauded for being compliant with recommendations of compression elevation, and being compliant with lymphedema treatment. Patient encouraged to continue to utilize compression elevation in the face of her disease. Patient given a prescription for hospital bed, states that she would benefit from having the ability to lay flat and have her legs elevated above the level of her heart. Patient reports that she tries to do so on the couch but is very difficult to achieve. She feels she would be able to achieve this type of modality if she had an hospital bed. In the meantime patient encouraged to continue to make better lifestyle choices, better nutritional choices, increasing her daily activity as tolerated, and of course smoking cessation. Follow-up in 1 month with repeat visit just to check her legs to see if there is any further ulcerations or symptoms. Patient states she agrees but if she has no symptoms she will cancel her appointment. I will discuss details with my attending. We reviewed the plan with the patient and the patient understands. Follow-up and Dispositions    Return in about 1 year (around 1/5/2024).          Javi South PA-C

## 2023-02-14 DIAGNOSIS — I48.0 PAROXYSMAL ATRIAL FIBRILLATION (HCC): ICD-10-CM

## 2023-02-14 RX ORDER — APIXABAN 5 MG/1
TABLET, FILM COATED ORAL
Qty: 180 TABLET | Refills: 1 | Status: SHIPPED | OUTPATIENT
Start: 2023-02-14

## 2023-02-14 RX ORDER — CLOPIDOGREL BISULFATE 75 MG/1
TABLET ORAL
Qty: 90 TABLET | Refills: 1 | Status: SHIPPED | OUTPATIENT
Start: 2023-02-14

## 2023-02-20 NOTE — TELEPHONE ENCOUNTER
Pt states the pharmacy told her she needed authorization to be able to  her nebulizer albuterol. Please advise [phone].
Spoke with [facility], they can fill the refill on the Albuterol.  Will be ready in 1 hr. Pt advised
Please follow up with your primary care physician regarding your hospitalization and for further monitoring/management.

## 2023-02-28 DIAGNOSIS — I50.32 CHRONIC DIASTOLIC (CONGESTIVE) HEART FAILURE (HCC): ICD-10-CM

## 2023-02-28 RX ORDER — FUROSEMIDE 40 MG/1
TABLET ORAL
Qty: 180 TABLET | Refills: 1 | Status: SHIPPED | OUTPATIENT
Start: 2023-02-28

## 2023-03-08 ENCOUNTER — OFFICE VISIT (OUTPATIENT)
Age: 78
End: 2023-03-08
Payer: MEDICARE

## 2023-03-08 VITALS
HEART RATE: 60 BPM | DIASTOLIC BLOOD PRESSURE: 65 MMHG | BODY MASS INDEX: 36.12 KG/M2 | SYSTOLIC BLOOD PRESSURE: 129 MMHG | OXYGEN SATURATION: 98 % | HEIGHT: 60 IN | WEIGHT: 184 LBS

## 2023-03-08 DIAGNOSIS — Z86.718 PERSONAL HISTORY OF OTHER VENOUS THROMBOSIS AND EMBOLISM: ICD-10-CM

## 2023-03-08 DIAGNOSIS — Z95.5 HISTORY OF CORONARY ARTERY STENT PLACEMENT: ICD-10-CM

## 2023-03-08 DIAGNOSIS — I10 ESSENTIAL (PRIMARY) HYPERTENSION: ICD-10-CM

## 2023-03-08 DIAGNOSIS — I25.118 ATHEROSCLEROTIC HEART DISEASE OF NATIVE CORONARY ARTERY WITH OTHER FORMS OF ANGINA PECTORIS (HCC): Primary | ICD-10-CM

## 2023-03-08 DIAGNOSIS — I50.32 CHRONIC DIASTOLIC (CONGESTIVE) HEART FAILURE (HCC): ICD-10-CM

## 2023-03-08 DIAGNOSIS — E78.00 PURE HYPERCHOLESTEROLEMIA, UNSPECIFIED: ICD-10-CM

## 2023-03-08 PROCEDURE — 99214 OFFICE O/P EST MOD 30 MIN: CPT | Performed by: INTERNAL MEDICINE

## 2023-03-08 PROCEDURE — 3074F SYST BP LT 130 MM HG: CPT | Performed by: INTERNAL MEDICINE

## 2023-03-08 PROCEDURE — G8427 DOCREV CUR MEDS BY ELIG CLIN: HCPCS | Performed by: INTERNAL MEDICINE

## 2023-03-08 PROCEDURE — 3078F DIAST BP <80 MM HG: CPT | Performed by: INTERNAL MEDICINE

## 2023-03-08 PROCEDURE — G8484 FLU IMMUNIZE NO ADMIN: HCPCS | Performed by: INTERNAL MEDICINE

## 2023-03-08 PROCEDURE — G8417 CALC BMI ABV UP PARAM F/U: HCPCS | Performed by: INTERNAL MEDICINE

## 2023-03-08 PROCEDURE — 93000 ELECTROCARDIOGRAM COMPLETE: CPT | Performed by: INTERNAL MEDICINE

## 2023-03-08 PROCEDURE — 1123F ACP DISCUSS/DSCN MKR DOCD: CPT | Performed by: INTERNAL MEDICINE

## 2023-03-08 PROCEDURE — G8400 PT W/DXA NO RESULTS DOC: HCPCS | Performed by: INTERNAL MEDICINE

## 2023-03-08 PROCEDURE — 1090F PRES/ABSN URINE INCON ASSESS: CPT | Performed by: INTERNAL MEDICINE

## 2023-03-08 PROCEDURE — 1036F TOBACCO NON-USER: CPT | Performed by: INTERNAL MEDICINE

## 2023-03-08 RX ORDER — OXYCODONE HYDROCHLORIDE 5 MG/1
TABLET ORAL
COMMUNITY
Start: 2023-02-16

## 2023-03-08 ASSESSMENT — PATIENT HEALTH QUESTIONNAIRE - PHQ9
SUM OF ALL RESPONSES TO PHQ QUESTIONS 1-9: 0
SUM OF ALL RESPONSES TO PHQ9 QUESTIONS 1 & 2: 0
SUM OF ALL RESPONSES TO PHQ QUESTIONS 1-9: 0
2. FEELING DOWN, DEPRESSED OR HOPELESS: 0
SUM OF ALL RESPONSES TO PHQ QUESTIONS 1-9: 0
1. LITTLE INTEREST OR PLEASURE IN DOING THINGS: 0
DEPRESSION UNABLE TO ASSESS: FUNCTIONAL CAPACITY MOTIVATION LIMITS ACCURACY
SUM OF ALL RESPONSES TO PHQ QUESTIONS 1-9: 0

## 2023-03-08 ASSESSMENT — ENCOUNTER SYMPTOMS
EYES NEGATIVE: 1
GASTROINTESTINAL NEGATIVE: 1
SHORTNESS OF BREATH: 1

## 2023-03-08 NOTE — PROGRESS NOTES
Marcelo Lantigua is a 76y.o. year old female. Follow-up of CAD, CHF, hypertension, hyperlipidemia, status post STEMI and PCI in 7/21. History of DVT and PE     8/2021  Patient presents to follow-up for hospitalization to [facility] 7/30-8/2/2021 for STEMI. Status post RCA stent on 07/30/2021. She has other PMH of hypertension, hyperlipidemia, and acute lower extremity DVT. She is tolerating all medications. She denies chest pain, shortness of breath or palpitations. She continues to have bilateral lower extremity edema. 3/8/2023 denies any chest pain dizziness palpitations. Does have dyspnea on exertion if she walks from car to her house in the garage. Edema is improving since diuretics were increased. Most of the time is spent on the scooter outside the house. Review of Systems   Constitutional: Negative. HENT: Negative. Eyes: Negative. Respiratory:  Positive for shortness of breath. Cardiovascular:  Positive for leg swelling. Gastrointestinal: Negative. Endocrine: Negative. Genitourinary: Negative. Musculoskeletal: Negative. Neurological: Negative. Psychiatric/Behavioral: Negative. All other systems reviewed and are negative. Physical Exam  Vitals and nursing note reviewed. Constitutional:       Appearance: Normal appearance. She is obese. HENT:      Head: Normocephalic and atraumatic. Nose: Nose normal.   Eyes:      Conjunctiva/sclera: Conjunctivae normal.   Cardiovascular:      Rate and Rhythm: Normal rate and regular rhythm. Pulses: Normal pulses. Heart sounds: Normal heart sounds. Pulmonary:      Effort: Pulmonary effort is normal.      Breath sounds: Normal breath sounds. Abdominal:      General: Bowel sounds are normal.      Palpations: Abdomen is soft. Musculoskeletal:         General: Normal range of motion. Right lower leg: Edema (1) present. Left lower leg: Edema (1) present. Skin:     General: Skin is warm and dry. Neurological:      General: No focal deficit present. Mental Status: She is alert and oriented to person, place, and time. Psychiatric:         Mood and Affect: Mood normal.         Behavior: Behavior normal.      Allergies   Allergen Reactions    Ciprofloxacin Other (See Comments)     Achilles tendonitis/leg pain and swelling    Indomethacin Anaphylaxis, Hives and Swelling    Tomato Other (See Comments)     Heart burn       Past Medical History:   Diagnosis Date    Abnormal chest CT 2020    Adopted     Arthritis     Balance problems     Chronic cough 2020    GOMES (dyspnea on exertion) 2019    GERD (gastroesophageal reflux disease)     Gout     Hemochromatosis     High cholesterol     Hypertension     Hypothyroidism     Left knee pain     Left shoulder pain     Low blood potassium     Lumbar pain     Pain of left sacroiliac joint     Shoulder impingement        Family History   Adopted: Yes       Social History     Tobacco Use    Smoking status: Former     Packs/day: 0.50     Types: Cigarettes     Start date: 1970     Quit date: 1974     Years since quittin.2    Smokeless tobacco: Never   Substance Use Topics    Alcohol use: No    Drug use: No        Current Outpatient Medications   Medication Sig Dispense Refill    oxyCODONE (ROXICODONE) 5 MG immediate release tablet TAKE 1 TABLET BY MOUTH EVERY 6 HOURS AS NEEDED FOR 30 DAYS      Multiple Vitamins-Minerals (MULTIVITAMIN ADULTS 50+ PO) Take by mouth      furosemide (LASIX) 40 MG tablet TAKE 1 TABLET BY MOUTH TWO (2) TIMES DAILY AS NEEDED (EDEMA).  180 tablet 1    acetaminophen (TYLENOL) 500 MG tablet Take 500 mg by mouth every 6 hours as needed      albuterol sulfate HFA (PROVENTIL;VENTOLIN;PROAIR) 108 (90 Base) MCG/ACT inhaler INHALE 2 PUFFS BY MOUTH EVERY 4 HOURS AS NEEDED FOR WHEEZE      aspirin 81 MG EC tablet Take 81 mg by mouth daily      levothyroxine (SYNTHROID) 25 MCG tablet TAKE 1 TABLET BY MOUTH EVERY DAY      metoprolol tartrate (LOPRESSOR) 25 MG tablet Take 25 mg by mouth daily      nitroGLYCERIN (NITROSTAT) 0.4 MG SL tablet Place 0.4 mg under the tongue      pantoprazole (PROTONIX) 40 MG tablet TAKE 1 TABLET BY MOUTH EVERY DAY BEFORE BREAKFAST      polyethyl glycol-propyl glycol 0.4-0.3 % (SYSTANE) 0.4-0.3 % ophthalmic solution Apply 2 drops to eye 4 times daily as needed      rosuvastatin (CRESTOR) 40 MG tablet TAKE 1 TABLET BY MOUTH EVERY DAY       No current facility-administered medications for this visit. Past Surgical History:   Procedure Laterality Date    CHOLECYSTECTOMY      ORTHOPEDIC SURGERY      R trigger finger     OTHER SURGICAL HISTORY      Left knee replacment revision     OTHER SURGICAL HISTORY      Trigger finger surgery    OTHER SURGICAL HISTORY      Left femur    OTHER SURGICAL HISTORY      Both knees    OTHER SURGICAL HISTORY      Right wrist, replaced unlnar). OTHER SURGICAL HISTORY      Right little finger    ROTATOR CUFF REPAIR Right     TONSILLECTOMY      TOTAL KNEE ARTHROPLASTY Bilateral     R knee/ Lknee and femur       Vitals:    03/08/23 1229   BP: 129/65   Site: Left Upper Arm   Position: Sitting   Cuff Size: Large Adult   Pulse: 60   SpO2: 98%   Weight: 184 lb (83.5 kg)   Height: 5' (1.524 m)          Diagnostic Studies:  I have reviewed the relevant tests done on the patient and show as follows  EKG tracings reviewed by me today. Ms. Yoanna Balderrama has a reminder for a \"due or due soon\" health maintenance. I have asked that she contact her primary care provider for follow-up on this health maintenance. 7/30/2021  Indications    ST elevation myocardial infarction (STEMI) involving other coronary artery of inferior wall (HCC) [I21.19 (ICD-10-CM)]   Conclusion       Single-vessel coronary artery disease with totally occluded proximal right coronary artery. Successful thrombectomy of the right coronary artery due to extensive thrombus burden.   Successful PCI of the proximal right coronary artery with residual 0% stenosis deploying a drug-eluting stent as above. Delaying factors in this patient were multiple trials at peripheral IV in the ER and only when IV was available. We successfully cannulated the right femoral vein in addition to right femoral artery without any complications in the lab for venous access. The wiring of the artery was challenging because of the possible  as described above. Due to extensive thrombus in the artery, thrombectomy was used. Aggressive risk factor med modification and medical treatment should continue. Further work-up of this chronic severe edema which may be related to bilateral DVTs in the past and chronic CHF.   7/21 echo  Interpretation Summary    Image quality for this study was technically difficult with poor image quality. Contrast used: DEFINITY. LV: Calculated LVEF is 50%. Visually measured ejection fraction. Normal cavity size and wall thickness. Low normal systolic function. Inconclusive left ventricular diastolic function. Ejection fraction may be underestimated due to poor image quality. Comparison Study Information      Prior Study    There is a prior study available for comparison. Prior study date: 7/23/2019. As compared to the previous study, there are significant changes. Pulmonary hypertension has decreased.            ASSESSMENT & PLAN    Lab Results   Component Value Date    CHOL 143 05/20/2022    CHOL 149 05/17/2022    CHOL 186 11/08/2021    CHOL 75 07/31/2021    CHOL 136 07/30/2021    CHOL 158 07/02/2021    CHOL 168 12/16/2020    CHOL 156 08/20/2020     Lab Results   Component Value Date    TRIG 107 05/20/2022    TRIG 134 05/17/2022    TRIG 220 (H) 11/08/2021    TRIG 54 07/31/2021    TRIG 86 07/30/2021    TRIG 186 (H) 07/02/2021    TRIG 226 (H) 12/16/2020    TRIG 180 (H) 08/20/2020     Lab Results   Component Value Date    HDL 73 (H) 05/20/2022    HDL 63 (H) 05/17/2022    HDL 59 11/08/2021    HDL 50 07/31/2021    HDL 59 07/30/2021    HDL 55 07/02/2021    HDL 47 12/16/2020    HDL 51 08/20/2020     Lab Results   Component Value Date    LDLCALC 48.6 05/20/2022    LDLCALC 59.2 05/17/2022    LDLCALC 83 11/08/2021    LDLCALC 14.2 07/31/2021    LDLCALC 59.8 07/30/2021    LDLCALC 65.8 07/02/2021    LDLCALC 75.8 12/16/2020    LDLCALC 69 08/20/2020     Lab Results   Component Value Date    LABVLDL 21.4 05/20/2022    LABVLDL 26.8 05/17/2022    LABVLDL 44 11/08/2021    LABVLDL 10.8 07/31/2021    LABVLDL 17.2 07/30/2021    LABVLDL 37.2 07/02/2021    LABVLDL 45.2 12/16/2020    LABVLDL 36 08/20/2020       Results for Adrian Carmona (MRN [id number]) as of 6/3/2022 13:10   Ref. Range 11/8/2021 08:21 12/9/2021 10:49 4/29/2022 11:46 5/17/2022 15:44 5/20/2022 07:26   Triglyceride Latest Ref Range: <150 MG/ (H)   134 107   Cholesterol, total Latest Ref Range: <200 MG/   149 143   HDL Cholesterol Latest Ref Range: 40 - 60 MG/DL 59   63 (H) 73 (H)   CHOL/HDL Ratio Latest Ref Range: 0 - 5.0   3.2   2.4 2.0   VLDL, calculated Latest Units: MG/DL 44   26.8 21.4   LDL, calculated Latest Ref Range: 0 - 100 MG/DL 83   59.2 48.6       8/2021  Recent STEMI. Cardiac catheterization and echocardiogram reviewed with patient EF 50% by echo. Continue triple therapy with Eliquis, aspirin, and Plavix due to AN. After 1 month plan to discontinue aspirin. Continued on DVT dose Eliquis. Continue PPI due to need for triple therapy. Referral entered for cardiac rehab. Will begin low-dose Lasix due to bilateral lower extremity edema. Repeat BMP in 1 week. 9/24/2021 CAD stable. Not going to rehab due to back pain. Encouraged to walk by self. AFib staying in sinus rhythm. Blood pressure is controlled. CHF is improving as she walks better but still has NYHA class III. Increase Lasix and follow-up electrolytes. Diet weight and exercise discussed. Mediterranean diet guidelines printed.     12/3/2021 CHF is much better but she is still in NYHA class III clinically which is likely due to deconditioning and leg problems. Recommended and encouraged to exercise little more regularly and may need to get a stationary bike if possible. A. fib is staying in sinus rhythm clinically continue anticoagulation. CAD stable. Blood pressure is controlled. Lipids have gone back up. Increase Lipitor and follow the labs. 6/3/2022 CHF compensated. NYHA 2-3. Limited by arthritis. Staying in sinus rhythm. Continue anticoagulation. CAD is stable. Lipids are excellent now. Diet weight and exercise discussed and she is losing weight well gradually. 12/2/2022 CHF is compensated but she has significant edema today which is dependent. Increase Lasix as needed watching the blood pressure closely. CAD is stable. Discontinue clopidogrel. Researched her records thoroughly and did not find any evidence of documented atrial fibrillation. It was mentioned in July 2019 that it was seen intermittently. Not sure as there are no scanned records on that time. Will discontinue Eliquis. Aspirin will be started. Blood pressure is controlled. Lipids are controlled. Kayla Alessandro was seen today for follow-up. Diagnoses and all orders for this visit:    Atherosclerotic heart disease of native coronary artery with other forms of angina pectoris (HCC)  -     EKG 12 Lead    Chronic diastolic (congestive) heart failure (HCC)  -     EKG 12 Lead    Essential (primary) hypertension  -     Basic Metabolic Panel; Future  -     Lipid Panel; Future  -     Hepatic Function Panel; Future    Pure hypercholesterolemia, unspecified  -     Lipid Panel; Future    Personal history of other venous thrombosis and embolism    History of coronary artery stent placement        Pertinent laboratory and test data reviewed and discussed with patient.   See patient instructions also for other medical advice given    Medications Discontinued During This Encounter   Medication Reason    ELIQUIS 5 MG TABS tablet Therapy completed    clopidogrel (PLAVIX) 75 MG tablet DISCONTINUED BY ANOTHER CLINICIAN       Follow-up and Dispositions    Return in about 6 months (around 9/8/2023), or if symptoms worsen or fail to improve, for post test/procedure. Return to ER if any significant CP not relieved by s/l NTG, severe SOB, severe palpitations, loss of consciousness    3/8/2023 CHF is compensated NYHA class III. CAD stable. Blood pressure is controlled. Lipids are controlled. Recommended to lose weight by doing more exercise.

## 2023-03-08 NOTE — PROGRESS NOTES
1. Have you been to the ER, urgent care clinic since your last visit? Hospitalized since your last visit? No      2. Where do you normally have your labs drawn? [address]    3. Do you need any refills today? No    4. Which local pharmacy do you use (enter pharmacy)? [facility]    5. Which mail order pharmacy do you use (enter pharmacy)? No     6. Are you here for surgical clearance and if so who will be doing your     procedure/surgery (care team)?    No

## 2023-03-09 ENCOUNTER — HOSPITAL ENCOUNTER (OUTPATIENT)
Facility: HOSPITAL | Age: 78
Discharge: HOME | End: 2023-03-09
Payer: MEDICARE

## 2023-03-09 DIAGNOSIS — E78.00 PURE HYPERCHOLESTEROLEMIA, UNSPECIFIED: ICD-10-CM

## 2023-03-09 DIAGNOSIS — I10 HYPERTENSION: Primary | ICD-10-CM

## 2023-03-09 DIAGNOSIS — I10 ESSENTIAL (PRIMARY) HYPERTENSION: ICD-10-CM

## 2023-03-09 LAB
ALBUMIN SERPL-MCNC: 3.4 G/DL (ref 3.4–5)
ALBUMIN/GLOB SERPL: 1 (ref 0.8–1.7)
ALP SERPL-CCNC: 77 U/L (ref 45–117)
ALT SERPL-CCNC: 32 U/L (ref 13–56)
ANION GAP SERPL CALC-SCNC: 5 MMOL/L (ref 3–18)
AST SERPL-CCNC: 37 U/L (ref 10–38)
BILIRUB DIRECT SERPL-MCNC: 0.2 MG/DL (ref 0–0.2)
BILIRUB SERPL-MCNC: 0.6 MG/DL (ref 0.2–1)
BUN SERPL-MCNC: 19 MG/DL (ref 7–18)
BUN/CREAT SERPL: 15 (ref 12–20)
CALCIUM SERPL-MCNC: 9.2 MG/DL (ref 8.5–10.1)
CHLORIDE SERPL-SCNC: 105 MMOL/L (ref 100–111)
CHOLEST SERPL-MCNC: 145 MG/DL
CO2 SERPL-SCNC: 32 MMOL/L (ref 21–32)
CREAT SERPL-MCNC: 1.31 MG/DL (ref 0.6–1.3)
GLOBULIN SER CALC-MCNC: 3.3 G/DL (ref 2–4)
GLUCOSE SERPL-MCNC: 72 MG/DL (ref 74–99)
HDLC SERPL-MCNC: 55 MG/DL (ref 40–60)
HDLC SERPL: 2.6 (ref 0–5)
LDLC SERPL CALC-MCNC: 55.8 MG/DL (ref 0–100)
LIPID PANEL: ABNORMAL
POTASSIUM SERPL-SCNC: 3 MMOL/L (ref 3.5–5.5)
PROT SERPL-MCNC: 6.7 G/DL (ref 6.4–8.2)
SODIUM SERPL-SCNC: 142 MMOL/L (ref 136–145)
TRIGL SERPL-MCNC: 171 MG/DL
VLDLC SERPL CALC-MCNC: 34.2 MG/DL

## 2023-03-09 PROCEDURE — 36415 COLL VENOUS BLD VENIPUNCTURE: CPT

## 2023-03-09 PROCEDURE — 80061 LIPID PANEL: CPT

## 2023-03-09 PROCEDURE — 80048 BASIC METABOLIC PNL TOTAL CA: CPT

## 2023-03-09 PROCEDURE — 80076 HEPATIC FUNCTION PANEL: CPT

## 2023-03-09 RX ORDER — POTASSIUM CHLORIDE 750 MG/1
10 TABLET, EXTENDED RELEASE ORAL DAILY
Qty: 30 TABLET | Refills: 4 | Status: SHIPPED | OUTPATIENT
Start: 2023-03-09

## 2023-03-09 RX ORDER — POTASSIUM CHLORIDE 750 MG/1
10 TABLET, EXTENDED RELEASE ORAL DAILY
COMMUNITY
End: 2023-03-09 | Stop reason: SDUPTHER

## 2023-03-09 NOTE — RESULT ENCOUNTER NOTE
Please contact patient and do the following asap  Add K-Dur 10 mEq a day  Rpt BMP  in 45 days  Fax to PCP for increased glucose

## 2023-03-09 NOTE — TELEPHONE ENCOUNTER
----- Message from Sameera Kohli MD sent at 3/9/2023  2:43 PM EST -----  Please contact patient and do the following asap  Add K-Dur 10 mEq a day  Rpt BMP  in 45 days

## 2023-03-09 NOTE — TELEPHONE ENCOUNTER
Requested Prescriptions     Pending Prescriptions Disp Refills    potassium chloride (KLOR-CON M) 10 MEQ extended release tablet 30 tablet 4     Sig: Take 1 tablet by mouth daily

## 2023-03-09 NOTE — TELEPHONE ENCOUNTER
Dur to poor connection in phone lines on both T# of patients. Will mail lab order with grant attached. Will fax lab results to Dr. Cantrell Call office.      --------------------------------------------------------    Please contact patient and do the following asap  Add K-Dur 10 mEq a day  Rpt BMP  in 45 days  Fax to PCP for increased glucose

## 2023-03-09 NOTE — TELEPHONE ENCOUNTER
----- Message from Jyoti Amador MD sent at 3/9/2023  2:43 PM EST -----  Please contact patient and do the following asap  Add K-Dur 10 mEq a day  Rpt BMP  in 45 days

## 2023-04-14 PROBLEM — N18.30 CHRONIC RENAL DISEASE, STAGE III (HCC): Status: ACTIVE | Noted: 2023-04-14

## 2023-04-23 ENCOUNTER — HOSPITAL ENCOUNTER (EMERGENCY)
Facility: HOSPITAL | Age: 78
Discharge: HOME | End: 2023-04-23
Attending: EMERGENCY MEDICINE
Payer: MEDICARE

## 2023-04-23 ENCOUNTER — APPOINTMENT (OUTPATIENT)
Facility: HOSPITAL | Age: 78
End: 2023-04-23
Payer: MEDICARE

## 2023-04-23 VITALS
TEMPERATURE: 97.4 F | HEART RATE: 64 BPM | SYSTOLIC BLOOD PRESSURE: 167 MMHG | DIASTOLIC BLOOD PRESSURE: 84 MMHG | RESPIRATION RATE: 18 BRPM | BODY MASS INDEX: 38.68 KG/M2 | WEIGHT: 197 LBS | OXYGEN SATURATION: 99 % | HEIGHT: 60 IN

## 2023-04-23 DIAGNOSIS — M54.9 CHRONIC RIGHT-SIDED BACK PAIN, UNSPECIFIED BACK LOCATION: Primary | ICD-10-CM

## 2023-04-23 DIAGNOSIS — G89.29 CHRONIC RIGHT-SIDED BACK PAIN, UNSPECIFIED BACK LOCATION: Primary | ICD-10-CM

## 2023-04-23 LAB
ALBUMIN SERPL-MCNC: 3.3 G/DL (ref 3.4–5)
ALBUMIN/GLOB SERPL: 0.8 (ref 0.8–1.7)
ALP SERPL-CCNC: 113 U/L (ref 45–117)
ALT SERPL-CCNC: 29 U/L (ref 13–56)
ANION GAP SERPL CALC-SCNC: 2 MMOL/L (ref 3–18)
APPEARANCE UR: CLEAR
AST SERPL-CCNC: 21 U/L (ref 10–38)
BACTERIA URNS QL MICRO: ABNORMAL /HPF
BASOPHILS # BLD: 0 K/UL (ref 0–0.1)
BASOPHILS NFR BLD: 0 % (ref 0–2)
BILIRUB SERPL-MCNC: 0.8 MG/DL (ref 0.2–1)
BILIRUB UR QL: NEGATIVE
BUN SERPL-MCNC: 24 MG/DL (ref 7–18)
BUN/CREAT SERPL: 20 (ref 12–20)
CALCIUM SERPL-MCNC: 9.7 MG/DL (ref 8.5–10.1)
CHLORIDE SERPL-SCNC: 110 MMOL/L (ref 100–111)
CO2 SERPL-SCNC: 30 MMOL/L (ref 21–32)
COLOR UR: YELLOW
CREAT SERPL-MCNC: 1.21 MG/DL (ref 0.6–1.3)
DIFFERENTIAL METHOD BLD: ABNORMAL
EOSINOPHIL # BLD: 0.2 K/UL (ref 0–0.4)
EOSINOPHIL NFR BLD: 2 % (ref 0–5)
EPITH CASTS URNS QL MICRO: ABNORMAL /LPF (ref 0–5)
ERYTHROCYTE [DISTWIDTH] IN BLOOD BY AUTOMATED COUNT: 18.6 % (ref 11.6–14.5)
GLOBULIN SER CALC-MCNC: 3.9 G/DL (ref 2–4)
GLUCOSE SERPL-MCNC: 97 MG/DL (ref 74–99)
GLUCOSE UR STRIP.AUTO-MCNC: NEGATIVE MG/DL
HCT VFR BLD AUTO: 36.7 % (ref 35–45)
HGB BLD-MCNC: 11.4 G/DL (ref 12–16)
HGB UR QL STRIP: ABNORMAL
IMM GRANULOCYTES # BLD AUTO: 0 K/UL (ref 0–0.04)
IMM GRANULOCYTES NFR BLD AUTO: 0 % (ref 0–0.5)
KETONES UR QL STRIP.AUTO: NEGATIVE MG/DL
LEUKOCYTE ESTERASE UR QL STRIP.AUTO: NEGATIVE
LYMPHOCYTES # BLD: 1.1 K/UL (ref 0.9–3.6)
LYMPHOCYTES NFR BLD: 15 % (ref 21–52)
MCH RBC QN AUTO: 28 PG (ref 24–34)
MCHC RBC AUTO-ENTMCNC: 31.1 G/DL (ref 31–37)
MCV RBC AUTO: 90.2 FL (ref 78–100)
MONOCYTES # BLD: 0.8 K/UL (ref 0.05–1.2)
MONOCYTES NFR BLD: 12 % (ref 3–10)
NEUTS SEG # BLD: 5 K/UL (ref 1.8–8)
NEUTS SEG NFR BLD: 70 % (ref 40–73)
NITRITE UR QL STRIP.AUTO: NEGATIVE
NRBC # BLD: 0 K/UL (ref 0–0.01)
NRBC BLD-RTO: 0 PER 100 WBC
NT PRO BNP: 471 PG/ML (ref 0–1800)
PH UR STRIP: 6.5 (ref 5–8)
PLATELET # BLD AUTO: 204 K/UL (ref 135–420)
PMV BLD AUTO: 10.7 FL (ref 9.2–11.8)
POTASSIUM SERPL-SCNC: 4.1 MMOL/L (ref 3.5–5.5)
PROT SERPL-MCNC: 7.2 G/DL (ref 6.4–8.2)
PROT UR STRIP-MCNC: 30 MG/DL
RBC # BLD AUTO: 4.07 M/UL (ref 4.2–5.3)
RBC #/AREA URNS HPF: ABNORMAL /HPF (ref 0–5)
SODIUM SERPL-SCNC: 142 MMOL/L (ref 136–145)
SP GR UR REFRACTOMETRY: 1.01 (ref 1–1.03)
TROPONIN I SERPL HS-MCNC: 28 NG/L (ref 0–54)
UROBILINOGEN UR QL STRIP.AUTO: 0.2 EU/DL (ref 0.2–1)
WBC # BLD AUTO: 7.1 K/UL (ref 4.6–13.2)
WBC URNS QL MICRO: ABNORMAL /HPF (ref 0–4)

## 2023-04-23 PROCEDURE — 80053 COMPREHEN METABOLIC PANEL: CPT

## 2023-04-23 PROCEDURE — 71045 X-RAY EXAM CHEST 1 VIEW: CPT

## 2023-04-23 PROCEDURE — 85025 COMPLETE CBC W/AUTO DIFF WBC: CPT

## 2023-04-23 PROCEDURE — 84484 ASSAY OF TROPONIN QUANT: CPT

## 2023-04-23 PROCEDURE — 87186 SC STD MICRODIL/AGAR DIL: CPT

## 2023-04-23 PROCEDURE — 87086 URINE CULTURE/COLONY COUNT: CPT

## 2023-04-23 PROCEDURE — 83880 ASSAY OF NATRIURETIC PEPTIDE: CPT

## 2023-04-23 PROCEDURE — 99285 EMERGENCY DEPT VISIT HI MDM: CPT

## 2023-04-23 PROCEDURE — 6370000000 HC RX 637 (ALT 250 FOR IP): Performed by: PHYSICIAN ASSISTANT

## 2023-04-23 PROCEDURE — 81001 URINALYSIS AUTO W/SCOPE: CPT

## 2023-04-23 PROCEDURE — 93005 ELECTROCARDIOGRAM TRACING: CPT | Performed by: PHYSICIAN ASSISTANT

## 2023-04-23 PROCEDURE — 87077 CULTURE AEROBIC IDENTIFY: CPT

## 2023-04-23 PROCEDURE — 93010 ELECTROCARDIOGRAM REPORT: CPT | Performed by: INTERNAL MEDICINE

## 2023-04-23 RX ORDER — HYDROCODONE BITARTRATE AND ACETAMINOPHEN 5; 325 MG/1; MG/1
1 TABLET ORAL
Status: COMPLETED | OUTPATIENT
Start: 2023-04-23 | End: 2023-04-23

## 2023-04-23 RX ADMIN — HYDROCODONE BITARTRATE AND ACETAMINOPHEN 1 TABLET: 5; 325 TABLET ORAL at 15:18

## 2023-04-23 RX ADMIN — HYDROCODONE BITARTRATE AND ACETAMINOPHEN 1 TABLET: 5; 325 TABLET ORAL at 21:03

## 2023-04-23 ASSESSMENT — ENCOUNTER SYMPTOMS
EYE REDNESS: 0
WHEEZING: 0
EYE DISCHARGE: 0
STRIDOR: 0
NAUSEA: 0
RHINORRHEA: 0
ABDOMINAL PAIN: 0
VOMITING: 0
SHORTNESS OF BREATH: 1
SORE THROAT: 0
BACK PAIN: 1
COUGH: 0

## 2023-04-23 ASSESSMENT — PAIN DESCRIPTION - LOCATION: LOCATION: BACK

## 2023-04-23 ASSESSMENT — PAIN SCALES - GENERAL: PAINLEVEL_OUTOF10: 10

## 2023-04-24 LAB
EKG ATRIAL RATE: 55 BPM
EKG DIAGNOSIS: NORMAL
EKG P AXIS: 33 DEGREES
EKG P-R INTERVAL: 184 MS
EKG Q-T INTERVAL: 376 MS
EKG QRS DURATION: 62 MS
EKG QTC CALCULATION (BAZETT): 359 MS
EKG R AXIS: -16 DEGREES
EKG T AXIS: -3 DEGREES
EKG VENTRICULAR RATE: 55 BPM

## 2023-04-24 NOTE — ED NOTES
Pt assisted to bedside commode and back to stretcher.   Tolerated well     Estil Marty, RN  04/23/23 2001

## 2023-04-28 LAB
BACTERIA SPEC CULT: ABNORMAL
CC UR VC: ABNORMAL
SERVICE CMNT-IMP: ABNORMAL

## 2023-04-28 RX ORDER — CEFADROXIL 500 MG/1
500 CAPSULE ORAL 2 TIMES DAILY
Qty: 10 CAPSULE | Refills: 0 | Status: SHIPPED | OUTPATIENT
Start: 2023-04-28 | End: 2023-05-03

## 2023-04-28 NOTE — ED PROVIDER NOTES
Urinary tract infection found on urine culture. Patient contacted at 4:30 pm 4/28/23 to communicate results. Cefadroxil sent to pharmacy.      Jessica Mckenna PA-C  04/28/23 0719

## 2023-05-23 ENCOUNTER — APPOINTMENT (OUTPATIENT)
Facility: HOSPITAL | Age: 78
End: 2023-05-23
Payer: MEDICARE

## 2023-05-23 ENCOUNTER — HOSPITAL ENCOUNTER (INPATIENT)
Facility: HOSPITAL | Age: 78
LOS: 3 days | Discharge: NURSING, REHAB OR LONG-TERM CARE | End: 2023-05-26
Attending: STUDENT IN AN ORGANIZED HEALTH CARE EDUCATION/TRAINING PROGRAM
Payer: MEDICARE

## 2023-05-23 DIAGNOSIS — I50.32 CHRONIC DIASTOLIC (CONGESTIVE) HEART FAILURE (HCC): ICD-10-CM

## 2023-05-23 DIAGNOSIS — S39.012A BACK STRAIN, INITIAL ENCOUNTER: ICD-10-CM

## 2023-05-23 DIAGNOSIS — S22.000A COMPRESSION FRACTURE OF BODY OF THORACIC VERTEBRA (HCC): Primary | ICD-10-CM

## 2023-05-23 DIAGNOSIS — R77.8 ELEVATED TROPONIN: ICD-10-CM

## 2023-05-23 PROBLEM — E87.6 HYPOKALEMIA: Status: ACTIVE | Noted: 2023-05-23

## 2023-05-23 PROBLEM — Z86.718 HISTORY OF DVT (DEEP VEIN THROMBOSIS): Status: ACTIVE | Noted: 2021-07-30

## 2023-05-23 PROBLEM — N39.0 UTI (URINARY TRACT INFECTION): Status: ACTIVE | Noted: 2022-11-03

## 2023-05-23 PROBLEM — E03.9 ACQUIRED HYPOTHYROIDISM: Status: ACTIVE | Noted: 2023-05-23

## 2023-05-23 PROBLEM — I10 HYPERTENSION: Status: ACTIVE | Noted: 2019-01-11

## 2023-05-23 LAB
ALBUMIN SERPL-MCNC: 2.9 G/DL (ref 3.4–5)
ALBUMIN/GLOB SERPL: 0.9 (ref 0.8–1.7)
ALP SERPL-CCNC: 105 U/L (ref 45–117)
ALT SERPL-CCNC: 69 U/L (ref 13–56)
ANION GAP SERPL CALC-SCNC: 5 MMOL/L (ref 3–18)
APPEARANCE UR: CLEAR
AST SERPL-CCNC: 92 U/L (ref 10–38)
BACTERIA URNS QL MICRO: ABNORMAL /HPF
BASOPHILS # BLD: 0 K/UL (ref 0–0.1)
BASOPHILS NFR BLD: 0 % (ref 0–2)
BILIRUB DIRECT SERPL-MCNC: 0.3 MG/DL (ref 0–0.2)
BILIRUB SERPL-MCNC: 0.8 MG/DL (ref 0.2–1)
BILIRUB UR QL: NEGATIVE
BUN SERPL-MCNC: 23 MG/DL (ref 7–18)
BUN/CREAT SERPL: 18 (ref 12–20)
CALCIUM SERPL-MCNC: 9.6 MG/DL (ref 8.5–10.1)
CHLORIDE SERPL-SCNC: 103 MMOL/L (ref 100–111)
CO2 SERPL-SCNC: 33 MMOL/L (ref 21–32)
COLOR UR: YELLOW
CREAT SERPL-MCNC: 1.31 MG/DL (ref 0.6–1.3)
DIFFERENTIAL METHOD BLD: ABNORMAL
EKG ATRIAL RATE: 68 BPM
EKG DIAGNOSIS: NORMAL
EKG P AXIS: -22 DEGREES
EKG P-R INTERVAL: 172 MS
EKG Q-T INTERVAL: 370 MS
EKG QRS DURATION: 70 MS
EKG QTC CALCULATION (BAZETT): 393 MS
EKG R AXIS: -25 DEGREES
EKG T AXIS: 15 DEGREES
EKG VENTRICULAR RATE: 68 BPM
EOSINOPHIL # BLD: 0.1 K/UL (ref 0–0.4)
EOSINOPHIL NFR BLD: 1 % (ref 0–5)
EPITH CASTS URNS QL MICRO: ABNORMAL /LPF (ref 0–5)
ERYTHROCYTE [DISTWIDTH] IN BLOOD BY AUTOMATED COUNT: 17.9 % (ref 11.6–14.5)
ETHANOL SERPL-MCNC: <3 MG/DL (ref 0–3)
GLOBULIN SER CALC-MCNC: 3.1 G/DL (ref 2–4)
GLUCOSE SERPL-MCNC: 120 MG/DL (ref 74–99)
GLUCOSE UR STRIP.AUTO-MCNC: NEGATIVE MG/DL
HCT VFR BLD AUTO: 39.1 % (ref 35–45)
HGB BLD-MCNC: 12.5 G/DL (ref 12–16)
HGB UR QL STRIP: ABNORMAL
IMM GRANULOCYTES # BLD AUTO: 0.1 K/UL (ref 0–0.04)
IMM GRANULOCYTES NFR BLD AUTO: 1 % (ref 0–0.5)
KETONES UR QL STRIP.AUTO: NEGATIVE MG/DL
LACTATE SERPL-SCNC: 1.8 MMOL/L (ref 0.4–2)
LACTATE SERPL-SCNC: 2.4 MMOL/L (ref 0.4–2)
LEUKOCYTE ESTERASE UR QL STRIP.AUTO: ABNORMAL
LYMPHOCYTES # BLD: 0.8 K/UL (ref 0.9–3.6)
LYMPHOCYTES NFR BLD: 8 % (ref 21–52)
MAGNESIUM SERPL-MCNC: 2.4 MG/DL (ref 1.6–2.6)
MCH RBC QN AUTO: 28.9 PG (ref 24–34)
MCHC RBC AUTO-ENTMCNC: 32 G/DL (ref 31–37)
MCV RBC AUTO: 90.3 FL (ref 78–100)
MONOCYTES # BLD: 1 K/UL (ref 0.05–1.2)
MONOCYTES NFR BLD: 10 % (ref 3–10)
NEUTS SEG # BLD: 7.9 K/UL (ref 1.8–8)
NEUTS SEG NFR BLD: 79 % (ref 40–73)
NITRITE UR QL STRIP.AUTO: NEGATIVE
NRBC # BLD: 0 K/UL (ref 0–0.01)
NRBC BLD-RTO: 0 PER 100 WBC
NT PRO BNP: 456 PG/ML (ref 0–1800)
PH UR STRIP: 6.5 (ref 5–8)
PLATELET # BLD AUTO: 232 K/UL (ref 135–420)
PMV BLD AUTO: 10.1 FL (ref 9.2–11.8)
POTASSIUM SERPL-SCNC: 3.3 MMOL/L (ref 3.5–5.5)
PROT SERPL-MCNC: 6 G/DL (ref 6.4–8.2)
PROT UR STRIP-MCNC: 30 MG/DL
RBC # BLD AUTO: 4.33 M/UL (ref 4.2–5.3)
RBC #/AREA URNS HPF: ABNORMAL /HPF (ref 0–5)
SODIUM SERPL-SCNC: 141 MMOL/L (ref 136–145)
SP GR UR REFRACTOMETRY: 1.01 (ref 1–1.03)
TROPONIN I SERPL HS-MCNC: 74 NG/L (ref 0–54)
TROPONIN I SERPL HS-MCNC: 77 NG/L (ref 0–54)
UROBILINOGEN UR QL STRIP.AUTO: 0.2 EU/DL (ref 0.2–1)
WBC # BLD AUTO: 10 K/UL (ref 4.6–13.2)
WBC URNS QL MICRO: ABNORMAL /HPF (ref 0–4)

## 2023-05-23 PROCEDURE — 70450 CT HEAD/BRAIN W/O DYE: CPT

## 2023-05-23 PROCEDURE — 87086 URINE CULTURE/COLONY COUNT: CPT

## 2023-05-23 PROCEDURE — 85025 COMPLETE CBC W/AUTO DIFF WBC: CPT

## 2023-05-23 PROCEDURE — 71045 X-RAY EXAM CHEST 1 VIEW: CPT

## 2023-05-23 PROCEDURE — 1100000000 HC RM PRIVATE

## 2023-05-23 PROCEDURE — 83880 ASSAY OF NATRIURETIC PEPTIDE: CPT

## 2023-05-23 PROCEDURE — 83605 ASSAY OF LACTIC ACID: CPT

## 2023-05-23 PROCEDURE — 84484 ASSAY OF TROPONIN QUANT: CPT

## 2023-05-23 PROCEDURE — 93005 ELECTROCARDIOGRAM TRACING: CPT | Performed by: STUDENT IN AN ORGANIZED HEALTH CARE EDUCATION/TRAINING PROGRAM

## 2023-05-23 PROCEDURE — 87186 SC STD MICRODIL/AGAR DIL: CPT

## 2023-05-23 PROCEDURE — 80076 HEPATIC FUNCTION PANEL: CPT

## 2023-05-23 PROCEDURE — 93010 ELECTROCARDIOGRAM REPORT: CPT | Performed by: INTERNAL MEDICINE

## 2023-05-23 PROCEDURE — 96365 THER/PROPH/DIAG IV INF INIT: CPT

## 2023-05-23 PROCEDURE — 80048 BASIC METABOLIC PNL TOTAL CA: CPT

## 2023-05-23 PROCEDURE — 2580000003 HC RX 258: Performed by: STUDENT IN AN ORGANIZED HEALTH CARE EDUCATION/TRAINING PROGRAM

## 2023-05-23 PROCEDURE — 81001 URINALYSIS AUTO W/SCOPE: CPT

## 2023-05-23 PROCEDURE — 6360000002 HC RX W HCPCS: Performed by: STUDENT IN AN ORGANIZED HEALTH CARE EDUCATION/TRAINING PROGRAM

## 2023-05-23 PROCEDURE — 71250 CT THORAX DX C-: CPT

## 2023-05-23 PROCEDURE — 96375 TX/PRO/DX INJ NEW DRUG ADDON: CPT

## 2023-05-23 PROCEDURE — 99285 EMERGENCY DEPT VISIT HI MDM: CPT

## 2023-05-23 PROCEDURE — 96366 THER/PROPH/DIAG IV INF ADDON: CPT

## 2023-05-23 PROCEDURE — 99223 1ST HOSP IP/OBS HIGH 75: CPT | Performed by: PHYSICIAN ASSISTANT

## 2023-05-23 PROCEDURE — 6370000000 HC RX 637 (ALT 250 FOR IP): Performed by: PHYSICIAN ASSISTANT

## 2023-05-23 PROCEDURE — 82077 ASSAY SPEC XCP UR&BREATH IA: CPT

## 2023-05-23 PROCEDURE — 6370000000 HC RX 637 (ALT 250 FOR IP): Performed by: STUDENT IN AN ORGANIZED HEALTH CARE EDUCATION/TRAINING PROGRAM

## 2023-05-23 PROCEDURE — 83735 ASSAY OF MAGNESIUM: CPT

## 2023-05-23 PROCEDURE — 87077 CULTURE AEROBIC IDENTIFY: CPT

## 2023-05-23 RX ORDER — ONDANSETRON 2 MG/ML
4 INJECTION INTRAMUSCULAR; INTRAVENOUS EVERY 6 HOURS PRN
Status: DISCONTINUED | OUTPATIENT
Start: 2023-05-23 | End: 2023-05-27 | Stop reason: HOSPADM

## 2023-05-23 RX ORDER — SODIUM CHLORIDE 9 MG/ML
INJECTION, SOLUTION INTRAVENOUS PRN
Status: DISCONTINUED | OUTPATIENT
Start: 2023-05-23 | End: 2023-05-27 | Stop reason: HOSPADM

## 2023-05-23 RX ORDER — ASPIRIN 81 MG/1
243 TABLET ORAL
Status: COMPLETED | OUTPATIENT
Start: 2023-05-23 | End: 2023-05-23

## 2023-05-23 RX ORDER — SODIUM CHLORIDE 0.9 % (FLUSH) 0.9 %
5-40 SYRINGE (ML) INJECTION EVERY 12 HOURS SCHEDULED
Status: DISCONTINUED | OUTPATIENT
Start: 2023-05-23 | End: 2023-05-27 | Stop reason: HOSPADM

## 2023-05-23 RX ORDER — POLYETHYLENE GLYCOL 3350 17 G/17G
17 POWDER, FOR SOLUTION ORAL DAILY PRN
Status: DISCONTINUED | OUTPATIENT
Start: 2023-05-23 | End: 2023-05-27 | Stop reason: HOSPADM

## 2023-05-23 RX ORDER — ONDANSETRON 4 MG/1
4 TABLET, ORALLY DISINTEGRATING ORAL EVERY 8 HOURS PRN
Status: DISCONTINUED | OUTPATIENT
Start: 2023-05-23 | End: 2023-05-27 | Stop reason: HOSPADM

## 2023-05-23 RX ORDER — OXYCODONE HYDROCHLORIDE 5 MG/1
5 TABLET ORAL EVERY 6 HOURS PRN
Status: DISCONTINUED | OUTPATIENT
Start: 2023-05-23 | End: 2023-05-27 | Stop reason: HOSPADM

## 2023-05-23 RX ORDER — SODIUM CHLORIDE 0.9 % (FLUSH) 0.9 %
5-40 SYRINGE (ML) INJECTION PRN
Status: DISCONTINUED | OUTPATIENT
Start: 2023-05-23 | End: 2023-05-27 | Stop reason: HOSPADM

## 2023-05-23 RX ORDER — PANTOPRAZOLE SODIUM 40 MG/1
40 TABLET, DELAYED RELEASE ORAL
Status: DISCONTINUED | OUTPATIENT
Start: 2023-05-24 | End: 2023-05-27 | Stop reason: HOSPADM

## 2023-05-23 RX ORDER — ROSUVASTATIN CALCIUM 20 MG/1
40 TABLET, COATED ORAL DAILY
Status: DISCONTINUED | OUTPATIENT
Start: 2023-05-24 | End: 2023-05-27 | Stop reason: HOSPADM

## 2023-05-23 RX ORDER — LEVOTHYROXINE SODIUM 0.03 MG/1
25 TABLET ORAL DAILY
Status: DISCONTINUED | OUTPATIENT
Start: 2023-05-24 | End: 2023-05-27 | Stop reason: HOSPADM

## 2023-05-23 RX ORDER — OXYCODONE HYDROCHLORIDE 10 MG/1
TABLET ORAL
Status: ON HOLD | COMMUNITY
Start: 2023-04-20 | End: 2023-05-26 | Stop reason: HOSPADM

## 2023-05-23 RX ORDER — ASPIRIN 81 MG/1
81 TABLET ORAL DAILY
Status: DISCONTINUED | OUTPATIENT
Start: 2023-05-24 | End: 2023-05-27 | Stop reason: HOSPADM

## 2023-05-23 RX ORDER — POTASSIUM CHLORIDE 20 MEQ/1
20 TABLET, EXTENDED RELEASE ORAL ONCE
Status: COMPLETED | OUTPATIENT
Start: 2023-05-23 | End: 2023-05-23

## 2023-05-23 RX ORDER — ACETAMINOPHEN 650 MG/1
650 SUPPOSITORY RECTAL EVERY 6 HOURS PRN
Status: DISCONTINUED | OUTPATIENT
Start: 2023-05-23 | End: 2023-05-27 | Stop reason: HOSPADM

## 2023-05-23 RX ORDER — OXYCODONE HYDROCHLORIDE AND ACETAMINOPHEN 5; 325 MG/1; MG/1
1 TABLET ORAL ONCE
Status: COMPLETED | OUTPATIENT
Start: 2023-05-23 | End: 2023-05-23

## 2023-05-23 RX ORDER — 0.9 % SODIUM CHLORIDE 0.9 %
500 INTRAVENOUS SOLUTION INTRAVENOUS ONCE
Status: COMPLETED | OUTPATIENT
Start: 2023-05-23 | End: 2023-05-23

## 2023-05-23 RX ORDER — MORPHINE SULFATE 4 MG/ML
4 INJECTION, SOLUTION INTRAMUSCULAR; INTRAVENOUS
Status: COMPLETED | OUTPATIENT
Start: 2023-05-23 | End: 2023-05-23

## 2023-05-23 RX ORDER — BISACODYL 10 MG
10 SUPPOSITORY, RECTAL RECTAL DAILY PRN
Status: DISCONTINUED | OUTPATIENT
Start: 2023-05-23 | End: 2023-05-27 | Stop reason: HOSPADM

## 2023-05-23 RX ORDER — ENOXAPARIN SODIUM 100 MG/ML
40 INJECTION SUBCUTANEOUS DAILY
Status: DISCONTINUED | OUTPATIENT
Start: 2023-05-24 | End: 2023-05-27 | Stop reason: HOSPADM

## 2023-05-23 RX ORDER — ACETAMINOPHEN 325 MG/1
650 TABLET ORAL EVERY 6 HOURS PRN
Status: DISCONTINUED | OUTPATIENT
Start: 2023-05-23 | End: 2023-05-27 | Stop reason: HOSPADM

## 2023-05-23 RX ADMIN — WATER 1000 MG: 1 INJECTION INTRAMUSCULAR; INTRAVENOUS; SUBCUTANEOUS at 16:18

## 2023-05-23 RX ADMIN — OXYCODONE HYDROCHLORIDE 5 MG: 5 TABLET ORAL at 23:32

## 2023-05-23 RX ADMIN — MORPHINE SULFATE 4 MG: 4 INJECTION INTRAVENOUS at 15:08

## 2023-05-23 RX ADMIN — OXYCODONE HYDROCHLORIDE AND ACETAMINOPHEN 1 TABLET: 5; 325 TABLET ORAL at 16:22

## 2023-05-23 RX ADMIN — POTASSIUM CHLORIDE 20 MEQ: 1500 TABLET, EXTENDED RELEASE ORAL at 23:15

## 2023-05-23 RX ADMIN — SODIUM CHLORIDE 500 ML: 9 INJECTION, SOLUTION INTRAVENOUS at 16:16

## 2023-05-23 RX ADMIN — VANCOMYCIN HYDROCHLORIDE 1750 MG: 10 INJECTION, POWDER, LYOPHILIZED, FOR SOLUTION INTRAVENOUS at 16:24

## 2023-05-23 RX ADMIN — ASPIRIN 243 MG: 81 TABLET, COATED ORAL at 16:22

## 2023-05-23 ASSESSMENT — PAIN DESCRIPTION - LOCATION
LOCATION: BACK;LEG
LOCATION: BACK;LEG

## 2023-05-23 ASSESSMENT — PAIN SCALES - GENERAL
PAINLEVEL_OUTOF10: 10
PAINLEVEL_OUTOF10: 6
PAINLEVEL_OUTOF10: 8

## 2023-05-23 ASSESSMENT — ENCOUNTER SYMPTOMS
BACK PAIN: 1
ABDOMINAL PAIN: 0
COUGH: 0
NAUSEA: 0
SHORTNESS OF BREATH: 0
VOMITING: 0
SORE THROAT: 0
DIARRHEA: 0

## 2023-05-23 ASSESSMENT — PAIN DESCRIPTION - ORIENTATION: ORIENTATION: LOWER;RIGHT;LEFT

## 2023-05-23 NOTE — ED NOTES
Urine sample obtained. Wiped and cleaned pt, provided with new diaper. Urine sample sent to lab. Taken to CT by transport. 1800 Back from CT.        James Duong RN  05/23/23 0169

## 2023-05-23 NOTE — ED TRIAGE NOTES
Pt to triage c/o generalized weakness and lower back pain radiating down the legs for the past 3 days. States she is normally able to ambulate with her walker which she is having trouble doing at this time. Denies any falls or recent injuries.

## 2023-05-23 NOTE — ED NOTES
MRI screening from completed in the system. Information provided by napoleon Kramer RN  05/23/23 5009

## 2023-05-23 NOTE — ED PROVIDER NOTES
Emergency Department Treatment Report        Patient: Baljit Perdue Age: [de-identified] y.o. Sex: female    YOB: 1945 Admit Date: 5/23/2023 PCP: Jayde Esquivel MD   MRN: [id number]  CSN: [id number]     Room: [facility] Time Dictated: 3:31 PM            Chief Complaint   Chief Complaint   Patient presents with    Extremity Weakness     Bilateral legs    Back Pain       History of Present Illness   [de-identified] y.o. female presents due to bilateral lower extremity weakness and back pain. Patient states over the last 2 days she has gotten progressively weaker. At baseline she is normally able to ambulate with a walker. Yesterday she had difficulty standing up and ambulating. Today she was unable to ambulate or stand up. She also admits to back pain. Back pain is in the mid back and radiates under her right breast or left breast depending on the day. She states that she has chronic back pain and follows with a pain specialist. she has a stimulator in her back due to her chronic back pain. Patient does admit to multiple falls in the past few months and the most recent was about a week ago. It is not the pain in her back that is preventing her from walking. She states she is weak. She states she has been trying to get into her pain specialist due to her back pain but has been unable to do so. She denies any fevers, chills, shortness of breath, chest pain, abdominal pain, nausea, vomiting, diarrhea, numbness, tingling. Review of Systems   Review of Systems   Constitutional:  Negative for chills and fever. HENT:  Negative for congestion and sore throat. Respiratory:  Negative for cough and shortness of breath. Cardiovascular:  Negative for chest pain and palpitations. Gastrointestinal:  Negative for abdominal pain, diarrhea, nausea and vomiting. Genitourinary:  Negative for dysuria and frequency. Musculoskeletal:  Positive for back pain. Negative for neck pain. Skin:  Negative for rash.    Neurological:

## 2023-05-23 NOTE — H&P
History and Physical          Subjective     HPI: Noemi Randhawa is a [de-identified] y.o. female with a PMHx of chronic pain s/p spinal stimulator, hypothyroid, CAD, CHF, HTN, HLD, DVT/PE who presented to the ED with severe back pain ongoing for more than 3 weeks and generalized weakness that has worsened over the past few days. She states she was seen by her pain management specialist about 3 weeks ago due to her chronic pain, and they adjusted her pain pump. Shortly after it was adjusted, she developed severe back pain. The pain moves from one side to the other. It's worse with movement. It radiates to her upper abdomen on both sides. She denies fever, cp, sob, cough, nvd, dysuria, urinary frequency. However she does report urinary retention that started today. She initially states she did not  the abx sent to her pharmacy last month, but then she states she did. Pharmacy was able to confirm that she picked up the abx that were prescribed to her which was cefadroxil. Urine culture was positive for >100,000 colonies E. Faecium on 4/23. Patient states she usually walks with a walker, but lately has not been able to due to the weakness. In the ED, VSS. Labs with K 3.3, Cr 1.31, ALT 69, AST 92, WBC 10. UA appears worsening than UA from 4/23/23. CT chest showed age indeterminate T4 compression fracture new from 2020, negative for pneumonia. Spinal surgery was consulted and requested MRI.      PMHx:  Past Medical History:   Diagnosis Date    Abnormal chest CT 2/6/2020    Adopted     Arthritis     Balance problems     Chronic cough 2/6/2020    GOMES (dyspnea on exertion) 2/28/2019    GERD (gastroesophageal reflux disease)     Gout     Hemochromatosis     High cholesterol     Hypertension     Hypothyroidism     Left knee pain     Left shoulder pain     Low blood potassium     Lumbar pain     Pain of left sacroiliac joint     Shoulder impingement        PSurgHx:  Past Surgical History:   Procedure Laterality Date

## 2023-05-23 NOTE — PROGRESS NOTES
[facility] Pharmacokinetic Monitoring Service - Vancomycin     Shayan Lee is a [de-identified] y.o. female starting on vancomycin therapy for Pneumonia (HAP). Pharmacy was consulted by Dr. Kvng Menjivar DO for monitoring and adjustment. Target Concentration: Dosing based on anticipated concentration <15 mg/L due to renal impairment/insufficiency    Additional Antimicrobials: Ceftriaxone    Pertinent Laboratory Values: Wt Readings from Last 1 Encounters:   05/23/23 187 lb (84.8 kg)     Temp Readings from Last 1 Encounters:   05/23/23 97.6 °F (36.4 °C) (Oral)     Estimated Creatinine Clearance: 33 mL/min (A) (based on SCr of 1.31 mg/dL (H)). Recent Labs     05/23/23  1500   CREATININE 1.31*   WBC 10.0     Procalcitonin: Not ordered    Pertinent Cultures:  Culture Date Source Results        MRSA Nasal Swab: not ordered. Order placed by pharmacy.     Plan:  Dosing recommendations based on Bayesian software  Patient received 1750 mg x 1 in ED, will follow with 750 mg q18h  Anticipated AUC of 480 and trough concentration of 16.3 at steady state  Renal labs as indicated   Vancomycin concentration ordered for 05/24 with AM labs   Pharmacy will continue to monitor patient and adjust therapy as indicated    Thank you for the consult,  [address] [place] [address]  5/23/2023 6:47 PM

## 2023-05-24 ENCOUNTER — APPOINTMENT (OUTPATIENT)
Facility: HOSPITAL | Age: 78
End: 2023-05-24
Payer: MEDICARE

## 2023-05-24 PROBLEM — Z71.89 ADVANCED CARE PLANNING/COUNSELING DISCUSSION: Status: ACTIVE | Noted: 2023-05-24

## 2023-05-24 PROBLEM — R53.81 DEBILITY: Status: ACTIVE | Noted: 2023-05-24

## 2023-05-24 PROBLEM — Z71.89 GOALS OF CARE, COUNSELING/DISCUSSION: Status: ACTIVE | Noted: 2023-05-24

## 2023-05-24 LAB
ALBUMIN SERPL-MCNC: 2.4 G/DL (ref 3.4–5)
ALBUMIN/GLOB SERPL: 0.7 (ref 0.8–1.7)
ALP SERPL-CCNC: 95 U/L (ref 45–117)
ALT SERPL-CCNC: 64 U/L (ref 13–56)
ANION GAP SERPL CALC-SCNC: 3 MMOL/L (ref 3–18)
AST SERPL-CCNC: 75 U/L (ref 10–38)
BASOPHILS # BLD: 0.1 K/UL (ref 0–0.1)
BASOPHILS NFR BLD: 1 % (ref 0–2)
BILIRUB DIRECT SERPL-MCNC: 0.1 MG/DL (ref 0–0.2)
BILIRUB SERPL-MCNC: 0.6 MG/DL (ref 0.2–1)
BUN SERPL-MCNC: 21 MG/DL (ref 7–18)
BUN/CREAT SERPL: 17 (ref 12–20)
CALCIUM SERPL-MCNC: 8.9 MG/DL (ref 8.5–10.1)
CHLORIDE SERPL-SCNC: 108 MMOL/L (ref 100–111)
CO2 SERPL-SCNC: 32 MMOL/L (ref 21–32)
CREAT SERPL-MCNC: 1.27 MG/DL (ref 0.6–1.3)
DIFFERENTIAL METHOD BLD: ABNORMAL
EOSINOPHIL # BLD: 0.3 K/UL (ref 0–0.4)
EOSINOPHIL NFR BLD: 3 % (ref 0–5)
ERYTHROCYTE [DISTWIDTH] IN BLOOD BY AUTOMATED COUNT: 18 % (ref 11.6–14.5)
GLOBULIN SER CALC-MCNC: 3.6 G/DL (ref 2–4)
GLUCOSE SERPL-MCNC: 102 MG/DL (ref 74–99)
HCT VFR BLD AUTO: 36.5 % (ref 35–45)
HGB BLD-MCNC: 11.4 G/DL (ref 12–16)
IMM GRANULOCYTES # BLD AUTO: 0.1 K/UL (ref 0–0.04)
IMM GRANULOCYTES NFR BLD AUTO: 1 % (ref 0–0.5)
LYMPHOCYTES # BLD: 1.1 K/UL (ref 0.9–3.6)
LYMPHOCYTES NFR BLD: 11 % (ref 21–52)
MAGNESIUM SERPL-MCNC: 2.3 MG/DL (ref 1.6–2.6)
MCH RBC QN AUTO: 28.5 PG (ref 24–34)
MCHC RBC AUTO-ENTMCNC: 31.2 G/DL (ref 31–37)
MCV RBC AUTO: 91.3 FL (ref 78–100)
MONOCYTES # BLD: 1 K/UL (ref 0.05–1.2)
MONOCYTES NFR BLD: 10 % (ref 3–10)
NEUTS SEG # BLD: 7.4 K/UL (ref 1.8–8)
NEUTS SEG NFR BLD: 75 % (ref 40–73)
NRBC # BLD: 0 K/UL (ref 0–0.01)
NRBC BLD-RTO: 0 PER 100 WBC
PHOSPHATE SERPL-MCNC: 2.3 MG/DL (ref 2.5–4.9)
PLATELET # BLD AUTO: 217 K/UL (ref 135–420)
PMV BLD AUTO: 10.3 FL (ref 9.2–11.8)
POTASSIUM SERPL-SCNC: 2.8 MMOL/L (ref 3.5–5.5)
PROT SERPL-MCNC: 6 G/DL (ref 6.4–8.2)
RBC # BLD AUTO: 4 M/UL (ref 4.2–5.3)
SODIUM SERPL-SCNC: 143 MMOL/L (ref 136–145)
WBC # BLD AUTO: 9.9 K/UL (ref 4.6–13.2)

## 2023-05-24 PROCEDURE — 99223 1ST HOSP IP/OBS HIGH 75: CPT | Performed by: NURSE PRACTITIONER

## 2023-05-24 PROCEDURE — 97530 THERAPEUTIC ACTIVITIES: CPT

## 2023-05-24 PROCEDURE — 97535 SELF CARE MNGMENT TRAINING: CPT

## 2023-05-24 PROCEDURE — 2580000003 HC RX 258: Performed by: PHYSICIAN ASSISTANT

## 2023-05-24 PROCEDURE — 97166 OT EVAL MOD COMPLEX 45 MIN: CPT

## 2023-05-24 PROCEDURE — 1100000000 HC RM PRIVATE

## 2023-05-24 PROCEDURE — 6360000002 HC RX W HCPCS: Performed by: PHYSICIAN ASSISTANT

## 2023-05-24 PROCEDURE — 6370000000 HC RX 637 (ALT 250 FOR IP): Performed by: HOSPITALIST

## 2023-05-24 PROCEDURE — 80076 HEPATIC FUNCTION PANEL: CPT

## 2023-05-24 PROCEDURE — 36415 COLL VENOUS BLD VENIPUNCTURE: CPT

## 2023-05-24 PROCEDURE — 99233 SBSQ HOSP IP/OBS HIGH 50: CPT | Performed by: HOSPITALIST

## 2023-05-24 PROCEDURE — 83735 ASSAY OF MAGNESIUM: CPT

## 2023-05-24 PROCEDURE — 76705 ECHO EXAM OF ABDOMEN: CPT

## 2023-05-24 PROCEDURE — 85025 COMPLETE CBC W/AUTO DIFF WBC: CPT

## 2023-05-24 PROCEDURE — 97162 PT EVAL MOD COMPLEX 30 MIN: CPT

## 2023-05-24 PROCEDURE — 6370000000 HC RX 637 (ALT 250 FOR IP): Performed by: INTERNAL MEDICINE

## 2023-05-24 PROCEDURE — 72146 MRI CHEST SPINE W/O DYE: CPT

## 2023-05-24 PROCEDURE — 6370000000 HC RX 637 (ALT 250 FOR IP): Performed by: PHYSICIAN ASSISTANT

## 2023-05-24 PROCEDURE — 84100 ASSAY OF PHOSPHORUS: CPT

## 2023-05-24 PROCEDURE — 80048 BASIC METABOLIC PNL TOTAL CA: CPT

## 2023-05-24 RX ORDER — POTASSIUM CHLORIDE 20 MEQ/1
40 TABLET, EXTENDED RELEASE ORAL
Status: COMPLETED | OUTPATIENT
Start: 2023-05-24 | End: 2023-05-24

## 2023-05-24 RX ORDER — AMOXICILLIN AND CLAVULANATE POTASSIUM 875; 125 MG/1; MG/1
1 TABLET, FILM COATED ORAL EVERY 12 HOURS SCHEDULED
Status: DISCONTINUED | OUTPATIENT
Start: 2023-05-24 | End: 2023-05-27 | Stop reason: HOSPADM

## 2023-05-24 RX ADMIN — METOPROLOL TARTRATE 25 MG: 25 TABLET, FILM COATED ORAL at 08:51

## 2023-05-24 RX ADMIN — POTASSIUM CHLORIDE 40 MEQ: 1500 TABLET, EXTENDED RELEASE ORAL at 08:52

## 2023-05-24 RX ADMIN — POTASSIUM CHLORIDE 40 MEQ: 1500 TABLET, EXTENDED RELEASE ORAL at 12:50

## 2023-05-24 RX ADMIN — POTASSIUM CHLORIDE 40 MEQ: 1500 TABLET, EXTENDED RELEASE ORAL at 16:44

## 2023-05-24 RX ADMIN — AMOXICILLIN AND CLAVULANATE POTASSIUM 1 TABLET: 875; 125 TABLET, FILM COATED ORAL at 21:00

## 2023-05-24 RX ADMIN — LEVOTHYROXINE SODIUM 25 MCG: 25 TABLET ORAL at 08:51

## 2023-05-24 RX ADMIN — ACETAMINOPHEN 325MG 650 MG: 325 TABLET ORAL at 15:19

## 2023-05-24 RX ADMIN — SODIUM CHLORIDE, PRESERVATIVE FREE 10 ML: 5 INJECTION INTRAVENOUS at 20:00

## 2023-05-24 RX ADMIN — ENOXAPARIN SODIUM 40 MG: 100 INJECTION SUBCUTANEOUS at 08:51

## 2023-05-24 RX ADMIN — ACETAMINOPHEN 325MG 650 MG: 325 TABLET ORAL at 08:52

## 2023-05-24 RX ADMIN — OXYCODONE HYDROCHLORIDE 5 MG: 5 TABLET ORAL at 15:19

## 2023-05-24 RX ADMIN — AMPICILLIN 1000 MG: 1 INJECTION, POWDER, FOR SOLUTION INTRAMUSCULAR; INTRAVENOUS at 01:31

## 2023-05-24 RX ADMIN — ROSUVASTATIN CALCIUM 40 MG: 20 TABLET, COATED ORAL at 21:41

## 2023-05-24 RX ADMIN — OXYCODONE HYDROCHLORIDE 5 MG: 5 TABLET ORAL at 21:40

## 2023-05-24 RX ADMIN — ASPIRIN 81 MG: 81 TABLET, COATED ORAL at 08:51

## 2023-05-24 RX ADMIN — PANTOPRAZOLE SODIUM 40 MG: 40 TABLET, DELAYED RELEASE ORAL at 08:51

## 2023-05-24 RX ADMIN — SODIUM CHLORIDE, PRESERVATIVE FREE 10 ML: 5 INJECTION INTRAVENOUS at 08:52

## 2023-05-24 RX ADMIN — AMPICILLIN 1000 MG: 1 INJECTION, POWDER, FOR SOLUTION INTRAMUSCULAR; INTRAVENOUS at 08:51

## 2023-05-24 RX ADMIN — OXYCODONE HYDROCHLORIDE 5 MG: 5 TABLET ORAL at 08:51

## 2023-05-24 ASSESSMENT — PAIN DESCRIPTION - LOCATION
LOCATION: BACK;LEG
LOCATION: BACK

## 2023-05-24 ASSESSMENT — PAIN SCALES - GENERAL
PAINLEVEL_OUTOF10: 3
PAINLEVEL_OUTOF10: 8
PAINLEVEL_OUTOF10: 0
PAINLEVEL_OUTOF10: 3
PAINLEVEL_OUTOF10: 4
PAINLEVEL_OUTOF10: 5
PAINLEVEL_OUTOF10: 10

## 2023-05-24 ASSESSMENT — PAIN DESCRIPTION - DESCRIPTORS
DESCRIPTORS: ACHING

## 2023-05-24 ASSESSMENT — PAIN DESCRIPTION - ORIENTATION
ORIENTATION: LEFT
ORIENTATION: RIGHT;LEFT

## 2023-05-24 ASSESSMENT — PAIN SCALES - WONG BAKER: WONGBAKER_NUMERICALRESPONSE: 0

## 2023-05-24 NOTE — PROGRESS NOTES
5/23/23 @ 2009 Patient has an implanted Neuro Stimulator, prior to patient mri exam, patient must have Stimulator remote to place stimulator in safe to scan mode prior to mri exam. Also both the patient stimulator and remote to stimulator must be fully  charged before patient can have mri completed. If these above factors are not met patient run the risk of a severe burn to back.

## 2023-05-24 NOTE — ACP (ADVANCE CARE PLANNING)
Advance Care Planning     General Advance Care Planning (ACP) Conversation  Advanced Steps [facility] (Physician Orders for Scope of Treatment)      Date of Conversation: 5/24/2023   Conducted with: Patient with Decision Making Capacity    Healthcare Decision Maker:    Primary Decision Maker: [place] Oquendo - [phone]    Secondary Decision Maker: Brea Solis - [phone]  Click here to complete Healthcare Decision Makers including selection of the Healthcare Decision Maker Relationship (ie \"Primary\"). Today we documented desired Decision Maker(s), who is (are) NOT Legal Next of Kin. ACP documents are required for decision maker authority. Content/Action Overview: Has ACP document(s) on file - reflects the patient's care preferences  Reviewed DNR/DNI and patient elects DNR order - completed portable DNR form & placed order  treatment goals, artificial nutrition, ventilation preferences, and resuscitation preferences    Conversation Topics     Sigrid Gallegos is a [de-identified] y.o. female with a PMHx of chronic pain s/p spinal stimulator, hypothyroid, CAD, CHF, HTN, HLD, DVT/PE. Patient was admitted via the ED with severe back pain ongoing for more than 3 weeks and generalized weakness. She is followed by a pain management specialist  and had adjusted to her pain pump  3 weeks ago. Ms Ibrahima Brown lives with with a long time friend, Hermelindo Campos. 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 Team allowed patient time to share her store of service kate others during her life time. She was never  and has no children. She was able to complete an [place] this AM with Norm Talley. She named her good friends daughter, Orlando Sherman as primary agent and her friend, Hermelindo Campos as surrogate. Patient had completed page 2 of [facility] Care Instructions\". Palliative team reviewed the AMD with Ms. Baron.  She clearly shared

## 2023-05-24 NOTE — PROGRESS NOTES
[facility]  Progress Note    Patient: Nikki Montalvo Age: [de-identified] y.o. : 1945 MR#: [id number] SSN: [id number]  Date/Time: 2023     Subjective: Patient lying in the bed, complains of back pain, worse with movement, not able to walk around due to severe back pain. Does have some generalized weakness and also lower extremity weakness. Known history of chronic back pain with spinal stimulator. Assessment/Plan:   Acute on chronic back pain  Recent fall   T4 compression fracture, age-indeterminate  UTI  Hypokalemia  Mild abnormal LFT  CKD stage III  History of CAD  Hypertension  Hypothyroidism  Morbid obesity    Plan  CT results noted, MRI pending  Spine surgery consulted, discussed with Dr. Priyank Gallo  Continue antibiotics per ID, ID input noted  Discussed with ID Dr. Delphine Cutler potassium, monitor labs in the morning  Pending liver ultrasound  Continue Synthroid  Continue metoprolol and monitor blood pressure  PT/OT eval and treatment    Discussed with the patient at the bedside and explained about my above plan care. Patient understood and agreed with my plan. I spent 40 minutes with the patient in face-to-face consultation, of which greater than 50% was spent in counseling and coordination of care as described above. Case discussed with:  [x]Patient  []Family  [x]Nursing  []Case Management  DVT Prophylaxis:  [x]Lovenox  []Hep SQ  []SCDs  []Coumadin   []Eliquis/Xarelto     Objective:   VS: /77   Pulse 72   Temp 97.1 °F (36.2 °C) (Oral)   Resp 20   Ht 5' (1.524 m)   Wt 187 lb (84.8 kg)   SpO2 97%   BMI 36.52 kg/m²    Tmax/24hrs: Temp (24hrs), Av °F (36.1 °C), Min:97 °F (36.1 °C), Max:97.1 °F (36.2 °C)  IOBRIEFNo intake or output data in the 24 hours ending 23 1513    General:  Alert, cooperative, no acute distress    HEENT: PERRLA, anicteric sclerae. Pulmonary:  CTA Bilaterally. No Wheezing/Rales.   Cardiovascular: Regular rate and

## 2023-05-24 NOTE — PROGRESS NOTES
Advance Care Planning     Advance Care Planning Inpatient Note  Spiritual Care Department    Today's Date: 5/24/2023  Unit: [facility] [address]    Received request from admission screening. Upon review of chart and communication with care team, patient's decision making abilities are not in question. . Patient was/were present in the room during visit. Goals of ACP Conversation:  Discuss advance care planning documents  Facilitate a discussion related to patient's goals of care as they align with the patient's values and beliefs. Health Care Decision Makers:       Primary Decision Maker: Shaista Yoo - [phone]  Summary:  Completed [address] 20 Decision Maker  Updated Healthcare Decision Maker                 Secondary Decision Maker: Nan Iqbal, [facility] Planning Documents (Patient Wishes):  Healthcare Power of /Advance Directive Appointment of [address]  Living Will/Advance Directive     Assessment:     05/24/23 0845   Encounter Summary   Encounter Overview/Reason  Initial Encounter   Service Provided For: Patient   Referral/Consult From: Nurse   Support System Family members; Children   Last Encounter  05/24/23  ([id number])   Complexity of Encounter Moderate   Begin Time 0825   End Time  0846   Total Time Calculated 21 min   Encounter    Type Initial Screen/Assessment   Spiritual/Emotional needs   Type Spiritual Support   Advance Care Planning   Type ACP conversation; Completed AD/ACP document(s); Care Preferences Addressed   Assessment/Intervention/Outcome   Assessment Calm;Coping   Intervention Active listening   Outcome Encouraged   Plan and Referrals   Plan/Referrals Continue to visit, (comment)         Interventions:  Assisted in the completion of documents according to patient's wishes at this time    Care Preferences Communicated:   No    Outcomes/Plan:  ACP Discussion: Completed  New advance directive completed.   Existing

## 2023-05-25 LAB
ALBUMIN SERPL-MCNC: 2.3 G/DL (ref 3.4–5)
ALBUMIN/GLOB SERPL: 0.7 (ref 0.8–1.7)
ALP SERPL-CCNC: 90 U/L (ref 45–117)
ALT SERPL-CCNC: 56 U/L (ref 13–56)
ANION GAP SERPL CALC-SCNC: 3 MMOL/L (ref 3–18)
AST SERPL-CCNC: 48 U/L (ref 10–38)
BASOPHILS # BLD: 0.1 K/UL (ref 0–0.1)
BASOPHILS NFR BLD: 1 % (ref 0–2)
BILIRUB SERPL-MCNC: 0.4 MG/DL (ref 0.2–1)
BUN SERPL-MCNC: 29 MG/DL (ref 7–18)
BUN/CREAT SERPL: 22 (ref 12–20)
CALCIUM SERPL-MCNC: 9.3 MG/DL (ref 8.5–10.1)
CHLORIDE SERPL-SCNC: 111 MMOL/L (ref 100–111)
CO2 SERPL-SCNC: 28 MMOL/L (ref 21–32)
CREAT SERPL-MCNC: 1.29 MG/DL (ref 0.6–1.3)
DIFFERENTIAL METHOD BLD: ABNORMAL
EOSINOPHIL # BLD: 0.4 K/UL (ref 0–0.4)
EOSINOPHIL NFR BLD: 5 % (ref 0–5)
ERYTHROCYTE [DISTWIDTH] IN BLOOD BY AUTOMATED COUNT: 18.6 % (ref 11.6–14.5)
GLOBULIN SER CALC-MCNC: 3.4 G/DL (ref 2–4)
GLUCOSE SERPL-MCNC: 125 MG/DL (ref 74–99)
HCT VFR BLD AUTO: 35.1 % (ref 35–45)
HCV AB SER IA-ACNC: 0.08 INDEX
HCV AB SERPL QL IA: NEGATIVE
HGB BLD-MCNC: 10.7 G/DL (ref 12–16)
IMM GRANULOCYTES # BLD AUTO: 0.1 K/UL (ref 0–0.04)
IMM GRANULOCYTES NFR BLD AUTO: 1 % (ref 0–0.5)
LYMPHOCYTES # BLD: 1 K/UL (ref 0.9–3.6)
LYMPHOCYTES NFR BLD: 12 % (ref 21–52)
Lab: NORMAL
MCH RBC QN AUTO: 28 PG (ref 24–34)
MCHC RBC AUTO-ENTMCNC: 30.5 G/DL (ref 31–37)
MCV RBC AUTO: 91.9 FL (ref 78–100)
MONOCYTES # BLD: 0.8 K/UL (ref 0.05–1.2)
MONOCYTES NFR BLD: 10 % (ref 3–10)
NEUTS SEG # BLD: 6.2 K/UL (ref 1.8–8)
NEUTS SEG NFR BLD: 72 % (ref 40–73)
NRBC # BLD: 0 K/UL (ref 0–0.01)
NRBC BLD-RTO: 0 PER 100 WBC
PLATELET # BLD AUTO: 221 K/UL (ref 135–420)
PMV BLD AUTO: 10.4 FL (ref 9.2–11.8)
POTASSIUM SERPL-SCNC: 4.8 MMOL/L (ref 3.5–5.5)
PROT SERPL-MCNC: 5.7 G/DL (ref 6.4–8.2)
RBC # BLD AUTO: 3.82 M/UL (ref 4.2–5.3)
SODIUM SERPL-SCNC: 142 MMOL/L (ref 136–145)
WBC # BLD AUTO: 8.5 K/UL (ref 4.6–13.2)

## 2023-05-25 PROCEDURE — 97530 THERAPEUTIC ACTIVITIES: CPT

## 2023-05-25 PROCEDURE — 2580000003 HC RX 258: Performed by: PHYSICIAN ASSISTANT

## 2023-05-25 PROCEDURE — 1100000000 HC RM PRIVATE

## 2023-05-25 PROCEDURE — 99232 SBSQ HOSP IP/OBS MODERATE 35: CPT | Performed by: HOSPITALIST

## 2023-05-25 PROCEDURE — 99232 SBSQ HOSP IP/OBS MODERATE 35: CPT | Performed by: INTERNAL MEDICINE

## 2023-05-25 PROCEDURE — 97535 SELF CARE MNGMENT TRAINING: CPT

## 2023-05-25 PROCEDURE — 6370000000 HC RX 637 (ALT 250 FOR IP): Performed by: INTERNAL MEDICINE

## 2023-05-25 PROCEDURE — 86803 HEPATITIS C AB TEST: CPT

## 2023-05-25 PROCEDURE — 6360000002 HC RX W HCPCS: Performed by: PHYSICIAN ASSISTANT

## 2023-05-25 PROCEDURE — 85025 COMPLETE CBC W/AUTO DIFF WBC: CPT

## 2023-05-25 PROCEDURE — 97110 THERAPEUTIC EXERCISES: CPT

## 2023-05-25 PROCEDURE — 6370000000 HC RX 637 (ALT 250 FOR IP): Performed by: PHYSICIAN ASSISTANT

## 2023-05-25 PROCEDURE — 94761 N-INVAS EAR/PLS OXIMETRY MLT: CPT

## 2023-05-25 PROCEDURE — 36415 COLL VENOUS BLD VENIPUNCTURE: CPT

## 2023-05-25 PROCEDURE — 80053 COMPREHEN METABOLIC PANEL: CPT

## 2023-05-25 RX ORDER — LIDOCAINE 4 G/G
1 PATCH TOPICAL DAILY
Status: DISCONTINUED | OUTPATIENT
Start: 2023-05-25 | End: 2023-05-27 | Stop reason: HOSPADM

## 2023-05-25 RX ADMIN — LEVOTHYROXINE SODIUM 25 MCG: 25 TABLET ORAL at 08:54

## 2023-05-25 RX ADMIN — ROSUVASTATIN CALCIUM 40 MG: 20 TABLET, COATED ORAL at 20:23

## 2023-05-25 RX ADMIN — OXYCODONE HYDROCHLORIDE 5 MG: 5 TABLET ORAL at 16:24

## 2023-05-25 RX ADMIN — SODIUM CHLORIDE, PRESERVATIVE FREE 10 ML: 5 INJECTION INTRAVENOUS at 08:56

## 2023-05-25 RX ADMIN — OXYCODONE HYDROCHLORIDE 5 MG: 5 TABLET ORAL at 08:54

## 2023-05-25 RX ADMIN — AMOXICILLIN AND CLAVULANATE POTASSIUM 1 TABLET: 875; 125 TABLET, FILM COATED ORAL at 20:22

## 2023-05-25 RX ADMIN — ACETAMINOPHEN 325MG 650 MG: 325 TABLET ORAL at 08:55

## 2023-05-25 RX ADMIN — PANTOPRAZOLE SODIUM 40 MG: 40 TABLET, DELAYED RELEASE ORAL at 06:22

## 2023-05-25 RX ADMIN — ENOXAPARIN SODIUM 40 MG: 100 INJECTION SUBCUTANEOUS at 08:54

## 2023-05-25 RX ADMIN — ACETAMINOPHEN 325MG 650 MG: 325 TABLET ORAL at 16:24

## 2023-05-25 RX ADMIN — SODIUM CHLORIDE, PRESERVATIVE FREE 10 ML: 5 INJECTION INTRAVENOUS at 20:24

## 2023-05-25 RX ADMIN — AMOXICILLIN AND CLAVULANATE POTASSIUM 1 TABLET: 875; 125 TABLET, FILM COATED ORAL at 08:54

## 2023-05-25 RX ADMIN — METOPROLOL TARTRATE 25 MG: 25 TABLET, FILM COATED ORAL at 08:54

## 2023-05-25 RX ADMIN — ASPIRIN 81 MG: 81 TABLET, COATED ORAL at 08:54

## 2023-05-25 ASSESSMENT — PAIN SCALES - GENERAL
PAINLEVEL_OUTOF10: 4
PAINLEVEL_OUTOF10: 8
PAINLEVEL_OUTOF10: 3
PAINLEVEL_OUTOF10: 9
PAINLEVEL_OUTOF10: 3
PAINLEVEL_OUTOF10: 7

## 2023-05-25 ASSESSMENT — PAIN SCALES - WONG BAKER
WONGBAKER_NUMERICALRESPONSE: 0
WONGBAKER_NUMERICALRESPONSE: 0

## 2023-05-25 ASSESSMENT — PAIN DESCRIPTION - DESCRIPTORS
DESCRIPTORS: ACHING
DESCRIPTORS: ACHING

## 2023-05-25 ASSESSMENT — PAIN DESCRIPTION - LOCATION
LOCATION: BACK
LOCATION: BACK

## 2023-05-25 ASSESSMENT — PAIN DESCRIPTION - ORIENTATION
ORIENTATION: LEFT
ORIENTATION: LEFT

## 2023-05-25 NOTE — PROGRESS NOTES
Palliative Medicine follow-up note  [facility]: [phone]  [facility]: [address] [place] [address] Way: [phone]    Patient Name: Alyson Huitron  YOB: 1945    Date of Initial Consult: 5/24/2023  Date of service: 5/25/23   Reason for Consult: goals of care   Requesting Provider: Ms Sunshine Cueva [place]  Primary Care Physician: Nina Rao MD      SUMMARY:   Alyson Huitron is a [de-identified]y.o. year old with a past history of chronic pain s/p spinal stimulator, CAD, CHF, hypertension, HLD, DVT/PE , who was admitted on 5/23/2023 from home  with a diagnosis of UTI, T4 compression fracture . Current medical issues leading to Palliative Medicine involvement include: [de-identified]year old chronically ill female has several life limiting illnesses. Palliative medicine is consulted for goals of care discussions. 5/25/23: Patient was seen in presence of palliative care RN. Patient is eating her lunch. Continues to have middle back pain radiating to the both flank area. Also has bilateral leg heaviness and leg edema/swelling. Dyspnea on exertion present. No cough currently. No headaches or dizziness. PALLIATIVE DIAGNOSES:   Goals of care  Advanced care plan discussions  UTI  Debility        PLAN:   Goals of care     5/25/23: Patient was seen in presence of palliative care RN. Patient was eating her lunch. Patient has some dyspnea on exertion as well as back pain with leg swelling. Patient knows that she has a fracture of her vertebra as well as history of CHF and was on Lasix. We introduced ourselves and explained her the purpose of my visit. Patient is awake and oriented x3 and able to make her medical decisions. She states she lives with her friend. She has had some problem walking prior to coming to the hospital.  She wants to focus more on pain management and conservative treatment.   She confirmed that she does not want any aggressive care in form of resuscitative

## 2023-05-25 NOTE — PROGRESS NOTES
[facility]  (A Division of [address] [place] [address])      Consultation Note      Date of Admission: 5/23/2023    Date of Note: 5/25/2023      Reason for Referral: complicated UTI  Referring Physician: Dr. Mariano Alexander from this admission:   4/23 urine culture: Greater than 100,000 Enterococcus faecium-sensitive to all antibiotics with intermediate sensitivities to nitrofurantoin  5/23 urine culture: 10,000 gram-negative rods    Current Antimicrobials:    Prior Antimicrobials:  Amoxicillin 5/24- present Ampicillin 5/23 to 5/24       Assessment:         Complicated UTI: Recent UA and culture from 4/23 with Enterococcus; repeat urine culture from 5/23 is currently with GNR  Elevated lactic acid: Resolved on repeat testing  Acute back pain limiting mobility  T4 compression fracture noted on CT scan: 5.24 MRI with acute T4 compression fracture  Multiple pulmonary nodules[de-identified] Unchanged when compared to prior CT scans  Electrolyte abnormalities  Mild transaminitis  Chronic lower extremity lymphedema    Plan:   Continue p.o. Augmentin 875 twice daily.   -Since this is a recurrent infection we will plan for 5 to 7 days  Follow-up 5/23 urine cultures-gram-thus far growing negative rods    Abdominal ultrasound reviewed-no hydronephrosis, no masses or intrahepatic biliary processes  hepatitis C antibody- negative    Consider IR consult for kyphoplasty    Discussed with Dr. Carla Brock. DO [facility]  [address], [address] [place] [address]  Office: [phone], Ext [phone]      Lines / Catheters:  peripheral      Subjective:   Patient seen and examined at bedside. She still very tearful and upset today. Ongoing pain in her back radiating into her left side. Does not have any specific urinary tract symptoms other than some increased frequency.     Tmax 98.2  WBCs 8.5      HPI:  Jama Griffith is a [de-identified] y.o. female with a PMHx of

## 2023-05-25 NOTE — PROGRESS NOTES
[facility]  Progress Note    Patient: Norman Prabhakar Age: [de-identified] y.o. : 1945 MR#: [id number] SSN: [id number]  Date/Time: 2023     Subjective: Patient lying in the bed, still having back pain, worse with exertion. Assessment/Plan:   Acute on chronic back pain  Recent fall   T4 compression fracture, age-indeterminate  UTI  Hypokalemia  Mild abnormal LFT  CKD stage III  History of CAD  Hypertension  Hypothyroidism  Morbid obesity    Plan  MRI results noted  Discussed with spine surgery, if pain is an issue recommend kyphoplasty  IR consult for kyphoplasty  Continue antibiotics per ID, ID input noted  Discussed with ID Dr. Jefe Plaza  Pending liver ultrasound  Continue Synthroid  Continue metoprolol and monitor blood pressure  PT/OT eval and treatment    Discussed with the patient and daughter at the bedside and explained about my above plan care. Patient understood and agreed with my plan. Disposition: Needs SNF placement    Case discussed with:  [x]Patient  [x]Family  [x]Nursing  []Case Management  DVT Prophylaxis:  [x]Lovenox  []Hep SQ  []SCDs  []Coumadin   []Eliquis/Xarelto     Objective:   VS: BP (!) 158/82   Pulse 73   Temp 97.1 °F (36.2 °C) (Oral)   Resp 17   Ht 5' (1.524 m)   Wt 187 lb (84.8 kg)   SpO2 96%   BMI 36.52 kg/m²    Tmax/24hrs: Temp (24hrs), Av.4 °F (36.3 °C), Min:97 °F (36.1 °C), Max:98.2 °F (36.8 °C)  IOBRIEF  Intake/Output Summary (Last 24 hours) at 2023 1828  Last data filed at 2023 7425  Gross per 24 hour   Intake --   Output 400 ml   Net -400 ml       General:  Alert, cooperative, no acute distress    Pulmonary:  CTA Bilaterally. No Wheezing/Rales. Cardiovascular: Regular rate and Rhythm. GI:  Soft, Non distended, Non tender. + Bowel sounds. Extremities:  No edema. No calf tenderness. Psych: Good insight. Not anxious or agitated. Neurologic: Alert and oriented X 3.   Upper extremity 5/5, lower extremity moves but

## 2023-05-25 NOTE — PALLIATIVE CARE
Palliative Medicine      Palliative team visited patient. She is sitting up in bed eating her lunch. States she is feeling \"better\". Pleasant and talkative with our team. Still having minor discomfort. Goals of care defined. Patient has an AMD and POST on file. Will sign off. Please re-consult team as needed/if appropriate. Thank you for the Palliative Medicine consult and allowing us to participate in the care of Ms. Baron. CODE STATUS: DNR/DNI, LIMITED INTERVENTIONS, FEEDING TUBE FOR SHORT TRIAL.       Amanda Bazan RN  Palliative Medicine Inpatient RN  [facility]  Palliative COPE Line: [phone])

## 2023-05-25 NOTE — CONSULTS
[address] [facility]: [phone]  [facility]: [address] [place] [address] Way: [phone]    Patient Name: Sandra Quinones  YOB: 1945    Date of Initial Consult: 5/24/2023   Reason for Consult: goals of care   Requesting Provider: Ms Jeremy Hancock, [address]  Primary Care Physician: Noe Zavala MD      SUMMARY:   Sandra Quinones is a [de-identified]y.o. year old with a past history of chronic pain s/p spinal stimulator, CAD, CHF, hypertension, HLD, DVT/PE , who was admitted on 5/23/2023 from home  with a diagnosis of UTI, T4 compression fracture . Current medical issues leading to Palliative Medicine involvement include: [de-identified]year old chronically ill female has several life limiting illnesses. Palliative medicine is consulted for goals of care discussions. PALLIATIVE DIAGNOSES:   Goals of care  Advanced care plan discussions  UTI  Debility        PLAN:   Goals of care Seen along with Ms Berto Carrillo RN. Patient is alert and oriented and able to participate in her medical decisions. We introduced our team and purpose for visit. Social: lives at home with room mate . She is a former Nun. Has no children, not . Worked in social service for 40 years. Patient has a good understanding of her overall medical condition. Goals of care including benefits and burdens discussed with patient. She was very clear she would not wish for CPR or intubation. POST form completion introduced and completed for DNR/DNI limited interventions no feeding tubes. Copy to patient and chart. Advanced care plan appreciate  assistance in completing AMD. Named her friend Malu Coleman as MPOA, and Romulo Watsonsumi as secondary MPOA  UTI dx prior to admission unfortunately antibiotic was not sensitive to bacteria E Faecium. ID has been consulted on IVAB.  Ct abd/ pelvis pending to rule out pyelo/ abscess  if develops Leukocytosis   Debility normally uses walker but current bed bound to weakness
rosuvastatin (CRESTOR) tablet 40 mg  40 mg Oral Daily EMILY Santos   40 mg at 05/24/23 2141        Allergies   Allergen Reactions    Ciprofloxacin Other (See Comments)     Achilles tendonitis/leg pain and swelling    Indomethacin Anaphylaxis, Hives and Swelling    Tomato Other (See Comments)     Heart burn       Review of Systems:  A comprehensive review of systems was negative except for that written in the History of Present Illness. Objective:     Vitals:    05/24/23 1201 05/24/23 1930 05/25/23 0000 05/25/23 0400   BP: 137/77 134/67 130/71 124/70   Pulse: 72 78 76 80   Resp: 20 18 18 18   Temp: 97.1 °F (36.2 °C) 98.2 °F (36.8 °C) 97 °F (36.1 °C) 97.4 °F (36.3 °C)   TempSrc: Oral Oral Oral Oral   SpO2: 97%      Weight:       Height:            Physical Exam:  General: no acute distress. Appears stated age. Neck: Normal cervical ROM. Skin: No new rashes or ulcers. Back: Antalgic ROM. Non tender. Extremities: Moves all extremities. Neuro: 3 or 4 /5 motor strength in her lower extremities. No gross sensory deficits. No hoffmans, no babinski, no clonus. Assessment:         Plan:     Patient with chronic back pain progression of weakness in her lower extremities with several falls over the last few months. MRI of her thoracic spine demonstrates acute T4 compression fracture that is stable without obstruction of the spinal canal. Likely not the source of this progression of weakness she is having. She has a spinal cord stimulator in place for chronic back pain. Neurologically she is intact. At this time recommend medical management, pain control. Patient will likely need SNF placement.      Signed By: Elizabeth Rosenthal PA-C     May 25, 2023
multilevel paraspinal). Extremities: Moves all four spontaneously. BLE edema. B/l TTP.   Skin: Warm and dry    Labs Reviewed:  CMP:   Lab Results   Component Value Date/Time    Sodium 142 05/25/2023 03:28 AM    Potassium 4.8 05/25/2023 03:28 AM    Chloride 111 05/25/2023 03:28 AM    CO2 28 05/25/2023 03:28 AM    BUN 29 (H) 05/25/2023 03:28 AM    Globulin 3.4 05/25/2023 03:28 AM    ALT 56 05/25/2023 03:28 AM   ]    CBC:   Lab Results   Component Value Date/Time    WBC 8.5 05/25/2023 03:28 AM    Hemoglobin 10.7 (L) 05/25/2023 03:28 AM    Hematocrit 35.1 05/25/2023 03:28 AM    Platelets 097 47/52/1676 03:28 AM   ]    COAGS:    ]    Blood Thinners:  Lovenox 40 mg SC qd
disease,  murmurs, peripheral edema, weight gain  GI:   nausea, vomiting, diarrhea, abdominal pain, prior C.diff, heartburn, melena, hematochezia, constipation  :  urinary frequency, dysuria, hematuria, bladder incontinence, flank pain, chronic chase, recurrent UTI's, urine retention  Neurologic:  seizures, syncope, prior CVA/TIA, confusion, memory impairment, neuropathy, weakness of extremities, difficulty speaking/word finding, headache, vision changes  Musculoskeletal:  myalgias, arthralgias, joint pain, joint swelling, back pain, falls  Skin:  Pruritis, rash, chronic stasis changes, diabetic foot ulcers, lymphedema, pressure wounds  Endocrine: polyuria, polydipsia, hair loss  Psych: anxiousness, prior substance abuse, suicidal ideation, depressed mood. Heme-Onc: prior DVT, easy bruising, fatigue, malignancy        Objective:      BP (!) 150/76   Pulse 79   Temp 97 °F (36.1 °C) (Oral)   Resp 20   Ht 5' (1.524 m)   Wt 187 lb (84.8 kg)   SpO2 95%   BMI 36.52 kg/m²   Temp (24hrs), Av.2 °F (36.2 °C), Min:97 °F (36.1 °C), Max:97.6 °F (36.4 °C)        General:   awake alert and oriented   Skin:   no rashes or skin lesions noted on limited exam   HEENT:  Normocephalic, atraumatic, PERRL, EOMI, no scleral icterus or pallor; no conjunctival hemmohage;    Lymph Nodes:   no cervical or inguinal adenopathy   Lungs:   non-labored, bilaterally clear to aspiration   Heart:  RRR, s1 and s2; no murmurs rubs or gallops, no edema, + pedal pulses   Abdomen:  soft, non-distended, active bowel sounds. Non-tender   Genitourinary:  deferred   Extremities:   no clubbing, cyanosis; no joint effusions or swelling; Full ROM of all large joints to the upper and lower extremities; muscle mass appropriate for age   Neurologic:  No gross focal sensory abnormalities; 5/5 muscle strength to upper and lower extremities. Speech appropirate. Cranial nerves intact   Psychiatric:   appropriate and interactive.        Labs: Results:

## 2023-05-26 VITALS
HEART RATE: 78 BPM | DIASTOLIC BLOOD PRESSURE: 71 MMHG | RESPIRATION RATE: 18 BRPM | HEIGHT: 60 IN | BODY MASS INDEX: 38.87 KG/M2 | SYSTOLIC BLOOD PRESSURE: 125 MMHG | OXYGEN SATURATION: 98 % | TEMPERATURE: 98.2 F | WEIGHT: 198 LBS

## 2023-05-26 PROBLEM — E87.6 HYPOKALEMIA: Status: RESOLVED | Noted: 2023-05-23 | Resolved: 2023-05-26

## 2023-05-26 PROBLEM — N39.0 UTI (URINARY TRACT INFECTION): Status: RESOLVED | Noted: 2022-11-03 | Resolved: 2023-05-26

## 2023-05-26 LAB
ANION GAP SERPL CALC-SCNC: 3 MMOL/L (ref 3–18)
BACTERIA SPEC CULT: ABNORMAL
BASOPHILS # BLD: 0.1 K/UL (ref 0–0.1)
BASOPHILS NFR BLD: 1 % (ref 0–2)
BUN SERPL-MCNC: 29 MG/DL (ref 7–18)
BUN/CREAT SERPL: 24 (ref 12–20)
CALCIUM SERPL-MCNC: 8.8 MG/DL (ref 8.5–10.1)
CC UR VC: ABNORMAL
CHLORIDE SERPL-SCNC: 113 MMOL/L (ref 100–111)
CO2 SERPL-SCNC: 26 MMOL/L (ref 21–32)
CREAT SERPL-MCNC: 1.19 MG/DL (ref 0.6–1.3)
DIFFERENTIAL METHOD BLD: ABNORMAL
EOSINOPHIL # BLD: 0.5 K/UL (ref 0–0.4)
EOSINOPHIL NFR BLD: 6 % (ref 0–5)
ERYTHROCYTE [DISTWIDTH] IN BLOOD BY AUTOMATED COUNT: 18.6 % (ref 11.6–14.5)
GLUCOSE SERPL-MCNC: 99 MG/DL (ref 74–99)
HCT VFR BLD AUTO: 32.9 % (ref 35–45)
HGB BLD-MCNC: 10 G/DL (ref 12–16)
IMM GRANULOCYTES # BLD AUTO: 0.1 K/UL (ref 0–0.04)
IMM GRANULOCYTES NFR BLD AUTO: 2 % (ref 0–0.5)
LYMPHOCYTES # BLD: 1.3 K/UL (ref 0.9–3.6)
LYMPHOCYTES NFR BLD: 15 % (ref 21–52)
MCH RBC QN AUTO: 28.2 PG (ref 24–34)
MCHC RBC AUTO-ENTMCNC: 30.4 G/DL (ref 31–37)
MCV RBC AUTO: 92.9 FL (ref 78–100)
MONOCYTES # BLD: 0.8 K/UL (ref 0.05–1.2)
MONOCYTES NFR BLD: 10 % (ref 3–10)
NEUTS SEG # BLD: 5.8 K/UL (ref 1.8–8)
NEUTS SEG NFR BLD: 68 % (ref 40–73)
NRBC # BLD: 0 K/UL (ref 0–0.01)
NRBC BLD-RTO: 0 PER 100 WBC
PLATELET # BLD AUTO: 200 K/UL (ref 135–420)
PMV BLD AUTO: 10.5 FL (ref 9.2–11.8)
POTASSIUM SERPL-SCNC: 4.4 MMOL/L (ref 3.5–5.5)
RBC # BLD AUTO: 3.54 M/UL (ref 4.2–5.3)
SERVICE CMNT-IMP: ABNORMAL
SODIUM SERPL-SCNC: 142 MMOL/L (ref 136–145)
WBC # BLD AUTO: 8.6 K/UL (ref 4.6–13.2)

## 2023-05-26 PROCEDURE — 2580000003 HC RX 258: Performed by: PHYSICIAN ASSISTANT

## 2023-05-26 PROCEDURE — 6370000000 HC RX 637 (ALT 250 FOR IP): Performed by: INTERNAL MEDICINE

## 2023-05-26 PROCEDURE — 1100000000 HC RM PRIVATE

## 2023-05-26 PROCEDURE — 85025 COMPLETE CBC W/AUTO DIFF WBC: CPT

## 2023-05-26 PROCEDURE — 99239 HOSP IP/OBS DSCHRG MGMT >30: CPT | Performed by: HOSPITALIST

## 2023-05-26 PROCEDURE — 6360000002 HC RX W HCPCS: Performed by: PHYSICIAN ASSISTANT

## 2023-05-26 PROCEDURE — 97535 SELF CARE MNGMENT TRAINING: CPT

## 2023-05-26 PROCEDURE — 97530 THERAPEUTIC ACTIVITIES: CPT

## 2023-05-26 PROCEDURE — 36415 COLL VENOUS BLD VENIPUNCTURE: CPT

## 2023-05-26 PROCEDURE — 80048 BASIC METABOLIC PNL TOTAL CA: CPT

## 2023-05-26 PROCEDURE — 6370000000 HC RX 637 (ALT 250 FOR IP): Performed by: PHYSICIAN ASSISTANT

## 2023-05-26 RX ORDER — FUROSEMIDE 40 MG/1
40 TABLET ORAL DAILY PRN
Qty: 180 TABLET | Refills: 1 | Status: SHIPPED | DISCHARGE
Start: 2023-05-26

## 2023-05-26 RX ORDER — OXYCODONE HYDROCHLORIDE 5 MG/1
5 TABLET ORAL EVERY 6 HOURS PRN
Qty: 8 TABLET | Refills: 0 | Status: SHIPPED | OUTPATIENT
Start: 2023-05-26 | End: 2023-05-29

## 2023-05-26 RX ORDER — ENOXAPARIN SODIUM 100 MG/ML
40 INJECTION SUBCUTANEOUS DAILY
Status: SHIPPED | DISCHARGE
Start: 2023-05-27

## 2023-05-26 RX ORDER — AMOXICILLIN AND CLAVULANATE POTASSIUM 875; 125 MG/1; MG/1
1 TABLET, FILM COATED ORAL EVERY 12 HOURS SCHEDULED
Qty: 6 TABLET | Refills: 0 | Status: SHIPPED | DISCHARGE
Start: 2023-05-26 | End: 2023-05-26 | Stop reason: HOSPADM

## 2023-05-26 RX ORDER — OXYCODONE HYDROCHLORIDE 5 MG/1
5 TABLET ORAL EVERY 6 HOURS PRN
Qty: 8 TABLET | Refills: 0 | Status: SHIPPED | OUTPATIENT
Start: 2023-05-26 | End: 2023-05-26 | Stop reason: SDUPTHER

## 2023-05-26 RX ORDER — GRANULES FOR ORAL 3 G/1
3 POWDER ORAL ONCE
Status: COMPLETED | OUTPATIENT
Start: 2023-05-26 | End: 2023-05-26

## 2023-05-26 RX ORDER — LIDOCAINE 4 G/G
1 PATCH TOPICAL DAILY
Status: SHIPPED | DISCHARGE
Start: 2023-05-27

## 2023-05-26 RX ADMIN — ASPIRIN 81 MG: 81 TABLET, COATED ORAL at 08:20

## 2023-05-26 RX ADMIN — GRANULES FOR ORAL SOLUTION 1 PACKET: 3 POWDER ORAL at 17:49

## 2023-05-26 RX ADMIN — ENOXAPARIN SODIUM 40 MG: 100 INJECTION SUBCUTANEOUS at 08:20

## 2023-05-26 RX ADMIN — AMOXICILLIN AND CLAVULANATE POTASSIUM 1 TABLET: 875; 125 TABLET, FILM COATED ORAL at 08:20

## 2023-05-26 RX ADMIN — METOPROLOL TARTRATE 25 MG: 25 TABLET, FILM COATED ORAL at 08:20

## 2023-05-26 RX ADMIN — PANTOPRAZOLE SODIUM 40 MG: 40 TABLET, DELAYED RELEASE ORAL at 07:52

## 2023-05-26 RX ADMIN — LEVOTHYROXINE SODIUM 25 MCG: 25 TABLET ORAL at 08:20

## 2023-05-26 RX ADMIN — SODIUM CHLORIDE, PRESERVATIVE FREE 10 ML: 5 INJECTION INTRAVENOUS at 08:23

## 2023-05-26 RX ADMIN — ACETAMINOPHEN 325MG 650 MG: 325 TABLET ORAL at 13:54

## 2023-05-26 ASSESSMENT — PAIN SCALES - GENERAL
PAINLEVEL_OUTOF10: 3
PAINLEVEL_OUTOF10: 3

## 2023-05-26 ASSESSMENT — PAIN DESCRIPTION - LOCATION: LOCATION: OTHER (COMMENT)

## 2023-05-26 NOTE — PROGRESS NOTES
Agree with IR on kyphoplasty. Limited benefit. Increased risk, especially with active infection present. Fracture should heal without intervention. Mobilize as tolerated. Palliative care for medication management. Please re-consult should question arise.

## 2023-05-26 NOTE — PLAN OF CARE
Problem: Discharge Planning  Goal: Discharge to home or other facility with appropriate resources  Outcome: Progressing     Problem: Safety - Adult  Goal: Free from fall injury  Outcome: Progressing     Problem: Skin/Tissue Integrity  Goal: Absence of new skin breakdown  Description: 1. Monitor for areas of redness and/or skin breakdown  2. Assess vascular access sites hourly  3. Every 4-6 hours minimum:  Change oxygen saturation probe site  4. Every 4-6 hours:  If on nasal continuous positive airway pressure, respiratory therapy assess nares and determine need for appliance change or resting period.   Outcome: Progressing
Problem: Occupational Therapy - Adult  Goal: By Discharge: Performs self-care activities at highest level of function for planned discharge setting. See evaluation for individualized goals. Description: Occupational Therapy Goals:  Initiated 5/24/2023 to be met within 7-10 days. 1.  Patient will perform grooming with modified independence seated edge of bed with good balance. 2.  Patient will perform bathing with supervision/set-up using adaptive equipment as needed. 3.  Patient will perform upper body dressing and lower body dressing  with supervision/set-up using adaptive equipment as needed. 4.  Patient will perform bed mobility with supervision/set-up in prep for ADL routine while adhering to spinal precautions. 5.  Patient will perform all aspects of toileting with supervision/set-up. 6.  Patient will participate in upper extremity therapeutic exercise/activities with supervision/set-up for 8-10 minutes to increase strength/endurance for ADLs. 7.  Patient will utilize energy conservation techniques during functional activities with min verbal cues.     PLOF: pt reports she takes a sponge bath while seated on tub transfer bench at sink side with friend/roommate assisting as needed e.g. washing her back, able to perform UB dressing and assist needed for LB dressing e.g. compression socks, functional mobility using rollator in home and electric scooter in community      Outcome: [address] TREATMENT    Patient: Sandra Quinones ([de-identified] y.o. female)  Date: 5/25/2023  Diagnosis: Elevated troponin [R77.8]  Back strain, initial encounter [S39.012A]  Compression fracture of body of thoracic vertebra (HCC) [S22.000A] Compression fracture of body of thoracic vertebra (HCC)      Precautions: Fall Risk, General Precautions,  ,  ,  ,  , Spinal Precautions: No Bending, No Lifting, No Twisting,  ,    PLOF:  pt reports she takes a sponge bath while seated on tub transfer bench at sink side
Problem: Occupational Therapy - Adult  Goal: By Discharge: Performs self-care activities at highest level of function for planned discharge setting. See evaluation for individualized goals. Description: Occupational Therapy Goals:  Initiated 5/24/2023 to be met within 7-10 days. 1.  Patient will perform grooming with modified independence seated edge of bed with good balance. 2.  Patient will perform bathing with supervision/set-up using adaptive equipment as needed. 3.  Patient will perform upper body dressing and lower body dressing  with supervision/set-up using adaptive equipment as needed. 4.  Patient will perform bed mobility with supervision/set-up in prep for ADL routine while adhering to spinal precautions. 5.  Patient will perform all aspects of toileting with supervision/set-up. 6.  Patient will participate in upper extremity therapeutic exercise/activities with supervision/set-up for 8-10 minutes to increase strength/endurance for ADLs. 7.  Patient will utilize energy conservation techniques during functional activities with min verbal cues.     PLOF: pt reports she takes a sponge bath while seated on tub transfer bench at sink side with friend/roommate assisting as needed e.g. washing her back, able to perform UB dressing and assist needed for LB dressing e.g. compression socks, functional mobility using rollator in home and electric scooter in community      Outcome: [address] TREATMENT    Patient: Cecelia Fito ([de-identified] y.o. female)  Date: 5/26/2023  Diagnosis: Elevated troponin [R77.8]  Back strain, initial encounter [S39.012A]  Compression fracture of body of thoracic vertebra (HCC) [S22.000A] Compression fracture of body of thoracic vertebra (HCC)      Precautions: Fall Risk, General Precautions,  ,  ,  ,  , Spinal Precautions: No Bending, No Lifting, No Twisting,  ,    PLOF:  pt reports she takes a sponge bath while seated on tub transfer bench at sink side
Problem: Physical Therapy - Adult  Goal: By Discharge: Performs mobility at highest level of function for planned discharge setting. See evaluation for individualized goals. Description: Physical Therapy Goals:  Initiated 5/24/2023 to be met within 7-10 days. 1.  Patient will move from supine to sit and sit to supine  in bed with minimal assistance/contact guard assist.    2.  Patient will transfer from bed to chair and chair to bed with minimal assistance/contact guard assist using the least restrictive device. 3.  Patient will perform sit to stand with minimal assistance/contact guard assist.  4.  Patient will ambulate with moderate assistance  for 10 feet with the least restrictive device. PLOF: Pt lives with a friend in a [facility] with ramp entry. Amb with rollator, increased difficulty recently. Multiple falls. Outcome: Progressing     PHYSICAL THERAPY EVALUATION    Patient: Sai Cuello ([de-identified] y.o. female)  Date: 5/24/2023  Primary Diagnosis: Elevated troponin [R77.8]  Back strain, initial encounter [T48.738Z]  Compression fracture of body of thoracic vertebra ([address] [S22.000A]       Precautions: Fall Risk,  ,  ,  ,  , Spinal Precautions: No Bending, No Lifting, No Twisting     ASSESSMENT :  RN cleared for mobility. Pt found semi-reclined in bed in NAD, c/o of back of LUQ pain. Edu pt on spinal precautions and log roll. Increased motivation needed for  transfer. Sup-sit with maxAX1, pt yelling out in pain but able to sit on EOB for ~8 minutes with good/fair balance. Pt declined STS trial, returned back to bed with maxAX1, maxA for scooting with bed modified. She was left semi-reclined with call bell and all needs met, bed alarm on. DEFICITS/IMPAIRMENTS:    , Body Structures, Functions, Activity Limitations Requiring Skilled Therapeutic Intervention: Decreased functional mobility ; Decreased ADL status; Decreased ROM; Decreased strength;Decreased safe awareness;Decreased endurance; Increased
Problem: Physical Therapy - Adult  Goal: By Discharge: Performs mobility at highest level of function for planned discharge setting. See evaluation for individualized goals. Description: Physical Therapy Goals:  Initiated 5/24/2023 to be met within 7-10 days. 1.  Patient will move from supine to sit and sit to supine  in bed with minimal assistance/contact guard assist.    2.  Patient will transfer from bed to chair and chair to bed with minimal assistance/contact guard assist using the least restrictive device. 3.  Patient will perform sit to stand with minimal assistance/contact guard assist.  4.  Patient will ambulate with moderate assistance  for 10 feet with the least restrictive device. PLOF: Pt lives with a friend in a [facility] with ramp entry. Amb with rollator, increased difficulty recently. Multiple falls. Outcome: Progressing    PHYSICAL THERAPY TREATMENT    Patient: Angela Carlton ([de-identified] y.o. female)  Date: 5/25/2023  Diagnosis: Elevated troponin [R77.8]  Back strain, initial encounter [Y32.220P]  Compression fracture of body of thoracic vertebra (HCC) [S22.000A] Compression fracture of body of thoracic vertebra (HCC)      Precautions: Fall Risk, General Precautions,  ,  ,  ,  , Spinal Precautions: No Bending, No Lifting, No Twisting,  ,        ASSESSMENT:  Patient seen with OT to maximize safety and participation with OOB mobility. Patient received semi reclined in bed and agreeable to PT treatment. She has increased left mid thoracic pain at radiates around her ribs. Poor tolerance to rolling to the right yelling in pain. Mod A supine to sit transfer to the left side of the bed. She completes seated LE exercises with supervision per flow sheet. Expresses she is fearful of falling, but reassured safety with non skid socks and RW. Min A sit to stand to RW. She was able to take 4 lateral steps towards [facility] with decreased B step clearance.  Patient returns to bed with mod A x2 and left resting
Toilet: Standard  Bathroom Equipment: Tub transfer bench, Toilet raiser  Bathroom Accessibility: Accessible  Home Equipment: Rollator, Walker, rolling (electric scooter, lift recliner x 2)  Has the patient had two or more falls in the past year or any fall with injury in the past year?: Yes  Receives Help From: Friend(s)  ADL Assistance: Needs assistance  Homemaking Assistance: Needs assistance  Ambulation Assistance: Independent  Transfer Assistance: Independent  Active : No  Mode of Transportation: Truck  Occupation: Retired  Type of Occupation: nun and   []  Right hand dominant   []  Left hand dominant    Cognitive/Behavioral Status:  Orientation  Overall Orientation Status: Within Normal Limits  Orientation Level: Oriented X4  Cognition  Overall Cognitive Status: WNL    Skin: bruising BUES  Edema: BLEs    Vision/Perceptual:    Vision  Vision: Impaired  Vision Exceptions: Wears glasses for distance        Coordination: BUE  Coordination: Generally decreased, functional        Balance:     Balance  Sitting: Impaired  Sitting - Static: Good (unsupported)  Sitting - Dynamic: Fair (occasional)    Strength: BUE  Strength: Generally decreased, functional    Tone & Sensation: BUE  Tone: Normal  Sensation: Impaired    Range of Motion: BUE  AROM: Generally decreased, functional (shoulder flexion ~ 90 degrees)         Functional Mobility and Transfers for ADLs:  Bed Mobility:     Bed Mobility Training  Bed Mobility Training: No  Supine to Sit: Maximum assistance;Assist X1;Additional time  Sit to Supine: Maximum assistance;Assist X1;Additional time  Scooting: Maximum assistance;Assist X1  Transfers:                 Transfer Training  Transfer Training: No    ADL Assessment:   Feeding: Setup  Grooming: Stand by assistance  UE Bathing: Maximum assistance  LE Bathing: Dependent/Total  UE Dressing: Maximum assistance  LE Dressing: Dependent/Total  Toileting: Dependent/Total    ADL
[FreeTextEntry1] : The patient's status is stable. He needs to be careful not to gain weight while he is sedentary. When he is able he will schedule a baseline carotid Doppler. He has any symptoms or problems she will call\par \par if all is well I will see him in 4 weeks. Eventually he will  require stress test when he is stable.\par \par When over his lifestyle including diet, exercise, and weight control. I answered all of his questions.

## 2023-05-26 NOTE — CARE COORDINATION
05/25/23 1015   IMM Letter   IMM Letter given to Patient/Family/Significant other/Guardian/POA/by: patient   IMM Letter date given: 05/25/23   IMM Letter time given: 0930     Important Message from [facility]" reviewed and explained with the patient at bedside and signature was obtained. A signed copy provided to patient/representative. Original signed document placed in patient's chart.       JEREMIE Wilks  Care Management
CM contacted MMT to follow up on transport.  MMT is scheduled behind and will transport pt at 6:45-7:00 pm.     MMT: [phone]    April JEREMIE Horne  Care Management
CM contacted [facility] [phone] and spoke with Kami Gold. Patient is being transported to [address] [place] [address] at 4:30 pm today. Discharge packet in chart.     Trip # [id number]    April JEREMIE Horne  Care Management
CM met with pt and provided personal care listing and received SNF options. Pt selected [address] CM sent clinicals for review.      JEREMIE Wilks  Care Management
Transition of Care Plan to SNF/Rehab    SNF/Rehab Transition:  Patient has been accepted to [address] [place] [address] and meets criteria for admission. Patient will transported by [facility] medical Transport and expected to leave at 4:30pm.    Communication to Patient/Family:  Met with patient and they are agreeable to the transition plan. Communication to SNF/Rehab:  Bedside RN, Viviana Moore, has been notified to update the transition plan to the facility and call report (phone number [phone]). Discharge information has been updated on the AVS.       Nursing Please include all hard scripts for controlled substances, med rec and dc summary, and AVS in packet. Reviewed and confirmed with facility, [address] [place] [address], can manage the patient care needs for the following:     Michelle Swanson with (X) only those applicable:    Medication:  [x]  Medications will be available at the facility  []  IV Antibiotics   []  Controlled Substance - hard copy to be sent with patient   []  Weekly Labs   Documents:  [] Hard RX  [] MAR  [] Kardex  [] AVS  []Transfer Summary  [x]Discharge   Equipment:  []  CPAP/BiPAP  []  Wound Vacuum  []  Iniguez or Urinary Device  []  PICC/Central Line  []  Nebulizer  []  Ventilator   Treatment:  []Isolation (for MRSA, VRE, etc.)  []Surgical Drain Management  []Tracheostomy Care  []Dressing Changes  []Dialysis with transportation and chair time  []PEG Care  []Oxygen  []Daily Weights for Heart Failure   Dietary:  []Any diet limitations  []Tube Feedings   []Total Parenteral Management (TPN)   Eligible for Medicaid Long Term Services and Supports  Yes:  [] Eligible for medical assistance or will become eligible within 180 days and UAI completed. [] Provider/Patient and/or support system has requested screening. [] UAI copy provided to patient or responsible party,  [] UAI unavailable at discharge will send once processed to SNF provider.   [] UAI unavailable at discharged mailed to patient  No:   []
Reviewed: Yes      History Provided by: (P) Patient  Patient Orientation: (P) Alert and Oriented    Patient Cognition: (P) Alert    Hospitalization in the last 30 days (Readmission):  No    If yes, Readmission Assessment in  Navigator will be completed. Advance Directives:      Code Status: DNR   Patient's Primary Decision Maker is: (P) Legal Next of Kin    Primary Decision Maker: Bob Oquendo - [phone]    Secondary Decision Maker: Romilda Boas - [phone]    Discharge Planning:    Patient lives with: (P) Friends [place] Rob[place]antosh Type of Home: (P) [place]  Primary Care Giver: (P) Self  Patient Support Systems include: (P) Friends/Neighbors (Roommate:Linda Nieves)   Current Financial resources: (P) Medicare  Current community resources: (P) None  Current services prior to admission: (P) Durable Medical Equipment            Current DME:              Type of Home Care services:  (P) None    ADLS  Prior functional level: (P) Assistance with the following:, Bathing, Dressing, Toileting, Cooking, Housework, Shopping, Mobility  Current functional level: (P) Assistance with the following:, Mobility, Bathing, Dressing, Toileting, Cooking, Housework, Shopping    PT AM-PAC:   /24  OT AM-PAC: 12 /24    Family can provide assistance at DC: (P) Yes  Would you like Case Management to discuss the discharge plan with any other family members/significant others, and if so, who? (P) Yes Agusto Gaviria (like a daughter to patient))  Plans to Return to Present Housing: (P) Unknown at present  Other Identified Issues/Barriers to RETURNING to current housing: Patient is unable to stand without falls in home. Recent fall last week. Roommate is able to help with some ADL/IADL. Patient benefit from personal care in home.      Potential Assistance needed at discharge:              Potential DME:  wheelchair   Patient expects to discharge to: (P) Jammie Erazo for transportation at discharge:

## 2023-05-26 NOTE — PROGRESS NOTES
[facility]  (A Division of [address] [place] [address])      Consultation Note      Date of Admission: 5/23/2023    Date of Note: 5/26/2023      Reason for Referral: complicated UTI  Referring Physician: Dr. Ifeoma Leo from this admission:   4/23 urine culture: Greater than 100,000 Enterococcus faecium-sensitive to all antibiotics with intermediate sensitivities to nitrofurantoin  5/23 urine culture: 10,000 gram-negative rods    Current Antimicrobials:    Prior Antimicrobials:  Amoxicillin 5/24- present Ampicillin 5/23 to 5/24       Assessment:         Complicated UTI: Recent UA and culture from 4/23 with Enterococcus; repeat urine culture from 5/23 is currently with GNR  Elevated lactic acid: Resolved on repeat testing  Acute back pain limiting mobility  T4 compression fracture noted on CT scan: 5.24 MRI with acute T4 compression fracture  Multiple pulmonary nodules[de-identified] Unchanged when compared to prior CT scans  Electrolyte abnormalities  Mild transaminitis  Chronic lower extremity lymphedema    Plan:   D/c augmentin  Will give 1 dose of fosfomycin for her Pseudomonas. Follow-up 5/23 urine cultures-Pseudomonas aeruginosa-  pansensitive    Discussed with Dr. Ten Farr. Okay for discharge from ID perspective. Torrie Schlatter, DO  [facility]  [address], [address] [facility]  Office: [phone], Ext [phone]      Lines / Catheters:  peripheral      Subjective:   Patient seen and examined at bedside. No events overnight. No new fevers. She is still having diffuse pain everywhere from her back to her abdomen and her legs. Not much change. She is agreeable to going to a SNF.   No particular complaints of dysuria today    Tmax 98.4  WBCs 8.6      HPI:  Shayan Lee is a [de-identified] y.o. female with a PMHx of chronic pain s/p spinal stimulator, hypothyroid, CAD, CHF, HTN, HLD, DVT/PE who presented to the ED with severe back pain ongoing

## 2023-05-26 NOTE — DISCHARGE SUMMARY
Transfer Summary    Patient: Nikki Montalvo MRN: [id number]  CSN: [id number]    YOB: 1945  Age: [de-identified] y.o. Sex: female    DOA: 5/23/2023 LOS:  LOS: 3 days   Discharge Date:   5/26/2023     Disposition: Transfer to SNF    Admission Diagnoses: Elevated troponin [R77.8]  Back strain, initial encounter [S39.012A]  Compression fracture of body of thoracic vertebra ([address] [S22.000A]    Discharge Diagnoses:    Acute on chronic back pain  Recent fall   T4 compression fracture, acute  UTI with Pseudomonas  Hypokalemia  Mild abnormal LFT, improving  CKD stage III  History of CAD  Hypertension  Hypothyroidism  Morbid obesity    Discharge Condition: Stable    PHYSICAL EXAM  Visit Vitals  /79   Pulse 79   Temp 97.2 °F (36.2 °C) (Oral)   Resp 18   Ht 5' (1.524 m)   Wt 198 lb (89.8 kg)   SpO2 97%   BMI 38.67 kg/m²       General: Alert, cooperative, no acute distress    Lungs:  CTA Bilaterally. No Wheezing/Rales. Heart:             Regular rate and Rhythm. Abdomen: Soft, Non distended, Non tender. + Bowel sounds. Extremities: No edema. Psych:   Good insight. Not anxious or agitated. Neurologic:  AA, oriented X 3. Upper extremity 5/5, lower extremity moves but limited due to pain                                Hospital Course:   Nikki Montalvo is a [de-identified] y.o. female with a PMHx of chronic pain s/p spinal stimulator, hypothyroid, CAD, CHF, HTN, HLD, DVT/PE who presented to the ED with severe back pain ongoing for more than 3 weeks and generalized weakness that has worsened over the past few days. She states she was seen by her pain management specialist about 3 weeks ago due to her chronic pain, and they adjusted her pain pump. Shortly after it was adjusted, she developed severe back pain. The pain moves from one side to the other. It's worse with movement. It radiates to her upper abdomen on both sides. She denies fever, cp, sob, cough, nvd, dysuria, urinary frequency.  However she does report urinary retention

## 2023-06-22 ENCOUNTER — HOME HEALTH ADMISSION (OUTPATIENT)
Age: 78
End: 2023-06-22
Payer: MEDICARE

## 2023-06-23 ENCOUNTER — HOME CARE VISIT (OUTPATIENT)
Age: 78
End: 2023-06-23

## 2023-06-23 VITALS
HEART RATE: 75 BPM | RESPIRATION RATE: 18 BRPM | BODY MASS INDEX: 37.95 KG/M2 | TEMPERATURE: 98.1 F | SYSTOLIC BLOOD PRESSURE: 124 MMHG | DIASTOLIC BLOOD PRESSURE: 70 MMHG | OXYGEN SATURATION: 97 % | HEIGHT: 60 IN | WEIGHT: 193.3 LBS

## 2023-06-23 DIAGNOSIS — I50.32 CHRONIC DIASTOLIC (CONGESTIVE) HEART FAILURE (HCC): ICD-10-CM

## 2023-06-23 PROCEDURE — 0221000100 HH NO PAY CLAIM PROCEDURE

## 2023-06-23 PROCEDURE — G0299 HHS/HOSPICE OF RN EA 15 MIN: HCPCS

## 2023-06-23 ASSESSMENT — ENCOUNTER SYMPTOMS
DYSPNEA ACTIVITY LEVEL: AFTER AMBULATING 10 - 20 FT
STOOL DESCRIPTION: FORMED

## 2023-06-24 ENCOUNTER — HOME CARE VISIT (OUTPATIENT)
Age: 78
End: 2023-06-24

## 2023-06-24 VITALS
DIASTOLIC BLOOD PRESSURE: 78 MMHG | OXYGEN SATURATION: 97 % | SYSTOLIC BLOOD PRESSURE: 136 MMHG | TEMPERATURE: 97.5 F | RESPIRATION RATE: 16 BRPM | HEART RATE: 63 BPM

## 2023-06-24 PROCEDURE — G0151 HHCP-SERV OF PT,EA 15 MIN: HCPCS

## 2023-06-24 ASSESSMENT — ENCOUNTER SYMPTOMS: PAIN LOCATION - PAIN QUALITY: ACHY

## 2023-06-24 NOTE — HOME HEALTH
Skilled services/Home bound verification:     Skilled Reason for admission/summary of clinical condition: CHF, T4 compression fracture  [de-identified] y.o. female referred to home health from rehab facility for T4 compression fracture and CHF. Patient has a PMH of arthritis, GERD, gout, hemochromatosis, high cholesterol, HTN, hypothyroidism, CHF. Home health admission completed 6/23/23. Patient is alert and oriented x 4, pleasant and cooperative. Patient lives in a 1 story home with her friend, Casper Mary. Patient is using a walker at this time. Patient has unsteady gait, decreased endurance and poor balance. PT and OT eval ordered. Patient was ordered HHA but is declining at this time, friend Casper Mary helps her. Respiratory rate is regular, right upper lung field diminished, all other fields are clear. Patient educated on using inspirometer. Heart rate regular, no cp or heart palpitations, 3+ pitting edema in BLE. Patient educated on elevating her legs throughout the day and wearing her compression stockings. Patient educated on no added salt diet and limiting caffeine. Patient educated she needs to weigh daily and call dr if >2lbs overnight or 5lbs in a week. SN provided patient with CHF booklet and showed her where to document her weight along with checking BP daily and logging. Med rec completed. Patient had not taken her meds since yesterday when she got home from rehab due to not knowing what to take. Rehab paperwork reviewed and med planner completed according to it. However, medications took an extended amount of time. SN had to go through all bottles due to patient having some meds mixed. Patient had metoprolol succinate mixed with metoprolol tartrate. Furosemide doses were mixed. Nitroglycerin tabs were in her med planner and she was taking them daily which is not how they are directed. SN placed them back into their bottle. Patient has 2 bottles, one she keeps in the home and one she keeps in her purse.  Patient was also

## 2023-06-26 ENCOUNTER — HOME CARE VISIT (OUTPATIENT)
Age: 78
End: 2023-06-26

## 2023-06-26 VITALS
RESPIRATION RATE: 17 BRPM | OXYGEN SATURATION: 97 % | TEMPERATURE: 97.7 F | DIASTOLIC BLOOD PRESSURE: 92 MMHG | SYSTOLIC BLOOD PRESSURE: 142 MMHG | HEART RATE: 75 BPM

## 2023-06-26 PROCEDURE — G0299 HHS/HOSPICE OF RN EA 15 MIN: HCPCS

## 2023-06-26 ASSESSMENT — ENCOUNTER SYMPTOMS
PAIN LOCATION - PAIN QUALITY: ACHE
DYSPNEA ACTIVITY LEVEL: AFTER AMBULATING 10 - 20 FT

## 2023-06-28 ENCOUNTER — HOME CARE VISIT (OUTPATIENT)
Age: 78
End: 2023-06-28

## 2023-06-28 VITALS
HEART RATE: 72 BPM | DIASTOLIC BLOOD PRESSURE: 80 MMHG | RESPIRATION RATE: 16 BRPM | TEMPERATURE: 97.5 F | OXYGEN SATURATION: 98 % | SYSTOLIC BLOOD PRESSURE: 125 MMHG

## 2023-06-28 PROCEDURE — G0157 HHC PT ASSISTANT EA 15: HCPCS

## 2023-06-29 ENCOUNTER — HOME CARE VISIT (OUTPATIENT)
Age: 78
End: 2023-06-29

## 2023-06-29 PROCEDURE — G0300 HHS/HOSPICE OF LPN EA 15 MIN: HCPCS

## 2023-06-30 ENCOUNTER — HOME CARE VISIT (OUTPATIENT)
Age: 78
End: 2023-06-30

## 2023-06-30 VITALS
DIASTOLIC BLOOD PRESSURE: 80 MMHG | TEMPERATURE: 96.6 F | RESPIRATION RATE: 20 BRPM | HEART RATE: 85 BPM | SYSTOLIC BLOOD PRESSURE: 140 MMHG | OXYGEN SATURATION: 96 %

## 2023-06-30 PROCEDURE — G0157 HHC PT ASSISTANT EA 15: HCPCS

## 2023-06-30 ASSESSMENT — ENCOUNTER SYMPTOMS: PAIN LOCATION - PAIN QUALITY: STABBING

## 2023-07-01 VITALS
SYSTOLIC BLOOD PRESSURE: 121 MMHG | TEMPERATURE: 98.1 F | HEART RATE: 66 BPM | RESPIRATION RATE: 18 BRPM | DIASTOLIC BLOOD PRESSURE: 65 MMHG | OXYGEN SATURATION: 98 %

## 2023-07-03 ENCOUNTER — HOME CARE VISIT (OUTPATIENT)
Age: 78
End: 2023-07-03

## 2023-07-03 VITALS
RESPIRATION RATE: 18 BRPM | DIASTOLIC BLOOD PRESSURE: 66 MMHG | HEART RATE: 76 BPM | TEMPERATURE: 97.1 F | OXYGEN SATURATION: 98 % | SYSTOLIC BLOOD PRESSURE: 132 MMHG

## 2023-07-03 VITALS
SYSTOLIC BLOOD PRESSURE: 120 MMHG | DIASTOLIC BLOOD PRESSURE: 80 MMHG | TEMPERATURE: 96.6 F | HEART RATE: 77 BPM | OXYGEN SATURATION: 98 % | RESPIRATION RATE: 16 BRPM

## 2023-07-03 PROCEDURE — G0157 HHC PT ASSISTANT EA 15: HCPCS

## 2023-07-03 PROCEDURE — G0300 HHS/HOSPICE OF LPN EA 15 MIN: HCPCS

## 2023-07-03 ASSESSMENT — ENCOUNTER SYMPTOMS: PAIN LOCATION - PAIN QUALITY: ACHE

## 2023-07-03 NOTE — HOME HEALTH
Patient's Subjective: She thinks the pillow she put at the LB since last session, is interfering with her pain device. Falls since last session:  no  Trips to ER since last session? no  Pain/Discomfort ? Yes, see pain page. New Meds: no  Upcoming MD appointments:   DR Sana Arita PCP 6/5/23 7/11/2023 2:00 PM Kj Wells PA-C . Caregiver involvement/assistance needed for: The patient's caregiver assists with transportation, chores, meals,   . Home health supplies by type and quantity ordered/delivered this visit include:  none  . Objective:  See interventions. Patient response to treatment:  SOB with all activity today. Re education for pursed lip breaths when SOB. She reports that she does. Patient level of understanding of education provided:  -Re recommended she use cold modalities for her back pain, but she reports she does not like it.  -Educated on safety and fall prevention to decrease her fall risk. She and her CG reported understanding.   - Education for her new exercises to perform 2-3x/day. See strengthening intervention section. She was able return demonstrate with seated rest breaks. Assess of progress towards goals: She reports amb every hour or more, indicating decreased pressure injury risk. This goal is adequate for DC. Assessment and Summary of Care:  Patient's current functional status before discharge is as follows  Strength: Will require reassessment at DC visit. ROM: WFL's  Bed Mobility: MOD I  Transfers: All transfers in the home with MDO I. She declined to perform the vehicle transfer as planned today due to not feeling well and high heat outside. She is waiting on the arrival of her car cane. If she is willing, assess next session. Gait: Rollator: Gait with rollator, level surfaces in the home, MOD I. She amb ~ 125 feet x 1, 2+ turns with education for increasing step length to improve her heel strike. She was able to correct ~ 50% of the time.  Pt SOB by the end

## 2023-07-03 NOTE — HOME HEALTH
Skilled reason for visit: Disease and Medication Management          Caregiver involvement: Patient independent in the home Roomate assist when needed. Family assist with transportation          Medications reviewed and all medications are available in the home this visit. The following education was provided regarding medications:  Medication reviewed no changes question or concerns . MD notified of any discrepancies/look a-like medications/medication interactions:n/a    Medications are effective at this time. Home health supplies by type and quantity ordered/delivered this visit include: n/a         Patient education provided this visit: see care plan intervention          Sharps education provided: n/a         Patient level of understanding of education provided: see care plan intervention          Patient response to procedure performed:  n/a         Agency Progress toward goals: Goals MET       Patient's Progress towards personal goals: Goals Met          Home exercise program: elevate BLE frequently     Continued need for the following skills: PT         Plan for next visit: N/A         Patient and/or caregiver notified and agrees to changes in the Plan of Care: Yes.           The following discharge planning was discussed with the pt/caregiver: Skilled level of care no longer needed per patient request

## 2023-07-06 ENCOUNTER — HOME CARE VISIT (OUTPATIENT)
Age: 78
End: 2023-07-06
Payer: MEDICARE

## 2023-07-06 PROCEDURE — G0151 HHCP-SERV OF PT,EA 15 MIN: HCPCS

## 2023-07-10 VITALS
TEMPERATURE: 97.5 F | HEART RATE: 79 BPM | RESPIRATION RATE: 18 BRPM | SYSTOLIC BLOOD PRESSURE: 124 MMHG | DIASTOLIC BLOOD PRESSURE: 72 MMHG | OXYGEN SATURATION: 99 %

## 2023-07-10 ASSESSMENT — ENCOUNTER SYMPTOMS: PAIN LOCATION - PAIN QUALITY: ACHY

## 2023-07-10 NOTE — HOME HEALTH
During this visit, patient presents with the following clinical findings:  . SUBJECTIVE: Patient denies fall since evaluation. Patient c/o intermittent achy pain on lower back and sara legs  with highest pain level of 8/10 and least pain level of 0/10 for the past 24 hours. Patient manages pain by taking pain medication as prescribed by MD, application of cold pack for soreness and achy  type of pain /hot  packs for achy, stiffness type of pain on painful area x 20 mins, positioning and relaxation. Diamond Hamman CAREGIVER ASSISTANCE: No cg present during this visit  . MEDICATIONS RECONCILED AND UPDATED: no changes in medications at this time  . BALANCE:  Static Standing balance: Good  Dynamic standing balance: NA d/t safety reasons  Tinetti balance and gait test: 18/28 indicative of high fall risks. Compared to   Static Standing balance: Fair  Dynamic standing balance: NA d/t safety reasons  Tinetti balance and gait test: 10/28 indicative of high fall risks during initial evaluation  BLE MMT: R hip flex 4/5   R hip Abd 4/5   R hip add 4/5   R knee flex 4/5  R knee ext. 4/5   R ankle DF 4/5  L hip flex 4/5  L hip Abd 4/5  L hip add 4/5  L knee flex 4/5  L knee ext. 4/5  L ankle DF 4/5   FTSTS: unable. Requires more than 1 attempt and sara UE push up to complete sit to stand  Compared to   R hip flex 4-/5   R hip Abd 4-/5   R hip add 4-/5   R knee flex 4-/5  R knee ext. 4-/5   R ankle DF 4-/5  L hip flex 4-/5  L hip Abd 4-/5  L hip add 4-/5  L knee flex 4-/5  L knee ext. 4-/5  L ankle DF 4-/5   FTSTS: requires touching assistance to complete sit to stand during initial evaluation. 2-minute test: 172 ft using rollator with modified aurelia scale of 3/10 post task compared to unable during initial evaluation. .  BED MOBILITY: indep in bed mobility without cues from sleeps on recliner during initial evaluation.    TRANSFERS:  now indep in transfers to and from bed, chair, toilet, and car using rollator without cues from

## 2023-07-11 ENCOUNTER — OFFICE VISIT (OUTPATIENT)
Age: 78
End: 2023-07-11
Payer: MEDICARE

## 2023-07-11 VITALS
RESPIRATION RATE: 20 BRPM | SYSTOLIC BLOOD PRESSURE: 150 MMHG | DIASTOLIC BLOOD PRESSURE: 72 MMHG | OXYGEN SATURATION: 98 % | HEART RATE: 78 BPM

## 2023-07-11 DIAGNOSIS — I87.2 VENOUS INSUFFICIENCY (CHRONIC) (PERIPHERAL): Primary | ICD-10-CM

## 2023-07-11 DIAGNOSIS — I89.0 LYMPHEDEMA, NOT ELSEWHERE CLASSIFIED: ICD-10-CM

## 2023-07-11 PROCEDURE — 3078F DIAST BP <80 MM HG: CPT | Performed by: PHYSICIAN ASSISTANT

## 2023-07-11 PROCEDURE — G8427 DOCREV CUR MEDS BY ELIG CLIN: HCPCS | Performed by: PHYSICIAN ASSISTANT

## 2023-07-11 PROCEDURE — 1036F TOBACCO NON-USER: CPT | Performed by: PHYSICIAN ASSISTANT

## 2023-07-11 PROCEDURE — G8417 CALC BMI ABV UP PARAM F/U: HCPCS | Performed by: PHYSICIAN ASSISTANT

## 2023-07-11 PROCEDURE — G8400 PT W/DXA NO RESULTS DOC: HCPCS | Performed by: PHYSICIAN ASSISTANT

## 2023-07-11 PROCEDURE — 1123F ACP DISCUSS/DSCN MKR DOCD: CPT | Performed by: PHYSICIAN ASSISTANT

## 2023-07-11 PROCEDURE — 99214 OFFICE O/P EST MOD 30 MIN: CPT | Performed by: PHYSICIAN ASSISTANT

## 2023-07-11 PROCEDURE — 3074F SYST BP LT 130 MM HG: CPT | Performed by: PHYSICIAN ASSISTANT

## 2023-07-11 PROCEDURE — 1090F PRES/ABSN URINE INCON ASSESS: CPT | Performed by: PHYSICIAN ASSISTANT

## 2023-07-11 NOTE — PROGRESS NOTES
1. Have you been to the ER, urgent care clinic since your last visit? Hospitalized since your last visit? Yes When: [facility] emergency dept in 04/23 ; [facility] in 05/23 for compression fx    2. Have you seen or consulted any other health care providers outside of the [address] [place] [address] since your last visit? Include any pap smears or colon screening.  Yes ; pain management

## 2023-07-12 NOTE — PROGRESS NOTES
01/05/23        [facility]        History and Physical    Lorenzo Almanzar is a 76 y.o. female The primary encounter diagnosis was Bilateral leg edema/lymphedema. Diagnoses of Severe obesity (BMI 35.0-39. 9) with comorbidity ([address]), Lymphedema, and Deep vein thrombosis (DVT) of proximal vein of both lower extremities, unspecified chronicity ([address]) were also pertinent to this visit. Kylie Garcia returns today for reevaluation for her known bilateral lower extremity venous insufficiency, lymphedema, with an abdominal component. Patient states that she is feeling much better this visit. She reports that she continues to ambulate around the house without any leg pain but if she has to walk longer distances she uses her mary chair. Patient reports that since her last visit she was been diagnosed with a serious back issue. Patient has been evaluated by orthopedics, has been treated and is feeling much better. It has affected her ability to walk but since the procedure she is walking without as much pain. Patient states that she continues to wear compression stockings as recommended and she has no noted ulcerations on this visit. Patient continues to utilize her pneumatic compression device given to her by tactile and states that she feels this is made a huge difference and has been able to be calm more mobile since. Overall the patient feels that she is in better shape on this visit and states that she is willing to continue with this modality of treatment. She is very appreciative of our services. Presently she denies any ulceration skin rashes or skin lesions in either lower extremity. Continues to utilize compression stockings as recommended. Continues to  try to use her pneumatic compression garments 1 hour a day treatment for lymphedema. Stated above presently having difficulty utilizing the garments as before. Continues to take her daily meds as recommended.     Physical Exam:    Visit Vitals  BP

## 2023-08-04 RX ORDER — ATORVASTATIN CALCIUM 80 MG/1
80 TABLET, FILM COATED ORAL NIGHTLY
Qty: 30 TABLET | Refills: 5 | Status: SHIPPED | OUTPATIENT
Start: 2023-08-04

## 2023-08-04 NOTE — TELEPHONE ENCOUNTER
Requested Prescriptions     Pending Prescriptions Disp Refills    atorvastatin (LIPITOR) 80 MG tablet 30 tablet 5     Sig: Take 1 tablet by mouth at bedtime

## 2023-09-08 ENCOUNTER — OFFICE VISIT (OUTPATIENT)
Age: 78
End: 2023-09-08
Payer: MEDICARE

## 2023-09-08 VITALS
DIASTOLIC BLOOD PRESSURE: 72 MMHG | HEART RATE: 71 BPM | WEIGHT: 197 LBS | HEIGHT: 60 IN | BODY MASS INDEX: 38.68 KG/M2 | SYSTOLIC BLOOD PRESSURE: 138 MMHG | OXYGEN SATURATION: 97 %

## 2023-09-08 DIAGNOSIS — E78.00 PURE HYPERCHOLESTEROLEMIA: ICD-10-CM

## 2023-09-08 DIAGNOSIS — I10 PRIMARY HYPERTENSION: ICD-10-CM

## 2023-09-08 DIAGNOSIS — I48.0 PAROXYSMAL ATRIAL FIBRILLATION (HCC): ICD-10-CM

## 2023-09-08 DIAGNOSIS — E66.01 SEVERE OBESITY (BMI 35.0-39.9) WITH COMORBIDITY (HCC): ICD-10-CM

## 2023-09-08 DIAGNOSIS — I50.33 ACUTE ON CHRONIC DIASTOLIC CONGESTIVE HEART FAILURE (HCC): Primary | ICD-10-CM

## 2023-09-08 PROCEDURE — 3075F SYST BP GE 130 - 139MM HG: CPT | Performed by: INTERNAL MEDICINE

## 2023-09-08 PROCEDURE — G8427 DOCREV CUR MEDS BY ELIG CLIN: HCPCS | Performed by: INTERNAL MEDICINE

## 2023-09-08 PROCEDURE — G8417 CALC BMI ABV UP PARAM F/U: HCPCS | Performed by: INTERNAL MEDICINE

## 2023-09-08 PROCEDURE — 3078F DIAST BP <80 MM HG: CPT | Performed by: INTERNAL MEDICINE

## 2023-09-08 PROCEDURE — 1036F TOBACCO NON-USER: CPT | Performed by: INTERNAL MEDICINE

## 2023-09-08 PROCEDURE — 99214 OFFICE O/P EST MOD 30 MIN: CPT | Performed by: INTERNAL MEDICINE

## 2023-09-08 PROCEDURE — G8400 PT W/DXA NO RESULTS DOC: HCPCS | Performed by: INTERNAL MEDICINE

## 2023-09-08 PROCEDURE — 1090F PRES/ABSN URINE INCON ASSESS: CPT | Performed by: INTERNAL MEDICINE

## 2023-09-08 PROCEDURE — 1123F ACP DISCUSS/DSCN MKR DOCD: CPT | Performed by: INTERNAL MEDICINE

## 2023-09-08 RX ORDER — BUMETANIDE 2 MG/1
2 TABLET ORAL 2 TIMES DAILY
Qty: 180 TABLET | Refills: 1 | Status: SHIPPED | OUTPATIENT
Start: 2023-09-08

## 2023-09-08 ASSESSMENT — ENCOUNTER SYMPTOMS
GASTROINTESTINAL NEGATIVE: 1
EYES NEGATIVE: 1
SHORTNESS OF BREATH: 1

## 2023-09-08 NOTE — PROGRESS NOTES
1. Have you been to the ER, urgent care clinic since your last visit? Hospitalized since your last visit? Yes When: 5/23 Where: [facility] Reason for visit: Elevated troponin       2. Where do you normally have your labs drawn? [facility]    3. Do you need any refills today?   no    4. Which local pharmacy do you use (enter pharmacy)? [facility]    5. Which mail order pharmacy do you use (enter pharmacy)? 6. Are you here for surgical clearance and if so who will be doing your     procedure/surgery (care team)?    no
Reason    atorvastatin (LIPITOR) 80 MG tablet        Follow-up and Dispositions    Return in about 3 months (around 12/8/2023), or if symptoms worsen or fail to improve, for post test/procedure. Return to ER if any significant CP not relieved by s/l NTG, severe SOB, severe palpitations, loss of consciousness    9/8/2023  Plan for medicines : CHF worsening. Change Lasix to Bumex. Follow-up electrolytes and BP chart at home. Exercise Plan: Try to walk as much as possible. If possible, walk 30 to 40 minutes a day and do the exercises of the legs while sitting as explained. Diet plan: Mediterranean diet guidelines reinforced.

## 2023-09-21 ENCOUNTER — HOSPITAL ENCOUNTER (OUTPATIENT)
Facility: HOSPITAL | Age: 78
Discharge: HOME | End: 2023-09-21
Payer: MEDICARE

## 2023-09-21 DIAGNOSIS — I50.33 ACUTE ON CHRONIC DIASTOLIC CONGESTIVE HEART FAILURE (HCC): ICD-10-CM

## 2023-09-21 LAB
ANION GAP SERPL CALC-SCNC: 5 MMOL/L (ref 3–18)
BUN SERPL-MCNC: 25 MG/DL (ref 7–18)
BUN/CREAT SERPL: 22 (ref 12–20)
CALCIUM SERPL-MCNC: 9.9 MG/DL (ref 8.5–10.1)
CHLORIDE SERPL-SCNC: 103 MMOL/L (ref 100–111)
CO2 SERPL-SCNC: 34 MMOL/L (ref 21–32)
CREAT SERPL-MCNC: 1.13 MG/DL (ref 0.6–1.3)
GLUCOSE SERPL-MCNC: 92 MG/DL (ref 74–99)
MAGNESIUM SERPL-MCNC: 2.6 MG/DL (ref 1.6–2.6)
POTASSIUM SERPL-SCNC: 4.1 MMOL/L (ref 3.5–5.5)
SODIUM SERPL-SCNC: 142 MMOL/L (ref 136–145)
TSH SERPL DL<=0.05 MIU/L-ACNC: 2.44 UIU/ML (ref 0.36–3.74)

## 2023-09-21 PROCEDURE — 84443 ASSAY THYROID STIM HORMONE: CPT

## 2023-09-21 PROCEDURE — 36415 COLL VENOUS BLD VENIPUNCTURE: CPT

## 2023-09-21 PROCEDURE — 83735 ASSAY OF MAGNESIUM: CPT

## 2023-09-21 PROCEDURE — 80048 BASIC METABOLIC PNL TOTAL CA: CPT

## 2023-09-22 DIAGNOSIS — I50.32 CHRONIC DIASTOLIC (CONGESTIVE) HEART FAILURE (HCC): ICD-10-CM

## 2023-09-22 RX ORDER — FUROSEMIDE 40 MG/1
TABLET ORAL
Qty: 180 TABLET | Refills: 1 | Status: SHIPPED | OUTPATIENT
Start: 2023-09-22

## 2023-10-12 NOTE — PROGRESS NOTES
I reviewed the History & Physical, I examined the patient, and there are no changes in the patient's condition.  Patient having EGD for anemia     Procedures were reviewed in detail with the patient including risks, benefits and alternatives.  All the questions were answered.  Patient agrees to proceed.  Informed consent was obtained and placed in the chart.    Karen Clements MD  10/12/2023     Patient was contacted. Patient is unable to come in today, however, will come in on Monday (8/17/23) around 2 pm. Patient was advised to bring in all her medications in it's pill container. Patient states good understanding of same. Patient states she has been using CBD sublingual drops for her chronic pain. Patient questions, \"Now do I have to stop taking it so it doesn't show up on my drug screen. I don't want to lose my medications.\"Patient reports she has been using CBD for some time now. Patient states she feels frustrated that no one is taking her serious on her pain. Patient does have a PCP and was advised to contact her PCP for pain management.     Soonest in person appointment with Dr. Arce scheduled for 8/17/23.   Problem: Mobility Impaired (Adult and Pediatric)  Goal: *Acute Goals and Plan of Care (Insert Text)  Description: Physical Therapy Goals  Initiated 11/1/2022 and to be accomplished within 7 day(s)  1. Patient will move from supine to sit and sit to supine  in bed with minimal assistance/contact guard assist.    2.  Patient will transfer from bed to chair and chair to bed with minimal assistance/contact guard assist using the least restrictive device. 3.  Patient will perform sit to stand with minimal assistance/contact guard assist.  4.  Patient will ambulate with minimal assistance/contact guard assist for 20 feet with the least restrictive device. PLOF: pt ambulates in home with rollator, has power wheelchair for community; states that a friend lives with her but isn't necessarily able to help her; sleeps in a recliner/lift chair     Outcome: Progressing Towards Goal   PHYSICAL THERAPY TREATMENT    Patient: Leonel Yo [de-identified]76 y.o. female)  Date: 11/3/2022  Diagnosis: Acute UTI [N39.0]  Elevated troponin [R77.8]  Weakness [R53.1] <principal problem not specified>      Precautions: Fall, Contact    ASSESSMENT:  Pt cleared to participate in PT session, pt received sitting up in chair and agreeable to therapy session. Pt completing LAQ x10 reps each side for warm up, standing with modA to RW. Taking several steps forward and reporting pain in B knees, becoming tearful. Pt returning to sitting with Gisell. Pt assisted with donning compression socks and slippers. Pt reporting her LEs feel better with compression socks. Pt standing again with modA, ambulating x4 feet with RW. Requesting to stay in chair. Pt positioned for comfort and educated to call for assist before getting up, pt verbalized understanding. Pt left with all needs met and call bell in reach. RN notified of position and participation.        Progression toward goals:   []      Improving appropriately and progressing toward goals  [x]      Improving slowly and progressing toward goals  []      Not making progress toward goals and plan of care will be adjusted     PLAN:  Patient continues to benefit from skilled intervention to address the above impairments. Continue treatment per established plan of care. Further Equipment Recommendations for Discharge:  rolling walker    AMPAC: Current research shows that an AM-PAC score of 17 or less is not associated with a discharge to the patient's home setting. Based on an AM-PAC score of 15/24 and their current functional mobility deficits, it is recommended that the patient have 3-5 sessions per week of Physical Therapy at d/c to increase the patient's independence. This AMPAC score should be considered in conjunction with interdisciplinary team recommendations to determine the most appropriate discharge setting. Patient's social support, diagnosis, medical stability, and prior level of function should also be taken into consideration. SUBJECTIVE:   Patient stated My legs really hurt.     OBJECTIVE DATA SUMMARY:   Critical Behavior:  Neurologic State: Alert  Orientation Level: Oriented X4  Cognition: Follows commands  Safety/Judgement: Fall prevention, Home safety  Functional Mobility Training:  Bed Mobility:     Supine to Sit:  (found up in chair)     Scooting: Minimum assistance    Transfers:  Sit to Stand: Moderate assistance  Stand to Sit: Minimum assistance        Bed to Chair: Minimum assistance (w/RW)    Balance:  Sitting: Intact  Sitting - Static: Good (unsupported)  Sitting - Dynamic: Good (unsupported)  Standing: Impaired; With support  Standing - Static: Fair  Standing - Dynamic : Fair      Posture:   Posture (WDL): Within defined limits     Ambulation/Gait Training:  Distance (ft): 4 Feet (ft) (x2 reps)  Assistive Device: Walker, rolling  Ambulation - Level of Assistance:  Moderate assistance    Gait Abnormalities: Decreased step clearance    Base of Support: Narrowed     Speed/Maty: Slow;Shuffled  Step Length: Right shortened;Left shortened      Pain:  Pain level pre-treatment: 0/10  Pain level post-treatment: 0/10     Activity Tolerance:   Good     Please refer to the flowsheet for vital signs taken during this treatment. After treatment:   [x] Patient left in no apparent distress sitting up in chair  [] Patient left in no apparent distress in bed  [x] Call bell left within reach  [x] Nursing notified  [] Caregiver present  [] Bed alarm activated  [] SCDs applied      COMMUNICATION/EDUCATION:   [x]         Role of Physical Therapy in the acute care setting. [x]         Fall prevention education was provided and the patient/caregiver indicated understanding. [x]         Patient/family have participated as able in working toward goals and plan of care. [x]         Patient/family agree to work toward stated goals and plan of care. []         Patient understands intent and goals of therapy, but is neutral about his/her participation. []         Patient is unable to participate in stated goals/plan of care: ongoing with therapy staff.  []         Other:        Diane Gandara, PT   Time Calculation: 26 mins    [employer] AM-PAC® Basic Mobility Inpatient Short Form (6-Clicks) Version 2    How much HELP from another person does the patient currently need    (If the patient hasn't done an activity recently, how much help from another person do you think he/she would need if he/she tried?)   Total (Total A or Dep)   A Lot  (Mod to Max A)   A Little (Sup or Min A)   None (Mod I to I)   Turning from your back to your side while in a flat bed without using bedrails? [] 1 [] 2 [x] 3 [] 4   2. Moving from lying on your back to sitting on the side of a flat bed without using bedrails? [] 1 [] 2 [x] 3 [] 4   3. Moving to and from a bed to a chair (including a wheelchair)? [] 1 [x] 2 [] 3 [] 4   4. Standing up from a chair using your arms (e.g., wheelchair, or bedside chair)?    [] 1 [x] 2 [] 3 [] 4   5. Walking in hospital room? [] 1 [] 2 [x] 3 [] 4   6. Climbing 3-5 steps with a railing?+   [] 1 [x] 2 [] 3 [] 4   +If stair climbing cannot be assessed, skip item #6. Sum responses from items 1-5.

## 2023-10-27 ENCOUNTER — OFFICE VISIT (OUTPATIENT)
Age: 78
End: 2023-10-27

## 2023-10-27 VITALS
SYSTOLIC BLOOD PRESSURE: 132 MMHG | WEIGHT: 196.21 LBS | DIASTOLIC BLOOD PRESSURE: 80 MMHG | BODY MASS INDEX: 38.52 KG/M2 | HEIGHT: 60 IN | OXYGEN SATURATION: 98 % | HEART RATE: 88 BPM

## 2023-10-27 DIAGNOSIS — E66.01 MORBID (SEVERE) OBESITY DUE TO EXCESS CALORIES (HCC): ICD-10-CM

## 2023-10-27 DIAGNOSIS — I89.0 LYMPHEDEMA, NOT ELSEWHERE CLASSIFIED: ICD-10-CM

## 2023-10-27 DIAGNOSIS — I87.2 VENOUS INSUFFICIENCY (CHRONIC) (PERIPHERAL): Primary | ICD-10-CM

## 2023-10-27 DIAGNOSIS — M79.604 ACUTE PAIN OF RIGHT LOWER EXTREMITY: ICD-10-CM

## 2023-10-27 ASSESSMENT — PATIENT HEALTH QUESTIONNAIRE - PHQ9
SUM OF ALL RESPONSES TO PHQ QUESTIONS 1-9: 0
1. LITTLE INTEREST OR PLEASURE IN DOING THINGS: 0
SUM OF ALL RESPONSES TO PHQ9 QUESTIONS 1 & 2: 0
2. FEELING DOWN, DEPRESSED OR HOPELESS: 0
SUM OF ALL RESPONSES TO PHQ QUESTIONS 1-9: 0

## 2023-10-27 NOTE — PROGRESS NOTES
1. Have you been to the ER, urgent care clinic since your last visit? Yes / [facility]    Hospitalized since your last visit? No     2. Have you seen or consulted any other health care providers outside of the [address] [place] [address] since your last visit? Include any pap smears or colon screening.   No

## 2023-11-01 NOTE — PROGRESS NOTES
01/05/23        [facility]        History and Physical    Mirna Saravia is a 76 y.o. female The primary encounter diagnosis was Bilateral leg edema/lymphedema. Diagnoses of Severe obesity (BMI 35.0-39. 9) with comorbidity ([address]), Lymphedema, and Deep vein thrombosis (DVT) of proximal vein of both lower extremities, unspecified chronicity ([address]) were also pertinent to this visit. Rouqe Rodriguez returns today for evaluation of right lower extremity leg edema, notable laceration to the shin after having a traumatic fall. Patient states that 3 days ago she was walking and tripped and hit her right shin. She developed a cut and was bleeding, got adequate hemostasis with holding pressure. But has notable bruising and significant swelling since that time. She only ambulates short distances and pivots from wheelchair to bed, and states that this has not been affected. She does have some mild 3 out of 10 pain in the area but denies any numbness or tingling going to her toes or feet. She denies any chest pain or shortness of breath. Denies any fever or chills. Reports that her appetite is being good with no nausea or vomiting and no bowel or bladder issues. Patient states that she has been taking Tylenol for pain relief, with adequate control at this time. Her main concern right now is the significant swelling, states that she knows she has lymphedema and venous insufficiency anyway and this is making it worse. Patient has a known past medical history for  bilateral lower extremity venous insufficiency, lymphedema, with an abdominal component. Patient states that she is feeling better this visit. She reports that she continues to ambulate around the house without any leg pain but if she has to walk longer distances she uses her mary chair. Patient reports that since her last visit she was been diagnosed with a serious back issue.   Patient has been evaluated by orthopedics, has been treated and is feeling

## 2023-11-03 ENCOUNTER — OFFICE VISIT (OUTPATIENT)
Age: 78
End: 2023-11-03
Payer: MEDICARE

## 2023-11-03 VITALS — WEIGHT: 196.21 LBS | BODY MASS INDEX: 38.52 KG/M2 | HEIGHT: 60 IN

## 2023-11-03 DIAGNOSIS — M79.89 LEFT LEG SWELLING: Primary | ICD-10-CM

## 2023-11-03 DIAGNOSIS — I87.2 VENOUS INSUFFICIENCY (CHRONIC) (PERIPHERAL): ICD-10-CM

## 2023-11-03 DIAGNOSIS — L97.921 INFECTED TRAUMATIC LEG ULCER, LEFT, LIMITED TO BREAKDOWN OF SKIN (HCC): ICD-10-CM

## 2023-11-03 DIAGNOSIS — L08.9 INFECTED TRAUMATIC LEG ULCER, LEFT, LIMITED TO BREAKDOWN OF SKIN (HCC): ICD-10-CM

## 2023-11-03 DIAGNOSIS — I89.0 LYMPHEDEMA, NOT ELSEWHERE CLASSIFIED: ICD-10-CM

## 2023-11-03 PROCEDURE — G8417 CALC BMI ABV UP PARAM F/U: HCPCS | Performed by: PHYSICIAN ASSISTANT

## 2023-11-03 PROCEDURE — G8427 DOCREV CUR MEDS BY ELIG CLIN: HCPCS | Performed by: PHYSICIAN ASSISTANT

## 2023-11-03 PROCEDURE — 1036F TOBACCO NON-USER: CPT | Performed by: PHYSICIAN ASSISTANT

## 2023-11-03 PROCEDURE — G8400 PT W/DXA NO RESULTS DOC: HCPCS | Performed by: PHYSICIAN ASSISTANT

## 2023-11-03 PROCEDURE — G8484 FLU IMMUNIZE NO ADMIN: HCPCS | Performed by: PHYSICIAN ASSISTANT

## 2023-11-03 PROCEDURE — 1090F PRES/ABSN URINE INCON ASSESS: CPT | Performed by: PHYSICIAN ASSISTANT

## 2023-11-03 PROCEDURE — 1123F ACP DISCUSS/DSCN MKR DOCD: CPT | Performed by: PHYSICIAN ASSISTANT

## 2023-11-03 PROCEDURE — 99214 OFFICE O/P EST MOD 30 MIN: CPT | Performed by: PHYSICIAN ASSISTANT

## 2023-11-03 NOTE — PROGRESS NOTES
1. Have you been to the ER, urgent care clinic since your last visit? No     Hospitalized since your last visit? No     2. Have you seen or consulted any other health care providers outside of the [address] [place] [address] since your last visit? Include any pap smears or colon screening.   No

## 2023-11-03 NOTE — PROGRESS NOTES
[facility]  [address]  Dept: [phone]           Chart reviewed for the following:   Jose F ROSA MA, have reviewed the medications and updated the Allergic reactions for [address] performed immediately prior to start of procedure:   Jose F ROSA [place], have performed the following reviews on [facility] prior to the start of the procedure:            * Patient was identified by name and date of birth   * Agreement that Jilda See boot removed and reapplied   * Procedure site verified   * Patient was positioned for comfort  * Verbal consent was given by patient     Time: 1766      Date of procedure: 11/3/2023    Procedure performed by:  Farheen Almonte PA-C    How tolerated by patient:  C/o pain and swelling and some bleeding                                                                                     Comments:  Applied adaptic to wound and  K2 to left leg

## 2023-11-06 NOTE — PROGRESS NOTES
01/05/23        [facility]        History and Physical    Mary San is a 76 y.o. female The primary encounter diagnosis was Bilateral leg edema/lymphedema. Diagnoses of Severe obesity (BMI 35.0-39. 9) with comorbidity ([address]), Lymphedema, and Deep vein thrombosis (DVT) of proximal vein of both lower extremities, unspecified chronicity ([address]) were also pertinent to this visit. Radha Zazueta returns today for evaluation of right lower extremity leg edema, notable laceration to the shin after having a traumatic fall. States that she has done well since her last visit, but her leg continues to be painful and swollen. She states it is tender to touch. She has developed a significant bruise around the area. Patient states that last week ago she was walking and tripped and hit her right shin. She developed a cut and was bleeding, got adequate hemostasis with holding pressure. But has notable bruising and significant swelling since that time. She only ambulates short distances and pivots from wheelchair to bed, and states that this has not been affected. She does have some mild 3 out of 10 pain in the area but denies any numbness or tingling going to her toes or feet. She denies any chest pain or shortness of breath. Denies any fever or chills. Reports that her appetite is being good with no nausea or vomiting and no bowel or bladder issues. Patient states that she has been taking Tylenol for pain relief, with adequate control at this time. Her main concern right now is the significant swelling, states that she knows she has lymphedema and venous insufficiency anyway and this is making it worse. Patient has a known past medical history for  bilateral lower extremity venous insufficiency, lymphedema, with an abdominal component. Patient states that she is feeling better this visit.    She reports that she continues to ambulate around the house without any leg pain but if she has to walk longer distances

## 2023-11-10 ENCOUNTER — TELEPHONE (OUTPATIENT)
Age: 78
End: 2023-11-10

## 2023-11-10 ENCOUNTER — NURSE ONLY (OUTPATIENT)
Age: 78
End: 2023-11-10

## 2023-11-10 DIAGNOSIS — M79.89 LEFT LEG SWELLING: Primary | ICD-10-CM

## 2023-11-10 NOTE — PROGRESS NOTES
Patient came in today for a wound check and dressing change, removed old dressing , swelling was down and wound was healed up , applied Aquaphor and double tubie to left leg, instructed patient to continue elevation compression and moisturizing leg and return to see provider in two weeks

## 2023-11-10 NOTE — TELEPHONE ENCOUNTER
Pt called stating she has been having trouble breathing for a few months now. Has not seen  since 8/2021.  Please advise [phone]

## 2023-11-10 NOTE — TELEPHONE ENCOUNTER
Left message. Sx's of sob for over past months needs to be evaluated with appt. Patient has not been seen in over 2 years. Advised to see PCP until the appt she needs to schedule here.

## 2023-11-15 ENCOUNTER — TELEPHONE (OUTPATIENT)
Age: 78
End: 2023-11-15

## 2023-11-15 NOTE — TELEPHONE ENCOUNTER
Pt calling about trouble breathing. Also called 11/10.  not available. Asking nurse to call her morning of 11/16 due to being unavailable rest of the afternoon today.

## 2023-11-16 NOTE — TELEPHONE ENCOUNTER
Spoke with patient. She states she doesn't need to talk with a nurse she was wanting to make appt with Dr. Ricki Dalal. It has bee 2 years and her PCP Dr. Alaina Osborne is requesting she follow up with pulmonary.  She states she was called this am by Lorenz Rubinstein at  and she is going to discuss with Dr. Ricki Dalal and appt and get back with her

## 2023-11-27 ENCOUNTER — NURSE ONLY (OUTPATIENT)
Age: 78
End: 2023-11-27

## 2023-11-27 NOTE — PROGRESS NOTES
Pt has no open wounds. From  last note is wearing double tubigrip. She has a knot on the leg and I took a picture. She will see Danika Saavedra next week.  Denies pain and swelling was very minimal.

## 2023-12-12 ENCOUNTER — OFFICE VISIT (OUTPATIENT)
Age: 78
End: 2023-12-12
Payer: MEDICARE

## 2023-12-12 VITALS
SYSTOLIC BLOOD PRESSURE: 131 MMHG | BODY MASS INDEX: 37.85 KG/M2 | WEIGHT: 192.8 LBS | DIASTOLIC BLOOD PRESSURE: 84 MMHG | HEART RATE: 81 BPM | OXYGEN SATURATION: 98 % | HEIGHT: 60 IN

## 2023-12-12 DIAGNOSIS — E78.00 PURE HYPERCHOLESTEROLEMIA: ICD-10-CM

## 2023-12-12 DIAGNOSIS — I48.0 PAROXYSMAL ATRIAL FIBRILLATION (HCC): ICD-10-CM

## 2023-12-12 DIAGNOSIS — I50.33 ACUTE ON CHRONIC DIASTOLIC CONGESTIVE HEART FAILURE (HCC): Primary | ICD-10-CM

## 2023-12-12 DIAGNOSIS — I10 PRIMARY HYPERTENSION: ICD-10-CM

## 2023-12-12 DIAGNOSIS — Z86.39 HISTORY OF HEMOCHROMATOSIS: ICD-10-CM

## 2023-12-12 DIAGNOSIS — E66.01 SEVERE OBESITY (BMI 35.0-39.9) WITH COMORBIDITY (HCC): ICD-10-CM

## 2023-12-12 PROCEDURE — G8417 CALC BMI ABV UP PARAM F/U: HCPCS | Performed by: INTERNAL MEDICINE

## 2023-12-12 PROCEDURE — 3075F SYST BP GE 130 - 139MM HG: CPT | Performed by: INTERNAL MEDICINE

## 2023-12-12 PROCEDURE — 1123F ACP DISCUSS/DSCN MKR DOCD: CPT | Performed by: INTERNAL MEDICINE

## 2023-12-12 PROCEDURE — 99214 OFFICE O/P EST MOD 30 MIN: CPT | Performed by: INTERNAL MEDICINE

## 2023-12-12 PROCEDURE — 3079F DIAST BP 80-89 MM HG: CPT | Performed by: INTERNAL MEDICINE

## 2023-12-12 PROCEDURE — 1036F TOBACCO NON-USER: CPT | Performed by: INTERNAL MEDICINE

## 2023-12-12 PROCEDURE — G8484 FLU IMMUNIZE NO ADMIN: HCPCS | Performed by: INTERNAL MEDICINE

## 2023-12-12 PROCEDURE — G8400 PT W/DXA NO RESULTS DOC: HCPCS | Performed by: INTERNAL MEDICINE

## 2023-12-12 PROCEDURE — 1090F PRES/ABSN URINE INCON ASSESS: CPT | Performed by: INTERNAL MEDICINE

## 2023-12-12 PROCEDURE — G8427 DOCREV CUR MEDS BY ELIG CLIN: HCPCS | Performed by: INTERNAL MEDICINE

## 2023-12-12 RX ORDER — ATORVASTATIN CALCIUM 20 MG/1
20 TABLET, FILM COATED ORAL DAILY
COMMUNITY

## 2023-12-12 ASSESSMENT — ENCOUNTER SYMPTOMS
EYES NEGATIVE: 1
SHORTNESS OF BREATH: 1
GASTROINTESTINAL NEGATIVE: 1

## 2023-12-12 NOTE — PROGRESS NOTES
Robert Singh is a 78y.o. year old female. Follow-up of CAD, CHF, hypertension, hyperlipidemia, status post STEMI and PCI in 7/21. History of DVT and PE     8/2021  Patient presents to follow-up for hospitalization to [facility] 7/30-8/2/2021 for STEMI. Status post RCA stent on 07/30/2021. She has other PMH of hypertension, hyperlipidemia, and acute lower extremity DVT. She is tolerating all medications. She denies chest pain, shortness of breath or palpitations. She continues to have bilateral lower extremity edema. 3/8/2023 denies any chest pain dizziness palpitations. Does have dyspnea on exertion if she walks from car to her house in the garage. Edema is improving since diuretics were increased. Most of the time is spent on the scooter outside the house. 9/8/2023 edema is about the same. Dyspnea on exertion is still present. She is taking omega-3 which reduced the pain in the legs. No CP, dizziness or palpitations. 12/12/2023 edema about same. GOMES walking to bathroom with walker. No CP, dizziness or palpitations. Review of Systems   Constitutional: Negative. HENT: Negative. Eyes: Negative. Respiratory:  Positive for shortness of breath. Cardiovascular:  Positive for leg swelling. Gastrointestinal: Negative. Endocrine: Negative. Genitourinary: Negative. Musculoskeletal: Negative. Neurological: Negative. Psychiatric/Behavioral: Negative. All other systems reviewed and are negative. Physical Exam  Vitals and nursing note reviewed. Constitutional:       Appearance: Normal appearance. She is obese. HENT:      Head: Normocephalic and atraumatic. Nose: Nose normal.   Eyes:      Conjunctiva/sclera: Conjunctivae normal.   Cardiovascular:      Rate and Rhythm: Normal rate and regular rhythm. Pulses: Normal pulses. Heart sounds: Normal heart sounds.    Pulmonary:      Effort: Pulmonary effort is normal.      Breath sounds: Normal breath No

## 2023-12-12 NOTE — PROGRESS NOTES
Room 7    1. Have you been to the ER, urgent care clinic since your last visit? Hospitalized since your last visit? Yes When: 10/2023 Where: [facility] Reason for visit: Leg Injury      2. Where do you normally have your labs drawn? [facility]      3. Do you need any refills today? No      4. Which local pharmacy do you use? [facility]      5. Which mail order pharmacy do you use? None       6. Are you here for surgical clearance? No, who will be doing your procedure/surgery (care team)?    N/A

## 2023-12-26 RX ORDER — ASPIRIN 81 MG/1
81 TABLET ORAL DAILY
Qty: 90 TABLET | Status: SHIPPED | OUTPATIENT
Start: 2023-12-26

## 2024-01-19 ENCOUNTER — PROCEDURE VISIT (OUTPATIENT)
Age: 79
End: 2024-01-19

## 2024-01-19 VITALS — HEIGHT: 60 IN | BODY MASS INDEX: 37.89 KG/M2 | WEIGHT: 193 LBS

## 2024-01-19 DIAGNOSIS — R06.02 SHORTNESS OF BREATH: Primary | ICD-10-CM

## 2024-01-26 ENCOUNTER — OFFICE VISIT (OUTPATIENT)
Age: 79
End: 2024-01-26
Payer: MEDICARE

## 2024-01-26 VITALS
HEART RATE: 78 BPM | RESPIRATION RATE: 16 BRPM | WEIGHT: 194.6 LBS | DIASTOLIC BLOOD PRESSURE: 63 MMHG | BODY MASS INDEX: 38.21 KG/M2 | TEMPERATURE: 97.7 F | SYSTOLIC BLOOD PRESSURE: 122 MMHG | HEIGHT: 60 IN | OXYGEN SATURATION: 94 %

## 2024-01-26 DIAGNOSIS — J45.40 MODERATE PERSISTENT ASTHMA WITHOUT COMPLICATION: Primary | ICD-10-CM

## 2024-01-26 DIAGNOSIS — R06.09 DYSPNEA ON EXERTION: ICD-10-CM

## 2024-01-26 DIAGNOSIS — J98.4 RESTRICTIVE LUNG DISEASE: ICD-10-CM

## 2024-01-26 DIAGNOSIS — R91.8 MULTIPLE PULMONARY NODULES: ICD-10-CM

## 2024-01-26 PROCEDURE — 3078F DIAST BP <80 MM HG: CPT | Performed by: INTERNAL MEDICINE

## 2024-01-26 PROCEDURE — G8400 PT W/DXA NO RESULTS DOC: HCPCS | Performed by: INTERNAL MEDICINE

## 2024-01-26 PROCEDURE — G8417 CALC BMI ABV UP PARAM F/U: HCPCS | Performed by: INTERNAL MEDICINE

## 2024-01-26 PROCEDURE — 1123F ACP DISCUSS/DSCN MKR DOCD: CPT | Performed by: INTERNAL MEDICINE

## 2024-01-26 PROCEDURE — G8427 DOCREV CUR MEDS BY ELIG CLIN: HCPCS | Performed by: INTERNAL MEDICINE

## 2024-01-26 PROCEDURE — G8484 FLU IMMUNIZE NO ADMIN: HCPCS | Performed by: INTERNAL MEDICINE

## 2024-01-26 PROCEDURE — 1090F PRES/ABSN URINE INCON ASSESS: CPT | Performed by: INTERNAL MEDICINE

## 2024-01-26 PROCEDURE — 1036F TOBACCO NON-USER: CPT | Performed by: INTERNAL MEDICINE

## 2024-01-26 PROCEDURE — 3074F SYST BP LT 130 MM HG: CPT | Performed by: INTERNAL MEDICINE

## 2024-01-26 PROCEDURE — 99214 OFFICE O/P EST MOD 30 MIN: CPT | Performed by: INTERNAL MEDICINE

## 2024-01-26 RX ORDER — BUDESONIDE AND FORMOTEROL FUMARATE DIHYDRATE 160; 4.5 UG/1; UG/1
2 AEROSOL RESPIRATORY (INHALATION) 2 TIMES DAILY
Qty: 10.2 G | Refills: 3 | Status: SHIPPED | OUTPATIENT
Start: 2024-01-26

## 2024-01-26 RX ORDER — POTASSIUM CHLORIDE 750 MG/1
TABLET, FILM COATED, EXTENDED RELEASE ORAL
COMMUNITY

## 2024-01-26 NOTE — PATIENT INSTRUCTIONS
What is the plan?  -Complete CAT/CT scan of your chest   -Complete 6-minute walk test  -Start on Symbicort as prescribed - rinse mouth after each use  -Continue Albuterol rescue inhaler as needed  -Discontinue Flovent inhaler    -Increase activity as much as able

## 2024-01-26 NOTE — PROGRESS NOTES
Maddy Baron presents today for   Chief Complaint   Patient presents with    Follow-up    Cough    Shortness of Breath    Abnormal CT       Is someone accompanying this pt? Yes    Is the patient using any DME equipment during OV? No    -DME Company NA    Depression Screening:        10/27/2023     2:42 PM   PHQ-9 Questionaire   Little interest or pleasure in doing things 0   Feeling down, depressed, or hopeless 0   PHQ-9 Total Score 0       Learning Needs Questionnaire:     No question data found.      Fall Risk:         10/27/2023     2:42 PM 4/14/2023    10:50 AM 3/8/2023    12:34 PM   Fall Risk   2 or more falls in past year? no no no   Fall with injury in past year? no no no        Abuse Screening:          No data to display                  Coordination of Care:    1. Have you been to the ER, urgent care clinic since your last visit? Hospitalized since your last visit? No    2. Have you seen or consulted any other health care providers outside of the [facility] since your last visit? Include any pap smears or colon screening. No    Medication list has been update per patient.      
for pneumonia.    Age indeterminant T4 compression fracture though new from 2020. Correlate for  point tenderness in this location.    Multiple subcentimeter lung  nodules which are generally unchanged. There is a  single right middle lobe nodule that is slightly more ovoid in appearance  compared to previous. Could be due to differences in slice selection. May  benefit from a follow-up CT chest in 6 months to assess for stability.       Patient Active Problem List   Diagnosis    Abnormal chest CT    History of pulmonary embolism    CAP (community acquired pneumonia)    Shoulder impingement    STEMI (ST elevation myocardial infarction) (HCC)    Hypertension    Chronic cough    GOMES (dyspnea on exertion)    Spinal stenosis    Sacroiliitis (HCC)    Chronic diastolic congestive heart failure (HCC)    Obesity, morbid (HCC)    Chronic bronchitis with productive mucopurulent cough (HCC)    Sepsis (HCC)    Hemochromatosis type 1 (HCC)    High cholesterol    Atrial fibrillation with RVR (HCC)    History of DVT (deep vein thrombosis)    Hypotension    Pulmonary embolism without acute cor pulmonale (HCC)    Coronary artery disease of native artery of native heart with stable angina pectoris (HCC)    Bilateral leg edema    Weakness    Acute UTI    Stage 3 chronic kidney disease (HCC)    Compression fracture of body of thoracic vertebra (HCC)    Acquired hypothyroidism    Goals of care, counseling/discussion    Advanced care planning/counseling discussion    Debility       IMPRESSION:   Asthma, moderate persistent: symptoms not adequately controlled as patient only using SKYLER; ICS not being used. PFT with mixed obstructive & restrictive defect. +Peripheral eosinophilia.  Dyspnea on exertion: likely due to above. SpO2 94% on RA.  Multiple pulmonary nodules: etiology unclear. Likely benign but repeat imaging is necessary.  H/o PE/DVT: not on anticoagulation (Eliquis); ASA 81 mg daily.  H/o STEMI  Obesity: BMI 38.01kg/m2 - likely

## 2024-02-02 ENCOUNTER — HOSPITAL ENCOUNTER (OUTPATIENT)
Facility: HOSPITAL | Age: 79
End: 2024-02-02
Attending: INTERNAL MEDICINE
Payer: MEDICARE

## 2024-02-02 DIAGNOSIS — R91.8 MULTIPLE PULMONARY NODULES: ICD-10-CM

## 2024-02-02 PROCEDURE — 71250 CT THORAX DX C-: CPT

## 2024-02-12 ENCOUNTER — CLINICAL DOCUMENTATION (OUTPATIENT)
Age: 79
End: 2024-02-12

## 2024-02-22 DIAGNOSIS — J98.4 RESTRICTIVE LUNG DISEASE: ICD-10-CM

## 2024-02-22 DIAGNOSIS — R06.09 DYSPNEA ON EXERTION: ICD-10-CM

## 2024-02-22 DIAGNOSIS — R06.02 SHORTNESS OF BREATH: ICD-10-CM

## 2024-02-22 DIAGNOSIS — R91.8 MULTIPLE PULMONARY NODULES: ICD-10-CM

## 2024-02-22 DIAGNOSIS — J45.40 MODERATE PERSISTENT ASTHMA WITHOUT COMPLICATION: Primary | ICD-10-CM

## 2024-02-22 NOTE — PROGRESS NOTES
Order placed for O2 for nighttime use, per Verbal Order from Dr. Robert Mondragon on 2/22/2024.    Last office visit: 1/26/2024    Follow up Visit: due 7/2024    Provider is aware of last office visit and follow up. No further action requested from provider.

## 2024-02-23 ENCOUNTER — TELEPHONE (OUTPATIENT)
Age: 79
End: 2024-02-23

## 2024-02-23 NOTE — TELEPHONE ENCOUNTER
Pt calling to ask why is a DME company calling? What are her results for her test? Can you give her a call back at

## 2024-02-26 ENCOUNTER — TELEPHONE (OUTPATIENT)
Age: 79
End: 2024-02-26

## 2024-02-26 NOTE — TELEPHONE ENCOUNTER
Pt is calling saying she is confused on what to do and what are her results for her watch? And she is confused about her oxygen. Can you please give her a call back at

## 2024-02-26 NOTE — TELEPHONE ENCOUNTER
Patient states she got a call from [email or web address] re delivery of the oxygen but she doesn't know when it is. Number given to [employer]

## 2024-03-14 ENCOUNTER — HOSPITAL ENCOUNTER (OUTPATIENT)
Facility: HOSPITAL | Age: 79
Discharge: HOME | End: 2024-03-14
Payer: MEDICARE

## 2024-03-14 ENCOUNTER — TELEPHONE (OUTPATIENT)
Age: 79
End: 2024-03-14

## 2024-03-14 DIAGNOSIS — I50.33 ACUTE ON CHRONIC DIASTOLIC CONGESTIVE HEART FAILURE (HCC): ICD-10-CM

## 2024-03-14 LAB
ANION GAP SERPL CALC-SCNC: 5 MMOL/L (ref 3–18)
BUN SERPL-MCNC: 26 MG/DL (ref 7–18)
BUN/CREAT SERPL: 22 (ref 12–20)
CALCIUM SERPL-MCNC: 10 MG/DL (ref 8.5–10.1)
CHLORIDE SERPL-SCNC: 101 MMOL/L (ref 100–111)
CO2 SERPL-SCNC: 37 MMOL/L (ref 21–32)
CREAT SERPL-MCNC: 1.19 MG/DL (ref 0.6–1.3)
GLUCOSE SERPL-MCNC: 92 MG/DL (ref 74–99)
POTASSIUM SERPL-SCNC: 3.8 MMOL/L (ref 3.5–5.5)
SODIUM SERPL-SCNC: 143 MMOL/L (ref 136–145)
TSH SERPL DL<=0.05 MIU/L-ACNC: 3.91 UIU/ML (ref 0.36–3.74)

## 2024-03-14 PROCEDURE — 36415 COLL VENOUS BLD VENIPUNCTURE: CPT

## 2024-03-14 PROCEDURE — 84443 ASSAY THYROID STIM HORMONE: CPT

## 2024-03-14 PROCEDURE — 80048 BASIC METABOLIC PNL TOTAL CA: CPT

## 2024-03-14 NOTE — RESULT ENCOUNTER NOTE
Please contact patient and do the following asap    Levothyroxine should be increased most likely and tell patient to get in touch with the physician who prescribed it

## 2024-03-14 NOTE — TELEPHONE ENCOUNTER
Spoke with patient regarding message below, patient voiced understanding and acceptance of advice.    ----- Message from Chely Mcarthur MD sent at 3/14/2024 12:41 PM EDT -----  Please contact patient and do the following asap    Levothyroxine should be increased most likely and tell patient to get in touch with the physician who prescribed it

## 2024-03-19 ENCOUNTER — OFFICE VISIT (OUTPATIENT)
Age: 79
End: 2024-03-19
Payer: MEDICARE

## 2024-03-19 VITALS
HEART RATE: 70 BPM | WEIGHT: 195 LBS | DIASTOLIC BLOOD PRESSURE: 77 MMHG | SYSTOLIC BLOOD PRESSURE: 133 MMHG | OXYGEN SATURATION: 96 % | BODY MASS INDEX: 38.08 KG/M2

## 2024-03-19 DIAGNOSIS — I50.33 ACUTE ON CHRONIC DIASTOLIC CONGESTIVE HEART FAILURE (HCC): Primary | ICD-10-CM

## 2024-03-19 DIAGNOSIS — I25.10 CORONARY ARTERY DISEASE INVOLVING NATIVE CORONARY ARTERY OF NATIVE HEART WITHOUT ANGINA PECTORIS: ICD-10-CM

## 2024-03-19 DIAGNOSIS — Z95.5 HISTORY OF CORONARY ARTERY STENT PLACEMENT: ICD-10-CM

## 2024-03-19 DIAGNOSIS — I10 PRIMARY HYPERTENSION: ICD-10-CM

## 2024-03-19 DIAGNOSIS — I48.0 PAROXYSMAL ATRIAL FIBRILLATION (HCC): ICD-10-CM

## 2024-03-19 DIAGNOSIS — E66.01 MORBID OBESITY (HCC): ICD-10-CM

## 2024-03-19 PROCEDURE — G8417 CALC BMI ABV UP PARAM F/U: HCPCS | Performed by: INTERNAL MEDICINE

## 2024-03-19 PROCEDURE — 1123F ACP DISCUSS/DSCN MKR DOCD: CPT | Performed by: INTERNAL MEDICINE

## 2024-03-19 PROCEDURE — 3075F SYST BP GE 130 - 139MM HG: CPT | Performed by: INTERNAL MEDICINE

## 2024-03-19 PROCEDURE — G8427 DOCREV CUR MEDS BY ELIG CLIN: HCPCS | Performed by: INTERNAL MEDICINE

## 2024-03-19 PROCEDURE — 99214 OFFICE O/P EST MOD 30 MIN: CPT | Performed by: INTERNAL MEDICINE

## 2024-03-19 PROCEDURE — 1036F TOBACCO NON-USER: CPT | Performed by: INTERNAL MEDICINE

## 2024-03-19 PROCEDURE — 1090F PRES/ABSN URINE INCON ASSESS: CPT | Performed by: INTERNAL MEDICINE

## 2024-03-19 PROCEDURE — G8484 FLU IMMUNIZE NO ADMIN: HCPCS | Performed by: INTERNAL MEDICINE

## 2024-03-19 PROCEDURE — G8400 PT W/DXA NO RESULTS DOC: HCPCS | Performed by: INTERNAL MEDICINE

## 2024-03-19 PROCEDURE — 3078F DIAST BP <80 MM HG: CPT | Performed by: INTERNAL MEDICINE

## 2024-03-19 RX ORDER — ASPIRIN 81 MG/1
81 TABLET ORAL DAILY
Qty: 90 TABLET | Refills: 1 | Status: SHIPPED | OUTPATIENT
Start: 2024-03-19

## 2024-03-19 RX ORDER — SPIRONOLACTONE 25 MG/1
25 TABLET ORAL DAILY
Qty: 30 TABLET | Refills: 5 | Status: SHIPPED | OUTPATIENT
Start: 2024-03-19

## 2024-03-19 RX ORDER — POTASSIUM CHLORIDE 20 MEQ/1
20 TABLET, EXTENDED RELEASE ORAL DAILY
Qty: 90 TABLET | Refills: 1 | Status: CANCELLED | OUTPATIENT
Start: 2024-03-19

## 2024-03-19 ASSESSMENT — ENCOUNTER SYMPTOMS
EYES NEGATIVE: 1
GASTROINTESTINAL NEGATIVE: 1
SHORTNESS OF BREATH: 1

## 2024-03-19 NOTE — PROGRESS NOTES
1. Have you been to the ER, urgent care clinic since your last visit?  Hospitalized since your last visit? NO     2.  Where do you normally have your labs drawn?  [facility]    3. Do you need any refills today?  YES    4. Which local pharmacy do you use (enter pharmacy)?   [facility]    5. Which mail order pharmacy do you use (enter pharmacy)?   NO     6. Are you here for surgical clearance and if so who will be doing your     procedure/surgery (care team)?   NO      
Arm   Position: Sitting   Cuff Size: Small Adult   Pulse: 70   SpO2: 96%   Weight: 88.5 kg (195 lb)          Diagnostic Studies:  I have reviewed the relevant tests done on the patient and show as follows  EKG tracings reviewed by me today.      Ms. Baron has a reminder for a \"due or due soon\" health maintenance. I have asked that she contact her primary care provider for follow-up on this health maintenance.      7/30/2021  Indications    ST elevation myocardial infarction (STEMI) involving other coronary artery of inferior wall (HCC) [I21.19 (ICD-10-CM)]   Conclusion       Single-vessel coronary artery disease with totally occluded proximal right coronary artery.  Successful thrombectomy of the right coronary artery due to extensive thrombus burden.  Successful PCI of the proximal right coronary artery with residual 0% stenosis deploying a drug-eluting stent as above.     Delaying factors in this patient were multiple trials at peripheral IV in the ER and only when IV was available.  We successfully cannulated the right femoral vein in addition to right femoral artery without any complications in the lab for venous access.  The wiring of the artery was challenging because of the possible  as described above.  Due to extensive thrombus in the artery, thrombectomy was used.  Aggressive risk factor med modification and medical treatment should continue.  Further work-up of this chronic severe edema which may be related to bilateral DVTs in the past and chronic CHF.   7/21 echo  Interpretation Summary    Image quality for this study was technically difficult with poor image quality.  Contrast used: DEFINITY.  LV: Calculated LVEF is 50%. Visually measured ejection fraction. Normal cavity size and wall thickness. Low normal systolic function. Inconclusive left ventricular diastolic function.  Ejection fraction may be underestimated due to poor image quality.       Comparison Study Information      Prior

## 2024-03-26 ENCOUNTER — HOSPITAL ENCOUNTER (OUTPATIENT)
Facility: HOSPITAL | Age: 79
Discharge: HOME | End: 2024-03-29
Payer: MEDICARE

## 2024-03-26 DIAGNOSIS — I50.33 ACUTE ON CHRONIC DIASTOLIC CONGESTIVE HEART FAILURE (HCC): ICD-10-CM

## 2024-03-26 LAB
ANION GAP SERPL CALC-SCNC: 4 MMOL/L (ref 3–18)
BUN SERPL-MCNC: 17 MG/DL (ref 7–18)
BUN/CREAT SERPL: 18 (ref 12–20)
CALCIUM SERPL-MCNC: 9.3 MG/DL (ref 8.5–10.1)
CHLORIDE SERPL-SCNC: 104 MMOL/L (ref 100–111)
CO2 SERPL-SCNC: 31 MMOL/L (ref 21–32)
CREAT SERPL-MCNC: 0.97 MG/DL (ref 0.6–1.3)
GLUCOSE SERPL-MCNC: 84 MG/DL (ref 74–99)
POTASSIUM SERPL-SCNC: 4.8 MMOL/L (ref 3.5–5.5)
SODIUM SERPL-SCNC: 139 MMOL/L (ref 136–145)

## 2024-03-26 PROCEDURE — 80048 BASIC METABOLIC PNL TOTAL CA: CPT

## 2024-03-26 PROCEDURE — 36415 COLL VENOUS BLD VENIPUNCTURE: CPT

## 2024-04-12 ENCOUNTER — TELEPHONE (OUTPATIENT)
Age: 79
End: 2024-04-12

## 2024-04-12 RX ORDER — ATORVASTATIN CALCIUM 20 MG/1
20 TABLET, FILM COATED ORAL DAILY
Qty: 30 TABLET | Status: CANCELLED | OUTPATIENT
Start: 2024-04-12

## 2024-04-12 NOTE — TELEPHONE ENCOUNTER
Patient returned call. Stated she's taking 80mg Lipitor  Refill not needed. She picked up refill 3/30/24

## 2024-04-12 NOTE — TELEPHONE ENCOUNTER
Called patient to check medication dose. Patient stated she will check and call back with medication dose.

## 2024-04-16 ENCOUNTER — HOSPITAL ENCOUNTER (OUTPATIENT)
Facility: HOSPITAL | Age: 79
Discharge: HOME | End: 2024-04-19
Payer: MEDICARE

## 2024-04-16 ENCOUNTER — OFFICE VISIT (OUTPATIENT)
Age: 79
End: 2024-04-16
Payer: MEDICARE

## 2024-04-16 VITALS
OXYGEN SATURATION: 97 % | HEART RATE: 70 BPM | SYSTOLIC BLOOD PRESSURE: 124 MMHG | HEIGHT: 60 IN | BODY MASS INDEX: 38.68 KG/M2 | DIASTOLIC BLOOD PRESSURE: 76 MMHG | WEIGHT: 197 LBS

## 2024-04-16 DIAGNOSIS — I89.0 LYMPHEDEMA, NOT ELSEWHERE CLASSIFIED: ICD-10-CM

## 2024-04-16 DIAGNOSIS — M79.604 ACUTE PAIN OF RIGHT LOWER EXTREMITY: ICD-10-CM

## 2024-04-16 DIAGNOSIS — I50.33 ACUTE ON CHRONIC DIASTOLIC CONGESTIVE HEART FAILURE (HCC): ICD-10-CM

## 2024-04-16 DIAGNOSIS — E66.01 MORBID (SEVERE) OBESITY DUE TO EXCESS CALORIES (HCC): ICD-10-CM

## 2024-04-16 DIAGNOSIS — M79.89 LEFT LEG SWELLING: Primary | ICD-10-CM

## 2024-04-16 LAB
ANION GAP SERPL CALC-SCNC: 4 MMOL/L (ref 3–18)
BUN SERPL-MCNC: 15 MG/DL (ref 7–18)
BUN/CREAT SERPL: 15 (ref 12–20)
CALCIUM SERPL-MCNC: 9.8 MG/DL (ref 8.5–10.1)
CHLORIDE SERPL-SCNC: 107 MMOL/L (ref 100–111)
CO2 SERPL-SCNC: 29 MMOL/L (ref 21–32)
CREAT SERPL-MCNC: 1.01 MG/DL (ref 0.6–1.3)
GLUCOSE SERPL-MCNC: 76 MG/DL (ref 74–99)
POTASSIUM SERPL-SCNC: 4.3 MMOL/L (ref 3.5–5.5)
SODIUM SERPL-SCNC: 140 MMOL/L (ref 136–145)

## 2024-04-16 PROCEDURE — 1123F ACP DISCUSS/DSCN MKR DOCD: CPT | Performed by: PHYSICIAN ASSISTANT

## 2024-04-16 PROCEDURE — G8400 PT W/DXA NO RESULTS DOC: HCPCS | Performed by: PHYSICIAN ASSISTANT

## 2024-04-16 PROCEDURE — 1036F TOBACCO NON-USER: CPT | Performed by: PHYSICIAN ASSISTANT

## 2024-04-16 PROCEDURE — G8427 DOCREV CUR MEDS BY ELIG CLIN: HCPCS | Performed by: PHYSICIAN ASSISTANT

## 2024-04-16 PROCEDURE — G8417 CALC BMI ABV UP PARAM F/U: HCPCS | Performed by: PHYSICIAN ASSISTANT

## 2024-04-16 PROCEDURE — 3074F SYST BP LT 130 MM HG: CPT | Performed by: PHYSICIAN ASSISTANT

## 2024-04-16 PROCEDURE — 36415 COLL VENOUS BLD VENIPUNCTURE: CPT

## 2024-04-16 PROCEDURE — 80048 BASIC METABOLIC PNL TOTAL CA: CPT

## 2024-04-16 PROCEDURE — 3078F DIAST BP <80 MM HG: CPT | Performed by: PHYSICIAN ASSISTANT

## 2024-04-16 PROCEDURE — 1090F PRES/ABSN URINE INCON ASSESS: CPT | Performed by: PHYSICIAN ASSISTANT

## 2024-04-16 PROCEDURE — 99214 OFFICE O/P EST MOD 30 MIN: CPT | Performed by: PHYSICIAN ASSISTANT

## 2024-04-16 RX ORDER — ATORVASTATIN CALCIUM 80 MG/1
80 TABLET, FILM COATED ORAL 2 TIMES DAILY
Qty: 90 TABLET | Refills: 1 | Status: SHIPPED | OUTPATIENT
Start: 2024-04-16

## 2024-04-18 NOTE — PROGRESS NOTES
1. Have you been to the ER, urgent care clinic since your last visit?  No Hospitalized since your last visit?No    2. Have you seen or consulted any other health care providers outside of the [facility] since your last visit?  Include any pap smears or colon screening. No   
symptoms.  At that time we will also follow-up and discuss tactile representatives plan.  Patient states she agrees but if she has no symptoms she will cancel her appointment.  In the meantime patient encouraged to continue to make better lifestyle choices, better nutritional choices, increasing her daily activity as tolerated, and of course smoking cessation.  Patient states she understands above, is very appreciative of our services, and is willing to proceed as planned.  I will discuss details with my attending.    We reviewed the plan with the patient and the patient understands.  Approximately 30-40 minutes was spent on this visit  Follow-up and Dispositions    Return in about 1 month - no imaging         Scooter Wells PA-C

## 2024-06-27 DIAGNOSIS — I50.33 ACUTE ON CHRONIC DIASTOLIC CONGESTIVE HEART FAILURE (HCC): ICD-10-CM

## 2024-06-27 RX ORDER — SPIRONOLACTONE 25 MG/1
25 TABLET ORAL DAILY
Qty: 90 TABLET | Refills: 1 | Status: SHIPPED | OUTPATIENT
Start: 2024-06-27

## 2024-07-01 DIAGNOSIS — E78.00 PURE HYPERCHOLESTEROLEMIA: ICD-10-CM

## 2024-07-01 DIAGNOSIS — I25.10 CORONARY ARTERY DISEASE INVOLVING NATIVE CORONARY ARTERY OF NATIVE HEART WITHOUT ANGINA PECTORIS: Primary | ICD-10-CM

## 2024-07-01 RX ORDER — ATORVASTATIN CALCIUM 80 MG/1
80 TABLET, FILM COATED ORAL EVERY EVENING
COMMUNITY
End: 2024-07-01 | Stop reason: SDUPTHER

## 2024-07-01 RX ORDER — ATORVASTATIN CALCIUM 80 MG/1
80 TABLET, FILM COATED ORAL EVERY EVENING
Qty: 90 TABLET | Refills: 3 | Status: SHIPPED | OUTPATIENT
Start: 2024-07-01

## 2024-07-01 NOTE — TELEPHONE ENCOUNTER
Requested Prescriptions     Pending Prescriptions Disp Refills    atorvastatin (LIPITOR) 80 MG tablet 90 tablet 3     Sig: Take 1 tablet by mouth every evening

## 2024-07-08 DIAGNOSIS — I50.33 ACUTE ON CHRONIC DIASTOLIC CONGESTIVE HEART FAILURE (HCC): ICD-10-CM

## 2024-07-08 RX ORDER — SALICYLIC ACID 40 %
81 ADHESIVE PATCH, MEDICATED TOPICAL DAILY
Qty: 90 TABLET | Refills: 1 | Status: SHIPPED | OUTPATIENT
Start: 2024-07-08

## 2024-07-26 ENCOUNTER — OFFICE VISIT (OUTPATIENT)
Age: 79
End: 2024-07-26
Payer: MEDICARE

## 2024-07-26 VITALS
SYSTOLIC BLOOD PRESSURE: 120 MMHG | HEIGHT: 60 IN | BODY MASS INDEX: 38.28 KG/M2 | OXYGEN SATURATION: 95 % | WEIGHT: 195 LBS | HEART RATE: 58 BPM | DIASTOLIC BLOOD PRESSURE: 80 MMHG

## 2024-07-26 DIAGNOSIS — M79.89 LEFT LEG SWELLING: Primary | ICD-10-CM

## 2024-07-26 DIAGNOSIS — I87.2 VENOUS INSUFFICIENCY (CHRONIC) (PERIPHERAL): ICD-10-CM

## 2024-07-26 DIAGNOSIS — I89.0 LYMPHEDEMA, NOT ELSEWHERE CLASSIFIED: ICD-10-CM

## 2024-07-26 PROCEDURE — 3074F SYST BP LT 130 MM HG: CPT | Performed by: PHYSICIAN ASSISTANT

## 2024-07-26 PROCEDURE — G8427 DOCREV CUR MEDS BY ELIG CLIN: HCPCS | Performed by: PHYSICIAN ASSISTANT

## 2024-07-26 PROCEDURE — 99214 OFFICE O/P EST MOD 30 MIN: CPT | Performed by: PHYSICIAN ASSISTANT

## 2024-07-26 PROCEDURE — 1090F PRES/ABSN URINE INCON ASSESS: CPT | Performed by: PHYSICIAN ASSISTANT

## 2024-07-26 PROCEDURE — 1036F TOBACCO NON-USER: CPT | Performed by: PHYSICIAN ASSISTANT

## 2024-07-26 PROCEDURE — 3079F DIAST BP 80-89 MM HG: CPT | Performed by: PHYSICIAN ASSISTANT

## 2024-07-26 PROCEDURE — 1123F ACP DISCUSS/DSCN MKR DOCD: CPT | Performed by: PHYSICIAN ASSISTANT

## 2024-07-26 PROCEDURE — G8400 PT W/DXA NO RESULTS DOC: HCPCS | Performed by: PHYSICIAN ASSISTANT

## 2024-07-26 PROCEDURE — G8417 CALC BMI ABV UP PARAM F/U: HCPCS | Performed by: PHYSICIAN ASSISTANT

## 2024-07-29 NOTE — PROGRESS NOTES
07/26/2024        Maddy Baron        History and Physical    Maddy Baron is a pleasant 79 y.o. female who is well-known to our practice from previous evaluations for both venous insufficiency and lymphedema.  The primary encounter diagnosis was Bilateral leg edema/lymphedema. Diagnoses of Severe obesity (BMI 35.0-39.9) with comorbidity (HCC), Lymphedema, and Deep vein thrombosis (DVT) of proximal vein of both lower extremities, unspecified chronicity (HCC) were also pertinent to this visit.        Maddy returns today for re-evaluation of right lower extremity leg edema.  States that she has done well since her last visit, but her leg continues to be uncomfortable and swollen.  She states it is tender to touch.  She has developed a significant tenderness around the area her foot and ankle.  Patient states that she has been compliant with compression elevation and with her lymphedema treatment.  She only ambulates short distances around the house, able to complete her daily activities, states that she pivots from wheelchair to bed or chair when needed, and states that this has not been affected since her last visit.  Albeit she still complains some mild 3 out of 10 pain in the area but denies any numbness or tingling going to her toes or feet.  She denies any chest pain or shortness of breath.  Denies any fever or chills.  Reports that her appetite is being good with no nausea or vomiting and no bowel or bladder issues.  Patient states that she has been taking Tylenol for pain relief, with adequate control at this time.  Her main concern right now is the significant swelling, states that she knows she has lymphedema and venous insufficiency anyway and this is making it worse.  Patient has a known past medical history for  bilateral lower extremity venous insufficiency, lymphedema, with an abdominal component.  Patient states that she is feeling frustrated on this visit because she cannot get control of her

## 2024-08-12 ENCOUNTER — OFFICE VISIT (OUTPATIENT)
Age: 79
End: 2024-08-12

## 2024-08-12 VITALS
TEMPERATURE: 96 F | DIASTOLIC BLOOD PRESSURE: 66 MMHG | RESPIRATION RATE: 16 BRPM | OXYGEN SATURATION: 97 % | WEIGHT: 200 LBS | SYSTOLIC BLOOD PRESSURE: 155 MMHG | HEART RATE: 56 BPM | BODY MASS INDEX: 39.27 KG/M2 | HEIGHT: 60 IN

## 2024-08-12 DIAGNOSIS — J98.4 RESTRICTIVE LUNG DISEASE: ICD-10-CM

## 2024-08-12 DIAGNOSIS — R09.02 HYPOXIA: Primary | ICD-10-CM

## 2024-08-12 DIAGNOSIS — R06.09 DYSPNEA ON EXERTION: ICD-10-CM

## 2024-08-12 PROBLEM — M10.9 GOUT, UNSPECIFIED: Status: ACTIVE | Noted: 2023-05-26

## 2024-08-12 PROBLEM — M54.50 LOW BACK PAIN, UNSPECIFIED: Status: ACTIVE | Noted: 2023-05-26

## 2024-08-12 PROBLEM — I82.409 DEEP VEIN THROMBOSIS (DVT) OF LOWER EXTREMITY (HCC): Status: ACTIVE | Noted: 2024-08-12

## 2024-08-12 PROBLEM — G93.2 BENIGN INTRACRANIAL HYPERTENSION: Status: ACTIVE | Noted: 2023-05-26

## 2024-08-12 PROBLEM — E46 PROTEIN-CALORIE MALNUTRITION (HCC): Status: ACTIVE | Noted: 2023-05-26

## 2024-08-12 PROBLEM — I50.41 ACUTE COMBINED SYSTOLIC (CONGESTIVE) AND DIASTOLIC (CONGESTIVE) HEART FAILURE (HCC): Status: ACTIVE | Noted: 2023-05-26

## 2024-08-12 PROBLEM — I82.890 ACUTE EMBOLISM AND THROMBOSIS OF OTHER SPECIFIED VEINS: Status: ACTIVE | Noted: 2023-05-26

## 2024-08-12 RX ORDER — FLUTICASONE FUROATE, UMECLIDINIUM BROMIDE AND VILANTEROL TRIFENATATE 200; 62.5; 25 UG/1; UG/1; UG/1
1 POWDER RESPIRATORY (INHALATION) DAILY
Qty: 2 EACH | Refills: 0 | Status: SHIPPED | COMMUNITY
Start: 2024-08-12

## 2024-08-12 RX ORDER — ALBUTEROL SULFATE 90 UG/1
2 AEROSOL, METERED RESPIRATORY (INHALATION) EVERY 4 HOURS PRN
Qty: 8.5 G | Refills: 5 | Status: SHIPPED | OUTPATIENT
Start: 2024-08-12

## 2024-08-12 RX ORDER — POTASSIUM CHLORIDE 1500 MG/1
TABLET, EXTENDED RELEASE ORAL
COMMUNITY
Start: 2016-08-31

## 2024-08-12 RX ORDER — BUDESONIDE AND FORMOTEROL FUMARATE DIHYDRATE 160; 4.5 UG/1; UG/1
2 AEROSOL RESPIRATORY (INHALATION) 2 TIMES DAILY
Qty: 10.2 G | Refills: 3 | Status: CANCELLED | OUTPATIENT
Start: 2024-08-12

## 2024-08-12 NOTE — PROGRESS NOTES
Maddy Baron presents today for   Chief Complaint   Patient presents with    Moderate persistent asthma without complication       Is someone accompanying this pt? Daughter    Is the patient using any DME equipment during OV? Wheelchair , Oxygen     -DME Company [employer]    Depression Screening:        10/27/2023     2:42 PM   PHQ-9 Questionaire   Little interest or pleasure in doing things 0   Feeling down, depressed, or hopeless 0   PHQ-9 Total Score 0       Learning Assessment:    Failed to redirect to the Timeline version of the [employer].    Abuse Screening:         No data to display                Fall Risk    Failed to redirect to the Timeline version of the [employer].    Coordination of Care:    1. Have you been to the ER, urgent care clinic since your last visit? Hospitalized since your last visit? No    2. Have you seen or consulted any other health care providers outside of the [facility] since your last visit? Include any pap smears or colon screening. No    Medication list has been update per patient.

## 2024-08-12 NOTE — PATIENT INSTRUCTIONS
What is the plan?  -Stop Flovent while on Trelegy ellipta  -Start on Trelegy ellipta - rinse mouth after each use. Let me know in about a month how this is working for you and I can send a prescription to the pharmacy.  -Albuterol - use only if you need it for cough, shortness of breath or wheezing.  -Start using Oxygen at 1-3L/min with activity and at night. We will work on getting your machine examined/replaced  -Get a Pulse-ox at home - make sure your Oxygen level is >88% at all times.  -Follow-up with your other doctors.  -Increase activity as much as able

## 2024-08-12 NOTE — PROGRESS NOTES
[facility]                                                       Pulmonary, Critical Care, and Sleep Medicine      Pulmonary Office Follow-up.    Name: Maddy Baron     : 1945     Date: 2024        Subjective:   Chief complaint: SOB  Patient is a 79 y.o. female with a PMHx of GERD, Gout, Hemochromatosis, and HTN.    She was previously seen by Dr. BRIDGETT Rivers.  Seen by Dr. Newell in the past as well.    HPI(24):  Today in clinic, she states that her breathing is unchanged.  Not using her oxygen as it makes a loud noise and she is not able to sleep.   Unsure of who provided her oxygen/DME.  Reports intermittent wheezing with walking from her bathroom to her den in her home.  Takes her about 10 minutes to catch her breath.   States her mouth is quite dry and that keeps her awake at night. States she is not on Bumex.  Notes recent weight gain of ~20Lbs since 2024.  Reports increased swelling of bilateral legs. She is following with Vascular surgery due to PAD.  Hemoptysis: none  O2 - not using at night due to fault machine.     Current inhalers and/or respiratory treatments:  -Symbicort 160/4.5mcg - using BID.  -Albuterol rescue inhaler - using 4-5x/day    She is here today with her daughter who supplements HPI; both are in agreement with plan of care.      HPI (24):  Today in clinic, she states that she is doing okay.  She states that minimal activity, she gets out of breath.  Needs help bathing herself.   Notes she is also limited by her chronic shoulder pain.   Admits to intermitent cough - worse in the Am and sometimes productive of phlegm.   Takes her about seven minutes to catch her breath.  Admits to intermittent wheezing.  Breathing has not changed since last visit.  Ambulataes with a motorized scooter.  She is on Bumex BID.  H/o PE / B/l DVT - ?2 years ago; off Eliquis over 1 year ago. On 81 mg /day

## 2024-08-19 ENCOUNTER — TELEPHONE (OUTPATIENT)
Age: 79
End: 2024-08-19

## 2024-08-19 ENCOUNTER — CLINICAL DOCUMENTATION (OUTPATIENT)
Age: 79
End: 2024-08-19

## 2024-08-19 NOTE — PROGRESS NOTES
Sent demo, insurance, and poc order to [employer] to fax [phone], telephone# [phone]    Sent oxygen repair to [employer] to fax [phone], telephone# [phone]

## 2024-08-19 NOTE — TELEPHONE ENCOUNTER
Patient states she has not received her oxygen, Dr. Mondragon ordered from appointment last week.

## 2024-08-19 NOTE — TELEPHONE ENCOUNTER
Confirmed with Shae that order was sent to [facility].  Patient advised order was sent and given the number to [facility] so she can follow up with them tomorrow afternoon if she hasn't heard from them.

## 2024-08-29 ENCOUNTER — APPOINTMENT (OUTPATIENT)
Facility: HOSPITAL | Age: 79
End: 2024-08-29
Payer: MEDICARE

## 2024-08-29 ENCOUNTER — HOSPITAL ENCOUNTER (INPATIENT)
Facility: HOSPITAL | Age: 79
LOS: 2 days | End: 2024-08-31
Attending: EMERGENCY MEDICINE | Admitting: STUDENT IN AN ORGANIZED HEALTH CARE EDUCATION/TRAINING PROGRAM
Payer: MEDICARE

## 2024-08-29 ENCOUNTER — CLINICAL DOCUMENTATION (OUTPATIENT)
Age: 79
End: 2024-08-29

## 2024-08-29 DIAGNOSIS — R06.00 DYSPNEA AND RESPIRATORY ABNORMALITIES: ICD-10-CM

## 2024-08-29 DIAGNOSIS — I82.4Z2 DVT, LOWER EXTREMITY, DISTAL, ACUTE, LEFT (HCC): Primary | ICD-10-CM

## 2024-08-29 DIAGNOSIS — I26.99 OTHER ACUTE PULMONARY EMBOLISM, UNSPECIFIED WHETHER ACUTE COR PULMONALE PRESENT (HCC): ICD-10-CM

## 2024-08-29 DIAGNOSIS — R06.89 DYSPNEA AND RESPIRATORY ABNORMALITIES: ICD-10-CM

## 2024-08-29 DIAGNOSIS — R09.02 HYPOXEMIA: ICD-10-CM

## 2024-08-29 LAB
ALBUMIN SERPL-MCNC: 3.2 G/DL (ref 3.4–5)
ALBUMIN/GLOB SERPL: 0.8 (ref 0.8–1.7)
ALP SERPL-CCNC: 66 U/L (ref 45–117)
ALT SERPL-CCNC: 31 U/L (ref 13–56)
ANION GAP SERPL CALC-SCNC: 3 MMOL/L (ref 3–18)
APTT PPP: 28.6 SEC (ref 23–36.4)
AST SERPL-CCNC: 29 U/L (ref 10–38)
BASOPHILS # BLD: 0 K/UL (ref 0–0.1)
BASOPHILS NFR BLD: 0 % (ref 0–2)
BILIRUB SERPL-MCNC: 1 MG/DL (ref 0.2–1)
BUN SERPL-MCNC: 29 MG/DL (ref 7–18)
BUN/CREAT SERPL: 30 (ref 12–20)
CALCIUM SERPL-MCNC: 9.1 MG/DL (ref 8.5–10.1)
CHLORIDE SERPL-SCNC: 111 MMOL/L (ref 100–111)
CO2 SERPL-SCNC: 26 MMOL/L (ref 21–32)
CREAT SERPL-MCNC: 0.96 MG/DL (ref 0.6–1.3)
DIFFERENTIAL METHOD BLD: ABNORMAL
EOSINOPHIL # BLD: 0.2 K/UL (ref 0–0.4)
EOSINOPHIL NFR BLD: 2 % (ref 0–5)
ERYTHROCYTE [DISTWIDTH] IN BLOOD BY AUTOMATED COUNT: 16.3 % (ref 11.6–14.5)
ERYTHROCYTE [DISTWIDTH] IN BLOOD BY AUTOMATED COUNT: 16.4 % (ref 11.6–14.5)
GLOBULIN SER CALC-MCNC: 3.9 G/DL (ref 2–4)
GLUCOSE SERPL-MCNC: 88 MG/DL (ref 74–99)
HCT VFR BLD AUTO: 44.8 % (ref 35–45)
HCT VFR BLD AUTO: 47.4 % (ref 35–45)
HGB BLD-MCNC: 14.9 G/DL (ref 12–16)
HGB BLD-MCNC: 15.8 G/DL (ref 12–16)
IMM GRANULOCYTES # BLD AUTO: 0.1 K/UL (ref 0–0.04)
IMM GRANULOCYTES NFR BLD AUTO: 1 % (ref 0–0.5)
INR PPP: 1 (ref 0.9–1.1)
LYMPHOCYTES # BLD: 1.8 K/UL (ref 0.9–3.6)
LYMPHOCYTES NFR BLD: 15 % (ref 21–52)
MCH RBC QN AUTO: 32.3 PG (ref 24–34)
MCH RBC QN AUTO: 32.4 PG (ref 24–34)
MCHC RBC AUTO-ENTMCNC: 33.3 G/DL (ref 31–37)
MCHC RBC AUTO-ENTMCNC: 33.3 G/DL (ref 31–37)
MCV RBC AUTO: 97.1 FL (ref 78–100)
MCV RBC AUTO: 97.2 FL (ref 78–100)
MONOCYTES # BLD: 0.9 K/UL (ref 0.05–1.2)
MONOCYTES NFR BLD: 7 % (ref 3–10)
NEUTS SEG # BLD: 9.1 K/UL (ref 1.8–8)
NEUTS SEG NFR BLD: 75 % (ref 40–73)
NRBC # BLD: 0 K/UL (ref 0–0.01)
NRBC # BLD: 0 K/UL (ref 0–0.01)
NRBC BLD-RTO: 0 PER 100 WBC
NRBC BLD-RTO: 0 PER 100 WBC
NT PRO BNP: 395 PG/ML (ref 0–1800)
PLATELET # BLD AUTO: 153 K/UL (ref 135–420)
PLATELET # BLD AUTO: 166 K/UL (ref 135–420)
PMV BLD AUTO: 10.7 FL (ref 9.2–11.8)
PMV BLD AUTO: 11 FL (ref 9.2–11.8)
POTASSIUM SERPL-SCNC: 4.7 MMOL/L (ref 3.5–5.5)
PROT SERPL-MCNC: 7.1 G/DL (ref 6.4–8.2)
PROTHROMBIN TIME: 13.6 SEC (ref 11.9–14.9)
RBC # BLD AUTO: 4.61 M/UL (ref 4.2–5.3)
RBC # BLD AUTO: 4.88 M/UL (ref 4.2–5.3)
SODIUM SERPL-SCNC: 140 MMOL/L (ref 136–145)
TROPONIN I SERPL HS-MCNC: 23 NG/L (ref 0–54)
TROPONIN I SERPL HS-MCNC: 29 NG/L (ref 0–54)
WBC # BLD AUTO: 12.1 K/UL (ref 4.6–13.2)
WBC # BLD AUTO: 12.9 K/UL (ref 4.6–13.2)

## 2024-08-29 PROCEDURE — 85730 THROMBOPLASTIN TIME PARTIAL: CPT

## 2024-08-29 PROCEDURE — 85025 COMPLETE CBC W/AUTO DIFF WBC: CPT

## 2024-08-29 PROCEDURE — 1100000003 HC PRIVATE W/ TELEMETRY

## 2024-08-29 PROCEDURE — 93005 ELECTROCARDIOGRAM TRACING: CPT | Performed by: EMERGENCY MEDICINE

## 2024-08-29 PROCEDURE — 99285 EMERGENCY DEPT VISIT HI MDM: CPT

## 2024-08-29 PROCEDURE — 84484 ASSAY OF TROPONIN QUANT: CPT

## 2024-08-29 PROCEDURE — 85027 COMPLETE CBC AUTOMATED: CPT

## 2024-08-29 PROCEDURE — 96374 THER/PROPH/DIAG INJ IV PUSH: CPT

## 2024-08-29 PROCEDURE — 6360000004 HC RX CONTRAST MEDICATION: Performed by: EMERGENCY MEDICINE

## 2024-08-29 PROCEDURE — 83880 ASSAY OF NATRIURETIC PEPTIDE: CPT

## 2024-08-29 PROCEDURE — 71275 CT ANGIOGRAPHY CHEST: CPT

## 2024-08-29 PROCEDURE — 85610 PROTHROMBIN TIME: CPT

## 2024-08-29 PROCEDURE — 6370000000 HC RX 637 (ALT 250 FOR IP): Performed by: EMERGENCY MEDICINE

## 2024-08-29 PROCEDURE — 6360000002 HC RX W HCPCS: Performed by: EMERGENCY MEDICINE

## 2024-08-29 PROCEDURE — 80053 COMPREHEN METABOLIC PANEL: CPT

## 2024-08-29 RX ORDER — SODIUM CHLORIDE 9 MG/ML
INJECTION, SOLUTION INTRAVENOUS PRN
Status: DISCONTINUED | OUTPATIENT
Start: 2024-08-29 | End: 2024-08-31 | Stop reason: HOSPADM

## 2024-08-29 RX ORDER — ACETAMINOPHEN 325 MG/1
650 TABLET ORAL EVERY 6 HOURS PRN
Status: DISCONTINUED | OUTPATIENT
Start: 2024-08-29 | End: 2024-08-31 | Stop reason: HOSPADM

## 2024-08-29 RX ORDER — POLYETHYLENE GLYCOL 3350 17 G/17G
17 POWDER, FOR SOLUTION ORAL DAILY PRN
Status: DISCONTINUED | OUTPATIENT
Start: 2024-08-29 | End: 2024-08-31

## 2024-08-29 RX ORDER — MAGNESIUM SULFATE IN WATER 40 MG/ML
2000 INJECTION, SOLUTION INTRAVENOUS PRN
Status: DISCONTINUED | OUTPATIENT
Start: 2024-08-29 | End: 2024-08-31 | Stop reason: HOSPADM

## 2024-08-29 RX ORDER — IOPAMIDOL 755 MG/ML
80 INJECTION, SOLUTION INTRAVASCULAR
Status: COMPLETED | OUTPATIENT
Start: 2024-08-29 | End: 2024-08-29

## 2024-08-29 RX ORDER — ACETAMINOPHEN 650 MG/1
650 SUPPOSITORY RECTAL EVERY 6 HOURS PRN
Status: DISCONTINUED | OUTPATIENT
Start: 2024-08-29 | End: 2024-08-31 | Stop reason: HOSPADM

## 2024-08-29 RX ORDER — POTASSIUM CHLORIDE 1500 MG/1
40 TABLET, EXTENDED RELEASE ORAL PRN
Status: DISCONTINUED | OUTPATIENT
Start: 2024-08-29 | End: 2024-08-31 | Stop reason: HOSPADM

## 2024-08-29 RX ORDER — ONDANSETRON 2 MG/ML
4 INJECTION INTRAMUSCULAR; INTRAVENOUS EVERY 6 HOURS PRN
Status: DISCONTINUED | OUTPATIENT
Start: 2024-08-29 | End: 2024-08-31 | Stop reason: HOSPADM

## 2024-08-29 RX ORDER — HEPARIN SODIUM 1000 [USP'U]/ML
40 INJECTION, SOLUTION INTRAVENOUS; SUBCUTANEOUS PRN
Status: DISCONTINUED | OUTPATIENT
Start: 2024-08-29 | End: 2024-08-30

## 2024-08-29 RX ORDER — POTASSIUM CHLORIDE 7.45 MG/ML
10 INJECTION INTRAVENOUS PRN
Status: DISCONTINUED | OUTPATIENT
Start: 2024-08-29 | End: 2024-08-31 | Stop reason: HOSPADM

## 2024-08-29 RX ORDER — HEPARIN SODIUM 10000 [USP'U]/100ML
5-30 INJECTION, SOLUTION INTRAVENOUS CONTINUOUS
Status: DISCONTINUED | OUTPATIENT
Start: 2024-08-29 | End: 2024-08-30

## 2024-08-29 RX ORDER — ONDANSETRON 4 MG/1
4 TABLET, ORALLY DISINTEGRATING ORAL EVERY 8 HOURS PRN
Status: DISCONTINUED | OUTPATIENT
Start: 2024-08-29 | End: 2024-08-31 | Stop reason: HOSPADM

## 2024-08-29 RX ORDER — SODIUM CHLORIDE 0.9 % (FLUSH) 0.9 %
5-40 SYRINGE (ML) INJECTION PRN
Status: DISCONTINUED | OUTPATIENT
Start: 2024-08-29 | End: 2024-08-31 | Stop reason: HOSPADM

## 2024-08-29 RX ORDER — OXYCODONE AND ACETAMINOPHEN 5; 325 MG/1; MG/1
1 TABLET ORAL
Status: COMPLETED | OUTPATIENT
Start: 2024-08-29 | End: 2024-08-29

## 2024-08-29 RX ORDER — SODIUM CHLORIDE 0.9 % (FLUSH) 0.9 %
5-40 SYRINGE (ML) INJECTION EVERY 12 HOURS SCHEDULED
Status: DISCONTINUED | OUTPATIENT
Start: 2024-08-29 | End: 2024-08-31 | Stop reason: HOSPADM

## 2024-08-29 RX ORDER — HEPARIN SODIUM 1000 [USP'U]/ML
80 INJECTION, SOLUTION INTRAVENOUS; SUBCUTANEOUS PRN
Status: DISCONTINUED | OUTPATIENT
Start: 2024-08-29 | End: 2024-08-30

## 2024-08-29 RX ORDER — HEPARIN SODIUM 1000 [USP'U]/ML
80 INJECTION, SOLUTION INTRAVENOUS; SUBCUTANEOUS ONCE
Status: COMPLETED | OUTPATIENT
Start: 2024-08-29 | End: 2024-08-29

## 2024-08-29 RX ADMIN — OXYCODONE HYDROCHLORIDE AND ACETAMINOPHEN 1 TABLET: 5; 325 TABLET ORAL at 22:05

## 2024-08-29 RX ADMIN — HEPARIN SODIUM 7200 UNITS: 1000 INJECTION INTRAVENOUS; SUBCUTANEOUS at 18:30

## 2024-08-29 RX ADMIN — HEPARIN SODIUM 18 UNITS/KG/HR: 10000 INJECTION, SOLUTION INTRAVENOUS at 18:34

## 2024-08-29 RX ADMIN — IOPAMIDOL 80 ML: 755 INJECTION, SOLUTION INTRAVENOUS at 17:08

## 2024-08-29 ASSESSMENT — PAIN DESCRIPTION - DESCRIPTORS: DESCRIPTORS: ACHING

## 2024-08-29 ASSESSMENT — PAIN DESCRIPTION - LOCATION: LOCATION: LEG

## 2024-08-29 ASSESSMENT — PAIN - FUNCTIONAL ASSESSMENT: PAIN_FUNCTIONAL_ASSESSMENT: 0-10

## 2024-08-29 ASSESSMENT — ENCOUNTER SYMPTOMS
SHORTNESS OF BREATH: 1
ABDOMINAL PAIN: 0
EYES NEGATIVE: 1
CHEST TIGHTNESS: 0

## 2024-08-29 ASSESSMENT — PAIN SCALES - GENERAL
PAINLEVEL_OUTOF10: 8
PAINLEVEL_OUTOF10: 0
PAINLEVEL_OUTOF10: 6

## 2024-08-29 ASSESSMENT — LIFESTYLE VARIABLES
HOW OFTEN DO YOU HAVE A DRINK CONTAINING ALCOHOL: NEVER
HOW MANY STANDARD DRINKS CONTAINING ALCOHOL DO YOU HAVE ON A TYPICAL DAY: PATIENT DOES NOT DRINK

## 2024-08-29 ASSESSMENT — PAIN DESCRIPTION - ORIENTATION: ORIENTATION: RIGHT;LEFT

## 2024-08-29 NOTE — ED NOTES
Received report from BILL Walker  Bedside shift report completed, patient updated on plan of care, meds / lines / monitor verified.   Heparin rate verified. 18units / kg / hr.   Still attempting to gain access.

## 2024-08-29 NOTE — PROGRESS NOTES
Pt came to office today for Bilateral Venous insufficiency testing, exam showed bilateral acute DVT that extended into the iliac vein on the left. Exam was changed to an bilateral acute venous and completed. Reported to Dr Blum, was advised to send patient to [facility] Emergency room. Pt and her daughter told to report to the ER. I called Scooter DIAZ who ordered the test and notified. I also notified the Emergency room staff that the patient was coming.

## 2024-08-29 NOTE — ED NOTES
Report  given to BILL Trevino  Bedside shift report completed, patient updated on plan of care, meds / lines / monitor verified.   Heparin rate verified. 18units / kg / hr. IV access still being attempted

## 2024-08-29 NOTE — ED PROVIDER NOTES
EMERGENCY DEPARTMENT HISTORY AND PHYSICAL EXAM    6:09 PM      Date: 8/29/2024  Patient Name: Maddy Baron    History of Presenting Illness     Chief Complaint   Patient presents with    Leg Swelling    sent by doctor       History From: Patient and Patient's Daughter    Maddy Baron is a 79 y.o. female   Patient is 79-year-old female with a history of chronic edema of the lower extremities, shortness of breath now on oxygen with exertion, diastolic heart failure, history of DVT, atrial fibrillation, gout, compression fractures of her spine, UTI, and mobility, that presents emergency department after having DVT study showing bilateral femoral DVTs.  The patient says she is not currently on a blood thinner but she had been on in the past and had started having increasing shortness of breath over the last month and follows up with a pulmonologist where she was started on oxygen because of dyspnea with exertion.  The patient has been having persistent lower extremity swelling and simply had an ultrasound for planning for lower extremity surgery to help with her venous congestion.  Patient lives with her family, and essentially adopted daughter is with her at the bedside providing some history, and the patient is not a smoker, drinker, nor drug user.         Nursing Notes were all reviewed and agreed with or any disagreements were addressed in the HPI.    PCP: Alyx Hall MD    Current Facility-Administered Medications   Medication Dose Route Frequency Provider Last Rate Last Admin    heparin (porcine) injection 7,200 Units  80 Units/kg IntraVENous Once Jose Trinidad MD        heparin (porcine) injection 7,200 Units  80 Units/kg IntraVENous PRN Jose Trinidad MD        heparin (porcine) injection 3,600 Units  40 Units/kg IntraVENous PRN Jose Trinidad MD        heparin 25,000 units in dextrose 5% 250 mL (premix) infusion  5-30 Units/kg/hr IntraVENous Continuous Jose Trinidad MD     disease)     Gout     Hemochromatosis     High cholesterol     Hypertension     Hypothyroidism     Left knee pain     Left shoulder pain     Low blood potassium     Lumbar pain     Pain of left sacroiliac joint     Shoulder impingement        Past Surgical History:  Past Surgical History:   Procedure Laterality Date    CHOLECYSTECTOMY      ORTHOPEDIC SURGERY      R trigger finger     OTHER SURGICAL HISTORY      Left knee replacment revision     OTHER SURGICAL HISTORY      Trigger finger surgery    OTHER SURGICAL HISTORY      Left femur    OTHER SURGICAL HISTORY      Both knees    OTHER SURGICAL HISTORY      Right wrist, replaced unlnar).    OTHER SURGICAL HISTORY      Right little finger    ROTATOR CUFF REPAIR Right     TONSILLECTOMY      TOTAL KNEE ARTHROPLASTY Bilateral     R knee/ Lknee and femur       Family History:  Family History   Adopted: Yes       Social History:  Social History     Tobacco Use    Smoking status: Former     Current packs/day: 0.00     Average packs/day: 0.5 packs/day for 4.0 years (2.0 ttl pk-yrs)     Types: Cigarettes     Start date: 1970     Quit date: 1974     Years since quittin.6    Smokeless tobacco: Never   Substance Use Topics    Alcohol use: No    Drug use: No       Allergies:  Allergies   Allergen Reactions    Ciprofloxacin Other (See Comments)     Achilles tendonitis/leg pain and swelling    Indomethacin Anaphylaxis, Hives and Swelling    Tomato Other (See Comments)     Heart burn         Review of Systems       Review of Systems   Constitutional:  Negative for activity change and fatigue.   HENT:  Negative for congestion.    Eyes: Negative.    Respiratory:  Positive for shortness of breath. Negative for chest tightness.    Cardiovascular:  Positive for leg swelling. Negative for chest pain.   Gastrointestinal:  Negative for abdominal pain.   Genitourinary:  Negative for dysuria.   Musculoskeletal:  Negative for arthralgias.   Skin: Negative.    Neurological:   4:37 PM   Result Value Ref Range    Ventricular Rate 60 BPM    Atrial Rate 60 BPM    P-R Interval 178 ms    QRS Duration 60 ms    Q-T Interval 356 ms    QTc Calculation (Bazett) 356 ms    P Axis 68 degrees    R Axis -18 degrees    T Axis 11 degrees    Diagnosis       Normal sinus rhythm with sinus arrhythmia  Minimal voltage criteria for LVH, may be normal variant ( R in aVL )  Inferior infarct (cited on or before 27-SEP-2013)  Cannot rule out Anterior infarct (cited on or before 27-SEP-2013)  Abnormal ECG  When compared with ECG of 23-MAY-2023 14:50,  Questionable change in initial forces of Anterior leads  Questionable change in initial forces of Inferior leads         Radiologic Studies -   CTA CHEST W WO CONTRAST   Final Result   1. Small burden of pulmonary emboli as described above, most proximally in the   distal right and left pulmonary arteries. No evidence for right heart strain.      2. Mild mosaic attenuation in the lungs could be due to small airways or small   vessel disease.      3. Severe coronary artery calcifications.      Seble Grover MD discussed #1 above with Dr. LORRI HARTMAN I at 8/29/2024   5:41 PM. Findings were acknowledged and understood.      Electronically signed by Seble Grover            Medical Decision Making   I am the first provider for this patient.    I reviewed the vital signs, available nursing notes, past medical history, past surgical history, family history and social history.    Vital Signs-Reviewed the patient's vital signs.      EKG: Sinus rhythm with a rate of 60, left axis deviation, no STEMI, interpreted by me      ED Course: Progress Notes, Reevaluation, and Consults:    Provider Notes (Medical Decision Making):     MDM  Number of Diagnoses or Management Options  Dyspnea and respiratory abnormalities  Hypoxemia  Other acute pulmonary embolism, unspecified whether acute cor pulmonale present (HCC)  Diagnosis management comments: Patient is 79-year-old  female with a extensive history of gout, GERD, chronic shortness of breath, cardiac disease, DVT, on nasal oxygen started recently in the past week, immobility, presents emergency department after getting a mapping study of her lower extremity found to have bilateral extensive lower extremity DVT.  Given the patient's need for oxygen and shortness of breath has been progressive have concerns for pulmonary embolism so we will obtain a CTA of the chest, follow cardiac labs, and based on the findings initiate appropriate anticoagulation.         DVT study report:Technically difficult exam secondary to pt inability to tolerate pressure on legs and edema.    Acute occlusive deep vein thrombosis in the right distal common femoral vein, femoral vein from proximal to distal segments, and popliteal vein.    Acute occlusive deep vein thrombosis in the distal left external iliac vein, unable to visualize more proximally secondary to body habitus and bowel gas.    Acute non-occlusive deep vein thrombosis in the left common femoral vein, proximal femoral, and proximal profunda femoris vein.    The distal right external iliac vein is patent with no evidence of thrombus and acceptable hemodynamics.    The left femoral vein, left popliteal vein, and bilateral posterior tibial veins are grossly patent with color and doppler. Secondary to edema and body habitus a non occlusive thrombus cannot be excluded. The peroneal veins are not visualized bilaterally.    Biphasic posterior tibial arteries bilaterally.    Reflux study deferred, secondary to DVT.      The patient has bilateral pulmonary emboli and new oxygen requirement as of a week ago and reviewed the case at length with Dr. YESICA Rivers from pulmonary and recommends IV heparin, admission, echocardiogram, and then pulmonary will consult in the hospital.  Will discuss the case with the hospitalist.    The hospitalist agrees with the plan and will admit.    Patient was given the

## 2024-08-29 NOTE — ED TRIAGE NOTES
Received a call from vascular with bilateral legs having blood clots after vascular studie this afternoon.

## 2024-08-29 NOTE — ED NOTES
IV infiltrated after Heparin  drip was started. Heparin drip paused while getting new IV site.Provider made aware

## 2024-08-29 NOTE — PROGRESS NOTES
Discussed with ER attending, outpatient of my partner, Dr. Mondragon, presented with recent new oxygen requirement, newly found extensive bilateral DVTs.  While in the ER, CTA chest shows small burden of bilateral PE.  Recommend full dose anticoagulation, TTE to rule out RV strain, continue oxygen, and consult vascular surgery for recommendations given extensive clots on lower extremity duplex.  Pulmonary consult service to see the patient tomorrow if admitted to [facility] by that time.        Paul Rivers MD/MPH     Pulmonary, Critical Care Medicine  [facility]

## 2024-08-30 ENCOUNTER — APPOINTMENT (OUTPATIENT)
Facility: HOSPITAL | Age: 79
End: 2024-08-30
Attending: STUDENT IN AN ORGANIZED HEALTH CARE EDUCATION/TRAINING PROGRAM
Payer: MEDICARE

## 2024-08-30 PROBLEM — I82.413 ACUTE BILATERAL DEEP VEIN THROMBOSIS (DVT) OF FEMORAL VEINS (HCC): Status: ACTIVE | Noted: 2024-08-30

## 2024-08-30 PROBLEM — R06.00 DYSPNEA AND RESPIRATORY ABNORMALITIES: Status: ACTIVE | Noted: 2019-02-28

## 2024-08-30 PROBLEM — R06.89 DYSPNEA AND RESPIRATORY ABNORMALITIES: Status: ACTIVE | Noted: 2019-02-28

## 2024-08-30 PROBLEM — J45.40 MODERATE PERSISTENT ASTHMA WITHOUT COMPLICATION: Status: ACTIVE | Noted: 2024-08-30

## 2024-08-30 PROBLEM — I26.94 MULTIPLE SUBSEGMENTAL PULMONARY EMBOLI WITHOUT ACUTE COR PULMONALE (HCC): Status: ACTIVE | Noted: 2021-03-10

## 2024-08-30 PROBLEM — E83.110 HEREDITARY HEMOCHROMATOSIS (HCC): Status: ACTIVE | Noted: 2019-01-11

## 2024-08-30 PROBLEM — E66.09 CLASS 2 OBESITY DUE TO EXCESS CALORIES WITHOUT SERIOUS COMORBIDITY WITH BODY MASS INDEX (BMI) OF 38.0 TO 38.9 IN ADULT: Status: ACTIVE | Noted: 2024-08-30

## 2024-08-30 PROBLEM — J96.11 CHRONIC HYPOXIC RESPIRATORY FAILURE (HCC): Status: ACTIVE | Noted: 2024-08-30

## 2024-08-30 LAB
ANION GAP SERPL CALC-SCNC: 5 MMOL/L (ref 3–18)
APTT PPP: >180 SEC (ref 23–36.4)
BASOPHILS # BLD: 0 K/UL (ref 0–0.1)
BASOPHILS NFR BLD: 0 % (ref 0–2)
BUN SERPL-MCNC: 25 MG/DL (ref 7–18)
BUN/CREAT SERPL: 28 (ref 12–20)
CALCIUM SERPL-MCNC: 9.2 MG/DL (ref 8.5–10.1)
CHLORIDE SERPL-SCNC: 110 MMOL/L (ref 100–111)
CO2 SERPL-SCNC: 20 MMOL/L (ref 21–32)
CREAT SERPL-MCNC: 0.89 MG/DL (ref 0.6–1.3)
DIFFERENTIAL METHOD BLD: ABNORMAL
ECHO AO ASC DIAM: 3.4 CM
ECHO AO ASCENDING AORTA INDEX: 1.84 CM/M2
ECHO AO ROOT DIAM: 3 CM
ECHO AO ROOT INDEX: 1.62 CM/M2
ECHO AV AREA PEAK VELOCITY: 4.1 CM2
ECHO AV AREA/BSA PEAK VELOCITY: 2.2 CM2/M2
ECHO AV PEAK GRADIENT: 3 MMHG
ECHO AV PEAK VELOCITY: 0.8 M/S
ECHO AV VELOCITY RATIO: 1.13
ECHO BSA: 1.94 M2
ECHO EST RA PRESSURE: 8 MMHG
ECHO LV E' LATERAL VELOCITY: 6 CM/S
ECHO LV E' SEPTAL VELOCITY: 4 CM/S
ECHO LV EF PHYSICIAN: 50 %
ECHO LV FRACTIONAL SHORTENING: 29 % (ref 28–44)
ECHO LV INTERNAL DIMENSION DIASTOLE INDEX: 1.84 CM/M2
ECHO LV INTERNAL DIMENSION DIASTOLIC: 3.4 CM (ref 3.9–5.3)
ECHO LV INTERNAL DIMENSION SYSTOLIC INDEX: 1.3 CM/M2
ECHO LV INTERNAL DIMENSION SYSTOLIC: 2.4 CM
ECHO LV IVSD: 1.2 CM (ref 0.6–0.9)
ECHO LV MASS 2D: 130.2 G (ref 67–162)
ECHO LV MASS INDEX 2D: 70.4 G/M2 (ref 43–95)
ECHO LV POSTERIOR WALL DIASTOLIC: 1.2 CM (ref 0.6–0.9)
ECHO LV RELATIVE WALL THICKNESS RATIO: 0.71
ECHO LVOT AREA: 3.8 CM2
ECHO LVOT DIAM: 2.2 CM
ECHO LVOT PEAK GRADIENT: 3 MMHG
ECHO LVOT PEAK VELOCITY: 0.9 M/S
ECHO MV A VELOCITY: 0.72 M/S
ECHO MV E DECELERATION TIME (DT): 319.8 MS
ECHO MV E VELOCITY: 0.33 M/S
ECHO MV E/A RATIO: 0.46
ECHO MV E/E' LATERAL: 5.5
ECHO MV E/E' RATIO (AVERAGED): 6.88
ECHO MV E/E' SEPTAL: 8.25
ECHO PV MAX VELOCITY: 0.9 M/S
ECHO PV PEAK GRADIENT: 3 MMHG
ECHO RIGHT VENTRICULAR SYSTOLIC PRESSURE (RVSP): 32 MMHG
ECHO RV TAPSE: 1.1 CM (ref 1.7–?)
ECHO TV REGURGITANT MAX VELOCITY: 2.47 M/S
ECHO TV REGURGITANT PEAK GRADIENT: 24 MMHG
EOSINOPHIL # BLD: 0.2 K/UL (ref 0–0.4)
EOSINOPHIL NFR BLD: 1 % (ref 0–5)
ERYTHROCYTE [DISTWIDTH] IN BLOOD BY AUTOMATED COUNT: 16.3 % (ref 11.6–14.5)
GLUCOSE SERPL-MCNC: 83 MG/DL (ref 74–99)
HCT VFR BLD AUTO: 44.6 % (ref 35–45)
HGB BLD-MCNC: 14.4 G/DL (ref 12–16)
IMM GRANULOCYTES # BLD AUTO: 0.1 K/UL (ref 0–0.04)
IMM GRANULOCYTES NFR BLD AUTO: 1 % (ref 0–0.5)
LYMPHOCYTES # BLD: 1.8 K/UL (ref 0.9–3.6)
LYMPHOCYTES NFR BLD: 15 % (ref 21–52)
MCH RBC QN AUTO: 32 PG (ref 24–34)
MCHC RBC AUTO-ENTMCNC: 32.3 G/DL (ref 31–37)
MCV RBC AUTO: 99.1 FL (ref 78–100)
MONOCYTES # BLD: 1 K/UL (ref 0.05–1.2)
MONOCYTES NFR BLD: 8 % (ref 3–10)
NEUTS SEG # BLD: 9.1 K/UL (ref 1.8–8)
NEUTS SEG NFR BLD: 75 % (ref 40–73)
NRBC # BLD: 0 K/UL (ref 0–0.01)
NRBC BLD-RTO: 0 PER 100 WBC
PLATELET # BLD AUTO: 141 K/UL (ref 135–420)
PMV BLD AUTO: 11.4 FL (ref 9.2–11.8)
POTASSIUM SERPL-SCNC: 5.5 MMOL/L (ref 3.5–5.5)
RBC # BLD AUTO: 4.5 M/UL (ref 4.2–5.3)
SODIUM SERPL-SCNC: 135 MMOL/L (ref 136–145)
WBC # BLD AUTO: 12.2 K/UL (ref 4.6–13.2)

## 2024-08-30 PROCEDURE — 6360000002 HC RX W HCPCS: Performed by: HOSPITALIST

## 2024-08-30 PROCEDURE — 80048 BASIC METABOLIC PNL TOTAL CA: CPT

## 2024-08-30 PROCEDURE — 99222 1ST HOSP IP/OBS MODERATE 55: CPT | Performed by: HOSPITALIST

## 2024-08-30 PROCEDURE — 2580000003 HC RX 258: Performed by: STUDENT IN AN ORGANIZED HEALTH CARE EDUCATION/TRAINING PROGRAM

## 2024-08-30 PROCEDURE — 99223 1ST HOSP IP/OBS HIGH 75: CPT | Performed by: STUDENT IN AN ORGANIZED HEALTH CARE EDUCATION/TRAINING PROGRAM

## 2024-08-30 PROCEDURE — 85730 THROMBOPLASTIN TIME PARTIAL: CPT

## 2024-08-30 PROCEDURE — 6360000002 HC RX W HCPCS: Performed by: STUDENT IN AN ORGANIZED HEALTH CARE EDUCATION/TRAINING PROGRAM

## 2024-08-30 PROCEDURE — 97530 THERAPEUTIC ACTIVITIES: CPT

## 2024-08-30 PROCEDURE — 97162 PT EVAL MOD COMPLEX 30 MIN: CPT

## 2024-08-30 PROCEDURE — 93306 TTE W/DOPPLER COMPLETE: CPT | Performed by: INTERNAL MEDICINE

## 2024-08-30 PROCEDURE — 97166 OT EVAL MOD COMPLEX 45 MIN: CPT

## 2024-08-30 PROCEDURE — 6370000000 HC RX 637 (ALT 250 FOR IP): Performed by: STUDENT IN AN ORGANIZED HEALTH CARE EDUCATION/TRAINING PROGRAM

## 2024-08-30 PROCEDURE — 2700000000 HC OXYGEN THERAPY PER DAY

## 2024-08-30 PROCEDURE — 94761 N-INVAS EAR/PLS OXIMETRY MLT: CPT

## 2024-08-30 PROCEDURE — 85025 COMPLETE CBC W/AUTO DIFF WBC: CPT

## 2024-08-30 PROCEDURE — 1100000003 HC PRIVATE W/ TELEMETRY

## 2024-08-30 PROCEDURE — 97110 THERAPEUTIC EXERCISES: CPT

## 2024-08-30 PROCEDURE — 93306 TTE W/DOPPLER COMPLETE: CPT

## 2024-08-30 PROCEDURE — 94640 AIRWAY INHALATION TREATMENT: CPT

## 2024-08-30 PROCEDURE — 36415 COLL VENOUS BLD VENIPUNCTURE: CPT

## 2024-08-30 PROCEDURE — 97535 SELF CARE MNGMENT TRAINING: CPT

## 2024-08-30 PROCEDURE — 6370000000 HC RX 637 (ALT 250 FOR IP): Performed by: HOSPITALIST

## 2024-08-30 RX ORDER — METOPROLOL TARTRATE 25 MG/1
25 TABLET, FILM COATED ORAL DAILY
Status: DISCONTINUED | OUTPATIENT
Start: 2024-08-30 | End: 2024-08-31 | Stop reason: HOSPADM

## 2024-08-30 RX ORDER — BUMETANIDE 1 MG/1
2 TABLET ORAL 2 TIMES DAILY
Status: DISCONTINUED | OUTPATIENT
Start: 2024-08-30 | End: 2024-08-31 | Stop reason: HOSPADM

## 2024-08-30 RX ORDER — ENOXAPARIN SODIUM 100 MG/ML
1 INJECTION SUBCUTANEOUS 2 TIMES DAILY
Status: DISCONTINUED | OUTPATIENT
Start: 2024-08-30 | End: 2024-08-31 | Stop reason: HOSPADM

## 2024-08-30 RX ORDER — ARFORMOTEROL TARTRATE 15 UG/2ML
15 SOLUTION RESPIRATORY (INHALATION)
Status: DISCONTINUED | OUTPATIENT
Start: 2024-08-30 | End: 2024-08-31 | Stop reason: HOSPADM

## 2024-08-30 RX ORDER — SPIRONOLACTONE 25 MG/1
25 TABLET ORAL DAILY
Status: DISCONTINUED | OUTPATIENT
Start: 2024-08-30 | End: 2024-08-31 | Stop reason: HOSPADM

## 2024-08-30 RX ORDER — MINERAL OIL AND WHITE PETROLATUM 150; 830 MG/G; MG/G
OINTMENT OPHTHALMIC PRN
Status: DISCONTINUED | OUTPATIENT
Start: 2024-08-30 | End: 2024-08-30 | Stop reason: CLARIF

## 2024-08-30 RX ORDER — HYDROCODONE BITARTRATE AND ACETAMINOPHEN 5; 325 MG/1; MG/1
1 TABLET ORAL EVERY 6 HOURS PRN
Status: DISCONTINUED | OUTPATIENT
Start: 2024-08-30 | End: 2024-08-31 | Stop reason: HOSPADM

## 2024-08-30 RX ORDER — LEVOTHYROXINE SODIUM 25 UG/1
25 TABLET ORAL DAILY
Status: DISCONTINUED | OUTPATIENT
Start: 2024-08-30 | End: 2024-08-31 | Stop reason: HOSPADM

## 2024-08-30 RX ORDER — BUDESONIDE 1 MG/2ML
1 INHALANT ORAL
Status: DISCONTINUED | OUTPATIENT
Start: 2024-08-30 | End: 2024-08-31 | Stop reason: HOSPADM

## 2024-08-30 RX ORDER — ALBUTEROL SULFATE 0.83 MG/ML
2.5 SOLUTION RESPIRATORY (INHALATION) EVERY 4 HOURS PRN
Status: DISCONTINUED | OUTPATIENT
Start: 2024-08-30 | End: 2024-08-31 | Stop reason: HOSPADM

## 2024-08-30 RX ORDER — HYDROCODONE BITARTRATE AND ACETAMINOPHEN 5; 325 MG/1; MG/1
1 TABLET ORAL ONCE
Status: DISCONTINUED | OUTPATIENT
Start: 2024-08-31 | End: 2024-08-31 | Stop reason: HOSPADM

## 2024-08-30 RX ORDER — ATORVASTATIN CALCIUM 40 MG/1
80 TABLET, FILM COATED ORAL EVERY EVENING
Status: DISCONTINUED | OUTPATIENT
Start: 2024-08-30 | End: 2024-08-31 | Stop reason: HOSPADM

## 2024-08-30 RX ORDER — ARFORMOTEROL TARTRATE 15 UG/2ML
15 SOLUTION RESPIRATORY (INHALATION)
Status: DISCONTINUED | OUTPATIENT
Start: 2024-08-30 | End: 2024-08-30

## 2024-08-30 RX ORDER — BUDESONIDE 0.5 MG/2ML
1 INHALANT ORAL
Status: DISCONTINUED | OUTPATIENT
Start: 2024-08-30 | End: 2024-08-30

## 2024-08-30 RX ADMIN — BUMETANIDE 2 MG: 1 TABLET ORAL at 08:43

## 2024-08-30 RX ADMIN — LEVOTHYROXINE SODIUM 25 MCG: 0.05 TABLET ORAL at 05:18

## 2024-08-30 RX ADMIN — ARFORMOTEROL TARTRATE 15 MCG: 15 SOLUTION RESPIRATORY (INHALATION) at 07:26

## 2024-08-30 RX ADMIN — SODIUM CHLORIDE, PRESERVATIVE FREE 10 ML: 5 INJECTION INTRAVENOUS at 21:54

## 2024-08-30 RX ADMIN — SODIUM CHLORIDE, PRESERVATIVE FREE 10 ML: 5 INJECTION INTRAVENOUS at 01:08

## 2024-08-30 RX ADMIN — IPRATROPIUM BROMIDE 0.5 MG: 0.5 SOLUTION RESPIRATORY (INHALATION) at 07:26

## 2024-08-30 RX ADMIN — BUMETANIDE 2 MG: 1 TABLET ORAL at 21:49

## 2024-08-30 RX ADMIN — SODIUM CHLORIDE, PRESERVATIVE FREE 10 ML: 5 INJECTION INTRAVENOUS at 08:47

## 2024-08-30 RX ADMIN — ENOXAPARIN SODIUM 90 MG: 100 INJECTION SUBCUTANEOUS at 14:43

## 2024-08-30 RX ADMIN — SPIRONOLACTONE 25 MG: 25 TABLET ORAL at 08:43

## 2024-08-30 RX ADMIN — BUDESONIDE 1 MG: 1 SUSPENSION RESPIRATORY (INHALATION) at 21:49

## 2024-08-30 RX ADMIN — BUDESONIDE 1000 MCG: 0.5 INHALANT RESPIRATORY (INHALATION) at 07:26

## 2024-08-30 RX ADMIN — IPRATROPIUM BROMIDE 0.5 MG: 0.5 SOLUTION RESPIRATORY (INHALATION) at 03:48

## 2024-08-30 RX ADMIN — METOPROLOL TARTRATE 25 MG: 25 TABLET, FILM COATED ORAL at 08:42

## 2024-08-30 RX ADMIN — ATORVASTATIN CALCIUM 80 MG: 40 TABLET, FILM COATED ORAL at 16:54

## 2024-08-30 RX ADMIN — ENOXAPARIN SODIUM 90 MG: 100 INJECTION SUBCUTANEOUS at 21:50

## 2024-08-30 RX ADMIN — HYDROCODONE BITARTRATE AND ACETAMINOPHEN 1 TABLET: 5; 325 TABLET ORAL at 19:16

## 2024-08-30 ASSESSMENT — PAIN DESCRIPTION - ORIENTATION
ORIENTATION: RIGHT;LEFT;LOWER
ORIENTATION: LEFT;LOWER

## 2024-08-30 ASSESSMENT — PAIN SCALES - GENERAL
PAINLEVEL_OUTOF10: 0
PAINLEVEL_OUTOF10: 0
PAINLEVEL_OUTOF10: 10
PAINLEVEL_OUTOF10: 0
PAINLEVEL_OUTOF10: 10

## 2024-08-30 ASSESSMENT — PAIN DESCRIPTION - LOCATION
LOCATION: LEG
LOCATION: BACK;HIP

## 2024-08-30 ASSESSMENT — PAIN DESCRIPTION - DESCRIPTORS
DESCRIPTORS: ACHING;SHARP;SHOOTING
DESCRIPTORS: ACHING;CRAMPING;STABBING

## 2024-08-30 NOTE — CONSULTS
Surgery ConsultSurgery Consult      Patient: Maddy Baron MRN: [id number]  [facility]: [id number]      YOB: 1945    Age: 79 y.o.    Sex: female      DOA: 8/29/2024       HPI:   Patient seen, chart reviewed, all acute events noted.  Patient is awake alert and answers all questions appropriately.  Patient moves all extremities to command.  Patient is well-known to our practice from previous workups for both previous lower extremity venous issues and lymphedema.  Maddy Baron is a 79 y.o. female who presents with hx of hemochromatosis, GERD, gout, hypertension, previous history of DVT.  Lymphedema, and chronic venous insufficiency issues.  Patient states that her best friend recently passed away and she has been devastated for the past 2 to 3 weeks.  Patient states that she has been nonambulatory and been sitting around the house moping due to her friend's death.  Patient reports that she reported to the emergency room because of increased shortness of breath and her legs were feeling funny.  Notable increase in edema on both lower extremities.  Patient reported to [facility] emergency department.  Patient has been having progressive shortness of breath for the past couple of weeks.  She was recently seen by her outpatient pulmonologist who prescribed oxygen secondary to dyspnea with exertion.   Patient was also concerned with bilateral leg swelling.  She had a DVT study which showed bilateral femoral DVTs. CTA chest also showed small bilateral pulmonary emboli.  Subsequently the patient was admitted and transferred to [facility].  Presently she denies any chest pain or shortness of breath at rest.  She does have chronic shortness of breath and dyspnea on exertion but reports no change in her breathing.  Patient states that since she has been in the hospital and they started her on heparin she has multiple bruises on her arms and skin tears.  She denies any headache, dizziness, or nausea or    NEUROLOGIC: No headache, seizures, numbness, tingling or weakness.     Physical Exam:      Visit Vitals  BP (!) 147/67   Pulse 76   Temp 98.1 °F (36.7 °C) (Oral)   Resp 18   Ht 1.524 m (5')   Wt 89.4 kg (197 lb)   SpO2 99%   BMI 38.47 kg/m²       Physical Exam:    General:  Alert, cooperative, no distress, appears stated age.              Head: Normocephalic, without obvious abnormality, atraumatic.   Eyes:  Conjunctivae/corneas clear. PERRL, EOMs intact.   Nose: Nares normal. No drainage or sinus tenderness.   Neck: Supple, symmetrical, trachea midline, no adenopathy, thyroid: no enlargement, no carotid bruit and no JVD.   Lungs:   Clear to auscultation bilaterally.   Heart:  Regular rate and rhythm, S1, S2 normal.     Abdomen: Soft, non-tender. Bowel sounds normal.    Extremities: Extensive bruising on bilateral arms. Bilateral 1+ pitting edema to in legs; tender to touch.    Pulses: 2+ and symmetric all extremities.   Skin:  No rashes or lesions   Neurologic: AAOx3, No focal motor or sensory deficit.     Data Review:    Vascular US Duplex Lower Extremity Venous Bilateral    Patient Name: Maddy Baron   Patient MRN: [id number]   Patient : 1945 (79 y.o.)   Gender Identity: Female   Height: 1.524 m (5')   Weight: 90.7 kg (200 lb)   BSA: 1.96 m²   Blood Pressure: Not recorded    Accession Number: [id number]   Date of Study: 2024 ( 3:34 PM)   Ordering Provider: Scooter Wells PA-C   Clinical Indications: Lymphedema       Reading Physicians  Performing Staff   Result is not signed. Tech/Nurse: Lili Marie        Vascular History    Vascular duplex lower extremity venous bilateral (Order #[id number]) on 24     Reason for Exam  Priority: STAT  Lymphedema   Dx: Lymphedema of both lower extremities [I89.0 (ICD-10-CM)]; Venous insufficiency of both lower extremities [I87.2 (ICD-10-CM)]     Important Information    PRELIMINARY TECH WORK NOTES     PHYSICIAN RESULT TO FOLLOW  PACS Images     Show    Proximal Femoral Vein: Acute non-occlusive thrombus.   Middle Femoral Vein: Grossly patent (cannot exclude non-occlusive thrombus).   Distal Femoral Vein: Grossly patent (cannot exclude non-occlusive thrombus).   Popliteal Vein: Grossly patent (cannot exclude non-occlusive thrombus).   Posterior Tibial Vein: Grossly patent (cannot exclude non-occlusive thrombus).         Posterior tibial artery Doppler waveforms are biphasic.   CTA CHEST W WO CONTRAST [[id number]]  Status: Final result     PACS Images     Show images for CTA CHEST W WO CONTRAST  CTA CHEST W WO CONTRAST  Order: [id number]  Status: Final result       Visible to patient: Yes (not seen)       Next appt: 09/11/2024 at 02:00 PM in Vascular Surgery (Jeny Laboy MD)    0 Result Notes  Details    Reading Physician Reading Date Result Priority   Seble Grover MD  [phone] 8/29/2024      Narrative & Impression  EXAM: CTA CHEST PULMONARY EMBOLISM      CLINICAL INDICATION/HISTORY: New diagnosis of DVT, new oxygen requirement,  evaluate for PE.     COMPARISON: CT chest 2/2/2024     TECHNIQUE: CTA of the chest was performed using timing optimized for pulmonary  embolism technique, with IV contrast.  To maximize sensitivity the sagittal and  coronal reconstructions were created using a 3D multislice MIP (maximal  intensity projection) methodology.  CT scans at this facility are performed using dose optimization technique as  appropriate to the performed exam, to include automated exposure control,  adjustment of the mA and/or kV according to patient size (including appropriate  matching for site specific examinations), or use of iterative reconstruction  technique.        FINDINGS:     Lung/Airway: No consolidation or pleural effusion. Mild mosaic attenuation in  the lungs. Multiple pulmonary nodules bilaterally, measuring up to 7 mm at the  left upper lobe, do not appear significantly changed since at least December 2019. No additional follow-up

## 2024-08-30 NOTE — H&P
History & Physical    Patient: Maddy Baron MRN: [id number]  CSN: [id number]    YOB: 1945  Age: 79 y.o.  Sex: female      DOA: 8/29/2024    Chief Complaint:   Chief Complaint   Patient presents with    Leg Swelling    sent by doctor          HPI:     Maddy Baron is a 79 y.o.  female with hx of hemochromatosis, GERD, gout, hypertension.    Patient reported to [facility] emergency department.  Patient has been having progressive shortness of breath for the past month.  She was recently seen by her outpatient pulmonologist who prescribed oxygen secondary to dyspnea with exertion.    Patient is also concerned with bilateral leg swelling.  She had a DVT study which showed bilateral femoral DVTs.    CTA chest also showed small bilateral pulmonary emboli.     Past Medical History:   Diagnosis Date    Abnormal chest CT 2/6/2020    Adopted     Arthritis     Balance problems     Chronic cough 2/6/2020    GOMES (dyspnea on exertion) 2/28/2019    GERD (gastroesophageal reflux disease)     Gout     Hemochromatosis     High cholesterol     Hypertension     Hypothyroidism     Left knee pain     Left shoulder pain     Low blood potassium     Lumbar pain     Pain of left sacroiliac joint     Shoulder impingement        Past Surgical History:   Procedure Laterality Date    CHOLECYSTECTOMY      ORTHOPEDIC SURGERY      R trigger finger     OTHER SURGICAL HISTORY      Left knee replacment revision     OTHER SURGICAL HISTORY      Trigger finger surgery    OTHER SURGICAL HISTORY      Left femur    OTHER SURGICAL HISTORY      Both knees    OTHER SURGICAL HISTORY      Right wrist, replaced unlnar).    OTHER SURGICAL HISTORY      Right little finger    ROTATOR CUFF REPAIR Right     TONSILLECTOMY      TOTAL KNEE ARTHROPLASTY Bilateral     R knee/ Lknee and femur       Family History   Adopted: Yes       Social History     Socioeconomic History    Marital status: Single     Spouse name: None     Number of children: None    Years of education: None    Highest education level: None   Tobacco Use    Smoking status: Former     Current packs/day: 0.00     Average packs/day: 0.5 packs/day for 4.0 years (2.0 ttl pk-yrs)     Types: Cigarettes     Start date: 1970     Quit date: 1974     Years since quittin.6    Smokeless tobacco: Never   Substance and Sexual Activity    Alcohol use: No    Drug use: No     Social Determinants of Health     Transportation Needs: No Transportation Needs (2023)    OASIS : Transportation     Lack of Transportation (Medical): No     Lack of Transportation (Non-Medical): No     Patient Unable or Declines to Respond: No   Social Connections: Feeling Somewhat Isolated (2023)    OASIS : Social Isolation     Frequency of experiencing loneliness or isolation: Sometimes       Prior to Admission medications    Medication Sig Start Date End Date Taking? Authorizing Provider   albuterol sulfate HFA (PROVENTIL;VENTOLIN;PROAIR) 108 (90 Base) MCG/ACT inhaler Inhale 2 puffs into the lungs every 4 hours as needed for Wheezing or Shortness of Breath 24  Yes Robert Mondragon DO   fluticasone-umeclidin-vilant (TRELEGY ELLIPTA) 200-62.5-25 MCG/ACT AEPB inhaler Inhale 1 puff into the lungs daily 24  Yes Robert Mondragon DO   [employer] ASPIRIN LOW DOSE 81 MG EC tablet TAKE 1 TABLET BY MOUTH EVERY DAY 24  Yes Chely Mcarthur MD   atorvastatin (LIPITOR) 80 MG tablet Take 1 tablet by mouth every evening 24  Yes Chely Mcarthur MD   metoprolol tartrate (LOPRESSOR) 25 MG tablet TAKE 1 TABLET BY MOUTH EVERY DAY 24  Yes Chely Mcarthur MD   spironolactone (ALDACTONE) 25 MG tablet TAKE 1 TABLET BY MOUTH EVERY DAY 24  Yes Chely Mcarthur MD   Spacer/Aero-Holding Chambers LORNE 1 Device by Does not apply route daily as needed (Shortess of breath) 24  Yes Robert Mondragon DO   bumetanide (BUMEX) 2 MG tablet Take 1 tablet by mouth 2 times daily 23   (cited on or before 27-SEP-2013)  Cannot rule out Anterior infarct (cited on or before 27-SEP-2013)  Abnormal ECG  When compared with ECG of 23-MAY-2023 14:50,  Questionable change in initial forces of Anterior leads  Questionable change in initial forces of Inferior leads     Troponin    Collection Time: 08/29/24  7:20 PM   Result Value Ref Range    Troponin, High Sensitivity 29 0 - 54 ng/L   CBC    Collection Time: 08/29/24  7:20 PM   Result Value Ref Range    WBC 12.9 4.6 - 13.2 K/uL    RBC 4.88 4.20 - 5.30 M/uL    Hemoglobin 15.8 12.0 - 16.0 g/dL    Hematocrit 47.4 (H) 35.0 - 45.0 %    MCV 97.1 78.0 - 100.0 FL    MCH 32.4 24.0 - 34.0 PG    MCHC 33.3 31.0 - 37.0 g/dL    RDW 16.4 (H) 11.6 - 14.5 %    Platelets 153 135 - 420 K/uL    MPV 11.0 9.2 - 11.8 FL    Nucleated RBCs 0.0 0  WBC    nRBC 0.00 0.00 - 0.01 K/uL        XR Results (most recent):  @BSHSILASTIMGCAT(TBD9388:1)@      @[facility]@      Procedures/imaging: see electronic medical records for all procedures/Xrays and details which were not copied into this note but were reviewed prior to creation of Plan      Assessment/Plan     Active Problems:    Hereditary hemochromatosis (HCC)    Multiple subsegmental pulmonary emboli without acute cor pulmonale (HCC)    Acute bilateral deep vein thrombosis (DVT) of femoral veins (HCC)    Moderate persistent asthma without complication    Chronic hypoxic respiratory failure (HCC)    Class 2 obesity due to excess calories without serious comorbidity with body mass index (BMI) of 38.0 to 38.9 in adult  Resolved Problems:    * No resolved hospital problems. *     Assessment  Extensive acute bilateral lower extremity DVTs  Small bilateral pulmonary emboli without CT evidence of heart strain  History of PE and bilateral DVT -approximately 2 years ago, patient had been placed on Eliquis at that time.  She has now been off of this medication for over a year.  #1-3. Admit. Vascular (EMILY Wells) added to txt team for IVC

## 2024-08-30 NOTE — PLAN OF CARE
Problem: Safety - Adult  Goal: Free from fall injury  8/30/2024 0938 by Sharan Cantrell RN  Outcome: Progressing  8/30/2024 0937 by Sharan Cantrell RN  Outcome: Progressing  8/30/2024 0330 by Xuan Hernandez RN  Outcome: Progressing     Problem: Discharge Planning  Goal: Discharge to home or other facility with appropriate resources  8/30/2024 0938 by Sharan Cantrell RN  Outcome: Progressing  8/30/2024 0937 by Sharan Cantrell RN  Outcome: Progressing  8/30/2024 0330 by Xuan Hernandez RN  Outcome: Progressing     Problem: Pain  Goal: Verbalizes/displays adequate comfort level or baseline comfort level  8/30/2024 0938 by Sharan Cantrell RN  Outcome: Progressing  8/30/2024 0937 by Sharan Cantrell RN  Outcome: Progressing  8/30/2024 0330 by Xuan Hernandez RN  Outcome: Progressing     Problem: Skin/Tissue Integrity  Goal: Absence of new skin breakdown  Description: 1.  Monitor for areas of redness and/or skin breakdown  2.  Assess vascular access sites hourly  3.  Every 4-6 hours minimum:  Change oxygen saturation probe site  4.  Every 4-6 hours:  If on nasal continuous positive airway pressure, respiratory therapy assess nares and determine need for appliance change or resting period.  8/30/2024 0938 by Sharan Cantrell RN  Outcome: Progressing  8/30/2024 0937 by Sharan Cantrell RN  Outcome: Progressing  8/30/2024 0330 by Xuan Hernandez RN  Outcome: Progressing

## 2024-08-30 NOTE — CONSULTS
[facility].  Pulmonary, Critical Care, and Sleep Medicine    Initial Patient Consult    Name: Maddy Baron MRN: [id number]   : 1945 Hospital: [facility]   Date: 2024        IMPRESSION:   Acute BL PE - No evidence of right heart strain.  PE low risk  Hx extensive DVT and now new BL DVT - previously on Eliquis  Chronic hypoxic respiratory failrue - on 3 Lpm O2 at home.    Moderate persistent asthma - hx eosinophilia.  Mosaic attenuation on CTA c/w small airways disease in addition  Hx heritary hemochromatosis  Chronic diastolic CHF  CKD Stage III  Lung nodules - peripheral.  Benign.  Minimal change on serial CT     Patient Active Problem List   Diagnosis    Abnormal chest CT    History of pulmonary embolism    CAP (community acquired pneumonia)    Shoulder impingement    STEMI (ST elevation myocardial infarction) (HCC)    Hypertension    Chronic cough    GOMES (dyspnea on exertion)    Spinal stenosis    Sacroiliitis (HCC)    Chronic diastolic congestive heart failure (HCC)    Obesity, morbid (HCC)    Chronic bronchitis with productive mucopurulent cough (HCC)    Sepsis (HCC)    Hereditary hemochromatosis (HCC)    High cholesterol    Atrial fibrillation with RVR (HCC)    History of DVT (deep vein thrombosis)    Hypotension    Multiple subsegmental pulmonary emboli without acute cor pulmonale (HCC)    Coronary artery disease of native artery of native heart with stable angina pectoris (HCC)    Bilateral leg edema    Weakness    Acute UTI    Stage 3 chronic kidney disease (HCC)    Compression fracture of body of thoracic vertebra (HCC)    Acquired hypothyroidism    Goals of care, counseling/discussion    Advanced care planning/counseling discussion    Debility    Acute combined systolic (congestive) and diastolic (congestive) heart failure (HCC)    Acute embolism and thrombosis of other specified veins    Benign intracranial hypertension    Deep vein thrombosis (DVT) of      TOTAL KNEE ARTHROPLASTY Bilateral     R knee/ Lknee and femur      Prior to Admission medications    Medication Sig Start Date End Date Taking? Authorizing Provider   albuterol sulfate HFA (PROVENTIL;VENTOLIN;PROAIR) 108 (90 Base) MCG/ACT inhaler Inhale 2 puffs into the lungs every 4 hours as needed for Wheezing or Shortness of Breath 8/12/24  Yes Robert Mondragon,    fluticasone-umeclidin-vilant (TRELEGY ELLIPTA) 200-62.5-25 MCG/ACT AEPB inhaler Inhale 1 puff into the lungs daily 8/12/24  Yes Robert Mondragon, DO   [employer] ASPIRIN LOW DOSE 81 MG EC tablet TAKE 1 TABLET BY MOUTH EVERY DAY 7/8/24  Yes Chely Mcarthur MD   atorvastatin (LIPITOR) 80 MG tablet Take 1 tablet by mouth every evening 7/1/24  Yes Chely Mcarthur MD   metoprolol tartrate (LOPRESSOR) 25 MG tablet TAKE 1 TABLET BY MOUTH EVERY DAY 6/27/24  Yes Chely Mcarthur MD   spironolactone (ALDACTONE) 25 MG tablet TAKE 1 TABLET BY MOUTH EVERY DAY 6/27/24  Yes Chely Mcarthur MD   Spacer/Aero-Holding Chambers LORNE 1 Device by Does not apply route daily as needed (Shortess of breath) 1/26/24  Yes Robert Mondragon DO   bumetanide (BUMEX) 2 MG tablet Take 1 tablet by mouth 2 times daily 9/8/23  Yes Chely Mcarthur MD   Multiple Vitamins-Minerals (MULTIVITAMIN ADULTS 50+ PO) Take by mouth   Yes ProviderMayda MD   levothyroxine (SYNTHROID) 50 MCG tablet Take 1 tablet by mouth Daily 4/20/22  Yes Automatic Reconciliation, Ar   potassium chloride (K-TAB) 20 MEQ TBCR extended release tablet 08/31/2016 Potassium Chloride Oral ER Tab  PO 1.0 TABLET(S), SUSTAINED ACTION daily  08/31/2016  active 8/31/16   Mayda Ivy MD   oxyCODONE (ROXICODONE) 5 MG immediate release tablet Take 1 tablet by mouth every 6 hours as needed for Pain.    Mayda Ivy MD   nitroGLYCERIN (NITROSTAT) 0.4 MG SL tablet Place 1 tablet under the tongue 8/2/21   Automatic Reconciliation, Ar   polyethyl glycol-propyl glycol 0.4-0.3 % (SYSTANE) 0.4-0.3 %  88 74 - 99 mg/dL    BUN 29 (H) 7.0 - 18 MG/DL    Creatinine 0.96 0.6 - 1.3 MG/DL    BUN/Creatinine Ratio 30 (H) 12 - 20      Est, Glom Filt Rate 59 (L) >60 ml/min/1.73m2    Calcium 9.1 8.5 - 10.1 MG/DL    Total Bilirubin 1.0 0.2 - 1.0 MG/DL    ALT 31 13 - 56 U/L    AST 29 10 - 38 U/L    Alk Phosphatase 66 45 - 117 U/L    Total Protein 7.1 6.4 - 8.2 g/dL    Albumin 3.2 (L) 3.4 - 5.0 g/dL    Globulin 3.9 2.0 - 4.0 g/dL    Albumin/Globulin Ratio 0.8 0.8 - 1.7     Protime-INR    Collection Time: 08/29/24  4:00 PM   Result Value Ref Range    Protime 13.6 11.9 - 14.9 sec    INR 1.0 0.9 - 1.1     APTT    Collection Time: 08/29/24  4:00 PM   Result Value Ref Range    APTT 28.6 23.0 - 36.4 SEC   Troponin    Collection Time: 08/29/24  4:00 PM   Result Value Ref Range    Troponin, High Sensitivity 23 0 - 54 ng/L   Brain Natriuretic Peptide    Collection Time: 08/29/24  4:00 PM   Result Value Ref Range    NT Pro- 0 - 1,800 PG/ML   EKG 12 Lead    Collection Time: 08/29/24  4:37 PM   Result Value Ref Range    Ventricular Rate 60 BPM    Atrial Rate 60 BPM    P-R Interval 178 ms    QRS Duration 60 ms    Q-T Interval 356 ms    QTc Calculation (Bazett) 356 ms    P Axis 68 degrees    R Axis -18 degrees    T Axis 11 degrees    Diagnosis       Normal sinus rhythm with sinus arrhythmia  Minimal voltage criteria for LVH, may be normal variant ( R in aVL )  Inferior infarct (cited on or before 27-SEP-2013)  Cannot rule out Anterior infarct (cited on or before 27-SEP-2013)  Abnormal ECG  When compared with ECG of 23-MAY-2023 14:50,  Questionable change in initial forces of Anterior leads  Questionable change in initial forces of Inferior leads     EKG 12 Lead    Collection Time: 08/29/24  5:26 PM   Result Value Ref Range    Ventricular Rate 85 BPM    Atrial Rate 85 BPM    P-R Interval 146 ms    QRS Duration 82 ms    Q-T Interval 378 ms    QTc Calculation (Bazett) 449 ms    P Axis 25 degrees    R Axis 19 degrees    T Axis 17 degrees  3:28 AM   Result Value Ref Range    Sodium 135 (L) 136 - 145 mmol/L    Potassium 5.5 3.5 - 5.5 mmol/L    Chloride 110 100 - 111 mmol/L    CO2 20 (L) 21 - 32 mmol/L    Anion Gap 5 3.0 - 18 mmol/L    Glucose 83 74 - 99 mg/dL    BUN 25 (H) 7.0 - 18 MG/DL    Creatinine 0.89 0.6 - 1.3 MG/DL    BUN/Creatinine Ratio 28 (H) 12 - 20      Est, Glom Filt Rate 65 >60 ml/min/1.73m2    Calcium 9.2 8.5 - 10.1 MG/DL   Echo (TTE) complete (PRN contrast/bubble/strain/3D)    Collection Time: 08/30/24 10:55 AM   Result Value Ref Range    IVSd 1.2 (A) 0.6 - 0.9 cm    LVIDd 3.4 (A) 3.9 - 5.3 cm    LVIDs 2.4 cm    LVOT Diameter 2.2 cm    LVPWd 1.2 (A) 0.6 - 0.9 cm    LVOT Peak Gradient 3 mmHg    LVOT Peak Velocity 0.9 m/s    AV Area by Peak Velocity 4.1 cm2    AV Peak Gradient 3 mmHg    AV Peak Velocity 0.8 m/s    MV A Velocity 0.72 m/s    MV E Wave Deceleration Time 319.8 ms    MV E Velocity 0.33 m/s    LV E' Lateral Velocity 6 cm/s    LV E' Septal Velocity 4 cm/s    PV Peak Gradient 3 mmHg    PV Max Velocity 0.9 m/s    TAPSE 1.1 (A) 1.7 cm    TR Peak Gradient 24 mmHg    TR Max Velocity 2.47 m/s    Ascending Aorta 3.4 cm    Aortic Root 3.0 cm    Body Surface Area 1.94 m2    Fractional Shortening 2D 29 28 - 44 %    LVIDd Index 1.84 cm/m2    LVIDs Index 1.30 cm/m2    LV RWT Ratio 0.71     LV Mass 2D 130.2 67 - 162 g    LV Mass 2D Index 70.4 43 - 95 g/m2    MV E/A 0.46     E/E' Ratio (Averaged) 6.88     E/E' Lateral 5.50     E/E' Septal 8.25     LVOT Area 3.8 cm2    Ao Root Index 1.62 cm/m2    Ascending Aorta Index 1.84 cm/m2    AV Velocity Ratio 1.13     JOSE ANTONIO/BSA Peak Velocity 2.2 cm2/m2    Est. RA Pressure 8 mmHg    RVSP 32 mmHg       [unfilled]  10/27/23    ECHO (TTE) COMPLETE (PRN CONTRAST/BUBBLE/STRAIN/3D) 10/27/2023  4:23 PM (Final)    Interpretation Summary    Left Ventricle: Normal left ventricular systolic function with a visually estimated EF of 55 - 60%. Left ventricle size is normal. Increased wall thickness. Normal wall motion.  reconstruction  technique.      FINDINGS:    Lung/Airway: No consolidation or pleural effusion. Mild mosaic attenuation in  the lungs. Multiple pulmonary nodules bilaterally, measuring up to 7 mm at the  left upper lobe, do not appear significantly changed since at least December 2019. No additional follow-up necessary per Fleischner criteria.    Lower neck: Unremarkable.    Mediastinum:  No mediastinal lymphadenopathy.    Pulmonary arteries (includes assessment of MIP images): Main pulmonary artery  measures less than 3 cm. Small burden of bilateral pulmonary emboli. Most  proximal embolism on the right is in the right main pulmonary artery and  extending into the upper and lower lobar arteries. Smaller amount of embolism  distal left pulmonary artery and left lower lobar artery.    Aorta and other cardiovascular structures: No aortic aneurysm or dissection.  Severe coronary artery calcifications.  Heart Strain assessment:  -  RV/LV ratio (normal <0.9): Normal  -  Dysfunction or bowing of interventricular septum: None  -  There is not visualization of contrast reflux from the right heart into the  IVC/hepatic veins.    Upper abdominal structures: Cholecystectomy.    Chest wall: A prominent right axillary lymph node has been stable since at least  2019.    Bones: Decreased bone mineralization. Old healed bilateral rib fractures.  Thoracic spinal cord stimulator device with leads at T7-T8. Unchanged T4  compression fracture.    Impression  1. Small burden of pulmonary emboli as described above, most proximally in the  distal right and left pulmonary arteries. No evidence for right heart strain.    2. Mild mosaic attenuation in the lungs could be due to small airways or small  vessel disease.    3. Severe coronary artery calcifications.    Seble Grover MD discussed #1 above with Dr. LORRI HARTMAN I at 8/29/2024  5:41 PM. Findings were acknowledged and understood.    Electronically signed by Seble SANFORD

## 2024-08-30 NOTE — ED NOTES
TRANSFER - OUT REPORT:    Verbal report given to BILL Govea on Maddy Baron  being transferred to [id number] for routine progression of patient care       Report consisted of patient's Situation, Background, Assessment and   Recommendations(SBAR).     Information from the following report(s) Nurse Handoff Report, ED Encounter Summary, ED SBAR, Adult Overview, Intake/Output, MAR, and Recent Results was reviewed with the receiving nurse.    [place] Fall Assessment:    Presents to emergency department  because of falls (Syncope, seizure, or loss of consciousness): No  Age > 70: Yes  Altered Mental Status, Intoxication with alcohol or substance confusion (Disorientation, impaired judgment, poor safety awaremess, or inability to follow instructions): No  Impaired Mobility: Ambulates or transfers with assistive devices or assistance; Unable to ambulate or transer.: Yes  Nursing Judgement: Yes          Lines:   Peripheral IV 08/29/24 Left Antecubital (Active)   Site Assessment Clean, dry & intact 08/29/24 1940   Line Status Brisk blood return 08/29/24 1940   Line Care Connections checked and tightened 08/29/24 1940   Phlebitis Assessment No symptoms 08/29/24 1940   Infiltration Assessment 0 08/29/24 1940   Alcohol Cap Used No 08/29/24 1940   Dressing Status New dressing applied;Clean, dry & intact 08/29/24 1940   Dressing Type Transparent 08/29/24 1940   Dressing Intervention New 08/29/24 1940        Opportunity for questions and clarification was provided.      Patient transported with:  Monitor

## 2024-08-30 NOTE — ED NOTES
Report given to [facility]. All personal belongings collected and proper paperwork given. Heparin rate verified with TMT.   Patient ROJELIO

## 2024-08-30 NOTE — PLAN OF CARE
Problem: Occupational Therapy - Adult  Goal: By Discharge: Performs self-care activities at highest level of function for planned discharge setting.  See evaluation for individualized goals.  Description: Occupational Therapy Goals:  Initiated 8/30/2024 to be met within 7-10 days.    1.  Patient will perform upper body dressing with supervision/set-up.   2.  Patient will perform grooming with supervision/set-up standing.  3.  Patient will perform lower body dressing with supervision/set-up using AE prn.  4.  Patient will perform toilet transfers with supervision/set-up.  5.  Patient will perform all aspects of toileting with supervision/set-up.  6.  Patient will participate in upper extremity therapeutic exercise/activities with supervision/set-up for 8 minutes to improve endurance and UB strength needed for ADLs    7.  Patient will utilize energy conservation techniques during functional activities with verbal cues.    PLOF: Pt lives with friend, uses rollator for functional mobility. Pt's friend assists with LB ADLs prn.  Outcome: Progressing  OCCUPATIONAL THERAPY EVALUATION    Patient: Maddy Baron (79 y.o. female)  Date: 8/30/2024  Primary Diagnosis: Acute pulmonary embolism, unspecified pulmonary embolism type, unspecified whether acute cor pulmonale present ([facility]) [I26.99]  DVT, lower extremity, distal, acute, left ([facility]) [I82.4Z2]       Precautions: Fall Risk, General Precautions    ASSESSMENT :    Pt is pleasant and cooperative, agreeable to OT. Pt is very motivated to get OOB. Pt required additional time and Mod A for bed mobility due to difficulty with BLE management. At baseline pt sleeps in recliner. SBA for functional mobility in room with FWW in prep for BR mobility and toileting. Min A for toileting hygiene in std for thoroughness. Pt with decreased BUR shd AROM, however, demos adaptive strategies for UB ADLs. Due to decreased flexibility and body habitus pt requires assist for LB dressing/bathing,  bruises, bleeding under skin folds R abdomen  Edema: BLE    Vision/Perceptual:    Vision  Vision: Impaired  Vision Exceptions: Wears glasses at all times        Coordination: BUE  Coordination: Generally decreased, functional        Balance:     Balance  Sitting: Intact  Standing: Impaired;With support  Standing - Static: Good  Standing - Dynamic: Fair    Strength: BUE  Strength: Generally decreased, functional    Tone & Sensation: BUE  Tone: Normal  Sensation: Intact    Range of Motion: BUE  AROM: Generally decreased, functional (decreased bilateral shd AROM)   Functional Mobility and Transfers for ADLs:  Bed Mobility:     Bed Mobility Training  Bed Mobility Training: Yes  Supine to Sit: Moderate assistance  Sit to Supine: Moderate assistance  Scooting: Minimum assistance  Transfers:   Transfer Training  Transfer Training: Yes  Sit to Stand: Stand-by assistance  Stand to Sit: Stand-by assistance  Toilet Transfer: Stand-by assistance    ADL Assessment:   Feeding: Supervision  Grooming: Supervision  UE Bathing: Supervision  LE Bathing: Minimal assistance  UE Dressing: Contact guard assistance  LE Dressing: Minimal assistance  Toileting: Minimal assistance  Functional Mobility: Contact guard assistance   ADL Intervention:  Donning gown with SBA in sitting  Toileting hygiene with Min A  BR mobility with FWW with CGA  Pain:  Intensity Pre-treatment: 0/10   Intensity Post-treatment: 0/10  Scale: Numeric Rating Scale    Activity Tolerance:   Activity Tolerance: Patient Tolerated treatment well  Please refer to the flowsheet for vital signs taken during this treatment.    After treatment:   [] Patient left in no apparent distress sitting up in chair  [x] Patient left in no apparent distress in bed  [x] Call bell left within reach  [x] Nursing notified  [x] RN present  [] Bed alarm activated    COMMUNICATION/EDUCATION:   Patient Education  Education Given To: Patient  Education Provided: Role of Therapy;ADL Adaptive  Strategies;Transfer Training;Energy Conservation;Fall Prevention Strategies;Plan of Care  Education Method: Demonstration;Verbal;Teach Back  Barriers to Learning: None  Education Outcome: Verbalized understanding    Thank you for this referral.  Rishi Whitlock OTR/L  Minutes: 32    Eval Complexity: Decision Making: Medium Complexity

## 2024-08-30 NOTE — CARE COORDINATION
08/30/24 1088   Service Assessment   Patient Orientation Alert and Oriented   Cognition Alert   History Provided By Patient   Primary Caregiver Self   Accompanied By/Relationship Yessi Kidd   Support Systems Family Members   Patient's Healthcare Decision Maker is: Legal Next of Kin   PCP Verified by CM Yes   Last Visit to PCP Within last 3 months   Prior Functional Level Assistance with the following:;Cooking;Housework;Shopping;Mobility   Current Functional Level Cooking;Housework;Shopping;Mobility   Can patient return to prior living arrangement Unknown at present   Ability to make needs known: Good   Family able to assist with home care needs: Yes   Would you like for me to discuss the discharge plan with any other family members/significant others, and if so, who? Yes  (Yessi)   Financial Resources Medicare;Other (Comment)   Community Resources None   Social/Functional History   Lives With Family   Type of Home House   Home Layout One level   Home Access Ramped entrance   Bathroom Toilet Handicap height   Bathroom Equipment None   Home Equipment Walker - Rolling;Wheelchair - Electric   Receives Help From Family   ADL Assistance Needs assistance   Ambulation Assistance Independent   Transfer Assistance Independent   Active  No   Patient's  Info Family transports   Occupation Retired   Discharge Planning   Type of Residence House   Living Arrangements Family Members   Current Services Prior To Admission   (Home oxygen)   DME Ordered? No   Potential Assistance Purchasing Medications No   Type of Home Care Services None   Patient expects to be discharged to: Skilled nursing facility   One/Two Story Residence One story   History of falls? 0   Services At/After Discharge   Transition of Care Consult (CM Consult) SNF   Partner SNF No   Services At/After Discharge Skilled Nursing Facility (SNF)   Mode of Transport at Discharge BLS   Confirm Follow Up Transport Self   Condition of Participation:

## 2024-08-30 NOTE — PROGRESS NOTES
4 Eyes Skin Assessment     NAME:  Maddy Baron  YOB: 1945  MEDICAL RECORD NUMBER:  [id number]    The patient is being assessed for  Shift Handoff    I agree that at least one RN has performed a thorough Head to Toe Skin Assessment on the patient. ALL assessment sites listed below have been assessed.      Areas assessed by both nurses:    Head, Face, Ears and Shoulders, Back, Chest        Does the Patient have a Wound? Yes wound(s) were present on assessment. LDA wound assessment was Initiated and completed by RN. Right groin rash from moisture        Carroll Prevention initiated by RN: Yes  Wound Care Orders initiated by RN: Yes    Pressure Injury (Stage 3,4, Unstageable, DTI, NWPT, and Complex wounds) if present, place Wound referral order by RN under : No    New Ostomies, if present place, Ostomy referral order under : No     Nurse 1 eSignature: Electronically signed by Xuan Hernandez RN on 8/30/24 at 8:01 AM EDT    **SHARE this note so that the co-signing nurse can place an eSignature**    Nurse 2 eSignature: Electronically signed by Sharan Cantrell RN on 8/30/24 at 7:03 PM EDT

## 2024-08-30 NOTE — PLAN OF CARE
Problem: Safety - Adult  Goal: Free from fall injury  8/30/2024 0937 by Sharan Cantrell RN  Outcome: Progressing  8/30/2024 0330 by Xuan Hernandez RN  Outcome: Progressing     Problem: Discharge Planning  Goal: Discharge to home or other facility with appropriate resources  8/30/2024 0937 by Sharan Cantrell RN  Outcome: Progressing  8/30/2024 0330 by Xuan Hernandez RN  Outcome: Progressing     Problem: Pain  Goal: Verbalizes/displays adequate comfort level or baseline comfort level  8/30/2024 0937 by Sharan Cantrell RN  Outcome: Progressing  8/30/2024 0330 by Xuan Hernandez RN  Outcome: Progressing     Problem: Skin/Tissue Integrity  Goal: Absence of new skin breakdown  Description: 1.  Monitor for areas of redness and/or skin breakdown  2.  Assess vascular access sites hourly  3.  Every 4-6 hours minimum:  Change oxygen saturation probe site  4.  Every 4-6 hours:  If on nasal continuous positive airway pressure, respiratory therapy assess nares and determine need for appliance change or resting period.  8/30/2024 0937 by Sharan Cantrell RN  Outcome: Progressing  8/30/2024 0330 by Xuan Hernandez RN  Outcome: Progressing

## 2024-08-30 NOTE — PLAN OF CARE
Problem: Physical Therapy - Adult  Goal: By Discharge: Performs mobility at highest level of function for planned discharge setting.  See evaluation for individualized goals.  Description: Initiated  8/30/24  to be met within 7-10 days.    1.  Patient will move from supine to sit and sit to supine , scoot up and down, and roll side to side in bed with minimal assistance.    2.  Patient will transfer from bed to chair and chair to bed with minimal assistance using the least restrictive device.  3.  Patient will perform sit to stand with minimal assistance  4.  Patient will ambulate with minimal assistance for 15 feet with the least restrictive device.       PLOF: Pt lives with family in a single story home with ramped entrance.  Pt was independent with short distance mobility in home with use of RW.     Outcome: Progressing     PHYSICAL THERAPY EVALUATION    Patient: Maddy Baron (79 y.o. female)  Date: 8/30/2024  Primary Diagnosis: Acute pulmonary embolism, unspecified pulmonary embolism type, unspecified whether acute cor pulmonale present ([facility]) [I26.99]  DVT, lower extremity, distal, acute, left ([facility]) [I82.4Z2]       Precautions: Fall Risk, General Precautions,  ,  ,  ,  ,  ,  ,      ASSESSMENT :  Pt resting in bed upon entering room, agreeable to PT evaluation.  Family member at bedside.  Pt notes pain throughout her whole body from being in the bed, b/l LE swelling noted.  Pt with decreased AROM in b/l LEs, PROM limited by pain and stiffness in b/l knees from past TKAs.  Pt required MaxA to position herself comfortably in bed, transport then arrived to take pt down for testing and evaluation had to be stopped.  Pt positioned comfortable and all needs met with transport present in room upon leaving.  Pt would continue to benefit from skilled PT services while in acute care setting to address deficits and optimize functional mobility.      DEFICITS/IMPAIRMENTS:    , Body Structures, Functions, Activity  SUMMARY:     Past Medical History:   Diagnosis Date    Abnormal chest CT 2/6/2020    Adopted     Arthritis     Balance problems     Chronic cough 2/6/2020    GOMES (dyspnea on exertion) 2/28/2019    GERD (gastroesophageal reflux disease)     Gout     Hemochromatosis     High cholesterol     Hypertension     Hypothyroidism     Left knee pain     Left shoulder pain     Low blood potassium     Lumbar pain     Pain of left sacroiliac joint     Shoulder impingement      Past Surgical History:   Procedure Laterality Date    CHOLECYSTECTOMY      ORTHOPEDIC SURGERY      R trigger finger     OTHER SURGICAL HISTORY      Left knee replacment revision     OTHER SURGICAL HISTORY      Trigger finger surgery    OTHER SURGICAL HISTORY      Left femur    OTHER SURGICAL HISTORY      Both knees    OTHER SURGICAL HISTORY      Right wrist, replaced unlnar).    OTHER SURGICAL HISTORY      Right little finger    ROTATOR CUFF REPAIR Right     TONSILLECTOMY      TOTAL KNEE ARTHROPLASTY Bilateral     R knee/ Lknee and femur       Home Situation:  Social/Functional History  Lives With: Family  Type of Home: House  Home Layout: One level  Home Access: Ramped entrance  Home Equipment: Walker - Rolling  Has the patient had two or more falls in the past year or any fall with injury in the past year?: Unknown  Receives Help From: Family  ADL Assistance: Needs assistance  Ambulation Assistance: Independent  Transfer Assistance: Independent  Critical Behavior:  Orientation  Overall Orientation Status: Within Functional Limits  Orientation Level: Oriented X4  Cognition  Overall Cognitive Status: WFL    Strength:    Strength: Generally decreased, functional    Tone & Sensation:   Tone: Normal  Sensation: Intact    Range Of Motion:  AROM: Generally decreased, functional  PROM: Generally decreased, functional    Functional Mobility:  Bed Mobility:     Bed Mobility Training  Bed Mobility Training: No (bed level evaluation performed, pt with decreased  endurance and transport took pt for testing shortly after starting evaluation)  Transfers:     Transfer Training  Transfer Training: No  Balance:                  Wheelchair Mobility:        Ambulation/Gait Training:                       Gait  Gait Training: No                       Pain:  Intensity Pre-treatment: 9/10   Intensity Post-treatment: 9/10  Scale: Numeric Rating Scale  Location:  throughout whole body  Quality: Aching and Pressure  Intervention(s): Repositioning  and Rest      Activity Tolerance:   Activity Tolerance: Patient limited by endurance;Patient limited by pain  Please refer to the flowsheet for vital signs taken during this treatment.    After treatment:   []         Patient left in no apparent distress sitting up in chair  [x]         Patient left in no apparent distress in bed  [x]         Call bell left within reach  [x]         Nursing notified  []         Caregiver present  []         Bed alarm activated  []         SCDs applied    COMMUNICATION/EDUCATION:   Patient Education  Education Given To: Patient;Family  Education Provided: Role of Therapy;Plan of Care;Transfer Training  Education Method: Verbal;Teach Back  Barriers to Learning: None  Education Outcome: Verbalized understanding;Continued education needed    Thank you for this referral.  Monserrat Gilmore, PT  Minutes: 16      Eval Complexity: Decision Making: Medium Complexity

## 2024-08-30 NOTE — PROGRESS NOTES
Patient lying in the bed, feels fine.  No shortness of breath or chest pain.  Per RN, patient very difficult stick hard to draw labs.  Vascular surgery and pulmonary input noted  Discussed with pulmonary recommend okay to transition to NOAC or Lovenox  Given need for frequent APTT on heparin drip, will change patient Lovenox and transition to NOAC at discharge  PT OT recommending SNF, discussed with the patient wants to go home with home health care  Will continue PT/OT treatment in the hospital    Discussed with patient at the bedside

## 2024-08-30 NOTE — PROGRESS NOTES
4 Eyes Skin Assessment     NAME:  Maddy Baron  YOB: 1945  MEDICAL RECORD NUMBER:  [id number]    The patient is being assessed for  Shift Handoff    I agree that at least one RN has performed a thorough Head to Toe Skin Assessment on the patient. ALL assessment sites listed below have been assessed.      Areas assessed by both nurses:    Head, Face, Ears, Shoulders, Back, Chest, Arms, Elbows, Hands, Sacrum. Buttock, Coccyx, Ischium, Legs. Feet and Heels, and Under Medical Devices         Does the Patient have a Wound? Yes wound(s) were present on assessment. LDA wound assessment was Initiated and completed by RN       Carroll Prevention initiated by RN: Yes  Wound Care Orders initiated by RN: Yes    Pressure Injury (Stage 3,4, Unstageable, DTI, NWPT, and Complex wounds) if present, place Wound referral order by RN under : No    New Ostomies, if present place, Ostomy referral order under : No     Nurse 1 eSignature: Electronically signed by Sharan Cantrell RN on 8/30/24 at 7:03 PM EDT    **SHARE this note so that the co-signing nurse can place an eSignature**    Nurse 2 eSignature: {Esignature:[id number]}

## 2024-08-31 ENCOUNTER — APPOINTMENT (OUTPATIENT)
Facility: HOSPITAL | Age: 79
End: 2024-08-31
Payer: MEDICARE

## 2024-08-31 VITALS
DIASTOLIC BLOOD PRESSURE: 32 MMHG | TEMPERATURE: 98.3 F | BODY MASS INDEX: 39.6 KG/M2 | HEART RATE: 92 BPM | SYSTOLIC BLOOD PRESSURE: 73 MMHG | OXYGEN SATURATION: 100 % | WEIGHT: 201.72 LBS | RESPIRATION RATE: 36 BRPM | HEIGHT: 60 IN

## 2024-08-31 LAB
ANION GAP SERPL CALC-SCNC: 10 MMOL/L (ref 3–18)
BASOPHILS # BLD: 0 K/UL (ref 0–0.1)
BASOPHILS NFR BLD: 0 % (ref 0–2)
BUN SERPL-MCNC: 37 MG/DL (ref 7–18)
BUN/CREAT SERPL: 25 (ref 12–20)
CALCIUM SERPL-MCNC: 9.2 MG/DL (ref 8.5–10.1)
CHLORIDE SERPL-SCNC: 106 MMOL/L (ref 100–111)
CO2 SERPL-SCNC: 20 MMOL/L (ref 21–32)
CREAT SERPL-MCNC: 1.51 MG/DL (ref 0.6–1.3)
DIFFERENTIAL METHOD BLD: ABNORMAL
EOSINOPHIL # BLD: 0 K/UL (ref 0–0.4)
EOSINOPHIL NFR BLD: 0 % (ref 0–5)
ERYTHROCYTE [DISTWIDTH] IN BLOOD BY AUTOMATED COUNT: 16.2 % (ref 11.6–14.5)
FERRITIN SERPL-MCNC: 156 NG/ML (ref 8–388)
GLUCOSE BLD STRIP.AUTO-MCNC: 209 MG/DL (ref 70–110)
GLUCOSE BLD STRIP.AUTO-MCNC: 221 MG/DL (ref 70–110)
GLUCOSE SERPL-MCNC: 168 MG/DL (ref 74–99)
HCT VFR BLD AUTO: 39.9 % (ref 35–45)
HGB BLD-MCNC: 13.1 G/DL (ref 12–16)
IMM GRANULOCYTES # BLD AUTO: 0 K/UL (ref 0–0.04)
IMM GRANULOCYTES NFR BLD AUTO: 0 % (ref 0–0.5)
IRON SATN MFR SERPL: 46 % (ref 20–50)
IRON SERPL-MCNC: 102 UG/DL (ref 50–175)
LACTATE SERPL-SCNC: 3.7 MMOL/L (ref 0.4–2)
LYMPHOCYTES # BLD: 0.6 K/UL (ref 0.9–3.6)
LYMPHOCYTES NFR BLD: 3 % (ref 21–52)
MCH RBC QN AUTO: 32.3 PG (ref 24–34)
MCHC RBC AUTO-ENTMCNC: 32.8 G/DL (ref 31–37)
MCV RBC AUTO: 98.3 FL (ref 78–100)
MONOCYTES # BLD: 1 K/UL (ref 0.05–1.2)
MONOCYTES NFR BLD: 5 % (ref 3–10)
NEUTS SEG # BLD: 17.9 K/UL (ref 1.8–8)
NEUTS SEG NFR BLD: 92 % (ref 40–73)
NRBC # BLD: 0 K/UL (ref 0–0.01)
NRBC BLD-RTO: 0 PER 100 WBC
PLATELET # BLD AUTO: 220 K/UL (ref 135–420)
PLATELET COMMENT: ABNORMAL
PMV BLD AUTO: 11.1 FL (ref 9.2–11.8)
POTASSIUM SERPL-SCNC: 4.3 MMOL/L (ref 3.5–5.5)
RBC # BLD AUTO: 4.06 M/UL (ref 4.2–5.3)
RBC MORPH BLD: ABNORMAL
SODIUM SERPL-SCNC: 136 MMOL/L (ref 136–145)
TIBC SERPL-MCNC: 223 UG/DL (ref 250–450)
WBC # BLD AUTO: 19.5 K/UL (ref 4.6–13.2)

## 2024-08-31 PROCEDURE — 94761 N-INVAS EAR/PLS OXIMETRY MLT: CPT

## 2024-08-31 PROCEDURE — 6370000000 HC RX 637 (ALT 250 FOR IP): Performed by: STUDENT IN AN ORGANIZED HEALTH CARE EDUCATION/TRAINING PROGRAM

## 2024-08-31 PROCEDURE — 6360000002 HC RX W HCPCS: Performed by: STUDENT IN AN ORGANIZED HEALTH CARE EDUCATION/TRAINING PROGRAM

## 2024-08-31 PROCEDURE — 2700000000 HC OXYGEN THERAPY PER DAY

## 2024-08-31 PROCEDURE — 82728 ASSAY OF FERRITIN: CPT

## 2024-08-31 PROCEDURE — 6360000002 HC RX W HCPCS

## 2024-08-31 PROCEDURE — 2580000003 HC RX 258: Performed by: EMERGENCY MEDICINE

## 2024-08-31 PROCEDURE — 6360000002 HC RX W HCPCS: Performed by: EMERGENCY MEDICINE

## 2024-08-31 PROCEDURE — 99238 HOSP IP/OBS DSCHRG MGMT 30/<: CPT | Performed by: HOSPITALIST

## 2024-08-31 PROCEDURE — P9041 ALBUMIN (HUMAN),5%, 50ML: HCPCS

## 2024-08-31 PROCEDURE — 83605 ASSAY OF LACTIC ACID: CPT

## 2024-08-31 PROCEDURE — 36415 COLL VENOUS BLD VENIPUNCTURE: CPT

## 2024-08-31 PROCEDURE — 82962 GLUCOSE BLOOD TEST: CPT

## 2024-08-31 PROCEDURE — 83540 ASSAY OF IRON: CPT

## 2024-08-31 PROCEDURE — 2580000003 HC RX 258

## 2024-08-31 PROCEDURE — 84466 ASSAY OF TRANSFERRIN: CPT

## 2024-08-31 PROCEDURE — 80048 BASIC METABOLIC PNL TOTAL CA: CPT

## 2024-08-31 PROCEDURE — 6370000000 HC RX 637 (ALT 250 FOR IP): Performed by: EMERGENCY MEDICINE

## 2024-08-31 PROCEDURE — 85025 COMPLETE CBC W/AUTO DIFF WBC: CPT

## 2024-08-31 PROCEDURE — 71045 X-RAY EXAM CHEST 1 VIEW: CPT

## 2024-08-31 RX ORDER — 0.9 % SODIUM CHLORIDE 0.9 %
500 INTRAVENOUS SOLUTION INTRAVENOUS ONCE
Status: COMPLETED | OUTPATIENT
Start: 2024-08-31 | End: 2024-08-31

## 2024-08-31 RX ORDER — SODIUM CHLORIDE 9 MG/ML
INJECTION, SOLUTION INTRAVENOUS CONTINUOUS
Status: DISPENSED | OUTPATIENT
Start: 2024-08-31 | End: 2024-08-31

## 2024-08-31 RX ORDER — HALOPERIDOL 5 MG/ML
2 INJECTION INTRAMUSCULAR EVERY 6 HOURS PRN
Status: DISCONTINUED | OUTPATIENT
Start: 2024-08-31 | End: 2024-08-31

## 2024-08-31 RX ORDER — ALBUMIN (HUMAN) 12.5 G/50ML
SOLUTION INTRAVENOUS
Status: DISCONTINUED
Start: 2024-08-31 | End: 2024-08-31 | Stop reason: HOSPADM

## 2024-08-31 RX ORDER — LOPERAMIDE HCL 2 MG
2 CAPSULE ORAL PRN
Status: DISCONTINUED | OUTPATIENT
Start: 2024-08-31 | End: 2024-08-31

## 2024-08-31 RX ORDER — ALBUMIN, HUMAN INJ 5% 5 %
25 SOLUTION INTRAVENOUS ONCE
Status: COMPLETED | OUTPATIENT
Start: 2024-08-31 | End: 2024-08-31

## 2024-08-31 RX ORDER — MORPHINE SULFATE 20 MG/ML
10 SOLUTION ORAL
Status: DISCONTINUED | OUTPATIENT
Start: 2024-08-31 | End: 2024-08-31

## 2024-08-31 RX ORDER — GLYCOPYRROLATE 0.2 MG/ML
0.2 INJECTION INTRAMUSCULAR; INTRAVENOUS EVERY 4 HOURS PRN
Status: DISCONTINUED | OUTPATIENT
Start: 2024-08-31 | End: 2024-08-31

## 2024-08-31 RX ORDER — POLYETHYLENE GLYCOL 3350 17 G/17G
17 POWDER, FOR SOLUTION ORAL DAILY PRN
Status: DISCONTINUED | OUTPATIENT
Start: 2024-08-31 | End: 2024-08-31 | Stop reason: HOSPADM

## 2024-08-31 RX ORDER — MIDODRINE HYDROCHLORIDE 5 MG/1
5 TABLET ORAL ONCE
Status: COMPLETED | OUTPATIENT
Start: 2024-08-31 | End: 2024-08-31

## 2024-08-31 RX ORDER — LIDOCAINE 4 G/G
1 PATCH TOPICAL DAILY
Status: DISCONTINUED | OUTPATIENT
Start: 2024-08-31 | End: 2024-08-31 | Stop reason: HOSPADM

## 2024-08-31 RX ORDER — 0.9 % SODIUM CHLORIDE 0.9 %
500 INTRAVENOUS SOLUTION INTRAVENOUS ONCE
Status: DISCONTINUED | OUTPATIENT
Start: 2024-08-31 | End: 2024-08-31

## 2024-08-31 RX ORDER — 0.9 % SODIUM CHLORIDE 0.9 %
250 INTRAVENOUS SOLUTION INTRAVENOUS ONCE
Status: COMPLETED | OUTPATIENT
Start: 2024-08-31 | End: 2024-08-31

## 2024-08-31 RX ADMIN — MEROPENEM 1000 MG: 1 INJECTION, POWDER, FOR SOLUTION INTRAVENOUS at 07:24

## 2024-08-31 RX ADMIN — ONDANSETRON 4 MG: 4 TABLET, ORALLY DISINTEGRATING ORAL at 01:36

## 2024-08-31 RX ADMIN — SODIUM CHLORIDE 500 ML: 9 INJECTION, SOLUTION INTRAVENOUS at 09:02

## 2024-08-31 RX ADMIN — VANCOMYCIN HYDROCHLORIDE 1750 MG: 10 INJECTION, POWDER, LYOPHILIZED, FOR SOLUTION INTRAVENOUS at 03:28

## 2024-08-31 RX ADMIN — ALBUMIN (HUMAN) 25 G: 12.5 INJECTION, SOLUTION INTRAVENOUS at 09:08

## 2024-08-31 RX ADMIN — MIDODRINE HYDROCHLORIDE 5 MG: 5 TABLET ORAL at 01:15

## 2024-08-31 RX ADMIN — MIDODRINE HYDROCHLORIDE 5 MG: 5 TABLET ORAL at 03:46

## 2024-08-31 RX ADMIN — ONDANSETRON 4 MG: 2 INJECTION INTRAMUSCULAR; INTRAVENOUS at 03:00

## 2024-08-31 RX ADMIN — LEVOTHYROXINE SODIUM 25 MCG: 0.05 TABLET ORAL at 07:32

## 2024-08-31 RX ADMIN — SODIUM CHLORIDE 250 ML: 9 INJECTION, SOLUTION INTRAVENOUS at 01:18

## 2024-08-31 ASSESSMENT — PAIN DESCRIPTION - ONSET: ONSET: ON-GOING

## 2024-08-31 ASSESSMENT — PAIN DESCRIPTION - ORIENTATION: ORIENTATION: RIGHT;LEFT;LOWER

## 2024-08-31 ASSESSMENT — PAIN - FUNCTIONAL ASSESSMENT: PAIN_FUNCTIONAL_ASSESSMENT: PREVENTS OR INTERFERES WITH ALL ACTIVE AND SOME PASSIVE ACTIVITIES

## 2024-08-31 ASSESSMENT — PAIN DESCRIPTION - FREQUENCY: FREQUENCY: CONTINUOUS

## 2024-08-31 ASSESSMENT — PAIN DESCRIPTION - LOCATION
LOCATION: LEG
LOCATION: LEG

## 2024-08-31 ASSESSMENT — PAIN SCALES - GENERAL
PAINLEVEL_OUTOF10: 10
PAINLEVEL_OUTOF10: 10

## 2024-08-31 ASSESSMENT — PAIN DESCRIPTION - PAIN TYPE: TYPE: ACUTE PAIN

## 2024-08-31 ASSESSMENT — PAIN DESCRIPTION - DESCRIPTORS: DESCRIPTORS: OTHER (COMMENT)

## 2024-08-31 NOTE — PROGRESS NOTES
Post mortem care completed after friends of the patient have left the bedside. Relative Yessi Kidd called back stating that instead of [facility]  home as previously stated, she would like for the patients remains to be sent to loving  home on [address] in [place]. Records updated to reflect this change.

## 2024-08-31 NOTE — PROGRESS NOTES
0515hrs:  TRANSFER - IN REPORT:    Verbal report received from BILL Parsons on Maddy Baron  being received from [address] for routine progression of patient care      Report consisted of patient's Situation, Background, Assessment and   Recommendations(SBAR).     Information from the following report(s) Index, ED Encounter Summary, ED SBAR, Adult Overview, Surgery Report, Intake/Output, MAR, Recent Results, Cardiac Rhythm ST, Neuro Assessment, and Event Log was reviewed with the receiving nurse.    Opportunity for questions and clarification was provided.      Assessment completed upon patient's arrival to unit and care assumed.      0615hrs:  Pt arrived to unit via bed and RN x2.      0633hrs:  Pt in bed, 's Right Eye with red-fluid filled and sluggish reaction.  Pt is appearing to be confused and keeps repeating \"I want to go back to bed\".  RN attempted to redirect pt; however, pt keeps repeating \"I want to go back to bed.\"  In addition, RN with multiple attempts to obtain BP; manual and bedside monitor unsuccessful.  RN also attempted to assess pt's pedal pulse with doppler - unsuccessful.     RN attempted to ask BILL Parsons, BILL Valentino and MD if pt's eye assessment was baseline; however, unsure.  RRT called.     Per RRT MD, CXR obtained; RN discussed CT of head and per MD it was ruled out d/t pt's Neuro assessment being intact.      RRT MD advised RN to start scheduled IV Meropenem STAT and to continue to gather Urine Cultures.  Still awaiting Vascular Team consult for PICC insertion, RRT MD and Nursing Supervisor made aware.      RN contacted Pharmacy to request pt's schedule IV Meropenem (pharmacy prepared).  Per Pharmacist, pt's dose switched to dose that is available NOW.      0724hrs:  IV Meropenem started.     0730hrs:  Bedside and Verbal shift change report given to BILL López (oncoming nurse) by BILL Cardenas (offgoing nurse). Report included the following information Nurse Handoff Report, Index, ED

## 2024-08-31 NOTE — SIGNIFICANT EVENT
[facility] Medicine  Rapid/Medical Response Team Note      Patient:    Maddy Baron 79 y.o. female, : 1945  Patient MRN: [id number]    Admission Date: 2024   Admission Diagnosis: Acute pulmonary embolism, unspecified pulmonary embolism type, unspecified whether acute cor pulmonale present ([facility]) [I26.99]  DVT, lower extremity, distal, acute, left (HCC) [I82.4Z2]      RAPID RESPONSE  Rapid response called for: AMS  Pt's nurse at bedside stated pt was confused, was asking to get into the bed when she was already lying in bed. Stated also concern about subconjunctival hemorrhage in R eye, stated it was mentioned before but not sure how long it has been there. Pt denies any CP/SOB, endorses back pain. States she wants to lie down. Able to state appropriate history and is oriented x4. Pt on heparin for acute PE.     OBJECTIVE    RRT/MRT start time:               RRT/MRT end time: 0710  Vitals:    24 0615   BP: (!) 116/98   Pulse: (!) 113   Resp: (!) 36   Temp:    SpO2:         Physical Exam:  Gen: NAD,   HEENT: normocephalic, atraumatic, subconjunctival hemorrhage in R eye however pupils equal, constrict to light  CV: tachycardic, normal S1/S2, poor perfusion in lower extremities however pulse found with doppler. Lower extremities edematous, cold.   Pulm: CTAB however limited to anterior chest due to pt not being able to lean to lean forward  Abd: soft, non-tender  MSK: BLE edematous, hematoma on LLE  Skin: cold  Neuro: CN 2-12 intact, intact to sensation with light touch bilaterally, motor strength 4+/5 on RUE, 5/5 on LUE, lower extremity motor strength 0/5 however pt able to plantarflex feet equally bilaterally  Psych: appropriate, alert, oriented x4      ASSESSMENT/PLAN AND DISPOSITION  Maddy Baron is a 79 y.o. year old female admitted for Acute pulmonary embolism, unspecified pulmonary embolism type, unspecified whether acute cor pulmonale present (HCC) [I26.99]  DVT, lower extremity,  distal, acute, left (HCC) [I82.4Z2]  Rapid Response Team/ Medical Response Team Called For: AMS    In setting of sepsis completed the below:    Medications Administered During Rapid:  none    EKG: n/a    Labs: unable to draw, pt requires PICC    POC Glu > 200    Imaging:       > CXR: no consolidation or signs of infection    Other interventions: started on meropenem and IV fluids, plan for PICC placement for blood draws    AMS  Less likely secondary to acute CVA due to no focal deficits  SIRS 2/4 (Tachycardia, leukocytosis)   Suspect sepsis, unknown source  Pt bp 116/98 during rapid, unable to obtain temperature  Pt received vanc, just arrived on floor  Plan:  - start meropenem  - IV fluids  - draw labs, PICC needed  - follow up CXR    Disposition:  3N  Patient condition: guarded      Attending Dr. Gil notified of rapid response and is in agreement with plan.     Primary team is resuming care.     [facility] Senior resident Dr. Mitchell present during Rapid Response.        Alanna Mitchell MD -PGY 1  [facility] Family Medicine  August 31, 2024 6:53 AM

## 2024-08-31 NOTE — PROGRESS NOTES
Patient pronounced  by Dr. Genao at 0922 a.m.  en route to hospital after being notified by charge RN. Relatives at bedside and afforded privacy to pay their respects to their loved one. [facility] updated and declined donation based on the patients age.  Case number per [facility] is [id number]. Supervisor previously at bedside Leti, aware of all events surrounding this patient.

## 2024-08-31 NOTE — CONSENT
Informed Consent for Blood Component Transfusion Note    I have discussed with the patient the rationale for blood component transfusion; its benefits in treating or preventing fatigue, organ damage, or death; and its risk which includes mild transfusion reactions, rare risk of blood borne infection, or more serious but rare reactions. I have discussed the alternatives to transfusion, including the risk and consequences of not receiving transfusion. The patient had an opportunity to ask questions and had agreed to proceed with transfusion of blood components.    Electronically signed by Gerardo Gil MD on 8/31/24 at 1:29 AM EDT

## 2024-08-31 NOTE — PROGRESS NOTES
Cessation of breathing noted, pulse to femoral artery attainable only by doppler and registered at 72 bpm and steadily decreasing. No CPR measures initiated in light of the patients code status DNR. Made comfortable at this time.

## 2024-08-31 NOTE — PROGRESS NOTES
Nighttime hospitalist note  Patient has lactic acidosis and leukocytosis.  Hemoglobin stable for now.  I am concerned about sepsis.  Patient has a history of ESBL in the past.  Will start empiric meropenem and vancomycin (pharmacy to dose).  Check blood cultures UA and urine culture.  IV fluids cautiously given history of CHF.  Serial lactate levels

## 2024-08-31 NOTE — PROGRESS NOTES
Patients corpse dispatched to [facility]. No personal effects remained at bedside or transported with her, all collected by relatives/friends who were at bedside earlier today.

## 2024-08-31 NOTE — PROGRESS NOTES
Patient is awake c/o leg pain.  Large, raised eccymotic area left side of leg noted.  Dr Gil made aware stating to give the scheduled norco now.  0015  B/P 88/63, 82/58.  Norco was not given.  HR .  Dr Gil made aware, orders to come.  0120  Receiving NS fluid bolus and midodrine as ordered.  Patient is awake, alert, continues to c/o left leg pain.  Dr Gil has seen the patient.   is here drawing her labs.  0350  Fluid bolus completed.  Vancomycin started .  0615  Order received for transfer to [facility].  Report given to Ameca.

## 2024-08-31 NOTE — SIGNIFICANT EVENT
[facility] Medicine  Rapid/Medical Response Team Note      Patient:    Maddy Baron 79 y.o. female, : 1945  Patient MRN: [id number]    Admission Date: 2024   Admission Diagnosis: Acute pulmonary embolism, unspecified pulmonary embolism type, unspecified whether acute cor pulmonale present ([facility]) [I26.99]  DVT, lower extremity, distal, acute, left ([facility]) [I82.4Z2]      RAPID RESPONSE  Rapid response called for: unresponsive, hypotension       SUBJECTIVE     Team was called for Maddy Baron, a 79 y.o. y/o female, due to unresponsiveness and hypotension. On arrival vitals were declining rapidly, bp 73/32, unable to obtain repeat. Extremities cold, pulse difficult to palpate, doppler was use to find femoral pulse. Pt unresponsive to sternal rub. Started on fluids and albumin. Pt was DNR with limited measures. PICC team was called. Pt was placed on non-rebreather. Pt's vitals deteriorated quickly, HR decreasing, unable to obtain repeat BP. Pt's daughter was called to confirm DNR and limited measures. Pt's daughter arrived and agreed with comfort measures. Pt's condition continued to decline, time of death was 9:20 AM.      Medical problems:     Patient Active Problem List   Diagnosis    Abnormal chest CT    History of pulmonary embolism    CAP (community acquired pneumonia)    Shoulder impingement    STEMI (ST elevation myocardial infarction) ([facility])    Hypertension    Chronic cough    Dyspnea and respiratory abnormalities    Spinal stenosis    Sacroiliitis (HCC)    Chronic diastolic congestive heart failure (HCC)    Obesity, morbid (HCC)    Chronic bronchitis with productive mucopurulent cough ([facility])    Sepsis ([facility])    Hereditary hemochromatosis ([facility])    High cholesterol    Atrial fibrillation with RVR ([facility])    History of DVT (deep vein thrombosis)    Hypotension    Multiple subsegmental pulmonary emboli without acute cor pulmonale ([facility])    Coronary artery disease of native artery of native heart with  stable angina pectoris (HCC)    Bilateral leg edema    Weakness    Acute UTI    Stage 3 chronic kidney disease (HCC)    Compression fracture of body of thoracic vertebra (HCC)    Acquired hypothyroidism    Goals of care, counseling/discussion    Advanced care planning/counseling discussion    Debility    Acute combined systolic (congestive) and diastolic (congestive) heart failure (HCC)    Acute embolism and thrombosis of other specified veins    Benign intracranial hypertension    Deep vein thrombosis (DVT) of lower extremity (HCC)    Low back pain, unspecified    Gout, unspecified    Protein-calorie malnutrition (HCC)    Acute pulmonary embolism, unspecified pulmonary embolism type, unspecified whether acute cor pulmonale present (HCC)    DVT, lower extremity, distal, acute, left (HCC)    Acute bilateral deep vein thrombosis (DVT) of femoral veins (HCC)    Moderate persistent asthma without complication    Chronic hypoxic respiratory failure ([facility])    Class 2 obesity due to excess calories without serious comorbidity with body mass index (BMI) of 38.0 to 38.9 in adult          OBJECTIVE    RRT/MRT start time:               RRT/MRT end time:  Vitals:    08/31/24 0837   BP:    Pulse: 92   Resp:    Temp:    SpO2:         Physical Exam:  Gen: NAD, comfortable  HEENT: normocephalic, atraumatic, subconjunctival hemorrhage in R eye  CV: no heart sounds auscultated  well-perfused, PMI not displaced  Pulm: CTAB, no wheezes, no crackles  Abd: S/NT/ND, no rebound, no guarding, no hepatosplenomegaly   MSK: no clubbing, no edema  Skin: warm, dry, intact, no rash  Neuro: CN II-XII grossly intact, no focal deficits appreciated   Psych: appropriate, alert, oriented x3      ASSESSMENT/PLAN AND DISPOSITION  Maddy LOREN Baron is a 79 y.o. year old female admitted for Acute pulmonary embolism, unspecified pulmonary embolism type, unspecified whether acute cor pulmonale present ([facility]) [I26.99]  DVT, lower extremity, distal, acute, left  ([facility]) [I82.4Z2]  Rapid Response Team/ Medical Response Team Called For:     In setting of unresponsiveness completed the below:    Medications Administered During Rapid:      EKG:none    Labs: unable to draw    Imaging:       > CXR: this AM, showed no acute process      Other interventions: fluid bolus, albumin      Pt , most likely secondary to acute PE given multiple blood clots and rapid decline.         Attending Dr. Genao notified of rapid response and is in agreement with plan.          [facility] Senior resident Dr. Mitchell present during Rapid Response.      Starla Prajapati MD -PGY 1  [facility] Family Medicine  2024 2:29 PM

## 2024-08-31 NOTE — DISCHARGE SUMMARY
Death summary    Date and time of death: 8/31/2024 at 9:22 AM      Principal diagnosis  Acute bilateral pulmonary embolism  Acute bilateral extensive DVTs  Left leg hematoma  Chronic respiratory failure on home oxygen  Moderate persistent asthma  History of pulmonary nodules  CAD  Morbid obesity  Head x-ray hemochromatosis  Chronic diastolic heart failure  CKD stage III    Hospital course  Maddy Baron is a 79 y.o.  female with hx of hemochromatosis, GERD, gout, hypertension.     Patient reported to [facility] emergency department.  Patient has been having progressive shortness of breath for the past month.  She was recently seen by her outpatient pulmonologist who prescribed oxygen secondary to dyspnea with exertion.     Patient is also concerned with bilateral leg swelling.  She had a DVT study which showed bilateral femoral DVTs. CTA chest also showed small bilateral pulmonary emboli.    Patient was admitted to hospital with impression of bilateral lower extremity extensive DVT, bilateral pulm embolism.  Was started on IV heparin drip.  Vascular surgery was consulted for possible need for IVC filter placement.  Pulmonary was consulted.  Vascular surgery saw the patient and recommended no need for IVC filter placement since patient does not have any extensive PE recommend continue anticoagulation with Eliquis.  Pulmonary also saw the patient agreed with vascular surgery recommendation, recommended okay to transition to Eliquis next day.  Patient was a hard stick, given multiple blood draws for APTT, IV heparin was stopped and started on Lovenox.  Overnight patient developed left leg hematoma, H&H did not drop but patient did develop swelling.  Patient also became slightly hypotensive, workup revealed concern for infection and sepsis.  Patient was started on empiric antibiotics.  Patient started having worsening mental status and hypotension, RRT was called.  Patient rapidly declined during RRT, pulse

## 2024-08-31 NOTE — PROGRESS NOTES
Nighttime hospitalist  RN called stating that patient was having worsening left leg pain and had some bluish swelling to her left leg.  Subsequently RN called that patient was hypotensive.  At which time I held the diuretics and gave the patient a fluid bolus and a dose of midodrine and ordered labs.  Subsequently I went and assessed the patient.  Patient is sitting in bed, awake and alert and follows commands.  Patient complains of left leg pain.  Large visible hematoma noted on the lateral aspect of the left leg around 14 cm x 12 cm in size.  Patient states that this has grown in size over the last day.  Likely hematoma in the setting of anticoagulation.  Check stat labs and hemoglobin.  Will hold Lovenox for now.  Vascular surgery is already following.  I did discuss with the patient regarding the possibility that she may need a blood transfusion.  Patient gave consent.  Please see my separate note.  Continue to monitor blood pressure.  Judicious pain control  Discussed with RN

## 2024-08-31 NOTE — PROGRESS NOTES
was called to the bedside at the time of death.  provided pastoral and bereavement care to the friends present.    Vinayak Edward  Director of spiritual health  [phone]

## 2024-08-31 NOTE — PROGRESS NOTES
Patient became unresponsive, vitals rapidly declining. Manual blood pressures immeasurable. Team alerted and at bedside, MD's attempting to contact HANNAH Kidd. Patients extremities cold with bluish discoloration and pupillary reaction unequal and sluggish. Switched to rebreather mask at 15 L O2 after SpO2 declined and could not be sustained per nasal canula. Unable to attain repeat saturation as extremities extremely cold to touch. Respirations agonal at this time. Awaiting further instructions per Dr. Mitchell.     0900: IV Bolus 500 ml started as well as albumin 25g.     0920am: Cessation of breathing noted, pulse to femoral artery attainable only by doppler and registered at 72 bpm and steadily decreasing.

## 2024-08-31 NOTE — PROGRESS NOTES
I revisited patient's room.  Patient is laying in bed.  Patient still complains of a lot of left leg pain.  I have added a lidocaine patch.  Blood pressures remain soft.  Will give an additional dose of midodrine 5 mg now.  Continue IV fluids.  Continue antibiotics as ordered.  Monitor closely.  Upgrade to stepdown.  I discussed with patient.  I discussed with RN

## 2024-09-01 LAB
EKG ATRIAL RATE: 60 BPM
EKG ATRIAL RATE: 85 BPM
EKG DIAGNOSIS: NORMAL
EKG DIAGNOSIS: NORMAL
EKG P AXIS: 25 DEGREES
EKG P AXIS: 68 DEGREES
EKG P-R INTERVAL: 146 MS
EKG P-R INTERVAL: 178 MS
EKG Q-T INTERVAL: 356 MS
EKG Q-T INTERVAL: 378 MS
EKG QRS DURATION: 60 MS
EKG QRS DURATION: 82 MS
EKG QTC CALCULATION (BAZETT): 356 MS
EKG QTC CALCULATION (BAZETT): 449 MS
EKG R AXIS: -18 DEGREES
EKG R AXIS: 19 DEGREES
EKG T AXIS: 11 DEGREES
EKG T AXIS: 17 DEGREES
EKG VENTRICULAR RATE: 60 BPM
EKG VENTRICULAR RATE: 85 BPM
TRANSFERRIN SERPL-MCNC: 177 MG/DL (ref 149–313)

## 2024-09-01 PROCEDURE — 93010 ELECTROCARDIOGRAM REPORT: CPT | Performed by: INTERNAL MEDICINE

## 2024-09-05 SDOH — HEALTH STABILITY: PHYSICAL HEALTH: ON AVERAGE, HOW MANY DAYS PER WEEK DO YOU ENGAGE IN MODERATE TO STRENUOUS EXERCISE (LIKE A BRISK WALK)?: 1 DAY

## 2024-09-05 NOTE — PROGRESS NOTES
Physician Progress Note      PATIENT:               CHRIS CONLEY  CSN #:                  [id number]  :                       1945  ADMIT DATE:       2024 3:45 PM  DISCH DATE:        2024 1:54 PM  RESPONDING  PROVIDER #:        Mehul Coello MD          QUERY TEXT:    Patient admitted with bilateral lower extremity extensive DVT and  Dewey. Pulm   Embolism . Documentation reflects concern for  Sepsis  in MD PN note(s) dated   24-24 .  If possible, please document in the progress notes and   discharge summary if Sepsis  was:    The medical record reflects the following:    Risk Factors: Patient has lactic acidosis and leukocytosis.  Hemoglobin stable   for now.  I am concerned about sepsis. O2 with goal sat 88-92% ,Dyspnea and   respiratory abnormalities ,Multiple subsegmental pulmonary emboli without   acute cor pulmonale.    Clinical Indicators: H&H did not drop but patient did develop swelling.    Patient also became slightly hypotensive, workup revealed concern for   infection and sepsis.    MD PN ---AMS  Less likely secondary to acute CVA due to no focal deficits  SIRS 2/4 (Tachycardia, leukocytosis)  Suspect sepsis, unknown source  Pulm. --Moderate persistent asthma - hx eosinophilia.  Mosaic attenuation on   CTA c/w small airways disease in addition ,Pes are small and subsegmental   without evidence of RV strain or other high risk features.  It is unlikely   that the PEs are significantly contributing to her poor pulmonary reserve and   that is due to her existing asthma and chronic hypoxia from restrictive lung   disease due to other medical morbidities. Her overall risk for developing   CTEPH is low provided she adheres to anticoagulation.    Progress Notes  - 24 : Patient has lactic acidosis and leukocytosis.    Hemoglobin stable for now.  I am concerned about sepsis.    Treatment: IV  meropenem and vancomycin ,Pulm and Vasc  consult , NS fluid   bolus

## 2024-09-12 NOTE — PROGRESS NOTES
Physician Progress Note      PATIENT:               CHRIS CONLEY  CSN #:                  [id number]  :                       1945  ADMIT DATE:       2024 3:45 PM  DISCH DATE:        2024 1:54 PM  RESPONDING  PROVIDER #:        Mehul Coello MD          QUERY TEXT:    Pt admitted with AMS , DVT and  Dewey. Pulm Embolism with Sepsis of unknown   etiology.  Pt noted to have Sepsis documented 24-24 per MDs. If possible,   please document in progress notes and discharge summary the present on   admission status of Sepsis:    The medical record reflects the following:    Risk Factors:79-year-old female with a history of chronic edema of the lower   extremities, shortness of breath now on oxygen with exertion, diastolic heart   failure, history of DVT, atrial fibrillation, gout, compression fractures of   her spine, UTI, and mobility, that presents emergency department after having   DVT study showing bilateral femoral DVTs. Chronic hypoxic respiratory failure    Clinical Indicators: ADt : 24 3:45 PM 1st 24 hrs VS- RR 21,Labs 12.9 ,79%   neutrophils , 9.1 neutro. absolute    Admitted with- Multiple subsegmental pulmonary emboli without acute cor   pulmonale  Acute bilateral deep vein thrombosis (DVT) of femoral veins  Moderate persistent asthma without complication  Chronic hypoxic respiratory failure  Class 2 obesity due to excess calories without serious comorbidity with body   mass index (BMI) of 39.40    RRT -   AMS  Less likely secondary to acute CVA due to no focal deficits  SIRS 2/4 (Tachycardia, leukocytosis)  Suspect sepsis, unknown source    Treatment: IV  meropenem and vancomycin ,Pulm and Vasc  consult , NS fluid   bolus and midodrine also IV albumin    Thank you  Jacquelin Vela RN  CRCR  CDI    [phone] [facility]/[place]  email  [email or web address]  Options provided:  -- Yes, Sepsis of unknown etiology was present at the time of the order to   admit  to the hospital  -- No, Sepsis of unknown etiology was not present on admission and developed   during the inpatient stay  -- Other - I will add my own diagnosis  -- Disagree - Not applicable / Not valid  -- Disagree - Clinically unable to determine / Unknown  -- Refer to Clinical Documentation Reviewer    PROVIDER RESPONSE TEXT:    No, Sepsis of unknown etiology was not present on admission and developed   during the inpatient stay.    Query created by: Jaqueline Vela on 9/11/2024 3:13 PM      Electronically signed by:  Mehul Coello MD 9/12/2024 5:12 PM

## 2024-09-14 NOTE — Clinical Note
- airway protection/sedation     Needs 4 months in office visit for HTN/HLD/thyroid with fasting labs

## 2024-09-15 NOTE — ROUTINE PROCESS
SHIFT CHANGE NOTE FOR ALONLUIS MIGUEL    Bedside and Verbal shift change report given to [facility] [employer] LPN(oncoming nurse) by Tonia Smith LPN (offgoing nurse). Report included the following information SBAR, Kardex, MAR and Recent Results.     Situation:   Code Status: Full Code   Hospital Day: 10   Problem List:   Hospital Problems  Date Reviewed: 9/22/2022            Codes Class Noted POA    UTI (urinary tract infection) ICD-10-CM: N39.0  ICD-9-CM: 599.0  11/3/2022 Unknown       Background:   Past Medical History:   Past Medical History:   Diagnosis Date    Abnormal chest CT 2/6/2020    Adopted     Arthritis     Balance problems     Chronic cough 2/6/2020    MCBRIDE (dyspnea on exertion) 2/28/2019    GERD (gastroesophageal reflux disease)     Gout     Hemochromatosis     High cholesterol     Hypertension     Hypothyroidism     Left knee pain     Left shoulder pain     Low blood potassium     Lumbar pain     Pain of left sacroiliac joint     Shoulder impingement, right, with rotator cuff strain         Assessment:  Changes in Assessment throughout shift:      Patient has a central line: no Reasons if yes: n/a  Insertion date:n/a Last dressing date:n/a  Patient has Coelho Cath: no Reasons if yes: na   Insertion date:n/a  Shift coelho care completed: NO    Last Vitals:     Vitals:    11/11/22 1954 11/12/22 0745 11/12/22 1545 11/12/22 2004   BP: 110/68 129/80 116/62 (!) 131/56   Pulse: 63 73 72 68   Resp: 16 18 18 18   Temp: 98.1 °F (36.7 °C) 97.6 °F (36.4 °C) 98.1 °F (36.7 °C) 98 °F (36.7 °C)   SpO2: 98% 96% 99% 99%   Weight:       Height:           PAIN    Pain Assessment    Pain Intensity 1: 0 (11/12/22 2004) Pain Intensity 1: 2 (12/29/14 1105)    Pain Location 1: Head, Leg Pain Location 1: Abdomen    Pain Intervention(s) 1: Medication (see MAR) Pain Intervention(s) 1: Medication (see MAR)  Patient Stated Pain Goal: 0 Patient Stated Pain Goal: 0  Intervention effective: yes  Other actions taken for pain:      Skin Assessment  Skin color Skin Color: Appropriate for ethnicity  Condition/Temperature Skin Condition/Temp: Warm, Dry  Integrity Skin Integrity: Tear  Turgor    Weekly Pressure Ulcer Documentation  Pressure  Injury Documentation: No Pressure Injury Noted-Pressure Ulcer Prevention Initiated  Wound Prevention & Protection Methods  Orientation of wound Orientation of Wound Prevention: Posterior  Location of Prevention Location of Wound Prevention: Buttocks, Sacrum/Coccyx  Dressing Present Dressing Present : No  Dressing Status    Wound Offloading Wound Offloading (Prevention Methods): Bed, pressure redistribution/air, Bed, pressure reduction mattress, Repositioning    INTAKE/OUPUT  Date 11/12/22 0700 - 11/13/22 0659 11/13/22 0700 - 11/14/22 0659   Shift 7396-6758 4608-6331 24 Hour Total 7225-5740 7455-2783 24 Hour Total   INTAKE   P.O. 480  480        P. O. 480  480      Shift Total(mL/kg) 480(5.8)  480(5.8)      OUTPUT   Urine(mL/kg/hr)           Urine Occurrence(s) 4 x 2 x 6 x      Emesis/NG output           Emesis Occurrence(s)  0 x 0 x      Stool           Stool Occurrence(s) 1 x 0 x 1 x      Shift Total(mL/kg)           480      Weight (kg) 82.3 82.3 82.3 82.3 82.3 82.3         Recommendations:  Patient needs and requests: assistance with ADL's, decrease swelling on BLE    Pending tests/procedures: none at this time     Functional Level/Equipment: Partial (one person) / Wheelchair;Walker    Fall Precautions:   Fall risk precautions were reinforced with the patient; she was instructed to call for help prior to getting up. The following fall risk precautions were continued: bed/ chair alarms, door signage, yellow bracelet and socks as well as update of the Ronda Shouts tool in the patient's room. Talia Score: 3    HEALS Safety Check    A safety check occurred in the patient's room between off going nurse and oncoming nurse listed above.     The safety check included the below items  Area Items   H  High Alert Medications Verify all high alert medication drips (heparin, PCA, etc.)   E  Equipment Suction is set up for ALL patients (with lucinda)  Red plugs utilized for all equipment (IV pumps, etc.)  WOWs wiped down at end of shift. Room stocked with oxygen, suction, and other unit-specific supplies   A  Alarms Bed alarm is set for fall risk patients  Ensure chair alarm is in place and activated if patient is up in a chair   L  Lines Check IV for any infiltration  Gonzalez bag is empty if patient has a Gonzalez   Tubing and IV bags are labeled   S  Safety   Room is clean, patient is clean, and equipment is clean. Hallways are clear from equipment besides carts. Fall bracelet on for fall risk patients  Ensure room is clear and free of clutter  Suction is set up for ALL patients (with lucinda)  Hallways are clear from equipment besides carts.    Isolation precautions followed, supplies available outside room, sign posted     Davi Merchant LPN hide

## (undated) DEVICE — DRAIN SURG W7MMXL20CM SIL FULL PERF HUBLESS FLAT RADPQ STRP

## (undated) DEVICE — (D)BNDG ADHESIVE FABRIC 3/4X3 -- DISC BY MFR USE ITEM 357960

## (undated) DEVICE — SPIROMETER INCENT 2500ML W ONE W VLV

## (undated) DEVICE — CATHETER THROMCTMY L140CM RX L LUMN ASPIR TBNG INDIGO

## (undated) DEVICE — REM POLYHESIVE ADULT PATIENT RETURN ELECTRODE: Brand: VALLEYLAB

## (undated) DEVICE — MINI TREK CORONARY DILATATION CATHETER 2.0 MM X 8 MM / RAPID-EXCHANGE: Brand: MINI TREK

## (undated) DEVICE — SOLUTION IV 1000ML 0.9% SOD CHL

## (undated) DEVICE — COVER US PRB W15XL120CM W/ GEL RUBBERBAND TAPE STRP FLD GEN

## (undated) DEVICE — GUIDEWIRE VASC STR 3 CM 0.014 INX190 CM BAL MIDWT UNIV

## (undated) DEVICE — INTENDED FOR TISSUE SEPARATION, AND OTHER PROCEDURES THAT REQUIRE A SHARP SURGICAL BLADE TO PUNCTURE OR CUT.: Brand: BARD-PARKER SAFETY BLADES SIZE 10, STERILE

## (undated) DEVICE — HEX-LOCKING BLADE ELECTRODE: Brand: EDGE

## (undated) DEVICE — PROCEDURE KIT FLUID MGMT 10 FR CUST MAINFOLD

## (undated) DEVICE — NC TREK CORONARY DILATATION CATHETER 2.75 MM X 12 MM / RAPID-EXCHANGE: Brand: NC TREK

## (undated) DEVICE — GARMENT,MEDLINE,DVT,INT,CALF,MED, GEN2: Brand: MEDLINE

## (undated) DEVICE — Device

## (undated) DEVICE — CATHETER DIAG AD 6FR L100CM COR GRN HYDRPHLC NYL AR MOD W/O

## (undated) DEVICE — ANGIO-SEAL VIP VASCULAR CLOSURE DEVICE: Brand: ANGIO-SEAL

## (undated) DEVICE — DEVICE INFL W ACCS + HEMSTAS VLV INSRT TOOL AND TORQ BASIX

## (undated) DEVICE — PACK PROCEDURE SURG VASC CATH 161 MMC LF

## (undated) DEVICE — TRAY MYEL SFTY +

## (undated) DEVICE — PREP SKN CHLRAPRP APL 26ML STR --

## (undated) DEVICE — CATHETER DIAG AD L100CM DIA6FR STD JUDKINS L 4 POLYUR COR

## (undated) DEVICE — CATHETER GUID 6FR L100CM GRN PTFE JR4 TRUELUMEN HYBRID

## (undated) DEVICE — SUTURE VCRL SZ 1 L18IN ABSRB VLT CTX L48MM 1/2 CIR J765D

## (undated) DEVICE — ADHESIVE TISS DERMA FLEX 0.7ML -- HIGH VISCOSITY

## (undated) DEVICE — RUNTHROUGH NS EXTRA FLOPPY PTCA GUIDEWIRE: Brand: RUNTHROUGH

## (undated) DEVICE — FLEX ADVANTAGE 3000CC: Brand: FLEX ADVANTAGE

## (undated) DEVICE — STIMULATOR NERVE PT CTRL PROCLAIM

## (undated) DEVICE — PRESSURE MONITORING SET: Brand: TRUWAVE

## (undated) DEVICE — KIT CLN UP BON SECOURS MARYV

## (undated) DEVICE — SUTURE VCRL SZ 2-0 L18IN ABSRB VLT CT-1 L36MM 1/2 CIR J739D

## (undated) DEVICE — INTRODUCER SHTH 4FR CANN L11CM DIL TIP 25MM RED TUNGSTEN

## (undated) DEVICE — SYR 10ML LUER LOK 1/5ML GRAD --

## (undated) DEVICE — SET FLD ADMIN 3 W STPCOCK FIX FEM L BOR 1IN

## (undated) DEVICE — NDL SPNE MANAN 22GX6IN --

## (undated) DEVICE — JACKSON TABLE POSITIONER KIT: Brand: MEDLINE INDUSTRIES, INC.

## (undated) DEVICE — SUT VCRL + 1 18IN CTX MP VIO --

## (undated) DEVICE — ASSEMBLY CANSTR ENGINE -- INDIGO ASPIRATION SYSTEM

## (undated) DEVICE — STERILE LATEX POWDER-FREE SURGICAL GLOVESWITH NITRILE COATING: Brand: PROTEXIS

## (undated) DEVICE — BLANKET WRM AD W50XL85.8IN PACU FULL BODY FORC AIR

## (undated) DEVICE — STOCKING ANTIMBLSM KNEE XL REG --

## (undated) DEVICE — BLADE ASSEMB CLP HAIR FINE --

## (undated) DEVICE — INTRODUCER SHTH 6FR CANN L11CM DIL TIP 35MM GRN TUNGSTEN

## (undated) DEVICE — MEDIA CONTRAST 10ML 200MG/ML 41%

## (undated) DEVICE — Z DUPLICATE USE 2103554 VALVE HEMOSTATIC BLEEDBK CTRL COPILOT

## (undated) DEVICE — TREK CORONARY DILATATION CATHETER 2.50 MM X 15 MM / RAPID-EXCHANGE: Brand: TREK